# Patient Record
Sex: FEMALE | Race: WHITE | NOT HISPANIC OR LATINO | Employment: OTHER | URBAN - METROPOLITAN AREA
[De-identification: names, ages, dates, MRNs, and addresses within clinical notes are randomized per-mention and may not be internally consistent; named-entity substitution may affect disease eponyms.]

---

## 2017-03-21 DIAGNOSIS — R31.29 OTHER MICROSCOPIC HEMATURIA: ICD-10-CM

## 2017-03-21 DIAGNOSIS — N18.30 CHRONIC KIDNEY DISEASE, STAGE III (MODERATE) (HCC): ICD-10-CM

## 2017-03-21 DIAGNOSIS — I10 ESSENTIAL (PRIMARY) HYPERTENSION: ICD-10-CM

## 2017-03-21 DIAGNOSIS — I48.91 ATRIAL FIBRILLATION (HCC): ICD-10-CM

## 2017-03-21 DIAGNOSIS — N25.81 SECONDARY HYPERPARATHYROIDISM OF RENAL ORIGIN (HCC): ICD-10-CM

## 2017-03-21 DIAGNOSIS — R80.9 PROTEINURIA: ICD-10-CM

## 2017-03-21 DIAGNOSIS — I12.9 HYPERTENSIVE CHRONIC KIDNEY DISEASE WITH STAGE 1 THROUGH STAGE 4 CHRONIC KIDNEY DISEASE, OR UNSPECIFIED CHRONIC KIDNEY DISEASE: ICD-10-CM

## 2017-03-23 ENCOUNTER — ALLSCRIPTS OFFICE VISIT (OUTPATIENT)
Dept: OTHER | Facility: OTHER | Age: 82
End: 2017-03-23

## 2017-04-24 ENCOUNTER — TRANSCRIBE ORDERS (OUTPATIENT)
Dept: ADMINISTRATIVE | Facility: HOSPITAL | Age: 82
End: 2017-04-24

## 2017-04-24 DIAGNOSIS — R10.84 GENERALIZED ABDOMINAL PAIN: Primary | ICD-10-CM

## 2017-04-25 ENCOUNTER — HOSPITAL ENCOUNTER (OUTPATIENT)
Dept: CT IMAGING | Facility: HOSPITAL | Age: 82
Discharge: HOME | End: 2017-04-25
Payer: MEDICARE

## 2017-04-25 DIAGNOSIS — R10.84 GENERALIZED ABDOMINAL PAIN: ICD-10-CM

## 2017-04-25 PROCEDURE — 74176 CT ABD & PELVIS W/O CONTRAST: CPT

## 2017-06-15 ENCOUNTER — APPOINTMENT (EMERGENCY)
Dept: RADIOLOGY | Facility: HOSPITAL | Age: 82
End: 2017-06-15
Payer: MEDICARE

## 2017-06-15 ENCOUNTER — HOSPITAL ENCOUNTER (EMERGENCY)
Facility: HOSPITAL | Age: 82
Discharge: HOME | End: 2017-06-15
Attending: EMERGENCY MEDICINE
Payer: MEDICARE

## 2017-06-15 ENCOUNTER — APPOINTMENT (EMERGENCY)
Dept: NON INVASIVE DIAGNOSTICS | Facility: HOSPITAL | Age: 82
End: 2017-06-15
Payer: MEDICARE

## 2017-06-15 VITALS
TEMPERATURE: 99.8 F | OXYGEN SATURATION: 96 % | WEIGHT: 240 LBS | BODY MASS INDEX: 38.57 KG/M2 | DIASTOLIC BLOOD PRESSURE: 76 MMHG | RESPIRATION RATE: 20 BRPM | HEART RATE: 75 BPM | HEIGHT: 66 IN | SYSTOLIC BLOOD PRESSURE: 164 MMHG

## 2017-06-15 DIAGNOSIS — R60.9 EDEMA: ICD-10-CM

## 2017-06-15 DIAGNOSIS — M79.605 ACUTE PAIN OF LEFT LOWER EXTREMITY: Primary | ICD-10-CM

## 2017-06-15 LAB
ANION GAP SERPL CALCULATED.3IONS-SCNC: 8 MMOL/L (ref 4–13)
APTT PPP: 48 SECONDS (ref 23–35)
BASOPHILS # BLD AUTO: 0.02 THOUSANDS/ΜL (ref 0–0.1)
BASOPHILS NFR BLD AUTO: 0 % (ref 0–1)
BUN SERPL-MCNC: 31 MG/DL (ref 5–25)
CALCIUM SERPL-MCNC: 8.4 MG/DL (ref 8.3–10.1)
CHLORIDE SERPL-SCNC: 100 MMOL/L (ref 100–108)
CO2 SERPL-SCNC: 29 MMOL/L (ref 21–32)
CREAT SERPL-MCNC: 1.27 MG/DL (ref 0.6–1.3)
EOSINOPHIL # BLD AUTO: 0.13 THOUSAND/ΜL (ref 0–0.61)
EOSINOPHIL NFR BLD AUTO: 1 % (ref 0–6)
ERYTHROCYTE [DISTWIDTH] IN BLOOD BY AUTOMATED COUNT: 17.1 % (ref 11.6–15.1)
GFR SERPL CREATININE-BSD FRML MDRD: 40.5 ML/MIN/1.73SQ M
GLUCOSE SERPL-MCNC: 263 MG/DL (ref 65–140)
HCT VFR BLD AUTO: 30.6 % (ref 34.8–46.1)
HGB BLD-MCNC: 9.8 G/DL (ref 11.5–15.4)
INR PPP: 2.66 (ref 0.86–1.16)
LYMPHOCYTES # BLD AUTO: 0.93 THOUSANDS/ΜL (ref 0.6–4.47)
LYMPHOCYTES NFR BLD AUTO: 10 % (ref 14–44)
MCH RBC QN AUTO: 26.8 PG (ref 26.8–34.3)
MCHC RBC AUTO-ENTMCNC: 32 G/DL (ref 31.4–37.4)
MCV RBC AUTO: 84 FL (ref 82–98)
MONOCYTES # BLD AUTO: 0.58 THOUSAND/ΜL (ref 0.17–1.22)
MONOCYTES NFR BLD AUTO: 6 % (ref 4–12)
NEUTROPHILS # BLD AUTO: 7.73 THOUSANDS/ΜL (ref 1.85–7.62)
NEUTS SEG NFR BLD AUTO: 83 % (ref 43–75)
NRBC BLD AUTO-RTO: 0 /100 WBCS
PLATELET # BLD AUTO: 249 THOUSANDS/UL (ref 149–390)
PMV BLD AUTO: 10.1 FL (ref 8.9–12.7)
POTASSIUM SERPL-SCNC: 3.8 MMOL/L (ref 3.5–5.3)
PROTHROMBIN TIME: 28.7 SECONDS (ref 12.1–14.4)
RBC # BLD AUTO: 3.65 MILLION/UL (ref 3.81–5.12)
SODIUM SERPL-SCNC: 137 MMOL/L (ref 136–145)
WBC # BLD AUTO: 9.42 THOUSAND/UL (ref 4.31–10.16)

## 2017-06-15 PROCEDURE — 99284 EMERGENCY DEPT VISIT MOD MDM: CPT

## 2017-06-15 PROCEDURE — 36415 COLL VENOUS BLD VENIPUNCTURE: CPT | Performed by: EMERGENCY MEDICINE

## 2017-06-15 PROCEDURE — 85730 THROMBOPLASTIN TIME PARTIAL: CPT | Performed by: EMERGENCY MEDICINE

## 2017-06-15 PROCEDURE — 73564 X-RAY EXAM KNEE 4 OR MORE: CPT

## 2017-06-15 PROCEDURE — 93971 EXTREMITY STUDY: CPT

## 2017-06-15 PROCEDURE — 85025 COMPLETE CBC W/AUTO DIFF WBC: CPT | Performed by: EMERGENCY MEDICINE

## 2017-06-15 PROCEDURE — 85610 PROTHROMBIN TIME: CPT | Performed by: EMERGENCY MEDICINE

## 2017-06-15 PROCEDURE — 80048 BASIC METABOLIC PNL TOTAL CA: CPT | Performed by: EMERGENCY MEDICINE

## 2017-06-15 RX ORDER — HYDROCODONE BITARTRATE AND ACETAMINOPHEN 5; 325 MG/1; MG/1
1 TABLET ORAL ONCE
Status: COMPLETED | OUTPATIENT
Start: 2017-06-15 | End: 2017-06-15

## 2017-06-15 RX ORDER — ASPIRIN 81 MG/1
81 TABLET ORAL DAILY
COMMUNITY
End: 2019-02-18 | Stop reason: HOSPADM

## 2017-06-15 RX ORDER — METOPROLOL SUCCINATE 50 MG/1
50 TABLET, EXTENDED RELEASE ORAL DAILY
COMMUNITY

## 2017-06-15 RX ORDER — TOLTERODINE TARTRATE 1 MG/1
1 TABLET, EXTENDED RELEASE ORAL AS NEEDED
COMMUNITY
End: 2021-04-30

## 2017-06-15 RX ORDER — SERTRALINE HYDROCHLORIDE 25 MG/1
25 TABLET, FILM COATED ORAL DAILY
Status: ON HOLD | COMMUNITY
End: 2017-09-12 | Stop reason: ALTCHOICE

## 2017-06-15 RX ORDER — MULTIVITAMIN
1 TABLET ORAL DAILY
COMMUNITY

## 2017-06-15 RX ORDER — MELATONIN
1000 DAILY
COMMUNITY

## 2017-06-15 RX ORDER — HYDROCODONE BITARTRATE AND ACETAMINOPHEN 5; 325 MG/1; MG/1
1 TABLET ORAL EVERY 6 HOURS PRN
Qty: 10 TABLET | Refills: 0 | Status: ON HOLD | OUTPATIENT
Start: 2017-06-15 | End: 2019-02-18 | Stop reason: SDUPTHER

## 2017-06-15 RX ORDER — LANOLIN ALCOHOL/MO/W.PET/CERES
1 CREAM (GRAM) TOPICAL 2 TIMES DAILY
COMMUNITY

## 2017-06-15 RX ORDER — RABEPRAZOLE SODIUM 20 MG/1
20 TABLET, DELAYED RELEASE ORAL DAILY
COMMUNITY
End: 2021-04-12 | Stop reason: SDUPTHER

## 2017-06-15 RX ORDER — ALLOPURINOL 100 MG/1
100 TABLET ORAL 2 TIMES DAILY
COMMUNITY
End: 2021-11-10 | Stop reason: SDUPTHER

## 2017-06-15 RX ORDER — WARFARIN SODIUM 2.5 MG/1
3.75 TABLET ORAL
COMMUNITY
End: 2019-02-18 | Stop reason: HOSPADM

## 2017-06-15 RX ORDER — INDAPAMIDE 2.5 MG/1
2.5 TABLET, FILM COATED ORAL EVERY MORNING
COMMUNITY
End: 2019-02-18 | Stop reason: HOSPADM

## 2017-06-15 RX ADMIN — HYDROCODONE BITARTRATE AND ACETAMINOPHEN 1 TABLET: 5; 325 TABLET ORAL at 09:29

## 2017-07-13 ENCOUNTER — ALLSCRIPTS OFFICE VISIT (OUTPATIENT)
Dept: OTHER | Facility: OTHER | Age: 82
End: 2017-07-13

## 2017-07-13 DIAGNOSIS — R80.9 PROTEINURIA: ICD-10-CM

## 2017-07-13 DIAGNOSIS — I10 ESSENTIAL (PRIMARY) HYPERTENSION: ICD-10-CM

## 2017-07-13 DIAGNOSIS — N18.30 CHRONIC KIDNEY DISEASE, STAGE III (MODERATE) (HCC): ICD-10-CM

## 2017-07-13 DIAGNOSIS — N25.81 SECONDARY HYPERPARATHYROIDISM OF RENAL ORIGIN (HCC): ICD-10-CM

## 2017-07-24 DIAGNOSIS — N25.81 SECONDARY HYPERPARATHYROIDISM OF RENAL ORIGIN (HCC): ICD-10-CM

## 2017-07-26 ENCOUNTER — GENERIC CONVERSION - ENCOUNTER (OUTPATIENT)
Dept: OTHER | Facility: OTHER | Age: 82
End: 2017-07-26

## 2017-08-30 ENCOUNTER — TRANSCRIBE ORDERS (OUTPATIENT)
Dept: SLEEP CENTER | Facility: CLINIC | Age: 82
End: 2017-08-30

## 2017-08-30 DIAGNOSIS — G47.52 REM BEHAVIORAL DISORDER: Primary | ICD-10-CM

## 2017-09-11 ENCOUNTER — ANESTHESIA EVENT (OUTPATIENT)
Dept: GASTROENTEROLOGY | Facility: HOSPITAL | Age: 82
DRG: 921 | End: 2017-09-11
Payer: MEDICARE

## 2017-09-12 ENCOUNTER — ANESTHESIA (OUTPATIENT)
Dept: GASTROENTEROLOGY | Facility: HOSPITAL | Age: 82
DRG: 921 | End: 2017-09-12
Payer: MEDICARE

## 2017-09-12 ENCOUNTER — HOSPITAL ENCOUNTER (OUTPATIENT)
Facility: HOSPITAL | Age: 82
Setting detail: OUTPATIENT SURGERY
Discharge: HOME | DRG: 921 | End: 2017-09-12
Attending: INTERNAL MEDICINE | Admitting: INTERNAL MEDICINE
Payer: MEDICARE

## 2017-09-12 VITALS
TEMPERATURE: 97.9 F | DIASTOLIC BLOOD PRESSURE: 60 MMHG | WEIGHT: 240 LBS | HEIGHT: 67 IN | OXYGEN SATURATION: 99 % | HEART RATE: 77 BPM | BODY MASS INDEX: 37.67 KG/M2 | SYSTOLIC BLOOD PRESSURE: 126 MMHG | RESPIRATION RATE: 20 BRPM

## 2017-09-12 DIAGNOSIS — R10.13 EPIGASTRIC PAIN: ICD-10-CM

## 2017-09-12 DIAGNOSIS — Z86.010 HISTORY OF COLONIC POLYPS: ICD-10-CM

## 2017-09-12 DIAGNOSIS — Z80.0 FAMILY HISTORY OF MALIGNANT NEOPLASM OF DIGESTIVE ORGAN: ICD-10-CM

## 2017-09-12 LAB — GLUCOSE SERPL-MCNC: 181 MG/DL (ref 65–140)

## 2017-09-12 PROCEDURE — 88305 TISSUE EXAM BY PATHOLOGIST: CPT | Performed by: INTERNAL MEDICINE

## 2017-09-12 PROCEDURE — 82948 REAGENT STRIP/BLOOD GLUCOSE: CPT

## 2017-09-12 PROCEDURE — 0DBN8ZZ EXCISION OF SIGMOID COLON, VIA NATURAL OR ARTIFICIAL OPENING ENDOSCOPIC: ICD-10-PCS | Performed by: INTERNAL MEDICINE

## 2017-09-12 PROCEDURE — 88342 IMHCHEM/IMCYTCHM 1ST ANTB: CPT | Performed by: INTERNAL MEDICINE

## 2017-09-12 PROCEDURE — 0DB68ZX EXCISION OF STOMACH, VIA NATURAL OR ARTIFICIAL OPENING ENDOSCOPIC, DIAGNOSTIC: ICD-10-PCS | Performed by: INTERNAL MEDICINE

## 2017-09-12 PROCEDURE — 0DBL8ZX EXCISION OF TRANSVERSE COLON, VIA NATURAL OR ARTIFICIAL OPENING ENDOSCOPIC, DIAGNOSTIC: ICD-10-PCS | Performed by: INTERNAL MEDICINE

## 2017-09-12 RX ORDER — CLINDAMYCIN PHOSPHATE 600 MG/50ML
600 INJECTION INTRAVENOUS ONCE
Status: COMPLETED | OUTPATIENT
Start: 2017-09-12 | End: 2017-09-12

## 2017-09-12 RX ORDER — PROPOFOL 10 MG/ML
INJECTION, EMULSION INTRAVENOUS AS NEEDED
Status: DISCONTINUED | OUTPATIENT
Start: 2017-09-12 | End: 2017-09-12 | Stop reason: SURG

## 2017-09-12 RX ORDER — SODIUM CHLORIDE 9 MG/ML
125 INJECTION, SOLUTION INTRAVENOUS CONTINUOUS
Status: DISCONTINUED | OUTPATIENT
Start: 2017-09-12 | End: 2017-09-12 | Stop reason: HOSPADM

## 2017-09-12 RX ADMIN — SODIUM CHLORIDE 125 ML/HR: 0.9 INJECTION, SOLUTION INTRAVENOUS at 12:43

## 2017-09-12 RX ADMIN — LIDOCAINE HYDROCHLORIDE 80 MG: 20 INJECTION, SOLUTION INTRAVENOUS at 13:10

## 2017-09-12 RX ADMIN — PROPOFOL 50 MG: 10 INJECTION, EMULSION INTRAVENOUS at 13:15

## 2017-09-12 RX ADMIN — PROPOFOL 50 MG: 10 INJECTION, EMULSION INTRAVENOUS at 13:19

## 2017-09-12 RX ADMIN — CLINDAMYCIN PHOSPHATE 600 MG: 12 INJECTION, SOLUTION INTRAMUSCULAR; INTRAVENOUS at 12:43

## 2017-09-12 RX ADMIN — PROPOFOL 100 MG: 10 INJECTION, EMULSION INTRAVENOUS at 13:10

## 2017-09-12 NOTE — INTERVAL H&P NOTE
H&P reviewed  After examining the patient I find no changes in the patients condition since the H&P had been written  On lovenox direction for reconversion to antocoag will be given depending of finding egd/colon

## 2017-09-12 NOTE — PROGRESS NOTES
D/C instructions given and Pt verbalizes understanding  Dr Lambert Flores was here to talk with Pt  OOB to BR gait steady denies dizziness  Reports passed air AND VOIDED

## 2017-09-12 NOTE — DISCHARGE INSTRUCTIONS
Please call [phone] with any problems  You were to resume her Lovenox later today and take care for his Coumadin dose today  Other instructions as per Cardiology as to follow up INR at 6 stature  My office to contact you to make sure we have a return office visit to go over likely benign pathology  Gastric Polyps   WHAT YOU NEED TO KNOW:   Gastric polyps are growths that form in the lining of your stomach  They are not cancerous, but certain types of polyps can change into cancer  DISCHARGE INSTRUCTIONS:   Follow up with your healthcare provider as directed: You may need more tests or procedures  Write down any questions so you remember to ask them at your visits  Medicines:   · Medicines may  be given if you have an infection caused by H  pylori bacteria  · Take your medicine as directed  Contact your healthcare provider if you think your medicine is not helping or if you have side effects  Tell him or her if you are allergic to any medicine  Keep a list of the medicines, vitamins, and herbs you take  Include the amounts, and when and why you take them  Bring the list or the pill bottles to follow-up visits  Carry your medicine list with you in case of an emergency  Seek care immediately if:   · You have blood in your vomit  · You have dark bowel movements  · You have severe pain in your abdomen that does not go away after you take medicine  Contact your healthcare provider if:   · You have indigestion that does not go away with treatment  · You vomit after meals  · You have questions or concerns about your condition or care  © 2017 [address] Information is for End User's use only and may not be sold, redistributed or otherwise used for commercial purposes  All illustrations and images included in CareNotes® are the copyrighted property of [employer]  or Miguel Fisher  The above information is an  only   It is not intended as medical advice for individual conditions or treatments  Talk to your doctor, nurse or pharmacist before following any medical regimen to see if it is safe and effective for you  Colorectal Polyps   WHAT YOU NEED TO KNOW:   Colorectal polyps are small growths of tissue in the lining of the colon and rectum  Most polyps are hyperplastic polyps and are usually benign (noncancerous)  Certain types of polyps, called adenomatous polyps, may turn into cancer  DISCHARGE INSTRUCTIONS:   Follow up with your healthcare provider or gastroenterologist as directed: You may need to return for more tests, such as another colonoscopy  Write down your questions so you remember to ask them during your visits  Reduce your risk for colorectal polyps:   · Eat a variety of healthy foods:  Healthy foods include fruit, vegetables, whole-grain breads, low-fat dairy products, beans, lean meat, and fish  Ask if you need to be on a special diet  · Maintain a healthy weight:  Ask your healthcare provider if you need to lose weight and how much you need to lose  Ask for help with a weight loss program     · Exercise:  Begin to exercise slowly and do more as you get stronger  Talk with your healthcare provider before you start an exercise program      · Limit alcohol:  Your risk for polyps increases the more you drink  · Do not smoke: If you smoke, it is never too late to quit  Ask for information about how to stop  For support and more information:   · Orlando Santana ([facility]) [address], [place] [address]  Phone: [phone]  Web Address: www digestive  niddk nih gov  Contact your healthcare provider or gastroenterologist if:   · You have a fever  · You have chills, a cough, or feel weak and achy  · You have abdominal pain that does not go away or gets worse after you take medicine  · Your abdomen is swollen       · You are losing weight without trying  · You have questions or concerns about your condition or care  Seek care immediately or call 911 if:   · You have sudden shortness of breath  · You have a fast heart rate, fast breathing, or are too dizzy to stand up  · You have severe abdominal pain  · You see blood in your bowel movement  © 2017 [address] Information is for End User's use only and may not be sold, redistributed or otherwise used for commercial purposes  All illustrations and images included in CareNotes® are the copyrighted property of A D [employer] M [employer]  or Miguel Fisher  The above information is an  only  It is not intended as medical advice for individual conditions or treatments  Talk to your doctor, nurse or pharmacist before following any medical regimen to see if it is safe and effective for you

## 2017-09-12 NOTE — PRE-PROCEDURE INSTRUCTIONS
Endo process reviewed with pt  Call bell in reach  Pt  Comfortable without further questions at present

## 2017-09-12 NOTE — OP NOTE
OPERATIVE REPORT  PATIENT NAME: Cristela Vazquez    :  1935  MRN: [id number]  Pt Location: [facility] RO[facility] 01    SURGERY DATE: 2017    Surgeon(s) and Role:     Kayden Faith MD - Primary    Preop Diagnosis:  History of colonic polyps [Z86 010]  Family history of malignant neoplasm of digestive organ [Z80 0]  Epigastric pain [R10 13]    Post-Op Diagnosis Codes:     * History of colonic polyps [Z86 010]     * Family history of malignant neoplasm of digestive organ [Z80 0]     * Epigastric pain [R10 13]    Procedure(s) (LRB):  EGD AND COLONOSCOPY (N/A)    Specimen(s):  ID Type Source Tests Collected by Time Destination   1 : gastric antrum polyp bx Tissue Polyp, Stomach/Small Intestine TISSUE EXAM Rena Bahena IV, MD 2017 1312    2 : gastric body polyp bx Tissue Polyp, Stomach/Small Intestine TISSUE EXAM Rena Bahena IV, MD 2017 1313        Estimated Blood Loss:   Minimal    Drains:       Anesthesia Type:   IV Sedation with Anesthesia    Operative Indications:  History of colonic polyps [Z86 010]  Family history of malignant neoplasm of digestive organ [Z80 0]  Epigastric pain [R10 13]      Operative Findings:  1  Multiple large gastric polyps 2  Colon polyps  Complications:   None    Procedure and Technique: The upper endoscope was passed under direct visualization esophagus stomach and duodenum  The duodenum is normal   There are multiple polyps in the stomach  One is in the antrum is sessile and appears entirely benign and is biopsied  Her multiple other large polyps in the midbody and upper body of the stomach the largest of which is slightly reddish and is biopsied several times  It appears soft and benign  It is not removed as she presently is on Lovenox and needs to be converted back to Coumadin next stature and we do not know the pathology of this polyp which likely is benign    The fundus and cardia stomach were normal the esophagus is normal    The patient states that the colon prep exacerbated her abdominal pain and is now quiescent immediately prior to the procedure  She vomited the 2nd part of the prep  The colonoscope was introduced and passed to the cecum the preparation is very good  The cecum was reached  In the proximal transverse colon a 0 4 cm polyp is cold snared and suctioned  In the proximal left colon at 0 3 cm polyp is cold snared and suctioned  No other polyps were seen mucosa throughout has an otherwise normal     She has been instructed to restart her Lovenox later today and go back on her 1st dose of Coumadin today as per directions from Cardiology         I was present for the entire procedure    Patient Disposition:  hemodynamically stable    SIGNATURE: Cristian Peterson MD  DATE: September 12, 2017  TIME: 1:14 PM

## 2017-09-13 ENCOUNTER — HOSPITAL ENCOUNTER (INPATIENT)
Facility: HOSPITAL | Age: 82
LOS: 2 days | Discharge: HOME | DRG: 921 | End: 2017-09-15
Attending: EMERGENCY MEDICINE | Admitting: INTERNAL MEDICINE
Payer: MEDICARE

## 2017-09-13 ENCOUNTER — ANESTHESIA EVENT (OUTPATIENT)
Dept: GASTROENTEROLOGY | Facility: HOSPITAL | Age: 82
DRG: 921 | End: 2017-09-13
Payer: MEDICARE

## 2017-09-13 ENCOUNTER — ANESTHESIA (OUTPATIENT)
Dept: GASTROENTEROLOGY | Facility: HOSPITAL | Age: 82
DRG: 921 | End: 2017-09-13
Payer: MEDICARE

## 2017-09-13 DIAGNOSIS — K91.840 COLONOSCOPY CAUSING POST-PROCEDURAL BLEEDING: Primary | ICD-10-CM

## 2017-09-13 DIAGNOSIS — Z95.2 MECHANICAL HEART VALVE PRESENT: Chronic | ICD-10-CM

## 2017-09-13 DIAGNOSIS — Z79.01 ANTICOAGULATED: ICD-10-CM

## 2017-09-13 DIAGNOSIS — I48.20 CHRONIC ATRIAL FIBRILLATION (HCC): Chronic | ICD-10-CM

## 2017-09-13 DIAGNOSIS — K62.5 RECTAL BLEEDING: ICD-10-CM

## 2017-09-13 PROBLEM — M1A.9XX0 CHRONIC GOUT: Chronic | Status: ACTIVE | Noted: 2017-09-13

## 2017-09-13 PROBLEM — N18.30 CKD (CHRONIC KIDNEY DISEASE) STAGE 3, GFR 30-59 ML/MIN (HCC): Chronic | Status: ACTIVE | Noted: 2017-09-13

## 2017-09-13 PROBLEM — K63.5 COLON POLYP: Status: ACTIVE | Noted: 2017-09-13

## 2017-09-13 PROBLEM — I10 ESSENTIAL HYPERTENSION: Chronic | Status: ACTIVE | Noted: 2017-09-13

## 2017-09-13 PROBLEM — K29.70 GASTRITIS: Chronic | Status: ACTIVE | Noted: 2017-09-13

## 2017-09-13 LAB
ANION GAP SERPL CALCULATED.3IONS-SCNC: 4 MMOL/L (ref 4–13)
APTT PPP: 34 SECONDS (ref 23–35)
BASOPHILS # BLD AUTO: 0.03 THOUSANDS/ΜL (ref 0–0.1)
BASOPHILS NFR BLD AUTO: 0 % (ref 0–1)
BUN SERPL-MCNC: 23 MG/DL (ref 5–25)
CALCIUM SERPL-MCNC: 9.3 MG/DL (ref 8.3–10.1)
CHLORIDE SERPL-SCNC: 103 MMOL/L (ref 100–108)
CO2 SERPL-SCNC: 32 MMOL/L (ref 21–32)
CREAT SERPL-MCNC: 1.19 MG/DL (ref 0.6–1.3)
EOSINOPHIL # BLD AUTO: 0.17 THOUSAND/ΜL (ref 0–0.61)
EOSINOPHIL NFR BLD AUTO: 2 % (ref 0–6)
ERYTHROCYTE [DISTWIDTH] IN BLOOD BY AUTOMATED COUNT: 19.8 % (ref 11.6–15.1)
EST. AVERAGE GLUCOSE BLD GHB EST-MCNC: 189 MG/DL
GFR SERPL CREATININE-BSD FRML MDRD: 43 ML/MIN/1.73SQ M
GLUCOSE SERPL-MCNC: 111 MG/DL (ref 65–140)
GLUCOSE SERPL-MCNC: 154 MG/DL (ref 65–140)
GLUCOSE SERPL-MCNC: 166 MG/DL (ref 65–140)
GLUCOSE SERPL-MCNC: 292 MG/DL (ref 65–140)
HBA1C MFR BLD: 8.2 % (ref 4.2–6.3)
HCT VFR BLD AUTO: 29.4 % (ref 34.8–46.1)
HCT VFR BLD AUTO: 31.9 % (ref 34.8–46.1)
HCT VFR BLD AUTO: 32.8 % (ref 34.8–46.1)
HCT VFR BLD AUTO: 38.3 % (ref 34.8–46.1)
HGB BLD-MCNC: 10.4 G/DL (ref 11.5–15.4)
HGB BLD-MCNC: 10.4 G/DL (ref 11.5–15.4)
HGB BLD-MCNC: 12.9 G/DL (ref 11.5–15.4)
HGB BLD-MCNC: 9.4 G/DL (ref 11.5–15.4)
INR PPP: 1.36 (ref 0.86–1.16)
LYMPHOCYTES # BLD AUTO: 1.27 THOUSANDS/ΜL (ref 0.6–4.47)
LYMPHOCYTES NFR BLD AUTO: 16 % (ref 14–44)
MCH RBC QN AUTO: 26.4 PG (ref 26.8–34.3)
MCHC RBC AUTO-ENTMCNC: 32.6 G/DL (ref 31.4–37.4)
MCV RBC AUTO: 81 FL (ref 82–98)
MONOCYTES # BLD AUTO: 0.55 THOUSAND/ΜL (ref 0.17–1.22)
MONOCYTES NFR BLD AUTO: 7 % (ref 4–12)
NEUTROPHILS # BLD AUTO: 6.11 THOUSANDS/ΜL (ref 1.85–7.62)
NEUTS SEG NFR BLD AUTO: 75 % (ref 43–75)
NRBC BLD AUTO-RTO: 0 /100 WBCS
PLATELET # BLD AUTO: 259 THOUSANDS/UL (ref 149–390)
PMV BLD AUTO: 9.5 FL (ref 8.9–12.7)
POTASSIUM SERPL-SCNC: 3.6 MMOL/L (ref 3.5–5.3)
PROTHROMBIN TIME: 16.8 SECONDS (ref 12.1–14.4)
RBC # BLD AUTO: 3.94 MILLION/UL (ref 3.81–5.12)
SODIUM SERPL-SCNC: 139 MMOL/L (ref 136–145)
WBC # BLD AUTO: 8.13 THOUSAND/UL (ref 4.31–10.16)

## 2017-09-13 PROCEDURE — 85018 HEMOGLOBIN: CPT | Performed by: PHYSICIAN ASSISTANT

## 2017-09-13 PROCEDURE — 80048 BASIC METABOLIC PNL TOTAL CA: CPT | Performed by: EMERGENCY MEDICINE

## 2017-09-13 PROCEDURE — 85014 HEMATOCRIT: CPT | Performed by: INTERNAL MEDICINE

## 2017-09-13 PROCEDURE — 36415 COLL VENOUS BLD VENIPUNCTURE: CPT | Performed by: EMERGENCY MEDICINE

## 2017-09-13 PROCEDURE — 0W3P8ZZ CONTROL BLEEDING IN GASTROINTESTINAL TRACT, VIA NATURAL OR ARTIFICIAL OPENING ENDOSCOPIC: ICD-10-PCS | Performed by: INTERNAL MEDICINE

## 2017-09-13 PROCEDURE — 85018 HEMOGLOBIN: CPT | Performed by: INTERNAL MEDICINE

## 2017-09-13 PROCEDURE — 99285 EMERGENCY DEPT VISIT HI MDM: CPT

## 2017-09-13 PROCEDURE — 85014 HEMATOCRIT: CPT | Performed by: PHYSICIAN ASSISTANT

## 2017-09-13 PROCEDURE — 82948 REAGENT STRIP/BLOOD GLUCOSE: CPT

## 2017-09-13 PROCEDURE — 85730 THROMBOPLASTIN TIME PARTIAL: CPT | Performed by: EMERGENCY MEDICINE

## 2017-09-13 PROCEDURE — 85610 PROTHROMBIN TIME: CPT | Performed by: EMERGENCY MEDICINE

## 2017-09-13 PROCEDURE — 82272 OCCULT BLD FECES 1-3 TESTS: CPT

## 2017-09-13 PROCEDURE — 83036 HEMOGLOBIN GLYCOSYLATED A1C: CPT | Performed by: PHYSICIAN ASSISTANT

## 2017-09-13 PROCEDURE — 85025 COMPLETE CBC W/AUTO DIFF WBC: CPT | Performed by: EMERGENCY MEDICINE

## 2017-09-13 RX ORDER — MORPHINE SULFATE 4 MG/ML
1 INJECTION, SOLUTION INTRAMUSCULAR; INTRAVENOUS
Status: DISCONTINUED | OUTPATIENT
Start: 2017-09-13 | End: 2017-09-15 | Stop reason: HOSPADM

## 2017-09-13 RX ORDER — ASPIRIN 81 MG/1
81 TABLET ORAL DAILY
Status: DISCONTINUED | OUTPATIENT
Start: 2017-09-13 | End: 2017-09-13

## 2017-09-13 RX ORDER — PROPOFOL 10 MG/ML
INJECTION, EMULSION INTRAVENOUS AS NEEDED
Status: DISCONTINUED | OUTPATIENT
Start: 2017-09-13 | End: 2017-09-13 | Stop reason: SURG

## 2017-09-13 RX ORDER — WARFARIN SODIUM 5 MG/1
5 TABLET ORAL
Status: DISCONTINUED | OUTPATIENT
Start: 2017-09-19 | End: 2017-09-13

## 2017-09-13 RX ORDER — CALCIUM CARBONATE 500(1250)
1 TABLET ORAL
Status: DISCONTINUED | OUTPATIENT
Start: 2017-09-13 | End: 2017-09-15 | Stop reason: HOSPADM

## 2017-09-13 RX ORDER — PANTOPRAZOLE SODIUM 40 MG/1
40 TABLET, DELAYED RELEASE ORAL
Status: DISCONTINUED | OUTPATIENT
Start: 2017-09-13 | End: 2017-09-15 | Stop reason: HOSPADM

## 2017-09-13 RX ORDER — WARFARIN SODIUM 7.5 MG/1
3.75 TABLET ORAL
Status: DISCONTINUED | OUTPATIENT
Start: 2017-09-14 | End: 2017-09-13

## 2017-09-13 RX ORDER — HYDROCODONE BITARTRATE AND ACETAMINOPHEN 5; 325 MG/1; MG/1
1 TABLET ORAL EVERY 6 HOURS PRN
Status: DISCONTINUED | OUTPATIENT
Start: 2017-09-13 | End: 2017-09-15 | Stop reason: HOSPADM

## 2017-09-13 RX ORDER — HYDRALAZINE HYDROCHLORIDE 20 MG/ML
10 INJECTION INTRAMUSCULAR; INTRAVENOUS EVERY 6 HOURS PRN
Status: DISCONTINUED | OUTPATIENT
Start: 2017-09-13 | End: 2017-09-15 | Stop reason: HOSPADM

## 2017-09-13 RX ORDER — INDAPAMIDE 2.5 MG/1
2.5 TABLET, FILM COATED ORAL EVERY MORNING
Status: DISCONTINUED | OUTPATIENT
Start: 2017-09-13 | End: 2017-09-15 | Stop reason: HOSPADM

## 2017-09-13 RX ORDER — CLINDAMYCIN PHOSPHATE 600 MG/50ML
600 INJECTION INTRAVENOUS ONCE
Status: COMPLETED | OUTPATIENT
Start: 2017-09-13 | End: 2017-09-14

## 2017-09-13 RX ORDER — TOLTERODINE TARTRATE 1 MG/1
1 TABLET, EXTENDED RELEASE ORAL
Status: DISCONTINUED | OUTPATIENT
Start: 2017-09-13 | End: 2017-09-15 | Stop reason: HOSPADM

## 2017-09-13 RX ORDER — ALLOPURINOL 100 MG/1
100 TABLET ORAL
Status: DISCONTINUED | OUTPATIENT
Start: 2017-09-13 | End: 2017-09-15 | Stop reason: HOSPADM

## 2017-09-13 RX ORDER — METOPROLOL SUCCINATE 50 MG/1
50 TABLET, EXTENDED RELEASE ORAL DAILY
Status: DISCONTINUED | OUTPATIENT
Start: 2017-09-13 | End: 2017-09-15 | Stop reason: HOSPADM

## 2017-09-13 RX ORDER — LIDOCAINE HYDROCHLORIDE 10 MG/ML
INJECTION, SOLUTION INFILTRATION; PERINEURAL AS NEEDED
Status: DISCONTINUED | OUTPATIENT
Start: 2017-09-13 | End: 2017-09-13 | Stop reason: SURG

## 2017-09-13 RX ORDER — SODIUM CHLORIDE 9 MG/ML
INJECTION, SOLUTION INTRAVENOUS CONTINUOUS PRN
Status: DISCONTINUED | OUTPATIENT
Start: 2017-09-13 | End: 2017-09-13 | Stop reason: SURG

## 2017-09-13 RX ORDER — WARFARIN SODIUM 7.5 MG/1
3.75 TABLET ORAL
Status: DISCONTINUED | OUTPATIENT
Start: 2017-09-13 | End: 2017-09-13

## 2017-09-13 RX ORDER — MELATONIN
1000 DAILY
Status: DISCONTINUED | OUTPATIENT
Start: 2017-09-13 | End: 2017-09-15 | Stop reason: HOSPADM

## 2017-09-13 RX ADMIN — CLINDAMYCIN PHOSPHATE 600 MG: 12 INJECTION, SOLUTION INTRAMUSCULAR; INTRAVENOUS at 12:05

## 2017-09-13 RX ADMIN — ALLOPURINOL 100 MG: 100 TABLET ORAL at 20:41

## 2017-09-13 RX ADMIN — ALLOPURINOL 100 MG: 100 TABLET ORAL at 06:46

## 2017-09-13 RX ADMIN — INDAPAMIDE 2.5 MG: 2.5 TABLET, FILM COATED ORAL at 08:00

## 2017-09-13 RX ADMIN — TOLTERODINE TARTRATE 1 MG: 1 TABLET, FILM COATED ORAL at 23:20

## 2017-09-13 RX ADMIN — SODIUM CHLORIDE: 0.9 INJECTION, SOLUTION INTRAVENOUS at 14:43

## 2017-09-13 RX ADMIN — PANTOPRAZOLE SODIUM 40 MG: 40 TABLET, DELAYED RELEASE ORAL at 07:59

## 2017-09-13 RX ADMIN — PROPOFOL 20 MG: 10 INJECTION, EMULSION INTRAVENOUS at 15:01

## 2017-09-13 RX ADMIN — Medication 1 TABLET: at 06:46

## 2017-09-13 RX ADMIN — LIDOCAINE HYDROCHLORIDE 100 MG: 10 INJECTION, SOLUTION INFILTRATION; PERINEURAL at 14:54

## 2017-09-13 RX ADMIN — HYDROCODONE BITARTRATE AND ACETAMINOPHEN 1 TABLET: 5; 325 TABLET ORAL at 23:19

## 2017-09-13 RX ADMIN — PROPOFOL 30 MG: 10 INJECTION, EMULSION INTRAVENOUS at 14:59

## 2017-09-13 RX ADMIN — PANTOPRAZOLE SODIUM 40 MG: 40 TABLET, DELAYED RELEASE ORAL at 20:41

## 2017-09-13 RX ADMIN — Medication 400 MG: at 07:59

## 2017-09-13 RX ADMIN — METOPROLOL SUCCINATE 50 MG: 50 TABLET, EXTENDED RELEASE ORAL at 08:00

## 2017-09-13 RX ADMIN — PROPOFOL 70 MG: 10 INJECTION, EMULSION INTRAVENOUS at 14:54

## 2017-09-13 RX ADMIN — VITAMIN D, TAB 1000IU (100/BT) 1000 UNITS: 25 TAB at 08:00

## 2017-09-13 RX ADMIN — BIMATOPROST 1 DROP: 0.1 SOLUTION/ DROPS OPHTHALMIC at 23:18

## 2017-09-13 RX ADMIN — PROPOFOL 30 MG: 10 INJECTION, EMULSION INTRAVENOUS at 14:57

## 2017-09-13 RX ADMIN — INSULIN LISPRO 4 UNITS: 100 INJECTION, SOLUTION INTRAVENOUS; SUBCUTANEOUS at 23:21

## 2017-09-13 NOTE — CASE MANAGEMENT
Initial Clinical Review    Admission: Date/Time/Statement:    OBS  ORDER    9/13  @    0416    Orders Placed This Encounter   Procedures    Place in Observation (expected length of stay for this patient is less than two midnights)     Standing Status:   Standing     Number of Occurrences:   1     Order Specific Question:   Admitting Physician     Answer:   Stevie Haq [[id number]]     Order Specific Question:   Level of Care     Answer:   Med Surg [16]         ED: Date/Time/Mode of Arrival:   ED Arrival Information     Expected Arrival Acuity Means of Arrival Escorted By Service Admission Type    - 9/13/2017 01:49 Urgent Walk-In Self General Medicine Urgent    Arrival Complaint    rectal bleeding          Chief Complaint:   Chief Complaint   Patient presents with    Rectal Bleeding     Pt states "I had a colonoscopy earlier today  After I came home I had 3 loose BMs and the toliet had a moderate amt of blood in the toliet  I called the gastro group and was sent here"       History of Illness:    Jessica Solomon is a 81 y o  female, former nurse and current , who underwent EGD and colonoscopy at approximately 13:00 yesterday  Biopsies of the stomach and colon were taken  She had 3 bowel movements at home which were bloody into mostly bloody bowel movements in the ER  She has a history of chronic anticoagulation with Coumadin for atrial fibrillation as well as mechanical heart valve  She has not taken her Coumadin since Saturday and was bridged with Lovenox      At present she has a crampy sensation in the abdomen with some discomfort in the right lower quadrant      She contacted her gastroenterology provider's office and was instructed to come to the ER for evaluation with regard to the bloody stool      ED Vital Signs:   ED Triage Vitals   Temperature Pulse Respirations Blood Pressure SpO2   09/13/17 0200 09/13/17 0200 09/13/17 0200 09/13/17 0200 09/13/17 0200   98 1 °F (36 7 °C) 83 18 168/92 97 % Temp src Heart Rate Source Patient Position - Orthostatic VS BP Location FiO2 (%)   -- 09/13/17 0411 09/13/17 0411 09/13/17 0411 --    Monitor Lying Right arm       Pain Score       09/13/17 0200       3        Wt Readings from Last 1 Encounters:   09/13/17 109 kg (240 lb 4 8 oz)       Vital Signs (abnormal):   above    Abnormal Labs/Nicci gnostic Test Results:    PT    16 8      INR   1 36  H/H   10 4/31 9      adm            9 4/29 4     (  9/13  @    2047)             9 2/28 6   (    9/14)    ED Treatment:   Medication Administration from 09/13/2017 0149 to 09/13/2017 1017       Date/Time Order Dose Route Action Action by Comments     09/13/2017 0646 allopurinol (ZYLOPRIM) tablet 100 mg 100 mg Oral Given Jesi Reyes RN      09/13/2017 0847 aspirin (ECOTRIN LOW STRENGTH) EC tablet 81 mg 0 mg Oral Hold Freeda Schirmer, RN hold per GI at this point     09/13/2017 0646 calcium carbonate (OYSTER SHELL,OSCAL) 500 mg tablet 1 tablet 1 tablet Oral Given Jesi Reyes RN      09/13/2017 0800 cholecalciferol (VITAMIN D3) tablet 1,000 Units 1,000 Units Oral Given Freeda Schirmer, RN      09/13/2017 0800 indapamide (LOZOL) tablet 2 5 mg 2 5 mg Oral Given Freeda Schirmer, RN      09/13/2017 0759 magnesium oxide (MAG-OX) tablet 400 mg 400 mg Oral Given Freeda Schirmer, RN      09/13/2017 0800 metoprolol succinate (TOPROL-XL) 24 hr tablet 50 mg 50 mg Oral Given Freeda Schirmer, RN      09/13/2017 0759 pantoprazole (PROTONIX) EC tablet 40 mg 40 mg Oral Given Freeda Schirmer, RN      09/13/2017 0848 enoxaparin (LOVENOX) subcutaneous injection 30 mg 0 mg Subcutaneous Hold Freeda Schirmer, RN hold per GI at this point     09/13/2017 0800 insulin lispro (HumaLOG) 100 units/mL subcutaneous injection 1-6 Units 1 Units Subcutaneous Not Given Freeda Schirmer, RN           Past Medical/Surgical History:    Active Ambulatory Problems     Diagnosis Date Noted    No Active Ambulatory Problems     Resolved Ambulatory Problems     Diagnosis Date Noted    No Resolved Ambulatory Problems     Past Medical History:   Diagnosis Date    Chronic atrial fibrillation 9/13/2017    Chronic gout 9/13/2017    CKD (chronic kidney disease) stage 3, GFR 30-59 ml/min 9/13/2017    Colon polyp 9/13/2017    Coronary artery disease     Diabetes mellitus     Essential hypertension 9/13/2017    Gastritis 9/13/2017    Hypertension     Irregular heart beat     Mechanical heart valve present 9/13/2017    Osteoarthritis     Pacemaker     S/P AVR     Spinal stenosis        Admitting Diagnosis: Rectal bleeding [K62 5]  Anticoagulated [Z79 01]  Colonoscopy causing post-procedural bleeding [K91 840]    Age/Sex: 81 y o  female    Assessment/Plan:    Rectal bleeding     Plan:     Rectal bleeding   -patient is status post colonoscopy and EGD with biopsies of the stomach and colon with 3 episodes of bloody stool at home and 2 episodes in the ER  -patient is on chronic Coumadin however has not taken it since Saturday and has been using Lovenox due to mechanical heart valve and atrial fibrillation  -will monitor H/H closely and recheck in 4 hours  -consult Dr Rogers Brian who performed the procedure yesterday     Hypertension  -we will continue indapamide and metoprolol when she can have p  o  intake and will use hydralazine IV for now     Type 2 diabetes mellitus with insulin  -she normally uses Tradjenta and Levemir however because she will be NPO we will utilize sliding scale     Overactive bladder  -will continue Detrol 1 mg q h s  when she is she can have p o  intake     Atrial fibrillation and mechanical heart valve  -her warfarin schedule is 3 75 mg on 6 days a week and 5 mg 1 day a week which will continue when she can tolerate p o  intake   -will continue with Lovenox for now and observe her rectal bleeding and labs  -INR 1 36, Protime 16 8  -history of pacemaker/AICD placement  Chronic kidney disease stage 3  -will monitor with BMP     Gout  -continue allopurinol when she can tolerate p o      Gastritis  -continue AcipHex 20 mg when she can tolerate p o      Admission Orders: OBS   ORDER     9/13  @    0416    IP  ORDER  ENTERED    9/13      1403    Scheduled Meds:   allopurinol 100 mg Oral TID (diuretic)   aspirin 81 mg Oral Daily   bimatoprost 1 drop Both Eyes HS   calcium carbonate 1 tablet Oral Daily With Breakfast   cholecalciferol 1,000 Units Oral Daily   clindamycin 600 mg Intravenous Once   enoxaparin 30 mg Subcutaneous BID   indapamide 2 5 mg Oral QAM   insulin lispro 1-6 Units Subcutaneous TID AC   insulin lispro 1-6 Units Subcutaneous HS   metoprolol succinate 50 mg Oral Daily   pantoprazole 40 mg Oral BID AC   tolterodine 1 mg Oral HS   [START ON 9/14/2017] warfarin 3 75 mg Oral Once per day on Sun Mon Wed Thu Fri Sat   [START ON 9/19/2017] warfarin 5 mg Oral Once per day on Tue     Continuous Infusions:    PRN Meds: hydrALAZINE    HYDROcodone-acetaminophen    morphine injection    Cons  GI  hmgb  Q 4 hrs    Cons cardiology  Cl liq  Diet  Advance to   Pre renal diet   9/14  Flex  Sigmoid  9/13

## 2017-09-13 NOTE — PROGRESS NOTES
This nurse witnessed the patients bowel movement and it was about 50ml's of liquid dark red blood with some clots present  No stool appeared to be present

## 2017-09-13 NOTE — CONSULTS
Patient MRN: [id number]  Date of Service: 9/13/2017  Referring Provider: Sushma Avery PA-C  Provider Creating Note: Leida Boles PA-C  PCP: Lexie Chávez  Reason for Consult: colonoscopy causing post-procedural bleeding  Rectal bleeding    HISTORY OF PRESENT ILLNESS:  Juan David Hanks is a 81 y o  female status post EGD and colonoscopy POD#1 by Dr David Randle  Her EGD revealed multiple large gastric polyps and were biopsied  However the polyps were not removed as she was on Lovenox  Pathology is currently pending  Her colonoscopy preparation was very good  A 0 4 cm proximal transverse polyp was cold snared and suctioned  In the proximal left colon a 0 3 cm polyp was cold snared and suctioned  No other polyps were seen  She was restarted on her Lovenox later yesterday and told to restart Coumadin as directed by Cardiology  She has a history of aortic valve replacement as well as atrial fibrillation  Her Coumadin had been on hold since Saturday  Her INR on Sunday was 1 8  There was no mention of any bleeding in the perioperative report  She states she did not restart Coumadin given her symptoms as described below  Her last dose of Lovenox was 6pm yesterday  She is currently NPO  She presented to the emergency department overnight complaining of rectal bleeding  She states at 10:00 p m  yesterday she woke with very watery stool and noted some maroon blood mixed with her stool  She had 2 subsequent episodes which seemed less bloody at home  Since admission, she has had 5 more episodes of blood without stool  The last episode was approximately 8:00am  It was not witnessed by the ER staff  She did have some associated nausea which resolved after time  She has had some lower abdominal discomfort, more so in the right lower quadrant which has improved  She denies any lightheadedness, dizziness, chest pain, shortness of breath, hematemesis or melena    Her hemoglobin on admission was 10 4 g   A rectal exam was performed showing awake positive stool  Her hemoglobin has remained stable at 10 4 g   Her INR is 1 36  She remains hemodynamically stable  Review of Systems:    General:   No fever or chills; No significant weight loss or gain  EENT:   No ear pain, facial swelling; No sneezing, sore throat  Skin:   No rashes, color changes  Respiratory:     No shortness of breath, cough, wheezing, stridor  Cardiovascular:     No chest pain, palpitations  Gastrointestinal:    As per HPI  Musculoskeletal:     No arthralgias, myalgias, swelling  Neurologic:   No dizziness, numbness, weakness  No speech difficulties  Psych:   No agitation, suicidal ideations    Otherwise, All other twelve-point review of systems normal      Past Medical History:   Diagnosis Date    Chronic atrial fibrillation 9/13/2017    Chronic gout 9/13/2017    CKD (chronic kidney disease) stage 3, GFR 30-59 ml/min 9/13/2017    Colon polyp 9/13/2017    Coronary artery disease     Diabetes mellitus     Essential hypertension 9/13/2017    Gastritis 9/13/2017    Hypertension     Irregular heart beat     Mechanical heart valve present 9/13/2017    Osteoarthritis     Pacemaker     S/P AVR     Spinal stenosis     disk herniation      Past Surgical History:   Procedure Laterality Date    AORTIC VALVE REPLACEMENT      CARDIAC SURGERY      CATARACT EXTRACTION      CHOLECYSTECTOMY      COLONOSCOPY      ESOPHAGOGASTRODUODENOSCOPY      HERNIA REPAIR      x3    HYSTERECTOMY      INSERT / REPLACE / REMOVE PACEMAKER      JOINT REPLACEMENT Bilateral     knees    OK COLONOSCOPY FLX DX W/COLLJ SPEC WHEN PFRMD N/A 9/12/2017    Procedure: Lawrence Arenas COLONOSCOPY with polypectomies;  Surgeon: Garrett Farfan MD;  Location: [facility];   Service: Gastroenterology    ROTATOR CUFF REPAIR Left     SKIN BIOPSY      TONSILLECTOMY       Allergies   Allergen Reactions    Acetazolamide GI Intolerance    Cephalosporins     Diltiazem     Hydrochlorothiazide Other (See Comments)    Latex     Penicillins     Sulfa Antibiotics        Medications:  Home Medications  Prior to Admission medications    Medication Sig Start Date End Date Taking?  Authorizing Provider   allopurinol (ZYLOPRIM) 100 mg tablet Take 100 mg by mouth 3 (three) times a day   Yes Historical Provider, MD   aspirin (ECOTRIN LOW STRENGTH) 81 mg EC tablet Take 81 mg by mouth daily   Yes Historical Provider, MD   bimatoprost (LUMIGAN) 0 01 % ophthalmic drops Administer 1 drop to both eyes daily at bedtime   Yes Historical Provider, MD   calcium citrate-vitamin D (CITRACAL+D) 315-200 MG-UNIT per tablet Take 1 tablet by mouth 2 (two) times a day   Yes Historical Provider, MD   cholecalciferol (VITAMIN D3) 1,000 units tablet Take 1,000 Units by mouth daily   Yes Historical Provider, MD   enoxaparin (LOVENOX) 30 mg/0 3 mL Inject 30 mg under the skin 2 (two) times a day   Yes Historical Provider, MD   HYDROcodone-acetaminophen (NORCO) 5-325 mg per tablet Take 1 tablet by mouth every 6 (six) hours as needed for pain for up to 10 doses Max Daily Amount: 4 tablets 6/15/17  Yes Bruno Quintana MD   indapamide (LOZOL) 2 5 mg tablet Take 2 5 mg by mouth every morning   Yes Historical Provider, MD   insulin detemir (LEVEMIR) 100 units/mL subcutaneous injection Inject 28 Units under the skin daily at bedtime   Yes Historical Provider, MD   Linagliptin (TRADJENTA) 5 MG TABS Take 5 mg by mouth daily   Yes Historical Provider, MD   Magnesium 250 MG TABS Take 1 each by mouth daily   Yes Historical Provider, MD   metoprolol succinate (TOPROL-XL) 50 mg 24 hr tablet Take 50 mg by mouth daily   Yes Historical Provider, MD   Multiple Vitamin (MULTIVITAMIN) tablet Take 1 tablet by mouth daily   Yes Historical Provider, MD   Multiple Vitamins-Minerals (ICAPS AREDS 2 PO) Take 2 capsules by mouth daily   Yes Historical Provider, MD   RABEprazole (ACIPHEX) 20 MG tablet Take 20 mg by mouth daily Yes Historical Provider, MD   tolterodine (DETROL) 1 mg tablet Take 1 mg by mouth daily at bedtime   Yes Historical Provider, MD   warfarin (COUMADIN) 2 5 mg tablet Take 3 75 mg by mouth daily 5mg on Tuesday   Yes Historical Provider, MD       Inhouse Medications    Current Facility-Administered Medications:     allopurinol (ZYLOPRIM) tablet 100 mg, 100 mg, Oral, TID (diuretic), 100 mg at 09/13/17 0646    aspirin (ECOTRIN LOW STRENGTH) EC tablet 81 mg, 81 mg, Oral, Daily    bimatoprost (LUMIGAN) 0 01 % ophthalmic solution 1 drop, 1 drop, Both Eyes, HS    calcium carbonate (OYSTER SHELL,OSCAL) 500 mg tablet 1 tablet, 1 tablet, Oral, Daily With Breakfast, 1 tablet at 09/13/17 0646    cholecalciferol (VITAMIN D3) tablet 1,000 Units, 1,000 Units, Oral, Daily, 1,000 Units at 09/13/17 0800    enoxaparin (LOVENOX) subcutaneous injection 30 mg, 30 mg, Subcutaneous, BID    hydrALAZINE (APRESOLINE) injection 10 mg, 10 mg, Intravenous, Q6H PRN    HYDROcodone-acetaminophen (NORCO) 5-325 mg per tablet 1 tablet, 1 tablet, Oral, Q6H PRN    indapamide (LOZOL) tablet 2 5 mg, 2 5 mg, Oral, QAM, 2 5 mg at 09/13/17 0800    insulin lispro (HumaLOG) 100 units/mL subcutaneous injection 1-6 Units, 1-6 Units, Subcutaneous, TID AC **AND** Fingerstick Glucose (POCT), , , TID AC    insulin lispro (HumaLOG) 100 units/mL subcutaneous injection 1-6 Units, 1-6 Units, Subcutaneous, HS    metoprolol succinate (TOPROL-XL) 24 hr tablet 50 mg, 50 mg, Oral, Daily, 50 mg at 09/13/17 0800    morphine (PF) 4 mg/mL injection 1 mg, 1 mg, Intravenous, Q3H PRN    pantoprazole (PROTONIX) EC tablet 40 mg, 40 mg, Oral, BID AC, 40 mg at 09/13/17 0759    tolterodine (DETROL) tablet 1 mg, 1 mg, Oral, HS    warfarin (COUMADIN) tablet 3 75 mg, 3 75 mg, Oral, Daily (warfarin)    Current Outpatient Prescriptions:     allopurinol (ZYLOPRIM) 100 mg tablet    aspirin (ECOTRIN LOW STRENGTH) 81 mg EC tablet    bimatoprost (LUMIGAN) 0 01 % ophthalmic drops    calcium citrate-vitamin D (CITRACAL+D) 315-200 MG-UNIT per tablet    cholecalciferol (VITAMIN D3) 1,000 units tablet    enoxaparin (LOVENOX) 30 mg/0 3 mL    HYDROcodone-acetaminophen (NORCO) 5-325 mg per tablet    indapamide (LOZOL) 2 5 mg tablet    insulin detemir (LEVEMIR) 100 units/mL subcutaneous injection    Linagliptin (TRADJENTA) 5 MG TABS    Magnesium 250 MG TABS    metoprolol succinate (TOPROL-XL) 50 mg 24 hr tablet    Multiple Vitamin (MULTIVITAMIN) tablet    Multiple Vitamins-Minerals (ICAPS AREDS 2 PO)    RABEprazole (ACIPHEX) 20 MG tablet    tolterodine (DETROL) 1 mg tablet    warfarin (COUMADIN) 2 5 mg tablet      Social History   reports that she has quit smoking  She has never used smokeless tobacco  She reports that she does not drink alcohol or use drugs  Family History  History reviewed  No pertinent family history  OBJECTIVE:    /96   Pulse 76   Temp 98 1 °F (36 7 °C)   Resp 16   Wt 109 kg (240 lb 4 8 oz)   SpO2 100%   BMI 38 20 kg/m²   Physical Exam:     General Appearance:    Awake, alert, oriented x3, no distress   Head:    Normocephalic without obvious abnormality   Eyes:    PERRL, conjunctiva/corneas clear, EOM's intact        Neck:   Supple, no adenopathy, no JVD   Throat:   Mucous membranes moist   Lungs:     Clear to auscultation bilaterally, no wheezing or rhonchi   Heart:    Regular rate and rhythm, S1 and S2 normal, mechanical valve noted  Abdomen:     Soft, mild RLQ tenderness, non-distended  bowel sounds normoactive    Extremities:   Lower extremities with chronic venous stasis changes and edema noted  Psych:   Normal Affect   Neurologic:   CNII-XII intact   Strength symmetric, speech intact         Laboratory Studies:    Results from last 7 days  Lab Units 09/13/17  0723 09/13/17  0308   WBC Thousand/uL  --  8 13   HEMOGLOBIN g/dL 10 4* 10 4*   HEMATOCRIT % 32 8* 31 9*   MCV fL  --  81*   PLATELETS Thousands/uL  --  259   INR   -- 1 36*       Results from last 7 days  Lab Units 09/13/17  0308   SODIUM mmol/L 139   POTASSIUM mmol/L 3 6   CHLORIDE mmol/L 103   CO2 mmol/L 32   BUN mg/dL 23   CREATININE mg/dL 1 19   CALCIUM mg/dL 9 3   GLUCOSE RANDOM mg/dL 154*           Imaging and Other Studies:  No results found  ASSESSMENT AND PLAN:  1  Rectal bleeding, likely post-polypectomy bleed in the setting of anticoagulation with Coumadin/Lovenox  Will continue to monitor H&H and transfuse if necessary  Continue to hold Lovenox and ASA - discussed with ER RN  If brisk bleeding recurs/continues will plan for endoscopy later today  Discussed with Dr Anton Speaker  2  Atrial fibrillation with mechanical heart valve  Anticoagulation on hold  3  History of pacemaker/AICD placement  4  Chronic kidney disease stage 3  Management per SLIM  5  History of gastritis with multiple gastric polyps noted on EGD  Continue PPI  Await pathology          David Vasquez PA-C

## 2017-09-13 NOTE — ED PROVIDER NOTES
History  Chief Complaint   Patient presents with    Rectal Bleeding     Pt states "I had a colonoscopy earlier today  After I came home I had 3 loose BMs and the toliet had a moderate amt of blood in the toliet  I called the gastro group and was sent here"     61-year-old female presenting with bloody bowel movements after EGD/colonoscopy  Patient had procedure by GI, Dr Salcido Channel done yesterday around 1:00 p m  Elyl Ulysses She had multiple polyp biopsies done in her stomach and 2 colon polyps removed  There were no complications and she went home with no problems  She reports that 8 normally  Around 10:00 p m  she woke up to have a bowel movement and reports that it was very watery and looked like her bowel prep, although she noticed some red in her stool  She reports that she had 2 subsequent episodes approximately 1 hour apart following this  She reports that stools seem to get lighter in color, less red  She reports she had another bowel movement on arrival here that looked similar  She reports noticing some blood when she was wiping on the toilet paper  No rectal bleeding without bowel movements  She reports that her abdomen has been feeling uneasy and bowel sounds have been hyperactive  She reports episode of nausea earlier, she took Tums and reports that this has resolved  She reports some mild abdominal discomfort, mostly to right lower abdomen  She called her GI doctor who initially told her to follow up in the morning, however then called back 30 minutes later and told her to go to the ED for evaluation  Patient is anticoagulated for history of aortic valve replacement as well as atrial fibrillation  She normally takes Coumadin, however has been holding this since Sunday  She reports her INR on Sunday was 1 8  She has been doing Lovenox injections since holding the Coumadin b i d  She reports that tonight she gave herself her Lovenox injection and was supposed to be restarting her Coumadin    She reports that because of the blood in her stools she did not take her Coumadin  Patient denies any symptoms of symptomatic anemia such as lightheadedness, dizziness, headache, chest pain, shortness of breath  She has history of prior cholecystectomy, hysterectomy, hernia repair surgeries on her abdomen  A/P:  81-year-old female with bright red blood per rectum after EGD/colonoscopy, we will get CBC to assess for anemia, coags given patient is anticoagulated, BMP to assess renal function/electrolytes, discuss with on-call GI physician         9/12 Dr Adolph lBoom  - EGD: There are multiple polyps in the stomach  One is in the antrum is sessile and appears entirely benign and is biopsied  Her multiple other large polyps in the midbody and upper body of the stomach the largest of which is slightly reddish and is biopsied several times  - Colonoscopy: In the proximal transverse colon a 0 4 cm polyp is cold snared and suctioned  In the proximal left colon at 0 3 cm polyp is cold snared and suctioned  Prior to Admission Medications   Prescriptions Last Dose Informant Patient Reported? Taking?    HYDROcodone-acetaminophen (NORCO) 5-325 mg per tablet   No Yes   Sig: Take 1 tablet by mouth every 6 (six) hours as needed for pain for up to 10 doses Max Daily Amount: 4 tablets   Linagliptin (TRADJENTA) 5 MG TABS   Yes Yes   Sig: Take 5 mg by mouth daily   Magnesium 250 MG TABS   Yes Yes   Sig: Take 1 each by mouth daily   Multiple Vitamin (MULTIVITAMIN) tablet   Yes Yes   Sig: Take 1 tablet by mouth daily   Multiple Vitamins-Minerals (ICAPS AREDS 2 PO)   Yes Yes   Sig: Take 2 capsules by mouth daily   RABEprazole (ACIPHEX) 20 MG tablet   Yes Yes   Sig: Take 20 mg by mouth daily   allopurinol (ZYLOPRIM) 100 mg tablet   Yes Yes   Sig: Take 100 mg by mouth 3 (three) times a day   aspirin (ECOTRIN LOW STRENGTH) 81 mg EC tablet   Yes Yes   Sig: Take 81 mg by mouth daily   bimatoprost (LUMIGAN) 0 01 % ophthalmic drops   Yes Yes Sig: Administer 1 drop to both eyes daily at bedtime   calcium citrate-vitamin D (CITRACAL+D) 315-200 MG-UNIT per tablet   Yes Yes   Sig: Take 1 tablet by mouth 2 (two) times a day   cholecalciferol (VITAMIN D3) 1,000 units tablet   Yes Yes   Sig: Take 1,000 Units by mouth daily   enoxaparin (LOVENOX) 30 mg/0 3 mL   Yes Yes   Sig: Inject 30 mg under the skin 2 (two) times a day   indapamide (LOZOL) 2 5 mg tablet   Yes Yes   Sig: Take 2 5 mg by mouth every morning   insulin detemir (LEVEMIR) 100 units/mL subcutaneous injection   Yes Yes   Sig: Inject 28 Units under the skin daily at bedtime   metoprolol succinate (TOPROL-XL) 50 mg 24 hr tablet   Yes Yes   Sig: Take 50 mg by mouth daily   tolterodine (DETROL) 1 mg tablet   Yes Yes   Sig: Take 1 mg by mouth daily at bedtime   warfarin (COUMADIN) 2 5 mg tablet   Yes Yes   Sig: Take 3 75 mg by mouth daily 5mg on Tuesday      Facility-Administered Medications: None       Past Medical History:   Diagnosis Date    Chronic atrial fibrillation 9/13/2017    Chronic gout 9/13/2017    CKD (chronic kidney disease) stage 3, GFR 30-59 ml/min 9/13/2017    Colon polyp 9/13/2017    Coronary artery disease     Diabetes mellitus     Essential hypertension 9/13/2017    Gastritis 9/13/2017    Hypertension     Irregular heart beat     Mechanical heart valve present 9/13/2017    Osteoarthritis     Pacemaker     S/P AVR     Spinal stenosis     disk herniation        Past Surgical History:   Procedure Laterality Date    AORTIC VALVE REPLACEMENT      CARDIAC SURGERY      CATARACT EXTRACTION      CHOLECYSTECTOMY      COLONOSCOPY      ESOPHAGOGASTRODUODENOSCOPY      HERNIA REPAIR      x3    HYSTERECTOMY      INSERT / REPLACE / REMOVE PACEMAKER      JOINT REPLACEMENT Bilateral     knees    CA COLONOSCOPY FLX DX W/COLLJ SPEC WHEN PFRMD N/A 9/12/2017    Procedure: EGDwith bxAND COLONOSCOPY with polypectomies;  Surgeon: Janett Junior MD;  Location: [facility];   Service: Gastroenterology    ROTATOR CUFF REPAIR Left     SKIN BIOPSY      TONSILLECTOMY         History reviewed  No pertinent family history  I have reviewed and agree with the history as documented  Social History   Substance Use Topics    Smoking status: Former Smoker    Smokeless tobacco: Never Used    Alcohol use No        Review of Systems   Constitutional: Negative for chills, fever and unexpected weight change  HENT: Negative for ear pain, rhinorrhea and sore throat  Respiratory: Negative for cough and shortness of breath  Cardiovascular: Negative for chest pain and leg swelling  Gastrointestinal: Positive for abdominal pain and blood in stool  Negative for constipation, diarrhea, nausea and vomiting  Genitourinary: Negative for dysuria, frequency, hematuria and urgency  Musculoskeletal: Negative for back pain, myalgias and neck pain  Skin: Negative for color change and rash  Allergic/Immunologic: Negative for immunocompromised state  Neurological: Negative for dizziness, weakness, light-headedness, numbness and headaches  All other systems reviewed and are negative  Physical Exam  ED Triage Vitals   Temperature Pulse Respirations Blood Pressure SpO2   09/13/17 0200 09/13/17 0200 09/13/17 0200 09/13/17 0200 09/13/17 0200   98 1 °F (36 7 °C) 83 18 168/92 97 %      Temp src Heart Rate Source Patient Position - Orthostatic VS BP Location FiO2 (%)   -- 09/13/17 0411 09/13/17 0411 09/13/17 0411 --    Monitor Lying Right arm       Pain Score       09/13/17 0200       3           Physical Exam   Constitutional: She is oriented to person, place, and time  She appears well-developed and well-nourished  HENT:   Head: Normocephalic and atraumatic  Mouth/Throat: Oropharynx is clear and moist    Eyes: Conjunctivae and EOM are normal    Cardiovascular: Normal rate, regular rhythm, normal heart sounds and intact distal pulses      Mechanical valve click   Pulmonary/Chest: Effort normal and breath sounds normal  No respiratory distress  Abdominal: Soft  She exhibits no distension  There is no rebound and no guarding  Mild RLQ ttp, no rebound/guarding  No back/CVA ttp   Genitourinary: Rectal exam shows guaiac positive stool  Genitourinary Comments: Rectal exam with clear mucus and small spots of maroon colored stool, hemoccult positive  No bright red bleeding  External hemorrhoids noted  Musculoskeletal: She exhibits no edema or deformity  Neurological: She is alert and oriented to person, place, and time  She exhibits normal muscle tone  Skin: Skin is warm and dry  Psychiatric: She has a normal mood and affect  Her behavior is normal    Nursing note and vitals reviewed        ED Medications  Medications   allopurinol (ZYLOPRIM) tablet 100 mg (not administered)   aspirin (ECOTRIN LOW STRENGTH) EC tablet 81 mg (not administered)   bimatoprost (LUMIGAN) 0 01 % ophthalmic solution 1 drop (not administered)   calcium carbonate (OYSTER SHELL,OSCAL) 500 mg tablet 1 tablet (not administered)   cholecalciferol (VITAMIN D3) tablet 1,000 Units (not administered)   HYDROcodone-acetaminophen (NORCO) 5-325 mg per tablet 1 tablet (not administered)   indapamide (LOZOL) tablet 2 5 mg (not administered)   magnesium oxide (MAG-OX) tablet 400 mg (not administered)   metoprolol succinate (TOPROL-XL) 24 hr tablet 50 mg (not administered)   pantoprazole (PROTONIX) EC tablet 40 mg (not administered)   tolterodine (DETROL) tablet 1 mg (not administered)   warfarin (COUMADIN) tablet 3 75 mg (not administered)   enoxaparin (LOVENOX) subcutaneous injection 30 mg (not administered)   insulin lispro (HumaLOG) 100 units/mL subcutaneous injection 1-6 Units (not administered)   insulin lispro (HumaLOG) 100 units/mL subcutaneous injection 1-6 Units (not administered)   hydrALAZINE (APRESOLINE) injection 10 mg (not administered)   morphine (PF) 4 mg/mL injection 1 mg (not administered)       Diagnostic Studies  Labs Reviewed   CBC AND DIFFERENTIAL - Abnormal        Result Value Ref Range Status    Hemoglobin 10 4 (*) 11 5 - 15 4 g/dL Final    Hematocrit 31 9 (*) 34 8 - 46 1 % Final    MCV 81 (*) 82 - 98 fL Final    MCH 26 4 (*) 26 8 - 34 3 pg Final    RDW 19 8 (*) 11 6 - 15 1 % Final    WBC 8 13  4 31 - 10 16 Thousand/uL Final    RBC 3 94  3 81 - 5 12 Million/uL Final    MCHC 32 6  31 4 - 37 4 g/dL Final    MPV 9 5  8 9 - 12 7 fL Final    Platelets 838  346 - 390 Thousands/uL Final    nRBC 0  /100 WBCs Final    Neutrophils Relative 75  43 - 75 % Final    Lymphocytes Relative 16  14 - 44 % Final    Monocytes Relative 7  4 - 12 % Final    Eosinophils Relative 2  0 - 6 % Final    Basophils Relative 0  0 - 1 % Final    Neutrophils Absolute 6 11  1 85 - 7 62 Thousands/µL Final    Lymphocytes Absolute 1 27  0 60 - 4 47 Thousands/µL Final    Monocytes Absolute 0 55  0 17 - 1 22 Thousand/µL Final    Eosinophils Absolute 0 17  0 00 - 0 61 Thousand/µL Final    Basophils Absolute 0 03  0 00 - 0 10 Thousands/µL Final   PROTIME-INR - Abnormal     Protime 16 8 (*) 12 1 - 14 4 seconds Final    INR 1 36 (*) 0 86 - 1 16 Final   BASIC METABOLIC PANEL - Abnormal     Glucose 154 (*) 65 - 140 mg/dL Final    Comment:   If the patient is fasting, the ADA then defines impaired fasting glucose as > 100 mg/dL and diabetes as > or equal to 123 mg/dL  Specimen collection should occur prior to Sulfasalazine administration due to the potential for falsely depressed results  Specimen collection should occur prior to Sulfapyridine administration due to the potential for falsely elevated results      Sodium 139  136 - 145 mmol/L Final    Potassium 3 6  3 5 - 5 3 mmol/L Final    Chloride 103  100 - 108 mmol/L Final    CO2 32  21 - 32 mmol/L Final    Anion Gap 4  4 - 13 mmol/L Final    BUN 23  5 - 25 mg/dL Final    Creatinine 1 19  0 60 - 1 30 mg/dL Final    Comment: Standardized to IDMS reference method    Calcium 9 3  8 3 - 10 1 mg/dL Final    eGFR 43 ml/min/1 73sq m Final    Narrative:     National Kidney Disease Education Program recommendations are as follows:  GFR calculation is accurate only with a steady state creatinine  Chronic Kidney disease less than 60 ml/min/1 73 sq  meters  Kidney failure less than 15 ml/min/1 73 sq  meters  APTT - Normal    PTT 34  23 - 35 seconds Final    Narrative: Therapeutic Heparin Range = 60-90 seconds   HEMOGLOBIN   HEMOGLOBIN A1C   HEMOGLOBIN AND HEMATOCRIT, BLOOD       No orders to display       Procedures  Procedures      Phone Consults  ED Phone Contact    ED Course  ED Course as of Sep 13 0607   Wed Sep 13, 2017   0335 9 8 3 months ago Hemoglobin: (!) 10 4   0336 1 27 3 months ago Creatinine: 1 19 0336 INR: (!) 1 36   0342 Paged GI    0404 Discussed with Dr Janusz Vieira, GI who would like pt admitted under SLIM for observation  MDM  Number of Diagnoses or Management Options  Anticoagulated:   Colonoscopy causing post-procedural bleeding:   Diagnosis management comments: 82 yo F with post procedure (EGD/colonoscopy) GI bleeding in setting of anticoagulation, admitted for observation         Amount and/or Complexity of Data Reviewed  Clinical lab tests: ordered and reviewed  Review and summarize past medical records: yes  Discuss the patient with other providers: yes (GI (Dr Janusz Vieira))      CritCare Time    Disposition  Final diagnoses:   Colonoscopy causing post-procedural bleeding   Anticoagulated     ED Disposition     ED Disposition Condition Comment    Admit  Case was discussed with IM and the patient's admission status was agreed to be Admission Status: observation status to the service of Dr Marlin Bar   Follow-up Information    None       Patient's Medications   Discharge Prescriptions    No medications on file     No discharge procedures on file  ED Provider  Attending physically available and evaluated Arnold Bearden I managed the patient along with the ED Attending      Electronically Signed by       Jil Camacho DO  Resident  09/13/17 4977

## 2017-09-13 NOTE — H&P
History and Physical - [facility]    Patient Information: Brittany Childs 81 y o  female MRN: [id number]  Unit/Bed#: ED 22 Encounter: [id number]  Admitting Physician: Olga Diego PA-C  PCP: Saad Dennis MD  Date of Admission:  09/13/17      Assessment:    Rectal bleeding    Plan:    Rectal bleeding   -patient is status post colonoscopy and EGD with biopsies of the stomach and colon with 3 episodes of bloody stool at home and 2 episodes in the ER  -patient is on chronic Coumadin however has not taken it since Saturday and has been using Lovenox due to mechanical heart valve and atrial fibrillation  -will monitor H/H closely and recheck in 4 hours  -consult Dr Eleazar Lucero who performed the procedure yesterday    Hypertension  -we will continue indapamide and metoprolol when she can have p  o  intake and will use hydralazine IV for now    Type 2 diabetes mellitus with insulin  -she normally uses Tradjenta and Levemir however because she will be NPO we will utilize sliding scale    Overactive bladder  -will continue Detrol 1 mg q h s  when she is she can have p o  intake    Atrial fibrillation and mechanical heart valve  -her warfarin schedule is 3 75 mg on 6 days a week and 5 mg 1 day a week which will continue when she can tolerate p o  intake   -will continue with Lovenox for now and observe her rectal bleeding and labs  -INR 1 36, Protime 16 8  -history of pacemaker/AICD placement    Chronic kidney disease stage 3  -will monitor with BMP    Gout  -continue allopurinol when she can tolerate p o  Gastritis  -continue AcipHex 20 mg when she can tolerate p o  HPI:   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, former nurse and current , who underwent EGD and colonoscopy at approximately 13:00 yesterday  Biopsies of the stomach and colon were taken  She had 3 bowel movements at home which were bloody into mostly bloody bowel movements in the ER    She has a history of chronic anticoagulation with Coumadin for atrial fibrillation as well as mechanical heart valve  She has not taken her Coumadin since Saturday and was bridged with Lovenox  At present she has a crampy sensation in the abdomen with some discomfort in the right lower quadrant  She contacted her gastroenterology provider's office and was instructed to come to the ER for evaluation with regard to the bloody stool  Denies: Chest pain, Shortness of Breath, Nausea, Vomiting,Dysuria    ROS:  A 12-point review of systems was done  Please see the HPI for the full details  All other systems negative  PMH:  Principal Problem:    Rectal bleeding  Active Problems:    Essential hypertension    CKD (chronic kidney disease) stage 3, GFR 30-59 ml/min    Chronic gout    Mechanical heart valve present    Chronic atrial fibrillation    Gastritis    Colon polyp      Past Medical History:   Diagnosis Date    Chronic atrial fibrillation 9/13/2017    Chronic gout 9/13/2017    CKD (chronic kidney disease) stage 3, GFR 30-59 ml/min 9/13/2017    Colon polyp 9/13/2017    Coronary artery disease     Diabetes mellitus     Essential hypertension 9/13/2017    Gastritis 9/13/2017    Hypertension     Irregular heart beat     Mechanical heart valve present 9/13/2017    Osteoarthritis     Pacemaker     S/P AVR     Spinal stenosis     disk herniation      Past Surgical History:   Procedure Laterality Date    AORTIC VALVE REPLACEMENT      CARDIAC SURGERY      CATARACT EXTRACTION      CHOLECYSTECTOMY      COLONOSCOPY      ESOPHAGOGASTRODUODENOSCOPY      HERNIA REPAIR      x3    HYSTERECTOMY      INSERT / REPLACE / REMOVE PACEMAKER      JOINT REPLACEMENT Bilateral     knees    NM COLONOSCOPY FLX DX W/COLLJ SPEC WHEN PFRMD N/A 9/12/2017    Procedure: Marsha Draft COLONOSCOPY with polypectomies;  Surgeon: Javi Knight MD;  Location: [facility];   Service: Gastroenterology    ROTATOR CUFF REPAIR Left     SKIN BIOPSY      TONSILLECTOMY       Social History     Social History    Marital status:      Spouse name: N/A    Number of children: N/A    Years of education: N/A     Social History Main Topics    Smoking status: Former Smoker    Smokeless tobacco: Never Used    Alcohol use No    Drug use: No    Sexual activity: Not Asked     Other Topics Concern    None     Social History Narrative    None     History reviewed  No pertinent family history  MED/ALLERGIES:  No current facility-administered medications for this encounter        Current Outpatient Prescriptions   Medication Sig Dispense Refill    allopurinol (ZYLOPRIM) 100 mg tablet Take 100 mg by mouth 3 (three) times a day      aspirin (ECOTRIN LOW STRENGTH) 81 mg EC tablet Take 81 mg by mouth daily      bimatoprost (LUMIGAN) 0 01 % ophthalmic drops Administer 1 drop to both eyes daily at bedtime      calcium citrate-vitamin D (CITRACAL+D) 315-200 MG-UNIT per tablet Take 1 tablet by mouth 2 (two) times a day      cholecalciferol (VITAMIN D3) 1,000 units tablet Take 1,000 Units by mouth daily      enoxaparin (LOVENOX) 30 mg/0 3 mL Inject 30 mg under the skin 2 (two) times a day      HYDROcodone-acetaminophen (NORCO) 5-325 mg per tablet Take 1 tablet by mouth every 6 (six) hours as needed for pain for up to 10 doses Max Daily Amount: 4 tablets 10 tablet 0    indapamide (LOZOL) 2 5 mg tablet Take 2 5 mg by mouth every morning      insulin detemir (LEVEMIR) 100 units/mL subcutaneous injection Inject 28 Units under the skin daily at bedtime      Linagliptin (TRADJENTA) 5 MG TABS Take 5 mg by mouth daily      Magnesium 250 MG TABS Take 1 each by mouth daily      metoprolol succinate (TOPROL-XL) 50 mg 24 hr tablet Take 50 mg by mouth daily      Multiple Vitamin (MULTIVITAMIN) tablet Take 1 tablet by mouth daily      Multiple Vitamins-Minerals (ICAPS AREDS 2 PO) Take 2 capsules by mouth daily      RABEprazole (ACIPHEX) 20 MG tablet Take 20 mg by mouth daily      tolterodine (DETROL) 1 mg tablet Take 1 mg by mouth daily at bedtime      warfarin (COUMADIN) 2 5 mg tablet Take 3 75 mg by mouth daily 5mg on Tuesday       Allergies   Allergen Reactions    Acetazolamide GI Intolerance    Cephalosporins     Diltiazem     Hydrochlorothiazide Other (See Comments)    Latex     Penicillins     Sulfa Antibiotics        OBJECTIVE:    Current Vitals:   Blood Pressure: (!) 177/82 (09/13/17 0411)  Pulse: 76 (09/13/17 0411)  Temperature: 98 1 °F (36 7 °C) (09/13/17 0200)  Respirations: 16 (09/13/17 0411)  Weight - Scale: 109 kg (240 lb 4 8 oz) (09/13/17 0200)  SpO2: 100 % (09/13/17 0411)    No intake or output data in the 24 hours ending 09/13/17 0511    Invasive Devices     Peripheral Intravenous Line            Peripheral IV 09/13/17 Left Forearm less than 1 day                  Physical Exam   Constitutional: She is oriented to person, place, and time  She appears well-developed and well-nourished  No distress  HENT:   Head: Normocephalic and atraumatic  Right Ear: External ear normal    Left Ear: External ear normal    Mouth/Throat: No oropharyngeal exudate  Eyes: Conjunctivae and EOM are normal  Pupils are equal, round, and reactive to light  Cardiovascular: Normal rate, regular rhythm, normal heart sounds and intact distal pulses  No murmur heard  Mechanical aortic valve noted   Pulmonary/Chest: Effort normal and breath sounds normal    Abdominal: Soft  Bowel sounds are increased  There is tenderness (Mild) in the right upper quadrant, right lower quadrant, epigastric area, left upper quadrant and left lower quadrant  There is no rebound  Musculoskeletal: She exhibits edema (Chronic, bilateral lower  leg)  Lymphadenopathy:     She has no cervical adenopathy  Neurological: She is alert and oriented to person, place, and time  Skin: Skin is warm and dry  Psychiatric: She has a normal mood and affect   Her behavior is normal  Judgment and thought content normal                                                      Lab Results:     Results from last 7 days  Lab Units 09/13/17  0308   WBC Thousand/uL 8 13   HEMOGLOBIN g/dL 10 4*   HEMATOCRIT % 31 9*   PLATELETS Thousands/uL 259        Results from last 7 days  Lab Units 09/13/17  0308   SODIUM mmol/L 139   POTASSIUM mmol/L 3 6   CHLORIDE mmol/L 103   CO2 mmol/L 32   BUN mg/dL 23   CREATININE mg/dL 1 19   CALCIUM mg/dL 9 3   GLUCOSE RANDOM mg/dL 154*     No results found for: CKTOTAL, CKMBINDEX, TROPONINI      Imaging:   EKG, Pathology, and Other Studies:     VTE Prophylaxis: Enoxaparin (Lovenox)    Code Status: FULL    Counseling / Coordination of Care: Total floor / unit time spent today 30 minutes  Betsy Covarrubias PA-C    Portions of the record may have been created with voice recognition software  Occasional wrong word or "sound a like" substitutions may have occurred due to the inherent limitations of voice recognition software  Read the chart carefully and recognize, using context, where substitutions have occurred

## 2017-09-13 NOTE — ED ATTENDING ATTESTATION
Bryan Finley DO, saw and evaluated the patient  I have discussed the patient with the resident/non-physician practitioner and agree with the resident's/non-physician practitioner's findings, Plan of Care, and MDM as documented in the resident's/non-physician practitioner's note, except where noted  All available labs and Radiology studies were reviewed  At this point I agree with the current assessment done in the Emergency Department  I have conducted an independent evaluation of this patient a history and physical is as follows:    80 yo female who had EGD and colonoscopy by Dr Eleazar Lucero yesterday  Diogenes all day and last night around 2300 had loose BM "like water" but noted blood in bowl that looked red, 0000 another water like BM with blood, 0100 another and 4th episode while here (unfortunately it flushed on own before she could show staff) but when she wiped she saw blood  Hyperactive BS  Mild abdominal discomfort - mainly RLQ and mild nausea earlier (took TUMS with good relief)  Pt normally on coumadin but held for few days before EGD - last INR 1 8, taking lovenox and to restart coumadin today  Pt called GI service who initially told her to call office in am, then called her back in 30 min and told her to come in for exam     0340 - H/H 10 4/31 9, platelets 562, INR 4 65       7993 - Resident discussed case with Dr Eleazar Lucero who wants pt admitted  SLIM to admit       Final diagnoses:   Colonoscopy causing post-procedural bleeding   Anticoagulated     Critical Care Time  CritCare Time

## 2017-09-13 NOTE — PLAN OF CARE
Events in and surrounding colonoscopy and EGD noted and reviewed with continued episodes of passage of bright red blood per rectum post colonoscopy  She had a 0 4 cm proximal transverse polyp cold snared and suctioned along with a proximal left colon 0 3 cm polyp  She remains hemodynamically stable she has been off warfarin for approaching 5 days and last dose of Lovenox mg per kg was last night she remains MPL her hemoglobin remained stable and last measured at 12 4 and prior to that 10 4 and no significant drop since procedure    She was now to proceed with a flex sig later today and I would defer any anticoagulation including DVT prophylaxis until results known and would then placed back on mg per kg dosing of Lovenox versus heparin and would favor the latter if risk of bleed rebleeding in consultation with Cardiology in regards to anticoagulation going forward as heparin can be readily discontinued and antagonize short-term

## 2017-09-13 NOTE — OP NOTE
OPERATIVE REPORT  PATIENT NAME: Higinio Cantu    :  1935  MRN: [id number]  Pt Location: [facility]    SURGERY DATE: 2017    Surgeon(s) and Role:     Omkar Delong MD - Primary    Preop Diagnosis:  Colonoscopy causing post-procedural bleeding [K91 840]    Post-Op Diagnosis Codes:     * Colonoscopy causing post-procedural bleeding [K91 840]    Procedure(s) (LRB):  SIGMOIDOSCOPY FLEXIBLE (N/A)    Specimen(s):  * No specimens in log *    Estimated Blood Loss:   Minimal    Drains:       Anesthesia Type:   Conscious Sedation     Operative Indications:  Colonoscopy causing post-procedural bleeding [K91 840]      Operative Findings:  Probable post polypectomy bleed-2 polyp sites clipped      Complications:   None    Procedure and Technique: The colonoscopy was then passed to the proximal transverse colon  In the mid to proximal transverse colon there was a polypectomy slight which did have a slight red spot on it but there was no evidence of bleeding  Because of her prior symptoms I did deploy a clip over the small polypectomy site without incident  The colonoscope was further withdrawn there is no blood in the colon however there is some darkish stool in the rectosigmoid area signifying that likely bleeding has stopped  In any event in the mid left colon another polypectomy site is seen and is definitely had a red spot associated with it but no visible bleeding  Again a hemoclip was deployed over the lesion and there was no bleeding whatsoever  Disc hemoclip was also successfully weaned appointment  The colonoscope was withdrawn she appeared to tolerate the procedure without apparent complication  hold Lovenox until a m  she may resume resume it if no further bleeding at 7:00 a m      I was present for the entire procedure    Patient Disposition:  hemodynamically stable    SIGNATURE: Martin Wang MD  DATE: 2017  TIME: 2:51 PM

## 2017-09-13 NOTE — PLAN OF CARE
DISCHARGE PLANNING     Discharge to home or other facility with appropriate resources Progressing        GASTROINTESTINAL - ADULT     Minimal or absence of nausea and/or vomiting Progressing     Maintains or returns to baseline bowel function Progressing     Maintains adequate nutritional intake Progressing        Knowledge Deficit     Patient/family/caregiver demonstrates understanding of disease process, treatment plan, medications, and discharge instructions Progressing        PAIN - ADULT     Verbalizes/displays adequate comfort level or baseline comfort level Progressing        Potential for Falls     Patient will remain free of falls Progressing        SAFETY ADULT     Maintain or return to baseline ADL function Progressing     Maintain or return mobility status to optimal level Progressing

## 2017-09-14 LAB
ANION GAP SERPL CALCULATED.3IONS-SCNC: 4 MMOL/L (ref 4–13)
BUN SERPL-MCNC: 17 MG/DL (ref 5–25)
CALCIUM SERPL-MCNC: 8.3 MG/DL (ref 8.3–10.1)
CHLORIDE SERPL-SCNC: 104 MMOL/L (ref 100–108)
CO2 SERPL-SCNC: 32 MMOL/L (ref 21–32)
CREAT SERPL-MCNC: 1.08 MG/DL (ref 0.6–1.3)
GFR SERPL CREATININE-BSD FRML MDRD: 48 ML/MIN/1.73SQ M
GLUCOSE SERPL-MCNC: 127 MG/DL (ref 65–140)
GLUCOSE SERPL-MCNC: 162 MG/DL (ref 65–140)
GLUCOSE SERPL-MCNC: 188 MG/DL (ref 65–140)
GLUCOSE SERPL-MCNC: 207 MG/DL (ref 65–140)
GLUCOSE SERPL-MCNC: 216 MG/DL (ref 65–140)
GLUCOSE SERPL-MCNC: 89 MG/DL (ref 65–140)
HCT VFR BLD AUTO: 28.6 % (ref 34.8–46.1)
HCT VFR BLD AUTO: 31.5 % (ref 34.8–46.1)
HGB BLD-MCNC: 9.2 G/DL (ref 11.5–15.4)
HGB BLD-MCNC: 9.9 G/DL (ref 11.5–15.4)
INR PPP: 1.3 (ref 0.86–1.16)
POTASSIUM SERPL-SCNC: 3.4 MMOL/L (ref 3.5–5.3)
PROTHROMBIN TIME: 16.3 SECONDS (ref 12.1–14.4)
SODIUM SERPL-SCNC: 140 MMOL/L (ref 136–145)

## 2017-09-14 PROCEDURE — 85018 HEMOGLOBIN: CPT | Performed by: PHYSICIAN ASSISTANT

## 2017-09-14 PROCEDURE — 85014 HEMATOCRIT: CPT | Performed by: PHYSICIAN ASSISTANT

## 2017-09-14 PROCEDURE — 82948 REAGENT STRIP/BLOOD GLUCOSE: CPT

## 2017-09-14 PROCEDURE — 85610 PROTHROMBIN TIME: CPT | Performed by: INTERNAL MEDICINE

## 2017-09-14 PROCEDURE — 80048 BASIC METABOLIC PNL TOTAL CA: CPT | Performed by: INTERNAL MEDICINE

## 2017-09-14 RX ORDER — GABAPENTIN 300 MG/1
600 CAPSULE ORAL
COMMUNITY
End: 2018-11-01 | Stop reason: SDUPTHER

## 2017-09-14 RX ORDER — GABAPENTIN 300 MG/1
300 CAPSULE ORAL
Status: DISCONTINUED | OUTPATIENT
Start: 2017-09-14 | End: 2017-09-15 | Stop reason: HOSPADM

## 2017-09-14 RX ADMIN — INSULIN DETEMIR 28 UNITS: 100 INJECTION, SOLUTION SUBCUTANEOUS at 21:09

## 2017-09-14 RX ADMIN — INDAPAMIDE 2.5 MG: 2.5 TABLET, FILM COATED ORAL at 09:54

## 2017-09-14 RX ADMIN — ALLOPURINOL 100 MG: 100 TABLET ORAL at 17:01

## 2017-09-14 RX ADMIN — ENOXAPARIN SODIUM 30 MG: 30 INJECTION SUBCUTANEOUS at 21:09

## 2017-09-14 RX ADMIN — INSULIN LISPRO 2 UNITS: 100 INJECTION, SOLUTION INTRAVENOUS; SUBCUTANEOUS at 21:10

## 2017-09-14 RX ADMIN — INSULIN LISPRO 1 UNITS: 100 INJECTION, SOLUTION INTRAVENOUS; SUBCUTANEOUS at 11:03

## 2017-09-14 RX ADMIN — TOLTERODINE TARTRATE 1 MG: 1 TABLET, FILM COATED ORAL at 21:11

## 2017-09-14 RX ADMIN — BIMATOPROST 1 DROP: 0.1 SOLUTION/ DROPS OPHTHALMIC at 21:10

## 2017-09-14 RX ADMIN — ENOXAPARIN SODIUM 30 MG: 30 INJECTION SUBCUTANEOUS at 09:49

## 2017-09-14 RX ADMIN — GABAPENTIN 300 MG: 300 CAPSULE ORAL at 21:10

## 2017-09-14 RX ADMIN — ALLOPURINOL 100 MG: 100 TABLET ORAL at 06:38

## 2017-09-14 RX ADMIN — HYDROCODONE BITARTRATE AND ACETAMINOPHEN 1 TABLET: 5; 325 TABLET ORAL at 21:09

## 2017-09-14 RX ADMIN — Medication 1 TABLET: at 09:49

## 2017-09-14 RX ADMIN — VITAMIN D, TAB 1000IU (100/BT) 1000 UNITS: 25 TAB at 09:48

## 2017-09-14 RX ADMIN — GABAPENTIN 300 MG: 300 CAPSULE ORAL at 00:34

## 2017-09-14 RX ADMIN — PANTOPRAZOLE SODIUM 40 MG: 40 TABLET, DELAYED RELEASE ORAL at 17:01

## 2017-09-14 RX ADMIN — METOPROLOL SUCCINATE 50 MG: 50 TABLET, EXTENDED RELEASE ORAL at 09:49

## 2017-09-14 RX ADMIN — INSULIN LISPRO 2 UNITS: 100 INJECTION, SOLUTION INTRAVENOUS; SUBCUTANEOUS at 17:01

## 2017-09-14 RX ADMIN — PANTOPRAZOLE SODIUM 40 MG: 40 TABLET, DELAYED RELEASE ORAL at 06:38

## 2017-09-14 NOTE — PROGRESS NOTES
[address] Internal Medicine Progress Note  Patient: Michael Cano 81 y o  female   MRN: [id number]  PCP: Betty Guajardo MD  Unit/Bed#: E2 -19 Encounter: [id number]  Date Of Visit: 09/14/17    Assessment:    Principal Problem:    Colonoscopy causing post-procedural bleeding  Active Problems:    Rectal bleeding    Essential hypertension    CKD (chronic kidney disease) stage 3, GFR 30-59 ml/min    Chronic gout    Mechanical heart valve present    Chronic atrial fibrillation    Gastritis    Colon polyp      Plan:    · Colonoscopy with postprocedural bleeding prompted admission after reinstituting anticoagulant therapy with Lovenox bridge to warfarin  She had a colonoscopy to the proximal transverse colon last night to further assess and deployment of a hemo clipped over the previous  polypectomy site in mid left colon that was reddened without visible bleeding there is no active bleeding noted throughout procedure  She has had no recurrence of bleeding since and her H&H status has remained stable with last measured at 9 9 in past hour from 9 2 at 4:00 a m  · Atrial fibrillation on chronic warfarin anticoagulation in relation to mechanical aortic valve replacement appreciate Cardiology input and note can resume warfarin in 1 to 2 days based on modest thrombotic risk in relation to high flow valve  Rate control can continue with metoprolol as prior  · Will watch for any signs of rebleed or hemodynamic compromise in next 24 hours and presume can discharge in a m  with reinstitution of warfarin    Was to restart warfarin at 7 5 mg for 3 days and then return to her previous schedule of 3 75 mg every day except Sunday 5 mg  · Diabetes mellitus will reinstitute Levemir tonight based on return to diet      VTE Pharmacologic Prophylaxis:   Pharmacologic: Enoxaparin (Lovenox)  Mechanical VTE Prophylaxis in Place: Yes    Discussions with Specialists or Other Care Team Provider:  Discussed with Cardiology    Time Spent for Care: 30 minutes  More than 50% of total time spent on counseling and coordination of care as described above  Subjective:   Denies any chest pain shortness of breath lightheadedness or dizziness  Denies any passage of bright red blood per rectum and/or room stools  Denies any hematemesis  Objective:     Vitals:   Temp (24hrs), Av 6 °F (36 4 °C), Min:96 7 °F (35 9 °C), Max:99 °F (37 2 °C)    HR:  [73-87] 75  Resp:  [16-20] 16  BP: (110-160)/(63-89) 110/69  SpO2:  [96 %-100 %] 100 %  Body mass index is 38 79 kg/m²  Input and Output Summary (last 24 hours): Intake/Output Summary (Last 24 hours) at 17 1359  Last data filed at 17 0901   Gross per 24 hour   Intake             1000 ml   Output                0 ml   Net             1000 ml       Physical Exam:     Physical Exam:   General appearance: alert, appears stated age and cooperative  Head: Normocephalic, without obvious abnormality, atraumatic  Lungs: clear to auscultation bilaterally  Heart: regular rate and rhythm  Abdomen: soft, non-tender; bowel sounds normal; no masses,  no organomegaly  Back: negative  Extremities: extremities normal, atraumatic, no cyanosis or edema  Neurologic: Grossly normal      Additional Data:     Labs:      Results from last 7 days  Lab Units 17  1211  17  0308   WBC Thousand/uL  --   --  8 13   HEMOGLOBIN g/dL 9 9*  < > 10 4*   HEMATOCRIT % 31 5*  < > 31 9*   PLATELETS Thousands/uL  --   --  259   NEUTROS PCT %  --   --  75   LYMPHS PCT %  --   --  16   MONOS PCT %  --   --  7   EOS PCT %  --   --  2   < > = values in this interval not displayed      Results from last 7 days  Lab Units 17  0411   SODIUM mmol/L 140   POTASSIUM mmol/L 3 4*   CHLORIDE mmol/L 104   CO2 mmol/L 32   BUN mg/dL 17   CREATININE mg/dL 1 08   CALCIUM mg/dL 8 3   GLUCOSE RANDOM mg/dL 89       Results from last 7 days  Lab Units 17  0411   INR  1 30*       * I Have Reviewed All Lab Data Listed Above   * Additional Pertinent Lab Tests Reviewed: [address] Admission Reviewed    Imaging:  No results found  Imaging Reports Reviewed Today Include: none  Imaging Personally Reviewed by Myself Includes:  *no  No results found  Recent Cultures (last 7 days):           Last 24 Hours Medication List:     allopurinol 100 mg Oral TID (diuretic)   bimatoprost 1 drop Both Eyes HS   calcium carbonate 1 tablet Oral Daily With Breakfast   cholecalciferol 1,000 Units Oral Daily   enoxaparin 30 mg Subcutaneous Q12H DAVIDA   gabapentin 300 mg Oral HS   indapamide 2 5 mg Oral QAM   insulin detemir 28 Units Subcutaneous HS   insulin lispro 1-6 Units Subcutaneous TID AC   insulin lispro 1-6 Units Subcutaneous HS   metoprolol succinate 50 mg Oral Daily   pantoprazole 40 mg Oral BID AC   tolterodine 1 mg Oral HS        Today, Patient Was Seen By: Param Magana MD    ** Please Note: Dragon 360 Dictation voice to text software may have been used in the creation of this document   **

## 2017-09-14 NOTE — PROGRESS NOTES
Patient Name: Lopez Rosenthal  Patient MRN: [id number]  Date: 09/14/17  Service: [facility]    Subjective   Lopez Rosenthal is a 81 y o  female who was admitted with Colonoscopy causing post-procedural bleeding  She had a flexible sigmoidoscopy yesterday with no evidence of active bleeding  Because of her prior symptoms clip was deployed at polypectomy site  At this time she has had no further bleeding  She denies any abdominal pain  Hemoglobin stable at 9 2    Patient denies: Chest pain, shortness of breath, bright red blood per rectum or abdominal pain  All others negative except as noted in HPI  Objective     Vitals  Blood pressure 136/73, pulse 75, temperature (!) 96 7 °F (35 9 °C), temperature source Tympanic, resp  rate 18, weight 109 kg (240 lb 4 8 oz), SpO2 99 %  General: Alert, no apparent distress  Eyes: No scleral icterus  ENT: MMM  Card: RRR no murmur  Lungs: Clear to ascultation b/l  No wheezes, rales, rhonchi  Abdomen: Soft  Nontender  Nondistended  Bowel sounds present and normoactive  No hepatosplenomegaly  Skin: No jaundice  Neuro: Alert and oriented x3  Extremities:  Bilateral lower extremities with chronic venous stasis changes and +1 edema        Laboratory Studies  Lab Results   Component Value Date    CREATININE 1 08 09/14/2017    BUN 17 09/14/2017    K 3 4 (L) 09/14/2017     09/14/2017    CO2 32 09/14/2017    GLUCOSE 89 09/14/2017    CALCIUM 8 3 09/14/2017     Lab Results   Component Value Date    WBC 8 13 09/13/2017    HGB 9 2 (L) 09/14/2017    HCT 28 6 (L) 09/14/2017     09/13/2017    MCV 81 (L) 09/13/2017     Lab Results   Component Value Date    PROTIME 16 3 (H) 09/14/2017    INR 1 30 (H) 09/14/2017       Imaging and Other Studies    Inhouse Medications       Current Facility-Administered Medications:     allopurinol (ZYLOPRIM) tablet 100 mg, 100 mg, Oral, TID (diuretic), 100 mg at 09/14/17 0638    bimatoprost (LUMIGAN) 0 01 % ophthalmic solution 1 drop, 1 drop, Both Eyes, HS, 1 drop at 09/13/17 2318    calcium carbonate (OYSTER SHELL,OSCAL) 500 mg tablet 1 tablet, 1 tablet, Oral, Daily With Breakfast, 1 tablet at 09/13/17 0646    cholecalciferol (VITAMIN D3) tablet 1,000 Units, 1,000 Units, Oral, Daily, 1,000 Units at 09/13/17 0800    enoxaparin (LOVENOX) subcutaneous injection 30 mg, 30 mg, Subcutaneous, Q12H DAVIDA    gabapentin (NEURONTIN) capsule 300 mg, 300 mg, Oral, HS, 300 mg at 09/14/17 0034    hydrALAZINE (APRESOLINE) injection 10 mg, 10 mg, Intravenous, Q6H PRN    HYDROcodone-acetaminophen (NORCO) 5-325 mg per tablet 1 tablet, 1 tablet, Oral, Q6H PRN, 1 tablet at 09/13/17 2319    indapamide (LOZOL) tablet 2 5 mg, 2 5 mg, Oral, QAM, 2 5 mg at 09/13/17 0800    insulin lispro (HumaLOG) 100 units/mL subcutaneous injection 1-6 Units, 1-6 Units, Subcutaneous, TID AC **AND** Fingerstick Glucose (POCT), , , TID AC    insulin lispro (HumaLOG) 100 units/mL subcutaneous injection 1-6 Units, 1-6 Units, Subcutaneous, HS, 4 Units at 09/13/17 2321    metoprolol succinate (TOPROL-XL) 24 hr tablet 50 mg, 50 mg, Oral, Daily, 50 mg at 09/13/17 0800    morphine (PF) 4 mg/mL injection 1 mg, 1 mg, Intravenous, Q3H PRN    pantoprazole (PROTONIX) EC tablet 40 mg, 40 mg, Oral, BID AC, 40 mg at 09/14/17 0638    tolterodine (DETROL) tablet 1 mg, 1 mg, Oral, HS, 1 mg at 09/13/17 2320      Assessment/Plan:  1  Post polypectomy bleed  Status post flexible sigmoidoscopy with no evidence of  bleeding  Hemoclip placed at polypectomy site  Bleeding appears to have stopped will advance the diet and patient can restart Lovenox  Monitor for further bleeding  2   Atrial fib with mechanical heart valve  Lovenox restarted  3  Chronic kidney disease stage 3- primary to manage      HEMALATHA Mendoza

## 2017-09-14 NOTE — CONSULTS
Consult - Cardiology   Margie Ambriz 81 y o  female MRN: [id number]  Unit/Bed#: E2 -01 Encounter: [id number]        Reason For Consult:  Recommendation regarding anticoagulation               Assessment and Plan:     1  Recent upper endoscopy and colonoscopy with biopsy and cold snare polypectomy - 9/12/17, postprocedural blood loss per rectum prompting readmission:   Repeat colonoscopy 9/13/2017 with deployment of hemoclip at site of previous polypectomy though no visible bleeding was observed   Hemoglobin stable 9-10s without recurrent blood loss  Lovenox resumed in diet advanced per admitting service  2  Chronic warfarin anticoagulation with history of previous mechanical AVR and atrial fibrillation:   Under direction of patient's usual cardiologist Coumadin was held prior to her elective procedure with Lovenox bridging  As above, Lovenox has currently been restarted  I would advise continued close observation for signs of recurrent bleeding and stabilization of hemoglobin before resuming warfarin which can likely be deferred for 1-2 days with modest thrombotic risk as this is a high-flow valve  3   Atrial fibrillation:     Heart rate currently controlled ~~> continue Toprol   Anticoagulation as above     History Of Present Illness: This woman is an 81-year-old was PCP is Dr Georgie Yang, and cardiologist Dr Luisa Whaley  She has a history of colon polyps with prior polypectomy with pathology demonstrating these to be adenomas  She was recently seen by Dr Lauryn Boothe with some complaints of epigastric and left upper quadrant discomfort  Upper endoscopy and colonoscopy were planned and the patient presented to the hospital on 9/12/2017 for these  She had completed these examinations which included multiple biopsies of gastric polyps, and cold snared extraction of a left colonic polyp    She was discharged to home with instruction to resume Lovenox that evening (The Lovenox had been ordered by her cardiologist as a bridge to resumption of Coumadin and a therapeutic INR)  The patient did take a dose of Lovenox as instructed  Later that evening she had passage of some bright red blood per rectum prompting her return to the hospital    The patient was admitted and repeat endoscopy performed  Report of colonoscopy indicates a small red spot on a polyp which should been cold snared 1 day prior  A hemoclip was applied to this area  Lovenox was withheld last evening but was given this morning  She has had no further bleeding and her hemoglobin is stable in the 9's  The patient was seen this morning by Gastroenterology noting that the patient's bleeding appears to have stopped Jez Gunning continued use of Lovenox and advance of the patient's diet  Our consultation was requested seemingly to opine on resumption of Lovenox (which has already begun) versus a heparin drip, as well as the Coumadin  As alluded to above, this woman has a notable history of previous mechanical AVR -1997, hypertension, atrial fibrillation, indwelling pacemaker, previous cardiac ablation of unspecified type, and pulmonary hypertension          Past Medical History:        Past Medical History:   Diagnosis Date    Chronic atrial fibrillation 9/13/2017    Chronic gout 9/13/2017    CKD (chronic kidney disease) stage 3, GFR 30-59 ml/min 9/13/2017    Colon polyp 9/13/2017    Coronary artery disease     Diabetes mellitus     Essential hypertension 9/13/2017    Gastritis 9/13/2017    Hypertension     Irregular heart beat     Mechanical heart valve present 9/13/2017    Osteoarthritis     Pacemaker     S/P AVR     Spinal stenosis     disk herniation     Past Surgical History:   Procedure Laterality Date    AORTIC VALVE REPLACEMENT      CARDIAC SURGERY      CATARACT EXTRACTION      CHOLECYSTECTOMY      COLONOSCOPY      ESOPHAGOGASTRODUODENOSCOPY      HERNIA REPAIR      x3    HYSTERECTOMY      INSERT / REPLACE / REMOVE PACEMAKER      JOINT REPLACEMENT Bilateral     knees    CA COLONOSCOPY FLX DX W/COLLJ SPEC WHEN PFRMD N/A 9/12/2017    Procedure: Brandee Cedeno bxAND COLONOSCOPY with polypectomies;  Surgeon: Samantha Stoner MD;  Location: [facility]; Service: Gastroenterology    CA [address] N/A 9/13/2017    Procedure: colonoscopy with hemo clip x 2;  Surgeon: Samantha Stoner MD;  Location: [facility]; Service: Gastroenterology    ROTATOR CUFF REPAIR Left     SKIN BIOPSY      TONSILLECTOMY          Allergy:        Allergies   Allergen Reactions    Acetazolamide GI Intolerance    Cephalosporins     Diltiazem     Hydrochlorothiazide Other (See Comments)    Latex     Penicillins     Sulfa Antibiotics        Medications:       Prior to Admission medications    Medication Sig Start Date End Date Taking?  Authorizing Provider   allopurinol (ZYLOPRIM) 100 mg tablet Take 100 mg by mouth 3 (three) times a day   Yes Historical Provider, MD   aspirin (ECOTRIN LOW STRENGTH) 81 mg EC tablet Take 81 mg by mouth daily   Yes Historical Provider, MD   bimatoprost (LUMIGAN) 0 01 % ophthalmic drops Administer 1 drop to both eyes daily at bedtime   Yes Historical Provider, MD   calcium citrate-vitamin D (CITRACAL+D) 315-200 MG-UNIT per tablet Take 1 tablet by mouth 2 (two) times a day   Yes Historical Provider, MD   cholecalciferol (VITAMIN D3) 1,000 units tablet Take 1,000 Units by mouth daily   Yes Historical Provider, MD   enoxaparin (LOVENOX) 30 mg/0 3 mL Inject 30 mg under the skin 2 (two) times a day   Yes Historical Provider, MD   gabapentin (NEURONTIN) 300 mg capsule Take 300 mg by mouth daily at bedtime   Yes Historical Provider, MD   HYDROcodone-acetaminophen (NORCO) 5-325 mg per tablet Take 1 tablet by mouth every 6 (six) hours as needed for pain for up to 10 doses Max Daily Amount: 4 tablets 6/15/17  Yes Cameron Caldwell MD   indapamide (LOZOL) 2 5 mg tablet Take 2 5 mg by mouth every morning   Yes Historical Provider, MD   insulin detemir (LEVEMIR) 100 units/mL subcutaneous injection Inject 28 Units under the skin daily at bedtime   Yes Historical Provider, MD   Linagliptin (TRADJENTA) 5 MG TABS Take 5 mg by mouth daily   Yes Historical Provider, MD   Magnesium 250 MG TABS Take 1 each by mouth daily   Yes Historical Provider, MD   metoprolol succinate (TOPROL-XL) 50 mg 24 hr tablet Take 50 mg by mouth daily   Yes Historical Provider, MD   Multiple Vitamin (MULTIVITAMIN) tablet Take 1 tablet by mouth daily   Yes Historical Provider, MD   Multiple Vitamins-Minerals (ICAPS AREDS 2 PO) Take 2 capsules by mouth daily   Yes Historical Provider, MD   RABEprazole (ACIPHEX) 20 MG tablet Take 20 mg by mouth daily   Yes Historical Provider, MD   tolterodine (DETROL) 1 mg tablet Take 1 mg by mouth daily at bedtime   Yes Historical Provider, MD   warfarin (COUMADIN) 2 5 mg tablet Take 3 75 mg by mouth daily 5mg on Tuesday   Yes Historical Provider, MD       Family History:     History reviewed  No pertinent family history  Social History:       Social History     Social History    Marital status:      Spouse name: N/A    Number of children: N/A    Years of education: N/A     Social History Main Topics    Smoking status: Former Smoker    Smokeless tobacco: Never Used    Alcohol use No    Drug use: No    Sexual activity: Not Asked     Other Topics Concern    None     Social History Narrative    None       ROS:  Symptoms per HPI    Exam:  General:  Alert, oriented, pleasant and cooperative elderly female in no distress  Head: Normocephalic, atraumatic  Eyes:  EOMI  Pupils - equal, round, reactive to accomodation  No icterus  Normal Conjunctiva  Oropharynx:  Moist mucosa without lesion  Neck:  No JVD or bruit  Heart:  Currently regular with controlled rate  Crisp aortic prosthetic closure click  Lungs:  Clear without rales rhonchi or wheeze  Abdomen:  Soft, nontender, with normal bowel sounds   No organomegaly or mass  Lower Limbs:  No pitting edema  Some mild stasis changes  Pulses[de-identified]  Dorsal pedal pulses 2+ bilaterally posterior tibial 1-2 +  Musculoskeletal:  Grossly symmetrical   Independent movement of 4 limbs observed  Neurologic:    Oriented to:  Person, place, situation  Cranial Nerves: grossly intact - vision, smell, taste, and hearing  were not tested  Motor function: grossly normal,  symmetric Sensation: was not tested  DATA:             Weights: Wt Readings from Last 3 Encounters:   09/13/17 109 kg (240 lb 4 8 oz)   09/12/17 109 kg (240 lb)   06/15/17 109 kg (240 lb)   , Body mass index is 38 2 kg/m²  Lab Studies:               Results from last 7 days  Lab Units 09/14/17  0406 09/13/17  2047 09/13/17  1229  09/13/17  0308   WBC Thousand/uL  --   --   --   --  8 13   HEMOGLOBIN g/dL 9 2* 9 4* 12 9  < > 10 4*   HEMATOCRIT % 28 6* 29 4* 38 3  < > 31 9*   PLATELETS Thousands/uL  --   --   --   --  259   < > = values in this interval not displayed  ,   Results from last 7 days  Lab Units 09/14/17  0411 09/13/17  0308   SODIUM mmol/L 140 139   POTASSIUM mmol/L 3 4* 3 6   CHLORIDE mmol/L 104 103   CO2 mmol/L 32 32   BUN mg/dL 17 23   CREATININE mg/dL 1 08 1 19   CALCIUM mg/dL 8 3 9 3   GLUCOSE RANDOM mg/dL 89 154*

## 2017-09-15 VITALS
OXYGEN SATURATION: 98 % | WEIGHT: 240.3 LBS | HEART RATE: 75 BPM | HEIGHT: 66 IN | TEMPERATURE: 98.6 F | RESPIRATION RATE: 18 BRPM | SYSTOLIC BLOOD PRESSURE: 117 MMHG | BODY MASS INDEX: 38.62 KG/M2 | DIASTOLIC BLOOD PRESSURE: 65 MMHG

## 2017-09-15 PROBLEM — K91.840 COLONOSCOPY CAUSING POST-PROCEDURAL BLEEDING: Status: RESOLVED | Noted: 2017-09-13 | Resolved: 2017-09-15

## 2017-09-15 PROBLEM — K91.840 COLONOSCOPY CAUSING POST-PROCEDURAL BLEEDING: Status: ACTIVE | Noted: 2017-09-13

## 2017-09-15 PROBLEM — Z79.01 ANTICOAGULATED: Status: ACTIVE | Noted: 2017-09-15

## 2017-09-15 PROBLEM — K62.5 RECTAL BLEEDING: Status: RESOLVED | Noted: 2017-09-13 | Resolved: 2017-09-15

## 2017-09-15 PROBLEM — K63.5 COLON POLYP: Status: RESOLVED | Noted: 2017-09-13 | Resolved: 2017-09-15

## 2017-09-15 LAB
BASOPHILS # BLD AUTO: 0.03 THOUSANDS/ΜL (ref 0–0.1)
BASOPHILS NFR BLD AUTO: 0 % (ref 0–1)
EOSINOPHIL # BLD AUTO: 0.26 THOUSAND/ΜL (ref 0–0.61)
EOSINOPHIL NFR BLD AUTO: 3 % (ref 0–6)
ERYTHROCYTE [DISTWIDTH] IN BLOOD BY AUTOMATED COUNT: 19.7 % (ref 11.6–15.1)
GLUCOSE SERPL-MCNC: 140 MG/DL (ref 65–140)
GLUCOSE SERPL-MCNC: 232 MG/DL (ref 65–140)
HCT VFR BLD AUTO: 33.5 % (ref 34.8–46.1)
HGB BLD-MCNC: 10.6 G/DL (ref 11.5–15.4)
INR PPP: 1.01 (ref 0.86–1.16)
LYMPHOCYTES # BLD AUTO: 1.26 THOUSANDS/ΜL (ref 0.6–4.47)
LYMPHOCYTES NFR BLD AUTO: 17 % (ref 14–44)
MCH RBC QN AUTO: 25.7 PG (ref 26.8–34.3)
MCHC RBC AUTO-ENTMCNC: 31.6 G/DL (ref 31.4–37.4)
MCV RBC AUTO: 81 FL (ref 82–98)
MONOCYTES # BLD AUTO: 0.59 THOUSAND/ΜL (ref 0.17–1.22)
MONOCYTES NFR BLD AUTO: 8 % (ref 4–12)
NEUTROPHILS # BLD AUTO: 5.45 THOUSANDS/ΜL (ref 1.85–7.62)
NEUTS SEG NFR BLD AUTO: 72 % (ref 43–75)
NRBC BLD AUTO-RTO: 0 /100 WBCS
PLATELET # BLD AUTO: 261 THOUSANDS/UL (ref 149–390)
PMV BLD AUTO: 10 FL (ref 8.9–12.7)
PROTHROMBIN TIME: 13.3 SECONDS (ref 12.1–14.4)
RBC # BLD AUTO: 4.12 MILLION/UL (ref 3.81–5.12)
WBC # BLD AUTO: 7.59 THOUSAND/UL (ref 4.31–10.16)

## 2017-09-15 PROCEDURE — 82948 REAGENT STRIP/BLOOD GLUCOSE: CPT

## 2017-09-15 PROCEDURE — 85025 COMPLETE CBC W/AUTO DIFF WBC: CPT | Performed by: INTERNAL MEDICINE

## 2017-09-15 PROCEDURE — 85610 PROTHROMBIN TIME: CPT | Performed by: INTERNAL MEDICINE

## 2017-09-15 RX ADMIN — ALLOPURINOL 100 MG: 100 TABLET ORAL at 07:22

## 2017-09-15 RX ADMIN — INDAPAMIDE 2.5 MG: 2.5 TABLET, FILM COATED ORAL at 08:13

## 2017-09-15 RX ADMIN — PANTOPRAZOLE SODIUM 40 MG: 40 TABLET, DELAYED RELEASE ORAL at 16:03

## 2017-09-15 RX ADMIN — PANTOPRAZOLE SODIUM 40 MG: 40 TABLET, DELAYED RELEASE ORAL at 07:22

## 2017-09-15 RX ADMIN — ENOXAPARIN SODIUM 100 MG: 100 INJECTION SUBCUTANEOUS at 11:47

## 2017-09-15 RX ADMIN — INSULIN LISPRO 3 UNITS: 100 INJECTION, SOLUTION INTRAVENOUS; SUBCUTANEOUS at 12:02

## 2017-09-15 RX ADMIN — Medication 1 TABLET: at 08:12

## 2017-09-15 RX ADMIN — METOPROLOL SUCCINATE 50 MG: 50 TABLET, EXTENDED RELEASE ORAL at 08:12

## 2017-09-15 RX ADMIN — VITAMIN D, TAB 1000IU (100/BT) 1000 UNITS: 25 TAB at 08:12

## 2017-09-15 NOTE — PLAN OF CARE
Potential for Falls     Patient will remain free of falls Progressing          SAFETY ADULT     Maintain or return to baseline ADL function Progressing

## 2017-09-15 NOTE — DISCHARGE INSTRUCTIONS
Please continue your coumadin tonight as scheduled from your prior regimen  Per Dr Janusz Vieira you may restart your iron supplementation this coming Monday  Please avoid taking it with calcium primarily dairy, eggs, or tea/coffee  Please take it at least 30 minutes before your meal   Please continue to take lovenox injections ONCE DAILY  A prescription has been provided for 10 days if you need more  Please follow up with your coumadin clinic and continue to take lovenox daily until your INR is above 2 0 for 2 visits  If you have any further bleeding please come back to the ER for re-evaluation  Please follow up with [facility] for discussion of your biopsy

## 2017-09-15 NOTE — PHYSICIAN ADVISOR
This patient is a 81 y o  y/o female who was admitted to the hospital on 9/13/2017 0157  History of Present Illness includes: This is an 80 y/o f who presented to the ED c/o bloody BM that began after EGD/colonscopy performed the day prior to presentation  + nausea and abdominal discomfort  Her medical history includes HTN, DM, CKD, and atrial fibrillation  PT also has a history of AVR and takes coumadin however since the procedure she has been giving herself lovenox shots  Vital signs in the ER are as follows   ED Triage Vitals   Temperature Pulse Respirations Blood Pressure SpO2   09/13/17 0200 09/13/17 0200 09/13/17 0200 09/13/17 0200 09/13/17 0200   98 1 °F (36 7 °C) 83 18 168/92 97 %      Temp Source Heart Rate Source Patient Position - Orthostatic VS BP Location FiO2 (%)   09/13/17 1433 09/13/17 0411 09/13/17 0411 09/13/17 0411 --   Tympanic Monitor Lying Right arm       Pain Score       09/13/17 0200       3       Physical Exam was notable for a well developed f, heart sounds with a mechanical valve click, abdomen with RLQ tenderness to palpation without rebound or guarding and guaic positive stool  Treatment in the ER included blood work to assess for anemia as well as renal function and anticoagulation as well as consultations to gastroenterology    Results are as follows:  WBC 4 31 - 10 16 Thousand/uL 8 13    RBC 3 81 - 5 12 Million/uL 3 94    Hemoglobin 11 5 - 15 4 g/dL 10 4  L   Hematocrit 34 8 - 46 1 % 31 9  L   MCV 82 - 98 fL 81  L   MCH 26 8 - 34 3 pg 26 4  L   MCHC 31 4 - 37 4 g/dL 32 6    RDW 11 6 - 15 1 % 19 8  H   MPV 8 9 - 12 7 fL 9 5    Platelets 565 - 617 Thousands/uL 259      Protime 12 1 - 14 4 seconds 16 8  H   INR 0 86 - 1 16 1 36       Sodium 136 - 145 mmol/L 139    Potassium 3 5 - 5 3 mmol/L 3 6    Chloride 100 - 108 mmol/L 103    CO2 21 - 32 mmol/L 32    Anion Gap 4 - 13 mmol/L 4    BUN 5 - 25 mg/dL 23    Creatinine 0 60 - 1 30 mg/dL 1 19    Comments: Standardized to IDMS reference method   Glucose 65 - 140 mg/dL 154  H      Calcium 8 3 - 10 1 mg/dL 9 3    eGFR ml/min/1 73sq m 43        The patient was initially admitted as OBSERVATION for rectal bleeding post-procedure however, she has remained hospitalized for 2 day(s) and is currently an INPATIENT due to ongoing blood loss secondary to rectal bleeding with a drop in hemoglobin  There is currently documentation of the need for continued INPATIENT ADMISSION by the treating provider  The plan of care includes consultation to gastroenterology with plan for colonoscopy as well as consultations to cardiology for anticoagulation therapy  OF note, During the admission, the pt has a clip placed at site of previous polypectomy with post-procedure hemoglobin of 9 2    The patient is appropriate for  Inpatient Admission  The rationale is as follows: This is an 82 y/o f with GI bleed post-coloscopy on anticoagulation requiring repeat coloscopy for clipping of previous procedure site as well as monitoring for initiation of anticoagulation therapy  INPATIENT ADMISSION is a complex medical decision and based upon review of the relevant and available documentation, I agree that the patient is appropriate for INPATIENT ADMISSION

## 2017-09-15 NOTE — DISCHARGE SUMMARY
Discharge Summary - [facility]    Patient Information: Sonal Mays 81 y o  female MRN: [id number]  Unit/Bed#: E2 -01 Encounter: [id number]    Discharging Physician / Practitioner: Gabrielle Fernandez PA-C  PCP: Analia Bonds MD  Admission Date: 9/13/2017  Discharge Date: 09/15/17    Reason for Admission: postprocedural colonoscopy bleeding    Discharge Diagnoses:     Principal Problem (Resolved):    Colonoscopy causing post-procedural bleeding  Active Problems:    Essential hypertension    CKD (chronic kidney disease) stage 3, GFR 30-59 ml/min    Chronic gout    Mechanical heart valve present    Chronic atrial fibrillation    Gastritis    Anticoagulated  Resolved Problems:    Rectal bleeding    Colon polyp      Consultations During Hospital Stay:  · GI  · cardiology    Procedures Performed:     · colonoscopy    Significant Findings / Test Results:     · Colonoscopy report 9/13/2017  Preop Diagnosis:  Colonoscopy causing post-procedural bleeding [K91 840]     Post-Op Diagnosis Codes:     * Colonoscopy causing post-procedural bleeding [K91 840]     Procedure(s) (LRB):  SIGMOIDOSCOPY FLEXIBLE (N/A)     Specimen(s):  * No specimens in log *     Estimated Blood Loss:   Minimal     Drains:        Anesthesia Type:   Conscious Sedation      Operative Indications:  Colonoscopy causing post-procedural bleeding [K91 840]        Operative Findings:  Probable post polypectomy bleed-2 polyp sites clipped        Complications:   None       Incidental Findings:   · None     Test Results Pending at Discharge (will require follow up): · None     Outpatient Tests Requested:  · PT INR, H&H    Complications:  None    Hospital Course:     Sonal Mays is a 81 y o  female patient who originally presented to the hospital on 9/13/2017 due to bloody bowel movements after EGD and colonoscopy the day prior to admission    She does have a baseline anemia and baseline CKD, as well as chronic atrial fibrillation and mechanical valve replacement, for which she is on Coumadin  On admission her PT INR was subtherapeutic with an INR of 1 36  Her APTT was 34 on b i d  Lovenox  In respect to the patient's GI bleed be, she had no further episodes of bleed during her stay  She had a repeat colonoscopy and had a hemoclip placed at a previous polypectomy site, as this was the suspected site of bleed  No active bleed was discovered  The patient's anemia on admission was between 9 8-10 4  She did have 1 value of hemoglobin at 12 9 which is thought to be an outlier  She then had progression of her hemoglobin to 9 4 and 9 2  Postprocedure, her hemoglobin had improved to 9 9 and on discharge 10 6  She was recommended to follow up with Dr [facility] Pederson group for GI discussed the biopsies of her polypectomy  She was recommended to restart her iron next week per his team     In respect to the patient's chronic anticoagulation and AFib and mechanical valve, Cardiology was consulted  Given her history of CKD stage 3 it was recommended that she go on a reduced dose of her Lovenox instead of b i d  Dosing  Initial discussion with Cardiology had recommended q 18 hours, however this was further discussed between Cardiology and the attending provider Dr  longer any and myself and the patient was felt to be an acceptable risk at 1mg/kg daily dosing with reintroduction of her normal coumadin regimen with close follow up with her coumadin clinic  She is recommended to take both her night time dose of 3 75mg daily except Sunday dose of 5mg, and stop lovenox after her INR is >2 0 x 2 checks  On the day of discharge, the patient had no further episodes of bleed, was doing well and anxious for discharge  She was cleared by Gastroenterology for d/c  Condition at Discharge: good     Discharge Day Visit / Exam:     Subjective:  Patient reports she is doing well  Her appetite is intact   No further episodes of BRBPR or melena  She denies any chest pain, shortness of breath, palpitations  No lightheadedness or dizziness or numbness or tingling or blurry vision  Vitals: Blood Pressure: 154/82 (09/15/17 0717)  Pulse: 76 (09/15/17 0717)  Temperature: 97 7 °F (36 5 °C) (09/15/17 0717)  Temp Source: Tympanic (09/15/17 0717)  Respirations: 18 (09/15/17 0717)  Height: 5' 6" (167 6 cm) (09/14/17 1050)  Weight - Scale: 109 kg (240 lb 4 8 oz) (09/14/17 1050)  SpO2: 100 % (09/15/17 0717)  Exam:   Physical Exam   Constitutional: She appears well-developed and well-nourished  No distress  HENT:   Head: Normocephalic and atraumatic  Right Ear: External ear normal    Left Ear: External ear normal    Mouth/Throat: Oropharynx is clear and moist  No oropharyngeal exudate  Eyes: Conjunctivae are normal  Right eye exhibits no discharge  Left eye exhibits no discharge  No scleral icterus  Cardiovascular: Normal rate, normal heart sounds and intact distal pulses  Exam reveals no gallop and no friction rub  No murmur heard  Irregularly irregular   Pulmonary/Chest: Effort normal  No respiratory distress  She has no wheezes  She has no rales  Abdominal: Soft  She exhibits no distension  There is no tenderness  There is no rebound and no guarding  Musculoskeletal: She exhibits no edema  Lymphadenopathy:     She has no cervical adenopathy  Neurological: She is alert  Skin: Skin is warm and dry  She is not diaphoretic  Psychiatric: She has a normal mood and affect  Vitals reviewed  (  Be Sure to Include Physical Exam: Delete this entire line when you have entered your exam)  Discussion with Family:  Discussion of anticoagulation needs, follow up with Coumadin clinic the patient's cardiologist, risk to benefit ratio of Lovenox dosing while patient on Coumadin bridge    Discharge instructions/Information to patient and family:   See after visit summary for information provided to patient and family        Provisions for Follow-Up Care:  See after visit summary for information related to follow-up care and any pertinent home health orders  Disposition:     Home    For Discharges to [facility]:   · Not Applicable to this Patient - Not Applicable to this Patient    Planned Readmission: none     Discharge Statement:  I spent 40 minutes discharging the patient  This time was spent on the day of discharge  I had direct contact with the patient on the day of discharge  Greater than 50% of the total time was spent examining patient, answering all patient questions, arranging and discussing plan of care with patient as well as directly providing post-discharge instructions  Additional time then spent on discharge activities  Discharge Medications:  See after visit summary for reconciled discharge medications provided to patient and family        ** Please Note: This note has been constructed using a voice recognition system **

## 2017-09-15 NOTE — PROGRESS NOTES
Patient Name: Gerhardt Server  Patient MRN: [id number]  Date: 09/15/17  Service: [facility]    Subjective   Gerhardt Server is a 81 y o  female who was admitted with Colonoscopy causing post-procedural bleeding  She states that she has no abdominal pain  She has had no abdominal pain  She has had no bright red blood per rectum or melena  Her appetite is good  Hemoglobin is now 10 6      Patient denies: Chest pain, shortness of breath  All others negative except as noted in HPI  Objective     Vitals  Blood pressure 154/82, pulse 76, temperature 97 7 °F (36 5 °C), temperature source Tympanic, resp  rate 18, height 5' 6" (1 676 m), weight 109 kg (240 lb 4 8 oz), SpO2 100 %  General: Alert, no apparent distress  Eyes: No scleral icterus  ENT: MMM  Card: RRR no murmur  Lungs: Clear to ascultation b/l  No wheezes, rales, rhonchi  Abdomen: Soft  Nontender  Nondistended  Bowel sounds present and normoactive  No hepatosplenomegaly    Skin: No jaundice  Neuro: Alert and oriented x3  Extremities:  Bilateral lower extremity with chronic venous stasis changes and +1 edema      Laboratory Studies  Lab Results   Component Value Date    CREATININE 1 08 09/14/2017    BUN 17 09/14/2017    K 3 4 (L) 09/14/2017     09/14/2017    CO2 32 09/14/2017    GLUCOSE 89 09/14/2017    CALCIUM 8 3 09/14/2017     Lab Results   Component Value Date    WBC 7 59 09/15/2017    HGB 10 6 (L) 09/15/2017    HCT 33 5 (L) 09/15/2017     09/15/2017    MCV 81 (L) 09/15/2017     Lab Results   Component Value Date    PROTIME 13 3 09/15/2017    INR 1 01 09/15/2017       Imaging and Other Studies    Inhouse Medications       Current Facility-Administered Medications:     allopurinol (ZYLOPRIM) tablet 100 mg, 100 mg, Oral, TID (diuretic), 100 mg at 09/15/17 0722    bimatoprost (LUMIGAN) 0 01 % ophthalmic solution 1 drop, 1 drop, Both Eyes, HS, 1 drop at 09/14/17 2110    calcium carbonate (OYSTER SHELL,OSCAL) 500 mg tablet 1 tablet, 1 tablet, Oral, Daily With Breakfast, 1 tablet at 09/15/17 6148    cholecalciferol (VITAMIN D3) tablet 1,000 Units, 1,000 Units, Oral, Daily, 1,000 Units at 09/15/17 0812    enoxaparin (LOVENOX) subcutaneous injection 105 mg, 1 mg/kg, Subcutaneous, Q18H    gabapentin (NEURONTIN) capsule 300 mg, 300 mg, Oral, HS, 300 mg at 09/14/17 2110    hydrALAZINE (APRESOLINE) injection 10 mg, 10 mg, Intravenous, Q6H PRN    HYDROcodone-acetaminophen (NORCO) 5-325 mg per tablet 1 tablet, 1 tablet, Oral, Q6H PRN, 1 tablet at 09/14/17 2109    indapamide (LOZOL) tablet 2 5 mg, 2 5 mg, Oral, QAM, 2 5 mg at 09/15/17 0813    insulin detemir (LEVEMIR) subcutaneous injection 28 Units, 28 Units, Subcutaneous, HS, 28 Units at 09/14/17 2109    insulin lispro (HumaLOG) 100 units/mL subcutaneous injection 1-6 Units, 1-6 Units, Subcutaneous, TID AC, 2 Units at 09/14/17 1701 **AND** Fingerstick Glucose (POCT), , , TID AC    insulin lispro (HumaLOG) 100 units/mL subcutaneous injection 1-6 Units, 1-6 Units, Subcutaneous, HS, 2 Units at 09/14/17 2110    metoprolol succinate (TOPROL-XL) 24 hr tablet 50 mg, 50 mg, Oral, Daily, 50 mg at 09/15/17 0812    morphine (PF) 4 mg/mL injection 1 mg, 1 mg, Intravenous, Q3H PRN    pantoprazole (PROTONIX) EC tablet 40 mg, 40 mg, Oral, BID AC, 40 mg at 09/15/17 5551    tolterodine (DETROL) tablet 1 mg, 1 mg, Oral, HS, 1 mg at 09/14/17 2111      Assessment/Plan:  1  Post polypectomy GI bleed  Status post flexible sigmoidoscopy with no evidence of bleeding  Hemoclip placed at polypectomy site  Bleeding to have stopped  Patient is tolerating advanced diet with no evidence of bright red blood per rectum or melena  Lovenox has been restarted  2   Mechanical AVR and history of atrial fibrillation  Anticoagulation per Cardiology  3  CKD stage 3 primary to manage        HEMALATHA Hamilton

## 2017-11-02 ENCOUNTER — HOSPITAL ENCOUNTER (OUTPATIENT)
Dept: SLEEP CENTER | Facility: CLINIC | Age: 82
Discharge: HOME | End: 2017-11-02
Payer: MEDICARE

## 2017-11-02 ENCOUNTER — TRANSCRIBE ORDERS (OUTPATIENT)
Dept: SLEEP CENTER | Facility: CLINIC | Age: 82
End: 2017-11-02

## 2017-11-02 DIAGNOSIS — G47.52 REM BEHAVIORAL DISORDER: ICD-10-CM

## 2017-11-02 DIAGNOSIS — G25.81 RESTLESS LEGS: Primary | ICD-10-CM

## 2017-11-02 NOTE — CONSULTS
Consultation - [id number] [place] 81 y o  female MRN: [id number]          Reason for Consult / Principal Problem:    1  Poor quality sleep   2  Frequent nocturnal awakenings  3  Non restorative sleep  4  Excessive daytime sleepiness  5  History of restless legs syndrome  6  History of anemia    HPI: Cecil Rogers is a 81y o  year old female  She currently complains of insomnia  This is characterized by frequent nocturnal awakenings and non restorative sleep  She feels this began several years ago when she was in the hospital for treatment of chronic low back pain  She was placed on opioids and tramadol for pain control  Later as this medication was discontinued she experienced withdrawal symptoms  It was at this time when her sleep issues began  Since then she has experienced frequent nocturnal awakenings, non restorative sleep and excessive daytime sleepiness  She has a history of restless legs syndrome which dates back to her childhood  She currently uses 300 mg of gabapentin for control of these symptoms at bedtime  Employment:  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  Work hours are 8:30 a m  to 5:00 p m  Monday, Tuesday and alternating Wednesdays  She also occasionally feels in as needed  Sleep Schedule:       Bedtime:  Work nights 11:00 p m , non work nights 11 p m  to 12:00 a m  Latency:  Work nights 15 minutes, non work nights 1 hour or more      Wakeup time:  Work days 5:30 a m , non work days 7:00 a m  Awakenings:       Frequency:  1 to 3 per night      Causes:  Need to urinate, leg cramps      Duration:  May be brief for as long as 1 hour    Daytime Sleepiness / Inappropriate Sleep:       Most severe:  About 10:00 a m          Naps :  Twice per week    Time:  Afternoon    Duration:  60 to 90 minutes, refreshing quality      Inappropriate drowsiness / sleep:  As a passenger in a car, watching television or sitting at her computer while working    Snoring:  Denied    Apnea:  Denied    Change in Weight:  She has gained and lost weight over the past several years but she denies any consistent pattern    RLS:  Many clinical symptoms consistent with this diagnosis  She experiences dysesthesias described as things crawling in my legs a sensation of pressure or squeezing  These are associated with an urge to move and seemed to be improved with movement  There much more significant when sitting or lying down and seemed to worsen as the day progresses  She feels that her daughter may also have similar symptoms she has been taking 300 mg of gabapentin at bedtime with fairly good success  Should she awaken with the symptoms she would usually take another 100 mg of gabapentin which seems to improve her complaints within 20 to 30 minutes      Other Complaints:  Bruxism, chronic pain, urinary frequency, heartburn during sleep, sleepwalking and sleep talking as a child, nightmares when on hypnotics and when pregnant     History:      Caffeine:  8 to 16 oz each morning with an extra copy in the afternoon if necessary to maintain wakefulness       Tobacco:  As many as 3 packs daily in the past, discontinued smoking about 40 years ago       Alcohol:  Denied       Drugs:  Denied     Past medical, past surgical,and family history can be reviewed in the patient's chart  Allergies and medications can be reviewed in the patient's chart        Signs         Weight:    135 6 lb (107 2 kg)  Height:    66 5 inches  BMI:     37 7   Blood Pressure:   138/80  Heart Rate:    88  Neck Circumference:  Not measured  ESS:     6    Physical Exam  General Appearance:   Alert, cooperative, no distress, appears stated age, obese     Head:   Normocephalic, without obvious abnormality, atraumatic     Eyes:   PERRL, conjunctiva/corneas clear, EOM's intact          Nose:  Nares normal, septum midline, mucosa normal, no drainage or sinus tenderness     Throat: Lips, teeth and gums normal; tongue slightly increased in size but normal in shape and midline in position; mucosa modestly redundant bilaterally, uvula appears normal, tonsils not visualized, Mallampati class 2       Neck:  Supple, symmetrical, trachea midline, no adenopathy; Thyroid: No enlargement, tenderness or nodules; no carotid bruit or JVD   Lungs:    Clear to auscultation bilaterally, respirations unlabored     Heart:   Regular rate and rhythm, S1 and S2 normal, mechanical valve , rub or gallop       Extremities:  Extremities normal, atraumatic, no cyanosis, mild bilateral edema of legs     Pulses:  2+ and symmetric all extremities     Skin:  Skin color, texture, turgor normal, no rashes or lesions     Lymph nodes:      Cervical and supraclavicular normal       Neurologic:    CNII-XII intact  Normal strength, sensation throughout     Sleep Study Results:  She reports sleep study was completed at [address] [place] [address] approximately 20 years ago and showed no evidence of abnormal nocturnal breathing  She states there was some evidence to suggest a diagnosis of restless legs syndrome  ASSESSMENT / PLAN     Assessment:  1  Restless legs syndrome  2  Nocturnal awakenings likely secondary to periodic limb movements  3  Non restorative sleep  4  Excessive daytime sleepiness  5  Obesity  6  Possible abnormal iron metabolism  7  Relatively insufficient sleep    Plan:  1  She will have blood drawn next week which will include serum ferritin, TIBC and CBC  2  Increase gabapentin to 400 mg at bedtime  3  Continue oral iron replacement for now  4  Consider iron infusion treatment if serum ferritin extremely low    Counseling / Coordination of Care  Total clinic time spent today 60 minutes  Greater than 50% of total time was spent with the patient and / or family counseling and / or coordination of care  A description of the counseling / coordination of care:      Most of our time was spent discussing her history of restless legs syndrome and current treatment including gabapentin at bedtime and iron replacement  She also told me about her history of chronic anemia possibly related to renal insufficiency  This may also be her history of GERD and hiatal hernia  This may be complicated by anticoagulation using Coumadin as result of her mechanical aortic valve ([facility])  I told her that my feeling was her nocturnal awakenings are the major cause of non restorative sleep  She also has insufficient sleep due to her schedule  At best she in bed for 6 5 hours on work days  Most of this is significantly fragmented resulting in very poor sleep efficiency  She only achieves about 6 hours on non work days which is likely very similar in quality to work days  I have asked her to increase her nighttime dose of gabapentin to 400 mg in the hopes of further suppressing periodic limb movements  This should help to decrease the number of awakenings and episodes of fragmentation so that the quality of her sleep will improve  She will contact the Sleep [address] [place] [address] in approximately 1 week with a report on her progress  Further changes in her medication will be made based on her response to this change  I do not feel that a repeat polysomnogram is warranted at this time, however, if treatment of involuntary leg movements does not improve her complaints a diagnostic study could prove helpful              Crystal Diez DO    Board Certified in Sleep Disorders Medicine

## 2018-01-13 VITALS
RESPIRATION RATE: 20 BRPM | WEIGHT: 244.25 LBS | HEART RATE: 78 BPM | DIASTOLIC BLOOD PRESSURE: 78 MMHG | BODY MASS INDEX: 39.25 KG/M2 | HEIGHT: 66 IN | SYSTOLIC BLOOD PRESSURE: 141 MMHG

## 2018-01-13 NOTE — MISCELLANEOUS
Provider Comments  Provider Comments:   PATIENT WAS A NO SHOW FOR THE APPOINTMENT        Signatures   Electronically signed by : JOHN Rodriguez ; Dec  8 2016  4:48PM EST

## 2018-01-14 VITALS
WEIGHT: 248.38 LBS | HEIGHT: 66 IN | HEART RATE: 88 BPM | BODY MASS INDEX: 39.92 KG/M2 | DIASTOLIC BLOOD PRESSURE: 88 MMHG | SYSTOLIC BLOOD PRESSURE: 148 MMHG

## 2018-01-15 NOTE — MISCELLANEOUS
Message   Recorded as Task   Date: 07/26/2016 09:02 PM, Created By: Ana Herbert   Task Name: Provider to Provider   Assigned To: Maritza Mckenna   Regarding Patient: Jay Bustillo, Status: In Progress   Shanema Naveed - 26 Jul 2016 9:02 PM     TASK CREATED  Please see if Juhi Kaye has any UTI symptoms   Chela Veliz - 27 Jul 2016 8:03 AM     TASK REASSIGNED: Previously Assigned To [facility] Katrin Kilgore - 27 Jul 2016 9:11 AM     TASK IN PROGRESS   hCela Veliz - 27 Jul 2016 9:11 AM     TASK EDITED  Left message for pt to call the office   Pt states she is not having any symptoms of UTI  [facility] 7/28/2016      Active Problems    1  Acute cystitis without hematuria (595 0) (N30 00)   2  Atrial fibrillation (427 31) (I48 91)   3  Benign hypertensive CKD (403 10) (I12 9)   4  Breast lump (611 72) (N63)   5  Cardiomegaly (429 3) (I51 7)   6  Chronic kidney disease, stage 3 (585 3) (N18 3)   7  Diastolic dysfunction (103 3) (I51 9)   8  DJD (degenerative joint disease), multiple sites (715 89) (M15 9)   9  DM2 (diabetes mellitus, type 2) (250 00) (E11 9)   10  Encounter for screening mammogram for malignant neoplasm of breast (V76 12)    (Z12 31)   11  Esophageal reflux (530 81) (K21 9)   12  Esophagitis, reflux (530 11) (K21 0)   13  Fibromyalgia (729 1) (M79 7)   14  Fibrous breast lumps (611 72) (N63)   15  Gout (274 9) (M10 9)   16  Hypertension (401 9) (I10)   17  Hyperuricemia without signs inflammatory arthritis/tophaceous disease (790 6) (E79 0)   18  Knee pain (719 46) (M25 569)   19  MCI (mild cognitive impairment) (331 83) (G31 84)   20  Microscopic hematuria (599 72) (R31 2)   21  Mitral regurgitation (424 0) (I34 0)   22  Morbid or severe obesity due to excess calories (278 01) (E66 01)   23  Nephrolithiasis With Hyperuricuria (274 11)   24  Osteoarthritis (715 90) (M19 90)   25  Osteoporosis (733 00) (M81 0)   26  Other urinary incontinence (788 39) (N39 498)   27  Proteinuria (791 0) (R80 9)   28  Pulmonary artery hypertension (416 8) (I27 2)   29  Restless legs syndrome (333 94) (G25 81)   30  Secondary hyperparathyroidism, renal (588 81) (N25 81)   31  Tricuspid regurgitation (397 0) (I07 1)   32  Urinary frequency (788 41) (R35 0)   33  Vitamin D deficiency (268 9) (E55 9)    Current Meds   1  Aciphex 20 MG Oral Tablet Delayed Release (RABEprazole Sodium); TAKE 1 TABLET   DAILY; Therapy: (Vergil Iva) to Recorded   2  Aspirin Low Dose 81 MG TABS; TAKE 1 TABLET DAILY; Therapy: (Vergil Iva) to Recorded   3  Citracal Plus Oral Tablet; TAKE 1 TABLET DAILY; Therapy: (Vergil Iva) to Recorded   4  Detrol 1 MG Oral Tablet (Tolterodine Tartrate); Take one tablet daily; Therapy: (Recorded:06May2014) to Recorded   5  FlavoxATE HCl - 100 MG Oral Tablet; Take one to 2 pills up to 3 times per day for urinary   symptoms; Therapy: [id number] to (Last Rx:[id number])  Requested for: [id number] Ordered   6  Indapamide 2 5 MG Oral Tablet; TAKE 1 TABLET DAILY; Therapy: (Vergil Iva) to Recorded   7  Levemir FlexPen 100 UNIT/ML SOLN; 9 units in the evening Recorded   8  Metoprolol Succinate ER 50 MG Oral Tablet Extended Release 24 Hour; Take 1 tablet   daily Recorded   9  Multi-Vitamin Oral Tablet; TAKE 1 TABLET DAILY; Therapy: (Vergil Iva) to Recorded   10  Tradjenta 5 MG Oral Tablet; Take 1 tablet daily Recorded   11  TraMADol HCl - 50 MG Oral Tablet; TAKE 1 TABLET 4 TIMES DAILY AS NEEDED    Recorded   12  Tylenol 325 MG Oral Tablet; TAKE 1 TO 2 TABLETS EVERY 6 HOURS AS NEEDED; Therapy: (Vergil Iva) to Recorded   13  Vicodin 5-500 MG TABS (Hydrocodone-Acetaminophen); TAKE 1 TABLET DAILY AS    NEEDED FOR PAIN;    Therapy: (Recorded:06May2014) to Recorded   14  Vitamin D3 1000 UNIT Oral Tablet; take 2 tablet daily; Therapy: (Vergil Iva) to Recorded   15   Warfarin Sodium 2 5 MG Oral Tablet; TAKE 1 TABLET EVERY OTHER DAY; Therapy: (Recorded:26Jun2013) to Recorded   16  Warfarin Sodium 5 MG Oral Tablet; TAKE 1 TABLET EVERY OTHER DAY; Therapy: (Valeen Mood) to Recorded    Allergies    1  Penicillins   2  Cephalosporins   3  Hydrochlorothiazide TABS   4  Sulfa Drugs   5  Cardizem TABS   6   Latex Exam Gloves MISC    Signatures   Electronically signed by : JOHN Yadav ; Jul 28 2016  4:22PM EST

## 2018-01-15 NOTE — MISCELLANEOUS
Message   Recorded as Task   Date: 07/26/2017 11:26 AM, Created By: Elzbieta Alcantar   Task Name: Follow Up   Assigned To: Luisa Azevedo   Regarding Patient: Umang Cisneros, Status: In Progress   Comment:    Lukas Montano - 26 Jul 2017 11:26 AM     TASK CREATED  Can you let her know that her labs work is stable, creatinine 1 24 UA show no proteinuria thanks  Krystal Wiley - 26 Jul 2017 12:34 PM     TASK IN PROGRESS   Krystal Wiley - 26 Jul 2017 12:34 PM     TASK EDITED  Left a message for Vikram Paul to return my call  Vikram Paul called me back  She is aware of her blood work results  No other concerns at the moment  Active Problems    1  Acute cystitis without hematuria (595 0) (N30 00)   2  Atrial fibrillation (427 31) (I48 91)   3  Benign hypertensive CKD (403 10) (I12 9)   4  Breast lump (611 72) (N63)   5  Cardiomegaly (429 3) (I51 7)   6  Chronic kidney disease, stage 3 (585 3) (N18 3)   7  Diastolic dysfunction (860 0) (I51 9)   8  DJD (degenerative joint disease), multiple sites (715 89) (M15 9)   9  DM2 (diabetes mellitus, type 2) (250 00) (E11 9)   10  Encounter for screening mammogram for malignant neoplasm of breast (V76 12)    (Z12 31)   11  Esophageal reflux (530 81) (K21 9)   12  Esophagitis, reflux (530 11) (K21 0)   13  Fibromyalgia (729 1) (M79 7)   14  Fibrous breast lumps (611 72) (N63)   15  Gout (274 9) (M10 9)   16  Hypertension (401 9) (I10)   17  Hyperuricemia without signs inflammatory arthritis/tophaceous disease (790 6) (E79 0)   18  Knee pain (719 46) (M25 569)   19  MCI (mild cognitive impairment) (331 83) (G31 84)   20  Microscopic hematuria (599 72) (R31 29)   21  Mitral regurgitation (424 0) (I34 0)   22  Morbid or severe obesity due to excess calories (278 01) (E66 01)   23  Nephrolithiasis With Hyperuricuria (274 11)   24  Osteoarthritis (715 90) (M19 90)   25  Osteoporosis (733 00) (M81 0)   26  Other urinary incontinence (788 39) (N39 498)   27  Proteinuria (791 0) (R80 9)   28   Pulmonary artery hypertension (416 8) (I27 2)   29  Restless legs syndrome (333 94) (G25 81)   30  Secondary hyperparathyroidism, renal (588 81) (N25 81)   31  Tricuspid regurgitation (397 0) (I07 1)   32  Urinary frequency (788 41) (R35 0)   33  Vitamin D deficiency (268 9) (E55 9)    Current Meds   1  Aciphex 20 MG Oral Tablet Delayed Release; TAKE 1 TABLET DAILY; Therapy: (Greer Cogan) to Recorded   2  Allopurinol 100 MG Oral Tablet; TAKE 1 TABLET TWICE DAILY; Therapy: [id number] to Recorded   3  Aspirin Low Dose 81 MG TABS; TAKE 1 TABLET DAILY; Therapy: (Greer Cogan) to Recorded   4  Citracal Plus Oral Tablet; TAKE 1 TABLET DAILY; Therapy: (Greer Cogan) to Recorded   5  Detrol 1 MG Oral Tablet; Take one tablet daily; Therapy: (Recorded:88Dom5534) to Recorded   6  FlavoxATE HCl - 100 MG Oral Tablet; Take one to 2 pills up to 3 times per day for urinary   symptoms; Therapy: 28Tpy0672 to (Last Rx:[id number])  Requested for: [id number] Ordered   7  Indapamide 2 5 MG Oral Tablet; TAKE 1 TABLET DAILY; Therapy: ([id number]) to Recorded   8  Levemir FlexPen 100 UNIT/ML SOLN; 9 units in the evening Recorded   9  Metoprolol Succinate ER 50 MG Oral Tablet Extended Release 24 Hour; Take 1 tablet   daily Recorded   10  Multi-Vitamin Oral Tablet; TAKE 1 TABLET DAILY; Therapy: (Greer Cogan) to Recorded   11  Sertraline HCl - 25 MG Oral Tablet; TAKE 1 TABLET DAILY; Therapy: [id number] to Recorded   12  Tradjenta 5 MG Oral Tablet; Take 1 tablet daily Recorded   13  Tylenol 325 MG Oral Tablet; TAKE 1 TO 2 TABLETS EVERY 6 HOURS AS NEEDED; Therapy: (Greer Cogan) to Recorded   14  Vitamin D3 1000 UNIT Oral Tablet; take 2 tablet daily; Therapy: (Greer Cogan) to Recorded   15  Warfarin Sodium 2 5 MG Oral Tablet; TAKE 1 TABLET EVERY OTHER DAY; Therapy: (Recorded:67Xko5247) to Recorded   16  Warfarin Sodium 5 MG Oral Tablet; TAKE 1 TABLET EVERY OTHER DAY;     Therapy: (Recorded:26Jun2013) to Recorded    Allergies    1  Penicillins   2  Cephalosporins   3  HydroCHLOROthiazide TABS   4  Sulfa Drugs   5  Cardizem TABS   6   Latex Exam Gloves MISC    Signatures   Electronically signed by : Lemuel Costa DO; Jul 26 2017  3:02PM EST                       (Author)

## 2018-01-25 ENCOUNTER — OFFICE VISIT (OUTPATIENT)
Dept: NEPHROLOGY | Facility: CLINIC | Age: 83
End: 2018-01-25
Payer: MEDICARE

## 2018-01-25 VITALS
HEIGHT: 67 IN | HEART RATE: 74 BPM | BODY MASS INDEX: 37.92 KG/M2 | WEIGHT: 241.6 LBS | DIASTOLIC BLOOD PRESSURE: 74 MMHG | SYSTOLIC BLOOD PRESSURE: 130 MMHG

## 2018-01-25 DIAGNOSIS — I51.7 CARDIOMEGALY: ICD-10-CM

## 2018-01-25 DIAGNOSIS — I51.89 DIASTOLIC DYSFUNCTION: ICD-10-CM

## 2018-01-25 DIAGNOSIS — N18.30 CHRONIC KIDNEY DISEASE, STAGE 3 (HCC): Chronic | ICD-10-CM

## 2018-01-25 DIAGNOSIS — N25.81 SECONDARY HYPERPARATHYROIDISM, RENAL (HCC): Primary | ICD-10-CM

## 2018-01-25 DIAGNOSIS — I10 ESSENTIAL HYPERTENSION: Chronic | ICD-10-CM

## 2018-01-25 DIAGNOSIS — M1A.9XX0 CHRONIC GOUT WITHOUT TOPHUS, UNSPECIFIED CAUSE, UNSPECIFIED SITE: Chronic | ICD-10-CM

## 2018-01-25 PROBLEM — R35.0 INCREASED FREQUENCY OF URINATION: Status: ACTIVE | Noted: 2018-01-25

## 2018-01-25 PROBLEM — N20.0 URIC ACID NEPHROLITHIASIS: Status: RESOLVED | Noted: 2018-01-25 | Resolved: 2018-01-25

## 2018-01-25 PROBLEM — I27.21 PULMONARY ARTERY HYPERTENSION (HCC): Status: ACTIVE | Noted: 2018-01-25

## 2018-01-25 PROBLEM — E66.01 MORBID OR SEVERE OBESITY DUE TO EXCESS CALORIES: Status: ACTIVE | Noted: 2018-01-25

## 2018-01-25 PROBLEM — E79.0 HYPERURICEMIA WITHOUT SIGNS INFLAMMATORY ARTHRITIS/TOPHACEOUS DISEASE: Status: ACTIVE | Noted: 2018-01-25

## 2018-01-25 PROBLEM — N20.0 URIC ACID NEPHROLITHIASIS: Status: ACTIVE | Noted: 2018-01-25

## 2018-01-25 PROBLEM — M15.9 DJD (DEGENERATIVE JOINT DISEASE), MULTIPLE SITES: Status: ACTIVE | Noted: 2018-01-25

## 2018-01-25 PROBLEM — N63.0 FIBROUS BREAST LUMPS: Status: ACTIVE | Noted: 2018-01-25

## 2018-01-25 PROBLEM — M79.7 FIBROMYALGIA: Status: ACTIVE | Noted: 2018-01-25

## 2018-01-25 PROBLEM — R80.9 PROTEINURIA: Status: ACTIVE | Noted: 2018-01-25

## 2018-01-25 PROBLEM — I12.9 BENIGN HYPERTENSIVE CKD: Status: ACTIVE | Noted: 2018-01-25

## 2018-01-25 PROBLEM — M10.9 GOUT: Chronic | Status: ACTIVE | Noted: 2017-09-13

## 2018-01-25 PROCEDURE — 99214 OFFICE O/P EST MOD 30 MIN: CPT | Performed by: INTERNAL MEDICINE

## 2018-01-25 NOTE — PROGRESS NOTES
OFFICE FOLLOW UP - Nephrology   Jaya Gustafson 81 y o  female MRN: [id number]    Encounter: [id number]        ASSESSMENT and PLAN:  Noa Dodson was seen today for follow-up  Diagnoses and all orders for this visit: 1  Secondary hyperparathyroidism, renal (HCC)  -     PTH, intact; Future  - she has had an elevated PTH in the past  - she is currently on vitamin D 3 1000 units daily  - repeat the PTH as above and will adjust according     2  Essential hypertension  - her blood pressure is well controlled today  - she is currently indapamide  And metoprolol  - she does have significant lower extremity edema  - if worsening edema would consider starting a diuretic     3  Cardiomegaly  - follows up with cardiologist at [facility]     4  Chronic gout without tophus, unspecified cause, unspecified site  -now on allopurinol 100 mg b i d   - tolerating this well  With no recent gout attacks     5  Diastolic dysfunction  - no episodes of flash pulmonary edema does have lower extremity edema but stable  - continue ongoing cardiology follow-up  - for her lower extremity edema we did discuss potentially using Ace wraps     6  Chronic kidney disease, stage 3  -     CBC and differential; Future  -     Comprehensive metabolic panel; Future  -     Magnesium; Future  -     Phosphorus; Future  -     Uric acid; Future  -     Urinalysis  -     Protein / creatinine ratio, urine  - will check the labs as listed above  - continue routine  for anemia related to chronic kidney disease and bone mineral disease    HPI: Jaya Gustafson is a 81 y o  female who is here for Follow-up    since her last office visit she has been doing relatively okay she states that she does have some increased fatigue and some increased lower extremity edema    She has tried compression stockings for this however there difficult for her to get on she denies any fevers or chills nausea vomiting     denies any nausea or vomiting noted diarrhea constipation no gout attack urine output has been stable    ROS:   All the systems were reviewed and were negative except as documented on the HPI  Allergies: Cephalosporins; Acetazolamide; Cefazolin; Colchicine; Diltiazem; Docusate; Febuxostat; Hydrochlorothiazide; Latex;  Metformin; Nortriptyline; Penicillins; Povidone iodine; Ropinirole; Simvastatin; Sulfa antibiotics; and Valsartan    Medications:   Current Outpatient Prescriptions:     allopurinol (ZYLOPRIM) 100 mg tablet, Take 100 mg by mouth 2 (two) times a day  , Disp: , Rfl:     aspirin (ECOTRIN LOW STRENGTH) 81 mg EC tablet, Take 81 mg by mouth daily, Disp: , Rfl:     bimatoprost (LUMIGAN) 0 01 % ophthalmic drops, Administer 1 drop to both eyes daily at bedtime, Disp: , Rfl:     calcium citrate-vitamin D (CITRACAL+D) 315-200 MG-UNIT per tablet, Take 1 tablet by mouth 2 (two) times a day, Disp: , Rfl:     cholecalciferol (VITAMIN D3) 1,000 units tablet, Take 1,000 Units by mouth daily, Disp: , Rfl:     gabapentin (NEURONTIN) 300 mg capsule, Take 300 mg by mouth daily at bedtime, Disp: , Rfl:     HYDROcodone-acetaminophen (NORCO) 5-325 mg per tablet, Take 1 tablet by mouth every 6 (six) hours as needed for pain for up to 10 doses Max Daily Amount: 4 tablets, Disp: 10 tablet, Rfl: 0    indapamide (LOZOL) 2 5 mg tablet, Take 2 5 mg by mouth every morning, Disp: , Rfl:     insulin detemir (LEVEMIR) 100 units/mL subcutaneous injection, Inject 28 Units under the skin daily at bedtime, Disp: , Rfl:     Linagliptin (TRADJENTA) 5 MG TABS, Take 5 mg by mouth daily, Disp: , Rfl:     Magnesium 250 MG TABS, Take 1 each by mouth daily, Disp: , Rfl:     metoprolol succinate (TOPROL-XL) 50 mg 24 hr tablet, Take 50 mg by mouth daily, Disp: , Rfl:     Multiple Vitamin (MULTIVITAMIN) tablet, Take 1 tablet by mouth daily, Disp: , Rfl:     Multiple Vitamins-Minerals (ICAPS AREDS 2 PO), Take 2 capsules by mouth daily, Disp: , Rfl:     RABEprazole (ACIPHEX) 20 MG tablet, Take 20 mg by mouth daily, Disp: , Rfl:     tolterodine (DETROL) 1 mg tablet, Take 1 mg by mouth daily at bedtime, Disp: , Rfl:     warfarin (COUMADIN) 2 5 mg tablet, Take 3 75 mg by mouth daily 5mg on Tuesday, Disp: , Rfl:     enoxaparin (LOVENOX) 100 mg/mL, Inject 1 mL under the skin every 24 hours, Disp: 10 mL, Rfl: 0    Past Medical History:   Diagnosis Date    Chronic atrial fibrillation ([facility] ) 9/13/2017    Chronic gout 9/13/2017    CKD (chronic kidney disease) stage 3, GFR 30-59 ml/min 9/13/2017    Colon polyp 9/13/2017    Coronary artery disease     Diabetes mellitus ([facility] )     Essential hypertension 9/13/2017    Gastritis 9/13/2017    Hypertension     Irregular heart beat     Mechanical heart valve present 9/13/2017    Osteoarthritis     Pacemaker     S/P AVR     Spinal stenosis     disk herniation      Past Surgical History:   Procedure Laterality Date    AORTIC VALVE REPLACEMENT      CARDIAC SURGERY      CATARACT EXTRACTION      CHOLECYSTECTOMY      COLONOSCOPY      ESOPHAGOGASTRODUODENOSCOPY      HERNIA REPAIR      x3    HYSTERECTOMY      INSERT / REPLACE / [address] Bilateral     knees    [facility] COLONOSCOPY FLX DX W/COLLJ SPEC WHEN PFRMD N/A 9/12/2017    Procedure: [place] COLONOSCOPY with polypectomies;  Surgeon: Anna Marie Serrano MD;  Location: [facility]; Service: Gastroenterology    [facility] SIGMOIDOSCOPY FLX DX W/COLLJ SPEC BR/WA IF PFRMD N/A 9/13/2017    Procedure: colonoscopy with hemo clip x 2;  Surgeon: Anna Marie Serrano MD;  Location: [facility]; Service: Gastroenterology    ROTATOR CUFF REPAIR Left     SKIN BIOPSY      TONSILLECTOMY       History reviewed  No pertinent family history  reports that she has quit smoking  She has never used smokeless tobacco  She reports that she does not drink alcohol or use drugs        Physical Exam:   Vitals:    01/25/18 1342   BP: 130/74   Pulse: 74   Weight: 110 kg (241 lb 9 6 oz)   Height: 5' 6 61" (1 692 m)     Body mass index is 38 28 kg/m²  General: conscious, cooperative, in not acute distress  Eyes: conjunctivae pink, anicteric sclerae  ENT: lips and mucous membranes moist  Neck: supple, no JVD  Chest: clear breath sounds bilateral, no crackles, ronchus or wheezings  CVS: distinct S1 & S2, normal rate, regular rhythm  Abdomen: soft, non-tender, non-distended, normoactive bowel sounds  Extremities:  2+ pitting edema  Skin: no rash  Neuro: awake, alert, oriented      Lab Results:       labs were reviewed from September and show a potassium of 3 4 creatinine of 1 08 with an estimated GFR 48 her hemoglobin was 10 6    Portions of the record may have been created with voice recognition software  Occasional wrong word or "sound a like" substitutions may have occurred due to the inherent limitations of voice recognition software  Read the chart carefully and recognize, using context, where substitutions have occurred  If you have any questions, please contact the dictating provider

## 2018-01-25 NOTE — PATIENT INSTRUCTIONS
1  Will check your blood work as listed  2  We will call you with the results and let you know of any medication changes if you do not hear from us within 4 days of it getting her blood work please call our office  3  Continue in Indapamide at current dose  4  Can use Ace wraps for compression as needed  5  Follow-up in 6 months if everything is stable  Please call with any questions or concerns sooner if need be

## 2018-02-16 ENCOUNTER — OFFICE VISIT (OUTPATIENT)
Dept: OBGYN CLINIC | Facility: CLINIC | Age: 83
End: 2018-02-16
Payer: MEDICARE

## 2018-02-16 VITALS — WEIGHT: 241 LBS | DIASTOLIC BLOOD PRESSURE: 80 MMHG | BODY MASS INDEX: 38.18 KG/M2 | SYSTOLIC BLOOD PRESSURE: 130 MMHG

## 2018-02-16 DIAGNOSIS — N63.10 LUMP OF RIGHT BREAST: ICD-10-CM

## 2018-02-16 DIAGNOSIS — N63.0 BREAST LUMP IN FEMALE: Primary | ICD-10-CM

## 2018-02-16 PROCEDURE — 99213 OFFICE O/P EST LOW 20 MIN: CPT | Performed by: NURSE PRACTITIONER

## 2018-02-16 RX ORDER — FERROUS SULFATE 325(65) MG
325 TABLET ORAL
COMMUNITY

## 2018-02-16 RX ORDER — METHOCARBAMOL 500 MG/1
750 TABLET, FILM COATED ORAL 2 TIMES DAILY
Status: ON HOLD | COMMUNITY
End: 2019-02-18 | Stop reason: SDUPTHER

## 2018-02-16 NOTE — PROGRESS NOTES
Assessment/Plan:    No problem-specific Assessment & Plan notes found for this encounter  Diagnoses and all orders for this visit:    Breast lump in female  -     Mammo diagnostic left w cad; Future    Lump of right breast    Other orders  -     ferrous sulfate 325 (65 Fe) mg tablet; Take 325 mg by mouth daily with breakfast  -     methocarbamol (ROBAXIN) 500 mg tablet; Take 500 mg by mouth 3 (three) times a day      Plan    Have Dx mammoram of both breast  Call with needs or concerns  Return in 1 yr  Subjective:      Patient ID: Kala Portillo is a 81 y o  female  Pt has a Hx of MARTÍNEZ BSO for endometrial cancer    HPI     Pt states she has noted a pea size NT lump at 1200 on her R breast Last mammogram was > 4 yrs ago    The following portions of the patient's history were reviewed and updated as appropriate: allergies, current medications, past family history, past medical history, past social history, past surgical history and problem list     Review of Systems      Denies complaints on concerns today, with exception of R breast lump  Denies pain  Objective:      /80   Wt 109 kg (241 lb)   BMI 38 18 kg/m²          Physical Exam      Valentin Zarate is alert and oriented  L breast NT, neg  Discharge dimpling or lumps  R breast pea size lump at 1200, NT, neg   Dimpling or discharge

## 2018-02-20 ENCOUNTER — TRANSCRIBE ORDERS (OUTPATIENT)
Dept: LAB | Facility: CLINIC | Age: 83
End: 2018-02-20

## 2018-02-20 ENCOUNTER — APPOINTMENT (OUTPATIENT)
Dept: LAB | Facility: CLINIC | Age: 83
End: 2018-02-20
Payer: MEDICARE

## 2018-02-20 DIAGNOSIS — E11.628 BULLOSIS DIABETICORUM (HCC): ICD-10-CM

## 2018-02-20 DIAGNOSIS — E78.5 HYPERLIPIDEMIA, UNSPECIFIED HYPERLIPIDEMIA TYPE: Primary | ICD-10-CM

## 2018-02-20 LAB
ALBUMIN SERPL BCP-MCNC: 3.5 G/DL (ref 3.5–5)
ALP SERPL-CCNC: 81 U/L (ref 46–116)
ALT SERPL W P-5'-P-CCNC: 32 U/L (ref 12–78)
ANION GAP SERPL CALCULATED.3IONS-SCNC: 6 MMOL/L (ref 4–13)
AST SERPL W P-5'-P-CCNC: 23 U/L (ref 5–45)
BASOPHILS # BLD AUTO: 0.04 THOUSANDS/ΜL (ref 0–0.1)
BASOPHILS NFR BLD AUTO: 0 % (ref 0–1)
BILIRUB SERPL-MCNC: 0.47 MG/DL (ref 0.2–1)
BUN SERPL-MCNC: 35 MG/DL (ref 5–25)
CALCIUM SERPL-MCNC: 9.5 MG/DL (ref 8.3–10.1)
CHLORIDE SERPL-SCNC: 98 MMOL/L (ref 100–108)
CHOLEST SERPL-MCNC: 187 MG/DL (ref 50–200)
CO2 SERPL-SCNC: 35 MMOL/L (ref 21–32)
CREAT SERPL-MCNC: 1.22 MG/DL (ref 0.6–1.3)
EOSINOPHIL # BLD AUTO: 0.15 THOUSAND/ΜL (ref 0–0.61)
EOSINOPHIL NFR BLD AUTO: 1 % (ref 0–6)
ERYTHROCYTE [DISTWIDTH] IN BLOOD BY AUTOMATED COUNT: 17 % (ref 11.6–15.1)
EST. AVERAGE GLUCOSE BLD GHB EST-MCNC: 223 MG/DL
GFR SERPL CREATININE-BSD FRML MDRD: 42 ML/MIN/1.73SQ M
GLUCOSE P FAST SERPL-MCNC: 160 MG/DL (ref 65–99)
HBA1C MFR BLD: 9.4 % (ref 4.2–6.3)
HCT VFR BLD AUTO: 36.7 % (ref 34.8–46.1)
HDLC SERPL-MCNC: 51 MG/DL (ref 40–60)
HGB BLD-MCNC: 11.9 G/DL (ref 11.5–15.4)
LDLC SERPL CALC-MCNC: 109 MG/DL (ref 0–100)
LYMPHOCYTES # BLD AUTO: 2.89 THOUSANDS/ΜL (ref 0.6–4.47)
LYMPHOCYTES NFR BLD AUTO: 28 % (ref 14–44)
MCH RBC QN AUTO: 28.7 PG (ref 26.8–34.3)
MCHC RBC AUTO-ENTMCNC: 32.4 G/DL (ref 31.4–37.4)
MCV RBC AUTO: 88 FL (ref 82–98)
MONOCYTES # BLD AUTO: 0.89 THOUSAND/ΜL (ref 0.17–1.22)
MONOCYTES NFR BLD AUTO: 9 % (ref 4–12)
NEUTROPHILS # BLD AUTO: 6.37 THOUSANDS/ΜL (ref 1.85–7.62)
NEUTS SEG NFR BLD AUTO: 62 % (ref 43–75)
NRBC BLD AUTO-RTO: 0 /100 WBCS
PLATELET # BLD AUTO: 281 THOUSANDS/UL (ref 149–390)
PMV BLD AUTO: 10.4 FL (ref 8.9–12.7)
POTASSIUM SERPL-SCNC: 4 MMOL/L (ref 3.5–5.3)
PROT SERPL-MCNC: 8.2 G/DL (ref 6.4–8.2)
RBC # BLD AUTO: 4.15 MILLION/UL (ref 3.81–5.12)
SODIUM SERPL-SCNC: 139 MMOL/L (ref 136–145)
TRIGL SERPL-MCNC: 134 MG/DL
WBC # BLD AUTO: 10.39 THOUSAND/UL (ref 4.31–10.16)

## 2018-02-20 PROCEDURE — 80053 COMPREHEN METABOLIC PANEL: CPT

## 2018-02-20 PROCEDURE — 36415 COLL VENOUS BLD VENIPUNCTURE: CPT

## 2018-02-20 PROCEDURE — 83036 HEMOGLOBIN GLYCOSYLATED A1C: CPT

## 2018-02-20 PROCEDURE — 80061 LIPID PANEL: CPT

## 2018-02-20 PROCEDURE — 85025 COMPLETE CBC W/AUTO DIFF WBC: CPT

## 2018-02-23 DIAGNOSIS — N63.0 BREAST LUMP: Primary | ICD-10-CM

## 2018-02-28 ENCOUNTER — HOSPITAL ENCOUNTER (OUTPATIENT)
Dept: ULTRASOUND IMAGING | Facility: CLINIC | Age: 83
Discharge: HOME | End: 2018-02-28
Payer: MEDICARE

## 2018-02-28 ENCOUNTER — HOSPITAL ENCOUNTER (OUTPATIENT)
Dept: MAMMOGRAPHY | Facility: CLINIC | Age: 83
Discharge: HOME | End: 2018-02-28
Payer: MEDICARE

## 2018-02-28 DIAGNOSIS — N63.0 BREAST LUMP: ICD-10-CM

## 2018-02-28 DIAGNOSIS — N63.0 LUMP OR MASS IN BREAST: ICD-10-CM

## 2018-02-28 PROCEDURE — 77066 DX MAMMO INCL CAD BI: CPT

## 2018-02-28 PROCEDURE — 76642 ULTRASOUND BREAST LIMITED: CPT

## 2018-04-13 ENCOUNTER — OFFICE VISIT (OUTPATIENT)
Dept: SLEEP CENTER | Facility: CLINIC | Age: 83
End: 2018-04-13
Payer: MEDICARE

## 2018-04-13 VITALS
BODY MASS INDEX: 38.8 KG/M2 | HEIGHT: 66 IN | WEIGHT: 241.4 LBS | DIASTOLIC BLOOD PRESSURE: 80 MMHG | HEART RATE: 83 BPM | SYSTOLIC BLOOD PRESSURE: 128 MMHG

## 2018-04-13 DIAGNOSIS — G25.81 RLS (RESTLESS LEGS SYNDROME): Primary | ICD-10-CM

## 2018-04-13 PROCEDURE — 99214 OFFICE O/P EST MOD 30 MIN: CPT | Performed by: PSYCHIATRY & NEUROLOGY

## 2018-04-13 RX ORDER — GABAPENTIN 100 MG/1
100 CAPSULE ORAL 4 TIMES DAILY
Qty: 360 CAPSULE | Refills: 1 | Status: SHIPPED | OUTPATIENT
Start: 2018-04-13 | End: 2018-09-30 | Stop reason: SDUPTHER

## 2018-04-13 NOTE — PATIENT INSTRUCTIONS
1   Increase gabapentin to 600 milligrams at bedtime  2  Consider further titration and 100 milligram increments as necessary  3  [facility] in 1 week with a progress report  4    Return for routine re-evaluation in 6 months

## 2018-04-13 NOTE — PROGRESS NOTES
Progress Note - [address] 81 y o  female   :1935, MRN: [id number]  2018          Follow Up Evaluation / Problem:     Restless Legs Syndrome    HPI: Gerhardt Server is a 81y o  year old female  She is currently being treated for Restless Legs Syndrome  At the time of her last visit she was taking 400 milligrams of gabapentin  This was recently increased to 500 milligrams          Current Outpatient Prescriptions:     allopurinol (ZYLOPRIM) 100 mg tablet, Take 100 mg by mouth 2 (two) times a day  , Disp: , Rfl:     aspirin (ECOTRIN LOW STRENGTH) 81 mg EC tablet, Take 81 mg by mouth daily, Disp: , Rfl:     bimatoprost (LUMIGAN) 0 01 % ophthalmic drops, Administer 1 drop to both eyes daily at bedtime, Disp: , Rfl:     calcium citrate-vitamin D (CITRACAL+D) 315-200 MG-UNIT per tablet, Take 1 tablet by mouth 2 (two) times a day, Disp: , Rfl:     cholecalciferol (VITAMIN D3) 1,000 units tablet, Take 1,000 Units by mouth daily, Disp: , Rfl:     ferrous sulfate 325 (65 Fe) mg tablet, Take 325 mg by mouth daily with breakfast, Disp: , Rfl:     gabapentin (NEURONTIN) 300 mg capsule, Take 500 mg by mouth daily at bedtime  , Disp: , Rfl:     HYDROcodone-acetaminophen (NORCO) 5-325 mg per tablet, Take 1 tablet by mouth every 6 (six) hours as needed for pain for up to 10 doses Max Daily Amount: 4 tablets, Disp: 10 tablet, Rfl: 0    indapamide (LOZOL) 2 5 mg tablet, Take 2 5 mg by mouth every morning, Disp: , Rfl:     insulin detemir (LEVEMIR) 100 units/mL subcutaneous injection, Inject 60 Units under the skin daily at bedtime  , Disp: , Rfl:     Linagliptin (TRADJENTA) 5 MG TABS, Take 5 mg by mouth daily, Disp: , Rfl:     Magnesium 250 MG TABS, Take 1 each by mouth daily, Disp: , Rfl:     methocarbamol (ROBAXIN) 500 mg tablet, Take 500 mg by mouth 3 (three) times a day, Disp: , Rfl:     metoprolol succinate (TOPROL-XL) 50 mg 24 hr tablet, Take 50 mg by mouth daily, Disp: , Rfl:     Multiple Vitamin (MULTIVITAMIN) tablet, Take 1 tablet by mouth daily, Disp: , Rfl:     Multiple Vitamins-Minerals (ICAPS AREDS 2 PO), Take 2 capsules by mouth daily, Disp: , Rfl:     RABEprazole (ACIPHEX) 20 MG tablet, Take 20 mg by mouth daily, Disp: , Rfl:     tolterodine (DETROL) 1 mg tablet, Take 1 mg by mouth daily at bedtime, Disp: , Rfl:     warfarin (COUMADIN) 2 5 mg tablet, Take 3 75 mg by mouth daily 5mg on Tuesday, Disp: , Rfl:     enoxaparin (LOVENOX) 100 mg/mL, Inject 1 mL under the skin every 24 hours, Disp: 10 mL, Rfl: 0    gabapentin (NEURONTIN) 100 mg capsule, Take 1 capsule (100 mg total) by mouth 4 (four) times a day, Disp: 360 capsule, Rfl: 1    [place] Sleepiness Scale  Sitting and reading: Would never doze  Watching TV: Moderate chance of dozing  Sitting, inactive in a public place (e g  a theatre or a meeting): Would never doze  As a passenger in a car for an hour without a break: Would never doze  Lying down to rest in the afternoon when circumstances permit: Moderate chance of dozing  Sitting and talking to someone: Would never doze  Sitting quietly after a lunch without alcohol: Would never doze  In a car, while stopped for a few minutes in traffic: Would never doze  Total score: 4              Vitals:    04/13/18 1200   BP: 128/80   Pulse: 83   Weight: 109 kg (241 lb 6 4 oz)   Height: 5' 6" (1 676 m)       Body mass index is 38 96 kg/m²  Neck Circumference: 32       EPWORTH SLEEPINESS SCORE  Total score: 4      Past History Since Last Sleep Center Visit:     She complained of some headache possibly associated with gabapentin, however, this dissipated without incident  At her current dose of 500 milligrams she has had no further difficulties  Symptoms of restless legs syndrome seem to be improved however she is having greater difficulty staying asleep  She reports at least to awakenings each night    One of these usually happens between midnight and 2 a m  and she reports no significant difficulty returning to sleep after 1 of these events  The other is often at approximately 4 a m  and results in a great deal of difficulty returning to sleep  The review of systems and following portions of the patient's history were reviewed and updated as appropriate: allergies, current medications, past family history, past medical history, past social history, past surgical history, and problem list     OBJECTIVE      Physical Exam:     General Appearance:   Alert, cooperative, no distress, appears stated age, rather obese     Head:   Normocephalic, without obvious abnormality, atraumatic     Eyes:   PERRL, conjunctiva/corneas clear, EOM's intact          Nose:  Nares normal, septum midline, no drainage or sinus tenderness           Throat:  Lips, teeth and gums normal; tongue normal size and  shape and midline mucosa mildly redundant bilaterally, uvula appears normal, tonsils not visualized, Mallampati class 2       Neck:  Supple, symmetrical, trachea midline, no adenopathy; Thyroid: No enlargement, tenderness or nodules; no carotid bruit or JVD     Lungs:      Clear to auscultation bilaterally, respirations unlabored     Heart:   Regular rate and rhythm, S1 and S2 normal, no murmur, rub or gallop       Extremities:  Extremities normal, atraumatic, no cyanosis or edema     Pulses:  2+ and symmetric all extremities     Skin:  Skin color, texture, turgor normal, no rashes or lesions       Neurologic:  CNII-XII intact  Normal strength, sensation throughout       ASSESSMENT / PLAN    1  RLS (restless legs syndrome)  gabapentin (NEURONTIN) 100 mg capsule           Counseling / Coordination of Care  Total clinic time spent today 25 minutes  Greater than 50% of total time was spent with the patient and / or family counseling and / or coordination of care       A description of the counseling / coordination of care:     Impressions, Prognosis, Instructions for management, Patient and family education and Importance of compliance with treatment    The following instructions have been given to the patient today:    Patient Instructions   1  Increase gabapentin to 600 milligrams at bedtime  2  Consider further titration and 100 milligram increments as necessary  3  Contact the [facility] in 1 week with a progress report  4    Return for routine re-evaluation in 6 months        Sánchez Cool

## 2018-04-18 ENCOUNTER — APPOINTMENT (OUTPATIENT)
Dept: LAB | Facility: CLINIC | Age: 83
End: 2018-04-18
Payer: MEDICARE

## 2018-04-18 ENCOUNTER — TRANSCRIBE ORDERS (OUTPATIENT)
Dept: LAB | Facility: CLINIC | Age: 83
End: 2018-04-18

## 2018-04-18 DIAGNOSIS — E11.628 BULLOSIS DIABETICORUM (HCC): Primary | ICD-10-CM

## 2018-04-18 PROCEDURE — 36415 COLL VENOUS BLD VENIPUNCTURE: CPT

## 2018-04-18 PROCEDURE — 83036 HEMOGLOBIN GLYCOSYLATED A1C: CPT

## 2018-04-19 LAB
EST. AVERAGE GLUCOSE BLD GHB EST-MCNC: 235 MG/DL
HBA1C MFR BLD: 9.8 % (ref 4.2–6.3)

## 2018-05-08 ENCOUNTER — TELEPHONE (OUTPATIENT)
Dept: CARDIOLOGY | Facility: CLINIC | Age: 83
End: 2018-05-08

## 2018-05-08 NOTE — TELEPHONE ENCOUNTER
Jacey please see the most recent call. Pt is asking about lab work and instructions for upcoming device implant. Sched 5/31

## 2018-05-08 NOTE — TELEPHONE ENCOUNTER
Pts surgery is 5/31 and has not received lab script or instructions in mail.  Please contact. Thank you!

## 2018-05-09 NOTE — TELEPHONE ENCOUNTER
Spoke with patient. Labs and instructions were mailed back in late February. Will send out another copy today

## 2018-05-31 ENCOUNTER — APPOINTMENT (OUTPATIENT)
Dept: RADIOLOGY | Facility: HOSPITAL | Age: 83
End: 2018-05-31
Attending: HOSPITALIST
Payer: MEDICARE

## 2018-05-31 ENCOUNTER — ANESTHESIA EVENT (OUTPATIENT)
Dept: CARDIOLOGY | Facility: HOSPITAL | Age: 83
Setting detail: HOSPITAL OUTPATIENT SURGERY
End: 2018-05-31
Payer: MEDICARE

## 2018-05-31 PROBLEM — I48.20 CHRONIC ATRIAL FIBRILLATION (CMS/HCC): Status: ACTIVE | Noted: 2018-05-31

## 2018-05-31 PROBLEM — I44.2 CHB (COMPLETE HEART BLOCK) (CMS/HCC): Status: ACTIVE | Noted: 2018-05-31

## 2018-05-31 PROBLEM — Z45.010 PACEMAKER GENERATOR END OF LIFE: Status: ACTIVE | Noted: 2018-05-31

## 2018-05-31 PROBLEM — I10 HYPERTENSION: Status: ACTIVE | Noted: 2018-05-31

## 2018-05-31 PROCEDURE — 25800000 HC PHARMACY IV SOLUTIONS: Performed by: NURSE ANESTHETIST, CERTIFIED REGISTERED

## 2018-05-31 PROCEDURE — 71045 X-RAY EXAM CHEST 1 VIEW: CPT

## 2018-05-31 PROCEDURE — 25000000 HC PHARMACY GENERAL: Performed by: NURSE ANESTHETIST, CERTIFIED REGISTERED

## 2018-05-31 PROCEDURE — 63600000 HC DRUGS/DETAIL CODE: Mod: JW | Performed by: NURSE ANESTHETIST, CERTIFIED REGISTERED

## 2018-05-31 RX ORDER — PROPOFOL 200MG/20ML
SYRINGE (ML) INTRAVENOUS AS NEEDED
Status: DISCONTINUED | OUTPATIENT
Start: 2018-05-31 | End: 2018-05-31 | Stop reason: SURG

## 2018-05-31 RX ORDER — SODIUM CHLORIDE 9 MG/ML
INJECTION, SOLUTION INTRAVENOUS CONTINUOUS PRN
Status: DISCONTINUED | OUTPATIENT
Start: 2018-05-31 | End: 2018-05-31 | Stop reason: SURG

## 2018-05-31 RX ORDER — PHENYLEPHRINE HCL IN 0.9% NACL 1 MG/10 ML
SYRINGE (ML) INTRAVENOUS AS NEEDED
Status: DISCONTINUED | OUTPATIENT
Start: 2018-05-31 | End: 2018-05-31 | Stop reason: SURG

## 2018-05-31 RX ORDER — FENTANYL CITRATE 50 UG/ML
INJECTION, SOLUTION INTRAMUSCULAR; INTRAVENOUS AS NEEDED
Status: DISCONTINUED | OUTPATIENT
Start: 2018-05-31 | End: 2018-05-31 | Stop reason: SURG

## 2018-05-31 RX ORDER — MIDAZOLAM HYDROCHLORIDE 2 MG/2ML
INJECTION, SOLUTION INTRAMUSCULAR; INTRAVENOUS AS NEEDED
Status: DISCONTINUED | OUTPATIENT
Start: 2018-05-31 | End: 2018-05-31 | Stop reason: SURG

## 2018-05-31 RX ORDER — LIDOCAINE HYDROCHLORIDE 10 MG/ML
INJECTION, SOLUTION EPIDURAL; INFILTRATION; INTRACAUDAL; PERINEURAL AS NEEDED
Status: DISCONTINUED | OUTPATIENT
Start: 2018-05-31 | End: 2018-05-31 | Stop reason: SURG

## 2018-05-31 RX ADMIN — PROPOFOL 20 MG: 10 INJECTION, EMULSION INTRAVENOUS at 09:15

## 2018-05-31 RX ADMIN — SODIUM CHLORIDE: 9 INJECTION, SOLUTION INTRAVENOUS at 09:00

## 2018-05-31 RX ADMIN — FENTANYL CITRATE 50 MCG: 50 INJECTION, SOLUTION INTRAMUSCULAR; INTRAVENOUS at 09:00

## 2018-05-31 RX ADMIN — PROPOFOL 20 MG: 10 INJECTION, EMULSION INTRAVENOUS at 10:01

## 2018-05-31 RX ADMIN — PROPOFOL 30 MG: 10 INJECTION, EMULSION INTRAVENOUS at 10:25

## 2018-05-31 RX ADMIN — FENTANYL CITRATE 25 MCG: 50 INJECTION, SOLUTION INTRAMUSCULAR; INTRAVENOUS at 09:15

## 2018-05-31 RX ADMIN — Medication 100 MCG: at 10:06

## 2018-05-31 RX ADMIN — FENTANYL CITRATE 25 MCG: 50 INJECTION, SOLUTION INTRAMUSCULAR; INTRAVENOUS at 09:24

## 2018-05-31 RX ADMIN — MIDAZOLAM HYDROCHLORIDE 1 MG: 1 INJECTION, SOLUTION INTRAMUSCULAR; INTRAVENOUS at 09:00

## 2018-05-31 RX ADMIN — LIDOCAINE HYDROCHLORIDE 5 ML: 10 INJECTION, SOLUTION EPIDURAL; INFILTRATION; INTRACAUDAL; PERINEURAL at 09:02

## 2018-05-31 RX ADMIN — PROPOFOL 50 MG: 10 INJECTION, EMULSION INTRAVENOUS at 09:30

## 2018-06-11 ENCOUNTER — OFFICE VISIT (OUTPATIENT)
Dept: CARDIOLOGY | Facility: CLINIC | Age: 83
End: 2018-06-11
Payer: MEDICARE

## 2018-06-11 DIAGNOSIS — Z48.89 ENCOUNTER FOR POSTOPERATIVE WOUND CHECK: Primary | ICD-10-CM

## 2018-06-12 NOTE — PROGRESS NOTES
Subjective     Patient ID: Ailyn St is a 82 y.o. female.    HPI-Ms. St presents to the office today for a check on her incision from her recent pacemaker generator change and placement of new RV lead.  A new RV Biotronik lead was advanced into the right ventricle.  The previous RV lead was capped and abandoned and the pocket. the generator was replaced due to end of battery life.  The atrial port of the device is capped due to chronic atrial fibrillation.  Therefore the patient currently has a capped and abandoned previous RV lead, a new RV lead and a chronic LV lead.  Left clavicular insertion site is healing nicely.  New Steri-Strips were applied to the inner edge for continued healing.     Review of Systems-negative    Objective     There were no vitals filed for this visit.  There is no height or weight on file to calculate BMI.    Physical Exam-left clavicular pacemaker insertion site is healing nicely.  There is small resolving ecchymosis.  New Steri-Strips were applied to the inner edge of the incision.    Assessment/Plan      Pacing and sensing thresholds were stable in the new RV and chronic LV lead.  There was 98% biventricular pacing.     She will return to see Dr. Mcknight in 1 month at the [place] [facility] office.  We reviewed continued restriction of the left arm below the shoulder level.  She knows for signs and symptoms to watch for any infection at the insertion site.      Problem List Items Addressed This Visit     None

## 2018-07-09 ENCOUNTER — TRANSCRIBE ORDERS (OUTPATIENT)
Dept: LAB | Facility: CLINIC | Age: 83
End: 2018-07-09

## 2018-07-09 ENCOUNTER — APPOINTMENT (OUTPATIENT)
Dept: LAB | Facility: CLINIC | Age: 83
End: 2018-07-09
Payer: MEDICARE

## 2018-07-09 DIAGNOSIS — D64.9 ANEMIA, UNSPECIFIED TYPE: Primary | ICD-10-CM

## 2018-07-09 LAB
BASOPHILS # BLD AUTO: 0.04 THOUSANDS/ΜL (ref 0–0.1)
BASOPHILS NFR BLD AUTO: 1 % (ref 0–1)
EOSINOPHIL # BLD AUTO: 0.25 THOUSAND/ΜL (ref 0–0.61)
EOSINOPHIL NFR BLD AUTO: 3 % (ref 0–6)
ERYTHROCYTE [DISTWIDTH] IN BLOOD BY AUTOMATED COUNT: 16.5 % (ref 11.6–15.1)
HCT VFR BLD AUTO: 37.4 % (ref 34.8–46.1)
HGB BLD-MCNC: 11.7 G/DL (ref 11.5–15.4)
IMM GRANULOCYTES # BLD AUTO: 0.04 THOUSAND/UL (ref 0–0.2)
IMM GRANULOCYTES NFR BLD AUTO: 1 % (ref 0–2)
LYMPHOCYTES # BLD AUTO: 1.18 THOUSANDS/ΜL (ref 0.6–4.47)
LYMPHOCYTES NFR BLD AUTO: 14 % (ref 14–44)
MCH RBC QN AUTO: 29 PG (ref 26.8–34.3)
MCHC RBC AUTO-ENTMCNC: 31.3 G/DL (ref 31.4–37.4)
MCV RBC AUTO: 93 FL (ref 82–98)
MONOCYTES # BLD AUTO: 0.58 THOUSAND/ΜL (ref 0.17–1.22)
MONOCYTES NFR BLD AUTO: 7 % (ref 4–12)
NEUTROPHILS # BLD AUTO: 6.59 THOUSANDS/ΜL (ref 1.85–7.62)
NEUTS SEG NFR BLD AUTO: 74 % (ref 43–75)
NRBC BLD AUTO-RTO: 0 /100 WBCS
PLATELET # BLD AUTO: 248 THOUSANDS/UL (ref 149–390)
PMV BLD AUTO: 10.1 FL (ref 8.9–12.7)
RBC # BLD AUTO: 4.04 MILLION/UL (ref 3.81–5.12)
WBC # BLD AUTO: 8.68 THOUSAND/UL (ref 4.31–10.16)

## 2018-07-09 PROCEDURE — 36415 COLL VENOUS BLD VENIPUNCTURE: CPT

## 2018-07-09 PROCEDURE — 85025 COMPLETE CBC W/AUTO DIFF WBC: CPT

## 2018-07-13 ENCOUNTER — OFFICE VISIT (OUTPATIENT)
Dept: CARDIOLOGY | Facility: CLINIC | Age: 83
End: 2018-07-13
Payer: MEDICARE

## 2018-07-13 VITALS
DIASTOLIC BLOOD PRESSURE: 80 MMHG | BODY MASS INDEX: 38.76 KG/M2 | OXYGEN SATURATION: 95 % | HEIGHT: 66 IN | WEIGHT: 241.2 LBS | SYSTOLIC BLOOD PRESSURE: 130 MMHG | HEART RATE: 75 BPM

## 2018-07-13 DIAGNOSIS — R53.83 FATIGUE, UNSPECIFIED TYPE: ICD-10-CM

## 2018-07-13 DIAGNOSIS — I44.2 CHB (COMPLETE HEART BLOCK) (CMS/HCC): ICD-10-CM

## 2018-07-13 DIAGNOSIS — Z45.018 BIVENTRICULAR PACEMAKER CHECK: Primary | ICD-10-CM

## 2018-07-13 PROCEDURE — 99024 POSTOP FOLLOW-UP VISIT: CPT | Performed by: INTERNAL MEDICINE

## 2018-07-16 PROBLEM — Z45.018 BIVENTRICULAR PACEMAKER CHECK: Status: ACTIVE | Noted: 2018-07-16

## 2018-07-16 PROBLEM — R53.83 FATIGUE: Status: ACTIVE | Noted: 2018-07-16

## 2018-07-16 ASSESSMENT — ENCOUNTER SYMPTOMS
GASTROINTESTINAL NEGATIVE: 1
CARDIOVASCULAR NEGATIVE: 1
HEMATOLOGIC/LYMPHATIC NEGATIVE: 1
SHORTNESS OF BREATH: 1
WEIGHT GAIN: 1
PSYCHIATRIC NEGATIVE: 1
WEAKNESS: 1
ENDOCRINE NEGATIVE: 1
EYES NEGATIVE: 1

## 2018-07-16 NOTE — ASSESSMENT & PLAN NOTE
She underwent generator change of a Biotronik biventricular pacemaker in May 2018.  Her previous RV lead was malfunctioning therefore a new RV lead was implanted.  Her chronic LV lead was left in place. Her previous RV lead is capped.  Biotronik Entrinsa implanted May 31, 2018.  LV threshold 0.8 V at 0.4 ms and lead impedance 1189 ohms.  Parameters VVIR 75 bpm.  BiV pacing is 100%.    It is unclear why the patient is having such marked changes in exercise tolerance along with her fatigue.  Her underlying rhythm is atrial fibrillation with complete heart block which is unchanged.  Other than the addition of a new RV lead, programmed parameters were kept as identical as possible to the old settings.  Transthoracic echo was performed in the office, and a limited fashion.  LV function appeared to remain normal, and there is no evidence of pericardial effusion.  Diagnostics on her new device revealed blunted heart rate response.  She underwent exercise testing in the office with adjustment in the sensor output of the rate responsiveness.

## 2018-07-16 NOTE — ASSESSMENT & PLAN NOTE
Ms. St complains of extreme fatigue over the past few weeks, not being able to walk across her office at work.  Adjustments were made in the rate responsiveness of her pacemaker after diagnostics revealed a blunted heart rate response with exercise.  Patient was concerned about her aortic valve; thinking there may have been a change in the status.  Limited echo did not appear to demonstrate significant changes in aortic valve function, but full diagnostics were unable to be performed and I do not have an old echo for comparison.  However, the onset of her symptoms appear to be associated with the generator change, I am hopeful changes rate responsiveness will alleviate her symptoms.

## 2018-07-16 NOTE — PROGRESS NOTES
Electrophysiology  Outpatient Progress Note       Reason for visit:   Chief Complaint   Patient presents with   • Atrial Fibrillation   • Device Check       HPI   Ailyn St is a 83 y.o. female who is seen today for follow up of her newly implanted biventricular pacemaker indicated for complete heart block during AV node ablation in 2013.  She has chronic atrial fibrillation.  She has a new LV lead along with a new RV lead.  Her previous RV lead had chronically elevated thresholds and had been turned off due to this.  That RV lead is capped.    Over the past 2-3 weeks she has noticed an extreme fatigue reporting she is unable to walk across her office without becoming extremely fatigued and mildly short of breath.       Past Medical History:   Diagnosis Date   • Abnormal ECG    • Arthritis    • Fatigue 7/16/2018   • GERD (gastroesophageal reflux disease)    • Hypertension    • Osteoporosis    • Type 2 diabetes mellitus (CMS/HCC) (HCC)      Past Surgical History:   Procedure Laterality Date   • AORTIC VALVE REPLACEMENT  1998   • TOTAL ABDOMINAL HYSTERECTOMY W/ BILATERAL SALPINGOOPHORECTOMY         Social History   Substance Use Topics   • Smoking status: Former Smoker     Types: Cigarettes     Quit date: 1966   • Smokeless tobacco: Never Used   • Alcohol use Yes     History reviewed. No pertinent family history.    ALLERGY:  Cephalosporins; Penicillins; Colace [docusate sodium]; Hydrochlorothiazide; Metformin; Requip [ropinirole]; Sulfa (sulfonamide antibiotics); and Latex    Current Outpatient Prescriptions   Medication Sig Dispense Refill   • allopurinol (ZYLOPRIM) 300 mg tablet Take 300 mg by mouth 2 (two) times a day.     • B-complex with vitamin C tablet Take 1 tablet by mouth daily.     • bimatoprost (LUMIGAN) 0.01 % ophthalmic drops 1 drop nightly.     • calcium carbonate-vitamin D3 250-125 mg-unit tablet Take by mouth daily.     • cholecalciferol, vitamin D3, 1,000 unit tablet Take 1,000 Units by  mouth daily.     • ferrous sulfate 325 mg (65 mg iron) EC tablet Take 325 mg by mouth daily with breakfast.     • gabapentin (NEURONTIN) 100 mg capsule Take 600 mg by mouth nightly.     • HYDROcodone-acetaminophen (NORCO) 5-325 mg per tablet Take 1 tablet by mouth every 6 (six) hours as needed for moderate pain (taken at night).     • insulin detemir U-100 (LEVEMIR) 100 unit/mL (3 mL) subcutaneous pen Inject 60 Units under the skin nightly. Patient took 30 units last evening     • metoprolol succinate XL (TOPROL-XL) 50 mg 24 hr tablet Take 50 mg by mouth daily.     • pantoprazole (PROTONIX) 40 mg EC tablet Take 40 mg by mouth. Normally takes aciphex 30 mg daily.     • tolterodine (DETROL) 2 mg tablet Take 2 mg by mouth nightly.     • warfarin (COUMADIN) 3 mg tablet Take 3.75 mg by mouth once daily. Taken for 6 days then 5 mg on Friday.  Held X 3 days per order.     • indapamide (LOZOL) 2.5 mg tablet Take 2.5 mg by mouth daily.     • insulin aspart U-100 (NovoLOG) 100 unit/mL subcutaneous pen Inject under the skin 3 (three) times a day before meals. Sliding scale as follows:  160-190 give 3 units, 191-210 give 5 units, 211-240 give 7 units, 241-270 give 9 units, greater than 271 give 10 units and recheck sugars in 2-3 hours     • methocarbamol (ROBAXIN) 750 mg tablet Take 750 mg by mouth 2 (two) times a day.       No current facility-administered medications for this visit.        Review of Systems   Constitution: Positive for weakness, malaise/fatigue and weight gain.   HENT: Negative.    Eyes: Negative.    Cardiovascular: Negative.    Respiratory: Positive for shortness of breath.    Endocrine: Negative.    Hematologic/Lymphatic: Negative.    Skin: Negative.    Musculoskeletal: Positive for muscle weakness.   Gastrointestinal: Negative.    Genitourinary: Negative.    Psychiatric/Behavioral: Negative.        Objective   Vitals:    07/13/18 1621   BP: 130/80   Pulse: 75   SpO2: 95%       Physical Exam    Constitutional: She is oriented to person, place, and time. She appears well-nourished.   HENT:   Head: Normocephalic.   Eyes: Conjunctivae and EOM are normal. Pupils are equal, round, and reactive to light.   Neck: Normal range of motion.   Cardiovascular: Normal rate, regular rhythm and normal heart sounds.    Pulmonary/Chest: Effort normal and breath sounds normal.   Abdominal: Soft. Bowel sounds are normal. She exhibits no distension.   Musculoskeletal: Normal range of motion. She exhibits no edema.   Neurological: She is alert and oriented to person, place, and time.   Skin: Skin is warm and dry.   Incision well-healed.    Psychiatric: Her mood appears anxious.   Frustrated       Lab Results   Component Value Date    WBC 6.87 06/01/2018    HGB 10.8 (L) 06/01/2018     06/01/2018     06/01/2018    K 3.7 06/01/2018    CREATININE 1.1 06/01/2018    INR 1.4 06/01/2018     Cardiovascular Procedures:  ECHO/MUGA:  Echo (Scanned) - 9/7/2012   ELECTROPHYSIOLOGY:  Devices (Bi-Ventricular PPM (Biotronik-Evia), Evia Dr-T serial# [id number] PID [id number]. DDDR 80-130ppm. Normal function. Changes made=sensor rate and gain. Base rate decreased to 75ppm.) - 10/30/2013   RFA (Indication A Fib, AVN ablation with pacemaker implantation) - 10/22/2013   STRESS TESTS:  Unruly MPI (Scanned) - 10/24/2011   ETT (Lexiscan. Had SOB and lightheadedness during recoevry, otherwise normal.) - 1/22/2016   AVR 1998    Assessment   Problem List Items Addressed This Visit        Circulatory    CHB (complete heart block) (CMS/HCC) (HCC)     100% AV paced following to complete heart block with AV node ablation in 2013.         Complication of Procedure: Insert single lead to existing PPM/ICD (05/31/2018)    Complication of Admission: Admission (02/19/2018)       Other    Biventricular pacemaker check - Primary     She underwent generator change of a Biotronik biventricular pacemaker in May 2018.  Her previous RV lead was malfunctioning therefore  a new RV lead was implanted.  Her chronic LV lead was left in place. Her previous RV lead is capped.  Biotronik Entrinsa implanted May 31, 2018.  LV threshold 0.8 V at 0.4 ms and lead impedance 1189 ohms.  Parameters VVIR 75 bpm.  BiV pacing is 100%.    It is unclear why the patient is having such marked changes in exercise tolerance along with her fatigue.  Her underlying rhythm is atrial fibrillation with complete heart block which is unchanged.  Other than the addition of a new RV lead, programmed parameters were kept as identical as possible to the old settings.  Transthoracic echo was performed in the office, and a limited fashion.  LV function appeared to remain normal, and there is no evidence of pericardial effusion.  Diagnostics on her new device revealed blunted heart rate response.  She underwent exercise testing in the office with adjustment in the sensor output of the rate responsiveness.         Fatigue     Ms. St complains of extreme fatigue over the past few weeks, not being able to walk across her office at work.  Adjustments were made in the rate responsiveness of her pacemaker after diagnostics revealed a blunted heart rate response with exercise.  Patient was concerned about her aortic valve; thinking there may have been a change in the status.  Limited echo did not appear to demonstrate significant changes in aortic valve function, but full diagnostics were unable to be performed and I do not have an old echo for comparison.  However, the onset of her symptoms appear to be associated with the generator change, I am hopeful changes rate responsiveness will alleviate her symptoms.                    Simon Mcknight MD  7/28/2018

## 2018-07-19 ENCOUNTER — OFFICE VISIT (OUTPATIENT)
Dept: NEPHROLOGY | Facility: CLINIC | Age: 83
End: 2018-07-19
Payer: MEDICARE

## 2018-07-19 VITALS
BODY MASS INDEX: 38.57 KG/M2 | SYSTOLIC BLOOD PRESSURE: 126 MMHG | WEIGHT: 240 LBS | HEIGHT: 66 IN | DIASTOLIC BLOOD PRESSURE: 68 MMHG

## 2018-07-19 DIAGNOSIS — N25.81 SECONDARY HYPERPARATHYROIDISM, RENAL (HCC): ICD-10-CM

## 2018-07-19 DIAGNOSIS — I51.89 DIASTOLIC DYSFUNCTION: ICD-10-CM

## 2018-07-19 DIAGNOSIS — I12.9 BENIGN HYPERTENSIVE KIDNEY DISEASE WITH CHRONIC KIDNEY DISEASE STAGE I THROUGH STAGE IV, OR UNSPECIFIED: ICD-10-CM

## 2018-07-19 DIAGNOSIS — M1A.9XX0 CHRONIC GOUT WITHOUT TOPHUS, UNSPECIFIED CAUSE, UNSPECIFIED SITE: Chronic | ICD-10-CM

## 2018-07-19 DIAGNOSIS — N18.30 CHRONIC KIDNEY DISEASE, STAGE 3 (HCC): Chronic | ICD-10-CM

## 2018-07-19 PROCEDURE — 99214 OFFICE O/P EST MOD 30 MIN: CPT | Performed by: INTERNAL MEDICINE

## 2018-07-19 NOTE — PATIENT INSTRUCTIONS
Chronic Kidney Disease   WHAT YOU NEED TO KNOW:   Chronic kidney disease (CKD) is the gradual and permanent loss of kidney function  It is also called chronic kidney failure, or chronic renal insufficiency  Normally, the kidneys remove fluid, chemicals, and waste from your blood  These wastes are turned into urine by your kidneys  CKD may worsen over time and lead to kidney failure  DISCHARGE INSTRUCTIONS:   Return to the emergency department if:   · You are confused and very drowsy  · You have a seizure  · You have shortness of breath  Contact your healthcare provider if:   · You suddenly gain or lose more weight than your healthcare provider has told you is okay  · You have itchy skin or a rash  · You urinate more or less than you normally do  · You have blood in your urine  · You have nausea and repeated vomiting  · You have fatigue or muscle weakness  · You have hiccups that will not stop  · You have questions or concerns about your condition or care  Medicines:   · Medicines  may be given to decrease blood pressure and get rid of extra fluid  You may also receive medicine to manage health conditions that may occur with CKD, such as anemia, diabetes, and heart disease  · Take your medicine as directed  Contact your healthcare provider if you think your medicine is not helping or if you have side effects  Tell him or her if you are allergic to any medicine  Keep a list of the medicines, vitamins, and herbs you take  Include the amounts, and when and why you take them  Bring the list or the pill bottles to follow-up visits  Carry your medicine list with you in case of an emergency  Follow up with your healthcare provider as directed: You will need to return for tests to monitor your kidney function  You may also be referred to a kidney specialist  Write down your questions so you remember to ask them during your visits  Manage other health conditions:   Follow your healthcare provider's directions on how to manage diabetes, high blood pressure, and heart disease  These conditions can make CKD worse  Talk to your healthcare provider before you take over-the-counter medicine  Medicines such as NSAIDs, stomach medicine, or laxatives may harm your kidneys  Weigh yourself daily:  Ask your healthcare provider what your weight should be  Ask how much liquid you should drink each day  CKD may cause you to gain or lose weight rapidly  Weigh yourself every day  Write down your weight, how much liquid you drink or eat, and how much you urinate each day  Contact your healthcare provider if your weight is higher or lower than it should be  Manage CKD:   · Maintain a healthy weight  Ask your healthcare provider how much you should weigh  Ask him to help you create a weight loss plan if you are overweight  · Exercise 30 to 60 minutes a day, 4 to 7 times a week, or as directed  Ask about the best exercise plan for you  Regular exercise can help you manage CKD, high blood pressure, and diabetes  · Follow your healthcare provider's advice about what to eat and drink  He may tell you to eat food low in sodium (salt), potassium, phosphorus, or protein  You may need to see a dietitian if you need help planning meals  Ask how much liquid to drink each day and which liquids are best for you  · Limit alcohol  Ask how much alcohol is safe for you to drink  A drink of alcohol is 12 ounces of beer, 5 ounces of wine, or 1½ ounces of liquor  · Do not smoke  Nicotine and other chemicals in cigarettes and cigars can cause lung and kidney damage  Ask your healthcare provider for information if you currently smoke and need help to quit  E-cigarettes or smokeless tobacco still contain nicotine  Talk to your healthcare provider before you use these products  · Ask your healthcare provider if you need vaccines    Infections such as pneumonia, influenza, and hepatitis can be more harmful or more likely to occur in a person who has CKD  Vaccines reduce your risk of infection with these viruses  © 2017 [facility] [place] [facility]– [place] Information is for End User's use only and may not be sold, redistributed or otherwise used for commercial purposes  All illustrations and images included in CareNotes® are the copyrighted property of [employer]  or Miguel Fisher  The above information is an  only  It is not intended as medical advice for individual conditions or treatments  Talk to your doctor, nurse or pharmacist before following any medical regimen to see if it is safe and effective for you

## 2018-07-19 NOTE — PROGRESS NOTES
OFFICE FOLLOW UP - Nephrology   Criss Way 81 y o  female MRN: [id number]    Encounter: [id number]        ASSESSMENT and PLAN:  Elle Nayak was seen today for follow-up  Diagnoses and all orders for this visit: 1  Secondary hyperparathyroidism, renal (HCC)  -     PTH, intact; Future  - she has had an elevated PTH in the past will repeat now  - she is currently on vitamin D 3 1000 units daily  - repeat the PTH as above and will adjust according     2  Essential hypertension  - her blood pressure is well controlled today upon repeat  - she is currently indapamide  And metoprolol  - she does have significant lower extremity edema  - if worsening edema would consider starting another diuretic     3  Cardiomegaly  - follows up with cardiologist at [facility]     4  Chronic gout without tophus, unspecified cause, unspecified site  -now on allopurinol 100 mg b i d   - tolerating this well  With no recent gout attacks     5  Diastolic dysfunction  - no episodes of flash pulmonary edema does have lower extremity edema but stable  - continue ongoing cardiology follow-up  - for her lower extremity edema we did discuss potentially using Ace wraps  -she follows up at [facility]     6  Chronic kidney disease, stage 3  -     CBC and differential; Future  -     Comprehensive metabolic panel; Future  -     Magnesium; Future  -     Phosphorus; Future  -     Uric acid;  Future  -     Urinalysis  -     Protein / creatinine ratio, urine  - will check the labs as listed above  - continue routine  for anemia related to chronic kidney disease and bone mineral disease    HPI: Criss Way is a 81 y o  female who is here for Follow-up    Her lower extremity edema remains stable denies any chest pain shortness of breath fevers chills nausea vomiting diarrhea or constipation continues to do well without any gout attacks good urine output her A1cs have been high and she was started on subcu insulin     denies any nausea or vomiting noted diarrhea constipation no gout attack urine output has been stable    ROS:   All the systems were reviewed and were negative except as documented on the HPI  Allergies: Cephalosporins; Acetazolamide; Cefazolin; Colchicine; Diltiazem; Docusate; Febuxostat; Hydrochlorothiazide; Latex;  Metformin; Nortriptyline; Penicillins; Povidone iodine; Ropinirole; Simvastatin; Sulfa antibiotics; and Valsartan    Medications:   Current Outpatient Prescriptions:     allopurinol (ZYLOPRIM) 100 mg tablet, Take 100 mg by mouth 2 (two) times a day  , Disp: , Rfl:     aspirin (ECOTRIN LOW STRENGTH) 81 mg EC tablet, Take 81 mg by mouth daily, Disp: , Rfl:     bimatoprost (LUMIGAN) 0 01 % ophthalmic drops, Administer 1 drop to both eyes daily at bedtime, Disp: , Rfl:     calcium citrate-vitamin D (CITRACAL+D) 315-200 MG-UNIT per tablet, Take 1 tablet by mouth 2 (two) times a day, Disp: , Rfl:     cholecalciferol (VITAMIN D3) 1,000 units tablet, Take 1,000 Units by mouth daily, Disp: , Rfl:     ferrous sulfate 325 (65 Fe) mg tablet, Take 325 mg by mouth daily with breakfast, Disp: , Rfl:     gabapentin (NEURONTIN) 100 mg capsule, Take 1 capsule (100 mg total) by mouth 4 (four) times a day (Patient taking differently: Take 600 mg by mouth 4 (four) times a day  ), Disp: 360 capsule, Rfl: 1    gabapentin (NEURONTIN) 300 mg capsule, Take 500 mg by mouth daily at bedtime  , Disp: , Rfl:     HYDROcodone-acetaminophen (NORCO) 5-325 mg per tablet, Take 1 tablet by mouth every 6 (six) hours as needed for pain for up to 10 doses Max Daily Amount: 4 tablets, Disp: 10 tablet, Rfl: 0    indapamide (LOZOL) 2 5 mg tablet, Take 2 5 mg by mouth every morning, Disp: , Rfl:     insulin aspart (NovoLOG) 100 units/mL injection, Inject under the skin 3 (three) times a day before meals, Disp: , Rfl:     insulin detemir (LEVEMIR) 100 units/mL subcutaneous injection, Inject 60 Units under the skin daily at bedtime  , Disp: , Rfl:     Linagliptin (TRADJENTA) 5 MG TABS, Take 5 mg by mouth daily, Disp: , Rfl:     Magnesium 250 MG TABS, Take 1 each by mouth daily, Disp: , Rfl:     methocarbamol (ROBAXIN) 500 mg tablet, Take 500 mg by mouth 3 (three) times a day, Disp: , Rfl:     metoprolol succinate (TOPROL-XL) 50 mg 24 hr tablet, Take 50 mg by mouth daily, Disp: , Rfl:     Multiple Vitamin (MULTIVITAMIN) tablet, Take 1 tablet by mouth daily, Disp: , Rfl:     Multiple Vitamins-Minerals (ICAPS AREDS 2 PO), Take 2 capsules by mouth daily, Disp: , Rfl:     RABEprazole (ACIPHEX) 20 MG tablet, Take 20 mg by mouth daily, Disp: , Rfl:     tolterodine (DETROL) 1 mg tablet, Take 1 mg by mouth daily at bedtime, Disp: , Rfl:     warfarin (COUMADIN) 2 5 mg tablet, Take 3 75 mg by mouth daily 5mg on Tuesday, Disp: , Rfl:     enoxaparin (LOVENOX) 100 mg/mL, Inject 1 mL under the skin every 24 hours, Disp: 10 mL, Rfl: 0    Past Medical History:   Diagnosis Date    Chronic atrial fibrillation ([facility] ) 9/13/2017    Chronic gout 9/13/2017    CKD (chronic kidney disease) stage 3, GFR 30-59 ml/min 9/13/2017    Colon polyp 9/13/2017    Coronary artery disease     [facility] [address] )     Essential hypertension 9/13/2017    Gastritis 9/13/2017    Hypertension     Irregular heart beat     Mechanical heart valve present 9/13/2017    Osteoarthritis     Pacemaker     S/P AVR     Spinal stenosis     disk herniation      Past Surgical History:   Procedure Laterality Date    AORTIC VALVE REPLACEMENT      CARDIAC SURGERY      CATARACT EXTRACTION      CHOLECYSTECTOMY      COLONOSCOPY      ESOPHAGOGASTRODUODENOSCOPY      HERNIA REPAIR      x3    HYSTERECTOMY      INSERT / REPLACE / REMOVE PACEMAKER      JOINT REPLACEMENT Bilateral     knees    KY COLONOSCOPY FLX DX W/COLLJ SPEC WHEN PFRMD N/A 9/12/2017    Procedure: EGDwith bxAND COLONOSCOPY with polypectomies;  Surgeon: Shane Salazar MD;  Location: [facility];   Service: Gastroenterology    WA SIGMOIDOSCOPY FLX DX W/COLLJ SPEC BR/WA IF PFRMD N/A 9/13/2017    Procedure: colonoscopy with hemo clip x 2;  Surgeon: Fer Rios MD;  Location: [facility]; Service: Gastroenterology    ROTATOR CUFF REPAIR Left     SKIN BIOPSY      TONSILLECTOMY       Family History   Problem Relation Age of Onset    Diabetes Mother     Heart disease Father       reports that she has quit smoking  She has never used smokeless tobacco  She reports that she does not drink alcohol or use drugs  Physical Exam:   Vitals:    07/19/18 1337 07/19/18 1356   BP: 164/82 126/68   BP Location: Right arm    Patient Position: Sitting    Cuff Size: Standard    Weight: 109 kg (240 lb)    Height: 5' 6" (1 676 m)      Body mass index is 38 74 kg/m²  General: conscious, cooperative, in not acute distress  Eyes: conjunctivae pink, anicteric sclerae  ENT: lips and mucous membranes moist  Neck: supple, no JVD  Chest: clear breath sounds bilateral, no crackles, ronchus or wheezings  CVS: distinct S1 & S2, normal rate, regular rhythm  Abdomen: soft, non-tender, non-distended, normoactive bowel sounds  Extremities:  2+ pitting edema  Skin: no rash  Neuro: awake, alert, oriented      Lab Results:      Her last creatinine has been 1 2 F electrolyte stable will repeat now    Portions of the record may have been created with voice recognition software  Occasional wrong word or "sound a like" substitutions may have occurred due to the inherent limitations of voice recognition software  Read the chart carefully and recognize, using context, where substitutions have occurred  If you have any questions, please contact the dictating provider

## 2018-07-24 ENCOUNTER — TRANSCRIBE ORDERS (OUTPATIENT)
Dept: LAB | Facility: CLINIC | Age: 83
End: 2018-07-24

## 2018-07-24 ENCOUNTER — APPOINTMENT (OUTPATIENT)
Dept: LAB | Facility: CLINIC | Age: 83
End: 2018-07-24
Payer: MEDICARE

## 2018-07-24 LAB
ALBUMIN SERPL BCP-MCNC: 3.5 G/DL (ref 3.5–5)
ALP SERPL-CCNC: 88 U/L (ref 46–116)
ALT SERPL W P-5'-P-CCNC: 27 U/L (ref 12–78)
ANION GAP SERPL CALCULATED.3IONS-SCNC: 7 MMOL/L (ref 4–13)
AST SERPL W P-5'-P-CCNC: 26 U/L (ref 5–45)
BACTERIA UR QL AUTO: ABNORMAL /HPF
BILIRUB SERPL-MCNC: 0.3 MG/DL (ref 0.2–1)
BILIRUB UR QL STRIP: NEGATIVE
BUN SERPL-MCNC: 27 MG/DL (ref 5–25)
CALCIUM SERPL-MCNC: 8.9 MG/DL (ref 8.3–10.1)
CHLORIDE SERPL-SCNC: 102 MMOL/L (ref 100–108)
CLARITY UR: CLEAR
CO2 SERPL-SCNC: 29 MMOL/L (ref 21–32)
COLOR UR: YELLOW
CREAT SERPL-MCNC: 1.26 MG/DL (ref 0.6–1.3)
CREAT UR-MCNC: 111 MG/DL
GFR SERPL CREATININE-BSD FRML MDRD: 40 ML/MIN/1.73SQ M
GLUCOSE P FAST SERPL-MCNC: 159 MG/DL (ref 65–99)
GLUCOSE UR STRIP-MCNC: NEGATIVE MG/DL
HGB UR QL STRIP.AUTO: NEGATIVE
HYALINE CASTS #/AREA URNS LPF: ABNORMAL /LPF
KETONES UR STRIP-MCNC: NEGATIVE MG/DL
LEUKOCYTE ESTERASE UR QL STRIP: ABNORMAL
MAGNESIUM SERPL-MCNC: 1.8 MG/DL (ref 1.6–2.6)
NITRITE UR QL STRIP: NEGATIVE
NON-SQ EPI CELLS URNS QL MICRO: ABNORMAL /HPF
PH UR STRIP.AUTO: 6 [PH] (ref 4.5–8)
PHOSPHATE SERPL-MCNC: 3.1 MG/DL (ref 2.3–4.1)
POTASSIUM SERPL-SCNC: 4 MMOL/L (ref 3.5–5.3)
PROT SERPL-MCNC: 8.1 G/DL (ref 6.4–8.2)
PROT UR STRIP-MCNC: NEGATIVE MG/DL
PROT UR-MCNC: 19 MG/DL
PROT/CREAT UR: 0.17 MG/G{CREAT} (ref 0–0.1)
PTH-INTACT SERPL-MCNC: 49.1 PG/ML (ref 18.4–80.1)
RBC #/AREA URNS AUTO: ABNORMAL /HPF
SODIUM SERPL-SCNC: 138 MMOL/L (ref 136–145)
SP GR UR STRIP.AUTO: 1.02 (ref 1–1.03)
UROBILINOGEN UR QL STRIP.AUTO: 0.2 E.U./DL
WBC #/AREA URNS AUTO: ABNORMAL /HPF

## 2018-07-24 PROCEDURE — 83970 ASSAY OF PARATHORMONE: CPT | Performed by: INTERNAL MEDICINE

## 2018-07-24 PROCEDURE — 36415 COLL VENOUS BLD VENIPUNCTURE: CPT | Performed by: INTERNAL MEDICINE

## 2018-07-24 PROCEDURE — 84100 ASSAY OF PHOSPHORUS: CPT | Performed by: INTERNAL MEDICINE

## 2018-07-24 PROCEDURE — 82570 ASSAY OF URINE CREATININE: CPT | Performed by: INTERNAL MEDICINE

## 2018-07-24 PROCEDURE — 81001 URINALYSIS AUTO W/SCOPE: CPT | Performed by: INTERNAL MEDICINE

## 2018-07-24 PROCEDURE — 80053 COMPREHEN METABOLIC PANEL: CPT | Performed by: INTERNAL MEDICINE

## 2018-07-24 PROCEDURE — 83735 ASSAY OF MAGNESIUM: CPT | Performed by: INTERNAL MEDICINE

## 2018-07-24 PROCEDURE — 84156 ASSAY OF PROTEIN URINE: CPT | Performed by: INTERNAL MEDICINE

## 2018-07-25 NOTE — PROGRESS NOTES
Please let her know that her wrap work is reviewed and her renal function remains at baseline parathyroid hormone is 49 1 will not make any changes to her medications thanks

## 2018-07-26 ENCOUNTER — TELEPHONE (OUTPATIENT)
Dept: NEPHROLOGY | Facility: HOSPITAL | Age: 83
End: 2018-07-26

## 2018-07-26 NOTE — TELEPHONE ENCOUNTER
----- Message from Sj Spicer DO sent at 7/25/2018  2:42 PM EDT -----  Please let her know that her wrap work is reviewed and her renal function remains at baseline parathyroid hormone is 49 1 will not make any changes to her medications thanks

## 2018-08-08 PROBLEM — Z95.2 S/P AVR: Status: ACTIVE | Noted: 2018-08-08

## 2018-08-08 ASSESSMENT — ENCOUNTER SYMPTOMS
PSYCHIATRIC NEGATIVE: 1
EYES NEGATIVE: 1
SHORTNESS OF BREATH: 0
WEIGHT GAIN: 0
CARDIOVASCULAR NEGATIVE: 1
HEMATOLOGIC/LYMPHATIC NEGATIVE: 1
GASTROINTESTINAL NEGATIVE: 1
WEAKNESS: 0
ENDOCRINE NEGATIVE: 1

## 2018-08-08 NOTE — PROGRESS NOTES
Electrophysiology  Outpatient Progress Note       Reason for visit:   No chief complaint on file.      PRADIP St is a 83 y.o. female who is seen today for follow up of her newly implanted biventricular pacemaker indicated for complete heart block during AV node ablation in 2013.  She has chronic atrial fibrillation.  She has a new LV lead along with a new RV lead.  Her previous RV lead had chronically elevated thresholds and had been turned off due to this.  That RV lead is capped.  At her previous visit a month ago she complained of severe dyspnea on exertion.  Limited echo did not show an effusion. Adjustments were made in the rate responsiveness of her pacemaker after diagnostics revealed a blunted heart rate response with exercise. She returns today feeling greatly improved.    Past Medical History:   Diagnosis Date   • Abnormal ECG    • Arthritis    • Fatigue 7/16/2018   • GERD (gastroesophageal reflux disease)    • Hypertension    • Osteoporosis    • S/P AVR 8/8/2018   • Type 2 diabetes mellitus (CMS/HCC) ([facility])      Past Surgical History:   Procedure Laterality Date   • AORTIC VALVE REPLACEMENT  1998   • TOTAL ABDOMINAL HYSTERECTOMY W/ BILATERAL SALPINGOOPHORECTOMY         Social History   Substance Use Topics   • Smoking status: Former Smoker     Types: Cigarettes     Quit date: 1966   • Smokeless tobacco: Never Used   • Alcohol use No     No family history on file.    ALLERGY:  Cephalosporins; Penicillins; Colace [docusate sodium]; Hydrochlorothiazide; Losartan potassium; Metformin; Requip [ropinirole]; Sulfa (sulfonamide antibiotics); Valsartan; and Latex    Current Outpatient Prescriptions   Medication Sig Dispense Refill   • allopurinol (ZYLOPRIM) 300 mg tablet Take 300 mg by mouth 2 (two) times a day.     • B-complex with vitamin C tablet Take 1 tablet by mouth daily.     • bimatoprost (LUMIGAN) 0.01 % ophthalmic drops 1 drop nightly.     • calcium carbonate-vitamin D3 250-125 mg-unit  tablet Take by mouth daily.     • cholecalciferol, vitamin D3, 1,000 unit tablet Take 1,000 Units by mouth daily.     • ferrous sulfate 325 mg (65 mg iron) EC tablet Take 325 mg by mouth daily with breakfast.     • gabapentin (NEURONTIN) 100 mg capsule Take 700 mg by mouth nightly.       • HYDROcodone-acetaminophen (NORCO) 5-325 mg per tablet Take 1 tablet by mouth every 6 (six) hours as needed for moderate pain (taken at night).     • indapamide (LOZOL) 2.5 mg tablet Take 2.5 mg by mouth daily.     • insulin aspart U-100 (NovoLOG) 100 unit/mL subcutaneous pen Inject under the skin 3 (three) times a day before meals. Sliding scale as follows:  160-190 give 3 units, 191-210 give 5 units, 211-240 give 7 units, 241-270 give 9 units, greater than 271 give 10 units and recheck sugars in 2-3 hours     • insulin detemir U-100 (LEVEMIR) 100 unit/mL (3 mL) subcutaneous pen Inject 60 Units under the skin nightly. Patient took 30 units last evening     • methocarbamol (ROBAXIN) 750 mg tablet Take 750 mg by mouth 2 (two) times a day.     • metoprolol succinate XL (TOPROL-XL) 50 mg 24 hr tablet Take 50 mg by mouth daily.     • pantoprazole (PROTONIX) 40 mg EC tablet Take 40 mg by mouth. Normally takes aciphex 30 mg daily.     • tolterodine (DETROL) 2 mg tablet Take 2 mg by mouth nightly.     • warfarin (COUMADIN) 3 mg tablet Take 3.75 mg by mouth once daily. Taken for 6 days then 5 mg on Friday.  Held X 3 days per order.       No current facility-administered medications for this visit.        Review of Systems   Constitution: Negative for weakness, malaise/fatigue and weight gain.   HENT: Negative.    Eyes: Negative.    Cardiovascular: Negative.    Respiratory: Negative for shortness of breath.    Endocrine: Negative.    Hematologic/Lymphatic: Negative.    Skin: Negative.    Musculoskeletal: Negative for muscle weakness.   Gastrointestinal: Negative.    Genitourinary: Negative.    Psychiatric/Behavioral: Negative.         Objective   Vitals:    08/10/18 0858   BP: 140/84   Pulse: 83   SpO2: 95%       Physical Exam   Constitutional: She is oriented to person, place, and time. She appears well-nourished.   HENT:   Head: Normocephalic.   Eyes: Conjunctivae and EOM are normal. Pupils are equal, round, and reactive to light.   Neck: Normal range of motion.   Cardiovascular: Normal rate, regular rhythm and normal heart sounds.    Pulmonary/Chest: Effort normal and breath sounds normal.   Abdominal: Soft. Bowel sounds are normal. She exhibits no distension.   Musculoskeletal: Normal range of motion. She exhibits no edema.   Neurological: She is alert and oriented to person, place, and time.   Skin: Skin is warm and dry.   Incision well-healed.    Psychiatric: Her mood appears not anxious. Her affect is not blunt.       Lab Results   Component Value Date    WBC 6.87 06/01/2018    HGB 10.8 (L) 06/01/2018     06/01/2018     06/01/2018    K 3.7 06/01/2018    CREATININE 1.1 06/01/2018    INR 1.4 06/01/2018     Cardiovascular Procedures:  ECHO/MUGA:  Echo (Scanned) - 9/7/2012   ELECTROPHYSIOLOGY:  Devices (Bi-Ventricular PPM (Biotronik-Evia), Evia Leora serial# [id number] [id number]. DDDR 80-130ppm. Normal function. Changes made=sensor rate and gain. Base rate decreased to 75ppm.) - 10/30/2013   RFA (Indication A Fib, AVN ablation with pacemaker implantation) - 10/22/2013   STRESS TESTS:  Unruly MPI (Scanned) - 10/24/2011   ETT (Lexiscan. Had SOB and lightheadedness during recoevry, otherwise normal.) - 1/22/2016   AVR 1998    Assessment   Problem List Items Addressed This Visit        Circulatory    CHB (complete heart block) (CMS/HCC) (HCC)     She remains in complete heart block following her AV node ablation for uncontrolled atrial fibrillation.  A normal functioning pacemaker is in place.         Complication of Procedure: Insert single lead to existing PPM/ICD (05/31/2018)    Complication of Admission: Admission (02/19/2018)     Relevant Orders    ECG 12 lead (Completed)    Chronic atrial fibrillation (CMS/HCC) (HCC) - Primary     She has chronic atrial fibrillation and underwent an AV node ablation in the past.  Her pacemaker is functioning normally.  She continues on chronic anticoagulation.  She is asymptomatic.         Relevant Orders    ECG 12 lead (Completed)    S/P AVR     Status post aVR 1998.  Her most recent echo shows stable aortic valve.            Other    Biventricular pacemaker check     She underwent implantation of a Biotronik biventricular pacemaker in May 2018.  Her previous RV lead was malfunctioning therefore a new RV lead was implanted along with the new LV lead.  Her previous RV lead is capped.  Biotronik Entrinsa implanted May 31, 2018.  LV threshold 0.8 V at 0.4 ms and lead impedance 1189 ohms.  Parameters VVIR 75 bpm.  BiV pacing is 100%.  Underlying rhythm is atrial fibrillation with complete heart block.  At her previous appointment diagnostics revealed blunted heart rate response.  She underwent exercise testing in the office with adjustment in the sensor output of the rate responsiveness.  She feels great improvement in her dyspnea on exertion.  She continues to notice a bit of dyspnea on exertion with stairs.  Another adjustment was made in her rate responsiveness today.  She will return in 3 months but call us sooner if this adjustment does not improve her mild dyspnea.    The atrial port is capped.  She has an abandoned capped old RV lead . A new LV and RV lead.  Thresholds, sensing and impedances are stable in these leads.                    Simon Mcknight MD  8/19/2018

## 2018-08-10 ENCOUNTER — OFFICE VISIT (OUTPATIENT)
Dept: CARDIOLOGY | Facility: CLINIC | Age: 83
End: 2018-08-10
Payer: MEDICARE

## 2018-08-10 VITALS
BODY MASS INDEX: 38.7 KG/M2 | OXYGEN SATURATION: 95 % | HEART RATE: 83 BPM | WEIGHT: 239.8 LBS | SYSTOLIC BLOOD PRESSURE: 140 MMHG | DIASTOLIC BLOOD PRESSURE: 84 MMHG

## 2018-08-10 DIAGNOSIS — I48.20 CHRONIC ATRIAL FIBRILLATION (CMS/HCC): Primary | ICD-10-CM

## 2018-08-10 DIAGNOSIS — I44.2 CHB (COMPLETE HEART BLOCK) (CMS/HCC): ICD-10-CM

## 2018-08-10 DIAGNOSIS — Z45.018 BIVENTRICULAR PACEMAKER CHECK: ICD-10-CM

## 2018-08-10 DIAGNOSIS — Z95.2 S/P AVR: ICD-10-CM

## 2018-08-10 PROCEDURE — 99024 POSTOP FOLLOW-UP VISIT: CPT | Performed by: INTERNAL MEDICINE

## 2018-08-10 PROCEDURE — 93000 ELECTROCARDIOGRAM COMPLETE: CPT | Performed by: INTERNAL MEDICINE

## 2018-08-10 NOTE — LETTER
August 19, 2018     Damien Perdue Jr., MD  [address]  [place] [address]    Patient: Ailyn St   YOB: 1935   Date of Visit: 8/10/2018       Dear Dr. Perdue:    Thank you for referring Ailyn St to me for evaluation. Below are my notes for this consultation.    If you have questions, please do not hesitate to call me. I look forward to following your patient along with you.         Sincerely,        Simon Mcknight MD        CC: No Recipients  Simon Mcknight MD  8/19/2018  1:19 PM  Signed       Electrophysiology  Outpatient Progress Note       Reason for visit:   No chief complaint on file.      HPI   Ailyn St is a 83 y.o. female who is seen today for follow up of her newly implanted biventricular pacemaker indicated for complete heart block during AV node ablation in 2013.  She has chronic atrial fibrillation.  She has a new LV lead along with a new RV lead.  Her previous RV lead had chronically elevated thresholds and had been turned off due to this.  That RV lead is capped.  At her previous visit a month ago she complained of severe dyspnea on exertion.  Limited echo did not show an effusion. Adjustments were made in the rate responsiveness of her pacemaker after diagnostics revealed a blunted heart rate response with exercise. She returns today feeling greatly improved.    Past Medical History:   Diagnosis Date   • Abnormal ECG    • Arthritis    • Fatigue 7/16/2018   • GERD (gastroesophageal reflux disease)    • Hypertension    • Osteoporosis    • S/P AVR 8/8/2018   • Type 2 diabetes mellitus (CMS/HCC) (HCC)      Past Surgical History:   Procedure Laterality Date   • AORTIC VALVE REPLACEMENT  1998   • TOTAL ABDOMINAL HYSTERECTOMY W/ BILATERAL SALPINGOOPHORECTOMY         Social History   Substance Use Topics   • Smoking status: Former Smoker     Types: Cigarettes     Quit date: 1966   • Smokeless tobacco: Never Used   • Alcohol use No     No family history on  file.    ALLERGY:  Cephalosporins; Penicillins; Colace [docusate sodium]; Hydrochlorothiazide; Losartan potassium; Metformin; Requip [ropinirole]; Sulfa (sulfonamide antibiotics); Valsartan; and Latex    Current Outpatient Prescriptions   Medication Sig Dispense Refill   • allopurinol (ZYLOPRIM) 300 mg tablet Take 300 mg by mouth 2 (two) times a day.     • B-complex with vitamin C tablet Take 1 tablet by mouth daily.     • bimatoprost (LUMIGAN) 0.01 % ophthalmic drops 1 drop nightly.     • calcium carbonate-vitamin D3 250-125 mg-unit tablet Take by mouth daily.     • cholecalciferol, vitamin D3, 1,000 unit tablet Take 1,000 Units by mouth daily.     • ferrous sulfate 325 mg (65 mg iron) EC tablet Take 325 mg by mouth daily with breakfast.     • gabapentin (NEURONTIN) 100 mg capsule Take 700 mg by mouth nightly.       • HYDROcodone-acetaminophen (NORCO) 5-325 mg per tablet Take 1 tablet by mouth every 6 (six) hours as needed for moderate pain (taken at night).     • indapamide (LOZOL) 2.5 mg tablet Take 2.5 mg by mouth daily.     • insulin aspart U-100 (NovoLOG) 100 unit/mL subcutaneous pen Inject under the skin 3 (three) times a day before meals. Sliding scale as follows:  160-190 give 3 units, 191-210 give 5 units, 211-240 give 7 units, 241-270 give 9 units, greater than 271 give 10 units and recheck sugars in 2-3 hours     • insulin detemir U-100 (LEVEMIR) 100 unit/mL (3 mL) subcutaneous pen Inject 60 Units under the skin nightly. Patient took 30 units last evening     • methocarbamol (ROBAXIN) 750 mg tablet Take 750 mg by mouth 2 (two) times a day.     • metoprolol succinate XL (TOPROL-XL) 50 mg 24 hr tablet Take 50 mg by mouth daily.     • pantoprazole (PROTONIX) 40 mg EC tablet Take 40 mg by mouth. Normally takes aciphex 30 mg daily.     • tolterodine (DETROL) 2 mg tablet Take 2 mg by mouth nightly.     • warfarin (COUMADIN) 3 mg tablet Take 3.75 mg by mouth once daily. Taken for 6 days then 5 mg on Friday.   Held X 3 days per order.       No current facility-administered medications for this visit.        Review of Systems   Constitution: Negative for weakness, malaise/fatigue and weight gain.   HENT: Negative.    Eyes: Negative.    Cardiovascular: Negative.    Respiratory: Negative for shortness of breath.    Endocrine: Negative.    Hematologic/Lymphatic: Negative.    Skin: Negative.    Musculoskeletal: Negative for muscle weakness.   Gastrointestinal: Negative.    Genitourinary: Negative.    Psychiatric/Behavioral: Negative.        Objective   Vitals:    08/10/18 0858   BP: 140/84   Pulse: 83   SpO2: 95%       Physical Exam   Constitutional: She is oriented to person, place, and time. She appears well-nourished.   HENT:   Head: Normocephalic.   Eyes: Conjunctivae and EOM are normal. Pupils are equal, round, and reactive to light.   Neck: Normal range of motion.   Cardiovascular: Normal rate, regular rhythm and normal heart sounds.    Pulmonary/Chest: Effort normal and breath sounds normal.   Abdominal: Soft. Bowel sounds are normal. She exhibits no distension.   Musculoskeletal: Normal range of motion. She exhibits no edema.   Neurological: She is alert and oriented to person, place, and time.   Skin: Skin is warm and dry.   Incision well-healed.    Psychiatric: Her mood appears not anxious. Her affect is not blunt.       Lab Results   Component Value Date    WBC 6.87 06/01/2018    HGB 10.8 (L) 06/01/2018     06/01/2018     06/01/2018    K 3.7 06/01/2018    CREATININE 1.1 06/01/2018    INR 1.4 06/01/2018     Cardiovascular Procedures:  ECHO/MUGA:  Echo (Scanned) - 9/7/2012   ELECTROPHYSIOLOGY:  Devices (Bi-Ventricular PPM (Biotronik-Evia), Evia Dr-T serial# [id number] [id number]. DDDR 80-130ppm. Normal function. Changes made=sensor rate and gain. Base rate decreased to 75ppm.) - 10/30/2013   RFA (Indication A Fib, AVN ablation with pacemaker implantation) - 10/22/2013   STRESS TESTS:  Unruly MPI (Scanned) -  10/24/2011   ETT (Lexiscan. Had SOB and lightheadedness during recoevry, otherwise normal.) - 1/22/2016   AVR 1998    Assessment   Problem List Items Addressed This Visit        Circulatory    CHB (complete heart block) (CMS/HCC) (HCC)     She remains in complete heart block following her AV node ablation for uncontrolled atrial fibrillation.  A normal functioning pacemaker is in place.         Complication of Procedure: Insert single lead to existing PPM/ICD (05/31/2018)    Complication of Admission: Admission (02/19/2018)    Relevant Orders    ECG 12 lead (Completed)    Chronic atrial fibrillation (CMS/HCC) (HCC) - Primary     She has chronic atrial fibrillation and underwent an AV node ablation in the past.  Her pacemaker is functioning normally.  She continues on chronic anticoagulation.  She is asymptomatic.         Relevant Orders    ECG 12 lead (Completed)    S/P AVR     Status post aVR 1998.  Her most recent echo shows stable aortic valve.            Other    Biventricular pacemaker check     She underwent implantation of a Biotronik biventricular pacemaker in May 2018.  Her previous RV lead was malfunctioning therefore a new RV lead was implanted along with the new LV lead.  Her previous RV lead is capped.  Biotronik Entrinsa implanted May 31, 2018.  LV threshold 0.8 V at 0.4 ms and lead impedance 1189 ohms.  Parameters VVIR 75 bpm.  BiV pacing is 100%.  Underlying rhythm is atrial fibrillation with complete heart block.  At her previous appointment diagnostics revealed blunted heart rate response.  She underwent exercise testing in the office with adjustment in the sensor output of the rate responsiveness.  She feels great improvement in her dyspnea on exertion.  She continues to notice a bit of dyspnea on exertion with stairs.  Another adjustment was made in her rate responsiveness today.  She will return in 3 months but call us sooner if this adjustment does not improve her mild dyspnea.    The atrial  port is capped.  She has an abandoned capped old RV lead . A new LV and RV lead.  Thresholds, sensing and impedances are stable in these leads.                    Simon Mcknight MD  8/19/2018

## 2018-08-10 NOTE — ASSESSMENT & PLAN NOTE
She remains in complete heart block following her AV node ablation for uncontrolled atrial fibrillation.  A normal functioning pacemaker is in place.

## 2018-08-10 NOTE — ASSESSMENT & PLAN NOTE
She has chronic atrial fibrillation and underwent an AV node ablation in the past.  Her pacemaker is functioning normally.  She continues on chronic anticoagulation.  She is asymptomatic.

## 2018-08-10 NOTE — ASSESSMENT & PLAN NOTE
She underwent implantation of a Biotronik biventricular pacemaker in May 2018.  Her previous RV lead was malfunctioning therefore a new RV lead was implanted along with the new LV lead.  Her previous RV lead is capped.  Biotronik Entrinsa implanted May 31, 2018.  LV threshold 0.8 V at 0.4 ms and lead impedance 1189 ohms.  Parameters VVIR 75 bpm.  BiV pacing is 100%.  Underlying rhythm is atrial fibrillation with complete heart block.  At her previous appointment diagnostics revealed blunted heart rate response.  She underwent exercise testing in the office with adjustment in the sensor output of the rate responsiveness.  She feels great improvement in her dyspnea on exertion.  She continues to notice a bit of dyspnea on exertion with stairs.  Another adjustment was made in her rate responsiveness today.  She will return in 3 months but call us sooner if this adjustment does not improve her mild dyspnea.    The atrial port is capped.  She has an abandoned capped old RV lead . A new LV and RV lead.  Thresholds, sensing and impedances are stable in these leads.

## 2018-08-10 NOTE — LETTER
August 19, 2018     Giorgio Man MD  [address]  [address]  [place] [place] [address]    Patient: Ailyn St   YOB: 1935   Date of Visit: 8/10/2018       Dear Dr. Man:    Thank you for referring Ailyn St to me for evaluation. Below are my notes for this consultation.    If you have questions, please do not hesitate to call me. I look forward to following your patient along with you.         Sincerely,        Simon Mcknight MD        CC: No Recipients

## 2018-09-30 DIAGNOSIS — G25.81 RLS (RESTLESS LEGS SYNDROME): ICD-10-CM

## 2018-10-01 RX ORDER — GABAPENTIN 100 MG/1
CAPSULE ORAL
Qty: 360 CAPSULE | Refills: 1 | Status: SHIPPED | OUTPATIENT
Start: 2018-10-01 | End: 2018-11-01 | Stop reason: DRUGHIGH

## 2018-10-04 ENCOUNTER — TELEPHONE (OUTPATIENT)
Dept: SCHEDULING | Facility: CLINIC | Age: 83
End: 2018-10-04

## 2018-10-04 ENCOUNTER — TRANSCRIBE ORDERS (OUTPATIENT)
Dept: ADMINISTRATIVE | Facility: HOSPITAL | Age: 83
End: 2018-10-04

## 2018-10-04 DIAGNOSIS — Z01.01 ENCOUNTER FOR VISION SCREENING WITH ABNORMAL FINDINGS: ICD-10-CM

## 2018-10-04 DIAGNOSIS — R06.00 ACUTE DYSPNEA: Primary | ICD-10-CM

## 2018-10-04 NOTE — TELEPHONE ENCOUNTER
Pt is requesting an appt for any of the below dates at [facility] or [place]    11/2 11/7 11/21 11/30    Please contact pt back on [phone]

## 2018-10-10 ENCOUNTER — HOSPITAL ENCOUNTER (OUTPATIENT)
Dept: PULMONOLOGY | Facility: HOSPITAL | Age: 83
Discharge: HOME | End: 2018-10-10
Payer: MEDICARE

## 2018-10-10 ENCOUNTER — TRANSCRIBE ORDERS (OUTPATIENT)
Dept: ADMINISTRATIVE | Facility: HOSPITAL | Age: 83
End: 2018-10-10

## 2018-10-10 ENCOUNTER — HOSPITAL ENCOUNTER (OUTPATIENT)
Dept: RADIOLOGY | Facility: HOSPITAL | Age: 83
Discharge: HOME | End: 2018-10-10
Payer: MEDICARE

## 2018-10-10 DIAGNOSIS — I27.20 PULMONARY HYPERTENSION (HCC): ICD-10-CM

## 2018-10-10 DIAGNOSIS — R06.00 ACUTE DYSPNEA: ICD-10-CM

## 2018-10-10 DIAGNOSIS — I27.20 PULMONARY HYPERTENSION (HCC): Primary | ICD-10-CM

## 2018-10-10 PROCEDURE — 94729 DIFFUSING CAPACITY: CPT | Performed by: INTERNAL MEDICINE

## 2018-10-10 PROCEDURE — 94760 N-INVAS EAR/PLS OXIMETRY 1: CPT

## 2018-10-10 PROCEDURE — 94060 EVALUATION OF WHEEZING: CPT | Performed by: INTERNAL MEDICINE

## 2018-10-10 PROCEDURE — 71046 X-RAY EXAM CHEST 2 VIEWS: CPT

## 2018-10-10 PROCEDURE — 94726 PLETHYSMOGRAPHY LUNG VOLUMES: CPT

## 2018-10-10 PROCEDURE — 94060 EVALUATION OF WHEEZING: CPT

## 2018-10-10 PROCEDURE — 94726 PLETHYSMOGRAPHY LUNG VOLUMES: CPT | Performed by: INTERNAL MEDICINE

## 2018-10-10 PROCEDURE — 94729 DIFFUSING CAPACITY: CPT

## 2018-10-10 RX ORDER — ALBUTEROL SULFATE 2.5 MG/3ML
2.5 SOLUTION RESPIRATORY (INHALATION) ONCE AS NEEDED
Status: COMPLETED | OUTPATIENT
Start: 2018-10-10 | End: 2018-10-10

## 2018-10-10 RX ADMIN — ALBUTEROL SULFATE 2.5 MG: 2.5 SOLUTION RESPIRATORY (INHALATION) at 10:32

## 2018-11-01 ENCOUNTER — OFFICE VISIT (OUTPATIENT)
Dept: SLEEP CENTER | Facility: CLINIC | Age: 83
End: 2018-11-01
Payer: MEDICARE

## 2018-11-01 ENCOUNTER — HOSPITAL ENCOUNTER (OUTPATIENT)
Dept: NON INVASIVE DIAGNOSTICS | Facility: CLINIC | Age: 83
Discharge: HOME | End: 2018-11-01
Payer: MEDICARE

## 2018-11-01 VITALS
WEIGHT: 244.2 LBS | OXYGEN SATURATION: 95 % | HEART RATE: 88 BPM | SYSTOLIC BLOOD PRESSURE: 124 MMHG | DIASTOLIC BLOOD PRESSURE: 78 MMHG | HEIGHT: 66 IN | BODY MASS INDEX: 39.25 KG/M2

## 2018-11-01 DIAGNOSIS — E66.01 CLASS 2 SEVERE OBESITY DUE TO EXCESS CALORIES WITH SERIOUS COMORBIDITY AND BODY MASS INDEX (BMI) OF 39.0 TO 39.9 IN ADULT (HCC): ICD-10-CM

## 2018-11-01 DIAGNOSIS — Z01.01 ENCOUNTER FOR VISION SCREENING WITH ABNORMAL FINDINGS: ICD-10-CM

## 2018-11-01 DIAGNOSIS — G25.81 RLS (RESTLESS LEGS SYNDROME): Primary | ICD-10-CM

## 2018-11-01 DIAGNOSIS — R25.2 LEG CRAMPS: ICD-10-CM

## 2018-11-01 DIAGNOSIS — I51.7 CARDIOMEGALY: ICD-10-CM

## 2018-11-01 DIAGNOSIS — I27.21 PULMONARY ARTERY HYPERTENSION (HCC): ICD-10-CM

## 2018-11-01 PROBLEM — E66.812 CLASS 2 SEVERE OBESITY DUE TO EXCESS CALORIES WITH SERIOUS COMORBIDITY AND BODY MASS INDEX (BMI) OF 39.0 TO 39.9 IN ADULT (HCC): Status: ACTIVE | Noted: 2018-01-25

## 2018-11-01 PROCEDURE — 99214 OFFICE O/P EST MOD 30 MIN: CPT | Performed by: PSYCHIATRY & NEUROLOGY

## 2018-11-01 PROCEDURE — 93880 EXTRACRANIAL BILAT STUDY: CPT

## 2018-11-01 RX ORDER — LIDOCAINE 50 MG/G
PATCH TOPICAL
COMMUNITY
End: 2019-01-10

## 2018-11-01 RX ORDER — GABAPENTIN 100 MG/1
CAPSULE ORAL
Qty: 180 CAPSULE | Refills: 0 | Status: SHIPPED | OUTPATIENT
Start: 2018-11-01 | End: 2019-01-10

## 2018-11-01 RX ORDER — GABAPENTIN 300 MG/1
600 CAPSULE ORAL
Qty: 180 CAPSULE | Refills: 1 | Status: SHIPPED | OUTPATIENT
Start: 2018-11-01 | End: 2019-06-14 | Stop reason: SDUPTHER

## 2018-11-01 NOTE — PROGRESS NOTES
Progress Note - [address] Harmon 81 y o  female   :1935, MRN: [id number]  2018          Follow Up Evaluation / Problem:     Restless Legs Syndrome    HPI: Jaya Gustafson is a 81y o  year old female  She is currently being treated for restless legs syndrome          Current Outpatient Prescriptions:     allopurinol (ZYLOPRIM) 100 mg tablet, Take 100 mg by mouth 2 (two) times a day  , Disp: , Rfl:     aspirin (ECOTRIN LOW STRENGTH) 81 mg EC tablet, Take 81 mg by mouth daily, Disp: , Rfl:     bimatoprost (LUMIGAN) 0 01 % ophthalmic drops, Administer 1 drop to both eyes daily at bedtime, Disp: , Rfl:     calcium citrate-vitamin D (CITRACAL+D) 315-200 MG-UNIT per tablet, Take 1 tablet by mouth 2 (two) times a day, Disp: , Rfl:     cholecalciferol (VITAMIN D3) 1,000 units tablet, Take 1,000 Units by mouth daily, Disp: , Rfl:     ferrous sulfate 325 (65 Fe) mg tablet, Take 325 mg by mouth daily with breakfast, Disp: , Rfl:     gabapentin (NEURONTIN) 300 mg capsule, Take 2 capsules (600 mg total) by mouth daily at bedtime, Disp: 180 capsule, Rfl: 1    HYDROcodone-acetaminophen (NORCO) 5-325 mg per tablet, Take 1 tablet by mouth every 6 (six) hours as needed for pain for up to 10 doses Max Daily Amount: 4 tablets, Disp: 10 tablet, Rfl: 0    indapamide (LOZOL) 2 5 mg tablet, Take 2 5 mg by mouth every morning, Disp: , Rfl:     insulin aspart (NovoLOG) 100 units/mL injection, Inject under the skin 3 (three) times a day before meals, Disp: , Rfl:     insulin detemir (LEVEMIR) 100 units/mL subcutaneous injection, Inject 60 Units under the skin daily at bedtime  , Disp: , Rfl:     Magnesium 250 MG TABS, Take 1 each by mouth daily, Disp: , Rfl:     methocarbamol (ROBAXIN) 500 mg tablet, Take 750 mg by mouth 2 (two) times a day  , Disp: , Rfl:     metoprolol succinate (TOPROL-XL) 50 mg 24 hr tablet, Take 50 mg by mouth daily, Disp: , Rfl:     Multiple Vitamin (MULTIVITAMIN) tablet, Take 1 tablet by mouth daily, Disp: , Rfl:     Multiple Vitamins-Minerals (ICAPS AREDS 2 PO), Take 2 capsules by mouth daily, Disp: , Rfl:     polyethylene glycol-propylene glycol (SYSTANE) 0 4-0 3 %, every 3 (three) hours as needed, Disp: , Rfl:     RABEprazole (ACIPHEX) 20 MG tablet, Take 20 mg by mouth daily, Disp: , Rfl:     tolterodine (DETROL) 1 mg tablet, Take 1 mg by mouth daily at bedtime, Disp: , Rfl:     enoxaparin (LOVENOX) 100 mg/mL, Inject 1 mL under the skin every 24 hours (Patient not taking: Reported on 11/1/2018 ), Disp: 10 mL, Rfl: 0    gabapentin (NEURONTIN) 100 mg capsule, Take 2 capsules at bedtime daily, Disp: 180 capsule, Rfl: 0    lidocaine (LIDODERM) 5 %, Lidoderm 5 % topical patch, Disp: , Rfl:     warfarin (COUMADIN) 2 5 mg tablet, Take 3 75 mg by mouth daily 5mg on Tuesday, Disp: , Rfl:     [place] Sleepiness Scale  Sitting and reading: Slight chance of dozing  Watching TV: Moderate chance of dozing  Sitting, inactive in a public place (e g  a theatre or a meeting): Would never doze  As a passenger in a car for an hour without a break: Would never doze  Lying down to rest in the afternoon when circumstances permit: High chance of dozing  Sitting and talking to someone: Would never doze  Sitting quietly after a lunch without alcohol: Would never doze  In a car, while stopped for a few minutes in traffic: Would never doze  Total score: 6              Vitals:    11/01/18 1100   BP: 124/78   Pulse: 88   SpO2: 95%   Weight: 111 kg (244 lb 3 2 oz)   Height: 5' 6" (1 676 m)       Body mass index is 39 41 kg/m²  EPWORTH SLEEPINESS SCORE  Total score: 6      Past History Since Last Sleep Center Visit:     She reports that her symptoms have improved significantly on gabapentin  There was some confusion with her last script  She was supposed to be taking to 300 mg tablets and to 100 mg capsules before bedtime    Her prescription was called in for 100 mg capsules to be taken 4 times daily  She has since run out of 300 mg tablets and has been using 100 mg capsules to make up the difference  She continues to take 200 mg of magnesium and 400 mg of calcium citrate on a daily basis  She also uses hydrocodone/acetaminophen (5-325) at bedtime for back pain  She complains today that she has difficulty falling asleep at times  She feels as though she must reposition herself to become comfortable  His most likely this is secondary to her back pain  She denies all symptoms of restless legs syndrome or periodic limb movements during these times  The review of systems and following portions of the patient's history were reviewed and updated as appropriate: allergies, current medications, past family history, past medical history, past social history, past surgical history, and problem list     Review of Systems        Genitourinary none   Cardiology ankle/leg swelling   Gastrointestinal none   Neurology numbness/tingling of an extremity and balance problems   Constitutional claustrophobia and fatigue   Integumentary none   Psychiatry none   Musculoskeletal muscle aches, back pain and leg cramps   Pulmonary difficulty breathing when lying flat    ENT none   Endocrine frequent urination   Hematological none          OBJECTIVE    Physical Exam:     General Appearance:   Alert, cooperative, no distress, appears stated age, very obese     Head:   Normocephalic, without obvious abnormality, atraumatic     Eyes:   PERRL, conjunctiva/corneas clear, EOM's intact          Nose:  Nares normal, septum midline, no drainage or sinus tenderness           Throat:  Lips, teeth and gums normal; tongue normal size and  shape and midline       Neck:  Supple, symmetrical, trachea midline, no adenopathy;  Thyroid: No enlargement, tenderness or nodules; no carotid bruit or JVD     Lungs:      Clear to auscultation bilaterally, respirations unlabored     Heart:   Regular rate and rhythm, S1 and S2 normal, no murmur, rub or gallop       Extremities:  Extremities normal, atraumatic, no cyanosis or edema     Pulses:  2+ and symmetric all extremities     Skin:  Skin color, texture, turgor normal, no rashes, there is a large area of ecchymosis on the dorsal aspect of her right forearm  She states that she was accidentally struck by a heavy door several days ago  Neurologic:  CNII-XII intact  Normal strength, sensation throughout       ASSESSMENT / PLAN    1  RLS (restless legs syndrome)  gabapentin (NEURONTIN) 100 mg capsule    gabapentin (NEURONTIN) 300 mg capsule           Counseling / Coordination of Care  Total clinic time spent today 25 minutes  Greater than 50% of total time was spent with the patient and / or family counseling and / or coordination of care  A description of the counseling / coordination of care:     Impressions, Prognosis, Instructions for management, Risks and benefits of treatment and Importance of compliance with treatment    The following instructions have been given to the patient today:    There are no Patient Instructions on file for this visit        Sánchez Harris

## 2018-11-01 NOTE — PATIENT INSTRUCTIONS
1   Continue gabapentin at 800 mg nightly  2  Supply prescription for 300 mg capsules and take two before bedtime  3  Continue to take two 100 mg capsules of gabapentin at bedtime  4  Try using 4-6 oz of tonic water nightly for control of leg cramps  5    Try adding 1/2 of a hydrocodone/acetaminophen tablet at bedtime to help control pain as needed

## 2018-11-01 NOTE — PROGRESS NOTES
Review of Systems      Genitourinary none   Cardiology ankle/leg swelling   Gastrointestinal none   Neurology numbness/tingling of an extremity and balance problems   Constitutional claustrophobia and fatigue   Integumentary none   Psychiatry none   Musculoskeletal muscle aches, back pain and leg cramps   Pulmonary difficulty breathing when lying flat    ENT none   Endocrine frequent urination   Hematological none

## 2018-11-02 PROCEDURE — 93880 EXTRACRANIAL BILAT STUDY: CPT | Performed by: SURGERY

## 2018-12-20 PROBLEM — Z79.01 ANTICOAGULATED ON COUMADIN: Status: ACTIVE | Noted: 2018-12-20

## 2018-12-20 ASSESSMENT — ENCOUNTER SYMPTOMS
PSYCHIATRIC NEGATIVE: 1
WEIGHT GAIN: 0
EYES NEGATIVE: 1
HEMATOLOGIC/LYMPHATIC NEGATIVE: 1
GASTROINTESTINAL NEGATIVE: 1
ENDOCRINE NEGATIVE: 1
WEAKNESS: 0

## 2018-12-20 NOTE — PROGRESS NOTES
Electrophysiology  Outpatient Progress Note       Reason for visit:   Chief Complaint   Patient presents with   • Follow Up   • Atrial Fibrillation   • Pacemaker Check       HPI   Ailyn St is a 83 y.o. female who is seen today for follow up of her  implanted biventricular pacemaker indicated for complete heart block during AV node ablation in 2013.  She has chronic atrial fibrillation.  She has a new LV lead along with a new RV lead.  Her previous RV lead had chronically elevated thresholds and had been turned off due to this.  That RV lead is capped.      She is recently been having some increasing shortness of breath.  She is scheduled to undergo further cardiac testing with her local cardiologist.    Past Medical History:   Diagnosis Date   • Abnormal ECG    • Anticoagulated on Coumadin 12/20/2018   • Arthritis    • Fatigue 7/16/2018   • GERD (gastroesophageal reflux disease)    • Hypertension    • Osteoporosis    • S/P AVR 8/8/2018   • SOB (shortness of breath) 12/31/2018   • Type 2 diabetes mellitus (CMS/HCC) (HCC)      Past Surgical History:   Procedure Laterality Date   • ADENOIDECTOMY     • AORTIC VALVE REPLACEMENT  1998   • CHOLECYSTECTOMY     • TONSILLECTOMY     • TOTAL ABDOMINAL HYSTERECTOMY W/ BILATERAL SALPINGOOPHORECTOMY         Social History   Substance Use Topics   • Smoking status: Former Smoker     Types: Cigarettes     Quit date: 1966   • Smokeless tobacco: Never Used      Comment: [employer] in 1966   • Alcohol use Yes      Comment: rarely     History reviewed. No pertinent family history.    ALLERGY:  Cephalosporins; Penicillins; Colace [docusate sodium]; Hydrochlorothiazide; Losartan potassium; Metformin; Requip [ropinirole]; Sulfa (sulfonamide antibiotics); Valsartan; and Latex    Current Outpatient Prescriptions   Medication Sig Dispense Refill   • allopurinol (ZYLOPRIM) 300 mg tablet Take 300 mg by mouth 2 (two) times a day.     • B-complex with vitamin C tablet Take 1 tablet by mouth  daily.     • bimatoprost (LUMIGAN) 0.01 % ophthalmic drops 1 drop nightly.     • calcium carbonate-vitamin D3 250-125 mg-unit tablet Take by mouth daily.     • cholecalciferol, vitamin D3, 1,000 unit tablet Take 1,000 Units by mouth daily.     • ferrous sulfate 325 mg (65 mg iron) EC tablet Take 325 mg by mouth daily with breakfast.     • gabapentin (NEURONTIN) 600 mg tablet Take 600 mg by mouth nightly.       • HYDROcodone-acetaminophen (NORCO) 5-325 mg per tablet Take 1 tablet by mouth every 6 (six) hours as needed for moderate pain (taken at night).     • indapamide (LOZOL) 2.5 mg tablet Take 2.5 mg by mouth daily.     • insulin aspart U-100 (NovoLOG) 100 unit/mL subcutaneous pen Inject under the skin 3 (three) times a day before meals. Sliding scale as follows:  160-190 give 3 units, 191-210 give 5 units, 211-240 give 7 units, 241-270 give 9 units, greater than 271 give 10 units and recheck sugars in 2-3 hours     • insulin detemir U-100 (LEVEMIR) 100 unit/mL (3 mL) subcutaneous pen Inject 60 Units under the skin nightly. Patient took 30 units last evening     • methocarbamol (ROBAXIN) 750 mg tablet Take 750 mg by mouth 2 (two) times a day.     • metoprolol succinate XL (TOPROL-XL) 50 mg 24 hr tablet Take 50 mg by mouth daily.     • RABEprazole (ACIPHEX) 20 mg EC tablet Take 20 mg by mouth daily.     • tolterodine (DETROL) 2 mg tablet Take 2 mg by mouth nightly.     • vit C/E/Zn/coppr/lutein/zeaxan (PRESERVISION AREDS-2 ORAL) Take by mouth.     • warfarin (COUMADIN) 3 mg tablet Take 3.75 mg by mouth once daily. Taken for 6 days then 5 mg on Friday.  Held X 3 days per order.     • pantoprazole (PROTONIX) 40 mg EC tablet Take 40 mg by mouth. Normally takes aciphex 30 mg daily.       No current facility-administered medications for this visit.        Review of Systems   Constitution: Negative for weakness, malaise/fatigue and weight gain.   HENT: Negative.    Eyes: Negative.    Cardiovascular: Positive for dyspnea  on exertion.   Respiratory: Positive for shortness of breath.    Endocrine: Negative.    Hematologic/Lymphatic: Negative.    Skin: Negative.    Musculoskeletal: Negative for muscle weakness.   Gastrointestinal: Negative.    Genitourinary: Negative.    Psychiatric/Behavioral: Negative.        Objective   Vitals:    12/27/18 1505   BP: 130/80   Pulse: 83   SpO2: 98%       Physical Exam   Constitutional: She is oriented to person, place, and time. She appears well-nourished.   HENT:   Head: Normocephalic.   Eyes: Conjunctivae and EOM are normal. Pupils are equal, round, and reactive to light.   Neck: Normal range of motion.   Cardiovascular: Normal rate, regular rhythm and normal heart sounds.    Pulmonary/Chest: Effort normal and breath sounds normal.   Abdominal: Soft. Bowel sounds are normal. She exhibits no distension.   Musculoskeletal: Normal range of motion. She exhibits no edema.   Neurological: She is alert and oriented to person, place, and time.   Skin: Skin is warm and dry.   Incision well-healed.    Psychiatric: Her mood appears not anxious. Her affect is not blunt.       Lab Results   Component Value Date    WBC 6.87 06/01/2018    HGB 10.8 (L) 06/01/2018     06/01/2018     06/01/2018    K 3.7 06/01/2018    CREATININE 1.1 06/01/2018    INR 1.4 06/01/2018     Cardiovascular Procedures:    ECHO/MUGA:  Echo (Scanned) - 9/7/2012     ELECTROPHYSIOLOGY:  Devices (Bi-Ventricular PPM (Biotronik-Evia), Evia Dr-T serial# [id number] [id number]. DDDR 80-130ppm. Normal function. Changes made=sensor rate and gain. Base rate decreased to 75ppm.) - 10/30/2013   RFA (Indication A Fib, AVN ablation with pacemaker implantation) - 10/22/2013     STRESS TESTS:  Unruly MPI (Scanned) - 10/24/2011   ETT (Lexiscan. Had SOB and lightheadedness during recoevry, otherwise normal.) - 1/22/2016    CT SURGERY-   AVR 1998    Assessment   Problem List Items Addressed This Visit        Respiratory    SOB (shortness of breath)      Adjustments to the rate responsive settings of her biventricular pacemaker did improve her SOB slightly in the past. But she continues with moderate symptoms. She will undergo a cardiac catheterization at [facility] for ongoing symptoms             Circulatory    CHB (complete heart block) (CMS/HCC) (HCC) - Primary     CHB following her AV node ablation for uncontrolled symptomatic atrial fibrillation. Normal functioning biventricular pacemaker in place.          Complication of Procedure: Insert single lead to existing PPM/ICD (05/31/2018)    Complication of Admission: Admission (02/19/2018)    Chronic atrial fibrillation (CMS/HCC) (HCC)     Asymptomatic since AV node ablation. Anticoagulated on Coumadin.             Hematologic    Anticoagulated on Coumadin       Other    Biventricular pacemaker check     She underwent implantation of a Biotronik biventricular pacemaker in May 2018.  Her previous RV lead was malfunctioning therefore a new RV lead was implanted along with the new LV lead.  Her previous RV lead is capped.  Biotronik Entrinsa implanted May 31, 2018.  LV threshold 0.9 V at 0.4 ms and lead impedance 1120 ohms.  Parameters VVIR 75 bpm.  BiV pacing is 100%.  Underlying rhythm is atrial fibrillation with complete heart block.           S/P AVR     S/p AVR 1998. Most recent echocardiogram shows stable valve. She plans to undergo a cardiac catheterization, both right and left at [facility].                     Simon Mcknight MD  1/1/2019

## 2018-12-27 ENCOUNTER — OFFICE VISIT (OUTPATIENT)
Dept: CARDIOLOGY | Facility: CLINIC | Age: 83
End: 2018-12-27
Payer: MEDICARE

## 2018-12-27 VITALS
BODY MASS INDEX: 39.37 KG/M2 | HEIGHT: 66 IN | WEIGHT: 245 LBS | OXYGEN SATURATION: 98 % | SYSTOLIC BLOOD PRESSURE: 130 MMHG | HEART RATE: 83 BPM | DIASTOLIC BLOOD PRESSURE: 80 MMHG

## 2018-12-27 DIAGNOSIS — Z95.2 S/P AVR: ICD-10-CM

## 2018-12-27 DIAGNOSIS — I48.20 CHRONIC ATRIAL FIBRILLATION (CMS/HCC): ICD-10-CM

## 2018-12-27 DIAGNOSIS — R06.02 SOB (SHORTNESS OF BREATH): ICD-10-CM

## 2018-12-27 DIAGNOSIS — I44.2 CHB (COMPLETE HEART BLOCK) (CMS/HCC): Primary | ICD-10-CM

## 2018-12-27 DIAGNOSIS — Z45.018 BIVENTRICULAR PACEMAKER CHECK: ICD-10-CM

## 2018-12-27 DIAGNOSIS — Z79.01 ANTICOAGULATED ON COUMADIN: ICD-10-CM

## 2018-12-27 PROCEDURE — 99214 OFFICE O/P EST MOD 30 MIN: CPT | Performed by: INTERNAL MEDICINE

## 2018-12-27 RX ORDER — RABEPRAZOLE SODIUM 20 MG/1
20 TABLET, DELAYED RELEASE ORAL DAILY
COMMUNITY

## 2018-12-27 NOTE — LETTER
January 1, 2019     Damien Perdue Jr., MD  [address]  [facility] [address]    Patient: Ailyn St   YOB: 1935   Date of Visit: 12/27/2018       Dear Dr. Perdue:    Thank you for referring Ailyn St to me for evaluation. Below are my notes for this consultation.    If you have questions, please do not hesitate to call me. I look forward to following your patient along with you.         Sincerely,        Simon Mcknight MD        CC: No Recipients  Simon Mcknight MD  1/1/2019  3:53 PM  Signed       Electrophysiology  Outpatient Progress Note       Reason for visit:   Chief Complaint   Patient presents with   • Follow Up   • Atrial Fibrillation   • Pacemaker Check       HPI   Ailyn St is a 83 y.o. female who is seen today for follow up of her  implanted biventricular pacemaker indicated for complete heart block during AV node ablation in 2013.  She has chronic atrial fibrillation.  She has a new LV lead along with a new RV lead.  Her previous RV lead had chronically elevated thresholds and had been turned off due to this.  That RV lead is capped.      She is recently been having some increasing shortness of breath.  She is scheduled to undergo further cardiac testing with her local cardiologist.    Past Medical History:   Diagnosis Date   • Abnormal ECG    • Anticoagulated on Coumadin 12/20/2018   • Arthritis    • Fatigue 7/16/2018   • GERD (gastroesophageal reflux disease)    • Hypertension    • Osteoporosis    • S/P AVR 8/8/2018   • SOB (shortness of breath) 12/31/2018   • Type 2 diabetes mellitus (CMS/HCC) (HCC)      Past Surgical History:   Procedure Laterality Date   • ADENOIDECTOMY     • AORTIC VALVE REPLACEMENT  1998   • CHOLECYSTECTOMY     • TONSILLECTOMY     • TOTAL ABDOMINAL HYSTERECTOMY W/ BILATERAL SALPINGOOPHORECTOMY         Social History   Substance Use Topics   • Smoking status: Former Smoker     Types: Cigarettes     Quit date: 1966   • Smokeless tobacco: Never  Used      Comment: [employer] in 1966   • Alcohol use Yes      Comment: rarely     History reviewed. No pertinent family history.    ALLERGY:  Cephalosporins; Penicillins; Colace [docusate sodium]; Hydrochlorothiazide; Losartan potassium; Metformin; Requip [ropinirole]; Sulfa (sulfonamide antibiotics); Valsartan; and Latex    Current Outpatient Prescriptions   Medication Sig Dispense Refill   • allopurinol (ZYLOPRIM) 300 mg tablet Take 300 mg by mouth 2 (two) times a day.     • B-complex with vitamin C tablet Take 1 tablet by mouth daily.     • bimatoprost (LUMIGAN) 0.01 % ophthalmic drops 1 drop nightly.     • calcium carbonate-vitamin D3 250-125 mg-unit tablet Take by mouth daily.     • cholecalciferol, vitamin D3, 1,000 unit tablet Take 1,000 Units by mouth daily.     • ferrous sulfate 325 mg (65 mg iron) EC tablet Take 325 mg by mouth daily with breakfast.     • gabapentin (NEURONTIN) 600 mg tablet Take 600 mg by mouth nightly.       • HYDROcodone-acetaminophen (NORCO) 5-325 mg per tablet Take 1 tablet by mouth every 6 (six) hours as needed for moderate pain (taken at night).     • indapamide (LOZOL) 2.5 mg tablet Take 2.5 mg by mouth daily.     • insulin aspart U-100 (NovoLOG) 100 unit/mL subcutaneous pen Inject under the skin 3 (three) times a day before meals. Sliding scale as follows:  160-190 give 3 units, 191-210 give 5 units, 211-240 give 7 units, 241-270 give 9 units, greater than 271 give 10 units and recheck sugars in 2-3 hours     • insulin detemir U-100 (LEVEMIR) 100 unit/mL (3 mL) subcutaneous pen Inject 60 Units under the skin nightly. Patient took 30 units last evening     • methocarbamol (ROBAXIN) 750 mg tablet Take 750 mg by mouth 2 (two) times a day.     • metoprolol succinate XL (TOPROL-XL) 50 mg 24 hr tablet Take 50 mg by mouth daily.     • RABEprazole (ACIPHEX) 20 mg EC tablet Take 20 mg by mouth daily.     • tolterodine (DETROL) 2 mg tablet Take 2 mg by mouth nightly.     • vit  C/E/Zn/coppr/lutein/zeaxan (PRESERVISION AREDS-2 ORAL) Take by mouth.     • warfarin (COUMADIN) 3 mg tablet Take 3.75 mg by mouth once daily. Taken for 6 days then 5 mg on Friday.  Held X 3 days per order.     • pantoprazole (PROTONIX) 40 mg EC tablet Take 40 mg by mouth. Normally takes aciphex 30 mg daily.       No current facility-administered medications for this visit.        Review of Systems   Constitution: Negative for weakness, malaise/fatigue and weight gain.   HENT: Negative.    Eyes: Negative.    Cardiovascular: Positive for dyspnea on exertion.   Respiratory: Positive for shortness of breath.    Endocrine: Negative.    Hematologic/Lymphatic: Negative.    Skin: Negative.    Musculoskeletal: Negative for muscle weakness.   Gastrointestinal: Negative.    Genitourinary: Negative.    Psychiatric/Behavioral: Negative.        Objective   Vitals:    12/27/18 1505   BP: 130/80   Pulse: 83   SpO2: 98%       Physical Exam   Constitutional: She is oriented to person, place, and time. She appears well-nourished.   HENT:   Head: Normocephalic.   Eyes: Conjunctivae and EOM are normal. Pupils are equal, round, and reactive to light.   Neck: Normal range of motion.   Cardiovascular: Normal rate, regular rhythm and normal heart sounds.    Pulmonary/Chest: Effort normal and breath sounds normal.   Abdominal: Soft. Bowel sounds are normal. She exhibits no distension.   Musculoskeletal: Normal range of motion. She exhibits no edema.   Neurological: She is alert and oriented to person, place, and time.   Skin: Skin is warm and dry.   Incision well-healed.    Psychiatric: Her mood appears not anxious. Her affect is not blunt.       Lab Results   Component Value Date    WBC 6.87 06/01/2018    HGB 10.8 (L) 06/01/2018     06/01/2018     06/01/2018    K 3.7 06/01/2018    CREATININE 1.1 06/01/2018    INR 1.4 06/01/2018     Cardiovascular Procedures:    ECHO/MUGA:  Echo (Scanned) - 9/7/2012      ELECTROPHYSIOLOGY:  Devices (Bi-Ventricular PPM (Biotronik-Evia), Evia Dr-T serial# [id number] [id number]. DDDR 80-130ppm. Normal function. Changes made=sensor rate and gain. Base rate decreased to 75ppm.) - 10/30/2013   RFA (Indication A Fib, AVN ablation with pacemaker implantation) - 10/22/2013     STRESS TESTS:  Unruly MPI (Scanned) - 10/24/2011   ETT (Lexiscan. Had SOB and lightheadedness during recoevry, otherwise normal.) - 1/22/2016    CT SURGERY-   AVR 1998    Assessment   Problem List Items Addressed This Visit        Respiratory    SOB (shortness of breath)     Adjustments to the rate responsive settings of her biventricular pacemaker did improve her SOB slightly in the past. But she continues with moderate symptoms. She will undergo a cardiac catheterization at [facility] for ongoing symptoms             Circulatory    CHB (complete heart block) (CMS/HCC) (HCC) - Primary     CHB following her AV node ablation for uncontrolled symptomatic atrial fibrillation. Normal functioning biventricular pacemaker in place.          Complication of Procedure: Insert single lead to existing PPM/ICD (05/31/2018)    Complication of Admission: Admission (02/19/2018)    Chronic atrial fibrillation (CMS/HCC) (HCC)     Asymptomatic since AV node ablation. Anticoagulated on Coumadin.             Hematologic    Anticoagulated on Coumadin       Other    Biventricular pacemaker check     She underwent implantation of a Biotronik biventricular pacemaker in May 2018.  Her previous RV lead was malfunctioning therefore a new RV lead was implanted along with the new LV lead.  Her previous RV lead is capped.  Biotronik Entrinsa implanted May 31, 2018.  LV threshold 0.9 V at 0.4 ms and lead impedance 1120 ohms.  Parameters VVIR 75 bpm.  BiV pacing is 100%.  Underlying rhythm is atrial fibrillation with complete heart block.           S/P AVR     S/p AVR 1998. Most recent echocardiogram shows stable valve. She plans to undergo a  cardiac catheterization, both right and left at [facility].                     Simon Mcknight MD  1/1/2019

## 2018-12-31 PROBLEM — R06.02 SOB (SHORTNESS OF BREATH): Status: ACTIVE | Noted: 2018-12-31

## 2018-12-31 NOTE — ASSESSMENT & PLAN NOTE
Adjustments to the rate responsive settings of her biventricular pacemaker did improve her SOB slightly in the past. But she continues with moderate symptoms. She will undergo a cardiac catheterization at [facility] for ongoing symptoms

## 2018-12-31 NOTE — ASSESSMENT & PLAN NOTE
S/p AVR 1998. Most recent echocardiogram shows stable valve. She plans to undergo a cardiac catheterization, both right and left at [facility].

## 2018-12-31 NOTE — ASSESSMENT & PLAN NOTE
CHB following her AV node ablation for uncontrolled symptomatic atrial fibrillation. Normal functioning biventricular pacemaker in place.

## 2018-12-31 NOTE — ASSESSMENT & PLAN NOTE
She underwent implantation of a Biotronik biventricular pacemaker in May 2018.  Her previous RV lead was malfunctioning therefore a new RV lead was implanted along with the new LV lead.  Her previous RV lead is capped.  Biotronik Entrinsa implanted May 31, 2018.  LV threshold 0.9 V at 0.4 ms and lead impedance 1120 ohms.  Parameters VVIR 75 bpm.  BiV pacing is 100%.  Underlying rhythm is atrial fibrillation with complete heart block.

## 2019-01-01 ASSESSMENT — ENCOUNTER SYMPTOMS
DYSPNEA ON EXERTION: 1
SHORTNESS OF BREATH: 1

## 2019-01-10 ENCOUNTER — OFFICE VISIT (OUTPATIENT)
Dept: CARDIOLOGY CLINIC | Facility: CLINIC | Age: 84
End: 2019-01-10
Payer: MEDICARE

## 2019-01-10 VITALS
BODY MASS INDEX: 39.31 KG/M2 | WEIGHT: 244.6 LBS | DIASTOLIC BLOOD PRESSURE: 78 MMHG | HEART RATE: 86 BPM | OXYGEN SATURATION: 97 % | HEIGHT: 66 IN | SYSTOLIC BLOOD PRESSURE: 128 MMHG

## 2019-01-10 DIAGNOSIS — E66.01 CLASS 2 SEVERE OBESITY DUE TO EXCESS CALORIES WITH SERIOUS COMORBIDITY AND BODY MASS INDEX (BMI) OF 39.0 TO 39.9 IN ADULT (HCC): ICD-10-CM

## 2019-01-10 DIAGNOSIS — I50.32 CHRONIC DIASTOLIC CONGESTIVE HEART FAILURE (HCC): ICD-10-CM

## 2019-01-10 DIAGNOSIS — I10 ESSENTIAL HYPERTENSION: Chronic | ICD-10-CM

## 2019-01-10 DIAGNOSIS — I51.89 DIASTOLIC DYSFUNCTION: ICD-10-CM

## 2019-01-10 DIAGNOSIS — I48.20 CHRONIC ATRIAL FIBRILLATION (HCC): Primary | Chronic | ICD-10-CM

## 2019-01-10 DIAGNOSIS — I27.21 PULMONARY ARTERY HYPERTENSION (HCC): ICD-10-CM

## 2019-01-10 DIAGNOSIS — I51.7 CARDIOMEGALY: ICD-10-CM

## 2019-01-10 DIAGNOSIS — Z95.2 MECHANICAL HEART VALVE PRESENT: Chronic | ICD-10-CM

## 2019-01-10 PROCEDURE — 99215 OFFICE O/P EST HI 40 MIN: CPT | Performed by: INTERNAL MEDICINE

## 2019-01-11 LAB — HBA1C MFR BLD HPLC: 9.4 %

## 2019-01-16 ENCOUNTER — TELEPHONE (OUTPATIENT)
Dept: CARDIOLOGY CLINIC | Facility: CLINIC | Age: 84
End: 2019-01-16

## 2019-01-16 NOTE — TELEPHONE ENCOUNTER
Received call from Sarai Quinonez at the [facility] regarding pt LHC which is scheduled for 1/23/2019 with you  Pt aware to  hold warfarin 3 days prior  per Dr Juanito Opitz' office pt will check an INR 1/22 and if below 2 5 pt will inject one dose of lovenox that morning  But will not take any other dosages prior to procedure but may need to bridge post        I just wanted to make you aware of this

## 2019-01-21 PROBLEM — I50.32 CHRONIC DIASTOLIC CONGESTIVE HEART FAILURE (HCC): Status: ACTIVE | Noted: 2019-01-21

## 2019-01-21 NOTE — PROGRESS NOTES
Cardiology Follow Up    Radha Campbell  1935  [id number]  [address]  [address] [address] [phone]    1  Chronic atrial fibrillation ([address] )     2  Chronic diastolic congestive heart failure (HCC)     3  Cardiomegaly     4  Diastolic dysfunction     5  Essential hypertension     6  Pulmonary artery hypertension ([address] )     7  Mechanical heart valve present     8  Class 2 severe obesity due to excess calories with serious comorbidity and body mass index (BMI) of 39 0 to 39 9 in adult [facility])          History:  64-year-old female with a history of a aortic valve replacement Saint Ra mechanical in 1998, chronic atrial fibrillation with an unsuccessful ablation in 2013 AV kian ablation and pacemaker placement  In 5th May 2018 the pacer generator was replaced and a new CRT Biotronik pulse generator was placed with new RV and LV leads  The previous RV lead was capped as was the are a lead secondary to the chronic atrial fibrillation  She was programmed in a VVI R mode  She has been complaining of progressive dyspnea on exertion for the past year  She states she gets short of breath when walking a few steps or on an upgrade and needs to rest   She also has an increase in fatigue  She denies chest pain orthopnea paroxysmal nocturnal dyspnea or syncope  She does have intermittent lower extremity edema      Patient Active Problem List   Diagnosis    Hypertension    Chronic kidney disease, stage 3 (HCC)    Gout    Mechanical heart valve present    Chronic atrial fibrillation (HCC)    Gastritis    Anticoagulated    Acute cystitis without hematuria    Benign hypertensive CKD    Cardiomegaly    Diastolic dysfunction    DJD (degenerative joint disease), multiple sites    Esophagitis, reflux    Fibromyalgia    Fibrous breast lumps    H/O mechanical aortic valve replacement    Hyperuricemia without signs inflammatory arthritis/tophaceous disease    MCI (mild cognitive impairment)    Class 2 severe obesity due to excess calories with serious comorbidity and body mass index (BMI) of 39 0 to 39 9 in adult [facility])    Osteoarthritis    Primary osteoarthritis involving multiple joints    Proteinuria    Pulmonary artery hypertension (HCC)    RLS (restless legs syndrome)    Secondary hyperparathyroidism, renal (HCC)    Increased frequency of urination    Lump of right breast    Leg cramps    Chronic diastolic congestive heart failure (HCC)     Past Medical History:   Diagnosis Date    Chronic atrial fibrillation ([facility] ) 9/13/2017    Chronic gout 9/13/2017    Chronic rhinitis 11/08/2018    CKD (chronic kidney disease) stage 3, GFR 30-59 ml/min ([facility] ) 9/13/2017    Colon polyp 9/13/2017    Coronary artery disease     Diabetes mellitus ([facility] )     COMBS (dyspnea on exertion) 11/02/2018    Essential hypertension 9/13/2017    Gastritis 9/13/2017    Hypertension     Irregular heart beat     Mechanical heart valve present 9/13/2017    Osteoarthritis     Pacemaker     S/P AVR     Spinal stenosis     disk herniation      Social History     Social History    Marital status:      Spouse name: N/A    Number of children: N/A    Years of education: N/A     Occupational History    Not on file       Social History Main Topics    Smoking status: Former Smoker    Smokeless tobacco: Never Used      Comment: quit more than 40 yr    Alcohol use No    Drug use: No    Sexual activity: Not on file     Other Topics Concern    Not on file     Social History Narrative    No narrative on file      Family History   Problem Relation Age of Onset    Diabetes Mother     Hypertension Mother     Heart disease Father     Emphysema Father     Asthma Sister     Cancer Sister     Hypertension Sister      Past Surgical History:   Procedure Laterality Date    AORTIC VALVE REPLACEMENT      CARDIAC SURGERY      CATARACT EXTRACTION      CHOLECYSTECTOMY      COLONOSCOPY      ESOPHAGOGASTRODUODENOSCOPY      HERNIA REPAIR      x3    HYSTERECTOMY      INSERT / REPLACE / REMOVE PACEMAKER      JOINT REPLACEMENT Bilateral     knees    [place] COLONOSCOPY FLX DX W/COLLJ SPEC WHEN PFRMD N/A 9/12/2017    Procedure: EGDwith bxAND COLONOSCOPY with polypectomies;  Surgeon: Derek Ramirez MD;  [facility]; Service: Gastroenterology    MN SIGMOIDOSCOPY FLX DX W/COLLJ SPEC BR/WA IF PFRMD N/A 9/13/2017    Procedure: colonoscopy with hemo clip x 2;  Surgeon: Derek Ramirez MD;  [facility]: [facility];   Service: Gastroenterology    ROTATOR CUFF REPAIR Left     SKIN BIOPSY      TONSILLECTOMY         Current Outpatient Prescriptions:     allopurinol (ZYLOPRIM) 100 mg tablet, Take 100 mg by mouth 2 (two) times a day  , Disp: , Rfl:     aspirin (ECOTRIN LOW STRENGTH) 81 mg EC tablet, Take 81 mg by mouth daily, Disp: , Rfl:     bimatoprost (LUMIGAN) 0 01 % ophthalmic drops, Administer 1 drop to both eyes daily at bedtime, Disp: , Rfl:     calcium citrate-vitamin D (CITRACAL+D) 315-200 MG-UNIT per tablet, Take 1 tablet by mouth 2 (two) times a day, Disp: , Rfl:     cholecalciferol (VITAMIN D3) 1,000 units tablet, Take 1,000 Units by mouth daily, Disp: , Rfl:     ferrous sulfate 325 (65 Fe) mg tablet, Take 325 mg by mouth daily with breakfast, Disp: , Rfl:     gabapentin (NEURONTIN) 300 mg capsule, Take 2 capsules (600 mg total) by mouth daily at bedtime, Disp: 180 capsule, Rfl: 1    HYDROcodone-acetaminophen (NORCO) 5-325 mg per tablet, Take 1 tablet by mouth every 6 (six) hours as needed for pain for up to 10 doses Max Daily Amount: 4 tablets, Disp: 10 tablet, Rfl: 0    indapamide (LOZOL) 2 5 mg tablet, Take 2 5 mg by mouth every morning, Disp: , Rfl:     insulin aspart (NovoLOG) 100 units/mL injection, Inject under the skin 3 (three) times a day before meals, Disp: , Rfl:     insulin detemir (LEVEMIR) 100 units/mL subcutaneous injection, Inject 60 Units under the skin daily at bedtime  , Disp: , Rfl:     Magnesium 250 MG TABS, Take 1 each by mouth daily, Disp: , Rfl:     methocarbamol (ROBAXIN) 500 mg tablet, Take 750 mg by mouth 2 (two) times a day  , Disp: , Rfl:     metoprolol succinate (TOPROL-XL) 50 mg 24 hr tablet, Take 50 mg by mouth daily, Disp: , Rfl:     Multiple Vitamin (MULTIVITAMIN) tablet, Take 1 tablet by mouth daily, Disp: , Rfl:     Multiple Vitamins-Minerals (ICAPS AREDS 2 PO), Take 2 capsules by mouth daily, Disp: , Rfl:     polyethylene glycol-propylene glycol (SYSTANE) 0 4-0 3 %, every 3 (three) hours as needed, Disp: , Rfl:     RABEprazole (ACIPHEX) 20 MG tablet, Take 20 mg by mouth daily, Disp: , Rfl:     tolterodine (DETROL) 1 mg tablet, Take 1 mg by mouth daily at bedtime, Disp: , Rfl:     warfarin (COUMADIN) 2 5 mg tablet, Take 3 75 mg by mouth daily 5mg on Tuesday, Disp: , Rfl:   Allergies   Allergen Reactions    Cephalosporins Rash and Anaphylaxis     Anaphylaxis  Other reaction(s): Itching    Acetazolamide GI Intolerance    Allopurinol     Cefazolin Other (See Comments)     rash    Colchicine     Diltiazem     Docusate Other (See Comments)     impaction    Eszopiclone     Febuxostat     Glycerin     Hydrochlorothiazide Other (See Comments) and Headache     Other reaction(s): Other (see comments)  Generalized swelling    Latex      Other reaction(s): Other (See Comments)  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    Metformin      Other reaction(s): GI intolerance    Nortriptyline      Other reaction(s): Other (See Comments)  rash bolivar flexeril      Penicillins      Other reaction(s): Other (See Comments)  RASH-anaphylaxis  Other reaction(s): Anaphylaxis    Povidone Iodine Other (See Comments)     VAGINAL ITCHING    Ropinirole      Back pain    Simvastatin Other (See Comments)     muscle aches    Sulfa Antibiotics      Other reaction(s):  Other (See Comments)  bolivar Lasix, GI upset, rash  Other reaction(s): GI Upset    Valsartan      Other reaction(s): Other (See Comments)  bolivar olmesartan    Fluticasone      Leg cramps       Labs:  No visits with results within 2 Month(s) from this visit  Latest known visit with results is:   Office Visit on 07/19/2018   Component Date Value    Sodium 07/24/2018 138     Potassium 07/24/2018 4 0     Chloride 07/24/2018 102     CO2 07/24/2018 29     ANION GAP 07/24/2018 7     BUN 07/24/2018 27*    Creatinine 07/24/2018 1 26     Glucose, Fasting 07/24/2018 159*    Calcium 07/24/2018 8 9     AST 07/24/2018 26     ALT 07/24/2018 27     Alkaline Phosphatase 07/24/2018 88     Total Protein 07/24/2018 8 1     Albumin 07/24/2018 3 5     Total Bilirubin 07/24/2018 0 30     eGFR 07/24/2018 40     Magnesium 07/24/2018 1 8     Phosphorus 07/24/2018 3 1     Creatinine, Ur 07/24/2018 111 0     Protein Urine Random 07/24/2018 19     Prot/Creat Ratio, Ur 07/24/2018 0 17*    PTH 07/24/2018 49 1     Clarity, UA 07/24/2018 Clear     Color, UA 07/24/2018 Yellow     Specific Gravity, UA 07/24/2018 1 019     pH, UA 07/24/2018 6 0     Glucose, UA 07/24/2018 Negative     Ketones, UA 07/24/2018 Negative     Blood, UA 07/24/2018 Negative     Protein, UA 07/24/2018 Negative     Nitrite, UA 07/24/2018 Negative     Bilirubin, UA 07/24/2018 Negative     Urobilinogen, UA 07/24/2018 0 2     Leukocytes, UA 07/24/2018 Trace*    WBC, UA 07/24/2018 2-4*    RBC, UA 07/24/2018 None Seen     Hyaline Casts, UA 07/24/2018 None Seen     Bacteria, UA 07/24/2018 Occasional     Epithelial Cells 07/24/2018 Occasional      Imaging: No results found  Review of Systems:  Review of Systems    Physical Exam:  Physical Exam    Discussion/Summary:  81-year-old female with the mechanical aortic valve replacement from 1998 chronic atrial fibrillation with AV kian ablation in 2013 and in 2018 placement of a biventricular pacemaker in VVIR mode    She has progressive dyspnea on exertion  2D echocardiogram performed by her primary cardiologist on 11/09/2018 showed moderate left ventricular hypertrophy  Ejection fraction was preserved at 60% right ventricular was normal in size and function the aortic valve appeared to function normally though I note a peak velocity of 3 8 m/sec reported  This may have been an over estimation  The mitral valve showed moderate mitral regurgitation tricuspid valve moderate to severe tricuspid regurgitation and there was evidence of moderate pulmonary hypertension I did not see the echocardiogram but this is from the report  Twelve lead EKG today shows an atrial fibrillation with a ventricularly paced rhythm at 85  She was referred to me for right left heart catheterization to further delineate her reasons for her dyspnea  I suspect she has chronic atrial fibrillation with C LVH resulting in diastolic heart failure  In addition she has at least moderate mitral regurgitation  She appears to have secondary pulmonary hypertension and moderate to severe tricuspid regurgitation  Right heart catheterization will give her referring cardiologist and pulmonary physicain an adequate assessment of her right heart pressures  I can also assess her coronary anatomy  I reviewed the risks of the procedure in detail as per the consent form she agrees and accepts  Will be scheduled within the next week or 2  Further recommendations will be made based on the findings

## 2019-01-22 ENCOUNTER — TELEPHONE (OUTPATIENT)
Dept: SURGERY | Facility: HOSPITAL | Age: 84
End: 2019-01-22

## 2019-01-22 RX ORDER — ASPIRIN 81 MG/1
324 TABLET, CHEWABLE ORAL ONCE
Status: CANCELLED | OUTPATIENT
Start: 2019-01-22 | End: 2019-01-22

## 2019-01-22 RX ORDER — SODIUM CHLORIDE 9 MG/ML
150 INJECTION, SOLUTION INTRAVENOUS CONTINUOUS
Status: CANCELLED | OUTPATIENT
Start: 2019-01-22

## 2019-01-23 ENCOUNTER — HOSPITAL ENCOUNTER (OUTPATIENT)
Dept: NON INVASIVE DIAGNOSTICS | Facility: HOSPITAL | Age: 84
Discharge: HOME | End: 2019-01-23
Attending: INTERNAL MEDICINE | Admitting: INTERNAL MEDICINE
Payer: MEDICARE

## 2019-01-23 VITALS
WEIGHT: 240 LBS | HEART RATE: 84 BPM | DIASTOLIC BLOOD PRESSURE: 67 MMHG | BODY MASS INDEX: 38.57 KG/M2 | OXYGEN SATURATION: 96 % | SYSTOLIC BLOOD PRESSURE: 140 MMHG | TEMPERATURE: 97.8 F | HEIGHT: 66 IN | RESPIRATION RATE: 16 BRPM

## 2019-01-23 DIAGNOSIS — N18.30 CHRONIC KIDNEY DISEASE, STAGE 3 (HCC): Primary | Chronic | ICD-10-CM

## 2019-01-23 DIAGNOSIS — R06.02 SOB (SHORTNESS OF BREATH): ICD-10-CM

## 2019-01-23 LAB
ANION GAP SERPL CALCULATED.3IONS-SCNC: 6 MMOL/L (ref 4–13)
ATRIAL RATE: 88 BPM
BASOPHILS # BLD AUTO: 0.04 THOUSANDS/ΜL (ref 0–0.1)
BASOPHILS NFR BLD AUTO: 1 % (ref 0–1)
BUN SERPL-MCNC: 36 MG/DL (ref 5–25)
CALCIUM SERPL-MCNC: 8.5 MG/DL (ref 8.3–10.1)
CHLORIDE SERPL-SCNC: 100 MMOL/L (ref 100–108)
CO2 SERPL-SCNC: 28 MMOL/L (ref 21–32)
CREAT SERPL-MCNC: 1.28 MG/DL (ref 0.6–1.3)
EOSINOPHIL # BLD AUTO: 0.14 THOUSAND/ΜL (ref 0–0.61)
EOSINOPHIL NFR BLD AUTO: 2 % (ref 0–6)
ERYTHROCYTE [DISTWIDTH] IN BLOOD BY AUTOMATED COUNT: 15.2 % (ref 11.6–15.1)
GFR SERPL CREATININE-BSD FRML MDRD: 39 ML/MIN/1.73SQ M
GLUCOSE P FAST SERPL-MCNC: 155 MG/DL (ref 65–99)
GLUCOSE SERPL-MCNC: 155 MG/DL (ref 65–140)
HCT VFR BLD AUTO: 39.6 % (ref 34.8–46.1)
HGB BLD-MCNC: 12.7 G/DL (ref 11.5–15.4)
IMM GRANULOCYTES # BLD AUTO: 0.03 THOUSAND/UL (ref 0–0.2)
IMM GRANULOCYTES NFR BLD AUTO: 0 % (ref 0–2)
INR PPP: 1.37 (ref 0.86–1.17)
LYMPHOCYTES # BLD AUTO: 1.18 THOUSANDS/ΜL (ref 0.6–4.47)
LYMPHOCYTES NFR BLD AUTO: 17 % (ref 14–44)
MCH RBC QN AUTO: 29.9 PG (ref 26.8–34.3)
MCHC RBC AUTO-ENTMCNC: 32.1 G/DL (ref 31.4–37.4)
MCV RBC AUTO: 93 FL (ref 82–98)
MONOCYTES # BLD AUTO: 0.49 THOUSAND/ΜL (ref 0.17–1.22)
MONOCYTES NFR BLD AUTO: 7 % (ref 4–12)
NEUTROPHILS # BLD AUTO: 4.89 THOUSANDS/ΜL (ref 1.85–7.62)
NEUTS SEG NFR BLD AUTO: 73 % (ref 43–75)
NRBC BLD AUTO-RTO: 0 /100 WBCS
PLATELET # BLD AUTO: 247 THOUSANDS/UL (ref 149–390)
PMV BLD AUTO: 10.1 FL (ref 8.9–12.7)
POTASSIUM SERPL-SCNC: 3.7 MMOL/L (ref 3.5–5.3)
PROTHROMBIN TIME: 17 SECONDS (ref 11.8–14.2)
QRS AXIS: 248 DEGREES
QRSD INTERVAL: 174 MS
QT INTERVAL: 448 MS
QTC INTERVAL: 533 MS
RBC # BLD AUTO: 4.25 MILLION/UL (ref 3.81–5.12)
SODIUM SERPL-SCNC: 134 MMOL/L (ref 136–145)
T WAVE AXIS: 79 DEGREES
VENTRICULAR RATE: 85 BPM
WBC # BLD AUTO: 6.77 THOUSAND/UL (ref 4.31–10.16)

## 2019-01-23 PROCEDURE — C1894 INTRO/SHEATH, NON-LASER: HCPCS | Performed by: INTERNAL MEDICINE

## 2019-01-23 PROCEDURE — 82810 BLOOD GASES O2 SAT ONLY: CPT | Performed by: INTERNAL MEDICINE

## 2019-01-23 PROCEDURE — 85610 PROTHROMBIN TIME: CPT | Performed by: NURSE PRACTITIONER

## 2019-01-23 PROCEDURE — 85025 COMPLETE CBC W/AUTO DIFF WBC: CPT | Performed by: NURSE PRACTITIONER

## 2019-01-23 PROCEDURE — C1769 GUIDE WIRE: HCPCS | Performed by: INTERNAL MEDICINE

## 2019-01-23 PROCEDURE — 93010 ELECTROCARDIOGRAM REPORT: CPT | Performed by: INTERNAL MEDICINE

## 2019-01-23 PROCEDURE — 93005 ELECTROCARDIOGRAM TRACING: CPT

## 2019-01-23 PROCEDURE — 80048 BASIC METABOLIC PNL TOTAL CA: CPT | Performed by: NURSE PRACTITIONER

## 2019-01-23 PROCEDURE — 99153 MOD SED SAME PHYS/QHP EA: CPT | Performed by: INTERNAL MEDICINE

## 2019-01-23 PROCEDURE — 93456 R HRT CORONARY ARTERY ANGIO: CPT | Performed by: INTERNAL MEDICINE

## 2019-01-23 PROCEDURE — 99152 MOD SED SAME PHYS/QHP 5/>YRS: CPT | Performed by: INTERNAL MEDICINE

## 2019-01-23 RX ORDER — ASPIRIN 81 MG/1
324 TABLET, CHEWABLE ORAL ONCE
Status: COMPLETED | OUTPATIENT
Start: 2019-01-23 | End: 2019-01-23

## 2019-01-23 RX ORDER — SODIUM CHLORIDE 9 MG/ML
150 INJECTION, SOLUTION INTRAVENOUS CONTINUOUS
Status: DISCONTINUED | OUTPATIENT
Start: 2019-01-23 | End: 2019-01-23

## 2019-01-23 RX ORDER — ACETAMINOPHEN 325 MG/1
650 TABLET ORAL EVERY 6 HOURS PRN
Status: DISCONTINUED | OUTPATIENT
Start: 2019-01-23 | End: 2019-01-23 | Stop reason: HOSPADM

## 2019-01-23 RX ORDER — LIDOCAINE HYDROCHLORIDE 10 MG/ML
INJECTION, SOLUTION INFILTRATION; PERINEURAL CODE/TRAUMA/SEDATION MEDICATION
Status: COMPLETED | OUTPATIENT
Start: 2019-01-23 | End: 2019-01-23

## 2019-01-23 RX ORDER — FENTANYL CITRATE 50 UG/ML
INJECTION, SOLUTION INTRAMUSCULAR; INTRAVENOUS CODE/TRAUMA/SEDATION MEDICATION
Status: COMPLETED | OUTPATIENT
Start: 2019-01-23 | End: 2019-01-23

## 2019-01-23 RX ORDER — SODIUM CHLORIDE 9 MG/ML
75 INJECTION, SOLUTION INTRAVENOUS CONTINUOUS
Status: DISPENSED | OUTPATIENT
Start: 2019-01-23 | End: 2019-01-23

## 2019-01-23 RX ORDER — ONDANSETRON 2 MG/ML
4 INJECTION INTRAMUSCULAR; INTRAVENOUS EVERY 8 HOURS PRN
Status: DISCONTINUED | OUTPATIENT
Start: 2019-01-23 | End: 2019-01-23 | Stop reason: HOSPADM

## 2019-01-23 RX ORDER — MIDAZOLAM HYDROCHLORIDE 1 MG/ML
INJECTION INTRAMUSCULAR; INTRAVENOUS CODE/TRAUMA/SEDATION MEDICATION
Status: COMPLETED | OUTPATIENT
Start: 2019-01-23 | End: 2019-01-23

## 2019-01-23 RX ADMIN — ONDANSETRON 4 MG: 2 INJECTION INTRAMUSCULAR; INTRAVENOUS at 14:29

## 2019-01-23 RX ADMIN — IOHEXOL 55 ML: 350 INJECTION, SOLUTION INTRAVENOUS at 12:45

## 2019-01-23 RX ADMIN — SODIUM CHLORIDE 150 ML/HR: 0.9 INJECTION, SOLUTION INTRAVENOUS at 08:14

## 2019-01-23 RX ADMIN — LIDOCAINE HYDROCHLORIDE 8 ML: 10 INJECTION, SOLUTION INFILTRATION; PERINEURAL at 12:13

## 2019-01-23 RX ADMIN — ACETAMINOPHEN 650 MG: 325 TABLET ORAL at 14:41

## 2019-01-23 RX ADMIN — FENTANYL CITRATE 50 MCG: 50 INJECTION, SOLUTION INTRAMUSCULAR; INTRAVENOUS at 11:55

## 2019-01-23 RX ADMIN — LIDOCAINE HYDROCHLORIDE 1 ML: 10 INJECTION, SOLUTION INFILTRATION; PERINEURAL at 12:04

## 2019-01-23 RX ADMIN — MIDAZOLAM 2 MG: 1 INJECTION INTRAMUSCULAR; INTRAVENOUS at 11:55

## 2019-01-23 RX ADMIN — FENTANYL CITRATE 50 MCG: 50 INJECTION, SOLUTION INTRAMUSCULAR; INTRAVENOUS at 12:42

## 2019-01-23 RX ADMIN — ASPIRIN 81 MG 243 MG: 81 TABLET ORAL at 07:39

## 2019-01-23 NOTE — INTERVAL H&P NOTE
Update:     H&P reviewed  After examining the patient, I find no changed to the H&P since it had been written  Patient re-evaluated   Accept as history and physical     Wilder Frey MD/January 23, 2019/11:46 AM

## 2019-01-23 NOTE — H&P (VIEW-ONLY)
Cardiology Follow Up    Kwame Hart  1935  [id number]  [address]  [address] [address] [phone]    1  Chronic atrial fibrillation ([address] )     2  Chronic diastolic congestive heart failure (HCC)     3  Cardiomegaly     4  Diastolic dysfunction     5  Essential hypertension     6  Pulmonary artery hypertension ([facility] )     7  Mechanical heart valve present     8  Class 2 severe obesity due to excess calories with serious comorbidity and body mass index (BMI) of 39 0 to 39 9 in adult [facility])          History:  52-year-old female with a history of a aortic valve replacement Saint Ra mechanical in 1998, chronic atrial fibrillation with an unsuccessful ablation in 2013 AV kian ablation and pacemaker placement  In 5th May 2018 the pacer generator was replaced and a new CRT Biotronik pulse generator was placed with new RV and LV leads  The previous RV lead was capped as was the are a lead secondary to the chronic atrial fibrillation  She was programmed in a VVI R mode  She has been complaining of progressive dyspnea on exertion for the past year  She states she gets short of breath when walking a few steps or on an upgrade and needs to rest   She also has an increase in fatigue  She denies chest pain orthopnea paroxysmal nocturnal dyspnea or syncope  She does have intermittent lower extremity edema      Patient Active Problem List   Diagnosis    Hypertension    Chronic kidney disease, stage 3 (HCC)    Gout    Mechanical heart valve present    Chronic atrial fibrillation (HCC)    Gastritis    Anticoagulated    Acute cystitis without hematuria    Benign hypertensive CKD    Cardiomegaly    Diastolic dysfunction    DJD (degenerative joint disease), multiple sites    Esophagitis, reflux    Fibromyalgia    Fibrous breast lumps    H/O mechanical aortic valve replacement    Hyperuricemia without signs inflammatory arthritis/tophaceous disease    MCI (mild cognitive impairment)    Class 2 severe obesity due to excess calories with serious comorbidity and body mass index (BMI) of 39 0 to 39 9 in adult [facility])    Osteoarthritis    Primary osteoarthritis involving multiple joints    Proteinuria    Pulmonary artery hypertension (HCC)    RLS (restless legs syndrome)    Secondary hyperparathyroidism, renal (HCC)    Increased frequency of urination    Lump of right breast    Leg cramps    Chronic diastolic congestive heart failure (HCC)     Past Medical History:   Diagnosis Date    Chronic atrial fibrillation ([facility] ) 9/13/2017    Chronic gout 9/13/2017    Chronic rhinitis 11/08/2018    CKD (chronic kidney disease) stage 3, GFR 30-59 ml/min ([facility] ) 9/13/2017    Colon polyp 9/13/2017    Coronary artery disease     Diabetes mellitus ([facility] )     COMBS (dyspnea on exertion) 11/02/2018    Essential hypertension 9/13/2017    Gastritis 9/13/2017    Hypertension     Irregular heart beat     Mechanical heart valve present 9/13/2017    Osteoarthritis     Pacemaker     S/P AVR     Spinal stenosis     disk herniation      Social History     Social History    Marital status:      Spouse name: N/A    Number of children: N/A    Years of education: N/A     Occupational History    Not on file       Social History Main Topics    Smoking status: Former Smoker    Smokeless tobacco: Never Used      Comment: quit more than 40 yr    Alcohol use No    Drug use: No    Sexual activity: Not on file     Other Topics Concern    Not on file     Social History Narrative    No narrative on file      Family History   Problem Relation Age of Onset    Diabetes Mother     Hypertension Mother     Heart disease Father     Emphysema Father     Asthma Sister     Cancer Sister     Hypertension Sister      Past Surgical History:   Procedure Laterality Date    AORTIC VALVE REPLACEMENT      CARDIAC SURGERY      CATARACT EXTRACTION      CHOLECYSTECTOMY      COLONOSCOPY      ESOPHAGOGASTRODUODENOSCOPY      HERNIA REPAIR      x3    HYSTERECTOMY      INSERT / REPLACE / REMOVE PACEMAKER      JOINT REPLACEMENT Bilateral     knees    [place] COLONOSCOPY FLX DX W/COLLJ SPEC WHEN PFRMD N/A 9/12/2017    Procedure: EGDwith bxAND COLONOSCOPY with polypectomies;  Surgeon: Sanjana Clifford MD;  Location: [facility]; Service: Gastroenterology    AZ SIGMOIDOSCOPY FLX DX W/COLLJ SPEC BR/WA IF PFRMD N/A 9/13/2017    Procedure: colonoscopy with hemo clip x 2;  Surgeon: Sanjana Clifford MD;  Location: [facility];   Service: Gastroenterology    ROTATOR CUFF REPAIR Left     SKIN BIOPSY      TONSILLECTOMY         Current Outpatient Prescriptions:     allopurinol (ZYLOPRIM) 100 mg tablet, Take 100 mg by mouth 2 (two) times a day  , Disp: , Rfl:     aspirin (ECOTRIN LOW STRENGTH) 81 mg EC tablet, Take 81 mg by mouth daily, Disp: , Rfl:     bimatoprost (LUMIGAN) 0 01 % ophthalmic drops, Administer 1 drop to both eyes daily at bedtime, Disp: , Rfl:     calcium citrate-vitamin D (CITRACAL+D) 315-200 MG-UNIT per tablet, Take 1 tablet by mouth 2 (two) times a day, Disp: , Rfl:     cholecalciferol (VITAMIN D3) 1,000 units tablet, Take 1,000 Units by mouth daily, Disp: , Rfl:     ferrous sulfate 325 (65 Fe) mg tablet, Take 325 mg by mouth daily with breakfast, Disp: , Rfl:     gabapentin (NEURONTIN) 300 mg capsule, Take 2 capsules (600 mg total) by mouth daily at bedtime, Disp: 180 capsule, Rfl: 1    HYDROcodone-acetaminophen (NORCO) 5-325 mg per tablet, Take 1 tablet by mouth every 6 (six) hours as needed for pain for up to 10 doses Max Daily Amount: 4 tablets, Disp: 10 tablet, Rfl: 0    indapamide (LOZOL) 2 5 mg tablet, Take 2 5 mg by mouth every morning, Disp: , Rfl:     insulin aspart (NovoLOG) 100 units/mL injection, Inject under the skin 3 (three) times a day before meals, Disp: , Rfl:     insulin detemir (LEVEMIR) 100 units/mL subcutaneous injection, Inject 60 Units under the skin daily at bedtime  , Disp: , Rfl:     Magnesium 250 MG TABS, Take 1 each by mouth daily, Disp: , Rfl:     methocarbamol (ROBAXIN) 500 mg tablet, Take 750 mg by mouth 2 (two) times a day  , Disp: , Rfl:     metoprolol succinate (TOPROL-XL) 50 mg 24 hr tablet, Take 50 mg by mouth daily, Disp: , Rfl:     Multiple Vitamin (MULTIVITAMIN) tablet, Take 1 tablet by mouth daily, Disp: , Rfl:     Multiple Vitamins-Minerals (ICAPS AREDS 2 PO), Take 2 capsules by mouth daily, Disp: , Rfl:     polyethylene glycol-propylene glycol (SYSTANE) 0 4-0 3 %, every 3 (three) hours as needed, Disp: , Rfl:     RABEprazole (ACIPHEX) 20 MG tablet, Take 20 mg by mouth daily, Disp: , Rfl:     tolterodine (DETROL) 1 mg tablet, Take 1 mg by mouth daily at bedtime, Disp: , Rfl:     warfarin (COUMADIN) 2 5 mg tablet, Take 3 75 mg by mouth daily 5mg on Tuesday, Disp: , Rfl:   Allergies   Allergen Reactions    Cephalosporins Rash and Anaphylaxis     Anaphylaxis  Other reaction(s): Itching    Acetazolamide GI Intolerance    Allopurinol     Cefazolin Other (See Comments)     rash    Colchicine     Diltiazem     Docusate Other (See Comments)     impaction    Eszopiclone     Febuxostat     Glycerin     Hydrochlorothiazide Other (See Comments) and Headache     Other reaction(s): Other (see comments)  Generalized swelling    Latex      Other reaction(s): Other (See Comments)  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    Metformin      Other reaction(s): GI intolerance    Nortriptyline      Other reaction(s): Other (See Comments)  rash bolivar flexeril      Penicillins      Other reaction(s): Other (See Comments)  RASH-anaphylaxis  Other reaction(s): Anaphylaxis    Povidone Iodine Other (See Comments)     VAGINAL ITCHING    Ropinirole      Back pain    Simvastatin Other (See Comments)     muscle aches    Sulfa Antibiotics      Other reaction(s):  Other (See Comments)  bolivar Lasix, GI upset, rash  Other reaction(s): GI Upset    Valsartan      Other reaction(s): Other (See Comments)  bolivar olmesartan    Fluticasone      Leg cramps       Labs:  No visits with results within 2 Month(s) from this visit  Latest known visit with results is:   Office Visit on 07/19/2018   Component Date Value    Sodium 07/24/2018 138     Potassium 07/24/2018 4 0     Chloride 07/24/2018 102     CO2 07/24/2018 29     ANION GAP 07/24/2018 7     BUN 07/24/2018 27*    Creatinine 07/24/2018 1 26     Glucose, Fasting 07/24/2018 159*    Calcium 07/24/2018 8 9     AST 07/24/2018 26     ALT 07/24/2018 27     Alkaline Phosphatase 07/24/2018 88     Total Protein 07/24/2018 8 1     Albumin 07/24/2018 3 5     Total Bilirubin 07/24/2018 0 30     eGFR 07/24/2018 40     Magnesium 07/24/2018 1 8     Phosphorus 07/24/2018 3 1     Creatinine, Ur 07/24/2018 111 0     Protein Urine Random 07/24/2018 19     Prot/Creat Ratio, Ur 07/24/2018 0 17*    PTH 07/24/2018 49 1     Clarity, UA 07/24/2018 Clear     Color, UA 07/24/2018 Yellow     Specific Gravity, UA 07/24/2018 1 019     pH, UA 07/24/2018 6 0     Glucose, UA 07/24/2018 Negative     Ketones, UA 07/24/2018 Negative     Blood, UA 07/24/2018 Negative     Protein, UA 07/24/2018 Negative     Nitrite, UA 07/24/2018 Negative     Bilirubin, UA 07/24/2018 Negative     Urobilinogen, UA 07/24/2018 0 2     Leukocytes, UA 07/24/2018 Trace*    WBC, UA 07/24/2018 2-4*    RBC, UA 07/24/2018 None Seen     Hyaline Casts, UA 07/24/2018 None Seen     Bacteria, UA 07/24/2018 Occasional     Epithelial Cells 07/24/2018 Occasional      Imaging: No results found  Review of Systems:  Review of Systems    Physical Exam:  Physical Exam    Discussion/Summary:  71-year-old female with the mechanical aortic valve replacement from 1998 chronic atrial fibrillation with AV kian ablation in 2013 and in 2018 placement of a biventricular pacemaker in VVIR mode    She has progressive dyspnea on exertion  2D echocardiogram performed by her primary cardiologist on 11/09/2018 showed moderate left ventricular hypertrophy  Ejection fraction was preserved at 60% right ventricular was normal in size and function the aortic valve appeared to function normally though I note a peak velocity of 3 8 m/sec reported  This may have been an over estimation  The mitral valve showed moderate mitral regurgitation tricuspid valve moderate to severe tricuspid regurgitation and there was evidence of moderate pulmonary hypertension I did not see the echocardiogram but this is from the report  Twelve lead EKG today shows an atrial fibrillation with a ventricularly paced rhythm at 85  She was referred to me for right left heart catheterization to further delineate her reasons for her dyspnea  I suspect she has chronic atrial fibrillation with C LVH resulting in diastolic heart failure  In addition she has at least moderate mitral regurgitation  She appears to have secondary pulmonary hypertension and moderate to severe tricuspid regurgitation  Right heart catheterization will give her referring cardiologist and pulmonary physicain an adequate assessment of her right heart pressures  I can also assess her coronary anatomy  I reviewed the risks of the procedure in detail as per the consent form she agrees and accepts  Will be scheduled within the next week or 2  Further recommendations will be made based on the findings

## 2019-01-23 NOTE — DISCHARGE INSTRUCTIONS
1  Please see the post cardiac catheterization dishcarge instructions  No heavy lifting, greater than 10 lbs  or strenuous  activity for 48 hrs  2 Remove band aid tomorrow  Shower and wash groin area gently with soap and water- beginning tomorrow  Rinse and pat dry  Apply new water seal band aid  Repeat this process for 5 days  No powders, creams lotions or antibiotic ointments  for 5 days  No tub baths, hot tubs or swimming for 5 days  3  Please call our office ([phone]) if you have any fever, redness, swelling, discharge from your groin access site      4 No driving for 1 day

## 2019-01-23 NOTE — PROCEDURES
Procedures  Procedure: Left and right heart catheterization  Attending: Annabella Lopez MD  Fellow: Lyssa Damon MD    Indication: Dyspnea    Findings:  Left main: normal-sized, no evidence of atherosclerosis  LAD: normal-sized, no evidence of significant disease  CX: normal size, no evidence of significant disease  RCA: normal size, no evidence of significant disease    Right heart hemodynamics: RA 10 mmHg, RV 45/10 mmHg, PA 45/25 mmHg with a mean PAP 32 mmHg, PCWP approx 22 mmHg with V waves  PA sats 68 8%, Arterial sats 96 4%, CO 4 5 L/min, CI 2 1 L/min/kg      Valve Fluoroscopy: Normal leaflet motion of mechanical aortic valve prosthesis  Access: Right femoral arterial access, hemostasis achieved with Per-Close device  Right femoral vein access  Hemostasis achieved with manual pressure  Complications: None    Impression:   No evidence of significant epicardial coronary artery disease  Mild to moderate pulmonary hypertension    Plan:   Follow-up with primary cardiologist     The findings of the catheterization were discussed with the patient

## 2019-01-28 ENCOUNTER — HOSPITAL ENCOUNTER (INPATIENT)
Facility: HOSPITAL | Age: 84
LOS: 4 days | Discharge: NURSING, REHAB OR LONG-TERM CARE | DRG: 908 | End: 2019-02-02
Attending: EMERGENCY MEDICINE | Admitting: INTERNAL MEDICINE
Payer: MEDICARE

## 2019-01-28 ENCOUNTER — APPOINTMENT (EMERGENCY)
Dept: CT IMAGING | Facility: HOSPITAL | Age: 84
DRG: 908 | End: 2019-01-28
Payer: MEDICARE

## 2019-01-28 ENCOUNTER — TELEPHONE (OUTPATIENT)
Dept: CARDIOLOGY CLINIC | Facility: CLINIC | Age: 84
End: 2019-01-28

## 2019-01-28 ENCOUNTER — HOSPITAL ENCOUNTER (OUTPATIENT)
Dept: NON INVASIVE DIAGNOSTICS | Facility: HOSPITAL | Age: 84
Discharge: HOME | End: 2019-01-28
Payer: MEDICARE

## 2019-01-28 DIAGNOSIS — S80.11XA HEMATOMA OF LEG, RIGHT, INITIAL ENCOUNTER: Primary | ICD-10-CM

## 2019-01-28 DIAGNOSIS — M79.604 PAIN OF RIGHT LOWER EXTREMITY: ICD-10-CM

## 2019-01-28 DIAGNOSIS — M79.604 PAIN OF RIGHT LOWER EXTREMITY: Primary | ICD-10-CM

## 2019-01-28 LAB
ALBUMIN SERPL BCP-MCNC: 3.7 G/DL (ref 3.5–5)
ALP SERPL-CCNC: 97 U/L (ref 46–116)
ALT SERPL W P-5'-P-CCNC: 43 U/L (ref 12–78)
ANION GAP SERPL CALCULATED.3IONS-SCNC: 9 MMOL/L (ref 4–13)
APTT PPP: 43 SECONDS (ref 26–38)
AST SERPL W P-5'-P-CCNC: 55 U/L (ref 5–45)
BASOPHILS # BLD AUTO: 0.04 THOUSANDS/ΜL (ref 0–0.1)
BASOPHILS NFR BLD AUTO: 0 % (ref 0–1)
BILIRUB SERPL-MCNC: 0.66 MG/DL (ref 0.2–1)
BUN SERPL-MCNC: 25 MG/DL (ref 5–25)
CALCIUM SERPL-MCNC: 9.7 MG/DL (ref 8.3–10.1)
CHLORIDE SERPL-SCNC: 97 MMOL/L (ref 100–108)
CO2 SERPL-SCNC: 29 MMOL/L (ref 21–32)
CREAT SERPL-MCNC: 1.23 MG/DL (ref 0.6–1.3)
EOSINOPHIL # BLD AUTO: 0.04 THOUSAND/ΜL (ref 0–0.61)
EOSINOPHIL NFR BLD AUTO: 0 % (ref 0–6)
ERYTHROCYTE [DISTWIDTH] IN BLOOD BY AUTOMATED COUNT: 15.5 % (ref 11.6–15.1)
GFR SERPL CREATININE-BSD FRML MDRD: 41 ML/MIN/1.73SQ M
GLUCOSE SERPL-MCNC: 231 MG/DL (ref 65–140)
HCT VFR BLD AUTO: 40.2 % (ref 34.8–46.1)
HGB BLD-MCNC: 12.5 G/DL (ref 11.5–15.4)
IMM GRANULOCYTES # BLD AUTO: 0.06 THOUSAND/UL (ref 0–0.2)
IMM GRANULOCYTES NFR BLD AUTO: 1 % (ref 0–2)
INR PPP: 1.79 (ref 0.86–1.17)
LYMPHOCYTES # BLD AUTO: 1.23 THOUSANDS/ΜL (ref 0.6–4.47)
LYMPHOCYTES NFR BLD AUTO: 10 % (ref 14–44)
MCH RBC QN AUTO: 29.6 PG (ref 26.8–34.3)
MCHC RBC AUTO-ENTMCNC: 31.1 G/DL (ref 31.4–37.4)
MCV RBC AUTO: 95 FL (ref 82–98)
MONOCYTES # BLD AUTO: 0.39 THOUSAND/ΜL (ref 0.17–1.22)
MONOCYTES NFR BLD AUTO: 3 % (ref 4–12)
NEUTROPHILS # BLD AUTO: 10.6 THOUSANDS/ΜL (ref 1.85–7.62)
NEUTS SEG NFR BLD AUTO: 86 % (ref 43–75)
NRBC BLD AUTO-RTO: 0 /100 WBCS
PLATELET # BLD AUTO: 286 THOUSANDS/UL (ref 149–390)
PMV BLD AUTO: 9.8 FL (ref 8.9–12.7)
POTASSIUM SERPL-SCNC: 5.1 MMOL/L (ref 3.5–5.3)
PROT SERPL-MCNC: 8.6 G/DL (ref 6.4–8.2)
PROTHROMBIN TIME: 20.9 SECONDS (ref 11.8–14.2)
RBC # BLD AUTO: 4.23 MILLION/UL (ref 3.81–5.12)
SODIUM SERPL-SCNC: 135 MMOL/L (ref 136–145)
WBC # BLD AUTO: 12.36 THOUSAND/UL (ref 4.31–10.16)

## 2019-01-28 PROCEDURE — 93971 EXTREMITY STUDY: CPT | Performed by: SURGERY

## 2019-01-28 PROCEDURE — 99285 EMERGENCY DEPT VISIT HI MDM: CPT

## 2019-01-28 PROCEDURE — 80053 COMPREHEN METABOLIC PANEL: CPT | Performed by: EMERGENCY MEDICINE

## 2019-01-28 PROCEDURE — 96374 THER/PROPH/DIAG INJ IV PUSH: CPT

## 2019-01-28 PROCEDURE — 36415 COLL VENOUS BLD VENIPUNCTURE: CPT | Performed by: EMERGENCY MEDICINE

## 2019-01-28 PROCEDURE — 93971 EXTREMITY STUDY: CPT

## 2019-01-28 PROCEDURE — 85730 THROMBOPLASTIN TIME PARTIAL: CPT | Performed by: EMERGENCY MEDICINE

## 2019-01-28 PROCEDURE — 85025 COMPLETE CBC W/AUTO DIFF WBC: CPT | Performed by: EMERGENCY MEDICINE

## 2019-01-28 PROCEDURE — 74177 CT ABD & PELVIS W/CONTRAST: CPT

## 2019-01-28 PROCEDURE — 85610 PROTHROMBIN TIME: CPT | Performed by: EMERGENCY MEDICINE

## 2019-01-28 RX ORDER — MORPHINE SULFATE 4 MG/ML
4 INJECTION, SOLUTION INTRAMUSCULAR; INTRAVENOUS ONCE
Status: COMPLETED | OUTPATIENT
Start: 2019-01-28 | End: 2019-01-28

## 2019-01-28 RX ADMIN — IOHEXOL 100 ML: 350 INJECTION, SOLUTION INTRAVENOUS at 23:41

## 2019-01-28 RX ADMIN — MORPHINE SULFATE 4 MG: 4 INJECTION, SOLUTION INTRAMUSCULAR; INTRAVENOUS at 22:45

## 2019-01-28 NOTE — TELEPHONE ENCOUNTER
Pt called today c/o severe, excruciating pain at right groin cath site, following cardiac cath done on 1/23  No edema, discoloration at site, good pulses noted  Pain is mostly with movement and began two days post cath  Pt believes the catheter may have irritated the femoral nerve  I sent an email and a text message to Dr Papito Looney with this information

## 2019-01-29 ENCOUNTER — APPOINTMENT (INPATIENT)
Dept: RADIOLOGY | Facility: HOSPITAL | Age: 84
DRG: 908 | End: 2019-01-29
Payer: MEDICARE

## 2019-01-29 PROBLEM — E11.9 DIABETES MELLITUS (HCC): Status: ACTIVE | Noted: 2019-01-29

## 2019-01-29 PROBLEM — T14.8XXA HEMATOMA: Status: ACTIVE | Noted: 2019-01-29

## 2019-01-29 LAB
ANION GAP SERPL CALCULATED.3IONS-SCNC: 11 MMOL/L (ref 4–13)
BUN SERPL-MCNC: 24 MG/DL (ref 5–25)
CALCIUM SERPL-MCNC: 9 MG/DL (ref 8.3–10.1)
CHLORIDE SERPL-SCNC: 98 MMOL/L (ref 100–108)
CO2 SERPL-SCNC: 28 MMOL/L (ref 21–32)
CREAT SERPL-MCNC: 1.24 MG/DL (ref 0.6–1.3)
ERYTHROCYTE [DISTWIDTH] IN BLOOD BY AUTOMATED COUNT: 15.5 % (ref 11.6–15.1)
EST. AVERAGE GLUCOSE BLD GHB EST-MCNC: 197 MG/DL
GFR SERPL CREATININE-BSD FRML MDRD: 41 ML/MIN/1.73SQ M
GLUCOSE SERPL-MCNC: 152 MG/DL (ref 65–140)
GLUCOSE SERPL-MCNC: 174 MG/DL (ref 65–140)
GLUCOSE SERPL-MCNC: 181 MG/DL (ref 65–140)
GLUCOSE SERPL-MCNC: 215 MG/DL (ref 65–140)
GLUCOSE SERPL-MCNC: 233 MG/DL (ref 65–140)
HBA1C MFR BLD: 8.5 % (ref 4.2–6.3)
HCT VFR BLD AUTO: 36 % (ref 34.8–46.1)
HGB BLD-MCNC: 11.4 G/DL (ref 11.5–15.4)
INR PPP: 1.78 (ref 0.86–1.17)
MCH RBC QN AUTO: 30.6 PG (ref 26.8–34.3)
MCHC RBC AUTO-ENTMCNC: 31.7 G/DL (ref 31.4–37.4)
MCV RBC AUTO: 97 FL (ref 82–98)
PLATELET # BLD AUTO: 284 THOUSANDS/UL (ref 149–390)
PMV BLD AUTO: 10.5 FL (ref 8.9–12.7)
POTASSIUM SERPL-SCNC: 4.1 MMOL/L (ref 3.5–5.3)
PROTHROMBIN TIME: 20.8 SECONDS (ref 11.8–14.2)
RBC # BLD AUTO: 3.73 MILLION/UL (ref 3.81–5.12)
SODIUM SERPL-SCNC: 137 MMOL/L (ref 136–145)
WBC # BLD AUTO: 12.21 THOUSAND/UL (ref 4.31–10.16)

## 2019-01-29 PROCEDURE — C1887 CATHETER, GUIDING: HCPCS

## 2019-01-29 PROCEDURE — C1894 INTRO/SHEATH, NON-LASER: HCPCS

## 2019-01-29 PROCEDURE — 99222 1ST HOSP IP/OBS MODERATE 55: CPT | Performed by: PHYSICIAN ASSISTANT

## 2019-01-29 PROCEDURE — 75710 ARTERY X-RAYS ARM/LEG: CPT | Performed by: RADIOLOGY

## 2019-01-29 PROCEDURE — 37242 VASC EMBOLIZE/OCCLUDE ARTERY: CPT

## 2019-01-29 PROCEDURE — 37244 VASC EMBOLIZE/OCCLUDE BLEED: CPT | Performed by: RADIOLOGY

## 2019-01-29 PROCEDURE — 99223 1ST HOSP IP/OBS HIGH 75: CPT | Performed by: INTERNAL MEDICINE

## 2019-01-29 PROCEDURE — 83036 HEMOGLOBIN GLYCOSYLATED A1C: CPT | Performed by: PHYSICIAN ASSISTANT

## 2019-01-29 PROCEDURE — 82948 REAGENT STRIP/BLOOD GLUCOSE: CPT

## 2019-01-29 PROCEDURE — 04LH3DZ OCCLUSION OF RIGHT EXTERNAL ILIAC ARTERY WITH INTRALUMINAL DEVICE, PERCUTANEOUS APPROACH: ICD-10-PCS | Performed by: RADIOLOGY

## 2019-01-29 PROCEDURE — 96375 TX/PRO/DX INJ NEW DRUG ADDON: CPT

## 2019-01-29 PROCEDURE — 99152 MOD SED SAME PHYS/QHP 5/>YRS: CPT

## 2019-01-29 PROCEDURE — 36247 INS CATH ABD/L-EXT ART 3RD: CPT | Performed by: RADIOLOGY

## 2019-01-29 PROCEDURE — 76937 US GUIDE VASCULAR ACCESS: CPT

## 2019-01-29 PROCEDURE — 85610 PROTHROMBIN TIME: CPT | Performed by: PHYSICIAN ASSISTANT

## 2019-01-29 PROCEDURE — C1760 CLOSURE DEV, VASC: HCPCS

## 2019-01-29 PROCEDURE — 75774 ARTERY X-RAY EACH VESSEL: CPT | Performed by: RADIOLOGY

## 2019-01-29 PROCEDURE — 99153 MOD SED SAME PHYS/QHP EA: CPT

## 2019-01-29 PROCEDURE — 75630 X-RAY AORTA LEG ARTERIES: CPT | Performed by: RADIOLOGY

## 2019-01-29 PROCEDURE — 85027 COMPLETE CBC AUTOMATED: CPT | Performed by: PHYSICIAN ASSISTANT

## 2019-01-29 PROCEDURE — 36415 COLL VENOUS BLD VENIPUNCTURE: CPT | Performed by: PHYSICIAN ASSISTANT

## 2019-01-29 PROCEDURE — 75736 ARTERY X-RAYS PELVIS: CPT

## 2019-01-29 PROCEDURE — 80048 BASIC METABOLIC PNL TOTAL CA: CPT | Performed by: PHYSICIAN ASSISTANT

## 2019-01-29 PROCEDURE — 36140 INTRO NDL ICATH UPR/LXTR ART: CPT

## 2019-01-29 PROCEDURE — C1769 GUIDE WIRE: HCPCS

## 2019-01-29 PROCEDURE — 99152 MOD SED SAME PHYS/QHP 5/>YRS: CPT | Performed by: RADIOLOGY

## 2019-01-29 RX ORDER — OXYBUTYNIN CHLORIDE 5 MG/1
2.5 TABLET ORAL
Status: DISCONTINUED | OUTPATIENT
Start: 2019-01-29 | End: 2019-02-02 | Stop reason: HOSPADM

## 2019-01-29 RX ORDER — PANTOPRAZOLE SODIUM 40 MG/1
40 TABLET, DELAYED RELEASE ORAL
Status: DISCONTINUED | OUTPATIENT
Start: 2019-01-29 | End: 2019-02-02 | Stop reason: HOSPADM

## 2019-01-29 RX ORDER — HYDROMORPHONE HCL/PF 1 MG/ML
0.5 SYRINGE (ML) INJECTION
Status: DISCONTINUED | OUTPATIENT
Start: 2019-01-29 | End: 2019-01-31

## 2019-01-29 RX ORDER — MIDAZOLAM HYDROCHLORIDE 1 MG/ML
INJECTION INTRAMUSCULAR; INTRAVENOUS CODE/TRAUMA/SEDATION MEDICATION
Status: COMPLETED | OUTPATIENT
Start: 2019-01-29 | End: 2019-01-29

## 2019-01-29 RX ORDER — FENTANYL CITRATE 50 UG/ML
INJECTION, SOLUTION INTRAMUSCULAR; INTRAVENOUS CODE/TRAUMA/SEDATION MEDICATION
Status: COMPLETED | OUTPATIENT
Start: 2019-01-29 | End: 2019-01-29

## 2019-01-29 RX ORDER — HYDROCODONE BITARTRATE AND ACETAMINOPHEN 5; 325 MG/1; MG/1
1 TABLET ORAL EVERY 6 HOURS PRN
Status: DISCONTINUED | OUTPATIENT
Start: 2019-01-29 | End: 2019-02-01

## 2019-01-29 RX ORDER — GABAPENTIN 300 MG/1
600 CAPSULE ORAL
Status: DISCONTINUED | OUTPATIENT
Start: 2019-01-29 | End: 2019-02-02 | Stop reason: HOSPADM

## 2019-01-29 RX ORDER — METHOCARBAMOL 500 MG/1
750 TABLET, FILM COATED ORAL 2 TIMES DAILY PRN
Status: DISCONTINUED | OUTPATIENT
Start: 2019-01-29 | End: 2019-02-02 | Stop reason: HOSPADM

## 2019-01-29 RX ORDER — ONDANSETRON 2 MG/ML
4 INJECTION INTRAMUSCULAR; INTRAVENOUS EVERY 8 HOURS PRN
Status: DISCONTINUED | OUTPATIENT
Start: 2019-01-29 | End: 2019-02-02 | Stop reason: HOSPADM

## 2019-01-29 RX ORDER — ALLOPURINOL 100 MG/1
100 TABLET ORAL 2 TIMES DAILY
Status: DISCONTINUED | OUTPATIENT
Start: 2019-01-29 | End: 2019-02-02 | Stop reason: HOSPADM

## 2019-01-29 RX ORDER — FERROUS SULFATE 325(65) MG
325 TABLET ORAL
Status: DISCONTINUED | OUTPATIENT
Start: 2019-01-29 | End: 2019-02-02 | Stop reason: HOSPADM

## 2019-01-29 RX ORDER — ACETAMINOPHEN 325 MG/1
650 TABLET ORAL EVERY 6 HOURS PRN
Status: DISCONTINUED | OUTPATIENT
Start: 2019-01-29 | End: 2019-02-02 | Stop reason: HOSPADM

## 2019-01-29 RX ORDER — INDAPAMIDE 2.5 MG/1
2.5 TABLET, FILM COATED ORAL EVERY MORNING
Status: DISCONTINUED | OUTPATIENT
Start: 2019-01-29 | End: 2019-02-02 | Stop reason: HOSPADM

## 2019-01-29 RX ORDER — METOPROLOL SUCCINATE 50 MG/1
50 TABLET, EXTENDED RELEASE ORAL DAILY
Status: DISCONTINUED | OUTPATIENT
Start: 2019-01-29 | End: 2019-02-02 | Stop reason: HOSPADM

## 2019-01-29 RX ORDER — HYDROMORPHONE HCL/PF 1 MG/ML
1 SYRINGE (ML) INJECTION ONCE
Status: COMPLETED | OUTPATIENT
Start: 2019-01-29 | End: 2019-01-29

## 2019-01-29 RX ORDER — SODIUM CHLORIDE 9 MG/ML
75 INJECTION, SOLUTION INTRAVENOUS CONTINUOUS
Status: DISCONTINUED | OUTPATIENT
Start: 2019-01-29 | End: 2019-01-30

## 2019-01-29 RX ORDER — WARFARIN SODIUM 5 MG/1
5 TABLET ORAL
Status: DISCONTINUED | OUTPATIENT
Start: 2019-01-29 | End: 2019-02-01

## 2019-01-29 RX ORDER — HEPARIN SODIUM 10000 [USP'U]/100ML
3-20 INJECTION, SOLUTION INTRAVENOUS
Status: DISCONTINUED | OUTPATIENT
Start: 2019-01-29 | End: 2019-01-31

## 2019-01-29 RX ADMIN — HYDROCODONE BITARTRATE AND ACETAMINOPHEN 1 TABLET: 5; 325 TABLET ORAL at 02:43

## 2019-01-29 RX ADMIN — INSULIN LISPRO 1 UNITS: 100 INJECTION, SOLUTION INTRAVENOUS; SUBCUTANEOUS at 17:36

## 2019-01-29 RX ADMIN — FENTANYL CITRATE 25 MCG: 50 INJECTION, SOLUTION INTRAMUSCULAR; INTRAVENOUS at 12:52

## 2019-01-29 RX ADMIN — ALLOPURINOL 100 MG: 100 TABLET ORAL at 17:36

## 2019-01-29 RX ADMIN — FENTANYL CITRATE 25 MCG: 50 INJECTION, SOLUTION INTRAMUSCULAR; INTRAVENOUS at 11:49

## 2019-01-29 RX ADMIN — OXYBUTYNIN CHLORIDE 2.5 MG: 5 TABLET ORAL at 21:31

## 2019-01-29 RX ADMIN — ALLOPURINOL 100 MG: 100 TABLET ORAL at 10:32

## 2019-01-29 RX ADMIN — MIDAZOLAM 1 MG: 1 INJECTION INTRAMUSCULAR; INTRAVENOUS at 12:21

## 2019-01-29 RX ADMIN — GABAPENTIN 600 MG: 300 CAPSULE ORAL at 21:30

## 2019-01-29 RX ADMIN — INSULIN DETEMIR 60 UNITS: 100 INJECTION, SOLUTION SUBCUTANEOUS at 21:34

## 2019-01-29 RX ADMIN — METHOCARBAMOL 750 MG: 750 TABLET, FILM COATED ORAL at 06:01

## 2019-01-29 RX ADMIN — HYDROCODONE BITARTRATE AND ACETAMINOPHEN 1 TABLET: 5; 325 TABLET ORAL at 18:15

## 2019-01-29 RX ADMIN — FENTANYL CITRATE 25 MCG: 50 INJECTION, SOLUTION INTRAMUSCULAR; INTRAVENOUS at 12:21

## 2019-01-29 RX ADMIN — BIMATOPROST 1 DROP: 0.1 SOLUTION/ DROPS OPHTHALMIC at 21:50

## 2019-01-29 RX ADMIN — PANTOPRAZOLE SODIUM 40 MG: 40 TABLET, DELAYED RELEASE ORAL at 05:55

## 2019-01-29 RX ADMIN — HYDROMORPHONE HYDROCHLORIDE 0.5 MG: 1 INJECTION, SOLUTION INTRAMUSCULAR; INTRAVENOUS; SUBCUTANEOUS at 16:42

## 2019-01-29 RX ADMIN — SODIUM CHLORIDE 75 ML/HR: 0.9 INJECTION, SOLUTION INTRAVENOUS at 18:08

## 2019-01-29 RX ADMIN — FERROUS SULFATE TAB 325 MG (65 MG ELEMENTAL FE) 325 MG: 325 (65 FE) TAB at 08:46

## 2019-01-29 RX ADMIN — MIDAZOLAM 1 MG: 1 INJECTION INTRAMUSCULAR; INTRAVENOUS at 11:49

## 2019-01-29 RX ADMIN — FENTANYL CITRATE 25 MCG: 50 INJECTION, SOLUTION INTRAMUSCULAR; INTRAVENOUS at 12:01

## 2019-01-29 RX ADMIN — METOPROLOL SUCCINATE 50 MG: 50 TABLET, EXTENDED RELEASE ORAL at 08:46

## 2019-01-29 RX ADMIN — IODIXANOL 50 ML: 320 INJECTION, SOLUTION INTRAVASCULAR at 13:44

## 2019-01-29 RX ADMIN — MIDAZOLAM 0.5 MG: 1 INJECTION INTRAMUSCULAR; INTRAVENOUS at 12:52

## 2019-01-29 RX ADMIN — HEPARIN SODIUM AND DEXTROSE 11.1 UNITS/KG/HR: 10000; 5 INJECTION INTRAVENOUS at 18:05

## 2019-01-29 RX ADMIN — INSULIN DETEMIR 60 UNITS: 100 INJECTION, SOLUTION SUBCUTANEOUS at 02:35

## 2019-01-29 RX ADMIN — INDAPAMIDE 2.5 MG: 2.5 TABLET, FILM COATED ORAL at 10:32

## 2019-01-29 RX ADMIN — HYDROCODONE BITARTRATE AND ACETAMINOPHEN 1 TABLET: 5; 325 TABLET ORAL at 08:46

## 2019-01-29 RX ADMIN — HYDROMORPHONE HYDROCHLORIDE 1 MG: 1 INJECTION, SOLUTION INTRAMUSCULAR; INTRAVENOUS; SUBCUTANEOUS at 01:02

## 2019-01-29 RX ADMIN — WARFARIN SODIUM 5 MG: 5 TABLET ORAL at 18:16

## 2019-01-29 NOTE — ASSESSMENT & PLAN NOTE
St Ra mechanical AVR '98  -on chronic Coumadin therapy  -on Lovenox-->coumadin bridge due to recent cardiac cath 1/23/19  -INR 1 78  -need to continue anticoagulation w/either heparin drip or Lovenox to prevent anticoagulation gap  -management per cardiology or primary service

## 2019-01-29 NOTE — UTILIZATION REVIEW
Initial Clinical Review    Admission: Date/Time/Statement: 1/29/19 @ 0205   Orders Placed This Encounter   Procedures    Inpatient Admission (expected length of stay for this patient Order details is greater than two midnights)     Standing Status:   Standing     Number of Occurrences:   1     Order Specific Question:   Admitting Physician     Answer:   Concepcion Whitlock [[id number]]     Order Specific Question:   Level of Care     Answer:   Med Surg [[id number]]     Order Specific Question:   Estimated length of stay     Answer:   More than 2 Midnights     Order Specific Question:   Certification     Answer:   I certify that inpatient services are medically necessary for this patient for a duration of greater than two midnights  See H&P and MD Progress Notes for additional information about the patient's course of treatment  ED: Date/Time/Mode of Arrival:   ED Arrival Information     Expected Arrival Acuity Means of Arrival Escorted By Service Admission Type    - 1/28/2019 19:50 Urgent Walk-In Self Hospitalist Urgent    Arrival Complaint    Leg Pain        Chief Complaint:   Chief Complaint   Patient presents with    Groin Pain     right sided groin pain radiating to right thigh, medial knee, and pain is radiating down right leg  had a venous duplex of right leg today outpatient and sent home  patient has had worsening pain  History of Illness: 81year old female with a PMH of aortic valve replacement, chronic A  Fib, essential hypertension, DM type II, CKD stage III presenting with right groin pain for 3 days  Pt states she had a cardiac catheterization done via the right femoral access 1/25/19  She states that she has had 8/10 severe sharp pain in her right groin that radiates down her leg to her popliteal fossa  She underwent a duplex this afternoon that ruled out aneurysm and DVT  She has ecchymosis across her lower abdomen and increased swelling in her right groin and leg compared to the left   She is also complaining of vaginal spotting  She denies n/v/d, urinary symptoms, hematochezia or melena  She denies chest pain or SOB  Pain is exacerbated with movement  Her pain has been getting progressively worse and she is now unable to ambulate  She has been bridged with Lovenox since Thursday, and has been taking 5 mg of Coumadin per day since then  She has not taken Coumadin today thus far      ED Vital Signs:   ED Triage Vitals   Temperature Pulse Respirations Blood Pressure SpO2   01/28/19 1953 01/28/19 1955 01/28/19 1955 01/28/19 1955 01/28/19 1955   (!) 97 °F (36 1 °C) 85 18 (!) 180/82 98 %      Temp Source Heart Rate Source Patient Position - Orthostatic VS BP Location FiO2 (%)   01/28/19 1953 01/28/19 1955 01/28/19 1955 01/28/19 1955 --   Temporal Monitor Sitting Right arm       Pain Score       01/28/19 1955       Worst Possible Pain        Wt Readings from Last 1 Encounters:   01/23/19 109 kg (240 lb)     Vital Signs (abnormal):     97 °F (36 1 °C) 75 18 170/70 96 % -- WK   01/29/19 0050 -- 70 18  180/76 96 % -- WK   01/28/19 2245 -- 74 18  190/84 96 % -- WK   01/28/19 2215 -- 74 18 136/65 96 % -- WK   01/28/19 1955 -- 85 18  180/82          Pertinent Labs/Diagnostic Test Results:   Wbc's 12 36,   H&H 12 5 / 40 2,   ,   CL 97,  Glu 231,     ast 55,   INR 1 79    CT A&P: 1   No acute inflammatory process identified within the abdomen or pelvis      ED Treatment:   Medication Administration from 01/28/2019 1949 to 01/29/2019 1620       Date/Time Order Dose Route Action Action by Comments     01/28/2019 2245 morphine (PF) 4 mg/mL injection 4 mg 4 mg Intravenous Given Padmini Gordillo RN      01/28/2019 2341 iohexol (OMNIPAQUE) 350 MG/ML injection (SINGLE-DOSE) 100 mL 100 mL Intravenous Given Maribel Rodriguez      01/29/2019 0102 HYDROmorphone (DILAUDID) injection 1 mg 1 mg Intravenous Given Padmini Gordillo RN      01/29/2019 1032 allopurinol (ZYLOPRIM) tablet 100 mg 100 mg Oral Given Jennifer Reynoso, RN      01/29/2019 0846 ferrous sulfate tablet 325 mg 325 mg Oral Given Daphne Peguero, RN      01/29/2019 0846 HYDROcodone-acetaminophen (NORCO) 5-325 mg per tablet 1 tablet 1 tablet Oral Given Daphne Peguero, RN      01/29/2019 0243 HYDROcodone-acetaminophen (NORCO) 5-325 mg per tablet 1 tablet 1 tablet Oral Given Isabel Cary RN      01/29/2019 1032 indapamide (LOZOL) tablet 2 5 mg 2 5 mg Oral Given Daphne Peguero RN      01/29/2019 0235 insulin detemir (LEVEMIR) subcutaneous injection 60 Units 60 Units Subcutaneous Given Isabel Cary RN      01/29/2019 0555 pantoprazole (PROTONIX) EC tablet 40 mg 40 mg Oral Given Isabel Cary RN      01/29/2019 0601 methocarbamol (ROBAXIN) tablet 750 mg 750 mg Oral Given Isabel Cary RN      01/29/2019 0846 metoprolol succinate (TOPROL-XL) 24 hr tablet 50 mg 50 mg Oral Given Daphne Peguero RN      01/29/2019 1200 insulin lispro (HumaLOG) 100 units/mL subcutaneous injection 1-5 Units 1 Units Subcutaneous Not Given Rashmi Pettit RN      01/29/2019 5265 insulin lispro (HumaLOG) 100 units/mL subcutaneous injection 1-5 Units 1 Units Subcutaneous Not Given Daphne Peguero RN NPO do to possible cath procedure       01/29/2019 0556 insulin lispro (HumaLOG) 100 units/mL subcutaneous injection 1-5 Units   Subcutaneous Not Given Isabel Cary RN      01/29/2019 1149 midazolam (VERSED) injection 1 mg Intravenous Given Connor Celis RN      01/29/2019 1149 fentanyl citrate (PF) 100 MCG/2ML 25 mcg Intravenous Given Connor Celis RN      01/29/2019 1252 fentanyl citrate (PF) 100 MCG/2ML 25 mcg Intravenous Given LYNETTE Urban      01/29/2019 1221 fentanyl citrate (PF) 100 MCG/2ML 25 mcg Intravenous Given Connor Celis, RN      01/29/2019 1201 fentanyl citrate (PF) 100 MCG/2ML 25 mcg Intravenous Given Connor Celis RN      01/29/2019 1252 midazolam (VERSED) injection 0 5 mg Intravenous Given LYNETTE Urban      01/29/2019 1221 midazolam (VERSED) injection 1 mg Intravenous Given Therese Abarca RN      01/29/2019 1344 iodixanol (VISIPAQUE) 320 MG/ML injection 200 mL 50 mL Intra-arterial Given Mouna [place]         Past Medical/Surgical History:   Past Medical History:   Diagnosis Date    Chronic atrial fibrillation ([facility] [address] ) 9/13/2017    Chronic gout 9/13/2017    Chronic rhinitis 11/08/2018    CKD (chronic kidney disease) stage 3, GFR 30-59 ml/min ([facility]) 9/13/2017    Colon polyp 9/13/2017    Coronary artery disease     Diabetes mellitus ([facility] [address] )     COMBS (dyspnea on exertion) 11/02/2018    Essential hypertension 9/13/2017    Gastritis 9/13/2017    Hypertension     Irregular heart beat     Mechanical heart valve present 9/13/2017    Osteoarthritis     Pacemaker     S/P AVR     Spinal stenosis      Admitting Diagnosis: Leg pain [M79 606]  Hematoma of leg, right, initial encounter [S80 11XA]  Age/Sex: 81 y o  female     Assessment/Plan:   · Hematoma right thigh  ? Admit to med/surg  S/p right cardiac cath  On coumadin for heart valve replacement  Also currently on lovenox for bridging for recent procedure  ? hgb 12 5 which is her baseline  ? CT scan shows hematoma right thigh abductors with possible small foci of active extravasation  ? ED spoke with vascular and IR  No need for urgent intervention  Will consult IR for evaluation in AM  ? Hold lovenox  INR 1 79  Needs to be anticoagulated as soon as safely possible due to valve replacement  Recheck INR in AM     Plan for Additional Problems:   · HTN- continue home meds  · CKD- creatinine 1 23 today  · Gastritis- continue protonix  · DM- continue levemir  Order accuchecks with sliding scale    Anticipated Length of Stay:  Patient will be admitted on an Inpatient basis with an anticipated length of stay of  Greater than 2 midnights     Justification for Hospital Stay: patient requires IR consult    Admission Orders: IP    Scheduled Meds:   Current Facility-Administered Medications:  acetaminophen 650 mg Oral Q6H PRN    allopurinol 100 mg Oral BID    bimatoprost 1 drop Both Eyes HS    ferrous sulfate 325 mg Oral Daily With Breakfast    gabapentin 600 mg Oral HS    HYDROcodone-acetaminophen 1 tablet Oral Q6H PRN    HYDROmorphone 0 5 mg Intravenous Q3H PRN    indapamide 2 5 mg Oral QAM    insulin detemir 60 Units Subcutaneous HS    insulin lispro 1-5 Units Subcutaneous Q6H DAVIDA    methocarbamol 750 mg Oral BID PRN    metoprolol succinate 50 mg Oral Daily    ondansetron 4 mg Intravenous Q8H PRN    oxybutynin 2 5 mg Oral HS    pantoprazole 40 mg Oral Early Morning      Consult IR  PT / OT Eval  CBC, BMP, PT / INR in am  Consult Vascular    1/29:  Right femoral angiogram and embolization    Findings: Successful coil embolization of a branch of the superficial epigastric artery  Network Utilization Review Department  Phone: [phone]; Fax [phone]  [email or web address]  ATTENTION: Please call with any questions or concerns to [phone]  and carefully listen to the prompts so that you are directed to the right person  Send all requests for admission clinical reviews, approved or denied determinations and any other requests to fax [phone]   All voicemails are confidential

## 2019-01-29 NOTE — ASSESSMENT & PLAN NOTE
Baseline creatinine 1 2 range  -recommend IV hydration pre and post angiogram today by IR  -BMP in am  -continue to trend creatinine  -continue to avoid nephrotoxic agents

## 2019-01-29 NOTE — INTERVAL H&P NOTE
Update: (This section must be completed if the H&P was completed greater than 24 hrs to procedure or admission)    H&P reviewed  After examining the patient, I find no changed to the H&P since it had been written  Patient re-evaluated   Accept as history and physical     Elsa Manzo MD/January 29, 2019/1:36 PM

## 2019-01-29 NOTE — ED NOTES
Pt states she has leg, groin pain especially after turning leg   Pt was at [facility] ER today for dvt study and was told it was negative     Audra Ferrari RN  01/28/19 7392

## 2019-01-29 NOTE — H&P (VIEW-ONLY)
Progress Note - Dilip Perez 1935, 81 y o  female MRN: [id number]    Unit/Bed#: ED 25 Encounter: [id number]    Primary Care Provider: Janette Rivera MD   Date and time admitted to hospital: 1/28/2019  9:27 PM    * Hematoma   Assessment & Plan    R proximal thigh hematoma after right/left heart cath 1/23/19  -CT abdomen pelvis with contrast reviewed  Intramuscular hematoma right thigh abductors with foci of active extravasation without expanding hematoma  No evidence of R CFA pseudoaneurysm or AVF  -planned angiogram w/IR today per ER workup  -no acute vascular intervention recommended  -continue observation  -in light of CKD III and 2 recent contrast loads w/cardiac cath 1/23/19 and CT today, recommend IV hydration pre and post angiogram today if performed  Baseline LVEF not available on chart  -continue anticoagulation due to mechanical AVR  -d/w Dr Fernandez [facility] Doctor  Thank you for allowing us to participate in the care of this patient       Chronic kidney disease, stage 3 (HCC)   Assessment & Plan    Baseline creatinine 1 2 range  -recommend IV hydration pre and post angiogram today by IR  -BMP in am  -continue to trend creatinine  -continue to avoid nephrotoxic agents     Mechanical heart valve present   Assessment & Plan    St Ra mechanical AVR '98  -on chronic Coumadin therapy  -on Lovenox-->coumadin bridge due to recent cardiac cath 1/23/19  -INR 1 78  -need to continue anticoagulation w/either heparin drip or Lovenox to prevent anticoagulation gap  -management per cardiology or primary service     Chronic atrial fibrillation (HCC)   Assessment & Plan    Chronic atrial fibrillation, s/p AV node ablation and PPM  -pacer dependent  -continue anticoagulation therapy           Consulting Service: [facility]    Chief Complaint:  Right inner thigh pain x 4 days after cardiac catheterization    HPI: Dilip Perez is a 81 y o  female with history of HTN, gastritis, type II DM, CKD III (creat 1 1-1 3), chronic atrial fibrillation, s/p failed AFib ablation, s/p AV node ablation and PPM, s/p mechanical AVR on chronic coumadin and s/p right/left heart catheterization 1/23/19 w/Lovenox bridging who presents with 4 day history of proximal medial R thigh pain exacerbated by ambulation, movement and internal rotation/adduction of R hip  The patient states the proximal right inner thigh pain started 2 days after cardiac catheterization 1/25/2019  It is persistent and mildly progressively worse prompting ER evaluation last p m  Patient denies right lower extremity claudication, rest pain, tissue loss, weakness, paresthesias, numbness, pallor, cyanosis or cool sensation  The pain radiates from the right medial thigh to anterior thigh  Venous duplex negative for acute or chronic DVT  CT abdomen pelvis with IV contrast demonstrates intramuscular hematoma of the right thigh abductors with small foci of active extravasation but no evidence of pseudoaneurysm or AV fistula  IR has been consulted for angiographic evaluation  Vascular surgery consulted for right thigh intermuscular hematoma with active bleed  No evidence of expanding hematoma  H/H stable @ 11 4/36 0 and unchanged from pre-cath  Review of Systems:  General: negative  Cardiovascular:  Dyspnea on exertion    No orthopnea  Respiratory:  Dyspnea on exertion  Gastrointestinal:  History of gastritis controlled with PPI  Genitourinary ROS: no dysuria, trouble voiding, or hematuria  Musculoskeletal ROS:  As per the above HPI  Neurological ROS: no TIA or stroke symptoms  Hematological and Lymphatic ROS: negative  Dermatological ROS: As per the above HPI  Psychological ROS: negative  Ophthalmic ROS: negative  ENT ROS: negative    Past Medical History:  Past Medical History:   Diagnosis Date    Chronic atrial fibrillation ([facility] [address] ) 9/13/2017    Chronic gout 9/13/2017    Chronic rhinitis 11/08/2018    CKD (chronic kidney disease) stage 3, GFR 30-59 ml/min ([facility]) 9/13/2017    Colon polyp 9/13/2017    Coronary artery disease     Diabetes mellitus ([address] )     COMBS (dyspnea on exertion) 11/02/2018    Essential hypertension 9/13/2017    Gastritis 9/13/2017    Hypertension     Irregular heart beat     Mechanical heart valve present 9/13/2017    Osteoarthritis     Pacemaker     S/P AVR     Spinal stenosis     disk herniation        Past Surgical History:  Past Surgical History:   Procedure Laterality Date    AORTIC VALVE REPLACEMENT      CARDIAC SURGERY      CATARACT EXTRACTION      CHOLECYSTECTOMY      COLONOSCOPY      ESOPHAGOGASTRODUODENOSCOPY      HERNIA REPAIR      x3    HYSTERECTOMY      INSERT / REPLACE / REMOVE PACEMAKER      JOINT REPLACEMENT Bilateral     knees    TN COLONOSCOPY FLX DX W/COLLJ SPEC WHEN PFRMD N/A 9/12/2017    Procedure: EGDwith bxAND COLONOSCOPY with polypectomies;  Surgeon: Donavon Pickering MD;  Location: [facility]; Service: Gastroenterology    TN SIGMOIDOSCOPY FLX DX W/COLLJ SPEC BR/WA IF PFRMD N/A 9/13/2017    Procedure: colonoscopy with hemo clip x 2;  Surgeon: Donavon Pickering MD;  Location: [facility]; Service: Gastroenterology    ROTATOR CUFF REPAIR Left     SKIN BIOPSY      TONSILLECTOMY         Social History:  History   Alcohol Use No     History   Drug Use No     History   Smoking Status    Former Smoker   Smokeless Tobacco    Never Used     Comment: quit more than 36 yr       Family History:  Family History   Problem Relation Age of Onset    Diabetes Mother     Hypertension Mother     Heart disease Father     Emphysema Father     Asthma Sister    Ray Cancer Sister     Hypertension Sister        Allergies:   Allergies   Allergen Reactions    Cephalosporins Rash and Anaphylaxis     Anaphylaxis  Other reaction(s): Itching    Acetazolamide GI Intolerance    Allopurinol     Cefazolin Other (See Comments)     rash    Colchicine     Diltiazem     Docusate Other (See Comments)     impaction    Eszopiclone     Febuxostat     Glycerin     Hydrochlorothiazide Other (See Comments) and Headache     Other reaction(s): Other (see comments)  Generalized swelling    Latex      Other reaction(s): Other (See Comments)  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    Metformin      Other reaction(s): GI intolerance    Nortriptyline      Other reaction(s): Other (See Comments)  rash bolivar flexeril      Penicillins      Other reaction(s): Other (See Comments)  RASH-anaphylaxis  Other reaction(s): Anaphylaxis    Povidone Iodine Other (See Comments)     VAGINAL ITCHING    Ropinirole      Back pain    Simvastatin Other (See Comments)     muscle aches    Sulfa Antibiotics      Other reaction(s): Other (See Comments)  bolivar Lasix, GI upset, rash  Other reaction(s): GI Upset    Valsartan      Other reaction(s):  Other (See Comments)  bolivar olmesartan    Fluticasone      Leg cramps       Medications:  Current Facility-Administered Medications   Medication Dose Route Frequency    acetaminophen (TYLENOL) tablet 650 mg  650 mg Oral Q6H PRN    allopurinol (ZYLOPRIM) tablet 100 mg  100 mg Oral BID    bimatoprost (LUMIGAN) 0 01 % ophthalmic solution 1 drop  1 drop Both Eyes HS    fentanyl citrate (PF) 100 MCG/2ML   Intravenous Code/Trauma/Sedation Med    ferrous sulfate tablet 325 mg  325 mg Oral Daily With Breakfast    gabapentin (NEURONTIN) capsule 600 mg  600 mg Oral HS    HYDROcodone-acetaminophen (NORCO) 5-325 mg per tablet 1 tablet  1 tablet Oral Q6H PRN    HYDROmorphone (DILAUDID) injection 0 5 mg  0 5 mg Intravenous Q3H PRN    indapamide (LOZOL) tablet 2 5 mg  2 5 mg Oral QAM    insulin detemir (LEVEMIR) subcutaneous injection 60 Units  60 Units Subcutaneous HS    insulin lispro (HumaLOG) 100 units/mL subcutaneous injection 1-5 Units  1-5 Units Subcutaneous Q6H [address]    methocarbamol (ROBAXIN) tablet 750 mg  750 mg Oral BID PRN    metoprolol succinate (TOPROL-XL) 24 hr tablet 50 mg  50 mg Oral Daily    ondansetron (ZOFRAN) injection 4 mg 4 mg Intravenous Q8H PRN    oxybutynin (DITROPAN) tablet 2 5 mg  2 5 mg Oral HS    pantoprazole (PROTONIX) EC tablet 40 mg  40 mg Oral Early Morning       Vitals:  /74 (BP Location: Right arm)   Pulse 84   Temp (!) 97 °F (36 1 °C)   Resp 18   SpO2 98%     I/Os:  No intake/output data recorded  Lab Results and Cultures:   Lab Results   Component Value Date    WBC 12 21 (H) 01/29/2019    HGB 11 4 (L) 01/29/2019    HCT 36 0 01/29/2019    MCV 97 01/29/2019     01/29/2019     Lab Results   Component Value Date    CALCIUM 9 0 01/29/2019    K 4 1 01/29/2019    CO2 28 01/29/2019    CL 98 (L) 01/29/2019    BUN 24 01/29/2019    CREATININE 1 24 01/29/2019     Lab Results   Component Value Date    INR 1 78 (H) 01/29/2019    INR 1 79 (H) 01/28/2019    INR 1 37 (H) 01/23/2019    PROTIME 20 8 (H) 01/29/2019    PROTIME 20 9 (H) 01/28/2019    PROTIME 17 0 (H) 01/23/2019       Lipid Panel: No results found for: CHOL,     Blood Culture: No results found for: BLOODCX,   Urinalysis:   Lab Results   Component Value Date    COLORU Yellow 07/24/2018    CLARITYU Clear 07/24/2018    SPECGRAV 1 019 07/24/2018    PHUR 6 0 07/24/2018    LEUKOCYTESUR Trace (A) 07/24/2018    NITRITE Negative 07/24/2018    GLUCOSEU Negative 07/24/2018    KETONESU Negative 07/24/2018    BILIRUBINUR Negative 07/24/2018    BLOODU Negative 07/24/2018   ,   Urine Culture: No results found for: URINECX,   Wound Culure: No results found for: WOUNDCULT    Imaging:  CT abdomen pelvis with IV contrast 1/29/2019:  Imaging study and images reviewed  Imaging study reviewed with Dr Janett Kowalski Doctor  As described above  See full report below:  ADDENDUM:  There is enlargement of the right hip abductor musculature predominantly of the right pectineus and obturator externus muscles compatible with intramuscular hematoma  There are small foci of active extravasation seen within the muscle for example   (series 2, image 80)    The right femoral artery is patent without evidence of pseudoaneurysm  The right femoral vein enhances normally without evidence of arteriovenous fistula  RLE LEVD 1/28/2019:  No acute or chronic DVT or superficial thrombophlebitis    Physical Exam:    General appearance: alert and oriented, in no acute distress  Skin: Skin color, texture, turgor normal  No rashes or lesions  Neurologic: Grossly normal  Head: Normocephalic, without obvious abnormality, atraumatic  Eyes: PERRL, EOMI, sclerae nonicteric  Throat: lips, mucosa, and tongue normal; teeth and gums normal  Neck: no adenopathy, no carotid bruit, no JVD, supple, symmetrical, trachea midline and thyroid not enlarged, symmetric, no tenderness/mass/nodules  Back: symmetric, no curvature  ROM normal  No CVA tenderness  Lungs: clear to auscultation bilaterally  Chest wall: no tenderness  Heart: regular rate and rhythm, S1: normal, S2: normal prosthetic S2, no S3 or S4, no rub and No murmur  Abdomen: soft, non-tender; bowel sounds normal; no masses,  no organomegaly and Nondistended  No abdominal bruits  Ecchymosis noted right groin extending on to suprapubic area  No hematoma or pulsatile mass  Extremities: extremities normal, warm and well-perfused; no cyanosis, clubbing, or edema, no ulcers, gangrene or trophic changes and Ecchymosis noted right groin at prior right femoral access site  No pulsatile mass  Ecchymosis extending on to the right suprapubic area  Moderate tenderness with palpation on the right proximal medial groin and thigh but difficult to appreciate hematoma  no right lower extremity edema  Chronic venous stasis changes bilateral lower legs  Bilateral lower extremities motor and sensory intact with palpable distal pulses        Pulse exam:  Radial: Right: 2+ Left[de-identified] 2+  Femoral: Right: 2+ Left: 2+  DP: Right: 2+ Left: 2+  PT: Right: 1+ Left: 1+      Cheryle Southerly, PA-C  1/29/2019  [facility], [phone]

## 2019-01-29 NOTE — H&P
History and Physical - [facility]    Patient Information: Blas Flores 81 y o  female MRN: [id number]  Unit/Bed#: ED 25 Encounter: [id number]  Admitting Physician: Everett Mujica PA-C  PCP: Thomas Moore MD  Date of Admission:  01/29/19    Assessment/Plan:    Hospital Problem List:     Principal Problem:    Hematoma  Active Problems:    Hypertension    Chronic kidney disease, stage 3 ([facility] [address] )    Mechanical heart valve present    Chronic atrial fibrillation ([facility] [address] )    Gastritis    DM    Plan for the Primary Problem(s):  · Hematoma right thigh  · Admit to med/surg  S/p right cardiac cath  On coumadin for heart valve replacement  Also currently on lovenox for bridging for recent procedure  · hgb 12 5 which is her baseline  · CT scan shows hematoma right thigh abductors with possible small foci of active extravasation  · ED spoke with vascular and IR  No need for urgent intervention  Will consult IR for evaluation in AM  · Hold lovenox  INR 1 79  Needs to be anticoagulated as soon as safely possible due to valve replacement  Recheck INR in AM    Plan for Additional Problems:   · HTN- continue home meds  · CKD- creatinine 1 23 today  · Gastritis- continue protonix  · DM- continue levemir  Order accuchecks with sliding scale    VTE Prophylaxis: Warfarin (Coumadin)  / reason for no mechanical VTE prophylaxis hematoma   Code Status: full code  POLST: There is no POLST form on file for this patient (pre-hospital)    Anticipated Length of Stay:  Patient will be admitted on an Inpatient basis with an anticipated length of stay of  Greater than 2 midnights  Justification for Hospital Stay: patient requires IR consult    Total Time for Visit, including Counseling / Coordination of Care: 45 minutes  Greater than 50% of this total time spent on direct patient counseling and coordination of care      Chief Complaint:   Thigh pain    History of Present Illness:    Blas Flores is a 81 y o  female who presents with right thigh pain  Patient had a cardiac cath on 1/25/19  She was on coumadin pre-procedure  She was bridged with lovenox  She developed ecchymosis in the right groin and swelling in the right thigh  She was unable to walk today  No bleeding at puncture site  Review of Systems:    Review of Systems   Constitutional: Positive for activity change  HENT: Negative  Eyes: Negative  Respiratory: Positive for shortness of breath  Cardiovascular: Negative  Gastrointestinal: Negative  Endocrine: Negative  Genitourinary: Negative  Musculoskeletal: Positive for gait problem and myalgias  Skin: Positive for color change  Allergic/Immunologic: Negative  Hematological: Bruises/bleeds easily  Psychiatric/Behavioral: Negative  Past Medical and Surgical History:     Past Medical History:   Diagnosis Date    Chronic atrial fibrillation ([facility] ) 9/13/2017    Chronic gout 9/13/2017    Chronic rhinitis 11/08/2018    CKD (chronic kidney disease) stage 3, GFR 30-59 ml/min [facility] 9/13/2017    Colon polyp 9/13/2017    Coronary artery disease     Diabetes mellitus ([facility] [address] )     COMBS (dyspnea on exertion) 11/02/2018    Essential hypertension 9/13/2017    Gastritis 9/13/2017    Hypertension     Irregular heart beat     Mechanical heart valve present 9/13/2017    Osteoarthritis     Pacemaker     S/P AVR     Spinal stenosis     disk herniation        Past Surgical History:   Procedure Laterality Date    AORTIC VALVE REPLACEMENT      CARDIAC SURGERY      CATARACT EXTRACTION      CHOLECYSTECTOMY      COLONOSCOPY      ESOPHAGOGASTRODUODENOSCOPY      HERNIA REPAIR      x3    HYSTERECTOMY      INSERT / REPLACE / REMOVE PACEMAKER      JOINT REPLACEMENT Bilateral     knees    TX COLONOSCOPY FLX DX W/COLLJ SPEC WHEN PFRMD N/A 9/12/2017    Procedure: Rupal Garcia COLONOSCOPY with polypectomies;  Surgeon: Tone Munguia MD;  Location: [facility];   Service: Gastroenterology    TX SIGMOIDOSCOPY FLX DX W/COLLJ SPEC BR/WA IF PFRMD N/A 9/13/2017    Procedure: colonoscopy with hemo clip x 2;  Surgeon: Naheed Jimenez MD;  Location: [facility]; Service: Gastroenterology    ROTATOR CUFF REPAIR Left     SKIN BIOPSY      TONSILLECTOMY         Meds/Allergies:    Prior to Admission medications    Medication Sig Start Date End Date Taking?  Authorizing Provider   allopurinol (ZYLOPRIM) 100 mg tablet Take 100 mg by mouth 2 (two) times a day      Historical Provider, MD   aspirin (ECOTRIN LOW STRENGTH) 81 mg EC tablet Take 81 mg by mouth daily    Historical Provider, MD   bimatoprost (LUMIGAN) 0 01 % ophthalmic drops Administer 1 drop to both eyes daily at bedtime    Historical Provider, MD   calcium citrate-vitamin D (CITRACAL+D) 315-200 MG-UNIT per tablet Take 1 tablet by mouth 2 (two) times a day    Historical Provider, MD   cholecalciferol (VITAMIN D3) 1,000 units tablet Take 1,000 Units by mouth daily    Historical Provider, MD   ferrous sulfate 325 (65 Fe) mg tablet Take 325 mg by mouth daily with breakfast    Historical Provider, MD   gabapentin (NEURONTIN) 300 mg capsule Take 2 capsules (600 mg total) by mouth daily at bedtime 11/1/18   Tamy Camargo DO   HYDROcodone-acetaminophen (NORCO) 5-325 mg per tablet Take 1 tablet by mouth every 6 (six) hours as needed for pain for up to 10 doses Max Daily Amount: 4 tablets 6/15/17   Brook Swanson MD   indapamide (LOZOL) 2 5 mg tablet Take 2 5 mg by mouth every morning    Historical Provider, MD   insulin aspart (NovoLOG) 100 units/mL injection Inject under the skin 3 (three) times a day before meals    Historical Provider, MD   insulin detemir (LEVEMIR) 100 units/mL subcutaneous injection Inject 60 Units under the skin daily at bedtime      Historical Provider, MD   Magnesium 250 MG TABS Take 1 each by mouth daily    Historical Provider, MD   methocarbamol (ROBAXIN) 500 mg tablet Take 750 mg by mouth 2 (two) times a day      Historical Provider, MD   metoprolol succinate (TOPROL-XL) 50 mg 24 hr tablet Take 50 mg by mouth daily    Historical Provider, MD   Multiple Vitamin (MULTIVITAMIN) tablet Take 1 tablet by mouth daily    Historical Provider, MD   Multiple Vitamins-Minerals (ICAPS AREDS 2 PO) Take 2 capsules by mouth daily    Historical Provider, MD   polyethylene glycol-propylene glycol (SYSTANE) 0 4-0 3 % every 3 (three) hours as needed    Historical Provider, MD   RABEprazole (ACIPHEX) 20 MG tablet Take 20 mg by mouth daily    Historical Provider, MD   tolterodine (DETROL) 1 mg tablet Take 1 mg by mouth daily at bedtime    Historical Provider, MD   warfarin (COUMADIN) 2 5 mg tablet Take 3 75 mg by mouth daily 5mg on Tuesday    Historical Provider, MD     I have reviewed home medications with patient personally  Allergies: Allergies   Allergen Reactions    Cephalosporins Rash and Anaphylaxis     Anaphylaxis  Other reaction(s): Itching    Acetazolamide GI Intolerance    Allopurinol     Cefazolin Other (See Comments)     rash    Colchicine     Diltiazem     Docusate Other (See Comments)     impaction    Eszopiclone     Febuxostat     Glycerin     Hydrochlorothiazide Other (See Comments) and Headache     Other reaction(s): Other (see comments)  Generalized swelling    Latex      Other reaction(s): Other (See Comments)  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    Metformin      Other reaction(s): GI intolerance    Nortriptyline      Other reaction(s): Other (See Comments)  rash bolivar flexeril      Penicillins      Other reaction(s): Other (See Comments)  RASH-anaphylaxis  Other reaction(s): Anaphylaxis    Povidone Iodine Other (See Comments)     VAGINAL ITCHING    Ropinirole      Back pain    Simvastatin Other (See Comments)     muscle aches    Sulfa Antibiotics      Other reaction(s): Other (See Comments)  bolivar Lasix, GI upset, rash  Other reaction(s): GI Upset    Valsartan      Other reaction(s):  Other (See Comments)  bolivar olmesartan    Fluticasone      Leg cramps       Social History:     Marital Status:    Occupation: RN/    Patient Pre-hospital Level of Mobility: ambulatory  Patient Pre-hospital Diet Restrictions: none  Substance Use History:   History   Alcohol Use No     History   Smoking Status    Former Smoker   Smokeless Tobacco    Never Used     Comment: quit more than 40 yr     History   Drug Use No       Family History:    non-contributory    Physical Exam:     Vitals:   Blood Pressure: (!) 180/76 (01/29/19 0050)  Pulse: 70 (01/29/19 0050)  Temperature: (!) 97 °F (36 1 °C) (01/28/19 1953)  Temp Source: Temporal (01/28/19 1953)  Respirations: 18 (01/29/19 0050)  SpO2: 96 % (01/29/19 0050)    Physical Exam   Constitutional: She is oriented to person, place, and time  She appears well-developed and well-nourished  No distress  HENT:   Head: Normocephalic and atraumatic  Eyes: Pupils are equal, round, and reactive to light  Conjunctivae are normal    Neck: Normal range of motion  Neck supple  Cardiovascular: Normal rate and regular rhythm  Pulmonary/Chest: Effort normal and breath sounds normal  No respiratory distress  She has no wheezes  Abdominal: Soft  Bowel sounds are normal  She exhibits no distension  There is no tenderness  Musculoskeletal:   Right thigh swelling greater than left  No active bleeding at groin access point  Ecchymosis noted in groin and over abdomen  Neurological: She is alert and oriented to person, place, and time  Skin: Skin is warm and dry  She is not diaphoretic  Psychiatric: She has a normal mood and affect  Her behavior is normal    Vitals reviewed  Additional Data:     Lab Results: I have personally reviewed pertinent reports          Results from last 7 days  Lab Units 01/28/19  2234   WBC Thousand/uL 12 36*   HEMOGLOBIN g/dL 12 5   HEMATOCRIT % 40 2   PLATELETS Thousands/uL 286   NEUTROS PCT % 86*   LYMPHS PCT % 10*   MONOS PCT % 3*   EOS PCT % 0 Results from last 7 days  Lab Units 01/28/19  2234   POTASSIUM mmol/L 5 1   CHLORIDE mmol/L 97*   CO2 mmol/L 29   BUN mg/dL 25   CREATININE mg/dL 1 23   CALCIUM mg/dL 9 7   ALK PHOS U/L 97   ALT U/L 43   AST U/L 55*       Results from last 7 days  Lab Units 01/28/19  2234   INR  1 79*       Imaging: I have personally reviewed pertinent reports  Vas Lower Limb Venous Duplex Study, Unilateral/limited    Result Date: 1/28/2019  Narrative:  [facility] REPORT CLINICAL: Indications: Patient presents with right lower extremity pain in groin for 2 days, post cardiac cath on 1/23/19 Operative History: Mechanical heart valve Pacemaker Risk Factors The patient has history of Obesity, HTN, Diabetes (Yes), HLD and CAD  CONCLUSION:  Impression: RIGHT LOWER LIMB No evidence of acute or chronic deep vein thrombosis   No evidence of superficial thrombophlebitis noted  Doppler evaluation shows a normal response to augmentation maneuvers  Popliteal, posterior tibial and anterior tibial arterial Doppler waveforms are triphasic  LEFT LOWER LIMB LIMITED Evaluation shows no evidence of thrombus in the common femoral vein  Doppler evaluation shows a normal response to augmentation maneuvers  Technical findings were given to Mary Mejias at Dr Megha Bush office  SIGNATURE: Electronically Signed by: Saturnino Abreu on 2019-01-28 09:15:25 PM    Ct Abdomen Pelvis With Contrast    Addendum Date: 1/29/2019 Addendum:   ADDENDUM: There is enlargement of the right hip abductor musculature predominantly of the right pectineus and obturator externus muscles compatible with intramuscular hematoma  There are small foci of active extravasation seen within the muscle for example (series 2, image 80)  The right femoral artery is patent without evidence of pseudoaneurysm  The right femoral vein enhances normally without evidence of arteriovenous fistula        Result Date: 1/29/2019  Narrative: CT ABDOMEN AND PELVIS WITH IV CONTRAST INDICATION:   Abdominal pain, unspecified  COMPARISON:  CT of the abdomen and pelvis on April 25, 2017  TECHNIQUE:  CT examination of the abdomen and pelvis was performed  Axial, sagittal, and coronal 2D reformatted images were created from the source data and submitted for interpretation  Radiation dose length product (DLP) for this visit:  1183 mGy-cm   This examination, like all CT scans performed in the [facility], was performed utilizing techniques to minimize radiation dose exposure, including the use of iterative reconstruction and automated exposure control  IV Contrast:  100 mL of iohexol (OMNIPAQUE)  was administered intravenously without immediate adverse reaction  Enteric Contrast:  Enteric contrast was not administered  FINDINGS: ABDOMEN LOWER CHEST:  Stable cardiomegaly  LIVER/BILIARY TREE:  Mildly nodular contour of the liver, correlate for cirrhosis  GALLBLADDER:  Gallbladder is surgically absent  SPLEEN:  Unremarkable  PANCREAS:  Atrophic  ADRENAL GLANDS:  Stable 1 cm left adrenal nodule  KIDNEYS/URETERS:  One or more simple renal cyst(s) is noted  In addition, there are hypodensities too small to characterize  No hydronephrosis  STOMACH AND BOWEL:  Unremarkable  APPENDIX:  No findings to suggest appendicitis  ABDOMINOPELVIC CAVITY:  No ascites or free intraperitoneal air  No lymphadenopathy  VESSELS:  Unremarkable for patient's age  PELVIS REPRODUCTIVE ORGANS:  Patient is status post hysterectomy  URINARY BLADDER:  Unremarkable  ABDOMINAL WALL/INGUINAL REGIONS:  Postsurgical changes of the ventral abdominal wall  Subcutaneous nodules are seen along the ventral abdominal wall likely due to injections  OSSEOUS STRUCTURES:  No acute fracture or destructive osseous lesion  Stable compression deformity of the T11 vertebral body  Redemonstrated kyphoplasty change within L2    Stable degenerative changes at L5-S1 with endplate sclerotic changes and bilateral neural foraminal narrowing  Impression: 1  No acute inflammatory process identified within the abdomen or pelvis  2   Other findings as above  Workstation performed: [id number]       EKG, Pathology, and Other Studies Reviewed on Admission:   · EKG: none    Allscripts / Epic Records Reviewed: Yes     ** Please Note: This note has been constructed using a voice recognition system   **

## 2019-01-29 NOTE — BRIEF OP NOTE (RAD/CATH)
Right femoral angiogram and embolization - Procedure Note    PATIENT NAME: Corinne Bloodgood  : 1935  MRN: [id number]     Pre-op Diagnosis:   1  Hematoma of leg, right, initial encounter      Post-op Diagnosis:   1  Hematoma of leg, right, initial encounter        Surgeon:   Carmen Montero MD  Assistants:     No qualified resident was available, Resident is only observing    Estimated Blood Loss: minimal  Findings: Successful coil embolization of a branch of the superficial epigastric artery      Specimens: none    Complications:  None immediate    Anesthesia: Conscious sedation    Carmen Montero MD     Date: 2019  Time: 1:43 PM

## 2019-01-29 NOTE — CONSULTS
Progress Note - Eleni Larsen 1935, 81 y o  female MRN: [id number]    Unit/Bed#: ED 25 Encounter: [id number]    Primary Care Provider: Vanna Ruelas MD   Date and time admitted to hospital: 1/28/2019  9:27 PM    * Hematoma   Assessment & Plan    R proximal thigh hematoma after right/left heart cath 1/23/19  -CT abdomen pelvis with contrast reviewed  Intramuscular hematoma right thigh abductors with foci of active extravasation without expanding hematoma  No evidence of R CFA pseudoaneurysm or AVF  -planned angiogram w/IR today per ER workup  -no acute vascular intervention recommended  -continue observation  -in light of CKD III and 2 recent contrast loads w/cardiac cath 1/23/19 and CT today, recommend IV hydration pre and post angiogram today if performed  Baseline LVEF not available on chart  -continue anticoagulation due to mechanical AVR  -d/w Dr Erasmo Wright Doctor  Thank you for allowing us to participate in the care of this patient       Chronic kidney disease, stage 3 (HCC)   Assessment & Plan    Baseline creatinine 1 2 range  -recommend IV hydration pre and post angiogram today by IR  -BMP in am  -continue to trend creatinine  -continue to avoid nephrotoxic agents     Mechanical heart valve present   Assessment & Plan    St Ra mechanical AVR '98  -on chronic Coumadin therapy  -on Lovenox-->coumadin bridge due to recent cardiac cath 1/23/19  -INR 1 78  -need to continue anticoagulation w/either heparin drip or Lovenox to prevent anticoagulation gap  -management per cardiology or primary service     Chronic atrial fibrillation (HCC)   Assessment & Plan    Chronic atrial fibrillation, s/p AV node ablation and PPM  -pacer dependent  -continue anticoagulation therapy           Consulting Service: [facility]    Chief Complaint:  Right inner thigh pain x 4 days after cardiac catheterization    HPI: Eleni Larsen is a 81 y o  female with history of HTN, gastritis, type II DM, CKD III (creat 1 1-1 3), chronic atrial fibrillation, s/p failed AFib ablation, s/p AV node ablation and PPM, s/p mechanical AVR on chronic coumadin and s/p right/left heart catheterization 1/23/19 w/Lovenox bridging who presents with 4 day history of proximal medial R thigh pain exacerbated by ambulation, movement and internal rotation/adduction of R hip  The patient states the proximal right inner thigh pain started 2 days after cardiac catheterization 1/25/2019  It is persistent and mildly progressively worse prompting ER evaluation last p m  Patient denies right lower extremity claudication, rest pain, tissue loss, weakness, paresthesias, numbness, pallor, cyanosis or cool sensation  The pain radiates from the right medial thigh to anterior thigh  Venous duplex negative for acute or chronic DVT  CT abdomen pelvis with IV contrast demonstrates intramuscular hematoma of the right thigh abductors with small foci of active extravasation but no evidence of pseudoaneurysm or AV fistula  IR has been consulted for angiographic evaluation  Vascular surgery consulted for right thigh intermuscular hematoma with active bleed  No evidence of expanding hematoma  H/H stable @ 11 4/36 0 and unchanged from pre-cath  Review of Systems:  General: negative  Cardiovascular:  Dyspnea on exertion    No orthopnea  Respiratory:  Dyspnea on exertion  Gastrointestinal:  History of gastritis controlled with PPI  Genitourinary ROS: no dysuria, trouble voiding, or hematuria  Musculoskeletal ROS:  As per the above HPI  Neurological ROS: no TIA or stroke symptoms  Hematological and Lymphatic ROS: negative  Dermatological ROS: As per the above HPI  Psychological ROS: negative  Ophthalmic ROS: negative  ENT ROS: negative    Past Medical History:  Past Medical History:   Diagnosis Date    Chronic atrial fibrillation ([facility] [address] ) 9/13/2017    Chronic gout 9/13/2017    Chronic rhinitis 11/08/2018    CKD (chronic kidney disease) stage 3, GFR 30-59 ml/min ([facility]) 9/13/2017    Colon polyp 9/13/2017    Coronary artery disease     Diabetes mellitus ([facility] [address] )     COMBS (dyspnea on exertion) 11/02/2018    Essential hypertension 9/13/2017    Gastritis 9/13/2017    Hypertension     Irregular heart beat     Mechanical heart valve present 9/13/2017    Osteoarthritis     Pacemaker     S/P AVR     Spinal stenosis     disk herniation        Past Surgical History:  Past Surgical History:   Procedure Laterality Date    AORTIC VALVE REPLACEMENT      CARDIAC SURGERY      CATARACT EXTRACTION      CHOLECYSTECTOMY      COLONOSCOPY      ESOPHAGOGASTRODUODENOSCOPY      HERNIA REPAIR      x3    HYSTERECTOMY      INSERT / REPLACE / REMOVE PACEMAKER      JOINT REPLACEMENT Bilateral     knees    SC COLONOSCOPY FLX DX W/COLLJ SPEC WHEN PFRMD N/A 9/12/2017    Procedure: EGDwith bxAND COLONOSCOPY with polypectomies;  Surgeon: Hailey Tavares MD;  [facility]; Service: Gastroenterology    SC SIGMOIDOSCOPY FLX DX W/COLLJ SPEC BR/WA IF PFRMD N/A 9/13/2017    Procedure: colonoscopy with hemo clip x 2;  Surgeon: Hailey Tavares MD;  [facility]; Service: Gastroenterology    ROTATOR CUFF REPAIR Left     SKIN BIOPSY      TONSILLECTOMY         Social History:  History   Alcohol Use No     History   Drug Use No     History   Smoking Status    Former Smoker   Smokeless Tobacco    Never Used     Comment: quit more than 36 yr       Family History:  Family History   Problem Relation Age of Onset    Diabetes Mother     Hypertension Mother     Heart disease Father     Emphysema Father     Asthma Sister    Bebe Rhody Cancer Sister     Hypertension Sister        Allergies:   Allergies   Allergen Reactions    Cephalosporins Rash and Anaphylaxis     Anaphylaxis  Other reaction(s): Itching    Acetazolamide GI Intolerance    Allopurinol     Cefazolin Other (See Comments)     rash    Colchicine     Diltiazem     Docusate Other (See Comments)     impaction    Eszopiclone     Febuxostat     Glycerin     Hydrochlorothiazide Other (See Comments) and Headache     Other reaction(s): Other (see comments)  Generalized swelling    Latex      Other reaction(s): Other (See Comments)  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    Metformin      Other reaction(s): GI intolerance    Nortriptyline      Other reaction(s): Other (See Comments)  rash bolivar flexeril      Penicillins      Other reaction(s): Other (See Comments)  RASH-anaphylaxis  Other reaction(s): Anaphylaxis    Povidone Iodine Other (See Comments)     VAGINAL ITCHING    Ropinirole      Back pain    Simvastatin Other (See Comments)     muscle aches    Sulfa Antibiotics      Other reaction(s): Other (See Comments)  bolivar Lasix, GI upset, rash  Other reaction(s): GI Upset    Valsartan      Other reaction(s):  Other (See Comments)  bolivar olmesartan    Fluticasone      Leg cramps       Medications:  Current Facility-Administered Medications   Medication Dose Route Frequency    acetaminophen (TYLENOL) tablet 650 mg  650 mg Oral Q6H PRN    allopurinol (ZYLOPRIM) tablet 100 mg  100 mg Oral BID    bimatoprost (LUMIGAN) 0 01 % ophthalmic solution 1 drop  1 drop Both Eyes HS    fentanyl citrate (PF) 100 MCG/2ML   Intravenous Code/Trauma/Sedation Med    ferrous sulfate tablet 325 mg  325 mg Oral Daily With Breakfast    gabapentin (NEURONTIN) capsule 600 mg  600 mg Oral HS    HYDROcodone-acetaminophen (NORCO) 5-325 mg per tablet 1 tablet  1 tablet Oral Q6H PRN    HYDROmorphone (DILAUDID) injection 0 5 mg  0 5 mg Intravenous Q3H PRN    indapamide (LOZOL) tablet 2 5 mg  2 5 mg Oral QAM    insulin detemir (LEVEMIR) subcutaneous injection 60 Units  60 Units Subcutaneous HS    insulin lispro (HumaLOG) 100 units/mL subcutaneous injection 1-5 Units  1-5 Units Subcutaneous Q6H [facility]    methocarbamol (ROBAXIN) tablet 750 mg  750 mg Oral BID PRN    metoprolol succinate (TOPROL-XL) 24 hr tablet 50 mg  50 mg Oral Daily    ondansetron (ZOFRAN) injection 4 mg 4 mg Intravenous Q8H PRN    oxybutynin (DITROPAN) tablet 2 5 mg  2 5 mg Oral HS    pantoprazole (PROTONIX) EC tablet 40 mg  40 mg Oral Early Morning       Vitals:  /74 (BP Location: Right arm)   Pulse 84   Temp (!) 97 °F (36 1 °C)   Resp 18   SpO2 98%     I/Os:  No intake/output data recorded  Lab Results and Cultures:   Lab Results   Component Value Date    WBC 12 21 (H) 01/29/2019    HGB 11 4 (L) 01/29/2019    HCT 36 0 01/29/2019    MCV 97 01/29/2019     01/29/2019     Lab Results   Component Value Date    CALCIUM 9 0 01/29/2019    K 4 1 01/29/2019    CO2 28 01/29/2019    CL 98 (L) 01/29/2019    BUN 24 01/29/2019    CREATININE 1 24 01/29/2019     Lab Results   Component Value Date    INR 1 78 (H) 01/29/2019    INR 1 79 (H) 01/28/2019    INR 1 37 (H) 01/23/2019    PROTIME 20 8 (H) 01/29/2019    PROTIME 20 9 (H) 01/28/2019    PROTIME 17 0 (H) 01/23/2019       Lipid Panel: No results found for: CHOL,     Blood Culture: No results found for: BLOODCX,   Urinalysis:   Lab Results   Component Value Date    COLORU Yellow 07/24/2018    CLARITYU Clear 07/24/2018    SPECGRAV 1 019 07/24/2018    PHUR 6 0 07/24/2018    LEUKOCYTESUR Trace (A) 07/24/2018    NITRITE Negative 07/24/2018    GLUCOSEU Negative 07/24/2018    KETONESU Negative 07/24/2018    BILIRUBINUR Negative 07/24/2018    BLOODU Negative 07/24/2018   ,   Urine Culture: No results found for: URINECX,   Wound Culure: No results found for: WOUNDCULT    Imaging:  CT abdomen pelvis with IV contrast 1/29/2019:  Imaging study and images reviewed  Imaging study reviewed with Dr Zarate [facility] Doctor  As described above  See full report below:  ADDENDUM:  There is enlargement of the right hip abductor musculature predominantly of the right pectineus and obturator externus muscles compatible with intramuscular hematoma  There are small foci of active extravasation seen within the muscle for example   (series 2, image 80)    The right femoral artery is patent without evidence of pseudoaneurysm  The right femoral vein enhances normally without evidence of arteriovenous fistula  RLE LEVD 1/28/2019:  No acute or chronic DVT or superficial thrombophlebitis    Physical Exam:    General appearance: alert and oriented, in no acute distress  Skin: Skin color, texture, turgor normal  No rashes or lesions  Neurologic: Grossly normal  Head: Normocephalic, without obvious abnormality, atraumatic  Eyes: PERRL, EOMI, sclerae nonicteric  Throat: lips, mucosa, and tongue normal; teeth and gums normal  Neck: no adenopathy, no carotid bruit, no JVD, supple, symmetrical, trachea midline and thyroid not enlarged, symmetric, no tenderness/mass/nodules  Back: symmetric, no curvature  ROM normal  No CVA tenderness  Lungs: clear to auscultation bilaterally  Chest wall: no tenderness  Heart: regular rate and rhythm, S1: normal, S2: normal prosthetic S2, no S3 or S4, no rub and No murmur  Abdomen: soft, non-tender; bowel sounds normal; no masses,  no organomegaly and Nondistended  No abdominal bruits  Ecchymosis noted right groin extending on to suprapubic area  No hematoma or pulsatile mass  Extremities: extremities normal, warm and well-perfused; no cyanosis, clubbing, or edema, no ulcers, gangrene or trophic changes and Ecchymosis noted right groin at prior right femoral access site  No pulsatile mass  Ecchymosis extending on to the right suprapubic area  Moderate tenderness with palpation on the right proximal medial groin and thigh but difficult to appreciate hematoma  no right lower extremity edema  Chronic venous stasis changes bilateral lower legs  Bilateral lower extremities motor and sensory intact with palpable distal pulses        Pulse exam:  Radial: Right: 2+ Left[de-identified] 2+  Femoral: Right: 2+ Left: 2+  DP: Right: 2+ Left: 2+  PT: Right: 1+ Left: 1+      Venkatesh Varela PA-C  1/29/2019  [facility], [phone]

## 2019-01-29 NOTE — ED RE-EVALUATION NOTE
Pain was improved with morphine for approximately 30 minutes  Pain returned, and then resolved with 1 mg of Dilaudid   Explained to patient that IR will be consulted for possible arterial gram       Monique Frankel PA-C  01/29/19 9847

## 2019-01-29 NOTE — ED NOTES
Patient returned from [address] [place] [address], [address] [place] [address]  01/28/19 7056

## 2019-01-29 NOTE — PHYSICIAN ADVISOR
Current patient class: Inpatient  The patient is currently on Hospital Day: 2 at [facility]      The patient was admitted to the hospital at [place] [address] on 1/29/19 for the following diagnosis:  Leg pain [M79 606]  Hematoma of leg, right, initial encounter [S80 11XA]       There is documentation in the medical record of an expected length of stay of at least 2 midnights  The patient is therefore expected to satisfy the 2 midnight benchmark and given the 2 midnight presumption is appropriate for INPATIENT ADMISSION  Given this expectation of a satisfying stay, CMS instructs us that the patient is most often appropriate for inpatient admission under part A provided medical necessity is documented in the chart  After review of the relevant documentation, labs, vital signs and test results, the patient is appropriate for INPATIENT ADMISSION  Admission to the hospital as an inpatient is a complex decision making process which requires the practitioner to consider the patients presenting complaint, history and physical examination and all relevant testing  With this in mind, in this case, the patient was deemed appropriate for INPATIENT ADMISSION  After review of the documentation and testing available at the time of the admission I concur with this clinical determination of medical necessity  Rationale is as follows: The patient is a 81 yrs old Female who presented to the ED at 1/28/2019  9:27 PM with a chief complaint of Groin Pain (right sided groin pain radiating to right thigh, medial knee, and pain is radiating down right leg  had a venous duplex of right leg today outpatient and sent home  patient has had worsening pain  )     Given the need for further hospitalization, and along with the documentation of medical necessity present in the chart, the patient is appropriate for inpatient admission    The patient is expected to satisfy the 2 midnight benchmark, and will require further acute medical care  The patient does have comorbid conditions which increases the risk for significant adverse outcome  Given this the patient is appropriate for inpatient admission  The patients vitals on arrival were ED Triage Vitals   Temperature Pulse Respirations Blood Pressure SpO2   01/28/19 1953 01/28/19 1955 01/28/19 1955 01/28/19 1955 01/28/19 1955   (!) 97 °F (36 1 °C) 85 18 (!) 180/82 98 %      Temp Source Heart Rate Source Patient Position - Orthostatic VS BP Location FiO2 (%)   01/28/19 1953 01/28/19 1955 01/28/19 1955 01/28/19 1955 --   Temporal Monitor Sitting Right arm       Pain Score       01/28/19 1955       Worst Possible Pain           Past Medical History:   Diagnosis Date    Chronic atrial fibrillation ([facility] [address] ) 9/13/2017    Chronic gout 9/13/2017    Chronic rhinitis 11/08/2018    CKD (chronic kidney disease) stage 3, GFR 30-59 ml/min ([facility]) 9/13/2017    Colon polyp 9/13/2017    Coronary artery disease     Diabetes mellitus ([facility] [address] )     COMBS (dyspnea on exertion) 11/02/2018    Essential hypertension 9/13/2017    Gastritis 9/13/2017    Hypertension     Irregular heart beat     Mechanical heart valve present 9/13/2017    Osteoarthritis     Pacemaker     S/P AVR     Spinal stenosis     disk herniation      Past Surgical History:   Procedure Laterality Date    AORTIC VALVE REPLACEMENT      CARDIAC SURGERY      CATARACT EXTRACTION      CHOLECYSTECTOMY      COLONOSCOPY      ESOPHAGOGASTRODUODENOSCOPY      HERNIA REPAIR      x3    HYSTERECTOMY      INSERT / REPLACE / REMOVE PACEMAKER      IR PELVIC ANGIOGRAPHY / INTERVENTION  1/29/2019    JOINT REPLACEMENT Bilateral     knees    HI COLONOSCOPY FLX DX W/COLLJ SPEC WHEN PFRMD N/A 9/12/2017    Procedure: Vesta pandeyAND COLONOSCOPY with polypectomies;  Surgeon: Wetzel Canavan, MD;  Location: [facility];   Service: Gastroenterology    HI SIGMOIDOSCOPY FLX DX W/COLLJ SPEC BR/WA IF PFRMD N/A 9/13/2017    Procedure: colonoscopy with hemo clip x 2;  Surgeon: Faby Cardenas MD;  Location: [facility];   Service: Gastroenterology    ROTATOR CUFF REPAIR Left     SKIN BIOPSY      TONSILLECTOMY             Consults have been placed to:   IP CONSULT TO CASE MANAGEMENT  IP CONSULT TO VASCULAR SURGERY  IP CONSULT TO CASE MANAGEMENT    Vitals:    01/29/19 1256 01/29/19 1301 01/29/19 1306 01/29/19 1647   BP: 158/77 156/83 147/84 127/75   BP Location:    Left arm   Pulse: 85 85 86 84   Resp: 16 18 18 18   Temp:    98 °F (36 7 °C)   TempSrc:    Tympanic   SpO2: 100% 100% 100% 97%       Most recent labs:    Recent Labs      01/28/19   2234  01/29/19   0408   WBC  12 36*  12 21*   HGB  12 5  11 4*   HCT  40 2  36 0   PLT  286  284   K  5 1  4 1   CALCIUM  9 7  9 0   BUN  25  24   CREATININE  1 23  1 24   INR  1 79*  1 78*   AST  55*   --    ALT  43   --    ALKPHOS  97   --        Scheduled Meds:  Current Facility-Administered Medications:  acetaminophen 650 mg Oral Q6H PRN Estefani Alba PA-C   allopurinol 100 mg Oral BID Estefani Alba PA-C   bimatoprost 1 drop Both Eyes HS Estefani Alba PA-C   ferrous sulfate 325 mg Oral Daily With Breakfast Estefani Alba PA-C   gabapentin 600 mg Oral HS Estefani Alba PA-C   heparin  Intravenous Once Krish Rodriguez DO   HYDROcodone-acetaminophen 1 tablet Oral Q6H PRN Estefani Alba PA-C   HYDROmorphone 0 5 mg Intravenous Q3H PRN Estefani Alba PA-C   indapamide 2 5 mg Oral QAM Estefani Alba PA-C   insulin detemir 60 Units Subcutaneous HS Estefani Alba PA-C   insulin lispro 1-5 Units Subcutaneous Q6H [facility] Estefani Alba PA-C   methocarbamol 750 mg Oral BID PRN Estefani Alba PA-C   metoprolol succinate 50 mg Oral Daily Estefani Alba PA-C   ondansetron 4 mg Intravenous Q8H PRN Estefani Alba PA-C   oxybutynin 2 5 mg Oral HS Estefani Alba PA-C   pantoprazole 40 mg Oral Early Morning Estefani Alba PA-C   warfarin 5 mg Oral Daily (warfarin) DO Ham Martinez Infusions:   PRN Meds:   acetaminophen    HYDROcodone-acetaminophen    HYDROmorphone    methocarbamol    ondansetron    Surgical procedures (if appropriate):

## 2019-01-29 NOTE — DISCHARGE INSTRUCTIONS
ARTERIOGRAM    WHAT YOU SHOULD KNOW:   An angiogram is a procedure to look at arteries in your body  Arteries are the blood vessels that carry blood from your heart to your body  AFTER YOU LEAVE:     Self-care:   · Limit activity: Rest for the remainder of the day of your procedure  Have some one with you until the next morning  Keep your arm or leg straight as much as possible  Rest as much as possible, sitting lying or reclining  Walk only to go to the bathroom, to bed or to eat  If the angiogram catheter was put in your leg, use the stairs as little as possible  No driving  · Keep your wound clean and dry  You may shower 24 hours after your procedure  The bandage you have on should fall off in 2-3 days  If there is any drainage from the puncture site, you should put on a clean bandage  · Watch for bleeding and bruising: It is normal to have a bruise and soreness where the angiogram catheter went in  · Diet:   · You may resume your regular diet, Sips of flat soda will help with mild nausea  · Drink more liquids than usual for the next 24 hours      · IMMEDIATELY Contact Interventional Radiology at [phone] [place] PATIENTS: Contact Interventional Radiology at [phone]) Yunier Levin PATIENTS: Contact Interventional Radiology at [phone]) if any of the following occur:  · If your bruise gets larger or if you notice any active bleeding  APPLY DIRECT PRESSURE TO THE BLEEDING SITE  · If you notice increased swelling or have increased pain at the puncture site   · If you have any numbness or pain in the extremity of the puncture site   · If that extremity seems cold or pale      · You have fever greater than 101  · Persistent nausea or vomitting    Follow up with your primary healthcare provider  as directed: Write down your questions so you remember to ask them during your visits

## 2019-01-29 NOTE — ED PROVIDER NOTES
History  Chief Complaint   Patient presents with    Groin Pain     right sided groin pain radiating to right thigh, medial knee, and pain is radiating down right leg  had a venous duplex of right leg today outpatient and sent home  patient has had worsening pain  Pt is an 81year old female with a PMH of aortic valve replacement, chronic A  Fib, essential hypertension, DM type II, CKD stage III presenting with right groin pain for 3 days  Pt states she had a cardiac catheterization done via the right femoral access 1/25/19  She states that she has had 8/10 severe sharp pain in her right groin that radiates down her leg to her popliteal fossa  She underwent a duplex this afternoon that ruled out aneurysm and DVT  She has ecchymosis across her lower abdomen and increased swelling in her right groin and leg compared to the left  She is also complaining of vaginal spotting  She denies n/v/d, urinary symptoms, hematochezia or melena  She denies chest pain or SOB  Pain is exacerbated with movement  Her pain has been getting progressively worse and she is now unable to ambulate  She has been bridged with Lovenox since Thursday, and has been taking 5 mg of Coumadin per day since then  She has not taken Coumadin today thus far  Prior to Admission Medications   Prescriptions Last Dose Informant Patient Reported? Taking?    HYDROcodone-acetaminophen (NORCO) 5-325 mg per tablet  Self No No   Sig: Take 1 tablet by mouth every 6 (six) hours as needed for pain for up to 10 doses Max Daily Amount: 4 tablets   Magnesium 250 MG TABS  Self Yes No   Sig: Take 1 each by mouth daily   Multiple Vitamin (MULTIVITAMIN) tablet  Self Yes No   Sig: Take 1 tablet by mouth daily   Multiple Vitamins-Minerals (ICAPS AREDS 2 PO)  Self Yes No   Sig: Take 2 capsules by mouth daily   RABEprazole (ACIPHEX) 20 MG tablet  Self Yes No   Sig: Take 20 mg by mouth daily   allopurinol (ZYLOPRIM) 100 mg tablet  Self Yes No   Sig: Take 100 mg by mouth 2 (two) times a day     aspirin (ECOTRIN LOW STRENGTH) 81 mg EC tablet  Self Yes No   Sig: Take 81 mg by mouth daily   bimatoprost (LUMIGAN) 0 01 % ophthalmic drops  Self Yes No   Sig: Administer 1 drop to both eyes daily at bedtime   calcium citrate-vitamin D (CITRACAL+D) 315-200 MG-UNIT per tablet  Self Yes No   Sig: Take 1 tablet by mouth 2 (two) times a day   cholecalciferol (VITAMIN D3) 1,000 units tablet  Self Yes No   Sig: Take 1,000 Units by mouth daily   ferrous sulfate 325 (65 Fe) mg tablet  Self Yes No   Sig: Take 325 mg by mouth daily with breakfast   gabapentin (NEURONTIN) 300 mg capsule   No No   Sig: Take 2 capsules (600 mg total) by mouth daily at bedtime   indapamide (LOZOL) 2 5 mg tablet  Self Yes No   Sig: Take 2 5 mg by mouth every morning   insulin aspart (NovoLOG) 100 units/mL injection  Self Yes No   Sig: Inject under the skin 3 (three) times a day before meals   insulin detemir (LEVEMIR) 100 units/mL subcutaneous injection  Self Yes No   Sig: Inject 60 Units under the skin daily at bedtime     methocarbamol (ROBAXIN) 500 mg tablet  Self Yes No   Sig: Take 750 mg by mouth 2 (two) times a day     metoprolol succinate (TOPROL-XL) 50 mg 24 hr tablet  Self Yes No   Sig: Take 50 mg by mouth daily   polyethylene glycol-propylene glycol (SYSTANE) 0 4-0 3 %   Yes No   Sig: every 3 (three) hours as needed   tolterodine (DETROL) 1 mg tablet  Self Yes No   Sig: Take 1 mg by mouth daily at bedtime   warfarin (COUMADIN) 2 5 mg tablet  Self Yes No   Sig: Take 3 75 mg by mouth daily 5mg on Tuesday      Facility-Administered Medications: None       Past Medical History:   Diagnosis Date    Chronic atrial fibrillation (HCC) 9/13/2017    Chronic gout 9/13/2017    Chronic rhinitis 11/08/2018    CKD (chronic kidney disease) stage 3, GFR 30-59 ml/min (HCC) 9/13/2017    Colon polyp 9/13/2017    Coronary artery disease     Diabetes mellitus ([address] )     COMBS (dyspnea on exertion) 11/02/2018    Essential hypertension 9/13/2017    Gastritis 9/13/2017    Hypertension     Irregular heart beat     Mechanical heart valve present 9/13/2017    Osteoarthritis     Pacemaker     S/P AVR     Spinal stenosis     disk herniation        Past Surgical History:   Procedure Laterality Date    AORTIC VALVE REPLACEMENT      CARDIAC SURGERY      CATARACT EXTRACTION      CHOLECYSTECTOMY      COLONOSCOPY      ESOPHAGOGASTRODUODENOSCOPY      HERNIA REPAIR      x3    HYSTERECTOMY      INSERT / REPLACE / REMOVE PACEMAKER      JOINT REPLACEMENT Bilateral     knees    PA COLONOSCOPY FLX DX W/COLLJ SPEC WHEN PFRMD N/A 9/12/2017    Procedure: EGDwith bxAND COLONOSCOPY with polypectomies;  Surgeon: Jh Galvan MD;  Location: [facility]; Service: Gastroenterology    PA SIGMOIDOSCOPY FLX DX W/COLLJ SPEC BR/WA IF PFRMD N/A 9/13/2017    Procedure: colonoscopy with hemo clip x 2;  Surgeon: Jh Galvan MD;  Location: [facility]; Service: Gastroenterology    ROTATOR CUFF REPAIR Left     SKIN BIOPSY      TONSILLECTOMY         Family History   Problem Relation Age of Onset    Diabetes Mother     Hypertension Mother     Heart disease Father     Emphysema Father     Asthma Sister     Cancer Sister     Hypertension Sister      I have reviewed and agree with the history as documented  Social History   Substance Use Topics    Smoking status: Former Smoker    Smokeless tobacco: Never Used      Comment: quit more than 40 yr    Alcohol use No        Review of Systems   Constitutional: Negative for activity change, appetite change, chills, diaphoresis, fatigue and fever  Respiratory: Negative for cough, chest tightness and shortness of breath  Cardiovascular: Negative for chest pain  Gastrointestinal: Negative for abdominal pain, constipation, diarrhea, nausea and vomiting  Genitourinary: Positive for vaginal bleeding   Negative for decreased urine volume, difficulty urinating, dysuria, flank pain, frequency, hematuria, menstrual problem, pelvic pain, urgency, vaginal discharge and vaginal pain  Musculoskeletal: Positive for gait problem and myalgias  Negative for back pain  Neurological: Negative for dizziness, weakness, light-headedness and headaches  Physical Exam  Physical Exam   Constitutional: She is oriented to person, place, and time  She appears well-developed and well-nourished  No distress  HENT:   Head: Normocephalic and atraumatic  Right Ear: External ear normal    Left Ear: External ear normal    Nose: Nose normal    Mouth/Throat: Oropharynx is clear and moist  No oropharyngeal exudate  Eyes: Pupils are equal, round, and reactive to light  Conjunctivae and EOM are normal  Right eye exhibits no discharge  Left eye exhibits no discharge  No scleral icterus  Neck: Normal range of motion  Neck supple  No JVD present  No tracheal deviation present  Cardiovascular: Normal rate, regular rhythm, normal heart sounds and intact distal pulses  Pulses:       Femoral pulses are 2+ on the right side, and 2+ on the left side  Dorsalis pedis pulses are 2+ on the right side, and 2+ on the left side  Pulmonary/Chest: Effort normal and breath sounds normal  No stridor  Abdominal: Soft  Bowel sounds are normal  She exhibits no distension and no mass  There is tenderness  There is no rebound and no guarding  RLQ and groin tenderness to palpation  Ecchymosis of the lower abdomen  Musculoskeletal: Normal range of motion  Swelling of the right groin and thigh when compared to left  No erythema or ecchymosis of the leg  Pain with ROM, but no decreased ROM  Lymphadenopathy:     She has no cervical adenopathy  Neurological: She is alert and oriented to person, place, and time  She has normal strength  No cranial nerve deficit or sensory deficit  She exhibits normal muscle tone  Coordination normal    Skin: Skin is warm and dry  Capillary refill takes less than 2 seconds   She is not diaphoretic         Vital Signs  ED Triage Vitals   Temperature Pulse Respirations Blood Pressure SpO2   01/28/19 1953 01/28/19 1955 01/28/19 1955 01/28/19 1955 01/28/19 1955   (!) 97 °F (36 1 °C) 85 18 (!) 180/82 98 %      Temp Source Heart Rate Source Patient Position - Orthostatic VS BP Location FiO2 (%)   01/28/19 1953 01/28/19 1955 01/28/19 1955 01/28/19 1955 --   Temporal Monitor Sitting Right arm       Pain Score       01/28/19 1955       Worst Possible Pain           Vitals:    01/28/19 1955 01/28/19 2215 01/28/19 2245 01/29/19 0050   BP: (!) 180/82 136/65 (!) 190/84 (!) 180/76   Pulse: 85 74 74 70   Patient Position - Orthostatic VS: Sitting Lying Lying Lying       Visual Acuity  Visual Acuity      Most Recent Value   L Pupil Size (mm)  3   R Pupil Size (mm)  3   L Pupil Shape  Round   R Pupil Shape  Round          ED Medications  Medications   morphine (PF) 4 mg/mL injection 4 mg (4 mg Intravenous Given 1/28/19 2245)   iohexol (OMNIPAQUE) 350 MG/ML injection (SINGLE-DOSE) 100 mL (100 mL Intravenous Given 1/28/19 2341)   HYDROmorphone (DILAUDID) injection 1 mg (1 mg Intravenous Given 1/29/19 0102)       Diagnostic Studies  Results Reviewed     Procedure Component Value Units Date/Time    Comprehensive metabolic panel [[id number]]  (Abnormal) Collected:  01/28/19 2234    Lab Status:  Final result Specimen:  Blood from Arm, Left Updated:  01/28/19 2324     Sodium 135 (L) mmol/L      Potassium 5 1 mmol/L      Chloride 97 (L) mmol/L      CO2 29 mmol/L      ANION GAP 9 mmol/L      BUN 25 mg/dL      Creatinine 1 23 mg/dL      Glucose 231 (H) mg/dL      Calcium 9 7 mg/dL      AST 55 (H) U/L      ALT 43 U/L      Alkaline Phosphatase 97 U/L      Total Protein 8 6 (H) g/dL      Albumin 3 7 g/dL      Total Bilirubin 0 66 mg/dL      eGFR 41 ml/min/1 73sq m     Narrative:         National Kidney Disease Education Program recommendations are as follows:  GFR calculation is accurate only with a steady state creatinine  Chronic Kidney disease less than 60 ml/min/1 73 sq  meters  Kidney failure less than 15 ml/min/1 73 sq  meters  Protime-INR [[id number]]  (Abnormal) Collected:  01/28/19 2234    Lab Status:  Final result Specimen:  Blood from Arm, Left Updated:  01/28/19 2253     Protime 20 9 (H) seconds      INR 1 79 (H)    APTT [[id number]]  (Abnormal) Collected:  01/28/19 2234    Lab Status:  Final result Specimen:  Blood from Arm, Left Updated:  01/28/19 2253     PTT 43 (H) seconds     CBC and differential [[id number]]  (Abnormal) Collected:  01/28/19 2234    Lab Status:  Final result Specimen:  Blood from Arm, Left Updated:  01/28/19 2241     WBC 12 36 (H) Thousand/uL      RBC 4 23 Million/uL      Hemoglobin 12 5 g/dL      Hematocrit 40 2 %      MCV 95 fL      MCH 29 6 pg      MCHC 31 1 (L) g/dL      RDW 15 5 (H) %      MPV 9 8 fL      Platelets 604 Thousands/uL      nRBC 0 /100 WBCs      Neutrophils Relative 86 (H) %      Immat GRANS % 1 %      Lymphocytes Relative 10 (L) %      Monocytes Relative 3 (L) %      Eosinophils Relative 0 %      Basophils Relative 0 %      Neutrophils Absolute 10 60 (H) Thousands/µL      Immature Grans Absolute 0 06 Thousand/uL      Lymphocytes Absolute 1 23 Thousands/µL      Monocytes Absolute 0 39 Thousand/µL      Eosinophils Absolute 0 04 Thousand/µL      Basophils Absolute 0 04 Thousands/µL                  CT abdomen pelvis with contrast   Final Result by Aditya Villarreal MD (01/29 0025)   Addendum 1 of 1 by Aditya Villarreal MD (01/29 6726)   ADDENDUM:      There is enlargement of the right hip abductor musculature predominantly    of the right pectineus and obturator externus muscles compatible with    intramuscular hematoma  There are small foci of active extravasation seen    within the muscle for example    (series 2, image 80)  The right femoral artery is patent without evidence    of pseudoaneurysm    The right femoral vein enhances normally without    evidence of arteriovenous fistula  Final      1  No acute inflammatory process identified within the abdomen or pelvis  2   Other findings as above  Workstation performed: [id number]                    Procedures  Procedures       Phone Contacts  ED Phone Contact    ED Course                               MDM  Number of Diagnoses or Management Options  Hematoma of leg, right, initial encounter:   Diagnosis management comments: CT of the a/p revealed intramuscular hematoma of the right abductor muscle with active extravasation within the muscle  INR is stable at 1 79  Pain is controlled with Dilaudid  Vitals have remained stable throughout ED course  No signs of decompensating  Hemoglobin is 12 5 and HCT 40 2  I spoke to vascular surgery and interventional radiology and they recommended admitting her to [facility], and having IR evaluate her in the morning for potential arterial gram  She had 0 8 of Lovenox this morning but did not take Coumadin  She will be admitted to [address] [place] [address]  Disposition  Final diagnoses:   Hematoma of leg, right, initial encounter - Right adductor muscle     Time reflects when diagnosis was documented in both MDM as applicable and the Disposition within this note     Time User Action Codes Description Comment    1/29/2019  1:02 AM Cira AFGAN Add [S80 11XA] Hematoma of leg, right, initial encounter     1/29/2019  1:02 AM Cira FAGAN Modify [S80 11XA] Hematoma of leg, right, initial encounter Right adductor muscle      ED Disposition     None      Follow-up Information    None         Patient's Medications   Discharge Prescriptions    No medications on file     No discharge procedures on file      ED Provider  Electronically Signed by           Tanya Carrington PA-C  01/29/19 0609

## 2019-01-29 NOTE — PROGRESS NOTES
Patient returned from IR  Must remain flat until 1600  Patient aware  Patient resting comfortably  Will continue to monitor

## 2019-01-29 NOTE — ED NOTES
Pt took vicodin at 1530 and 2 extra strength tylenol at 1730 w no relief   Pt denies and change in leg size, no bruising on leg      Higinio Metz RN  01/28/19 2133       Higinio Metz RN  01/28/19 6131

## 2019-01-29 NOTE — ASSESSMENT & PLAN NOTE
R proximal thigh hematoma after right/left heart cath 1/23/19  -CT abdomen pelvis with contrast reviewed  Intramuscular hematoma right thigh abductors with foci of active extravasation without expanding hematoma  No evidence of R CFA pseudoaneurysm or AVF  -planned angiogram w/IR today per ER workup  -no acute vascular intervention recommended  -continue observation  -in light of CKD III and 2 recent contrast loads w/cardiac cath 1/23/19 and CT today, recommend IV hydration pre and post angiogram today if performed  Baseline LVEF not available on chart  -continue anticoagulation due to mechanical AVR  -d/w Dr Zarate [facility] Doctor  Thank you for allowing us to participate in the care of this patient

## 2019-01-29 NOTE — ASSESSMENT & PLAN NOTE
Chronic atrial fibrillation, s/p AV node ablation and PPM  -pacer dependent  -continue anticoagulation therapy

## 2019-01-30 PROBLEM — D62 ACUTE BLOOD LOSS ANEMIA: Status: ACTIVE | Noted: 2019-01-30

## 2019-01-30 LAB
ALBUMIN SERPL BCP-MCNC: 3 G/DL (ref 3.5–5)
ALP SERPL-CCNC: 72 U/L (ref 46–116)
ALT SERPL W P-5'-P-CCNC: 41 U/L (ref 12–78)
ANION GAP SERPL CALCULATED.3IONS-SCNC: 6 MMOL/L (ref 4–13)
APTT PPP: 71 SECONDS (ref 26–38)
APTT PPP: 88 SECONDS (ref 26–38)
AST SERPL W P-5'-P-CCNC: 45 U/L (ref 5–45)
BILIRUB SERPL-MCNC: 0.61 MG/DL (ref 0.2–1)
BUN SERPL-MCNC: 27 MG/DL (ref 5–25)
CALCIUM SERPL-MCNC: 8.2 MG/DL (ref 8.3–10.1)
CHLORIDE SERPL-SCNC: 100 MMOL/L (ref 100–108)
CO2 SERPL-SCNC: 32 MMOL/L (ref 21–32)
CREAT SERPL-MCNC: 1.19 MG/DL (ref 0.6–1.3)
ERYTHROCYTE [DISTWIDTH] IN BLOOD BY AUTOMATED COUNT: 15.8 % (ref 11.6–15.1)
GFR SERPL CREATININE-BSD FRML MDRD: 43 ML/MIN/1.73SQ M
GLUCOSE SERPL-MCNC: 131 MG/DL (ref 65–140)
GLUCOSE SERPL-MCNC: 144 MG/DL (ref 65–140)
GLUCOSE SERPL-MCNC: 171 MG/DL (ref 65–140)
GLUCOSE SERPL-MCNC: 189 MG/DL (ref 65–140)
GLUCOSE SERPL-MCNC: 219 MG/DL (ref 65–140)
HCT VFR BLD AUTO: 29.9 % (ref 34.8–46.1)
HGB BLD-MCNC: 9.2 G/DL (ref 11.5–15.4)
INR PPP: 2.03 (ref 0.86–1.17)
MCH RBC QN AUTO: 29.8 PG (ref 26.8–34.3)
MCHC RBC AUTO-ENTMCNC: 30.8 G/DL (ref 31.4–37.4)
MCV RBC AUTO: 97 FL (ref 82–98)
PLATELET # BLD AUTO: 233 THOUSANDS/UL (ref 149–390)
PMV BLD AUTO: 10 FL (ref 8.9–12.7)
POTASSIUM SERPL-SCNC: 3.9 MMOL/L (ref 3.5–5.3)
PROT SERPL-MCNC: 6.9 G/DL (ref 6.4–8.2)
PROTHROMBIN TIME: 23 SECONDS (ref 11.8–14.2)
RBC # BLD AUTO: 3.09 MILLION/UL (ref 3.81–5.12)
SODIUM SERPL-SCNC: 138 MMOL/L (ref 136–145)
WBC # BLD AUTO: 11.96 THOUSAND/UL (ref 4.31–10.16)

## 2019-01-30 PROCEDURE — 85730 THROMBOPLASTIN TIME PARTIAL: CPT | Performed by: INTERNAL MEDICINE

## 2019-01-30 PROCEDURE — G8988 SELF CARE GOAL STATUS: HCPCS

## 2019-01-30 PROCEDURE — G8987 SELF CARE CURRENT STATUS: HCPCS

## 2019-01-30 PROCEDURE — 85027 COMPLETE CBC AUTOMATED: CPT | Performed by: INTERNAL MEDICINE

## 2019-01-30 PROCEDURE — G8979 MOBILITY GOAL STATUS: HCPCS

## 2019-01-30 PROCEDURE — 97163 PT EVAL HIGH COMPLEX 45 MIN: CPT

## 2019-01-30 PROCEDURE — G8978 MOBILITY CURRENT STATUS: HCPCS

## 2019-01-30 PROCEDURE — 99232 SBSQ HOSP IP/OBS MODERATE 35: CPT | Performed by: INTERNAL MEDICINE

## 2019-01-30 PROCEDURE — 82948 REAGENT STRIP/BLOOD GLUCOSE: CPT

## 2019-01-30 PROCEDURE — 97167 OT EVAL HIGH COMPLEX 60 MIN: CPT

## 2019-01-30 PROCEDURE — 85610 PROTHROMBIN TIME: CPT | Performed by: INTERNAL MEDICINE

## 2019-01-30 PROCEDURE — 99232 SBSQ HOSP IP/OBS MODERATE 35: CPT | Performed by: PHYSICIAN ASSISTANT

## 2019-01-30 PROCEDURE — 80053 COMPREHEN METABOLIC PANEL: CPT | Performed by: INTERNAL MEDICINE

## 2019-01-30 RX ADMIN — HYDROMORPHONE HYDROCHLORIDE 0.5 MG: 1 INJECTION, SOLUTION INTRAMUSCULAR; INTRAVENOUS; SUBCUTANEOUS at 11:30

## 2019-01-30 RX ADMIN — ALLOPURINOL 100 MG: 100 TABLET ORAL at 09:04

## 2019-01-30 RX ADMIN — HYDROMORPHONE HYDROCHLORIDE 0.5 MG: 1 INJECTION, SOLUTION INTRAMUSCULAR; INTRAVENOUS; SUBCUTANEOUS at 01:25

## 2019-01-30 RX ADMIN — INSULIN DETEMIR 60 UNITS: 100 INJECTION, SOLUTION SUBCUTANEOUS at 21:38

## 2019-01-30 RX ADMIN — HYDROMORPHONE HYDROCHLORIDE 0.5 MG: 1 INJECTION, SOLUTION INTRAMUSCULAR; INTRAVENOUS; SUBCUTANEOUS at 19:11

## 2019-01-30 RX ADMIN — HYDROCODONE BITARTRATE AND ACETAMINOPHEN 1 TABLET: 5; 325 TABLET ORAL at 09:03

## 2019-01-30 RX ADMIN — BIMATOPROST 1 DROP: 0.1 SOLUTION/ DROPS OPHTHALMIC at 21:38

## 2019-01-30 RX ADMIN — PANTOPRAZOLE SODIUM 40 MG: 40 TABLET, DELAYED RELEASE ORAL at 05:20

## 2019-01-30 RX ADMIN — FERROUS SULFATE TAB 325 MG (65 MG ELEMENTAL FE) 325 MG: 325 (65 FE) TAB at 09:03

## 2019-01-30 RX ADMIN — INSULIN LISPRO 2 UNITS: 100 INJECTION, SOLUTION INTRAVENOUS; SUBCUTANEOUS at 13:32

## 2019-01-30 RX ADMIN — ALLOPURINOL 100 MG: 100 TABLET ORAL at 17:57

## 2019-01-30 RX ADMIN — WARFARIN SODIUM 5 MG: 5 TABLET ORAL at 17:57

## 2019-01-30 RX ADMIN — HYDROCODONE BITARTRATE AND ACETAMINOPHEN 1 TABLET: 5; 325 TABLET ORAL at 15:19

## 2019-01-30 RX ADMIN — HYDROMORPHONE HYDROCHLORIDE 0.5 MG: 1 INJECTION, SOLUTION INTRAMUSCULAR; INTRAVENOUS; SUBCUTANEOUS at 06:49

## 2019-01-30 RX ADMIN — OXYBUTYNIN CHLORIDE 2.5 MG: 5 TABLET ORAL at 21:38

## 2019-01-30 RX ADMIN — GABAPENTIN 600 MG: 300 CAPSULE ORAL at 21:38

## 2019-01-30 RX ADMIN — INSULIN LISPRO 1 UNITS: 100 INJECTION, SOLUTION INTRAVENOUS; SUBCUTANEOUS at 17:56

## 2019-01-30 NOTE — ASSESSMENT & PLAN NOTE
R proximal thigh intermuscular hematoma s/p R CFV/CFA access for right/left heart cath 1/23/19, s/p coil embolization branch of superficial epigastric artery (IR) 1/29/19  -CT abdomen pelvis with contrast reviewed  Intramuscular hematoma right thigh abductors with foci of active extravasation without expanding hematoma  No evidence of R CFA pseudoaneurysm or AVF  -no further vascular intervention recommended  -symptoms improving  -H/H 9 2/29 9, slight drift but stable  -continue anticoagulation with heparin-->coumadin bridge due to mechanical AVR  -will see on a p r n  Basis during his hospitalization  No outpatient follow-up required    Please call if we can be of any further assistance  -discussed with Dr Roman Sultana  -discussed with Dr Alisia Ramirez

## 2019-01-30 NOTE — ASSESSMENT & PLAN NOTE
Hematoma of right thigh due to cardiac catheterization and on anticoagulation  Affected artery has been successfully embolized by IR    Does have drop in hemoglobin which probably reflects recent bleeding

## 2019-01-30 NOTE — PROGRESS NOTES
Progress Note -  Solders 1935, 81 y o  female MRN: [id number]    Unit/Bed#: [place] [address] -01 Encounter: [id number]    Primary Care Provider: Georgie Hashimoto , MD   Date and time admitted to hospital: 1/28/2019  9:27 PM    * Hematoma   Assessment & Plan    R proximal thigh intermuscular hematoma s/p R CFV/CFA access for right/left heart cath 1/23/19, s/p coil embolization branch of superficial epigastric artery (IR) 1/29/19  -CT abdomen pelvis with contrast reviewed  Intramuscular hematoma right thigh abductors with foci of active extravasation without expanding hematoma  No evidence of R CFA pseudoaneurysm or AVF  -no further vascular intervention recommended  -symptoms improving  -H/H 9 2/29 9, slight drift but stable  -continue anticoagulation with heparin-->coumadin bridge due to mechanical AVR  -will see on a p r n  Basis during his hospitalization  No outpatient follow-up required  Please call if we can be of any further assistance  -discussed with Dr Gloria Flower  -discussed with Dr Zeferino Ames     Chronic kidney disease, stage 3 [facility])   Assessment & Plan    -baseline creatinine 1 2 range  -creat stable at 1 19 this am  -continue to trend creatinine  -continue to avoid nephrotoxic agents  -management per primary service     Mechanical heart valve present   Assessment & Plan    St Ra mechanical AVR '98  -on chronic Coumadin therapy  -pre-hospital on Lovenox-->coumadin bridge due to recent cardiac cath 1/23/19  -INR 2 03 this am  -continue heparin--->coumadin bridge for target INR 2 5-3 5  -management per primary service     Chronic atrial fibrillation [facility])   Assessment & Plan    Chronic atrial fibrillation, s/p AV node ablation and PPM  -pacer dependent  -continue anticoagulation therapy         Subjective:  POD #1 coil embolization branches superficial epigastric artery (IR)  Patient awake, alert without new complaints  States right groin and buttocks pain improved but still present    Denies right lower extremity paresthesias, weakness, numbness or rest pain  Denies left groin access site pain  Slight drift in H/H but 9 2/29  9  Creatinine stable 1 19 this a m  and baseline  VSS    Vitals:  /61 (BP Location: Right arm)   Pulse 84   Temp 99 1 °F (37 3 °C) (Tympanic)   Resp 18   SpO2 98%     I/Os:  No intake/output data recorded  I/O this shift:   In: 997 5 [I V :997 5]  Out: -     Lab Results and Cultures:   Lab Results   Component Value Date    WBC 11 96 (H) 01/30/2019    HGB 9 2 (L) 01/30/2019    HCT 29 9 (L) 01/30/2019    MCV 97 01/30/2019     01/30/2019     Lab Results   Component Value Date    CALCIUM 8 2 (L) 01/30/2019    K 3 9 01/30/2019    CO2 32 01/30/2019     01/30/2019    BUN 27 (H) 01/30/2019    CREATININE 1 19 01/30/2019     Lab Results   Component Value Date    INR 2 03 (H) 01/30/2019    INR 1 78 (H) 01/29/2019    INR 1 79 (H) 01/28/2019    PROTIME 23 0 (H) 01/30/2019    PROTIME 20 8 (H) 01/29/2019    PROTIME 20 9 (H) 01/28/2019        Blood Culture: No results found for: BLOODCX,   Urinalysis:   Lab Results   Component Value Date    COLORU Yellow 07/24/2018    CLARITYU Clear 07/24/2018    SPECGRAV 1 019 07/24/2018    PHUR 6 0 07/24/2018    LEUKOCYTESUR Trace (A) 07/24/2018    NITRITE Negative 07/24/2018    GLUCOSEU Negative 07/24/2018    KETONESU Negative 07/24/2018    BILIRUBINUR Negative 07/24/2018    BLOODU Negative 07/24/2018   ,   Urine Culture: No results found for: URINECX,   Wound Culure: No results found for: WOUNDCULT    Medications:  Current Facility-Administered Medications   Medication Dose Route Frequency    acetaminophen (TYLENOL) tablet 650 mg  650 mg Oral Q6H PRN    allopurinol (ZYLOPRIM) tablet 100 mg  100 mg Oral BID    bimatoprost (LUMIGAN) 0 01 % ophthalmic solution 1 drop  1 drop Both Eyes HS    ferrous sulfate tablet 325 mg  325 mg Oral Daily With Breakfast    gabapentin (NEURONTIN) capsule 600 mg  600 mg Oral HS    heparin (porcine) 25,000 units in 250 mL infusion (premix)  3-20 Units/kg/hr (Order-Specific) Intravenous Titrated    HYDROcodone-acetaminophen (NORCO) 5-325 mg per tablet 1 tablet  1 tablet Oral Q6H PRN    HYDROmorphone (DILAUDID) injection 0 5 mg  0 5 mg Intravenous Q3H PRN    indapamide (LOZOL) tablet 2 5 mg  2 5 mg Oral QAM    insulin detemir (LEVEMIR) subcutaneous injection 60 Units  60 Units Subcutaneous HS    insulin lispro (HumaLOG) 100 units/mL subcutaneous injection 1-6 Units  1-6 Units Subcutaneous 4x Daily (AC & HS)    methocarbamol (ROBAXIN) tablet 750 mg  750 mg Oral BID PRN    metoprolol succinate (TOPROL-XL) 24 hr tablet 50 mg  50 mg Oral Daily    ondansetron (ZOFRAN) injection 4 mg  4 mg Intravenous Q8H PRN    oxybutynin (DITROPAN) tablet 2 5 mg  2 5 mg Oral HS    pantoprazole (PROTONIX) EC tablet 40 mg  40 mg Oral Early Morning    sodium chloride 0 9 % infusion  75 mL/hr Intravenous Continuous    warfarin (COUMADIN) tablet 5 mg  5 mg Oral Daily (warfarin)       Imaging:  Pelvic angiogram (IR) 1/29/2019:  IMPRESSION:  Impression: No definite extravasation or active bleeding identified  Small irregularity off of a branch of the superficial epigastric artery which was embolized    Physical Exam:    General appearance: alert and oriented, in no acute distress  Neurologic: Grossly normal  Neck: no adenopathy, no carotid bruit, no JVD, supple, symmetrical, trachea midline and thyroid not enlarged, symmetric, no tenderness/mass/nodules  Lungs: clear to auscultation bilaterally  Heart: regular rate and rhythm, S1: normal, S2: normal prosthetic S2, no rub and No murmur  Abdomen: soft, non-tender; bowel sounds normal; no masses,  no organomegaly and Nondistended  No abdominal bruits    Stable ecchymosis right suprapubic area  Extremities: extremities normal, warm and well-perfused; no cyanosis, clubbing, or edema, no ulcers, gangrene or trophic changes and Ecchymosis noted right groin extending on to suprapubic area   No palpable hematoma or pulsatile mass  Mild tenderness with palpation of right proximal medial groin no right lower extremity edema  Chronic venous stasis changes bilateral lower legs  Bilateral lower extremities motor and sensory intact with palpable distal pulses  Left groin access site clear without erythema, induration, drainage, hemorrhage or hematoma    Dressing intact    Pulse exam:  Radial: Right: 2+ Left[de-identified] 2+  Femoral: Right: 2+ Left: 2+  DP: Right: 2+ Left: 2+  PT: Right: 1+ Left: 1+      Cari Montaño PA-C  1/30/2019  [facility], [phone]

## 2019-01-30 NOTE — PLAN OF CARE

## 2019-01-30 NOTE — ASSESSMENT & PLAN NOTE
Chronic atrial fibrillation rate controlled on metoprolol    On heparin drip bridging for mechanical valve regarding warfarin

## 2019-01-30 NOTE — ASSESSMENT & PLAN NOTE
Blood loss anemia due to thigh hematoma      Results from last 7 days  Lab Units 01/30/19  0626 01/29/19  0408 01/28/19  2234   HEMOGLOBIN g/dL 9 2* 11 4* 12 5

## 2019-01-30 NOTE — SOCIAL WORK
CM met with patient at bedside to address discharge needs  CM pointed out name/number on the white board and communicated she was that individual      Patient lives with her , Rachael Hooksutant, in a multiple-story home; bedroom on the 2nd floor  PTA, patient had no difficulty with stairs, but after this medical event she believes she will have difficulty  Patient is independent with ADLs and functional mobility  Food shopping & meal prep is done by patient  Patient does not use any DMEs  Patient is not able to ambulate without assistance from a seated or laying position  Hx of VNA reported  Patient is being recommended for STR; list provided  Patient is not involved in any community activities  Patient is employed  PCP identified as Alexander Gregory, a [facility] physician  No POA identified but dtr, Gustavo Marsh, is permitted to be healthcare representative, spokesperson for the family and designated caregiver if/when needed  Patient uses [facility] on [address]  for Rx needs; patient made aware of [facility] in house  Patient does drive; reports her  is available to transport home  Is this not a readmission within the last 30 days  CM will remain available and follow as needed  CM also encouraged patient to follow up with all/any recommended appointments after discharge  Patient advised of importance for patient and family to participate in managing patients medical well being

## 2019-01-30 NOTE — PHYSICAL THERAPY NOTE
PT EVALUATION    81 y o     [id number]    Leg pain [M79 606]  Hematoma of leg, right, initial encounter [S80 11XA]    Past Medical History:   Diagnosis Date    Chronic atrial fibrillation ([facility] ) 9/13/2017    Chronic gout 9/13/2017    Chronic rhinitis 11/08/2018    CKD (chronic kidney disease) stage 3, GFR 30-59 ml/min ([facility]) 9/13/2017    Colon polyp 9/13/2017    Coronary artery disease     Diabetes mellitus ([facility] )     COMBS (dyspnea on exertion) 11/02/2018    Essential hypertension 9/13/2017    Gastritis 9/13/2017    Hypertension     Irregular heart beat     Mechanical heart valve present 9/13/2017    Osteoarthritis     Pacemaker     S/P AVR     Spinal stenosis     disk herniation          Past Surgical History:   Procedure Laterality Date    AORTIC VALVE REPLACEMENT      CARDIAC SURGERY      CATARACT EXTRACTION      CHOLECYSTECTOMY      COLONOSCOPY      ESOPHAGOGASTRODUODENOSCOPY      HERNIA REPAIR      x3    HYSTERECTOMY      INSERT / REPLACE / REMOVE PACEMAKER      IR PELVIC ANGIOGRAPHY / INTERVENTION  1/29/2019    JOINT REPLACEMENT Bilateral     knees    DC COLONOSCOPY FLX DX W/COLLJ SPEC WHEN PFRMD N/A 9/12/2017    Procedure: Zia Benoit bxAND COLONOSCOPY with polypectomies;  Surgeon: Maria Victoria Allen MD;  [facility]: [facility]; Service: Gastroenterology    DC SIGMOIDOSCOPY FLX DX W/COLLJ SPEC BR/WA IF PFRMD N/A 9/13/2017    Procedure: colonoscopy with hemo clip x 2;  Surgeon: Maria Victoria Allen MD;  [facility]: [facility]; Service: Gastroenterology    ROTATOR CUFF REPAIR Left     SKIN BIOPSY      TONSILLECTOMY        01/30/19 1204   Note Type   Note type Eval only   Pain Assessment   Pain Score 8   Pain Type Acute pain   Pain Location Groin   Pain Orientation Right   Home Living   Type of Home House   Home Layout Two level;Bed/bath upstairs  (1 LAUREL  13 steps to 2nd)   Home Equipment Walker;Cane  (not using PTA)   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   + drives     Prior Function   Level of [place] Independent with ADLs and functional mobility   Lives With Spouse   Receives Help From Family   ADL Assistance Independent   IADLs Independent   Falls in the last 6 months 0   Vocational Part time employment   Comments I without use of AD  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   Restrictions/Precautions   Weight Bearing Precautions Per Order No   Other Precautions Fall Risk;Pain;Multiple lines   General   Additional Pertinent History Pt is 80 y/o female admitted with R groin pain 2* hematoma p cardiac cath  s/p coil embolization in IR 1/29  PT consulted  Activity as tolerated  Family/Caregiver Present Yes   Cognition   Overall Cognitive Status WFL   RUE Assessment   RUE Assessment WFL   LUE Assessment   LUE Assessment WFL   RLE Assessment   RLE Assessment X   Strength RLE   R Hip Flexion 3/5   R Knee Extension 4-/5   R Ankle Dorsiflexion 4/5   R Ankle Plantar Flexion 4/5   LLE Assessment   LLE Assessment WFL  (groslsy 4/5)   Light Touch   RLE Light Touch Grossly intact  (+ hx of neuropathy)   LLE Light Touch Grossly intact  (+ hx of nueropathy)   Bed Mobility   Sit to Supine 3  Moderate assistance   Additional items Assist x 2; Increased time required;Verbal cues;LE management   Additional Comments seated at EOB upon arrival    Transfers   Sit to Stand 4  Minimal assistance   Additional items Assist x 1; Increased time required;Verbal cues   Stand to Sit 4  Minimal assistance   Additional items Assist x 1; Increased time required;Verbal cues   Additional Comments cues for hand placement and technique  Ambulation/Elevation   Gait pattern Decreased foot clearance; Improper Weight shift;Decreased R stance; Short stride; Excessively slow; Inconsistent vianca; Antalgic   Gait Assistance 4  Minimal assist   Additional items Assist x 1   Assistive Device Rolling walker   Distance Amb with RW 15'x1  Increased time, fatigue and pain noted     Balance   Static Sitting Good   Dynamic Sitting Fair +   Static Standing Fair   Dynamic Standing Fair -   Ambulatory Fair -   Endurance Deficit   Endurance Deficit Yes   Endurance Deficit Description pain, fatigue/   Activity Tolerance   Activity Tolerance Patient limited by fatigue;Patient limited by pain   Medical Staff Made Aware Nurse, Natalya Whittaker   Nurse Made Aware yes   Assessment   Prognosis Good   Problem List Decreased strength;Decreased range of motion;Decreased endurance; Impaired balance;Decreased mobility;Pain   Assessment Pt is 80 y/o female admitted with R leg pain with inability to walk  Found to have Hematoma R thigh following recent cardiac catherization on 1/25  S/P coil embolization of branch of superficial epigastric artery in IR 1/29  PT consulted  Activity as tolerated  Prior to cardiac cath with hematoma, was completely independent without AD, drives and works part time as   Resides with spouse in 2 story home with bed and bath on 2nd floor  Currently presents with moderate functional limitations related to R thigh pain, decreased RLE ROM and strength related to same  Requires increased time and assistance for all mobility and ambulation with RW  Limited tolerance to distances noted with decreased RLE WB, pace and foot clearance  ModA of 2 for sit to supine  Fely for transfers and ambulation with RW support  Given impairments as previously listed will benefit from ongoing PT in order to address and optimize outcomes and abiltiy to return to independent level of function  May benefit from STR at d/c but will monitor disposition with progress  Barriers to Discharge Inaccessible home environment   Goals   Patient Goals to be able to get back to work  STG Expiration Date 02/09/19   Short Term Goal #1 10 days: 1)  Pt will perform bed mobility with Esperanza demonstrating appropriate technique 100% of the time in order to improve function  2)  Perform all transfers with Esperanza demonstrating safe and appropriate technique 100% of the time in order to improve ability to negotiate safely in home environment  3) Amb with least restrictive AD > 200'x1 with mod I in order to demonstrate ability to negotiate in home environment  4)  Improve overall strength and balance 1/2 grade in order to optimize ability to perform functional tasks and reduce fall risk  5) Increase activity tolerance to 45 minutes in order to improve endurance to functional tasks  6)  Negotiate stairs using most appropriate technique and S in order to be able to negotiate safely in home environment  7) PT for ongoing patient and family/caregiver education, DME needs and d/c planning in order to promote highest level of function in least restrictive environment  Treatment Day 0   Plan   Treatment/Interventions Functional transfer training;LE strengthening/ROM; Elevations; Therapeutic exercise; Endurance training;Patient/family training;Equipment eval/education; Bed mobility;Gait training; Compensatory technique education;Continued evaluation;Spoke to nursing;OT;Family   PT Frequency Other (Comment)  (4-5x/wk)   Recommendation   Recommendation Short-term skilled PT  (vs hhpt pending progress )   Equipment Recommended Walker  (RW)   PT - OK to Discharge No  (to home, yes if to [facility])   Additional Comments must acheive IPPT goals if to d/c to home     Modified Dasha Scale   Modified Dasha Scale 4   Barthel Index   Feeding 10   Bathing 0   Grooming Score 5   Dressing Score 5   Bladder Score 10   Bowels Score 10   Toilet Use Score 5   Transfers (Bed/Chair) Score 10   Mobility (Level Surface) Score 0   Stairs Score 0   Barthel Index Score 55   History: co - morbidities, fall risk, use of assistive device, assist for adl's, steps,  multiple lines  Exam: impairments in locomotion, musculoskeletal, balance,posture,skin integrity, cardiac, barthel 55  Clinical: unstable/unpredictable ( fall risk, more restrictive AD use, decreased activity tolerance compared to baseline, pain)  Complexity:high    Mar Cough, PT

## 2019-01-30 NOTE — ASSESSMENT & PLAN NOTE
St Ra mechanical AVR '98  -on chronic Coumadin therapy  -pre-hospital on Lovenox-->coumadin bridge due to recent cardiac cath 1/23/19  -INR 2 03 this am  -continue heparin--->coumadin bridge for target INR 2 5-3 5  -management per primary service

## 2019-01-30 NOTE — PLAN OF CARE
Problem: OCCUPATIONAL THERAPY ADULT  Goal: Performs self-care activities at highest level of function for planned discharge setting  See evaluation for individualized goals  Treatment Interventions: ADL retraining, Functional transfer training, UE strengthening/ROM, Endurance training, Patient/family training, Equipment evaluation/education, Compensatory technique education          See flowsheet documentation for full assessment, interventions and recommendations  Limitation: Decreased ADL status, Decreased UE strength, Decreased endurance  Prognosis: Good  Assessment: Pt is a 84y/o female admitted to the hospital 2* R thigh pain, swelling, and inability to walk  Pt noted with a hematoma, requiring a s/p R LE angiogram, embolization(1/29)  Pt with recent(1/23) hx cardiac cath  No restriction noted with R LE  PTA pt states independence with all aspects of her ADLs, transfers, ambulation--w/o device; neg falls, ocassionally home alone, +  During initial evaluation, pt demonstrated deficits with her functional balance, functional mobility, ADL status, activity tolerance(currently fair=15-20mins), b/l UE strength, and transfer safety  Pt would benefit from continued OT tx for the above deficits  3-5xwk/1-2wks        OT Discharge Recommendation: Short Term Rehab

## 2019-01-30 NOTE — PLAN OF CARE
Problem: PHYSICAL THERAPY ADULT  Goal: Performs mobility at highest level of function for planned discharge setting  See evaluation for individualized goals  Treatment/Interventions: Functional transfer training, LE strengthening/ROM, Elevations, Therapeutic exercise, Endurance training, Patient/family training, Equipment eval/education, Bed mobility, Gait training, Compensatory technique education, Continued evaluation, Spoke to nursing, OT, Family  Equipment Recommended: Winter Vargas (ANGELIKA)       See flowsheet documentation for full assessment, interventions and recommendations  Outcome: Progressing  Prognosis: Good  Problem List: Decreased strength, Decreased range of motion, Decreased endurance, Impaired balance, Decreased mobility, Pain  Assessment: Pt is 82 y/o female admitted with R leg pain with inability to walk  Found to have Hematoma R thigh following recent cardiac catherization on 1/25  S/P coil embolization of branch of superficial epigastric artery in IR 1/29  PT consulted  Activity as tolerated  Prior to cardiac cath with hematoma, was completely independent without AD, drives and works part time as   Resides with spouse in 2 story home with bed and bath on 2nd floor  Currently presents with moderate functional limitations related to R thigh pain, decreased RLE ROM and strength related to same  Requires increased time and assistance for all mobility and ambulation with RW  Limited tolerance to distances noted with decreased RLE WB, pace and foot clearance  ModA of 2 for sit to supine  Fely for transfers and ambulation with RW support  Given impairments as previously listed will benefit from ongoing PT in order to address and optimize outcomes and abiltiy to return to independent level of function  May benefit from STR at d/c but will monitor disposition with progress    Barriers to Discharge: Inaccessible home environment     Recommendation: Short-term skilled PT (vs hhpt pending progress  )     PT - OK to Discharge: No (to home, yes if to rhb)    See flowsheet documentation for full assessment

## 2019-01-30 NOTE — ASSESSMENT & PLAN NOTE
-baseline creatinine 1 2 range  -creat stable at 1 19 this am  -continue to trend creatinine  -continue to avoid nephrotoxic agents  -management per primary service

## 2019-01-30 NOTE — ASSESSMENT & PLAN NOTE
Lab Results   Component Value Date    HGBA1C 8 5 (H) 01/29/2019       Recent Labs      01/29/19   2059  01/30/19   0738  01/30/19   1101  01/30/19   1559   POCGLU  174*  131  219*  171*     Diabetes mellitus, continue levemir and sliding scale

## 2019-01-30 NOTE — OCCUPATIONAL THERAPY NOTE
OccupationalTherapy Evaluation(time=6723-0816)     Patient Name: Sai Proctor  Today's Date: 1/30/2019  Problem List  Patient Active Problem List   Diagnosis    Hypertension    Chronic kidney disease, stage 3 ([address] )    Gout    Mechanical heart valve present    Chronic atrial fibrillation (HCC)    Gastritis    Anticoagulated    Acute cystitis without hematuria    Benign hypertensive CKD    Cardiomegaly    Diastolic dysfunction    DJD (degenerative joint disease), multiple sites    Esophagitis, reflux    Fibromyalgia    Fibrous breast lumps    H/O mechanical aortic valve replacement    Hyperuricemia without signs inflammatory arthritis/tophaceous disease    MCI (mild cognitive impairment)    Class 2 severe obesity due to excess calories with serious comorbidity and body mass index (BMI) of 39 0 to 39 9 in adult (HCC)    Osteoarthritis    Primary osteoarthritis involving multiple joints    Proteinuria    Pulmonary artery hypertension (HCC)    RLS (restless legs syndrome)    Secondary hyperparathyroidism, renal (HCC)    Increased frequency of urination    Lump of right breast    Leg cramps    Chronic diastolic congestive heart failure (HCC)    Hematoma    Diabetes mellitus ([address] )     Past Medical History  Past Medical History:   Diagnosis Date    Chronic atrial fibrillation ([address] ) 9/13/2017    Chronic gout 9/13/2017    Chronic rhinitis 11/08/2018    CKD (chronic kidney disease) stage 3, GFR 30-59 ml/min ([address] ) 9/13/2017    Colon polyp 9/13/2017    Coronary artery disease     Diabetes mellitus ([address] )     COMBS (dyspnea on exertion) 11/02/2018    Essential hypertension 9/13/2017    Gastritis 9/13/2017    Hypertension     Irregular heart beat     Mechanical heart valve present 9/13/2017    Osteoarthritis     Pacemaker     S/P AVR     Spinal stenosis     disk herniation      Past Surgical History  Past Surgical History:   Procedure Laterality Date    AORTIC VALVE REPLACEMENT  CARDIAC SURGERY      CATARACT EXTRACTION      CHOLECYSTECTOMY      COLONOSCOPY      ESOPHAGOGASTRODUODENOSCOPY      HERNIA REPAIR      x3    HYSTERECTOMY      INSERT / Emmalene Drivers / REMOVE PACEMAKER      IR PELVIC ANGIOGRAPHY / INTERVENTION  1/29/2019    JOINT REPLACEMENT Bilateral     knees    OK COLONOSCOPY FLX DX W/COLLJ SPEC WHEN PFRMD N/A 9/12/2017    Procedure: Peder Carbine bxAND COLONOSCOPY with polypectomies;  Surgeon: Dereck Tavarez MD;  Location: [facility]; Service: Gastroenterology    OK SIGMOIDOSCOPY FLX DX W/COLLJ SPEC BR/WA IF PFRMD N/A 9/13/2017    Procedure: colonoscopy with hemo clip x 2;  Surgeon: Dereck Tavarez MD;  Location: [facility];   Service: Gastroenterology    ROTATOR CUFF REPAIR Left     SKIN BIOPSY      TONSILLECTOMY             01/30/19 1205   Note Type   Note type Eval only   Restrictions/Precautions   Other Precautions Fall Risk;Pain;Multiple lines   Pain Assessment   Pain Assessment 0-10   Pain Score 8   Pain Type Acute pain   Pain Location Groin   Pain Orientation Right   Home Living   Type of Home House   Home Layout Two level;Bed/bath upstairs  (13 steps to 2nd, 1 ludmila, 0 railing)   Home Equipment Walker;Cane   Prior Function   Lives With Spouse   ADL Assistance Independent   Falls in the last 6 months 0   Lifestyle   Autonomy PTA pt states independence with all aspects of her ADLs, transfers, ambulation--w/o device; neg falls, ocassionally home alone, +   Reciprocal Relationships 4 children   Service to Others works parttime at [employer] as a    Intrinsic Gratification watching TV   Psychosocial   Psychosocial (WDL) WDL   Subjective   Subjective "It's sore when I get up "   ADL   Where Assessed Edge of bed   Eating Assistance 6  Modified independent   Grooming Assistance 6  Modified Independent   UB Bathing Assistance 5  Supervision/Setup   LB Bathing Assistance 3  Moderate Assistance   UB Dressing Assistance 5  Supervision/Setup   LB Dressing Assistance 3  Moderate Assistance   Bed Mobility   Rolling R 4  Minimal assistance   Additional items Assist x 1; Increased time required;Verbal cues;LE management   Rolling L 4  Minimal assistance   Additional items Assist x 1; Increased time required;Verbal cues;LE management   Sit to Supine 3  Moderate assistance   Additional items Assist x 2; Increased time required;Verbal cues;LE management   Transfers   Sit to Stand 4  Minimal assistance   Additional items Assist x 1; Increased time required   Stand to Sit 4  Minimal assistance   Additional items Assist x 1; Increased time required;Verbal cues   Functional Mobility   Functional Mobility 4  Minimal assistance   Additional Comments x1   Additional items Rolling walker   Balance   Static Sitting Good   Dynamic Sitting Fair +   Static Standing Fair   Dynamic Standing Fair -   Activity Tolerance   Activity Tolerance Patient limited by fatigue;Patient limited by pain   Medical Staff Made Aware nsg, P T     RUE Assessment   RUE Assessment WFL   RUE Strength   RUE Overall Strength Within Functional Limits - able to perform ADL tasks with strength  (4/5 throughout)   LUE Assessment   LUE Assessment WFL   LUE Strength   LUE Overall Strength Within Functional Limits - able to perform ADL tasks with strength  (4/5 throughout)   Hand Function   Gross Motor Coordination Functional   Fine Motor Coordination Functional   Sensation   Light Touch No apparent deficits   Proprioception   Proprioception No apparent deficits   Vision-Basic Assessment   Current Vision Wears glasses all the time   Vision - Complex Assessment   Acuity Able to read clock/calendar on wall without difficulty   Perception   Inattention/Neglect Appears intact   Cognition   Overall Cognitive Status WFL   Arousal/Participation Alert   Attention Within functional limits   Orientation Level Oriented X4   Memory Within functional limits   Following Commands Follows all commands and directions without difficulty Assessment   Limitation Decreased ADL status; Decreased UE strength;Decreased endurance   Prognosis Good   Assessment Pt is a 82y/o female admitted to the hospital 2* R thigh pain, swelling, and inability to walk  Pt noted with a hematoma, requiring a s/p R LE angiogram, embolization(1/29)  Pt with recent(1/23) hx cardiac cath  No restriction noted with R LE  PTA pt states independence with all aspects of her ADLs, transfers, ambulation--w/o device; neg falls, ocassionally home alone, +  During initial evaluation, pt demonstrated deficits with her functional balance, functional mobility, ADL status, activity tolerance(currently fair=15-20mins), b/l UE strength, and transfer safety  Pt would benefit from continued OT tx for the above deficits  3-5xwk/1-2wks  Goals   Patient Goals "to be able to get back to work "   STG Time Frame 3-5   Short Term Goal #1 Pt will demonstrate improved activity tolerance to good(20-30mins) and standing tolerance to 3-5mins to assist with ADLs  Short Term Goal #2 Pt will demonstrate mod I with their bed mobility to facilitate EOB ADLs  Short Term Goal  Pt will demonstrate mod I with their sit-stand transfers to assist with completion of their LE dressing  LTG Time Frame (5-10days)   Long Term Goal #1 Pt will demonstrate proper walker/transfer safety 100% of the time  Long Term Goal #2 Pt will demonstrate g/g- balance with all functional activities  Long Term Goal Pt will demonstrate mod I with their UE and LE bathing/dresssing  Plan   Treatment Interventions ADL retraining;Functional transfer training;UE strengthening/ROM; Endurance training;Patient/family training;Equipment evaluation/education; Compensatory technique education   Goal Expiration Date 02/09/19   Treatment Day 0   OT Frequency 3-5x/wk   Recommendation   OT Discharge Recommendation Short Term Rehab   Barthel Index   Feeding 10   Bathing 0   Grooming Score 5   Dressing Score 5   Bladder Score 10 Bowels Score 10   Toilet Use Score 5   Transfers (Bed/Chair) Score 10   Mobility (Level Surface) Score 0   Stairs Score 0   Barthel Index Score 55   Gt Wu, OT

## 2019-01-30 NOTE — PROGRESS NOTES
Progress Note - Justin Lane 1935, 81 y o  female MRN: [id number]  Unit/Bed#: [facility] 68 2 -01 Encounter: [id number]  Primary Care Provider: Parveen Kenyon MD   Date and time admitted to hospital: 1/28/2019  9:27 PM        Assessment and Plan  * Hematoma   Assessment & Plan    Hematoma of right thigh due to cardiac catheterization and on anticoagulation  Affected artery has been successfully embolized by IR  Does have drop in hemoglobin which probably reflects recent bleeding     Acute blood loss anemia   Assessment & Plan    Blood loss anemia due to thigh hematoma  Results from last 7 days  Lab Units 01/30/19  0626 01/29/19  0408 01/28/19  2234   HEMOGLOBIN g/dL 9 2* 11 4* 12 5        Diabetes mellitus [facility])   Assessment & Plan    Lab Results   Component Value Date    HGBA1C 8 5 (H) 01/29/2019       Recent Labs      01/29/19   2059  01/30/19   0738  01/30/19   1101  01/30/19   1559   POCGLU  174*  131  219*  171*     Diabetes mellitus, continue levemir and sliding scale     Chronic atrial fibrillation (HCC)   Assessment & Plan    Chronic atrial fibrillation rate controlled on metoprolol  On heparin drip bridging for mechanical valve regarding warfarin     Mechanical heart valve present   Assessment & Plan    Continue heparin drip bridging to warfarin     Chronic kidney disease, stage 3 (HCC)   Assessment & Plan    CKD 3 remains at baseline after contrast study; will discontinue IV fluids     Hypertension   Assessment & Plan    Continue indapamide and metoprolol  VTE Pharmacologic Prophylaxis: Heparin Drip    Patient Centered Rounds: I have performed bedside rounds with nursing staff today  Discussions with Specialists or Other Care Team Provider:  Vascular surgery, fior Rose  Education and Discussions with Family / Patient:     Time Spent for Care: 30 mins  More than 50% of total time spent on counseling and coordination of care as described above      Current Length of Stay: 1 day(s)  Current Patient Status: Inpatient     Certification Statement: The patient will continue to require additional inpatient hospital stay due to Hematoma  Discharge Plan / Estimated Discharge Date:  Patient agreeable to rehab    Code Status: Level 1 - Full Code  ______________________________________________________________________________    Subjective:   Patient seen and examined  Working with rehab realizes she is weak  Still having leg pains  Objective:   Vitals: Blood pressure 140/80, pulse 84, temperature 97 7 °F (36 5 °C), temperature source Temporal, resp  rate 20, SpO2 93 %      Physical Exam:   General appearance: alert, appears stated age and cooperative  Head: atraumatic  Lungs: clear to auscultation bilaterally  Heart: regular rate and rhythm  Abdomen: soft, non-tender, positive bowel sounds   Back: negative, range of motion normal  Extremities: right thigh edema  Neurologic: Grossly normal    Additional Data:   Labs:    Results from last 7 days  Lab Units 01/30/19  0626 01/29/19  0408 01/28/19  2234   WBC Thousand/uL 11 96* 12 21* 12 36*   HEMOGLOBIN g/dL 9 2* 11 4* 12 5   HEMATOCRIT % 29 9* 36 0 40 2   MCV fL 97 97 95   PLATELETS Thousands/uL 233 284 286   INR  2 03* 1 78* 1 79*       Results from last 7 days  Lab Units 01/30/19  0626 01/29/19  0408 01/28/19  2234   SODIUM mmol/L 138 137 135*   POTASSIUM mmol/L 3 9 4 1 5 1   CHLORIDE mmol/L 100 98* 97*   CO2 mmol/L 32 28 29   ANION GAP mmol/L 6 11 9   BUN mg/dL 27* 24 25   CREATININE mg/dL 1 19 1 24 1 23   CALCIUM mg/dL 8 2* 9 0 9 7   ALBUMIN g/dL 3 0*  --  3 7   TOTAL BILIRUBIN mg/dL 0 61  --  0 66   ALK PHOS U/L 72  --  97   ALT U/L 41  --  43   AST U/L 45  --  55*   EGFR ml/min/1 73sq m 43 41 41   GLUCOSE RANDOM mg/dL 144* 233* 231*                        Results from last 7 days  Lab Units 01/30/19  1101 01/30/19  0738 01/29/19  2059 01/29/19  1705 01/29/19  1017 01/29/19  0550   POC GLUCOSE mg/dl 219* 131 174* 152* 181* 215* Results from last 7 days  Lab Units 01/29/19  0408   HEMOGLOBIN A1C % 8 5*         * I Have Reviewed All Lab Data Listed Above  Cultures:           Imaging:  Imaging Reports Reviewed Today Include:   Procedure: Ir Pelvic Angiography / Intervention  Result Date: 1/29/2019  Impression: Impression: No definite extravasation or active bleeding identified  Small irregularity off of a branch of the superficial epigastric artery which was embolized Workstation performed: [id number]     Scheduled Meds:  Current Facility-Administered Medications:  acetaminophen 650 mg Oral Q6H PRN HIRAM White-REESE    allopurinol 100 mg Oral BID HIRAM White-REESE    bimatoprost 1 drop Both Eyes HS Tomas Lara PA-C    ferrous sulfate 325 mg Oral Daily With Breakfast Tomas Lara PA-C    gabapentin 600 mg Oral HS Tomas Lara PA-C    heparin (porcine) 3-20 Units/kg/hr (Order-Specific) Intravenous Titrated Hershell Meagan, DO Last Rate: 11 1 Units/kg/hr (01/30/19 1608)   HYDROcodone-acetaminophen 1 tablet Oral Q6H PRN HIRAM White-REESE    HYDROmorphone 0 5 mg Intravenous Q3H PRN HIRAM White-REESE    indapamide 2 5 mg Oral QAM Tomas Lara PA-C    insulin detemir 60 Units Subcutaneous HS Tomas Lara PA-C    insulin lispro 1-6 Units Subcutaneous 4x Daily (AC & HS) Alfonso Cobian,     methocarbamol 750 mg Oral BID PRN HIRAM White-REESE    metoprolol succinate 50 mg Oral Daily Tomas Lara PA-C    ondansetron 4 mg Intravenous Q8H PRN HIRAM White-REESE    oxybutynin 2 5 mg Oral HS HIRAM White-REESE    pantoprazole 40 mg Oral Early Morning HIRAM White-REESE    sodium chloride 75 mL/hr Intravenous Continuous Hershell Meagan, DO Last Rate: 75 mL/hr (01/30/19 7814)   warfarin 5 mg Oral Daily (warfarin) Hershell Meagan, DO        Hershell Meagan, DO  [facility] Internal Medicine  Hospitalist    ** Please Note: This note has been constructed using a voice recognition system   **

## 2019-01-31 ENCOUNTER — APPOINTMENT (INPATIENT)
Dept: CT IMAGING | Facility: HOSPITAL | Age: 84
DRG: 908 | End: 2019-01-31
Payer: MEDICARE

## 2019-01-31 ENCOUNTER — TELEPHONE (OUTPATIENT)
Dept: NEPHROLOGY | Facility: CLINIC | Age: 84
End: 2019-01-31

## 2019-01-31 LAB
ANION GAP SERPL CALCULATED.3IONS-SCNC: 8 MMOL/L (ref 4–13)
APTT PPP: 76 SECONDS (ref 26–38)
BUN SERPL-MCNC: 31 MG/DL (ref 5–25)
CALCIUM SERPL-MCNC: 8 MG/DL (ref 8.3–10.1)
CHLORIDE SERPL-SCNC: 95 MMOL/L (ref 100–108)
CO2 SERPL-SCNC: 28 MMOL/L (ref 21–32)
CREAT SERPL-MCNC: 1.25 MG/DL (ref 0.6–1.3)
ERYTHROCYTE [DISTWIDTH] IN BLOOD BY AUTOMATED COUNT: 16 % (ref 11.6–15.1)
GFR SERPL CREATININE-BSD FRML MDRD: 40 ML/MIN/1.73SQ M
GLUCOSE SERPL-MCNC: 118 MG/DL (ref 65–140)
GLUCOSE SERPL-MCNC: 125 MG/DL (ref 65–140)
GLUCOSE SERPL-MCNC: 166 MG/DL (ref 65–140)
GLUCOSE SERPL-MCNC: 170 MG/DL (ref 65–140)
GLUCOSE SERPL-MCNC: 185 MG/DL (ref 65–140)
HCT VFR BLD AUTO: 25.6 % (ref 34.8–46.1)
HGB BLD-MCNC: 8.2 G/DL (ref 11.5–15.4)
INR PPP: 2.3 (ref 0.86–1.17)
MCH RBC QN AUTO: 31.1 PG (ref 26.8–34.3)
MCHC RBC AUTO-ENTMCNC: 32 G/DL (ref 31.4–37.4)
MCV RBC AUTO: 97 FL (ref 82–98)
PLATELET # BLD AUTO: 274 THOUSANDS/UL (ref 149–390)
PMV BLD AUTO: 11.1 FL (ref 8.9–12.7)
POTASSIUM SERPL-SCNC: 4.1 MMOL/L (ref 3.5–5.3)
PROTHROMBIN TIME: 25.4 SECONDS (ref 11.8–14.2)
RBC # BLD AUTO: 2.64 MILLION/UL (ref 3.81–5.12)
SODIUM SERPL-SCNC: 131 MMOL/L (ref 136–145)
WBC # BLD AUTO: 11.45 THOUSAND/UL (ref 4.31–10.16)

## 2019-01-31 PROCEDURE — 73700 CT LOWER EXTREMITY W/O DYE: CPT

## 2019-01-31 PROCEDURE — 85027 COMPLETE CBC AUTOMATED: CPT | Performed by: INTERNAL MEDICINE

## 2019-01-31 PROCEDURE — 80048 BASIC METABOLIC PNL TOTAL CA: CPT | Performed by: INTERNAL MEDICINE

## 2019-01-31 PROCEDURE — 82948 REAGENT STRIP/BLOOD GLUCOSE: CPT

## 2019-01-31 PROCEDURE — 85610 PROTHROMBIN TIME: CPT | Performed by: INTERNAL MEDICINE

## 2019-01-31 PROCEDURE — 99232 SBSQ HOSP IP/OBS MODERATE 35: CPT | Performed by: INTERNAL MEDICINE

## 2019-01-31 PROCEDURE — 85730 THROMBOPLASTIN TIME PARTIAL: CPT | Performed by: INTERNAL MEDICINE

## 2019-01-31 RX ORDER — HYDROMORPHONE HCL/PF 1 MG/ML
0.5 SYRINGE (ML) INJECTION
Status: DISCONTINUED | OUTPATIENT
Start: 2019-01-31 | End: 2019-02-02 | Stop reason: HOSPADM

## 2019-01-31 RX ORDER — OXYCODONE HYDROCHLORIDE 5 MG/1
5 TABLET ORAL EVERY 4 HOURS PRN
Status: DISCONTINUED | OUTPATIENT
Start: 2019-01-31 | End: 2019-02-01

## 2019-01-31 RX ADMIN — HYDROMORPHONE HYDROCHLORIDE 0.5 MG: 1 INJECTION, SOLUTION INTRAMUSCULAR; INTRAVENOUS; SUBCUTANEOUS at 00:56

## 2019-01-31 RX ADMIN — INSULIN LISPRO 1 UNITS: 100 INJECTION, SOLUTION INTRAVENOUS; SUBCUTANEOUS at 17:41

## 2019-01-31 RX ADMIN — PANTOPRAZOLE SODIUM 40 MG: 40 TABLET, DELAYED RELEASE ORAL at 05:31

## 2019-01-31 RX ADMIN — INSULIN LISPRO 1 UNITS: 100 INJECTION, SOLUTION INTRAVENOUS; SUBCUTANEOUS at 12:39

## 2019-01-31 RX ADMIN — METHOCARBAMOL 750 MG: 750 TABLET, FILM COATED ORAL at 17:52

## 2019-01-31 RX ADMIN — HYDROCODONE BITARTRATE AND ACETAMINOPHEN 1 TABLET: 5; 325 TABLET ORAL at 09:05

## 2019-01-31 RX ADMIN — OXYCODONE HYDROCHLORIDE 5 MG: 5 TABLET ORAL at 23:22

## 2019-01-31 RX ADMIN — METOPROLOL SUCCINATE 50 MG: 50 TABLET, EXTENDED RELEASE ORAL at 09:05

## 2019-01-31 RX ADMIN — ALLOPURINOL 100 MG: 100 TABLET ORAL at 17:41

## 2019-01-31 RX ADMIN — INSULIN DETEMIR 60 UNITS: 100 INJECTION, SOLUTION SUBCUTANEOUS at 21:50

## 2019-01-31 RX ADMIN — BIMATOPROST 1 DROP: 0.1 SOLUTION/ DROPS OPHTHALMIC at 21:53

## 2019-01-31 RX ADMIN — ALLOPURINOL 100 MG: 100 TABLET ORAL at 09:05

## 2019-01-31 RX ADMIN — FERROUS SULFATE TAB 325 MG (65 MG ELEMENTAL FE) 325 MG: 325 (65 FE) TAB at 09:05

## 2019-01-31 RX ADMIN — HYDROMORPHONE HYDROCHLORIDE 0.5 MG: 1 INJECTION, SOLUTION INTRAMUSCULAR; INTRAVENOUS; SUBCUTANEOUS at 12:17

## 2019-01-31 RX ADMIN — INDAPAMIDE 2.5 MG: 2.5 TABLET, FILM COATED ORAL at 09:05

## 2019-01-31 RX ADMIN — OXYBUTYNIN CHLORIDE 2.5 MG: 5 TABLET ORAL at 21:51

## 2019-01-31 RX ADMIN — GABAPENTIN 600 MG: 300 CAPSULE ORAL at 21:51

## 2019-01-31 RX ADMIN — HYDROCODONE BITARTRATE AND ACETAMINOPHEN 1 TABLET: 5; 325 TABLET ORAL at 17:52

## 2019-01-31 RX ADMIN — WARFARIN SODIUM 5 MG: 5 TABLET ORAL at 17:40

## 2019-01-31 NOTE — ASSESSMENT & PLAN NOTE
Chronic atrial fibrillation rate controlled on metoprolol    On heparin drip with warfarin bridging bridging for mechanical valve

## 2019-01-31 NOTE — ASSESSMENT & PLAN NOTE
Blood loss anemia due to thigh hematoma    Due to worsening anemia will recheck imaging to ensure hematoma is stable    Results from last 7 days  Lab Units 01/31/19  0502 01/30/19  0626 01/29/19  0408 01/28/19  2234   HEMOGLOBIN g/dL 8 2* 9 2* 11 4* 12 5

## 2019-01-31 NOTE — NURSING NOTE
Went into patients room to give nighttime medications  Blood glucose was 189  She was due for 1 unit of Humalog and 60 units of Levemir  Refused Humalog, states she does not take Humalog and Levemir together  She was also requesting the insulin injection to be given on the inner part of upper arm  Explained that we inject it in the subcutaneous tissue of the outer arm or abdomen if patient chooses that location

## 2019-01-31 NOTE — ASSESSMENT & PLAN NOTE
Hematoma of right thigh due to cardiac catheterization and on anticoagulation  Affected artery has been successfully embolized by IR    Does have drop in hemoglobin and due to increased pressure and pain in area will have repeat imaging and orthopedic evaluation

## 2019-01-31 NOTE — SOCIAL WORK
CM met with pt at bedside to get STR options, in order of preference: [address] [facility], [facility] and Jeff  Referrals sent

## 2019-01-31 NOTE — TELEPHONE ENCOUNTER
Pt called to cancel her upcoming appointment with Dr Pool Negron as she is currently admitted to the hospital  She did not wish to reschedule at this time and says she will call once she is d/c

## 2019-01-31 NOTE — PLAN OF CARE
DISCHARGE PLANNING     Discharge to home or other facility with appropriate resources Progressing        DISCHARGE PLANNING - CARE MANAGEMENT     Discharge to post-acute care or home with appropriate resources Progressing        Nutrition/Hydration-ADULT     Nutrient/Hydration intake appropriate for improving, restoring or maintaining nutritional needs Progressing        PAIN - ADULT     Verbalizes/displays adequate comfort level or baseline comfort level Progressing        Potential for Falls     Patient will remain free of falls Progressing        SAFETY ADULT     Maintain or return to baseline ADL function Progressing     Maintain or return mobility status to optimal level Progressing

## 2019-01-31 NOTE — PROGRESS NOTES
Progress Note - Jazmin Gillette 1935, 81 y o  female MRN: [id number]  Unit/Bed#: [facility] 68 2 -01 Encounter: [id number]  Primary Care Provider: Soren Kearney MD   Date and time admitted to hospital: 1/28/2019  9:27 PM        Assessment and Plan  * Hematoma   Assessment & Plan    Hematoma of right thigh due to cardiac catheterization and on anticoagulation  Affected artery has been successfully embolized by IR  Does have drop in hemoglobin and due to increased pressure and pain in area will have repeat imaging and orthopedic evaluation     Acute blood loss anemia   Assessment & Plan    Blood loss anemia due to thigh hematoma  Due to worsening anemia will recheck imaging to ensure hematoma is stable    Results from last 7 days  Lab Units 01/31/19  0502 01/30/19  0626 01/29/19  0408 01/28/19  2234   HEMOGLOBIN g/dL 8 2* 9 2* 11 4* 12 5        Diabetes mellitus [facility])   Assessment & Plan    Lab Results   Component Value Date    HGBA1C 8 5 (H) 01/29/2019       Recent Labs      01/30/19   2117  01/31/19   0757  01/31/19   1111  01/31/19   1615   POCGLU  189*  125  185*  170*     Diabetes mellitus, continue levemir and sliding scale     Chronic atrial fibrillation (HCC)   Assessment & Plan    Chronic atrial fibrillation rate controlled on metoprolol  On heparin drip with warfarin bridging bridging for mechanical valve     Mechanical heart valve present   Assessment & Plan    Continue heparin drip bridging to warfarin     Chronic kidney disease, stage 3 (HCC)   Assessment & Plan    CKD 3 remains at baseline after contrast study; stable off IV fluids     Hypertension   Assessment & Plan    Continue indapamide and metoprolol  VTE Pharmacologic Prophylaxis: Heparin Drip    Patient Centered Rounds: I have performed bedside rounds with nursing staff today  Discussions with Specialists or Other Care Team Provider:   Education and Discussions with Family / Patient:      Time Spent for Care: 30 mins  More than 50% of total time spent on counseling and coordination of care as described above  Current Length of Stay: 2 day(s)  Current Patient Status: Inpatient     Certification Statement: The patient will continue to require additional inpatient hospital stay due to Hematoma  Discharge Plan / Estimated Discharge Date:  Patient agreeable to rehab placement    Code Status: Level 1 - Full Code  ______________________________________________________________________________    Subjective:   Patient seen and examined  Having worsening right thigh pain despite heating pad    Objective:   Vitals: Blood pressure 126/80, pulse 84, temperature 98 4 °F (36 9 °C), temperature source Temporal, resp  rate 18, SpO2 97 %      Physical Exam:   General appearance: alert, appears stated age and cooperative  Head: Normocephalic, without obvious abnormality, atraumatic  Lungs: clear to auscultation bilaterally  Heart: regular rate and rhythm  Abdomen: soft, non-tender, positive bowel sounds   Back: negative  Extremities: edema and ecchymoses on right thigh  Neurologic: Grossly normal    Additional Data:   Labs:    Results from last 7 days  Lab Units 01/31/19  0525 01/31/19  0502 01/30/19  0626 01/29/19 0408 01/28/19  2234   WBC Thousand/uL  --  11 45* 11 96* 12 21* 12 36*   HEMOGLOBIN g/dL  --  8 2* 9 2* 11 4* 12 5   HEMATOCRIT %  --  25 6* 29 9* 36 0 40 2   MCV fL  --  97 97 97 95   PLATELETS Thousands/uL  --  274 233 284 286   INR  2 30*  --  2 03* 1 78* 1 79*       Results from last 7 days  Lab Units 01/31/19  0502 01/30/19  0626 01/29/19  0408 01/28/19  2234   SODIUM mmol/L 131* 138 137 135*   POTASSIUM mmol/L 4 1 3 9 4 1 5 1   CHLORIDE mmol/L 95* 100 98* 97*   CO2 mmol/L 28 32 28 29   ANION GAP mmol/L 8 6 11 9   BUN mg/dL 31* 27* 24 25   CREATININE mg/dL 1 25 1 19 1 24 1 23   CALCIUM mg/dL 8 0* 8 2* 9 0 9 7   ALBUMIN g/dL  --  3 0*  --  3 7   TOTAL BILIRUBIN mg/dL  --  0 61  --  0 66   ALK PHOS U/L  --  72  --  97   ALT U/L --  41  --  43   AST U/L  --  45  --  55*   EGFR ml/min/1 73sq m 40 43 41 41   GLUCOSE RANDOM mg/dL 118 144* 233* 231*       Results from last 7 days  Lab Units 01/31/19  1615 01/31/19  1111 01/31/19  0757 01/30/19  2117 01/30/19  1559 01/30/19  1101 01/30/19  0738 01/29/19  2059 01/29/19  1705 01/29/19  1017 01/29/19  0550   POC GLUCOSE mg/dl 170* 185* 125 189* 171* 219* 131 174* 152* 181* 215*       Results from last 7 days  Lab Units 01/29/19  0408   HEMOGLOBIN A1C % 8 5*         * I Have Reviewed All Lab Data Listed Above  Cultures:           Imaging:  Imaging Reports Reviewed Today Include:   Procedure: Ir Pelvic Angiography / Intervention    Result Date: 1/29/2019  Narrative: Pelvic angiogram Right common femoral angiogram Right profunda femoris angiogram Medial branch of the profunda femoris artery angiogram Right superficial epigastric angiogram and embolization History: Right groin active bleeding and intramuscular hematoma Contrast: 50 mL of Visipaque 320 Fluoro time: 12 2 minutes Number of Images: 856 Conscious sedation time: 1 hour and 30 minutes Technique: The patient was brought to the interventional radiology suite and identified verbally and by wrist band  The patient was placed supine on the table and the left groin was prepped and draped in the usual sterile fashion  Lidocaine was administered to the skin and a small skin incision was made  The left common femoral artery was then punctured percutaneously utilizing Seldinger technique  A [employer] Health[employer] wire was advanced into the abdominal aorta over which a 5 [employer] Rani vascular sheath was inserted and connected to a flush bag  A 4 [employer] omni flush catheter was then advanced to distal aorta and a digital subtraction pelvic angiogram was performed    Next, a catheter was placed up and over the bifurcation into the right external iliac artery for an angiogram and then into the right common femoral artery for an angiogram and then into the right profunda femoris artery for an angiogram and then into a medial branch vessel of the right profunda femoris artery for an angiogram and then into the right superior epigastric artery for an angiogram followed by placement of a microcatheter into a branch of the superficial epigastric artery for an angiogram and coil embolization  A postprocedure right external iliac antrum was performed  A left common femoral angiogram was performed  The left common femoral arteriotomy was closed with a pro glide closure device  The patient tolerated the procedure well, including conscious sedation with no immediate complications seen  Impression: Impression: No definite extravasation or active bleeding identified  Small irregularity off of a branch of the superficial epigastric artery which was embolized Workstation performed: [id number]     Procedure: Ct Abdomen Pelvis With Contrast    Addendum Date: 1/29/2019 Addendum:   ADDENDUM: There is enlargement of the right hip abductor musculature predominantly of the right pectineus and obturator externus muscles compatible with intramuscular hematoma  There are small foci of active extravasation seen within the muscle for example (series 2, image 80)  The right femoral artery is patent without evidence of pseudoaneurysm  The right femoral vein enhances normally without evidence of arteriovenous fistula  Result Date: 1/29/2019  Narrative: CT ABDOMEN AND PELVIS WITH IV CONTRAST INDICATION:   Abdominal pain, unspecified  COMPARISON:  CT of the abdomen and pelvis on April 25, 2017  TECHNIQUE:  CT examination of the abdomen and pelvis was performed  Axial, sagittal, and coronal 2D reformatted images were created from the source data and submitted for interpretation  Radiation dose length product (DLP) for this visit:  1183 mGy-cm     This examination, like all CT scans performed in the [facility], was performed utilizing techniques to minimize radiation dose exposure, including the use of iterative reconstruction and automated exposure control  IV Contrast:  100 mL of iohexol (OMNIPAQUE)  was administered intravenously without immediate adverse reaction  Enteric Contrast:  Enteric contrast was not administered  FINDINGS: ABDOMEN LOWER CHEST:  Stable cardiomegaly  LIVER/BILIARY TREE:  Mildly nodular contour of the liver, correlate for cirrhosis  GALLBLADDER:  Gallbladder is surgically absent  SPLEEN:  Unremarkable  PANCREAS:  Atrophic  ADRENAL GLANDS:  Stable 1 cm left adrenal nodule  KIDNEYS/URETERS:  One or more simple renal cyst(s) is noted  In addition, there are hypodensities too small to characterize  No hydronephrosis  STOMACH AND BOWEL:  Unremarkable  APPENDIX:  No findings to suggest appendicitis  ABDOMINOPELVIC CAVITY:  No ascites or free intraperitoneal air  No lymphadenopathy  VESSELS:  Unremarkable for patient's age  PELVIS REPRODUCTIVE ORGANS:  Patient is status post hysterectomy  URINARY BLADDER:  Unremarkable  ABDOMINAL WALL/INGUINAL REGIONS:  Postsurgical changes of the ventral abdominal wall  Subcutaneous nodules are seen along the ventral abdominal wall likely due to injections  OSSEOUS STRUCTURES:  No acute fracture or destructive osseous lesion  Stable compression deformity of the T11 vertebral body  Redemonstrated kyphoplasty change within L2  Stable degenerative changes at L5-S1 with endplate sclerotic changes and bilateral neural foraminal narrowing  Impression: 1  No acute inflammatory process identified within the abdomen or pelvis  2   Other findings as above   Workstation performed: [id number]     Scheduled Meds:  Current Facility-Administered Medications:  acetaminophen 650 mg Oral Q6H PRN Katelyn Cavazos PA-C    allopurinol 100 mg Oral BID Katelyn Cavazos PA-C    bimatoprost 1 drop Both Eyes HS Katelyn Cavazos PA-C    ferrous sulfate 325 mg Oral Daily With Breakfast Katelyn Cavazos PA-C    gabapentin 600 mg Oral HS Katelyn Cavazos PA-C heparin (porcine) 3-20 Units/kg/hr (Order-Specific) Intravenous Titrated Elvin Olsen,  Last Rate: 11 1 Units/kg/hr (01/31/19 0801)   HYDROcodone-acetaminophen 1 tablet Oral Q6H PRN [employer], PA-C    HYDROmorphone 0 5 mg Intravenous Q3H PRN [employer], PA-C    indapamide 2 5 mg Oral QAM [employer], PA-C    insulin detemir 60 Units Subcutaneous HS [employer], PA-C    insulin lispro 1-6 Units Subcutaneous 4x Daily (AC & HS) Alfonso Cobian,     methocarbamol 750 mg Oral BID PRN [employer], PA-C    metoprolol succinate 50 mg Oral Daily [employer], PA-C    ondansetron 4 mg Intravenous Q8H PRN [employer], PA-C    oxybutynin 2 5 mg Oral HS [employer], PA-C    pantoprazole 40 mg Oral Early Morning [employer], PA-C    warfarin 5 mg Oral Daily (warfarin) Elvin Olsen, DO Elvin Olsen,   [facility] Internal Medicine  Hospitalist    ** Please Note: This note has been constructed using a voice recognition system   **

## 2019-01-31 NOTE — ASSESSMENT & PLAN NOTE
Lab Results   Component Value Date    HGBA1C 8 5 (H) 01/29/2019       Recent Labs      01/30/19   2117  01/31/19   0757  01/31/19   1111  01/31/19   1615   POCGLU  189*  125  185*  170*     Diabetes mellitus, continue levemir and sliding scale

## 2019-02-01 PROBLEM — K59.00 CONSTIPATION: Status: ACTIVE | Noted: 2019-02-01

## 2019-02-01 LAB
GLUCOSE SERPL-MCNC: 102 MG/DL (ref 65–140)
GLUCOSE SERPL-MCNC: 179 MG/DL (ref 65–140)
GLUCOSE SERPL-MCNC: 199 MG/DL (ref 65–140)
GLUCOSE SERPL-MCNC: 251 MG/DL (ref 65–140)
HCT VFR BLD AUTO: 25.3 % (ref 34.8–46.1)
HGB BLD-MCNC: 8.1 G/DL (ref 11.5–15.4)
INR PPP: 2.54 (ref 0.86–1.17)
PROTHROMBIN TIME: 27.4 SECONDS (ref 11.8–14.2)

## 2019-02-01 PROCEDURE — 85610 PROTHROMBIN TIME: CPT | Performed by: INTERNAL MEDICINE

## 2019-02-01 PROCEDURE — 99232 SBSQ HOSP IP/OBS MODERATE 35: CPT | Performed by: INTERNAL MEDICINE

## 2019-02-01 PROCEDURE — 85014 HEMATOCRIT: CPT | Performed by: INTERNAL MEDICINE

## 2019-02-01 PROCEDURE — 82948 REAGENT STRIP/BLOOD GLUCOSE: CPT

## 2019-02-01 PROCEDURE — 85018 HEMOGLOBIN: CPT | Performed by: INTERNAL MEDICINE

## 2019-02-01 PROCEDURE — 99222 1ST HOSP IP/OBS MODERATE 55: CPT | Performed by: ORTHOPAEDIC SURGERY

## 2019-02-01 RX ORDER — WARFARIN SODIUM 7.5 MG/1
3.75 TABLET ORAL
Status: DISCONTINUED | OUTPATIENT
Start: 2019-02-01 | End: 2019-02-02 | Stop reason: HOSPADM

## 2019-02-01 RX ORDER — OXYCODONE HYDROCHLORIDE 10 MG/1
10 TABLET ORAL EVERY 4 HOURS PRN
Status: DISCONTINUED | OUTPATIENT
Start: 2019-02-01 | End: 2019-02-02 | Stop reason: HOSPADM

## 2019-02-01 RX ORDER — OXYCODONE HYDROCHLORIDE 5 MG/1
5 TABLET ORAL EVERY 4 HOURS PRN
Status: DISCONTINUED | OUTPATIENT
Start: 2019-02-01 | End: 2019-02-02 | Stop reason: HOSPADM

## 2019-02-01 RX ORDER — LACTULOSE 20 G/30ML
20 SOLUTION ORAL 3 TIMES DAILY PRN
Status: DISCONTINUED | OUTPATIENT
Start: 2019-02-01 | End: 2019-02-02 | Stop reason: HOSPADM

## 2019-02-01 RX ORDER — POLYETHYLENE GLYCOL 3350 17 G/17G
17 POWDER, FOR SOLUTION ORAL DAILY
Status: DISCONTINUED | OUTPATIENT
Start: 2019-02-01 | End: 2019-02-02 | Stop reason: HOSPADM

## 2019-02-01 RX ADMIN — BIMATOPROST 1 DROP: 0.1 SOLUTION/ DROPS OPHTHALMIC at 21:36

## 2019-02-01 RX ADMIN — METOPROLOL SUCCINATE 50 MG: 50 TABLET, EXTENDED RELEASE ORAL at 08:33

## 2019-02-01 RX ADMIN — INDAPAMIDE 2.5 MG: 2.5 TABLET, FILM COATED ORAL at 08:32

## 2019-02-01 RX ADMIN — INSULIN DETEMIR 60 UNITS: 100 INJECTION, SOLUTION SUBCUTANEOUS at 21:38

## 2019-02-01 RX ADMIN — GABAPENTIN 600 MG: 300 CAPSULE ORAL at 21:35

## 2019-02-01 RX ADMIN — OXYBUTYNIN CHLORIDE 2.5 MG: 5 TABLET ORAL at 21:35

## 2019-02-01 RX ADMIN — INSULIN LISPRO 2 UNITS: 100 INJECTION, SOLUTION INTRAVENOUS; SUBCUTANEOUS at 12:44

## 2019-02-01 RX ADMIN — POLYETHYLENE GLYCOL 3350 17 G: 17 POWDER, FOR SOLUTION ORAL at 11:27

## 2019-02-01 RX ADMIN — OXYCODONE HYDROCHLORIDE 5 MG: 5 TABLET ORAL at 07:32

## 2019-02-01 RX ADMIN — PANTOPRAZOLE SODIUM 40 MG: 40 TABLET, DELAYED RELEASE ORAL at 05:57

## 2019-02-01 RX ADMIN — ALLOPURINOL 100 MG: 100 TABLET ORAL at 08:32

## 2019-02-01 RX ADMIN — HYDROMORPHONE HYDROCHLORIDE 0.5 MG: 1 INJECTION, SOLUTION INTRAMUSCULAR; INTRAVENOUS; SUBCUTANEOUS at 00:25

## 2019-02-01 RX ADMIN — METHOCARBAMOL 750 MG: 750 TABLET, FILM COATED ORAL at 08:32

## 2019-02-01 RX ADMIN — OXYCODONE HYDROCHLORIDE 10 MG: 10 TABLET ORAL at 18:02

## 2019-02-01 RX ADMIN — ALLOPURINOL 100 MG: 100 TABLET ORAL at 18:02

## 2019-02-01 RX ADMIN — FERROUS SULFATE TAB 325 MG (65 MG ELEMENTAL FE) 325 MG: 325 (65 FE) TAB at 08:33

## 2019-02-01 RX ADMIN — WARFARIN SODIUM 3.75 MG: 7.5 TABLET ORAL at 18:02

## 2019-02-01 RX ADMIN — OXYCODONE HYDROCHLORIDE 10 MG: 10 TABLET ORAL at 12:44

## 2019-02-01 NOTE — ASSESSMENT & PLAN NOTE
INR now therapeutic    Heparin infusion discontinued    Results from last 7 days  Lab Units 02/01/19  0559 01/31/19  0525 01/30/19  0626 01/29/19  0408 01/28/19  2234   INR  2 54* 2 30* 2 03* 1 78* 1 79*

## 2019-02-01 NOTE — ASSESSMENT & PLAN NOTE
Hematoma of right thigh due to cardiac catheterization and on anticoagulation  Affected artery has been successfully embolized by IR  Does have drop in hemoglobin which now seems to have stabilized  Appreciate orthopedic evaluation  Imaging does demonstrate worsening hematoma however consideration is there was time difference between the original imaging and IR procedure

## 2019-02-01 NOTE — PROGRESS NOTES
Procedure: Ct Femur Right Wo Contrast  Result Date: 1/31/2019  Impression: Slight interval increase in size of right adductor compartment hematoma The study was marked in EPIC for immediate notification  Workstation performed: [id number]     Results from last 7 days  Lab Units 01/31/19  0502 01/30/19  0626 01/29/19  0408 01/28/19  2234   HEMOGLOBIN g/dL 8 2* 9 2* 11 4* 12 5     Discussed imaging findings with the patient as well as nurse  Does have enlarged area of hematoma which may be old blood as timing between last image and IR embolization was approximately 15 hours  It is noted patient had drop in hemoglobin compared to time of admission  On examination no evidence of compartment syndrome with good distal pulses  INR this morning 2 3 likely will be 2 5+ tomorrow for mechanical valve  Recommend stopping heparin drip tonight and patient agreeable  Will have orthopedic evaluation tomorrow non urgently for evaluation    Patient agreeable to plan  All questions have been answered

## 2019-02-01 NOTE — PROGRESS NOTES
Progress Note - Sai Proctor 1935, 81 y o  female MRN: [id number]    Unit/Bed#: [facility] 68 2 -01 Encounter: [id number]    Primary Care Provider: Tariq Nunn MD   Date and time admitted to hospital: 1/28/2019  9:27 PM        Assessment and Plan  * Hematoma   Assessment & Plan    Hematoma of right thigh due to cardiac catheterization and on anticoagulation  Affected artery has been successfully embolized by IR  Does have drop in hemoglobin which now seems to have stabilized  Appreciate orthopedic evaluation  Imaging does demonstrate worsening hematoma however consideration is there was time difference between the original imaging and IR procedure  Constipation   Assessment & Plan    No bowel movement x7 days  Add miralax and lactulose p r n  Acute blood loss anemia   Assessment & Plan    Blood loss anemia due to thigh hematoma  Appears to have stabilized  Results from last 7 days  Lab Units 02/01/19  0559 01/31/19  0502 01/30/19  0626 01/29/19  0408 01/28/19  2234   HEMOGLOBIN g/dL 8 1* 8 2* 9 2* 11 4* 12 5        Diabetes mellitus [facility])   Assessment & Plan    Lab Results   Component Value Date    HGBA1C 8 5 (H) 01/29/2019       Recent Labs      01/31/19   1111  01/31/19   1615  01/31/19   2103  02/01/19   0729   POCGLU  185*  170*  166*  102     Diabetes mellitus, continue levemir and sliding scale     Chronic atrial fibrillation (HCC)   Assessment & Plan    Chronic atrial fibrillation rate controlled on metoprolol  Mechanical heart valve present   Assessment & Plan    INR now therapeutic    Heparin infusion discontinued    Results from last 7 days  Lab Units 02/01/19  0559 01/31/19  0525 01/30/19  0626 01/29/19  0408 01/28/19  2234   INR  2 54* 2 30* 2 03* 1 78* 1 79*        Chronic kidney disease, stage 3 (HCC)   Assessment & Plan    CKD 3 remains at baseline after contrast study; stable off IV fluids     Hypertension   Assessment & Plan    Continue indapamide and metoprolol  VTE Pharmacologic Prophylaxis: Warfarin (Coumadin)    Patient Centered Rounds: I have performed bedside rounds with nursing staff today  Discussions with Specialists or Other Care Team Provider:  Case management  Education and Discussions with Family / Patient:     Time Spent for Care: 25 mins  More than 50% of total time spent on counseling and coordination of care as described above  Current Length of Stay: 3 day(s)  Current Patient Status: Inpatient     Certification Statement: The patient will continue to require additional inpatient hospital stay due to Hematoma  Discharge Plan / Estimated Discharge Date: rehab tomorrow    Code Status: Level 1 - Full Code  ______________________________________________________________________________    Subjective:   Patient seen and examined  Pain better controlled with oxycodone  Objective:   Vitals: Blood pressure 133/73, pulse 83, temperature 98 5 °F (36 9 °C), temperature source Temporal, resp  rate 18, SpO2 100 %      Physical Exam:   General appearance: alert, appears stated age and cooperative  Head: Normocephalic, without obvious abnormality, atraumatic  Lungs: clear to auscultation bilaterally  Heart: regular rate and rhythm  Abdomen: soft, non-tender, positive bowel sounds   Back: negative, range of motion normal  Extremities: edema and ecchymosis right thigh  Neurologic: Grossly normal    Additional Data:   Labs:    Results from last 7 days  Lab Units 02/01/19  0559 01/31/19  0525 01/31/19  0502 01/30/19  0626 01/29/19  0408 01/28/19  2234   WBC Thousand/uL  --   --  11 45* 11 96* 12 21* 12 36*   HEMOGLOBIN g/dL 8 1*  --  8 2* 9 2* 11 4* 12 5   HEMATOCRIT % 25 3*  --  25 6* 29 9* 36 0 40 2   MCV fL  --   --  97 97 97 95   PLATELETS Thousands/uL  --   --  274 233 284 286   INR  2 54* 2 30*  --  2 03* 1 78* 1 79*       Results from last 7 days  Lab Units 01/31/19  0502 01/30/19  0626 01/29/19  0408 01/28/19  2234   SODIUM mmol/L 131* 138 137 135*   POTASSIUM mmol/L 4 1 3 9 4 1 5 1   CHLORIDE mmol/L 95* 100 98* 97*   CO2 mmol/L 28 32 28 29   ANION GAP mmol/L 8 6 11 9   BUN mg/dL 31* 27* 24 25   CREATININE mg/dL 1 25 1 19 1 24 1 23   CALCIUM mg/dL 8 0* 8 2* 9 0 9 7   ALBUMIN g/dL  --  3 0*  --  3 7   TOTAL BILIRUBIN mg/dL  --  0 61  --  0 66   ALK PHOS U/L  --  72  --  97   ALT U/L  --  41  --  43   AST U/L  --  45  --  55*   EGFR ml/min/1 73sq m 40 43 41 41   GLUCOSE RANDOM mg/dL 118 144* 233* 231*                        Results from last 7 days  Lab Units 02/01/19  0729 01/31/19  2103 01/31/19  1615 01/31/19  1111 01/31/19  0757 01/30/19  2117 01/30/19  1559 01/30/19  1101 01/30/19  0738 01/29/19  2059 01/29/19  1705 01/29/19  1017   POC GLUCOSE mg/dl 102 166* 170* 185* 125 189* 171* 219* 131 174* 152* 181*       Results from last 7 days  Lab Units 01/29/19  0408   HEMOGLOBIN A1C % 8 5*         * I Have Reviewed All Lab Data Listed Above  Cultures:           Imaging:  Imaging Reports Reviewed Today Include:   Procedure: Ct Femur Right Wo Contrast  Result Date: 1/31/2019  Impression: Slight interval increase in size of right adductor compartment hematoma The study was marked in EPIC for immediate notification  Workstation performed: [id number]     Procedure: Ir Pelvic Angiography / Intervention  Result Date: 1/29/2019  Impression: Impression: No definite extravasation or active bleeding identified    Small irregularity off of a branch of the superficial epigastric artery which was embolized Workstation performed: [id number]     Scheduled Meds:  Current Facility-Administered Medications:  acetaminophen 650 mg Oral Q6H PRN HIRAM Nichole-REESE   allopurinol 100 mg Oral BID HIRAM Nichole-REESE   bimatoprost 1 drop Both Eyes HS HIRAM Nichole-REESE   ferrous sulfate 325 mg Oral Daily With Breakfast HIRAM Nichole-REESE   gabapentin 600 mg Oral HS HIRAM Nichole-C   HYDROcodone-acetaminophen 1 tablet Oral Q6H PRN Baldo Corey PA-C HYDROmorphone 0 5 mg Intravenous Q3H PRN Javier Esparza, DO   indapamide 2 5 mg Oral QAM Joyce Kraft PA-C   insulin detemir 60 Units Subcutaneous HS Joyce Kraft, LORRI   insulin lispro 1-6 Units Subcutaneous 4x Daily (AC & HS) Alfonso Cobian, DO   methocarbamol 750 mg Oral BID PRN Joyce Kraft PA-C   metoprolol succinate 50 mg Oral Daily Joyce Kraft PA-C   ondansetron 4 mg Intravenous Q8H PRN Joyce Kraft PA-C   oxybutynin 2 5 mg Oral HS Joyce Kraft, LORRI   oxyCODONE 5 mg Oral Q4H PRN Javier Esparza,    pantoprazole 40 mg Oral Early Morning Joyce Kraft PA-C   warfarin 5 mg Oral Daily (warfarin) Javier Esparza, DO       Javier Esparza, DO  [facility] Internal Medicine  Hospitalist    ** Please Note: This note has been constructed using a voice recognition system   **

## 2019-02-01 NOTE — ASSESSMENT & PLAN NOTE
Lab Results   Component Value Date    HGBA1C 8 5 (H) 01/29/2019       Recent Labs      01/31/19   1111  01/31/19   1615  01/31/19   2103  02/01/19   0729   POCGLU  185*  170*  166*  102     Diabetes mellitus, continue levemir and sliding scale

## 2019-02-01 NOTE — ASSESSMENT & PLAN NOTE
Blood loss anemia due to thigh hematoma  Appears to have stabilized      Results from last 7 days  Lab Units 02/01/19  0559 01/31/19  0502 01/30/19  0626 01/29/19  0408 01/28/19  2234   HEMOGLOBIN g/dL 8 1* 8 2* 9 2* 11 4* 12 5

## 2019-02-01 NOTE — SOCIAL WORK
Attending made CM aware that patient would be cleared for discharge today  A [facility] transport was scheduled with [email or web address] for 1:30 to go to [facility] for report in EPIC  No Medical Necessity form required  Updated: attending, RN, unit clerk, facility via [address], and patient at bedside  IMM presented to patient at bedside and transport time for [facility]; IMM copy made for MR

## 2019-02-02 ENCOUNTER — HOSPITAL ENCOUNTER (INPATIENT)
Facility: HOSPITAL | Age: 84
LOS: 16 days | Discharge: HOME HEALTH | DRG: 948 | End: 2019-02-18
Attending: FAMILY MEDICINE | Admitting: FAMILY MEDICINE
Payer: MEDICARE

## 2019-02-02 VITALS
TEMPERATURE: 98.2 F | OXYGEN SATURATION: 98 % | HEART RATE: 85 BPM | SYSTOLIC BLOOD PRESSURE: 126 MMHG | DIASTOLIC BLOOD PRESSURE: 74 MMHG | RESPIRATION RATE: 17 BRPM

## 2019-02-02 DIAGNOSIS — M15.9 DJD (DEGENERATIVE JOINT DISEASE), MULTIPLE SITES: ICD-10-CM

## 2019-02-02 DIAGNOSIS — Z95.2 H/O MECHANICAL AORTIC VALVE REPLACEMENT: ICD-10-CM

## 2019-02-02 DIAGNOSIS — M1A.9XX0 CHRONIC GOUT WITHOUT TOPHUS, UNSPECIFIED CAUSE, UNSPECIFIED SITE: Chronic | ICD-10-CM

## 2019-02-02 DIAGNOSIS — E87.1 HYPONATREMIA: Primary | ICD-10-CM

## 2019-02-02 PROBLEM — R26.2 AMBULATORY DYSFUNCTION: Status: ACTIVE | Noted: 2019-02-02

## 2019-02-02 LAB
GLUCOSE SERPL-MCNC: 109 MG/DL (ref 65–140)
GLUCOSE SERPL-MCNC: 162 MG/DL (ref 70–99)
GLUCOSE SERPL-MCNC: 174 MG/DL (ref 65–140)
GLUCOSE SERPL-MCNC: 199 MG/DL (ref 70–99)
HCT VFR BLD AUTO: 26.1 % (ref 34.8–46.1)
HGB BLD-MCNC: 7.9 G/DL (ref 11.5–15.4)

## 2019-02-02 PROCEDURE — 99239 HOSP IP/OBS DSCHRG MGMT >30: CPT | Performed by: INTERNAL MEDICINE

## 2019-02-02 PROCEDURE — 85014 HEMATOCRIT: CPT | Performed by: INTERNAL MEDICINE

## 2019-02-02 PROCEDURE — 99305 1ST NF CARE MODERATE MDM 35: CPT | Performed by: FAMILY MEDICINE

## 2019-02-02 PROCEDURE — 82948 REAGENT STRIP/BLOOD GLUCOSE: CPT

## 2019-02-02 PROCEDURE — 85018 HEMOGLOBIN: CPT | Performed by: INTERNAL MEDICINE

## 2019-02-02 RX ORDER — OXYCODONE HYDROCHLORIDE 10 MG/1
10 TABLET ORAL EVERY 4 HOURS PRN
Status: CANCELLED | OUTPATIENT
Start: 2019-02-02

## 2019-02-02 RX ORDER — WARFARIN SODIUM 2.5 MG/1
3.75 TABLET ORAL
Status: DISCONTINUED | OUTPATIENT
Start: 2019-02-02 | End: 2019-02-03

## 2019-02-02 RX ORDER — METOPROLOL SUCCINATE 50 MG/1
50 TABLET, EXTENDED RELEASE ORAL DAILY
Status: CANCELLED | OUTPATIENT
Start: 2019-02-03

## 2019-02-02 RX ORDER — ACETAMINOPHEN 325 MG/1
650 TABLET ORAL EVERY 6 HOURS PRN
Status: CANCELLED | OUTPATIENT
Start: 2019-02-02

## 2019-02-02 RX ORDER — POLYETHYLENE GLYCOL 3350 17 G/17G
17 POWDER, FOR SOLUTION ORAL DAILY
Status: DISCONTINUED | OUTPATIENT
Start: 2019-02-03 | End: 2019-02-18 | Stop reason: HOSPADM

## 2019-02-02 RX ORDER — METHOCARBAMOL 750 MG/1
750 TABLET, FILM COATED ORAL 2 TIMES DAILY
Status: DISCONTINUED | OUTPATIENT
Start: 2019-02-02 | End: 2019-02-03

## 2019-02-02 RX ORDER — METHOCARBAMOL 500 MG/1
750 TABLET, FILM COATED ORAL 2 TIMES DAILY
Status: CANCELLED | OUTPATIENT
Start: 2019-02-02

## 2019-02-02 RX ORDER — POLYETHYLENE GLYCOL 3350 17 G/17G
17 POWDER, FOR SOLUTION ORAL DAILY
Status: CANCELLED | OUTPATIENT
Start: 2019-02-03

## 2019-02-02 RX ORDER — METOPROLOL SUCCINATE 50 MG/1
50 TABLET, EXTENDED RELEASE ORAL DAILY
Status: DISCONTINUED | OUTPATIENT
Start: 2019-02-03 | End: 2019-02-18 | Stop reason: HOSPADM

## 2019-02-02 RX ORDER — FERROUS SULFATE 325(65) MG
325 TABLET ORAL
Status: CANCELLED | OUTPATIENT
Start: 2019-02-03

## 2019-02-02 RX ORDER — PANTOPRAZOLE SODIUM 40 MG/1
40 TABLET, DELAYED RELEASE ORAL
Status: CANCELLED | OUTPATIENT
Start: 2019-02-03

## 2019-02-02 RX ORDER — OXYCODONE HYDROCHLORIDE 5 MG/1
10 TABLET ORAL EVERY 4 HOURS PRN
Status: DISCONTINUED | OUTPATIENT
Start: 2019-02-02 | End: 2019-02-18 | Stop reason: HOSPADM

## 2019-02-02 RX ORDER — LACTULOSE 20 G/30ML
20 SOLUTION ORAL 3 TIMES DAILY PRN
Status: DISCONTINUED | OUTPATIENT
Start: 2019-02-02 | End: 2019-02-18 | Stop reason: HOSPADM

## 2019-02-02 RX ORDER — OXYBUTYNIN CHLORIDE 5 MG/1
2.5 TABLET ORAL
Status: DISCONTINUED | OUTPATIENT
Start: 2019-02-02 | End: 2019-02-18 | Stop reason: HOSPADM

## 2019-02-02 RX ORDER — FERROUS SULFATE 325(65) MG
325 TABLET ORAL
Status: DISCONTINUED | OUTPATIENT
Start: 2019-02-03 | End: 2019-02-18 | Stop reason: HOSPADM

## 2019-02-02 RX ORDER — GABAPENTIN 300 MG/1
600 CAPSULE ORAL
Status: DISCONTINUED | OUTPATIENT
Start: 2019-02-02 | End: 2019-02-18 | Stop reason: HOSPADM

## 2019-02-02 RX ORDER — INDAPAMIDE 2.5 MG/1
2.5 TABLET, FILM COATED ORAL EVERY MORNING
Status: CANCELLED | OUTPATIENT
Start: 2019-02-03

## 2019-02-02 RX ORDER — WARFARIN SODIUM 7.5 MG/1
3.75 TABLET ORAL
Status: CANCELLED | OUTPATIENT
Start: 2019-02-02

## 2019-02-02 RX ORDER — ACETAMINOPHEN 325 MG/1
650 TABLET ORAL EVERY 6 HOURS PRN
Status: DISCONTINUED | OUTPATIENT
Start: 2019-02-02 | End: 2019-02-18 | Stop reason: HOSPADM

## 2019-02-02 RX ORDER — LACTULOSE 20 G/30ML
20 SOLUTION ORAL 3 TIMES DAILY PRN
Status: CANCELLED | OUTPATIENT
Start: 2019-02-02

## 2019-02-02 RX ORDER — ALLOPURINOL 100 MG/1
100 TABLET ORAL 2 TIMES DAILY
Status: DISCONTINUED | OUTPATIENT
Start: 2019-02-02 | End: 2019-02-18 | Stop reason: HOSPADM

## 2019-02-02 RX ORDER — OXYCODONE HYDROCHLORIDE 5 MG/1
5 TABLET ORAL EVERY 4 HOURS PRN
Status: CANCELLED | OUTPATIENT
Start: 2019-02-02

## 2019-02-02 RX ORDER — PANTOPRAZOLE SODIUM 40 MG/1
40 TABLET, DELAYED RELEASE ORAL
Status: DISCONTINUED | OUTPATIENT
Start: 2019-02-03 | End: 2019-02-18 | Stop reason: HOSPADM

## 2019-02-02 RX ORDER — OXYCODONE HYDROCHLORIDE 5 MG/1
5 TABLET ORAL EVERY 4 HOURS PRN
Status: DISCONTINUED | OUTPATIENT
Start: 2019-02-02 | End: 2019-02-18 | Stop reason: HOSPADM

## 2019-02-02 RX ORDER — LATANOPROST 50 UG/ML
1 SOLUTION/ DROPS OPHTHALMIC
Status: DISCONTINUED | OUTPATIENT
Start: 2019-02-02 | End: 2019-02-18 | Stop reason: HOSPADM

## 2019-02-02 RX ORDER — GABAPENTIN 300 MG/1
600 CAPSULE ORAL
Status: CANCELLED | OUTPATIENT
Start: 2019-02-02

## 2019-02-02 RX ORDER — INDAPAMIDE 2.5 MG/1
2.5 TABLET, FILM COATED ORAL EVERY MORNING
Status: DISCONTINUED | OUTPATIENT
Start: 2019-02-03 | End: 2019-02-12

## 2019-02-02 RX ORDER — OXYBUTYNIN CHLORIDE 5 MG/1
2.5 TABLET ORAL
Status: CANCELLED | OUTPATIENT
Start: 2019-02-02

## 2019-02-02 RX ORDER — ALLOPURINOL 100 MG/1
100 TABLET ORAL 2 TIMES DAILY
Status: CANCELLED | OUTPATIENT
Start: 2019-02-02

## 2019-02-02 RX ADMIN — METOPROLOL SUCCINATE 50 MG: 50 TABLET, EXTENDED RELEASE ORAL at 07:59

## 2019-02-02 RX ADMIN — METHOCARBAMOL 750 MG: 750 TABLET, FILM COATED ORAL at 08:07

## 2019-02-02 RX ADMIN — OXYCODONE HYDROCHLORIDE 5 MG: 5 TABLET ORAL at 16:31

## 2019-02-02 RX ADMIN — INSULIN DETEMIR 60 UNITS: 100 INJECTION, SOLUTION SUBCUTANEOUS at 21:04

## 2019-02-02 RX ADMIN — INSULIN LISPRO 1 UNITS: 100 INJECTION, SOLUTION INTRAVENOUS; SUBCUTANEOUS at 12:07

## 2019-02-02 RX ADMIN — INSULIN LISPRO 1 UNITS: 100 INJECTION, SOLUTION INTRAVENOUS; SUBCUTANEOUS at 17:37

## 2019-02-02 RX ADMIN — OXYCODONE HYDROCHLORIDE 10 MG: 10 TABLET ORAL at 10:07

## 2019-02-02 RX ADMIN — FERROUS SULFATE TAB 325 MG (65 MG ELEMENTAL FE) 325 MG: 325 (65 FE) TAB at 07:59

## 2019-02-02 RX ADMIN — ALLOPURINOL 100 MG: 100 TABLET ORAL at 17:38

## 2019-02-02 RX ADMIN — INDAPAMIDE 2.5 MG: 2.5 TABLET, FILM COATED ORAL at 08:00

## 2019-02-02 RX ADMIN — ALLOPURINOL 100 MG: 100 TABLET ORAL at 07:59

## 2019-02-02 RX ADMIN — OXYBUTYNIN CHLORIDE 2.5 MG: 5 TABLET ORAL at 21:04

## 2019-02-02 RX ADMIN — LATANOPROST 1 DROP: 50 SOLUTION OPHTHALMIC at 21:07

## 2019-02-02 RX ADMIN — OXYCODONE HYDROCHLORIDE 10 MG: 10 TABLET ORAL at 06:03

## 2019-02-02 RX ADMIN — OXYCODONE HYDROCHLORIDE 10 MG: 5 TABLET ORAL at 22:58

## 2019-02-02 RX ADMIN — POLYETHYLENE GLYCOL 3350 17 G: 17 POWDER, FOR SOLUTION ORAL at 08:01

## 2019-02-02 RX ADMIN — GABAPENTIN 600 MG: 300 CAPSULE ORAL at 21:03

## 2019-02-02 RX ADMIN — WARFARIN SODIUM 3.75 MG: 2.5 TABLET ORAL at 17:38

## 2019-02-02 RX ADMIN — PANTOPRAZOLE SODIUM 40 MG: 40 TABLET, DELAYED RELEASE ORAL at 05:57

## 2019-02-02 RX ADMIN — METHOCARBAMOL 750 MG: 750 TABLET ORAL at 17:38

## 2019-02-02 NOTE — ASSESSMENT & PLAN NOTE
Lab Results   Component Value Date    HGBA1C 8 5 (H) 01/29/2019       Recent Labs      02/01/19   1615  02/01/19   2057  02/02/19   0727  02/02/19   1139   POCGLU  179*  251*  109  174*     Diabetes mellitus, continue levemir and sliding scale

## 2019-02-02 NOTE — ASSESSMENT & PLAN NOTE
CKD 3 remains at baseline after contrast study; stable off IV fluids    Results from last 7 days  Lab Units 01/31/19  0502 01/30/19  0626 01/29/19  0408 01/28/19  2234   BUN mg/dL 31* 27* 24 25   CREATININE mg/dL 1 25 1 19 1 24 1 23

## 2019-02-02 NOTE — ASSESSMENT & PLAN NOTE
When patient was discharge from hospital previous to recent admission to [facility] regarding cardiac catheterization she was on Lovenox to warfarin bridging given mechanical valve  Warfarin was held for two days after findings of hematoma  INR now therapeutic and heparin infusion discontinued  She has been historically well controlled on 3 75 mg daily    Will recheck INR tomorrow as her hemoglobin is 7 9    Results from last 7 days  Lab Units 02/01/19  0559 01/31/19  0525 01/30/19  0626 01/29/19  0408 01/28/19  2234   INR  2 54* 2 30* 2 03* 1 78* 1 79*

## 2019-02-02 NOTE — ASSESSMENT & PLAN NOTE
· Hematoma of right thigh due to cardiac catheterization and on anticoagulation  Affected artery has been successfully embolized by IR  Does have drop in hemoglobin which now seems to have stabilized- but still dropped from previous I will repeat another 1 tomorrow  Appreciate orthopedic evaluation  Imaging does demonstrate worsening hematoma however  there was time difference between the original imaging and IR procedure  Continue supportive care with heating pad  · Pain management  · Repeat INR hemoglobin tomorrow

## 2019-02-02 NOTE — ASSESSMENT & PLAN NOTE
Blood loss anemia due to thigh hematoma  Appears to have stabilized    With her acute blood loss anemia at her age in her cardiac status I would like to keep hemoglobin actually higher as she also will start physical therapy I actually like to be above 8  I will actually repeat hemoglobin tomorrow and transfuse if lower  On iron    Results from last 7 days  Lab Units 02/02/19  0549 02/01/19  0559 01/31/19  0502 01/30/19  0626 01/29/19  0408 01/28/19  2234   HEMOGLOBIN g/dL 7 9* 8 1* 8 2* 9 2* 11 4* 12 5

## 2019-02-02 NOTE — ASSESSMENT & PLAN NOTE
No bowel movement since admission  Added miralax and lactulose p r n   If does not work will need to have enema

## 2019-02-02 NOTE — DISCHARGE SUMMARY
Discharge- Collette Pierini 1935, 81 y o  female MRN: [id number]  Unit/Bed#: [address] 219-01 Encounter: [id number]  Primary Care Provider: Gary Yang MD   Date and time admitted to hospital: 1/28/2019  9:27 PM        Admitting Provider:  rBandon Miller DO  Discharge Provider:  Brandon Miller DO  Admission Date: 1/28/2019       Discharge Date: 02/02/19   LOS: 4  Primary Care Physician at Discharge: Gary Yang MD [facility] COURSE:  Collette Pierini is a 81 y o  female who presented to the hospital with worsening right thigh pain  Patient had cardiac diagnostic catheterization done 1/23/2019  Given presence of mechanical aortic valve she was discharged with ;ovenox to warfarin bridging  Unfortunately she developed worsening right thigh pain and in the emergency department was noted to have hematoma  She was admitted where vascular surgery and IR coordinated embolization of superficial epigastric artery  She did have further drop in hemoglobin however does not appear to be evidence of further bleeding  She had repeat imaging and was seen by orthopedics  Due to debility she is being discharged to rehab  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 [employer]  Case was discussed with  prior to discharge  DISCHARGE DIAGNOSES  * Hematoma   Assessment & Plan    Hematoma of right thigh due to cardiac catheterization and on anticoagulation  Affected artery has been successfully embolized by IR  Does have drop in hemoglobin which now seems to have stabilized  Appreciate orthopedic evaluation  Imaging does demonstrate worsening hematoma however  there was time difference between the original imaging and IR procedure  Continue supportive care with heating pad     Constipation   Assessment & Plan    No bowel movement since admission  Added miralax and lactulose p r n       Acute blood loss anemia   Assessment & Plan    Blood loss anemia due to thigh hematoma  Appears to have stabilized  Transfuse if hemoglobin less than 7 0    Results from last 7 days  Lab Units 02/02/19  0549 02/01/19  0559 01/31/19  0502 01/30/19  0626 01/29/19  0408 01/28/19  2234   HEMOGLOBIN g/dL 7 9* 8 1* 8 2* 9 2* 11 4* 12 5        Diabetes mellitus ([address] )   Assessment & Plan    Lab Results   Component Value Date    HGBA1C 8 5 (H) 01/29/2019       Recent Labs      02/01/19   1615  02/01/19   2057  02/02/19   0727  02/02/19   1139   POCGLU  179*  251*  109  174*     Diabetes mellitus, continue levemir and sliding scale     Chronic atrial fibrillation (HCC)   Assessment & Plan    Chronic atrial fibrillation rate controlled on metoprolol  Mechanical heart valve present   Assessment & Plan    When patient was discharge from hospital regarding cardiac catheterization she was on Lovenox to warfarin bridging given mechanical valve  Warfarin was held for two days after findings of hematoma  INR now therapeutic and heparin infusion discontinued  She has been historically well controlled on 3 75 mg daily  Will recheck INR on Monday  Results from last 7 days  Lab Units 02/01/19  0559 01/31/19  0525 01/30/19  0626 01/29/19  0408 01/28/19  2234   INR  2 54* 2 30* 2 03* 1 78* 1 79*        Chronic kidney disease, stage 3 (HCC)   Assessment & Plan    CKD 3 remains at baseline after contrast study; stable off IV fluids    Results from last 7 days  Lab Units 01/31/19  0502 01/30/19  0626 01/29/19  0408 01/28/19  2234   BUN mg/dL 31* 27* 24 25   CREATININE mg/dL 1 25 1 19 1 24 1 23        Hypertension   Assessment & Plan    Continue indapamide and metoprolol  Anil Layton Dr Doctor - vascular surgery    PROCEDURES PERFORMED  Ir Pelvic Angiography / Intervention  Result Date: 1/29/2019  Impression: Impression: No definite extravasation or active bleeding identified    Small irregularity off of a branch of the superficial epigastric artery which was embolized Workstation performed: [id number]     RADIOLOGY RESULTS  Ct Femur Right Wo Contrast  Result Date: 1/31/2019  Impression: Slight interval increase in size of right adductor compartment hematoma The study was marked in EPIC for immediate notification  Workstation performed: [id number]     Vas Lower Limb Venous Duplex Study, Unilateral/limited  Result Date: 1/28/2019  RIGHT LOWER LIMB No evidence of acute or chronic deep vein thrombosis   No evidence of superficial thrombophlebitis noted  Doppler evaluation shows a normal response to augmentation maneuvers  Popliteal, posterior tibial and anterior tibial arterial Doppler waveforms are triphasic  LEFT LOWER LIMB LIMITED Evaluation shows no evidence of thrombus in the common femoral vein  Doppler evaluation shows a normal response to augmentation maneuvers  Technical findings were given to Rock Lawrence at Dr Verlyn Kocher office  SIGNATURE: Electronically Signed by: Rosy Andre on 2019-01-28 09:15:25 PM    Ct Abdomen Pelvis With Contrast  Addendum Date: 1/29/2019 Addendum:   ADDENDUM: There is enlargement of the right hip abductor musculature predominantly of the right pectineus and obturator externus muscles compatible with intramuscular hematoma  There are small foci of active extravasation seen within the muscle for example (series 2, image 80)  The right femoral artery is patent without evidence of pseudoaneurysm  The right femoral vein enhances normally without evidence of arteriovenous fistula  Result Date: 1/29/2019  Impression: 1  No acute inflammatory process identified within the abdomen or pelvis  2   Other findings as above   Workstation performed: [id number]       LABS  Results from last 7 days  Lab Units 02/02/19  0549 02/01/19  0559 01/31/19  0525 01/31/19  0502 01/30/19  0626 01/29/19  0408 01/28/19  2234   WBC Thousand/uL  --   --   --  11 45* 11 96* 12 21* 12 36*   HEMOGLOBIN g/dL 7 9* 8 1*  --  8 2* 9 2* 11 4* 12 5 HEMATOCRIT % 26 1* 25 3*  --  25 6* 29 9* 36 0 40 2   MCV fL  --   --   --  97 97 97 95   PLATELETS Thousands/uL  --   --   --  274 233 284 286   INR   --  2 54* 2 30*  --  2 03* 1 78* 1 79*       Results from last 7 days  Lab Units 01/31/19  0502 01/30/19  0626 01/29/19  0408 01/28/19  2234   SODIUM mmol/L 131* 138 137 135*   POTASSIUM mmol/L 4 1 3 9 4 1 5 1   CHLORIDE mmol/L 95* 100 98* 97*   CO2 mmol/L 28 32 28 29   BUN mg/dL 31* 27* 24 25   CREATININE mg/dL 1 25 1 19 1 24 1 23   CALCIUM mg/dL 8 0* 8 2* 9 0 9 7   ALBUMIN g/dL  --  3 0*  --  3 7   TOTAL BILIRUBIN mg/dL  --  0 61  --  0 66   ALK PHOS U/L  --  72  --  97   ALT U/L  --  41  --  43   AST U/L  --  45  --  55*   EGFR ml/min/1 73sq m 40 43 41 41   GLUCOSE RANDOM mg/dL 118 144* 233* 231*       Results from last 7 days  Lab Units 02/02/19  1139 02/02/19  0727 02/01/19 2057 02/01/19 1615 02/01/19  1135 02/01/19  0729 01/31/19  2103 01/31/19  1615 01/31/19  1111 01/31/19  0757   POC GLUCOSE mg/dl 174* 109 251* 179* 199* 102 166* 170* 185* 125       Results from last 7 days  Lab Units 01/29/19  0408   HEMOGLOBIN A1C % 8 5*       PHYSICAL EXAM:  Vitals:   Blood Pressure: 126/74 (02/02/19 0725)  Pulse: 85 (02/02/19 0725)  Temperature: 98 2 °F (36 8 °C) (02/02/19 0725)  Temp Source: Temporal (02/02/19 0725)  Respirations: 17 (02/02/19 0725)  SpO2: 98 % (02/02/19 0725)    General appearance: alert, appears stated age and cooperative  Skin: Ecchymosis of right thigh upper extremities and torso  Head: Normocephalic, without obvious abnormality, atraumatic  Eyes: conjunctivae/corneas clear  PERRL, EOM's intact    Lungs: clear to auscultation bilaterally  Heart: regular rate and rhythm  Abdomen: obese soft scattered ecchymosis  Back: range of motion normal  Extremities: edema +2 right thigh; scattered ecchymosis  Neurologic: Grossly normal    Planned Re-admission:  No  Discharge Disposition:  Rehab  Facility: [facility]    Test Results Pending at Discharge: None  Incidental findings:  None    Medications   Please see After Visit Summary for reconciled discharge medications provided to patient and family  Diet restrictions:  Diabetic diet   Activity restrictions: No strenuous activity  Discharge Condition: stable    Outpatient Follow-Up  1  Eufemia Chambers MD [address] [place] [id number] [phone] - follow-up within one week  2  Carlene Ruiz MD cardiology  Follow-up as scheduled  3  Mallika Reynaga DO nephrology  Rescheduled appointment for kidney disease  4  Ulysses Casas MD interventional cardiology  Follow-up as scheduled    Code Status: Level 1 - Full Code  Discharge Statement   I spent 45 minutes discharging the patient  This time was spent on the day of discharge  Greater than 50% of total time was spent with the patient and / or family counseling and / or coordination of care  ** Please Note: This note has been constructed using a voice recognition system   **

## 2019-02-02 NOTE — H&P
H&P- Damir Peraza 1935, 81 y o  female MRN: [id number]    Unit/Bed#: -01 Encounter: [id number]    Primary Care Provider: Valente Adams MD   Date and time admitted to hospital: 2/2/2019  2:40 PM        Constipation   Assessment & Plan    No bowel movement since admission  Added miralax and lactulose p r n  If does not work will need to have enema     Acute blood loss anemia   Assessment & Plan    Blood loss anemia due to thigh hematoma  Appears to have stabilized  With her acute blood loss anemia at her age in her cardiac status I would like to keep hemoglobin actually higher as she also will start physical therapy I actually like to be above 8  I will actually repeat hemoglobin tomorrow and transfuse if lower  On iron    Results from last 7 days  Lab Units 02/02/19  0549 02/01/19  0559 01/31/19  0502 01/30/19  0626 01/29/19  0408 01/28/19  2234   HEMOGLOBIN g/dL 7 9* 8 1* 8 2* 9 2* 11 4* 12 5        Diabetes mellitus ([address] )   Assessment & Plan    Lab Results   Component Value Date    HGBA1C 8 5 (H) 01/29/2019       Recent Labs      02/01/19   1615  02/01/19   2057  02/02/19   0727  02/02/19   1139   POCGLU  179*  251*  109  174*     Diabetes mellitus, continue levemir and sliding scale     Hematoma   Assessment & Plan    · Hematoma of right thigh due to cardiac catheterization and on anticoagulation  Affected artery has been successfully embolized by IR  Does have drop in hemoglobin which now seems to have stabilized- but still dropped from previous I will repeat another 1 tomorrow  Appreciate orthopedic evaluation  Imaging does demonstrate worsening hematoma however  there was time difference between the original imaging and IR procedure  Continue supportive care with heating pad  · Pain management  · Repeat INR hemoglobin tomorrow  Chronic atrial fibrillation (HCC)   Assessment & Plan    Chronic atrial fibrillation rate controlled on metoprolol    She is also on warfarin Mechanical heart valve present   Assessment & Plan    When patient was discharge from hospital previous to recent admission to [facility] regarding cardiac catheterization she was on Lovenox to warfarin bridging given mechanical valve  Warfarin was held for two days after findings of hematoma  INR now therapeutic and heparin infusion discontinued  She has been historically well controlled on 3 75 mg daily  Will recheck INR tomorrow as her hemoglobin is 7 9    Results from last 7 days  Lab Units 02/01/19  0559 01/31/19  0525 01/30/19  0626 01/29/19  0408 01/28/19  2234   INR  2 54* 2 30* 2 03* 1 78* 1 79*        Gout   Assessment & Plan    · Continue allopurinol     Chronic kidney disease, stage 3 (HCC)   Assessment & Plan    CKD 3 remains at baseline after contrast study; stable off IV fluids    Results from last 7 days  Lab Units 01/31/19  0502 01/30/19  0626 01/29/19  0408 01/28/19  2234   BUN mg/dL 31* 27* 24 25   CREATININE mg/dL 1 25 1 19 1 24 1 23        Hypertension   Assessment & Plan    Continue indapamide and metoprolol  * Ambulatory dysfunction   Assessment & Plan    · PT OT for evaluation  VTE Prophylaxis: Warfarin (Coumadin)  / sequential compression device   Code Status:  Full code  POLST: There is no POLST form on file for this patient (pre-hospital)  Discussion with family:  Patient    Anticipated Length of Stay:  Patient will be admitted on an SNF Short Term Inpatient basis with an anticipated length of stay of  > 2 midnights  Justification for Hospital Stay:  Rehab    Total Time for Visit, including Counseling / Coordination of Care: 1 hour  Greater than 50% of this total time spent on direct patient counseling and coordination of care      Chief Complaint:   Leg pain    History of Present Illness:    Blas Flores is a 81 y o  female who presents with deconditioning altered dysfunction with recent hospitalization [facility] for a right groin hematoma  status post a previous the catheterization     Patient states that the leg pain is still there but is better than before she denies any chest pain or shortness of breath nausea vomiting diarrhea but she does have constipation last time she went to the bathroom was Friday  She denies any dizziness  Review of Systems:    Review of Systems   Constitutional: Negative for fatigue and fever  HENT: Negative  Negative for ear pain, rhinorrhea and sore throat  Eyes: Negative  Negative for pain and itching  Respiratory: Negative  Negative for cough, chest tightness, shortness of breath and wheezing  Cardiovascular: Negative  Negative for chest pain, palpitations and leg swelling  Gastrointestinal: Positive for constipation  Negative for abdominal pain, diarrhea, nausea and vomiting  Endocrine: Negative for polydipsia, polyphagia and polyuria  Genitourinary: Negative for dysuria and flank pain  Musculoskeletal: Negative  Negative for back pain and myalgias  Right leg pain   Skin: Negative  Negative for rash and wound  Neurological: Negative  Negative for dizziness, syncope, speech difficulty, weakness, light-headedness, numbness and headaches  Psychiatric/Behavioral: Negative for agitation, confusion and decreased concentration  All other systems reviewed and are negative        Past Medical and Surgical History:     Past Medical History:   Diagnosis Date    Chronic atrial fibrillation ([facility] ) 9/13/2017    Chronic gout 9/13/2017    Chronic rhinitis 11/08/2018    CKD (chronic kidney disease) stage 3, GFR 30-59 ml/min ([facility]) 9/13/2017    Colon polyp 9/13/2017    Coronary artery disease     Diabetes mellitus ([facility] )     COMBS (dyspnea on exertion) 11/02/2018    Essential hypertension 9/13/2017    Gastritis 9/13/2017    Hypertension     Irregular heart beat     Mechanical heart valve present 9/13/2017    Osteoarthritis     Pacemaker     S/P AVR     Spinal stenosis     disk herniation        Past Surgical History:   Procedure Laterality Date    AORTIC VALVE REPLACEMENT      CARDIAC SURGERY      CATARACT EXTRACTION      CHOLECYSTECTOMY      COLONOSCOPY      ESOPHAGOGASTRODUODENOSCOPY      HERNIA REPAIR      x3    HYSTERECTOMY      INSERT / REPLACE / REMOVE PACEMAKER      IR PELVIC ANGIOGRAPHY / INTERVENTION  1/29/2019    JOINT REPLACEMENT Bilateral     knees    WY COLONOSCOPY FLX DX W/COLLJ SPEC WHEN PFRMD N/A 9/12/2017    Procedure: EGDwith bxAND COLONOSCOPY with polypectomies;  Surgeon: Hossein Sandoval MD;  Location: [facility]; Service: Gastroenterology    WY SIGMOIDOSCOPY FLX DX W/COLLJ SPEC BR/WA IF PFRMD N/A 9/13/2017    Procedure: colonoscopy with hemo clip x 2;  Surgeon: Hossein Sandoval MD;  Location: [facility]; Service: Gastroenterology    ROTATOR CUFF REPAIR Left     SKIN BIOPSY      TONSILLECTOMY         Meds/Allergies:    Prior to Admission medications    Medication Sig Start Date End Date Taking?  Authorizing Provider   allopurinol (ZYLOPRIM) 100 mg tablet Take 100 mg by mouth 2 (two) times a day     Yes Historical Provider, MD   aspirin (ECOTRIN LOW STRENGTH) 81 mg EC tablet Take 81 mg by mouth daily   Yes Historical Provider, MD   bimatoprost (LUMIGAN) 0 01 % ophthalmic drops Administer 1 drop to both eyes daily at bedtime   Yes Historical Provider, MD   calcium citrate-vitamin D (CITRACAL+D) 315-200 MG-UNIT per tablet Take 1 tablet by mouth 2 (two) times a day   Yes Historical Provider, MD   cholecalciferol (VITAMIN D3) 1,000 units tablet Take 1,000 Units by mouth daily   Yes Historical Provider, MD   ferrous sulfate 325 (65 Fe) mg tablet Take 325 mg by mouth daily with breakfast   Yes Historical Provider, MD   gabapentin (NEURONTIN) 300 mg capsule Take 2 capsules (600 mg total) by mouth daily at bedtime 11/1/18  Yes Arlyne Im, DO   HYDROcodone-acetaminophen (NORCO) 5-325 mg per tablet Take 1 tablet by mouth every 6 (six) hours as needed for pain for up to 10 doses Max Daily Amount: 4 tablets 6/15/17  Yes Tanika Monique MD   indapamide (LOZOL) 2 5 mg tablet Take 2 5 mg by mouth every morning   Yes Historical Provider, MD   insulin aspart (NovoLOG) 100 units/mL injection Inject under the skin 3 (three) times a day before meals   Yes Historical Provider, MD   insulin detemir (LEVEMIR) 100 units/mL subcutaneous injection Inject 60 Units under the skin daily at bedtime     Yes Historical Provider, MD   Magnesium 250 MG TABS Take 1 each by mouth daily   Yes Historical Provider, MD   methocarbamol (ROBAXIN) 500 mg tablet Take 750 mg by mouth 2 (two) times a day     Yes Historical Provider, MD   metoprolol succinate (TOPROL-XL) 50 mg 24 hr tablet Take 50 mg by mouth daily   Yes Historical Provider, MD   Multiple Vitamin (MULTIVITAMIN) tablet Take 1 tablet by mouth daily   Yes Historical Provider, MD   Multiple Vitamins-Minerals (ICAPS AREDS 2 PO) Take 2 capsules by mouth daily   Yes Historical Provider, MD   polyethylene glycol-propylene glycol (SYSTANE) 0 4-0 3 % every 3 (three) hours as needed   Yes Historical Provider, MD   RABEprazole (ACIPHEX) 20 MG tablet Take 20 mg by mouth daily   Yes Historical Provider, MD   tolterodine (DETROL) 1 mg tablet Take 1 mg by mouth daily at bedtime   Yes Historical Provider, MD   warfarin (COUMADIN) 2 5 mg tablet Take 3 75 mg by mouth daily 5mg on Tuesday   Yes Historical Provider, MD     I have reviewed home medications with a medical source (PCP, Pharmacy, other)  Allergies: Allergies   Allergen Reactions    Cephalosporins Rash and Anaphylaxis     Anaphylaxis  Other reaction(s): Itching    Acetazolamide GI Intolerance    Allopurinol     Cefazolin Other (See Comments)     rash    Colchicine     Diltiazem     Docusate Other (See Comments)     impaction    Eszopiclone     Febuxostat     Glycerin     Hydrochlorothiazide Other (See Comments) and Headache     Other reaction(s):  Other (see comments)  Generalized swelling    Latex Other reaction(s): Other (See Comments)  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    Metformin      Other reaction(s): GI intolerance    Nortriptyline      Other reaction(s): Other (See Comments)  rash bolivar flexeril      Penicillins      Other reaction(s): Other (See Comments)  RASH-anaphylaxis  Other reaction(s): Anaphylaxis    Povidone Iodine Other (See Comments)     VAGINAL ITCHING    Ropinirole      Back pain    Simvastatin Other (See Comments)     muscle aches    Sulfa Antibiotics      Other reaction(s): Other (See Comments)  bolivar Lasix, GI upset, rash  Other reaction(s): GI Upset    Valsartan      Other reaction(s): Other (See Comments)  bolivar olmesartan    Fluticasone      Leg cramps       Social History:     Marital Status:    Substance Use History:   History   Alcohol Use No     History   Smoking Status    Former Smoker   Smokeless Tobacco    Never Used     Comment: quit more than 40 yr     History   Drug Use No       Family History:    Family History   Problem Relation Age of Onset    Diabetes Mother     Hypertension Mother     Heart disease Father     Emphysema Father     Asthma Sister     Cancer Sister     Hypertension Sister        Physical Exam:     Vitals:   Blood Pressure: 145/72 (02/02/19 1420)  Pulse: 92 (02/02/19 1420)  Temperature: 97 5 °F (36 4 °C) (02/02/19 1420)  Temp Source: Temporal (02/02/19 1420)  Respirations: 19 (02/02/19 1420)  Height: 5' 6" (167 6 cm) (02/02/19 1420)  Weight - Scale: 112 kg (246 lb 7 6 oz) (02/02/19 1420)  SpO2: 93 % (02/02/19 1420)    Physical Exam   Constitutional: She is oriented to person, place, and time  Obese   HENT:   Head: Normocephalic and atraumatic  Eyes: Pupils are equal, round, and reactive to light  EOM are normal    Neck: Normal range of motion  Cardiovascular: Normal rate, regular rhythm and normal heart sounds  Pulmonary/Chest: Effort normal and breath sounds normal  No respiratory distress  She has no wheezes  She has no rales  Abdominal: Soft  Bowel sounds are normal    Musculoskeletal: Normal range of motion  She exhibits edema (Plus one pitting edema of the bilateral lower extremities)  Neurological: She is alert and oriented to person, place, and time  She has normal reflexes  Skin: Skin is warm  Left inner thigh hematoma there is also ecchymosis at the left forearm arm   Psychiatric: She has a normal mood and affect  Additional Data:     Lab Results: I have personally reviewed pertinent reports  Results from last 7 days  Lab Units 02/02/19  0549  01/31/19  0502  01/28/19  2234   WBC Thousand/uL  --   --  11 45*  < > 12 36*   HEMOGLOBIN g/dL 7 9*  < > 8 2*  < > 12 5   HEMATOCRIT % 26 1*  < > 25 6*  < > 40 2   PLATELETS Thousands/uL  --   --  274  < > 286   NEUTROS PCT %  --   --   --   --  86*   LYMPHS PCT %  --   --   --   --  10*   MONOS PCT %  --   --   --   --  3*   EOS PCT %  --   --   --   --  0   < > = values in this interval not displayed      Results from last 7 days  Lab Units 01/31/19  0502 01/30/19  0626   SODIUM mmol/L 131* 138   POTASSIUM mmol/L 4 1 3 9   CHLORIDE mmol/L 95* 100   CO2 mmol/L 28 32   BUN mg/dL 31* 27*   CREATININE mg/dL 1 25 1 19   ANION GAP mmol/L 8 6   CALCIUM mg/dL 8 0* 8 2*   ALBUMIN g/dL  --  3 0*   TOTAL BILIRUBIN mg/dL  --  0 61   ALK PHOS U/L  --  72   ALT U/L  --  41   AST U/L  --  45   GLUCOSE RANDOM mg/dL 118 144*       Results from last 7 days  Lab Units 02/01/19  0559   INR  2 54*       Results from last 7 days  Lab Units 02/02/19  1139 02/02/19  0727 02/01/19 2057 02/01/19  1615 02/01/19  1135 02/01/19  0729 01/31/19  2103 01/31/19  1615 01/31/19  1111 01/31/19  0757 01/30/19  2117 01/30/19  1559   POC GLUCOSE mg/dl 174* 109 251* 179* 199* 102 166* 170* 185* 125 189* 171*       Results from last 7 days  Lab Units 01/29/19  0408   HEMOGLOBIN A1C % 8 5*           Imaging: I have personally reviewed pertinent films in PACS    No orders to display       EKG, Pathology, and Other Studies Reviewed on Admission:   · EKG:  Not available    Allscripts / Epic Records Reviewed: Yes     ** Please Note: This note has been constructed using a voice recognition system   **

## 2019-02-02 NOTE — ASSESSMENT & PLAN NOTE
When patient was discharge from hospital regarding cardiac catheterization she was on Lovenox to warfarin bridging given mechanical valve  Warfarin was held for two days after findings of hematoma  INR now therapeutic and heparin infusion discontinued  She has been historically well controlled on 3 75 mg daily    Will recheck INR on Monday  Results from last 7 days  Lab Units 02/01/19  0559 01/31/19  0525 01/30/19  0626 01/29/19  0408 01/28/19  2234   INR  2 54* 2 30* 2 03* 1 78* 1 79*

## 2019-02-02 NOTE — ASSESSMENT & PLAN NOTE
Hematoma of right thigh due to cardiac catheterization and on anticoagulation  Affected artery has been successfully embolized by IR  Does have drop in hemoglobin which now seems to have stabilized  Appreciate orthopedic evaluation  Imaging does demonstrate worsening hematoma however  there was time difference between the original imaging and IR procedure    Continue supportive care with heating pad

## 2019-02-02 NOTE — ASSESSMENT & PLAN NOTE
Blood loss anemia due to thigh hematoma  Appears to have stabilized    Transfuse if hemoglobin less than 7 0    Results from last 7 days  Lab Units 02/02/19  0549 02/01/19  0559 01/31/19  0502 01/30/19  0626 01/29/19  0408 01/28/19  2234   HEMOGLOBIN g/dL 7 9* 8 1* 8 2* 9 2* 11 4* 12 5

## 2019-02-02 NOTE — PLAN OF CARE
Problem: Potential for Falls  Goal: Patient will remain free of falls  INTERVENTIONS:  - Assess patient frequently for physical needs  -  Identify cognitive and physical deficits and behaviors that affect risk of falls  -  [place] fall precautions as indicated by assessment   - Educate patient/family on patient safety including physical limitations  - Instruct patient to call for assistance with activity based on assessment  - Modify environment to reduce risk of injury  - Consider OT/PT consult to assist with strengthening/mobility   Outcome: Progressing  Patient arrived from [facility]    Call bell and belongings  within reach and patient instructed to call for ASSISTANCE

## 2019-02-03 LAB
GLUCOSE SERPL-MCNC: 132 MG/DL (ref 70–99)
GLUCOSE SERPL-MCNC: 142 MG/DL (ref 70–99)
GLUCOSE SERPL-MCNC: 186 MG/DL (ref 70–99)
GLUCOSE SERPL-MCNC: 221 MG/DL (ref 70–99)
HCT VFR BLD AUTO: 24.8 % (ref 36–46)
HGB BLD-MCNC: 8 G/DL (ref 12–16)
INR PPP: 2.97 (ref 0.89–1.1)
PROTHROMBIN TIME: 29.6 SECONDS (ref 9.5–11.6)

## 2019-02-03 PROCEDURE — 85014 HEMATOCRIT: CPT | Performed by: FAMILY MEDICINE

## 2019-02-03 PROCEDURE — 85610 PROTHROMBIN TIME: CPT | Performed by: FAMILY MEDICINE

## 2019-02-03 PROCEDURE — 85018 HEMOGLOBIN: CPT | Performed by: FAMILY MEDICINE

## 2019-02-03 PROCEDURE — 82948 REAGENT STRIP/BLOOD GLUCOSE: CPT

## 2019-02-03 RX ORDER — WARFARIN SODIUM 2.5 MG/1
2.5 TABLET ORAL
Status: COMPLETED | OUTPATIENT
Start: 2019-02-03 | End: 2019-02-03

## 2019-02-03 RX ORDER — METHOCARBAMOL 500 MG/1
500 TABLET, FILM COATED ORAL 2 TIMES DAILY
Status: DISCONTINUED | OUTPATIENT
Start: 2019-02-03 | End: 2019-02-18 | Stop reason: HOSPADM

## 2019-02-03 RX ADMIN — ALLOPURINOL 100 MG: 100 TABLET ORAL at 17:27

## 2019-02-03 RX ADMIN — LATANOPROST 1 DROP: 50 SOLUTION OPHTHALMIC at 21:47

## 2019-02-03 RX ADMIN — ALLOPURINOL 100 MG: 100 TABLET ORAL at 08:28

## 2019-02-03 RX ADMIN — METHOCARBAMOL 750 MG: 750 TABLET ORAL at 08:31

## 2019-02-03 RX ADMIN — INSULIN LISPRO 1 UNITS: 100 INJECTION, SOLUTION INTRAVENOUS; SUBCUTANEOUS at 17:28

## 2019-02-03 RX ADMIN — METHOCARBAMOL 500 MG: 500 TABLET, FILM COATED ORAL at 17:28

## 2019-02-03 RX ADMIN — INSULIN DETEMIR 60 UNITS: 100 INJECTION, SOLUTION SUBCUTANEOUS at 21:47

## 2019-02-03 RX ADMIN — OXYBUTYNIN CHLORIDE 2.5 MG: 5 TABLET ORAL at 21:47

## 2019-02-03 RX ADMIN — OXYCODONE HYDROCHLORIDE 10 MG: 5 TABLET ORAL at 10:29

## 2019-02-03 RX ADMIN — OXYCODONE HYDROCHLORIDE 5 MG: 5 TABLET ORAL at 22:52

## 2019-02-03 RX ADMIN — FERROUS SULFATE TAB 325 MG (65 MG ELEMENTAL FE) 325 MG: 325 (65 FE) TAB at 08:29

## 2019-02-03 RX ADMIN — METOPROLOL SUCCINATE 50 MG: 50 TABLET, EXTENDED RELEASE ORAL at 08:28

## 2019-02-03 RX ADMIN — OXYCODONE HYDROCHLORIDE 5 MG: 5 TABLET ORAL at 06:11

## 2019-02-03 RX ADMIN — GABAPENTIN 600 MG: 300 CAPSULE ORAL at 21:46

## 2019-02-03 RX ADMIN — WARFARIN SODIUM 2.5 MG: 2.5 TABLET ORAL at 17:51

## 2019-02-03 RX ADMIN — PANTOPRAZOLE SODIUM 40 MG: 40 TABLET, DELAYED RELEASE ORAL at 06:11

## 2019-02-03 RX ADMIN — INDAPAMIDE 2.5 MG: 2.5 TABLET, FILM COATED ORAL at 08:27

## 2019-02-03 RX ADMIN — OXYCODONE HYDROCHLORIDE 10 MG: 5 TABLET ORAL at 17:27

## 2019-02-03 NOTE — PROGRESS NOTES
Medication Regimen Review (MRR)    To promote positive health outcomes and reduce adverse consequences the patient's medication therapy has been reviewed by a pharmacist for the following potential problems:   1   documented indication and therapeutic benefits  2   appropriate dose, frequency, route, and duration of therapy  3   medication interactions, side effects, and allergies  4   medication or transcription errors  Medications are also reviewed for appropriate monitoring, duplicate therapy, and dose reduction  Based on the review please see the following recommendations  Patient information:    The patient is 81 y o  admitted for  ambulatory dysfunction  INR 2 54 A1c 8 5    Wt Readings from Last 1 Encounters:   02/02/19 112 kg (246 lb 7 6 oz)       Lab Results   Component Value Date    CALCIUM 8 0 (L) 01/31/2019    K 4 1 01/31/2019    CO2 28 01/31/2019    CL 95 (L) 01/31/2019    BUN 31 (H) 01/31/2019    CREATININE 1 25 01/31/2019       Patient is taking the following medications that need review:    Methocarbamol 750mg po 2 times per day  Recommendations:  1  Recommend to decrease the dose or use alternative therapy for the psychopharmacologic medication  Methocarbamol is Beer's medication with anticholinergic side effects, sedation, and increased risk for falls

## 2019-02-04 LAB
GLUCOSE SERPL-MCNC: 151 MG/DL (ref 70–99)
GLUCOSE SERPL-MCNC: 177 MG/DL (ref 70–99)
GLUCOSE SERPL-MCNC: 189 MG/DL (ref 70–99)
GLUCOSE SERPL-MCNC: 190 MG/DL (ref 70–99)
HCT VFR BLD AUTO: 26.4 % (ref 36–46)
HGB BLD-MCNC: 8.6 G/DL (ref 12–16)
INR PPP: 2.82 (ref 0.89–1.1)
PROTHROMBIN TIME: 28.2 SECONDS (ref 9.5–11.6)

## 2019-02-04 PROCEDURE — 85014 HEMATOCRIT: CPT | Performed by: FAMILY MEDICINE

## 2019-02-04 PROCEDURE — 97116 GAIT TRAINING THERAPY: CPT

## 2019-02-04 PROCEDURE — 82948 REAGENT STRIP/BLOOD GLUCOSE: CPT

## 2019-02-04 PROCEDURE — 85610 PROTHROMBIN TIME: CPT | Performed by: FAMILY MEDICINE

## 2019-02-04 PROCEDURE — 97163 PT EVAL HIGH COMPLEX 45 MIN: CPT

## 2019-02-04 PROCEDURE — 97535 SELF CARE MNGMENT TRAINING: CPT

## 2019-02-04 PROCEDURE — 97166 OT EVAL MOD COMPLEX 45 MIN: CPT

## 2019-02-04 PROCEDURE — 85018 HEMOGLOBIN: CPT | Performed by: FAMILY MEDICINE

## 2019-02-04 PROCEDURE — 97530 THERAPEUTIC ACTIVITIES: CPT

## 2019-02-04 RX ORDER — WARFARIN SODIUM 2.5 MG/1
2.5 TABLET ORAL
Status: DISCONTINUED | OUTPATIENT
Start: 2019-02-04 | End: 2019-02-05

## 2019-02-04 RX ADMIN — OXYBUTYNIN CHLORIDE 2.5 MG: 5 TABLET ORAL at 21:05

## 2019-02-04 RX ADMIN — WARFARIN SODIUM 2.5 MG: 2.5 TABLET ORAL at 17:13

## 2019-02-04 RX ADMIN — METHOCARBAMOL 500 MG: 500 TABLET, FILM COATED ORAL at 17:13

## 2019-02-04 RX ADMIN — PANTOPRAZOLE SODIUM 40 MG: 40 TABLET, DELAYED RELEASE ORAL at 06:13

## 2019-02-04 RX ADMIN — LATANOPROST 1 DROP: 50 SOLUTION OPHTHALMIC at 21:05

## 2019-02-04 RX ADMIN — ALLOPURINOL 100 MG: 100 TABLET ORAL at 09:15

## 2019-02-04 RX ADMIN — OXYCODONE HYDROCHLORIDE 10 MG: 5 TABLET ORAL at 20:48

## 2019-02-04 RX ADMIN — INSULIN DETEMIR 60 UNITS: 100 INJECTION, SOLUTION SUBCUTANEOUS at 21:05

## 2019-02-04 RX ADMIN — METOPROLOL SUCCINATE 50 MG: 50 TABLET, EXTENDED RELEASE ORAL at 09:15

## 2019-02-04 RX ADMIN — INSULIN LISPRO 2 UNITS: 100 INJECTION, SOLUTION INTRAVENOUS; SUBCUTANEOUS at 21:05

## 2019-02-04 RX ADMIN — OXYCODONE HYDROCHLORIDE 10 MG: 5 TABLET ORAL at 13:34

## 2019-02-04 RX ADMIN — ALLOPURINOL 100 MG: 100 TABLET ORAL at 17:14

## 2019-02-04 RX ADMIN — INSULIN LISPRO 1 UNITS: 100 INJECTION, SOLUTION INTRAVENOUS; SUBCUTANEOUS at 06:13

## 2019-02-04 RX ADMIN — INSULIN LISPRO 1 UNITS: 100 INJECTION, SOLUTION INTRAVENOUS; SUBCUTANEOUS at 17:16

## 2019-02-04 RX ADMIN — FERROUS SULFATE TAB 325 MG (65 MG ELEMENTAL FE) 325 MG: 325 (65 FE) TAB at 09:15

## 2019-02-04 RX ADMIN — OXYCODONE HYDROCHLORIDE 5 MG: 5 TABLET ORAL at 09:21

## 2019-02-04 RX ADMIN — METHOCARBAMOL 500 MG: 500 TABLET, FILM COATED ORAL at 09:15

## 2019-02-04 RX ADMIN — MAGNESIUM OXIDE TAB 400 MG (241.3 MG ELEMENTAL MG) 400 MG: 400 (241.3 MG) TAB at 14:40

## 2019-02-04 RX ADMIN — INDAPAMIDE 2.5 MG: 2.5 TABLET, FILM COATED ORAL at 09:28

## 2019-02-04 RX ADMIN — GABAPENTIN 600 MG: 300 CAPSULE ORAL at 21:05

## 2019-02-04 NOTE — SOCIAL WORK
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) in a [address] w/ 1 [address] and 2nd floor set-up (half bath available on 1st floor)  Pt was independent with ambulation w/o DME and with all self-care/home management tasks  Pt notes that she owns a SPC, walker (suspected to be RW), and raised toilet seat  Pt has no current in-home services, recent SNF admissions, MH dx or substance abuse hx  Pt's PCP is Dr Nicole Yap and preferred pharmacy is the [facility] on [address]  SW discussed scheduling [facility]  Pt was interested but was not ready to schedule meeting at this time  No questions or concerns from pt  SW will continue to follow for plan of care

## 2019-02-04 NOTE — PHYSICAL THERAPY NOTE
Physical Therapy Evaluation: 20 minutes ( 8:50 to 9:19)     Patient's Name: Chato Mills    Admitting Diagnosis  Groin hematoma [S30 1XXA]    Problem List  Patient Active Problem List   Diagnosis    Hypertension    Chronic kidney disease, stage 3 ([address] )    Gout    Mechanical heart valve present    Chronic atrial fibrillation (HCC)    Gastritis    Anticoagulated    Acute cystitis without hematuria    Benign hypertensive CKD    Cardiomegaly    Diastolic dysfunction    DJD (degenerative joint disease), multiple sites    Esophagitis, reflux    Fibromyalgia    Fibrous breast lumps    H/O mechanical aortic valve replacement    Hyperuricemia without signs inflammatory arthritis/tophaceous disease    MCI (mild cognitive impairment)    Class 2 severe obesity due to excess calories with serious comorbidity and body mass index (BMI) of 39 0 to 39 9 in adult (HCC)    Osteoarthritis    Primary osteoarthritis involving multiple joints    Proteinuria    Pulmonary artery hypertension (HCC)    RLS (restless legs syndrome)    Secondary hyperparathyroidism, renal (HCC)    Increased frequency of urination    Lump of right breast    Leg cramps    Chronic diastolic congestive heart failure (HCC)    Hematoma    Diabetes mellitus ([address] )    Acute blood loss anemia    Constipation    Ambulatory dysfunction       Past Medical History  Past Medical History:   Diagnosis Date    Chronic atrial fibrillation ([address] ) 9/13/2017    Chronic gout 9/13/2017    Chronic rhinitis 11/08/2018    CKD (chronic kidney disease) stage 3, GFR 30-59 ml/min ([address] ) 9/13/2017    Colon polyp 9/13/2017    Coronary artery disease     Diabetes mellitus ([address] )     COMBS (dyspnea on exertion) 11/02/2018    Essential hypertension 9/13/2017    Gastritis 9/13/2017    Hypertension     Irregular heart beat     Mechanical heart valve present 9/13/2017    Osteoarthritis     Pacemaker     S/P AVR     Spinal stenosis     disk herniation        Past Surgical History  Past Surgical History:   Procedure Laterality Date    AORTIC VALVE REPLACEMENT      CARDIAC SURGERY      CATARACT EXTRACTION      CHOLECYSTECTOMY      COLONOSCOPY      ESOPHAGOGASTRODUODENOSCOPY      HERNIA REPAIR      x3    HYSTERECTOMY      INSERT / REPLACE / REMOVE PACEMAKER      IR PELVIC ANGIOGRAPHY / INTERVENTION  1/29/2019    JOINT REPLACEMENT Bilateral     knees    MD COLONOSCOPY FLX DX W/COLLJ SPEC WHEN PFRMD N/A 9/12/2017    Procedure: EGDwith bxAND COLONOSCOPY with polypectomies;  Surgeon: Jayme Vo MD;  Location: [facility]; Service: Gastroenterology    MD SIGMOIDOSCOPY FLX DX W/COLLJ SPEC BR/WA IF PFRMD N/A 9/13/2017    Procedure: colonoscopy with hemo clip x 2;  Surgeon: Jayme Vo MD;  Location: [facility]; Service: Gastroenterology    ROTATOR CUFF REPAIR Left     SKIN BIOPSY      TONSILLECTOMY          02/04/19 0859   Note Type   Note type Eval/Treat   Pain Assessment   Pain Assessment 0-10   Pain Score 8   Pain Type Acute pain   Pain Location Leg;Arm  (Front and inside of thigh past knee)   Pain Orientation Right   Home Living   Type of Home House  (1 LAUREL without rails ( doorstoop))   Home Layout Two level;1/2 bath on main level  (13 steps to 2nd fl )   Bathroom Shower/Tub Tub/shower unit  (Steps into tub (glass door won't allow for shower bench))   Bathroom Toilet Raised  (Raised on 2nd fl; standard on 1st floor)   Bathroom Equipment (None)   Bathroom Accessibility Accessible via walker  (In both bathrooms)   Home Equipment Cane;Feeding equipment;Reacher  (RW (5 years))   Additional Comments Prior to hospitalization, pt lived with her spouse in a 2 story home with 1 LAUREL over door stoop  There is a powder room on the first floor  Pt has a full flight of steps with left handrail up to access 2nd floor bedroom and full bathroom     Prior Function   Level of [place] Independent with ADLs and functional mobility   Lives With Spouse  (Works part-time as a therapist)   Receives Help From Family;Friend(s)  (Spouse assists, 4 children are out of state)   [address] in the last 6 months 0   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[address]))   Comments Pt was Independent without use of AD short community distances (pt reports "  limited by SOB with minimal exertion and poor exercise tolerance;  due to my heart" ) , + 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   Restrictions/Precautions   [facility] Precautions Per Order No   Other Precautions Visual impairment;Pain; Fall Risk  (Req LUE man BP take 2'  painful R arm, pacemaker)   General   Additional Pertinent History Pt is 80 y/o female admitted with R groin pain 2* hematoma p cardiac cath ; s/p coil embolization in IR 1/29  PT consulted  Family/Caregiver Present No   Cognition   Overall Cognitive Status WFL   Arousal/Participation Cooperative   Attention Within functional limits   Orientation Level Oriented X4;Oriented to person;Oriented to place;Oriented to time   Memory Within functional limits   Following Commands Follows multistep commands without difficulty   Coordination   Sensation ("vibratory sensat, is impaired in both feet per podiatrist)   Light Touch   RLE Light Touch Impaired  (Pt reports " slightly impaired to light touch")   LLE Light Touch Impaired  (Pt reports " slightly impaired to light touch")   Proprioception   RLE Proprioception Grossly intact   LLE Proprioception Grossly Intact   Bed Mobility   Supine to Sit (MIN A)   Additional items LE management;Verbal cues; Increased time required  (Assisted with BLE placement off of bed)   Sit to Supine Kasey Loomis /Dependent)   Additional items LE management; Increased time required;Verbal cues  (Assisted with BLE management onto bed)   Additional Comments (Bed Mobility: HOB flat, no rial, right side of bed )   Transfers   Sit to Stand (CG A)   Additional items Verbal cues; Increased time required  (Cues for hand placement)   Stand to Sit (CG A)   Additional items (Cues for hand placement, assited pt to control descent)   Stand pivot (CG A)   Additional items (SPT with RW-> cues for RW management)   Ambulation/Elevation   Gait pattern Decreased foot clearance;Decreased R stance; Short stride; Step to; Antalgic; Improper Weight shift  (Feet EV'ed, +BLE cramping upon initiation of amb)   Gait Assistance (CG A)   Additional items (LOB x 1 to right ( CGA for recovery))   Assistive Device Rolling walker   Distance 30 feet   Stair Management Assistance Not tested   Balance   Static Sitting Good   Dynamic Sitting (Fair (-) increased RUE/RLE pain with bending)   Static Standing (Fair with RW)   Dynamic Standing (Fair (-) with RW)   Ambulatory (Fair (-) with RW)   Endurance Deficit   Endurance Deficit Yes   Endurance Deficit Description (Decreased endurance for activity)   Activity Tolerance   Activity Tolerance Patient limited by fatigue;Patient limited by pain  ("Limited by SOB with min exertion, poor ex bolivar due to heart")   Nurse Made Aware (RN made aware of pain and gave pt meds)   Assessment   Prognosis Good   Problem List Decreased strength;Decreased range of motion;Decreased endurance; Impaired balance;Decreased coordination;Decreased mobility; Impaired judgement;Decreased safety awareness; Impaired vision; Impaired sensation;Obesity; Decreased skin integrity;Orthopedic restrictions;Pain   Assessment In summary, guiding factors including patient history, examination of body sytem(s), clinical presentation and clinical decision making were considered   Pt presents with comorbid conditions that impact function, comorbid conditions that may limit ability to progress, context of current functional limitations as compared to the prior level of function, impaired prior level of function, limited physical/social support, participation restrictions, with living environment deficits, and impaired emotional state (anxious at times durng PT Eval, very particular)  Pt also presents with impaired: safety, sensation (slight neuropathy  bilateral fee/toes numb, pt reports " vibratory sensation is impaired per podiatrist " ) skin condition ( BUE's and right thigh/pubic are bruised, RLE edematous), vision ( macular degeneration, glaucoma bilateral eyes), hearing, vital sign response ( decreased SPO2 level post mobility), BLE MMT strength, BLE functional strength, endurance for activity, sit and stand balance, bed mobility, transfers, and gait abilities  Pt assessed to have significant BLE cramping upon initiation of ambulation  Pt also reports decreased use of her RUE ROM and strength during inpatient hospitalization  Clinical presentation is with unstable and unpredictable characteristics  The assigned level of complexity is: High  Pt will benefit from skilled PT tx intervention to maximize safe mobility in prep for discharge  Prior to hospitalization, pt lived with her spouse in a 2 story home with 1 LAUREL over door stoop  There is a powder room on the first floor  Pt has a full flight of steps with left handrail up to access 2nd floor bedroom and full bathroom  Pt was Independent without use of AD short community distances (pt reports "  limited by SOB with minimal exertion and poor exercise tolerance;  due to my heart" ) , + 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  Barriers to Discharge Inaccessible home environment;Decreased caregiver support  (Pt's husb works Part Time; alone at times)   Barriers to Discharge Comments (Pt has 1 LAUREL home and a FF of steps to 2nd floor)   Goals   Patient Goals (" Increase ambulation, strneght, and stamina)   STG Expiration Date 02/18/19   Short Term Goal #1 STG's = LTG's   LTG Expiration Date 02/18/19   Long Term Goal #1   STG's = LTG's ( Expiration Date: 2/18/19)    1   Patient will perform sit <->supine transfer ( HOB flat, no rail, RIGHT side of bed) with MOD I ( in order to get in/out of right side of bed at home)     2  Patient will perform all functional transfers with: MOD I ( in order to  transfer from one surface to another)     3  Patient will ambulate with a RW  > or = 100 feet with MOD I ( in order to safely access all necessary areas of home)     4  Patient will ascend/descend a curb step with RW and MOD I (to allow pt to safely enter and exit home over door stoop)  5  Patient will ascend/descend a full flight of steps with left handrail up,  with device prn, with MOD I (to access bedroom/full bathroom level of home)     Treatment Day 1   Plan   Treatment/Interventions ADL retraining;Functional transfer training;LE strengthening/ROM; Endurance training;Elevations; Therapeutic exercise;Cognitive reorientation;Patient/family training;Equipment eval/education; Bed mobility;Gait training; Compensatory technique education;Spoke to MD;Spoke to nursing;Spoke to case management;Spoke to advanced practitioner;OT;Family   PT Frequency (6x/week x 2 weeks)   Recommendation   Equipment Recommended (RW ( to allow pt to keep a RW on each level))   Barthel Index   Feeding 5  (Pt reports difficulty using RUEdue to pain)   Bathing 0   Grooming Score 0  (Unable to lift RUE to brush hair ( pt is right handed))   Dressing Score 5   Bladder Score 5   Bowels Score 10   Toilet Use Score 5   Transfers (Bed/Chair) Score 10   Mobility (Level Surface) Score 0   Stairs Score 0   Barthel Index Score 40     Vitals:   Seated at rest: HR 76 ( manual), SPO2 (RA) 97%, /60 ( manual)     After ambulating with a RW for 30 feet ( seated):  HR  83 ( left lower arm, manual), SPO2 (RA) 90%, /70 ( left lower arm, manual)       BLE MMT Strength  Right hip flexion  ( increased pain): 2/5  Left hip flexion: 3-/5   Right knee / ( increased pain, resistance not applied): 3/5   Left knee /: 4/5    Right ankle DF( within available range): 4+/5  Left ankle DF: 5/5   Right ankle PF: 4+/5  Left ankle PF: 5/5        BLE ROM Testing  Bilateral knee flexion: grossly 95 degrees  Right ankle DF AROM: - 8 degrees    Lacey Blind, PT         PHYSICAL THERAPY TREATMENT NOTE     Time In: 9:19    Time Out: 10:14  Total Time: 55 minutes         S: " I would like to get my strength back before I go to work"     O:   Vitals: After ambulating with a RW for 40 feet ( seated): HR  83 ( left lower arm, manual), SPO2 (RA) 90%        Patient Education:  Educated pt on benefits of mobility, risks of immobility, differences between PT/OT, functional mobility training with a RW ( during transfers and gait), using appropriate hand placement  with sit <->stand transfers and proper RW management with SPT's  During sit <->stand transfers,  pt attempts to pull to stand from RW; then keeps hands on RW when sitting  Discussed importance of controlling decent during stand to sit transfers to decrease risk of spinal compression fractures  Pt pushes RW too far forward during transfers and gait and occassionally abandons RW during SPT's  Reviewed use of call bell for assistance, different methods to assist BLE's into bed, and POC  Transfers ( EOB, recliner chair): sit <->stand CG A, SPT with RW CG A, SPT with no AD and CG A    Gait: Ambulated with a RW for 14 feet, then 40 feet with CG A,  LOB to right with CG A required for recovery ( see PT Eval above for gait deviations)    At end of session pt remained in recliner chair w/o issues  Call bell and phone within reach  A: Pt presents with generalized deconditioning s/p recent hospital stay  Pt assessed to have a decrease in SPO2 levels  post ambulation  Pt was a former nurse and requesting manual BP's  be taken in left lower arm due + A-fid diagnosis and pain in RUE  Heart rate also to be taken manually   Patient receptive to education and is looking forward continued rehab  Would like to be home in 1 week if possible      P: Continue skilled PT tx, as per POC in prep for return to home with spouse      Ursula Conde, PT

## 2019-02-04 NOTE — PLAN OF CARE
Problem: OCCUPATIONAL THERAPY ADULT  Goal: Performs self-care activities at highest level of function for planned discharge setting  See evaluation for individualized goals  Treatment Interventions: ADL retraining, Functional transfer training, UE strengthening/ROM, Endurance training, Patient/family training, Equipment evaluation/education, Activity engagement, Energy conservation  Equipment Recommended:  (to be further assessed)       See flowsheet documentation for full assessment, interventions and recommendations  Limitation: Decreased ADL status, Decreased UE ROM, Decreased UE strength, Decreased endurance, Decreased self-care trans, Decreased high-level ADLs  Prognosis: Good  Assessment: Pt is a 81 y o  female seen for OT evaluation s/p admit to [facility] on 2/2/2019 w/ Ambulatory dysfunction  OT completed expanded review of pt's medical and social history  Pt with current OT orders and appropriate for evaluation  Pt with h/o HTN, stage 3 kidney disease, gout, DM type II, and recently seen in ED d/t R LE hematoma  Prior to admission, pt was living at home with spouse and able to complete ADLs, IADLs, functional mobility (I)  Pt presents to OT below baseline due to the following performance deficits: ROM; strength; edema; pain; balance; stand tolerance; functional mobility; self care; and IADLs  Pt to benefit from continued skilled OT tx while in the [facility] to address deficits as defined above and maximize level of functional independence w ADL's and functional mobility  Occupational Performance areas to address include: grooming, bathing/shower, toilet hygiene, dressing, clothing management, meal prep, household maintenance and care of pets  During evaluation, pt reported that she did not want to participate in MMT/ROM of UE d/t increase pain  B UE ROM WFLs based on occupational performance with ADLs during session  Will assess MMT in future sessions   OT provided pt with heating pad that was present in room after the session to decrease pain in B LE  Based on findings, pt is of moderate complexity  From OT standpoint, recommendation at time of d/c would be home with home OT         OT Discharge Recommendation: Home OT  OT - OK to Discharge: No

## 2019-02-04 NOTE — OCCUPATIONAL THERAPY NOTE
Occupational Therapy Evaluation ([id number]) and Treatment ([id number])      Jackie Clemons    2/4/2019    Patient Active Problem List   Diagnosis    Hypertension    Chronic kidney disease, stage 3 (HCC)    Gout    Mechanical heart valve present    Chronic atrial fibrillation (HCC)    Gastritis    Anticoagulated    Acute cystitis without hematuria    Benign hypertensive CKD    Cardiomegaly    Diastolic dysfunction    DJD (degenerative joint disease), multiple sites    Esophagitis, reflux    Fibromyalgia    Fibrous breast lumps    H/O mechanical aortic valve replacement    Hyperuricemia without signs inflammatory arthritis/tophaceous disease    MCI (mild cognitive impairment)    Class 2 severe obesity due to excess calories with serious comorbidity and body mass index (BMI) of 39 0 to 39 9 in adult (HCC)    Osteoarthritis    Primary osteoarthritis involving multiple joints    Proteinuria    Pulmonary artery hypertension (HCC)    RLS (restless legs syndrome)    Secondary hyperparathyroidism, renal ([facility])    Increased frequency of urination    Lump of right breast    Leg cramps    Chronic diastolic congestive heart failure (HCC)    Hematoma    Diabetes mellitus ([address] )    Acute blood loss anemia    Constipation    Ambulatory dysfunction       Past Medical History:   Diagnosis Date    Chronic atrial fibrillation ([facility]) 9/13/2017    Chronic gout 9/13/2017    Chronic rhinitis 11/08/2018    CKD (chronic kidney disease) stage 3, GFR 30-59 ml/min ([facility]) 9/13/2017    Colon polyp 9/13/2017    Coronary artery disease     Diabetes mellitus ([address] )     COMBS (dyspnea on exertion) 11/02/2018    Essential hypertension 9/13/2017    Gastritis 9/13/2017    Hypertension     Irregular heart beat     Mechanical heart valve present 9/13/2017    Osteoarthritis     Pacemaker     S/P AVR     Spinal stenosis     disk herniation        Past Surgical History:   Procedure Laterality Date    AORTIC VALVE REPLACEMENT      CARDIAC SURGERY      CATARACT EXTRACTION      CHOLECYSTECTOMY      COLONOSCOPY      ESOPHAGOGASTRODUODENOSCOPY      HERNIA REPAIR      x3    HYSTERECTOMY      INSERT / Yesenia Cerna / REMOVE PACEMAKER      IR PELVIC ANGIOGRAPHY / INTERVENTION  1/29/2019    JOINT REPLACEMENT Bilateral     knees    CO COLONOSCOPY FLX DX W/COLLJ SPEC WHEN PFRMD N/A 9/12/2017    Procedure: Burnetta Briscoe bxAND COLONOSCOPY with polypectomies;  Surgeon: Derek Ramirez MD;  Location: [facility]; Service: Gastroenterology    CO SIGMOIDOSCOPY FLX DX W/COLLJ SPEC BR/WA IF PFRMD N/A 9/13/2017    Procedure: colonoscopy with hemo clip x 2;  Surgeon: Derek Ramirez MD;  Location: [facility]; Service: Gastroenterology    ROTATOR CUFF REPAIR Left     SKIN BIOPSY      TONSILLECTOMY        02/04/19 1202   Note Type   Note type Eval/Treat   Restrictions/Precautions   Other Precautions Fall Risk;Pain   Pain Assessment   Pain Assessment 0-10   Pain Score 5   Pain Location Arm   Pain Orientation Right   Pain Descriptors Aching   Pain Frequency Constant/continuous   Patient's Stated Pain Goal No pain   Hospital Pain Intervention(s) Medication (See MAR); Repositioned;Rest;Emotional support   Multiple Pain Sites Yes   Pain 2   Pain Score 2 4   Pain Location 2 Leg   Pain Orientation 2 Bilateral   Pain Descriptors 2 Aching   Pain Frequency 2 Constant/continuous   Patient's Stated Pain Goal 2 No pain   Pain Intervention(s) 2 Medication (See MAR); Heat applied; Emotional support;Rest;Elevated   Home Living   Type of Home House  (1 LAUREL without rails)   Home Layout Two level;Bed/bath upstairs   Bathroom Shower/Tub Tub/shower unit  (glass door - won't allow for tub/bench)   Bathroom Toilet Raised  (on 2nd floor; standard of 1st floor)   Bathroom Equipment (none)   Bathroom Accessibility Accessible via walker   Home Equipment Cane;Feeding equipment  (RW)   Prior Function   Level of [place] Independent with ADLs and functional mobility Lives With Spouse  (1 cat)   Receives Help From Family   ADL Assistance Independent   IADLs Independent   Falls in the last 6 months 0   Vocational Part time employment  (employed PT as  for [employer])   Lifestyle   Autonomy (PTA, pt (I) ADLs, IADLs, functional moblity, (+) driving)   Reciprocal Relationships (lives with , has 4 children)   Service to Others ()   Intrinsic Gratification (watching tv)   Psychosocial   Psychosocial (WDL) WDL   Subjective   Subjective "I can't do that bc of all my pain" re: MMT   ADL   Where Assessed Chair   Equipment Provided [address] [facility] 5  Supervision/Setup   Eating Deficit Setup   Grooming Assistance 5  Supervision/Setup   Grooming Deficit Setup   UB Bathing Assistance 3  Moderate Assistance   UB Bathing Deficit Steadying;Setup;Verbal cueing;Supervision/safety; Increased time to complete   LB Bathing Assistance 2  Maximal Assistance   LB Bathing Deficit Setup;Steadying;Verbal cueing;Supervision/safety; Increased time to complete   UB Dressing Assistance 3  Moderate Assistance   UB Dressing Deficit Thread LUE; Thread RUE;Pull around back;Pull down in back;Steadying;Supervision/safety; Increased time to complete   LB Dressing Assistance 3  Moderate Assistance   LB Dressing Deficit Setup;Steadying;Verbal cueing;Supervision/safety; Increased time to complete; Thread LLE into pants; Thread RLE into pants;Pull up over hips   Toileting Assistance  (pt did not need to void during session)   Functional Assistance 4  Minimal Assistance   Functional Deficit Steadying;Verbal cueing;Supervision/safety; Increased time to complete   Bed Mobility   Additional Comments (pt OOB beginning of session)   Transfers   Sit to Stand 5  Supervision   Additional items Increased time required   Stand to Sit 5  Supervision   Additional items Increased time required   Car transfer Unable to assess   Functional Mobility   Functional Mobility 5  Supervision   Balance Static Sitting Fair +   Dynamic Sitting Fair +   Static Standing Fair +   Dynamic Standing Fair   Ambulatory Fair   Activity Tolerance   Activity Tolerance Patient limited by fatigue;Patient limited by pain   Hand Function   Gross Motor Coordination Functional   Fine Motor Coordination Functional   Cognition   Overall Cognitive Status WFL   Orientation Level Oriented X4   Assessment   Limitation Decreased ADL status; Decreased UE ROM; Decreased UE strength;Decreased endurance;Decreased self-care trans;Decreased high-level ADLs   Prognosis Good   Assessment Pt is a 81 y o  female seen for OT evaluation s/p admit to [facility] on 2/2/2019 w/ Ambulatory dysfunction  OT completed expanded review of pt's medical and social history  Pt with current OT orders and appropriate for evaluation  Pt with h/o HTN, stage 3 kidney disease, gout, DM type II, and recently seen in ED d/t R LE hematoma  Prior to admission, pt was living at home with spouse and able to complete ADLs, IADLs, functional mobility (I)  Pt presents to OT below baseline due to the following performance deficits: ROM; strength; edema; pain; balance; stand tolerance; functional mobility; self care; and IADLs  Pt to benefit from continued skilled OT tx while in the T[facility] to address deficits as defined above and maximize level of functional independence w ADL's and functional mobility  Occupational Performance areas to address include: grooming, bathing/shower, toilet hygiene, dressing, clothing management, meal prep, household maintenance and care of pets  During evaluation, pt reported that she did not want to participate in MMT/ROM of UE d/t increase pain  B UE ROM WFLs based on occupational performance with ADLs during session  Will assess MMT in future sessions  OT provided pt with heating pad that was present in room after the session to decrease pain in B LE  Based on findings, pt is of moderate complexity   From OT standpoint, recommendation at time of d/c would be home with home OT  Goals   Patient Goals ("To increase my activity tolerance and fall asleep faster  ")   Plan   Treatment Interventions ADL retraining;Functional transfer training;UE strengthening/ROM; Endurance training;Patient/family training;Equipment evaluation/education; Activityengagement; Energy conservation   Goal Expiration Date 02/18/19   Treatment Day (1)   OT Frequency (6x/wk 40 minutes)   Additional Treatment Session   Start Time 1145   End Time 1202   Treatment Assessment Pt participated in skilled OT session 2/4/2019 with interventions consisting of self-care re-training, AE eval/education to: increase independence with ADLs/IADLs, increase safety with ADLs/IADLs, increase activity tolerance needed to complete ADLs/IADLs  Pt agreeable to OT treatment session, upon arrival patient was found sitting in chair  Vitals: 130/70, 100% O2, 87 BPM  Pt reported that she would not be able to cuate/doff pants; however, pt demonstrated mod A donning/doffing pants with use of reacher  Pt required VC and encouragement to complete ADLs (donning/doffing pants and jacket)  Pt reported that she wants to work on her activity tolerance in order to complete ADLs/IADLs at home and at work  OT educated pt on POC and role of OT while in TCF  Pt verbalized understanding of same and reported that she is thankful for OT  Pt continues to be functioning below baseline level, occupational performance remains limited secondary to factors listed above and increased risk for falls and injury  From OT standpoint, recommendation at time of d/c would be home with home OT  Pt in chair, all needs met at end of session      Recommendation   OT Discharge Recommendation Home OT   Equipment Recommended (to be further assessed)   OT - OK to Discharge No       Pt will achieve the following goals in 2 weeks   Grooming- (I)   UB ADL- (I)    LB ADL- mod (I)   Toileting- mod (I) with hygiene and clothing management    Bed Mobility- (I) using log roll technique d/t back pain with bed flat and no SR to prep for purposeful tasks   ADL Txfs- mod (I) with RW for ADLs   Stand Balance- Good dynamic & unsupported for clothing management    Stand Tolerance- 5 minutes for hygiene   Seated Balance- Good dynamic for LB dressing     Tub/Shower- mod (I) using most appropriate method; educate on AE   Meal Prep- (I) with good safety    Home Management (laundry & bed making)- mod (I)   Item Retrieval- mod (I) with good safety    Activity Tolerance- 35 minutes for ADLs   Pt will participate in UE there ex 3xs to maximize strength for ADL txfs   Pt will perform UE HEP to maximize strength for ADL/IADL tasks   Pt will identify 3-5 energy conservation techniques to maximize endurance with occupational performance     Elina Loera MS, OTR/L

## 2019-02-04 NOTE — PLAN OF CARE
Problem: PHYSICAL THERAPY ADULT  Goal: Performs mobility at highest level of function for planned discharge setting  See evaluation for individualized goals  Treatment/Interventions: ADL retraining, Functional transfer training, LE strengthening/ROM, Endurance training, Elevations, Therapeutic exercise, Cognitive reorientation, Patient/family training, Equipment eval/education, Bed mobility, Gait training, Compensatory technique education, Spoke to MD, Spoke to nursing, Spoke to case management, Spoke to advanced practitioner, OT, Family  Equipment Recommended:  (RW ( to allow pt to keep a RW on each level))       See flowsheet documentation for full assessment, interventions and recommendations  Prognosis: Good  Problem List: Decreased strength, Decreased range of motion, Decreased endurance, Impaired balance, Decreased coordination, Decreased mobility, Impaired judgement, Decreased safety awareness, Impaired vision, Impaired sensation, Obesity, Decreased skin integrity, Orthopedic restrictions, Pain  Assessment: In summary, guiding factors including patient history, examination of body sytem(s), clinical presentation and clinical decision making were considered  Pt presents with comorbid conditions that impact function, comorbid conditions that may limit ability to progress, context of current functional limitations as compared to the prior level of function, impaired prior level of function, limited physical/social support, participation restrictions, with living environment deficits, and impaired emotional state (anxious at times durng PT Eval, very particular)  Pt also presents with impaired: safety, sensation (slight neuropathy  bilateral fee/toes numb, pt reports " vibratory sensation is impaired per podiatrist " ) skin condition ( BUE's and right thigh/pubic are bruised, RLE edematous), vision ( macular degeneration, glaucoma bilateral eyes), hearing, vital sign response ( decreased SPO2 level post mobility), BLE MMT strength, BLE functional strength, endurance for activity, sit and stand balance, bed mobility, transfers, and gait abilities  Pt assessed to have significant BLE cramping upon initiation of ambulation  Pt also reports decreased use of her RUE ROM and strength during inpatient hospitalization  Clinical presentation is with unstable and unpredictable characteristics  The assigned level of complexity is: High  Pt will benefit from skilled PT tx intervention to maximize safe mobility in prep for discharge  Prior to hospitalization, pt lived with her spouse in a 2 story home with 1 LAUREL over door stoop  There is a powder room on the first floor  Pt has a full flight of steps with left handrail up to access 2nd floor bedroom and full bathroom  Pt was Independent without use of AD short community distances (pt reports "  limited by SOB with minimal exertion and poor exercise tolerance;  due to my heart" ) , + 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

## 2019-02-05 LAB
GLUCOSE SERPL-MCNC: 146 MG/DL (ref 70–99)
GLUCOSE SERPL-MCNC: 185 MG/DL (ref 65–140)
GLUCOSE SERPL-MCNC: 192 MG/DL (ref 65–140)

## 2019-02-05 PROCEDURE — 97110 THERAPEUTIC EXERCISES: CPT

## 2019-02-05 PROCEDURE — 97535 SELF CARE MNGMENT TRAINING: CPT

## 2019-02-05 PROCEDURE — 97116 GAIT TRAINING THERAPY: CPT

## 2019-02-05 PROCEDURE — 82948 REAGENT STRIP/BLOOD GLUCOSE: CPT

## 2019-02-05 RX ORDER — WARFARIN SODIUM 2.5 MG/1
3.75 TABLET ORAL
Status: COMPLETED | OUTPATIENT
Start: 2019-02-05 | End: 2019-02-05

## 2019-02-05 RX ADMIN — OXYBUTYNIN CHLORIDE 2.5 MG: 5 TABLET ORAL at 21:13

## 2019-02-05 RX ADMIN — OXYCODONE HYDROCHLORIDE 10 MG: 5 TABLET ORAL at 22:25

## 2019-02-05 RX ADMIN — METHOCARBAMOL 500 MG: 500 TABLET, FILM COATED ORAL at 17:06

## 2019-02-05 RX ADMIN — METHOCARBAMOL 500 MG: 500 TABLET, FILM COATED ORAL at 09:05

## 2019-02-05 RX ADMIN — INSULIN LISPRO 2 UNITS: 100 INJECTION, SOLUTION INTRAVENOUS; SUBCUTANEOUS at 12:33

## 2019-02-05 RX ADMIN — INSULIN DETEMIR 60 UNITS: 100 INJECTION, SOLUTION SUBCUTANEOUS at 21:14

## 2019-02-05 RX ADMIN — OXYCODONE HYDROCHLORIDE 10 MG: 5 TABLET ORAL at 14:51

## 2019-02-05 RX ADMIN — ACETAMINOPHEN 650 MG: 325 TABLET ORAL at 19:44

## 2019-02-05 RX ADMIN — PANTOPRAZOLE SODIUM 40 MG: 40 TABLET, DELAYED RELEASE ORAL at 06:13

## 2019-02-05 RX ADMIN — INSULIN LISPRO 1 UNITS: 100 INJECTION, SOLUTION INTRAVENOUS; SUBCUTANEOUS at 21:13

## 2019-02-05 RX ADMIN — WARFARIN SODIUM 2.5 MG: 2.5 TABLET ORAL at 17:06

## 2019-02-05 RX ADMIN — OXYCODONE HYDROCHLORIDE 10 MG: 5 TABLET ORAL at 08:38

## 2019-02-05 RX ADMIN — FERROUS SULFATE TAB 325 MG (65 MG ELEMENTAL FE) 325 MG: 325 (65 FE) TAB at 08:38

## 2019-02-05 RX ADMIN — INSULIN LISPRO 1 UNITS: 100 INJECTION, SOLUTION INTRAVENOUS; SUBCUTANEOUS at 17:40

## 2019-02-05 RX ADMIN — ALLOPURINOL 100 MG: 100 TABLET ORAL at 09:05

## 2019-02-05 RX ADMIN — MAGNESIUM OXIDE TAB 400 MG (241.3 MG ELEMENTAL MG) 400 MG: 400 (241.3 MG) TAB at 09:05

## 2019-02-05 RX ADMIN — ALLOPURINOL 100 MG: 100 TABLET ORAL at 17:06

## 2019-02-05 RX ADMIN — GABAPENTIN 600 MG: 300 CAPSULE ORAL at 21:13

## 2019-02-05 RX ADMIN — WARFARIN SODIUM 3.75 MG: 2.5 TABLET ORAL at 17:39

## 2019-02-05 RX ADMIN — INDAPAMIDE 2.5 MG: 2.5 TABLET, FILM COATED ORAL at 09:05

## 2019-02-05 RX ADMIN — METOPROLOL SUCCINATE 50 MG: 50 TABLET, EXTENDED RELEASE ORAL at 09:05

## 2019-02-05 RX ADMIN — LATANOPROST 1 DROP: 50 SOLUTION OPHTHALMIC at 21:13

## 2019-02-05 NOTE — OCCUPATIONAL THERAPY NOTE
Occupational Therapy Treatment Note    Name:  Radha Campbell   MRN:   [id number]  Age:     81 y o    Patient Active Problem List   Diagnosis    Hypertension    Chronic kidney disease, stage 3 (HCC)    Gout    Mechanical heart valve present    Chronic atrial fibrillation (HCC)    Gastritis    Anticoagulated    Acute cystitis without hematuria    Benign hypertensive CKD    Cardiomegaly    Diastolic dysfunction    DJD (degenerative joint disease), multiple sites    Esophagitis, reflux    Fibromyalgia    Fibrous breast lumps    H/O mechanical aortic valve replacement    Hyperuricemia without signs inflammatory arthritis/tophaceous disease    MCI (mild cognitive impairment)    Class 2 severe obesity due to excess calories with serious comorbidity and body mass index (BMI) of 39 0 to 39 9 in adult (HCC)    Osteoarthritis    Primary osteoarthritis involving multiple joints    Proteinuria    Pulmonary artery hypertension (HCC)    RLS (restless legs syndrome)    Secondary hyperparathyroidism, renal (HCC)    Increased frequency of urination    Lump of right breast    Leg cramps    Chronic diastolic congestive heart failure (HCC)    Hematoma    Diabetes mellitus ([facility] )    Acute blood loss anemia    Constipation    Ambulatory dysfunction     Groin hematoma [S30 [id number]]      Subjective/Goals: "to get back to work"    Vitals: see nursing summary sheet    OT total treatment time: ([phone]) 68 min    Additional goals & Comments:       02/05/19 0943   Restrictions/Precautions   Weight Bearing Precautions Per Order No   Other Precautions Fall Risk;Pain   Pain Assessment   Pain Assessment 0-10   Pain Score 8   Pain Type Acute pain   Pain Location Buttocks;Hip;Leg   Pain Orientation Right   Pain Descriptors Aching;Nagging;Radiating   Pain Frequency Constant/continuous   Pain Onset Ongoing   Clinical Progression Not changed   Patient's Stated Pain Goal No pain   Hospital Pain Intervention(s) Medication (See MAR); Repositioned; Ambulation/increased activity; Emotional support; Rest   Diversional Activities Television   ADL   Where Assessed (standing sinskdie bathing, seated dressing)   Grooming Comments standing sinkside-- Supervision  increased effort with completion due to pain right UE-- patient uses left to assist PRN  Recommended sitting for completion to conserve energy    UB Bathing Comments MI   LB Bathing Comments MI (ed to sit as needed due to some SOB)   UB Dressing Comments min assist (ed on technique for c-style shirt management)-- assistance influenced for pain right UE   LB Dressing Comments Min assist with use of AE (reacher)   Toileting Comments S/MI CM and hygiene   Meal Prep   Meal Preparation reports shared duties with spouse and NO intention to complete upon immediate D/C   Light Housekeeping   Light Housekeeping Supervision bed management-- fixing of sheets    Kitchen Mobility   Kitchen Mobility Comments S/MI without AE  Patient states that she intends to NOT use walker upon d/c leana with work   Functional Standing Tolerance   Time approx 5 min  Does present with pain and need for rest with SOB evident   Bed Mobility   Supine to Sit 4  Minimal assistance   Sit to Supine 4  Minimal assistance   Additional Comments LE management, increased time and ed on leg  use  Patient with pain influencing abilities-- foot stool used to increase function    Needs continued practice   Transfers   Sit to Stand 6  Modified independent   Stand to Sit 6  Modified independent   Stand pivot 6  Modified independent   Additional Comments Without AD   Functional Mobility   Functional Mobility 5  Supervision   Additional Comments + RW distance   Tub Transfers   Tub Transfers Comments discussed states 16" wall management, no GB and no use of bench ability-- unable to trail this date due to pain   Therapeutic Exercise - ROM   UE-ROM (decreased ROM on right UE, ROM within ADL's)   Cognition   Overall Cognitive Status [facility]   Arousal/Participation Alert; Cooperative   Attention Within functional limits   Orientation Level Oriented X4   Memory Within functional limits   Following Commands Follows multistep commands with increased time or repetition   Comments needs some redirection to focus on education, some difficulty with recommendations as she wants to "just get back to the way she was"   Additional Activities   Additional Activities Comments Balance:  Fair+ dynamci and unsupported   Activity Tolerance   Activity Tolerance Patient tolerated treatment well  (Fair+)   Assessment   Assessment Patient seen this date for OT with focus on goals as set by OTR  Patient reports that her main difficulty to be with management of right UE ROM, right LE pain resulting in decreased strength for mobility, standing and bed mobility  Patient ed on energy conservation techniques and AE for increased function  Patient reports that she is not concerned with her daily function but more her ability to get back to work  Ed on goals for therapy session and relation to her getting back home at independent level  Patient states spouse and her both work PT and that they share responsibilities  Continued OT is recommended at this time as patient functional abilities and safety remain below functional baseline  At end of session patient remains in room with all needs within reach  Plan   Treatment Interventions ADL retraining;Functional transfer training;UE strengthening/ROM; Energy conservation; Activityengagement   Goal Expiration Date 02/18/19   Treatment Day 2   OT Frequency (6x/week)   Recommendation   OT Discharge Recommendation Home OT   OT - OK to Discharge Olga Tucker  2/5/2019

## 2019-02-05 NOTE — PROGRESS NOTES
RECREATIONAL THERAPY PARTICIPATION LOG      ACTIVITY:    GAMES:        BINGO:        MUSIC STIM:        ARTS & CRAFTS:        EXERCISE:        CLUBS & MEETING:        SOCIALS: Resident declined/refused Lunch [place] invitation  SPIRITUAL: Resident declined/refused 1:1 Bible Reading in her room  INDEPENDENT: Watching television in her room  Resident communicated that the Channels available to patients here are very limited to what she is accustomed to at home  1:1:    Resident was seen for a Recreational Therapy greeting and visitation this morning  Resident's goal is to be able to go home soon; resident described herself as not someone who always likes to have someone taking care of her  Resident shared her thoughts and feelings about Recreational therapy in regards to patient care; resident/patient has a lot of experience in Patient Care, as she is a  for [employer] in [place] and 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

## 2019-02-05 NOTE — PLAN OF CARE
Problem: PHYSICAL THERAPY ADULT  Goal: Performs mobility at highest level of function for planned discharge setting  See evaluation for individualized goals  Treatment/Interventions: ADL retraining, Functional transfer training, LE strengthening/ROM, Endurance training, Elevations, Therapeutic exercise, Cognitive reorientation, Patient/family training, Equipment eval/education, Bed mobility, Gait training, Compensatory technique education, Spoke to MD, Spoke to nursing, Spoke to case management, Spoke to advanced practitioner, OT, Family  Equipment Recommended:  (RW ( to allow pt to keep a RW on each level))       See flowsheet documentation for full assessment, interventions and recommendations  Outcome: Progressing  Prognosis: Good  Problem List: Decreased strength, Decreased range of motion, Decreased endurance, Impaired balance, Decreased coordination, Decreased mobility, Impaired judgement, Decreased safety awareness, Impaired vision, Impaired sensation, Obesity, Decreased skin integrity, Orthopedic restrictions, Pain  Assessment: Pt seen at bedside for PT treatment  Pt agreeable to PT despite pain  Pts gait is steady  NO LOB  Pt painful w/ all exercise  Pt did fairly well on stairs  Cp provided for pts R knee per her request  Barriers to Discharge: Inaccessible home environment, Decreased caregiver support (Pt's lilianeb works Part Time; alone at times)  Barriers to Discharge Comments:  (Pt has 1 LAUREL home and a FF of steps to 2nd floor)             See flowsheet documentation for full assessment

## 2019-02-05 NOTE — PROGRESS NOTES
Patient takes 3 75 mg of warfarin 6 days a week and one day in between she takes 2 5- so today will give her 3 75 as had 2 5 yesterday - inr in am and dose accordingly

## 2019-02-05 NOTE — PLAN OF CARE
Problem: OCCUPATIONAL THERAPY ADULT  Goal: Performs self-care activities at highest level of function for planned discharge setting  See evaluation for individualized goals  Treatment Interventions: ADL retraining, Functional transfer training, UE strengthening/ROM, Endurance training, Patient/family training, Equipment evaluation/education, Activity engagement, Energy conservation  Equipment Recommended:  (to be further assessed)       See flowsheet documentation for full assessment, interventions and recommendations  Outcome: Progressing  Limitation: Decreased ADL status, Decreased UE ROM, Decreased UE strength, Decreased endurance, Decreased self-care trans, Decreased high-level ADLs  Prognosis: Good  Assessment: Patient seen this date for OT with focus on goals as set by OTR  Patient reports that her main difficulty to be with management of right UE ROM, right LE pain resulting in decreased strength for mobility, standing and bed mobility  Patient ed on energy conservation techniques and AE for increased function  Patient reports that she is not concerned with her daily function but more her ability to get back to work  Ed on goals for therapy session and relation to her getting back home at independent level  Patient states spouse and her both work PT and that they share responsibilities  Continued OT is recommended at this time as patient functional abilities and safety remain below functional baseline  At end of session patient remains in room with all needs within reach       OT Discharge Recommendation: Home OT  OT - OK to Discharge: No

## 2019-02-05 NOTE — PHYSICAL THERAPY NOTE
55 minute treatment       02/05/19 3978   Pain Assessment   Pain Assessment 0-10   Pain Score 8   Pain Type Acute pain   Pain Location Leg   Pain Orientation Right;Upper   Pain Descriptors Cramping;Aching;Tightness   Pain Frequency Constant/continuous   Pain Onset Ongoing   Patient's Stated Pain Goal No pain   Hospital Pain Intervention(s) Medication (See MAR); Cold applied; Emotional support; Environmental changes   Restrictions/Precautions   Weight Bearing Precautions Per Order No   Other Precautions Fall Risk;Pain   General   Chart Reviewed Yes   Response to Previous Treatment Patient with no complaints from previous session  Family/Caregiver Present No   Cognition   Overall Cognitive Status WFL   Following Commands Follows multistep commands without difficulty   Subjective   Subjective Pt agreeable to PT   Transfers   Sit to Stand 6  Modified independent   Additional items Increased time required   Stand to Sit 6  Modified independent   Additional items Increased time required   Stand pivot 6  Modified independent   Additional items (RW support)   Ambulation/Elevation   Gait pattern Improper Weight shift;Decreased foot clearance;Decreased R stance; Antalgic   Gait Assistance 5  Supervision   Assistive Device Rolling walker   Distance 80', 79', 40' x 2   Stair Management Assistance (CG)   Stair Management Technique Two rails   Number of Stairs 4   Endurance Deficit   Endurance Deficit Yes   Activity Tolerance   Activity Tolerance Patient limited by fatigue;Patient limited by pain   Exercises   Heelslides Sitting;20 reps  (seated heel slide)   Hip Flexion Sitting;Right;AAROM;20 reps; Left;AROM   Knee AROM [employer]; Bilateral;20 reps   Ankle Pumps Sitting;10 reps;Bilateral;AROM   Assessment   Prognosis Good   Problem List Decreased strength;Decreased range of motion;Decreased endurance; Impaired balance;Decreased coordination;Decreased mobility; Impaired judgement;Decreased safety awareness; Impaired vision; Impaired sensation;Obesity; Decreased skin integrity;Orthopedic restrictions;Pain   Assessment Pt seen at bedside for PT treatment  Pt agreeable to PT despite pain  Pts gait is steady  NO LOB  Pt painful w/ all exercise  Pt did fairly well on stairs   Cp provided for pts R knee per her request   Goals   Patient Goals "get out of here", to go back to work"   STG Expiration Date 02/18/19   Treatment Day 2   Plan   Treatment/Interventions (COntinue per plan of care)   Progress Progressing toward goals   PT Frequency (6x/week)     Colletta Alderman, PTA

## 2019-02-06 ENCOUNTER — APPOINTMENT (INPATIENT)
Dept: NON INVASIVE DIAGNOSTICS | Facility: HOSPITAL | Age: 84
DRG: 948 | End: 2019-02-06
Payer: MEDICARE

## 2019-02-06 PROBLEM — E87.1 HYPONATREMIA: Status: ACTIVE | Noted: 2019-02-06

## 2019-02-06 LAB
GLUCOSE SERPL-MCNC: 114 MG/DL (ref 65–140)
GLUCOSE SERPL-MCNC: 131 MG/DL (ref 65–140)
GLUCOSE SERPL-MCNC: 200 MG/DL (ref 65–140)
GLUCOSE SERPL-MCNC: 206 MG/DL (ref 65–140)
GLUCOSE SERPL-MCNC: 240 MG/DL (ref 65–140)
HCT VFR BLD AUTO: 26.8 % (ref 36–46)
HGB BLD-MCNC: 8.7 G/DL (ref 12–16)
INR PPP: 1.63 (ref 0.89–1.1)
PLATELET # BLD AUTO: 382 THOUSANDS/UL (ref 150–450)
PROTHROMBIN TIME: 16.8 SECONDS (ref 9.5–11.6)

## 2019-02-06 PROCEDURE — 85610 PROTHROMBIN TIME: CPT | Performed by: FAMILY MEDICINE

## 2019-02-06 PROCEDURE — 97535 SELF CARE MNGMENT TRAINING: CPT

## 2019-02-06 PROCEDURE — 85018 HEMOGLOBIN: CPT | Performed by: FAMILY MEDICINE

## 2019-02-06 PROCEDURE — 85014 HEMATOCRIT: CPT | Performed by: FAMILY MEDICINE

## 2019-02-06 PROCEDURE — 93971 EXTREMITY STUDY: CPT

## 2019-02-06 PROCEDURE — 82948 REAGENT STRIP/BLOOD GLUCOSE: CPT

## 2019-02-06 PROCEDURE — 99308 SBSQ NF CARE LOW MDM 20: CPT | Performed by: NURSE PRACTITIONER

## 2019-02-06 PROCEDURE — 97110 THERAPEUTIC EXERCISES: CPT

## 2019-02-06 PROCEDURE — 97530 THERAPEUTIC ACTIVITIES: CPT

## 2019-02-06 PROCEDURE — 85049 AUTOMATED PLATELET COUNT: CPT | Performed by: PHYSICIAN ASSISTANT

## 2019-02-06 PROCEDURE — 97116 GAIT TRAINING THERAPY: CPT

## 2019-02-06 RX ORDER — WARFARIN SODIUM 2.5 MG/1
3.75 TABLET ORAL
Status: DISCONTINUED | OUTPATIENT
Start: 2019-02-06 | End: 2019-02-08

## 2019-02-06 RX ADMIN — LATANOPROST 1 DROP: 50 SOLUTION OPHTHALMIC at 22:02

## 2019-02-06 RX ADMIN — ENOXAPARIN SODIUM 105 MG: 120 INJECTION SUBCUTANEOUS at 08:12

## 2019-02-06 RX ADMIN — METHOCARBAMOL 500 MG: 500 TABLET, FILM COATED ORAL at 08:07

## 2019-02-06 RX ADMIN — GABAPENTIN 600 MG: 300 CAPSULE ORAL at 22:01

## 2019-02-06 RX ADMIN — OXYCODONE HYDROCHLORIDE 10 MG: 5 TABLET ORAL at 19:51

## 2019-02-06 RX ADMIN — OXYBUTYNIN CHLORIDE 2.5 MG: 5 TABLET ORAL at 22:01

## 2019-02-06 RX ADMIN — OXYCODONE HYDROCHLORIDE 10 MG: 5 TABLET ORAL at 06:52

## 2019-02-06 RX ADMIN — ALLOPURINOL 100 MG: 100 TABLET ORAL at 17:43

## 2019-02-06 RX ADMIN — MAGNESIUM OXIDE TAB 400 MG (241.3 MG ELEMENTAL MG) 400 MG: 400 (241.3 MG) TAB at 08:08

## 2019-02-06 RX ADMIN — INSULIN LISPRO 3 UNITS: 100 INJECTION, SOLUTION INTRAVENOUS; SUBCUTANEOUS at 22:12

## 2019-02-06 RX ADMIN — INDAPAMIDE 2.5 MG: 2.5 TABLET, FILM COATED ORAL at 08:07

## 2019-02-06 RX ADMIN — WARFARIN SODIUM 3.75 MG: 2.5 TABLET ORAL at 17:41

## 2019-02-06 RX ADMIN — METHOCARBAMOL 500 MG: 500 TABLET, FILM COATED ORAL at 17:43

## 2019-02-06 RX ADMIN — FERROUS SULFATE TAB 325 MG (65 MG ELEMENTAL FE) 325 MG: 325 (65 FE) TAB at 06:53

## 2019-02-06 RX ADMIN — METOPROLOL SUCCINATE 50 MG: 50 TABLET, EXTENDED RELEASE ORAL at 08:08

## 2019-02-06 RX ADMIN — INSULIN DETEMIR 60 UNITS: 100 INJECTION, SOLUTION SUBCUTANEOUS at 21:59

## 2019-02-06 RX ADMIN — INSULIN LISPRO 2 UNITS: 100 INJECTION, SOLUTION INTRAVENOUS; SUBCUTANEOUS at 12:26

## 2019-02-06 RX ADMIN — ALLOPURINOL 100 MG: 100 TABLET ORAL at 08:08

## 2019-02-06 RX ADMIN — ENOXAPARIN SODIUM 105 MG: 120 INJECTION SUBCUTANEOUS at 22:03

## 2019-02-06 RX ADMIN — OXYCODONE HYDROCHLORIDE 10 MG: 5 TABLET ORAL at 14:30

## 2019-02-06 NOTE — ASSESSMENT & PLAN NOTE
Lab Results   Component Value Date    HGBA1C 8 5 (H) 01/29/2019       Recent Labs      02/05/19   1147  02/05/19   1644  02/05/19 2042 02/06/19   0626   POCGLU  192*  185*  200*  114     · Fair control with levamir 60u at HS and sliding scale  · Continue to monitor

## 2019-02-06 NOTE — ASSESSMENT & PLAN NOTE
· Hematoma of right thigh due to diagnostic cardiac catheterization on 1/23/19, and on anticoagulation for mechanical AV and afib  · Affected artery has been successfully embolized by IR  · Did have drop in hemoglobin which now seems to have stabilized  · Continue pain management - controlled on Oxy IR    · Monitor CBC and INR

## 2019-02-06 NOTE — PHYSICAL THERAPY NOTE
Physical Therapy Daily Treatment Note       02/06/19 4220-8779 (37 minutes)   Pain Assessment   Pain Assessment 0-10   Pain Score 5 (increased to 8/10 post PT treatment)   Pain Type Acute pain   Pain Location Leg   Pain Orientation Right; Anterior;Frontal;Inner   Pain Descriptors Tightness   Pain Frequency Constant/continuous   Pain Onset Ongoing   Clinical Progression Gradually improving   Patient's Stated Pain Goal No pain   Hospital Pain Intervention(s) Cold applied;Repositioned; Ambulation/increased activity; Elevated   Restrictions/Precautions   Weight Bearing Precautions Per Order No   Other Precautions Fall Risk;Pain;Visual impairment   General   Chart Reviewed Yes   Response to Previous Treatment (Patient reporting increased pain from previous session )   Family/Caregiver Present Yes  ( present part of session )   Cognition   Overall Cognitive Status [facility]   Arousal/Participation Alert; Cooperative   Attention Within functional limits   Memory Within functional limits   Following Commands Follows all commands and directions without difficulty   Subjective   Subjective (I was )   Bed Mobility   Rolling R 6  Modified independent   Supine to Sit 6  Modified independent   Additional items HOB elevated; Bedrails; Increased time required   Sit to Supine 6  Modified independent   Additional items HOB elevated  (did not use leg  this session)   Transfers   Sit to Stand 6  Modified independent   Additional items Armrests; Increased time required   Stand to Sit 6  Modified independent   Additional items Armrests; Increased time required   Stand pivot 6  Modified independent   Additional items Armrests; Increased time required  (RW)   Ambulation/Elevation   Gait pattern Improper Weight shift; Antalgic; Forward Flexion;Decreased foot clearance;Decreased R stance; Short stride; Excessively slow   Gait Assistance 5  Supervision   Assistive Device Rolling walker   Distance (30' x 2)   Stair Management Assistance 5 Supervision   Stair Management Technique Step to pattern; Foreward;Nonreciprocal  (2 steps B HR, 2 steps L HR)   Number of Stairs (4)   Balance   Ambulatory Fair +   Endurance Deficit   Endurance Deficit Yes   Activity Tolerance   Activity Tolerance Patient limited by fatigue;Patient limited by pain   Exercises   Knee AROM Sitting;10 reps;Right   Knee PROM Extension Sitting;5 reps;Right   Ankle Pumps Supine;20 reps;AROM; Right   Assessment   Prognosis Good   Problem List Decreased strength;Decreased range of motion;Decreased endurance; Impaired balance;Decreased mobility; Impaired vision;Obesity; Decreased skin integrity;Pain   Assessment Unable to complete full treatment session this afternoon due to pt having external cardiology appointment for new RV lead placement and multi-chamber pacemaker generator change  Pt reports having increased pain after last PT treatment session but CP on R knee, HP on groin area, and pain medication have been helping control pain  Pt able to perform bed mobility and transfers MOD I  No LOB noted with ambulation this session  Pt SOB with minimal activity with stable SPO2 readings  Pt educated about proper positioning and technique for performing ankle pumps to decrease BLE edema  All movements continue to be painful  Would benefit from continued skilled physical therapy to improve safety and functional mobility in prep for return to home with  and work  Barriers to Discharge Inaccessible home environment  ([address], FF to 2nd floor)   Goals   Patient Goals ("restoration of strength, decrease pain, greater movement")   STG Expiration Date 02/18/19   LTG Expiration Date 02/18/19   Treatment Day 2   Plan   Treatment/Interventions Functional transfer training;LE strengthening/ROM; Elevations; Therapeutic exercise;Patient/family training;Bed mobility;Gait training; Compensatory technique education; Family   Progress Progressing toward goals   PT Frequency (6x/wk)   Recommendation   Equipment Recommended (none at this time)     Vitals  Resting edge of bed: /72, HR 87 bpm, SPO2 (RA) 99%  Seated post 30' x 1 ambulation with RW: HR 70 bpm, SPO2 (RA) 98%    Ankush Andre, PT

## 2019-02-06 NOTE — NURSING NOTE
No c/o chest pain or respiratory distress  History of pacemaker, due to pain in right leg , pain meds given as ordered and effective  Pt is post cardiac cath   Alert and oriented x  3

## 2019-02-06 NOTE — ASSESSMENT & PLAN NOTE
· She has been historically well controlled on 3 75 mg daily, with 2 5mg one day a week  · INR had been therapeutic, but dropped to day to 1 63  Pt reports this is because she was not getting her home dose  · Lovenox ordered until therapeutic again  Daily INR x3 days also ordered  ,     Results from last 7 days  Lab Units 02/06/19  0445 02/04/19  0545 02/03/19  0742 02/01/19  0559 01/31/19  0525   INR  1 63* 2 82* 2 97* 2 54* 2 30*

## 2019-02-06 NOTE — OCCUPATIONAL THERAPY NOTE
Occupational Therapy Treatment Note    Name:  Priscilla Guzman   MRN:   [id number]  Age:     81 y o    Patient Active Problem List   Diagnosis    Hypertension    Chronic kidney disease, stage 3 (HCC)    Gout    Mechanical heart valve present    Chronic atrial fibrillation (HCC)    Gastritis    Anticoagulated    Acute cystitis without hematuria    Benign hypertensive CKD    Cardiomegaly    Diastolic dysfunction    DJD (degenerative joint disease), multiple sites    Esophagitis, reflux    Fibromyalgia    Fibrous breast lumps    H/O mechanical aortic valve replacement    Hyperuricemia without signs inflammatory arthritis/tophaceous disease    MCI (mild cognitive impairment)    Class 2 severe obesity due to excess calories with serious comorbidity and body mass index (BMI) of 39 0 to 39 9 in adult (HCC)    Osteoarthritis    Primary osteoarthritis involving multiple joints    Proteinuria    Pulmonary artery hypertension (HCC)    RLS (restless legs syndrome)    Secondary hyperparathyroidism, renal (HCC)    Increased frequency of urination    Lump of right breast    Leg cramps    Chronic diastolic congestive heart failure (HCC)    Hematoma    Diabetes mellitus (HCC)    Acute blood loss anemia    Constipation    Ambulatory dysfunction    Hyponatremia     Groin hematoma [S30 1XXA]      Subjective/Goals: "I have a horrible headache and hope it is not a migraine" patient felt better by end of session    Vitals: see nursing summary sheet    OT total treatment time: (5146-2818) 68 min    Additional goals & Comments:  Reacher, sock aide, and leg  sample equipment left in room along with foot stool for bed mobility     02/06/19 1218   Restrictions/Precautions   Weight Bearing Precautions Per Order No   Other Precautions Fall Risk;Pain   Pain Assessment   Pain Assessment 0-10   Pain Score 8   Pain Type Acute pain   Pain Location Leg   Pain Orientation Right   Pain Descriptors Aching   Pain Frequency Constant/continuous   Pain Onset Ongoing   Clinical Progression Gradually improving   Patient's Stated Pain Goal No pain   Hospital Pain Intervention(s) Medication (See MAR); Repositioned; Ambulation/increased activity; Distraction; Rest   Diversional Activities Television   ADL   Where Assessed Chair   Grooming Comments MI standing sinkside for hand washing    LB Dressing Comments focus on sock management with AE use-- doff with dressing stick Supervision, don with wide sock aide Supervision   Toileting Comments MI CM and hygiene   Meal Prep   Meal Preparation due to headache and increased pain this date- not tolerated   Light Housekeeping   Light Housekeeping Supervision to fix bed sheet and pad   Kitchen Mobility   Kitchen Mobility Comments S/MI without AD short room distance-- uses walker bed<>bathroom   Functional Standing Tolerance   Time approx 3 min (pain/fatige/headache)   Bed Mobility   Supine to Sit 5  Supervision   Sit to Supine 5  Supervision   Additional Comments patient not tolerate to log roll technique as this increaes pain to l=right LE at this time  Bed mobility completed with ed on alternative technique with use of foot stool and leg  completing both trails with supervision  Attempted to cross left under right for support to bring LE up as unit however patient unable  Discussed option of bed rail -- patient may be considering  2 types of leg lifters trailed with ed on pro/con to each     Transfers   Sit to Stand 6  Modified independent   Stand to Sit 6  Modified independent   Stand pivot 6  Modified independent   Toilet transfer 6  Modified independent   Additional Comments + RW and without for SPT   Functional Mobility   Functional Mobility 6  Modified independent   Additional Comments + RW within room for short distance   Therapeutic Exercise - ROM   UE-ROM (withheld for VDE testing- but  did clear for tx  )   Therapeutic Excerise-Strength   UE Strength (to be sent for VDE therefore ther ex withheld pending result)   Cognition   Overall Cognitive Status [facility]   Arousal/Participation Alert; Cooperative   Attention Within functional limits   Orientation Level Oriented X4   Memory Within functional limits   Following Commands Follows all commands and directions without difficulty   Comments needs some cues to remain on task  Patient ed on energy conservation techniques, fall prevention and safety  Patient ed on use of AE PRN for pain management and increased function  Patient with headache this date therefore increased time needed however reports felt better come end of session  Additional Activities   Additional Activities Comments Balance:  fair + dynamic and unsupported   Activity Tolerance   Activity Tolerance Patient tolerated treatment well  (Fair)   Assessment   Assessment Patient seen this date for OT with focus on goals as set by OTR  Due to patient's increased headache at start of session patient education was provided on fall prevention, safety and use of energy conservation techniques  Patient then agreeable to ed with AE for sock management as well as bed mobility  Increased time needed for task completion  Patient was MI for toileting demostrating good safety and balance for transfer  Continue OT at this time  At end of session patient remains in room with all needs within reach  Plan   Treatment Interventions ADL retraining;Functional transfer training;UE strengthening/ROM; Energy conservation; Activityengagement   Goal Expiration Date 02/18/19   Treatment Day 3   OT Frequency (6x/week)   Recommendation   OT Discharge Recommendation Home OT   OT - OK to Discharge Olga Dudley  2/6/2019

## 2019-02-06 NOTE — ASSESSMENT & PLAN NOTE
· At baseline as of 1/31/19    · Will repeat labs in AM  · Avoid nephrotoxins  · Avoid hypotension    Results from last 7 days  Lab Units 01/31/19  0502   BUN mg/dL 31*   CREATININE mg/dL 1 25

## 2019-02-06 NOTE — SOCIAL WORK
[facility] scheduled with patient and her  at 2:00 PM  Team notified

## 2019-02-06 NOTE — ASSESSMENT & PLAN NOTE
· Rate controlled on metoprolol  · Also on warfarin-subtherapeutic, therefore bridging with lovenox

## 2019-02-06 NOTE — PROGRESS NOTES
Progress Note - [place] 1935, 81 y o  female MRN: [id number]    Unit/Bed#: -01 Encounter: [id number]    Primary Care Provider: Cosmo Ivan MD   Date and time admitted to hospital: 2/2/2019  2:40 PM        * Ambulatory dysfunction   Assessment & Plan    · Deconditioning 2/2 recent hospital admission for thigh hematoma, 2/2 diagnostic cardiac cath done on 1/23/19  · Continue inpatient rehab  Acute blood loss anemia   Assessment & Plan    · Admitted to hospital on 1/28/19 with blood loss due to thigh hematoma, s/p diagnostic catheterization done 1/23/2019  · Also on coumadin for mechanical AV and hx of afib - currently subtherapeutic, therefore, bridging with lovenox  · Hg has stabilized  · Tranfuse if less than 8 and continue to monitor  Results from last 7 days  Lab Units 02/06/19  0445 02/04/19  0545 02/03/19  0742 02/02/19  0549 02/01/19  0559 01/31/19  0502   HEMOGLOBIN g/dL 8 7* 8 6* 8 0* 7 9* 8 1* 8 2*        Mechanical heart valve present   Assessment & Plan    · She has been historically well controlled on 3 75 mg daily, with 2 5mg one day a week  · INR had been therapeutic, but dropped to day to 1 63  Pt reports this is because she was not getting her home dose  · Lovenox ordered until therapeutic again  Daily INR x3 days also ordered  ,     Results from last 7 days  Lab Units 02/06/19  0445 02/04/19  0545 02/03/19  0742 02/01/19  0559 01/31/19  0525   INR  1 63* 2 82* 2 97* 2 54* 2 30*        Chronic atrial fibrillation (HCC)   Assessment & Plan    · Rate controlled on metoprolol  · Also on warfarin-subtherapeutic, therefore bridging with lovenox  Hyponatremia   Assessment & Plan    · Sodium was 131 on 1/31 - will repeat BMP       Constipation   Assessment & Plan    · Resolved  · Continue miralax and prn lactulose     Diabetes mellitus [facility])   Assessment & Plan    Lab Results   Component Value Date    HGBA1C 8 5 (H) 01/29/2019       Recent Labs      02/05/19 1147  02/05/19   1644  02/05/19   2042  02/06/19   0626   POCGLU  192*  185*  200*  114     · Fair control with levamir 60u at HS and sliding scale  · Continue to monitor  Hematoma   Assessment & Plan    · Hematoma of right thigh due to diagnostic cardiac catheterization on 1/23/19, and on anticoagulation for mechanical AV and afib  · Affected artery has been successfully embolized by IR  · Did have drop in hemoglobin which now seems to have stabilized  · Continue pain management - controlled on Oxy IR  · Monitor CBC and INR       Diastolic dysfunction   Assessment & Plan    · Pt has dyspnea on exertion at baseline  · Denies any change from baseline  · Does not appear to be in exacerbation  · She does had 1-2+ BLE edema  · Also had edema in BUE's, which appear to be due to multiple needle sticks for blood/IV  · Will monitor daily weights  Gout   Assessment & Plan    · Continue allopurinol  · No acute flair  Chronic kidney disease, stage 3 (HCC)   Assessment & Plan    · At baseline as of 1/31/19  · Will repeat labs in AM  · Avoid nephrotoxins  · Avoid hypotension    Results from last 7 days  Lab Units 01/31/19  0502   BUN mg/dL 31*   CREATININE mg/dL 1 25        Hypertension   Assessment & Plan    · Continue indapamide and metoprolol  · Stable at this time  Continue PO ferrous sulfate for anemia  VTE Pharmacologic Prophylaxis:   Pharmacologic: lovenox to bridge to coumadin  Mechanical VTE Prophylaxis in Place: Yes    Patient Centered Rounds: I have performed bedside rounds with nursing staff today  Discussions with Specialists or Other Care Team Provider:     Education and Discussions with Family / Patient: Progress, hx, and plan of care discussed with pt  Time Spent for Care: 20 minutes  More than 50% of total time spent on counseling and coordination of care as described above      Current Length of Stay: 4 day(s)    Current Patient Status: SNF Short Term Inpatient Certification Statement: The patient will continue to require additional inpatient hospital stay due to deconditioning  Discharge Plan: Pt is not yet medically stable for discharge  Code Status: Level 1 - Full Code      Subjective:   Pt reports ongoing RLE pain related to hematoma, and RUE pain related to multiple sticks  She does stated symptoms are gradually improving  No c/o chest pain or SOB at rest  Has ongoing dyspnea with exertion  No N/V/D,constipation, dysuria, dizziness, or light headedness  Objective:     Vitals:   Temp (24hrs), Av 6 °F (36 4 °C), Min:97 6 °F (36 4 °C), Max:97 6 °F (36 4 °C)    Temp:  [97 6 °F (36 4 °C)] 97 6 °F (36 4 °C)  HR:  [72-86] 86  Resp:  [19-20] 19  BP: (118-128)/(60) 120/60  SpO2:  [99 %] 99 %  Body mass index is 39 14 kg/m²  Input and Output Summary (last 24 hours):     No intake or output data in the 24 hours ending 19 0830    Physical Exam:     Physical Exam   Constitutional: She is oriented to person, place, and time  No distress  HENT:   Head: Normocephalic and atraumatic  Eyes: Right eye exhibits no discharge  Left eye exhibits no discharge  Neck: No JVD present  Cardiovascular: Normal rate, regular rhythm and intact distal pulses  Exam reveals no gallop and no friction rub  No murmur heard  Valve click     Pulmonary/Chest: Effort normal and breath sounds normal  No stridor  No respiratory distress  She has no wheezes  She has no rales  Abdominal: Soft  Bowel sounds are normal  She exhibits no distension  There is no tenderness  There is no rebound and no guarding  Musculoskeletal: She exhibits edema (BLE and BUE edema) and tenderness (Right arm and right thigh)  Neurological: She is alert and oriented to person, place, and time  Skin: Skin is warm and dry  She is not diaphoretic  Diffuse ecchymotic areas in BUE's and RLE, with largest are in RLE  Psychiatric: She has a normal mood and affect         Additional Data: Labs:      Results from last 7 days  Lab Units 02/06/19  0445  01/31/19  0502   WBC Thousand/uL  --   --  11 45*   HEMOGLOBIN g/dL 8 7*  < > 8 2*   HEMATOCRIT % 26 8*  < > 25 6*   PLATELETS Thousands/uL  --   --  274   < > = values in this interval not displayed  Results from last 7 days  Lab Units 01/31/19  0502   SODIUM mmol/L 131*   POTASSIUM mmol/L 4 1   CHLORIDE mmol/L 95*   CO2 mmol/L 28   BUN mg/dL 31*   CREATININE mg/dL 1 25   ANION GAP mmol/L 8   CALCIUM mg/dL 8 0*   GLUCOSE RANDOM mg/dL 118       Results from last 7 days  Lab Units 02/06/19  0445   INR  1 63*       Results from last 7 days  Lab Units 02/06/19  0626 02/05/19  2042 02/05/19  1644 02/05/19  1147 02/05/19  0616 02/04/19  2044 02/04/19  1637 02/04/19  1139 02/04/19  0605 02/03/19  2030 02/03/19  1638 02/03/19  1144   POC GLUCOSE mg/dl 114 200* 185* 192* 146* 190* 189* 151* 177* 221* 186* 142*                   * I Have Reviewed All Lab Data Listed Above  * Additional Pertinent Lab Tests Reviewed:  Osiel 67 Admission Reviewed    Imaging:    Imaging Reports Reviewed Today Include: none  Imaging Personally Reviewed by Myself Includes:  none    Recent Cultures (last 7 days):           Last 24 Hours Medication List:     Current Facility-Administered Medications:  acetaminophen 650 mg Oral Q6H PRN Enedelia Fuller MD   allopurinol 100 mg Oral BID Enedelia Fuller MD   enoxaparin 1 mg/kg Subcutaneous Q12H [address] [facility]   ferrous sulfate 325 mg Oral Daily With Breakfast Enedelia Fuller MD   gabapentin 600 mg Oral HS Enedelia Fuller MD   indapamide 2 5 mg Oral QAM Enedelia Fuller MD   insulin detemir 60 Units Subcutaneous HS Enedelia Fuller MD   insulin lispro 1-6 Units Subcutaneous 4x Daily (AC & HS) Enedelia Fuller MD   lactulose 20 g Oral TID PRN Enedelia Fuller MD   latanoprost 1 drop Both Eyes HS Enedelia Fuller MD   magnesium oxide 400 mg Oral Daily Enedelia Fuller MD   methocarbamol 500 mg Oral BID Enedelia Fuller MD   metoprolol succinate 50 mg Oral Daily Eendelia Fuller MD   oxybutynin 2 5 mg Oral HS Enedelia Fuller MD   oxyCODONE 10 mg Oral Q4H PRN Enedelia Fuller MD   oxyCODONE 5 mg Oral Q4H PRN Enedelia Fuller MD   pantoprazole 40 mg Oral Early Morning Enedelia Fuller MD   polyethylene glycol 17 g Oral Daily Enedelia Fuller MD   tuberculin 5 Units Intradermal PRN Meg Salcedo MD   warfarin 3 75 mg Oral Daily (warfarin) HEMALATHA Wood        Today, Patient Was Seen By: HEMALATHA Wood

## 2019-02-06 NOTE — OCCUPATIONAL THERAPY NOTE
Patient attempted this AM for OT  Patient with c/o increased headache at this time requesting therapy try later in AM   Patient reports headache to be 6/10 and although no numerical pain rated to right LE patient did express pain and use of ice to aide in pain management  PORTER to resume later in AM as able      Rosaline Fraction  2/6/2019

## 2019-02-06 NOTE — ASSESSMENT & PLAN NOTE
· Admitted to hospital on 1/28/19 with blood loss due to thigh hematoma, s/p diagnostic catheterization done 1/23/2019  · Also on coumadin for mechanical AV and hx of afib - currently subtherapeutic, therefore, bridging with lovenox  · Hg has stabilized  · Tranfuse if less than 8 and continue to monitor     Results from last 7 days  Lab Units 02/06/19  0445 02/04/19  0545 02/03/19  0742 02/02/19  0549 02/01/19  0559 01/31/19  0502   HEMOGLOBIN g/dL 8 7* 8 6* 8 0* 7 9* 8 1* 8 2*

## 2019-02-06 NOTE — ASSESSMENT & PLAN NOTE
· Pt has dyspnea on exertion at baseline  · Denies any change from baseline  · Does not appear to be in exacerbation  · She does had 1-2+ BLE edema  · Also had edema in BUE's, which appear to be due to multiple needle sticks for blood/IV  · Will monitor daily weights

## 2019-02-06 NOTE — PLAN OF CARE
Problem: PHYSICAL THERAPY ADULT  Goal: Performs mobility at highest level of function for planned discharge setting  See evaluation for individualized goals  Treatment/Interventions: ADL retraining, Functional transfer training, LE strengthening/ROM, Endurance training, Elevations, Therapeutic exercise, Cognitive reorientation, Patient/family training, Equipment eval/education, Bed mobility, Gait training, Compensatory technique education, Spoke to MD, Spoke to nursing, Spoke to case management, Spoke to advanced practitioner, OT, Family  Equipment Recommended:  (RW ( to allow pt to keep a RW on each level))       See flowsheet documentation for full assessment, interventions and recommendations  Outcome: Progressing  Prognosis: Good  Problem List: Decreased strength, Decreased range of motion, Decreased endurance, Impaired balance, Decreased mobility, Impaired vision, Obesity, Decreased skin integrity, Pain  Assessment: Unable to complete full treatment session this afternoon due to pt having external cardiology appointment for new RV lead placement and multi-chamber pacemaker generator change  Pt reports having increased pain after last PT treatment session but CP on R knee, HP on groin area, and pain medication have been helping control pain  Pt able to perform bed mobility and transfers MOD I  No LOB noted with ambulation this session  Pt SOB with minimal activity with stable SPO2 readings  Pt educated about proper positioning and technique for performing ankle pumps to decrease BLE edema  All movements continue to be painful  Would benefit from continued skilled physical therapy to improve safety and functional mobility in prep for return to home with  and work  Barriers to Discharge: Inaccessible home environment (1 LAUREL, FF to 2nd floor)  Barriers to Discharge Comments:  (Pt has 1 LAUREL home and a FF of steps to 2nd floor)             See flowsheet documentation for full assessment

## 2019-02-06 NOTE — ASSESSMENT & PLAN NOTE
· Deconditioning 2/2 recent hospital admission for thigh hematoma, 2/2 diagnostic cardiac cath done on 1/23/19  · Continue inpatient rehab

## 2019-02-06 NOTE — PLAN OF CARE
Problem: OCCUPATIONAL THERAPY ADULT  Goal: Performs self-care activities at highest level of function for planned discharge setting  See evaluation for individualized goals  Treatment Interventions: ADL retraining, Functional transfer training, UE strengthening/ROM, Endurance training, Patient/family training, Equipment evaluation/education, Activity engagement, Energy conservation  Equipment Recommended:  (to be further assessed)       See flowsheet documentation for full assessment, interventions and recommendations  Outcome: Progressing  Limitation: Decreased ADL status, Decreased UE ROM, Decreased UE strength, Decreased endurance, Decreased self-care trans, Decreased high-level ADLs  Prognosis: Good  Assessment: Patient seen this date for OT with focus on goals as set by OTR  Due to patient's increased headache at start of session patient education was provided on fall prevention, safety and use of energy conservation techniques  Patient then agreeable to ed with AE for sock management as well as bed mobility  Increased time needed for task completion  Patient was MI for toileting demostrating good safety and balance for transfer  Continue OT at this time  At end of session patient remains in room with all needs within reach         OT Discharge Recommendation: Home OT  OT - OK to Discharge: No

## 2019-02-07 LAB
ANION GAP SERPL CALCULATED.3IONS-SCNC: 8 MMOL/L (ref 5–14)
BASOPHILS # BLD AUTO: 0.1 THOUSANDS/ΜL (ref 0–0.1)
BASOPHILS NFR BLD AUTO: 1 % (ref 0–1)
BUN SERPL-MCNC: 32 MG/DL (ref 5–25)
CALCIUM SERPL-MCNC: 8.7 MG/DL (ref 8.4–10.2)
CHLORIDE SERPL-SCNC: 89 MMOL/L (ref 97–108)
CO2 SERPL-SCNC: 32 MMOL/L (ref 22–30)
CREAT SERPL-MCNC: 1.09 MG/DL (ref 0.6–1.2)
EOSINOPHIL # BLD AUTO: 0.3 THOUSAND/ΜL (ref 0–0.4)
EOSINOPHIL NFR BLD AUTO: 3 % (ref 0–6)
ERYTHROCYTE [DISTWIDTH] IN BLOOD BY AUTOMATED COUNT: 17.3 %
GFR SERPL CREATININE-BSD FRML MDRD: 47 ML/MIN/1.73SQ M
GLUCOSE SERPL-MCNC: 130 MG/DL (ref 65–140)
GLUCOSE SERPL-MCNC: 140 MG/DL (ref 70–99)
GLUCOSE SERPL-MCNC: 155 MG/DL (ref 65–140)
GLUCOSE SERPL-MCNC: 157 MG/DL (ref 65–140)
GLUCOSE SERPL-MCNC: 166 MG/DL (ref 65–140)
GLUCOSE SERPL-MCNC: 216 MG/DL (ref 65–140)
HCT VFR BLD AUTO: 26.2 % (ref 36–46)
HGB BLD-MCNC: 8.3 G/DL (ref 12–16)
INR PPP: 1.65 (ref 0.89–1.1)
LYMPHOCYTES # BLD AUTO: 1.5 THOUSANDS/ΜL (ref 0.5–4)
LYMPHOCYTES NFR BLD AUTO: 18 % (ref 25–45)
MCH RBC QN AUTO: 30.3 PG (ref 26–34)
MCHC RBC AUTO-ENTMCNC: 31.8 G/DL (ref 31–36)
MCV RBC AUTO: 95 FL (ref 80–100)
MONOCYTES # BLD AUTO: 0.4 THOUSAND/ΜL (ref 0.2–0.9)
MONOCYTES NFR BLD AUTO: 5 % (ref 1–10)
NEUTROPHILS # BLD AUTO: 6 THOUSANDS/ΜL (ref 1.8–7.8)
NEUTS SEG NFR BLD AUTO: 73 % (ref 45–65)
OSMOLALITY UR/SERPL-RTO: 290 MMOL/KG (ref 282–298)
OSMOLALITY UR: 238 MMOL/KG
PLATELET # BLD AUTO: 359 THOUSANDS/UL (ref 150–450)
PMV BLD AUTO: 7.6 FL (ref 8.9–12.7)
POTASSIUM SERPL-SCNC: 4.2 MMOL/L (ref 3.6–5)
PROTHROMBIN TIME: 17 SECONDS (ref 9.5–11.6)
RBC # BLD AUTO: 2.75 MILLION/UL (ref 4–5.2)
SODIUM 24H UR-SCNC: 36 MOL/L
SODIUM SERPL-SCNC: 129 MMOL/L (ref 137–147)
WBC # BLD AUTO: 8.2 THOUSAND/UL (ref 4.5–11)

## 2019-02-07 PROCEDURE — 83930 ASSAY OF BLOOD OSMOLALITY: CPT | Performed by: NURSE PRACTITIONER

## 2019-02-07 PROCEDURE — 93971 EXTREMITY STUDY: CPT | Performed by: SURGERY

## 2019-02-07 PROCEDURE — 97116 GAIT TRAINING THERAPY: CPT

## 2019-02-07 PROCEDURE — 82948 REAGENT STRIP/BLOOD GLUCOSE: CPT

## 2019-02-07 PROCEDURE — 80048 BASIC METABOLIC PNL TOTAL CA: CPT | Performed by: NURSE PRACTITIONER

## 2019-02-07 PROCEDURE — 97110 THERAPEUTIC EXERCISES: CPT

## 2019-02-07 PROCEDURE — 97530 THERAPEUTIC ACTIVITIES: CPT

## 2019-02-07 PROCEDURE — 84300 ASSAY OF URINE SODIUM: CPT | Performed by: NURSE PRACTITIONER

## 2019-02-07 PROCEDURE — 83935 ASSAY OF URINE OSMOLALITY: CPT | Performed by: NURSE PRACTITIONER

## 2019-02-07 PROCEDURE — 85025 COMPLETE CBC W/AUTO DIFF WBC: CPT | Performed by: NURSE PRACTITIONER

## 2019-02-07 PROCEDURE — 97535 SELF CARE MNGMENT TRAINING: CPT

## 2019-02-07 PROCEDURE — 85610 PROTHROMBIN TIME: CPT | Performed by: NURSE PRACTITIONER

## 2019-02-07 RX ADMIN — WARFARIN SODIUM 3.75 MG: 2.5 TABLET ORAL at 17:19

## 2019-02-07 RX ADMIN — LATANOPROST 1 DROP: 50 SOLUTION OPHTHALMIC at 21:32

## 2019-02-07 RX ADMIN — INSULIN LISPRO 1 UNITS: 100 INJECTION, SOLUTION INTRAVENOUS; SUBCUTANEOUS at 11:59

## 2019-02-07 RX ADMIN — ALLOPURINOL 100 MG: 100 TABLET ORAL at 08:04

## 2019-02-07 RX ADMIN — ALLOPURINOL 100 MG: 100 TABLET ORAL at 17:19

## 2019-02-07 RX ADMIN — MAGNESIUM OXIDE TAB 400 MG (241.3 MG ELEMENTAL MG) 400 MG: 400 (241.3 MG) TAB at 08:01

## 2019-02-07 RX ADMIN — OXYBUTYNIN CHLORIDE 2.5 MG: 5 TABLET ORAL at 21:27

## 2019-02-07 RX ADMIN — POLYETHYLENE GLYCOL 3350 17 G: 17 POWDER, FOR SOLUTION ORAL at 08:03

## 2019-02-07 RX ADMIN — GABAPENTIN 600 MG: 300 CAPSULE ORAL at 21:27

## 2019-02-07 RX ADMIN — METOPROLOL SUCCINATE 50 MG: 50 TABLET, EXTENDED RELEASE ORAL at 08:00

## 2019-02-07 RX ADMIN — METHOCARBAMOL 500 MG: 500 TABLET, FILM COATED ORAL at 08:02

## 2019-02-07 RX ADMIN — ENOXAPARIN SODIUM 105 MG: 120 INJECTION SUBCUTANEOUS at 21:32

## 2019-02-07 RX ADMIN — OXYCODONE HYDROCHLORIDE 10 MG: 5 TABLET ORAL at 13:34

## 2019-02-07 RX ADMIN — INSULIN LISPRO 2 UNITS: 100 INJECTION, SOLUTION INTRAVENOUS; SUBCUTANEOUS at 21:27

## 2019-02-07 RX ADMIN — METHOCARBAMOL 500 MG: 500 TABLET, FILM COATED ORAL at 17:19

## 2019-02-07 RX ADMIN — INDAPAMIDE 2.5 MG: 2.5 TABLET, FILM COATED ORAL at 08:02

## 2019-02-07 RX ADMIN — ENOXAPARIN SODIUM 105 MG: 120 INJECTION SUBCUTANEOUS at 08:07

## 2019-02-07 RX ADMIN — FERROUS SULFATE TAB 325 MG (65 MG ELEMENTAL FE) 325 MG: 325 (65 FE) TAB at 07:56

## 2019-02-07 RX ADMIN — OXYCODONE HYDROCHLORIDE 10 MG: 5 TABLET ORAL at 08:22

## 2019-02-07 RX ADMIN — OXYCODONE HYDROCHLORIDE 10 MG: 5 TABLET ORAL at 20:26

## 2019-02-07 RX ADMIN — INSULIN LISPRO 1 UNITS: 100 INJECTION, SOLUTION INTRAVENOUS; SUBCUTANEOUS at 17:36

## 2019-02-07 RX ADMIN — INSULIN DETEMIR 60 UNITS: 100 INJECTION, SOLUTION SUBCUTANEOUS at 21:27

## 2019-02-07 NOTE — PROGRESS NOTES
Pt has hyponatremia - 129  Urine studies ordered  Meds reviewed  Places on 1200cc fluid restriction for now  Will pass on for  f/u

## 2019-02-07 NOTE — NURSING NOTE
Patient woken for AM labs and medication pass  No complaints at this time  Patient stated that she "just got up not long ago to pee" and didn't want to get up for AM weight at this time  Will pass to nursing aid and day staff  Call bell in reach, will monitor

## 2019-02-07 NOTE — PHYSICAL THERAPY NOTE
Physical Therapy Daily Treatment Note        02/07/19: 43 minutes ( 15:13 to 15:56)     Pain Assessment   Pain Assessment 0-10   Pain Score 7   Pain Type Acute pain   Pain Location Head  (Headche)   Pain Orientation Anterior   Pain Radiating Towards (Across forehead and posterior neck)   Pain Onset Sudden   Clinical Progression Gradually improving   Patient's Stated Pain Goal No pain   Restrictions/Precautions   Weight Bearing Precautions Per Order No   Other Precautions Visual impairment;Pain; Fall Risk   General   Chart Reviewed Yes   Response to Previous Treatment Patient with no complaints from previous session  Family/Caregiver Present No   Cognition   Overall Cognitive Status WFL   Arousal/Participation Alert; Cooperative   Attention Within functional limits   Memory Within functional limits   Following Commands Follows all commands and directions without difficulty   Subjective   Subjective (" I think my headache is from temperature changes in room")   Bed Mobility   Supine to Sit 6  Modified independent   Additional items Increased time required   Sit to Supine 6  Modified independent   Additional items Increased time required   Additional Comments (Bed Mobiliy: HOB flat, no rail)   Transfers   Sit to Stand 6  Modified independent   Additional items (Good hand placement)   Stand to Sit 6  Modified independent   Additional items (Good hand placement, + controlled descent)   Stand pivot 6  Modified independent  (SPT with and without RW)   Additional items (Cues for RW management; but still appears steady )   Ambulation/Elevation   Gait pattern Decreased foot clearance;Narrow MALAIKA; Improper Weight shift; Forward Flexion;Decreased R stance; Short stride  (M/L sway, slow vianca)   Assistive Device Rolling walker   Distance 45 feet x 2   Stair Management Assistance Not tested  (Pt declined due to not feeling well)   Balance   Static Sitting Good   Dynamic Sitting Fair +   Static Standing (Good (-) with RW) Dynamic Standing (Fair+ with RW during SPT)   Ambulatory (Fair+ with RW )   Endurance Deficit   Endurance Deficit Yes   Endurance Deficit Description (Decreased endurance for activity)   Activity Tolerance   Activity Tolerance Patient limited by pain; Patient limited by fatigue   Nurse Made Aware (Nursing aware of pt's headache)   Assessment   Prognosis Good   Problem List Decreased strength;Decreased range of motion;Decreased endurance; Impaired balance;Decreased mobility; Decreased coordination; Impaired judgement;Decreased safety awareness; Impaired vision;Decreased skin integrity;Obesity;Orthopedic restrictions;Pain   Assessment Prior to PT tx session, pt reported having a severe headache  Pt stated that RN was aware that she had a headache and was medicated  This PT assisted pt into bed and placed a cold pack on her head  Upon returning 25 minutes later pt stated that she  "felt a little better"  Pt concerned that her sugar may be low  Nursing assessed blood sugar and vitals as follows: Seated at EOB: SPO2 (RA), /80 ( manual), HR 75 (manual), SPO2 (RA) pulse oximeter not recording, Blood sugar: 157  Pt declined elevation training; but will try tomorrow  Vitals post 45 feet ambulation trial ( seated): HR 85 ( taken manually), SPO2 did not register on pulse oximeter  Pt now plans to sleep on the left side of the bed upon return to home  STG/LTG 1 modified to reflect change in information ( effective 2/7/19): Patient will perform sit <->supine transfer ( HOB flat, no rail)  with conssitent MOD I ( in order to get in/out of bed at home)  Pt has a Care Conference scheduled tomorrow at 2:00 PM to discuss discharge planning and needs  At end of tx session pt requested to sit in her recliner ; all needs within reach     Barriers to Discharge Inaccessible home environment  (1 LAUREL, FF to second floor bed/full bathroom)   Goals   Patient Goals (" I like to get eventually walk without a RW, I'm too vain")   STG Expiration Date 02/18/19   Short Term Goal #1 STG's = LTG's   LTG Expiration Date 02/18/19   Long Term Goal #1 STG's = LTG's   Treatment Day 3   Plan   Treatment/Interventions ADL retraining;Functional transfer training;LE strengthening/ROM; Elevations; Therapeutic exercise; Endurance training;Cognitive reorientation;Patient/family training;Equipment eval/education; Bed mobility;Gait training; Compensatory technique education;Spoke to MD;Spoke to nursing;Spoke to case management;Spoke to advanced practitioner;OT;Family   Progress Progressing toward goals   PT Frequency (6x/week)   Recommendation   Equipment Recommended (Bariatric RW)       Danniel Opitz, PT

## 2019-02-07 NOTE — NURSING NOTE
Medicated for pain x 2 for R arm and R leg pain with adequate relief   Swollen of R arm unchanged , will monitor

## 2019-02-07 NOTE — NURSING NOTE
Alert and oriented x 3 medicated for back pain with adequate relief  C/o pain and swollen upper extremities VDE done   Will monitor closely

## 2019-02-07 NOTE — PLAN OF CARE
Problem: OCCUPATIONAL THERAPY ADULT  Goal: Performs self-care activities at highest level of function for planned discharge setting  See evaluation for individualized goals  Treatment Interventions: ADL retraining, Functional transfer training, UE strengthening/ROM, Endurance training, Patient/family training, Equipment evaluation/education, Activity engagement, Energy conservation  Equipment Recommended:  (to be further assessed)       See flowsheet documentation for full assessment, interventions and recommendations  Outcome: Progressing  Limitation: Decreased ADL status, Decreased UE ROM, Decreased UE strength, Decreased endurance, Decreased self-care trans, Decreased high-level ADLs  Prognosis: Good  Assessment: Patient seen this date for OT with focus on goals as set by OTR  Patient reports that she is feeling a little better then previous sessions however remains limits by pain throughout right LE  Patient unable to complete tub shower transfers at this time and reports inability to remove shower doors  Plans to addess m,eal prep and home management which thus far have not been tolerated as focus was on basic ADL function, transfers and bed mobility  Continue OT at this time  At end of session patient remains in room with all needs within reach        OT Discharge Recommendation: Home OT  OT - OK to Discharge: No

## 2019-02-07 NOTE — NURSING NOTE
Patient assisted back to bed after voiding sufficient quantities in washroom  Ambulating well with walker  Complaints of mild pain in right knee and shoulder, refusing medication at this time  All needs within reach, will monitor

## 2019-02-07 NOTE — PLAN OF CARE
Problem: PHYSICAL THERAPY ADULT  Goal: Performs mobility at highest level of function for planned discharge setting  See evaluation for individualized goals  Treatment/Interventions: ADL retraining, Functional transfer training, LE strengthening/ROM, Endurance training, Elevations, Therapeutic exercise, Cognitive reorientation, Patient/family training, Equipment eval/education, Bed mobility, Gait training, Compensatory technique education, Spoke to MD, Spoke to nursing, Spoke to case management, Spoke to advanced practitioner, OT, Family  Equipment Recommended:  (RW ( to allow pt to keep a RW on each level))       See flowsheet documentation for full assessment, interventions and recommendations  Outcome: Progressing  Prognosis: Good  Problem List: Decreased strength, Decreased range of motion, Decreased endurance, Impaired balance, Decreased mobility, Decreased coordination, Impaired judgement, Decreased safety awareness, Impaired vision, Decreased skin integrity, Obesity, Orthopedic restrictions, Pain  Assessment: Prior to PT tx session, pt reported having a severe headache  Pt stated that RN was aware that she had a headache and was medicated  This PT assisted pt into bed and placed a cold pack on her head  Upon returning 25 minutes later pt stated that she  "felt a little better"  Pt concerned that her sugar may be low  Nursing assessed blood sugar and vitals as follows: Seated at EOB: SPO2 (RA), /80 ( manual), HR 75 (manual), SPO2 (RA) pulse oximeter not recording, Blood sugar: 157  Pt declined elevation training; but will try tomorrow  Vitals post 45 feet ambulation trial ( seated): HR 85 ( taken manually), SPO2 did not register on pulse oximeter  Pt now plans to sleep on the left side of the bed upon return to home  STG/LTG 1 modified to reflect change in information ( effective 2/7/19):   Patient will perform sit <->supine transfer ( HOB flat, no rail)  with conssitent MOD I ( in order to get in/out of bed at home)  Pt has a Care Conference scheduled tomorrow at 2:00 PM to discuss discharge planning and needs  At end of tx session pt requested to sit in her recliner ; all needs within reach  Barriers to Discharge: Inaccessible home environment (1 LAUREL, FF to second floor bed/full bathroom)  Barriers to Discharge Comments:  (Pt has 1 LAUREL home and a FF of steps to 2nd floor)             See flowsheet documentation for full assessment

## 2019-02-07 NOTE — OCCUPATIONAL THERAPY NOTE
Occupational Therapy Treatment Note    Name:  Damir Peraza   MRN:   [id number]  Age:     81 y o    Patient Active Problem List   Diagnosis    Hypertension    Chronic kidney disease, stage 3 (HCC)    Gout    Mechanical heart valve present    Chronic atrial fibrillation (HCC)    Gastritis    Anticoagulated    Acute cystitis without hematuria    Benign hypertensive CKD    Cardiomegaly    Diastolic dysfunction    DJD (degenerative joint disease), multiple sites    Esophagitis, reflux    Fibromyalgia    Fibrous breast lumps    H/O mechanical aortic valve replacement    Hyperuricemia without signs inflammatory arthritis/tophaceous disease    MCI (mild cognitive impairment)    Class 2 severe obesity due to excess calories with serious comorbidity and body mass index (BMI) of 39 0 to 39 9 in adult (HCC)    Osteoarthritis    Primary osteoarthritis involving multiple joints    Proteinuria    Pulmonary artery hypertension (HCC)    RLS (restless legs syndrome)    Secondary hyperparathyroidism, renal (HCC)    Increased frequency of urination    Lump of right breast    Leg cramps    Chronic diastolic congestive heart failure (HCC)    Hematoma    Diabetes mellitus ([address] )    Acute blood loss anemia    Constipation    Ambulatory dysfunction    Hyponatremia     Groin hematoma [S30 1XXA]      Subjective/Goals: "my VDE was negative but I still have no intential movement on my right arm"    Vitals: see nursing summary sheet    OT total treatment time: (7563-0378) 57 min    Additional goals & Comments:       02/07/19 1118   Restrictions/Precautions   Weight Bearing Precautions Per Order No   Other Precautions Pain;Visual impairment   Pain Assessment   Pain Assessment 0-10   Pain Score 7   Pain Type Acute pain   Pain Location Arm   Pain Orientation Right   Pain Descriptors Tightness   Pain Frequency Constant/continuous   Pain Onset Ongoing   Clinical Progression Gradually improving   Patient's Stated Pain Goal No pain   Hospital Pain Intervention(s) Repositioned; Ambulation/increased activity; Distraction; Emotional support   Diversional Activities Television   ADL   Grooming Comments Standing sinkside MI grooming   UB Dressing Comments independent with gown but no overhead shirt worn this date   Toileting Comments MI CM and hygiene   Kitchen Mobility   Kitchen Mobility Comments item retrieval/tranport short room distance ithout AD MI    Functional Standing Tolerance   Time approx 4 min    Bed Mobility   Rolling L 6  Modified independent   Additional items (ed on positioning)   Supine to Sit 6  Modified independent   Sit to Supine 6  Modified independent   Additional Comments no rail, HOB slight elevated from left of bed bs right (plans to switch position with spouse upon d/c until feeling better to manage from right without pain  no leg  needed  Transfers   Sit to Stand 6  Modified independent   Stand to Sit 6  Modified independent   Stand pivot 6  Modified independent   Additional Comments + RW, increased time   Functional Mobility   Functional Mobility 6  Modified independent   Additional Comments + RW   Tub Transfers   Tub Transfers Comments attempted patient unable to manage at this time (pain)   Therapeutic Exercise - ROM   UE-ROM (1# bilateral tableslides 2x10 forward flex, "V" x10)   Cognition   Overall Cognitive Status [facility]   Arousal/Participation Alert; Cooperative   Attention Within functional limits   Orientation Level Oriented X4   Memory Within functional limits   Following Commands Follows all commands and directions without difficulty   Comments needs some redirection to progress through session as she gets lost in discussion   Additional Activities   Additional Activities Comments Balance:  Fair+/good- Dynamic and unsupported for ADL's  Sitting balance: Fair (pain limitations)   Activity Tolerance   Activity Tolerance Patient tolerated treatment well  (Fair)   Assessment   Assessment Patient seen this date for OT with focus on goals as set by OTR  Patient reports that she is feeling a little better then previous sessions however remains limits by pain throughout right LE  Patient unable to complete tub shower transfers at this time and reports inability to remove shower doors  Plans to addess m,eal prep and home management which thus far have not been tolerated as focus was on basic ADL function, transfers and bed mobility  Continue OT at this time  At end of session patient remains in room with all needs within reach  Plan   Treatment Interventions ADL retraining;Functional transfer training;UE strengthening/ROM; Energy conservation; Activityengagement   Goal Expiration Date 02/18/19   Treatment Day 4   OT Frequency (6x/week)   Recommendation   OT Discharge Recommendation Home OT   OT - OK to Discharge No   Rosaline Fraction  2/7/2019

## 2019-02-08 LAB
ANION GAP SERPL CALCULATED.3IONS-SCNC: 9 MMOL/L (ref 5–14)
ANISOCYTOSIS BLD QL SMEAR: PRESENT
BUN SERPL-MCNC: 31 MG/DL (ref 5–25)
CALCIUM SERPL-MCNC: 9 MG/DL (ref 8.4–10.2)
CHLORIDE SERPL-SCNC: 89 MMOL/L (ref 97–108)
CO2 SERPL-SCNC: 32 MMOL/L (ref 22–30)
CREAT SERPL-MCNC: 1.06 MG/DL (ref 0.6–1.2)
EOSINOPHIL # BLD AUTO: 0.33 THOUSAND/UL (ref 0–0.4)
EOSINOPHIL NFR BLD MANUAL: 4 % (ref 0–6)
ERYTHROCYTE [DISTWIDTH] IN BLOOD BY AUTOMATED COUNT: 17.5 %
GFR SERPL CREATININE-BSD FRML MDRD: 49 ML/MIN/1.73SQ M
GLUCOSE SERPL-MCNC: 108 MG/DL (ref 65–140)
GLUCOSE SERPL-MCNC: 123 MG/DL (ref 70–99)
GLUCOSE SERPL-MCNC: 151 MG/DL (ref 65–140)
GLUCOSE SERPL-MCNC: 210 MG/DL (ref 65–140)
GLUCOSE SERPL-MCNC: 225 MG/DL (ref 65–140)
HCT VFR BLD AUTO: 29.8 % (ref 36–46)
HGB BLD-MCNC: 9.6 G/DL (ref 12–16)
INR PPP: 1.67 (ref 0.89–1.1)
LYMPHOCYTES # BLD AUTO: 2.05 THOUSAND/UL (ref 0.5–4)
LYMPHOCYTES # BLD AUTO: 25 % (ref 25–45)
MCH RBC QN AUTO: 30.7 PG (ref 26–34)
MCHC RBC AUTO-ENTMCNC: 32.3 G/DL (ref 31–36)
MCV RBC AUTO: 95 FL (ref 80–100)
MONOCYTES # BLD AUTO: 0.74 THOUSAND/UL (ref 0.2–0.9)
MONOCYTES NFR BLD AUTO: 9 % (ref 1–10)
NEUTS BAND NFR BLD MANUAL: 1 % (ref 0–8)
NEUTS SEG # BLD: 5.08 THOUSAND/UL (ref 1.8–7.8)
NEUTS SEG NFR BLD AUTO: 61 %
PLATELET # BLD AUTO: 366 THOUSANDS/UL (ref 150–450)
PLATELET BLD QL SMEAR: ADEQUATE
PMV BLD AUTO: 7.7 FL (ref 8.9–12.7)
POTASSIUM SERPL-SCNC: 4.2 MMOL/L (ref 3.6–5)
PROTHROMBIN TIME: 17.2 SECONDS (ref 9.5–11.6)
RBC # BLD AUTO: 3.13 MILLION/UL (ref 4–5.2)
RBC MORPH BLD: NORMAL
SODIUM SERPL-SCNC: 130 MMOL/L (ref 137–147)
TOTAL CELLS COUNTED SPEC: 100
WBC # BLD AUTO: 8.2 THOUSAND/UL (ref 4.5–11)

## 2019-02-08 PROCEDURE — 97535 SELF CARE MNGMENT TRAINING: CPT

## 2019-02-08 PROCEDURE — 97110 THERAPEUTIC EXERCISES: CPT

## 2019-02-08 PROCEDURE — 97530 THERAPEUTIC ACTIVITIES: CPT

## 2019-02-08 PROCEDURE — 97116 GAIT TRAINING THERAPY: CPT

## 2019-02-08 PROCEDURE — 82948 REAGENT STRIP/BLOOD GLUCOSE: CPT

## 2019-02-08 PROCEDURE — 85610 PROTHROMBIN TIME: CPT | Performed by: NURSE PRACTITIONER

## 2019-02-08 PROCEDURE — 80048 BASIC METABOLIC PNL TOTAL CA: CPT | Performed by: NURSE PRACTITIONER

## 2019-02-08 PROCEDURE — 85007 BL SMEAR W/DIFF WBC COUNT: CPT | Performed by: NURSE PRACTITIONER

## 2019-02-08 PROCEDURE — 85027 COMPLETE CBC AUTOMATED: CPT | Performed by: NURSE PRACTITIONER

## 2019-02-08 RX ORDER — WARFARIN SODIUM 5 MG/1
5 TABLET ORAL
Status: DISCONTINUED | OUTPATIENT
Start: 2019-02-08 | End: 2019-02-15

## 2019-02-08 RX ADMIN — OXYBUTYNIN CHLORIDE 2.5 MG: 5 TABLET ORAL at 21:59

## 2019-02-08 RX ADMIN — INSULIN LISPRO 2 UNITS: 100 INJECTION, SOLUTION INTRAVENOUS; SUBCUTANEOUS at 12:14

## 2019-02-08 RX ADMIN — OXYCODONE HYDROCHLORIDE 10 MG: 5 TABLET ORAL at 12:27

## 2019-02-08 RX ADMIN — INSULIN LISPRO 2 UNITS: 100 INJECTION, SOLUTION INTRAVENOUS; SUBCUTANEOUS at 22:00

## 2019-02-08 RX ADMIN — LATANOPROST 1 DROP: 50 SOLUTION OPHTHALMIC at 22:09

## 2019-02-08 RX ADMIN — INSULIN DETEMIR 60 UNITS: 100 INJECTION, SOLUTION SUBCUTANEOUS at 22:00

## 2019-02-08 RX ADMIN — ENOXAPARIN SODIUM 105 MG: 120 INJECTION SUBCUTANEOUS at 21:59

## 2019-02-08 RX ADMIN — INSULIN LISPRO 1 UNITS: 100 INJECTION, SOLUTION INTRAVENOUS; SUBCUTANEOUS at 17:13

## 2019-02-08 RX ADMIN — FERROUS SULFATE TAB 325 MG (65 MG ELEMENTAL FE) 325 MG: 325 (65 FE) TAB at 07:31

## 2019-02-08 RX ADMIN — OXYCODONE HYDROCHLORIDE 10 MG: 5 TABLET ORAL at 06:08

## 2019-02-08 RX ADMIN — METHOCARBAMOL 500 MG: 500 TABLET, FILM COATED ORAL at 08:41

## 2019-02-08 RX ADMIN — ENOXAPARIN SODIUM 105 MG: 120 INJECTION SUBCUTANEOUS at 09:00

## 2019-02-08 RX ADMIN — OXYCODONE HYDROCHLORIDE 10 MG: 5 TABLET ORAL at 17:13

## 2019-02-08 RX ADMIN — ALLOPURINOL 100 MG: 100 TABLET ORAL at 17:13

## 2019-02-08 RX ADMIN — WARFARIN SODIUM 5 MG: 5 TABLET ORAL at 17:13

## 2019-02-08 RX ADMIN — ALLOPURINOL 100 MG: 100 TABLET ORAL at 08:41

## 2019-02-08 RX ADMIN — MAGNESIUM OXIDE TAB 400 MG (241.3 MG ELEMENTAL MG) 400 MG: 400 (241.3 MG) TAB at 08:41

## 2019-02-08 RX ADMIN — OXYCODONE HYDROCHLORIDE 10 MG: 5 TABLET ORAL at 22:21

## 2019-02-08 RX ADMIN — METHOCARBAMOL 500 MG: 500 TABLET, FILM COATED ORAL at 17:13

## 2019-02-08 RX ADMIN — GABAPENTIN 600 MG: 300 CAPSULE ORAL at 21:59

## 2019-02-08 RX ADMIN — INDAPAMIDE 2.5 MG: 2.5 TABLET, FILM COATED ORAL at 08:41

## 2019-02-08 RX ADMIN — METOPROLOL SUCCINATE 50 MG: 50 TABLET, EXTENDED RELEASE ORAL at 08:41

## 2019-02-08 NOTE — OCCUPATIONAL THERAPY NOTE
Occupational Therapy Treatment Note    Name:  Denita Huffman   MRN:   [id number]  Age:     81 y o  Patient Active Problem List   Diagnosis    Hypertension    Chronic kidney disease, stage 3 (HCC)    Gout    Mechanical heart valve present    Chronic atrial fibrillation (HCC)    Gastritis    Anticoagulated    Acute cystitis without hematuria    Benign hypertensive CKD    Cardiomegaly    Diastolic dysfunction    DJD (degenerative joint disease), multiple sites    Esophagitis, reflux    Fibromyalgia    Fibrous breast lumps    H/O mechanical aortic valve replacement    Hyperuricemia without signs inflammatory arthritis/tophaceous disease    MCI (mild cognitive impairment)    Class 2 severe obesity due to excess calories with serious comorbidity and body mass index (BMI) of 39 0 to 39 9 in adult (HCC)    Osteoarthritis    Primary osteoarthritis involving multiple joints    Proteinuria    Pulmonary artery hypertension (HCC)    RLS (restless legs syndrome)    Secondary hyperparathyroidism, renal (HCC)    Increased frequency of urination    Lump of right breast    Leg cramps    Chronic diastolic congestive heart failure (HCC)    Hematoma    Diabetes mellitus ([facility] [address] )    Acute blood loss anemia    Constipation    Ambulatory dysfunction    Hyponatremia     Groin hematoma [S30 1XXA]      Subjective/Goals: "to get better and back to work"    Vitals: see nursing summary sheet     OT total treatment time: (4790-7028) 64 min    Pain:  Right UE arm (shoulder through forearm) 5/10 with medication 1 hour prior    Care conference:  · All goals appropriate at this time with exception of home management which patient states although a long term goal is not a goal for her to return home-- patient states that spouse to assist in home management    · Patient continues to make progress in therapy skill sets with her main concern on movement and ROM of right UE and ability to walk without assistive device even if for short distance as she does not want to work with a walker  · Meal prep for light snack/beverage retrieval and transport-- she reports she will have spouse to majority of cooking and she may plan to be in kitchen to assist seated to instruct spouse and/or prep  · Patient ed on HEP and continued OT upon d/c for arm-- patient ensured that these are the goals  Goals:  · Grooming- independent  · LB ADL- MI  · Toileting- MI  · Bed Mobility- MI left side of bed, no rail, no AE and good technique  · ADL Txfs- MI with walker in room, MI SHORT room distance without  Ed to use walker for pain management, safety and energy conservation leana with distance  · Stand Balance- Good dynamic & unsupported for clothing management   · Stand Tolerance-2-3 min average (does get SOB with activity and limited to pain)  · Seated Balance- Fair dynamic (AE utilized for LE management)  · Tub/Shower- unable to manage right LE height for tub transfer at this time-- pain, fatigue and decreased strength  · Activity Tolerance- patient continues to need rest with each activity presented limited by pain and or fatigue  Average tolerance 5 min before rest, tolerating 60 min session  · UE there ex:  Patient completed 1# table slides 2x10 with good tolerance with cues to complete at slow pace with rest as needed  Patient ed on completion of these as HEP to be initiated  Patient would benefit from trail with wall walks- not attempted this date  · Energy conservation:  Continued ed  Assessment:  Patient see this date for OT with focus on goals as set by OTR  Patient plans are to d/c home with spouse at highest level of function however spouse able to assist with meals and home management  Patient continues to make progress in therapy skill sets but remains limited by overall pain and SOB/fatigue  Patient is well motivated for wellness and open to recommendations made for safety, energy conservation and ADL's with AE PRN  Continue OT at this time  At end of session patient remains in room with all needs within reach       [facility]  2/8/2019

## 2019-02-08 NOTE — PLAN OF CARE
Problem: PHYSICAL THERAPY ADULT  Goal: Performs mobility at highest level of function for planned discharge setting  See evaluation for individualized goals  Treatment/Interventions: ADL retraining, Functional transfer training, LE strengthening/ROM, Endurance training, Elevations, Therapeutic exercise, Cognitive reorientation, Patient/family training, Equipment eval/education, Bed mobility, Gait training, Compensatory technique education, Spoke to MD, Spoke to nursing, Spoke to case management, Spoke to advanced practitioner, OT, Family  Equipment Recommended:  (RW ( to allow pt to keep a RW on each level))       See flowsheet documentation for full assessment, interventions and recommendations  Outcome: Progressing  Prognosis: Good  Problem List: Decreased strength, Decreased range of motion, Decreased endurance, Impaired balance, Decreased mobility, Impaired judgement, Decreased safety awareness, Decreased coordination, Impaired vision, Obesity, Decreased skin integrity, Orthopedic restrictions, Pain  Assessment: Reviewed home set-up with pt: Pt has 1 door stoop to enter and exit home  Practiced 1 + 1 curb steps with RW ascending forwards with LLE; then descending backwards with RLE  Pt then requested to practice ascending and descending forwards with the same BLE sequencing  Pt was able to manage RW without assistance on curb steps; during both trials  Pt reported feeling safer with increased ease and less groin discomfort descending backwards  Pt also has a full flight of steps with left handrail up, to access bedroom/full bathroom level of home  Initiated elevation training on steps in stairwell, for the first time today  Pt ascended and descended  6 steps, followed immediately by 3 more steps with left handrail up  Pt ascended forwards with LLE; then descending backwards with RLE  Required cues for cane and BLE sequencing  Improvement assessed today with activity tolerance and gait training    A Care Conference is scheduled for 2:00 PM to discuss discharge planning and needs  Barriers to Discharge: Inaccessible home environment, Decreased caregiver support (Pt will be alone when husb works part time)  Barriers to Discharge Comments:  (1 LAUREL home, FF of step sto access 2nd fl bedroom/full bath)             See flowsheet documentation for full assessment

## 2019-02-08 NOTE — PHYSICAL THERAPY NOTE
Physical Therapy Daily Treatment Note     02/08/19: 55 minutes ( 8:47 to 9:42)    Pain Assessment   Pain Assessment 0-10   Pain Score 7   Pain Type Acute pain   Pain Location Leg;Arm   Pain Orientation Right   Pain Descriptors Tightness  (Tightness in groin)   Pain Frequency Constant/continuous   Clinical Progression Gradually improving   Patient's Stated Pain Goal No pain   Restrictions/Precautions   Weight Bearing Precautions Per Order No   Other Precautions Visual impairment; Fall Risk;Pain, pacemaker,  take manual BP & HR per pt request ( + A-fib)   General   Chart Reviewed Yes   Response to Previous Treatment Patient with no complaints from previous session  Family/Caregiver Present No   Cognition   Overall Cognitive Status WFL   Arousal/Participation Alert; Cooperative   Attention Within functional limits   Memory Within functional limits   Following Commands Follows all commands and directions without difficulty   Subjective   Subjective (" My pain is bad in the morning and gets worse")   Bed Mobility   Rolling L 6  Modified independent   Additional items Increased time required   Supine to Sit 6  Modified independent   Additional items Increased time required   Sit to Supine 6  Modified independent   Additional items Increased time required   Additional Comments (Bed Mobility: HOB flat, no rail)   Transfers   Sit to Stand 6  Modified independent   Additional items Increased time required  (Good hand placement)   Stand to Sit 6  Modified independent   Additional items Increased time required  (Good hand placement, controlled descent)   Stand pivot 6  Modified independent  (SPT with and without RW)   Additional items (Occ abandons RW, but still appears steady)   Additional items (Transfers: EOB, low wheelchair, recliner)   Ambulation/Elevation   Gait pattern Short stride;Decreased foot clearance; Forward Flexion; Improper Weight shift; Antalgic;Decreased R stance  (Slow vianca, slight M/L sway)   Additional items (Amb with no AD 10 feet with Supervision)   Assistive Device Rolling walker   Distance (Amb with RW 35 feet, 20 feet MOD I; 70 feet with Supervision)   Stair Management Assistance 5  Supervision   Stair Management Technique One rail L;Step to pattern; With cane  (Ascends forward with LLE; descends backwards RLE)   Number of Stairs (6 + 3 ( see assessment for details))   Curbs (See Assessment for details)   Balance   Static Sitting Good   Dynamic Sitting Fair +   Dynamic Standing (Good (-) with RW during SPT's)   Ambulatory (Good (-) with RW)   Higher level balance (F Unspported bal: side step, step backwards, turn )   Endurance Deficit   Endurance Deficit Yes   Endurance Deficit Description (Fatigued very easily on steps today)   Activity Tolerance   Activity Tolerance Patient limited by fatigue;Patient limited by pain   Assessment   Prognosis Good   Problem List Decreased strength;Decreased range of motion;Decreased endurance; Impaired balance;Decreased mobility; Impaired judgement;Decreased safety awareness;Decreased coordination; Impaired vision;Obesity; Decreased skin integrity;Orthopedic restrictions;Pain   Assessment Reviewed home set-up with pt: Pt has 1 door stoop to enter and exit home  Practiced 1 + 1 curb steps with RW ascending forwards with LLE; then descending backwards with RLE  Pt then requested to practice ascending and descending forwards with the same BLE sequencing  Pt was able to manage RW without assistance on curb steps; during both trials  Pt reported feeling safer with increased ease and less groin discomfort descending backwards  Pt also has a full flight of steps with left handrail up, to access bedroom/full bathroom level of home  Initiated elevation training on steps in stairwell, for the first time today  Pt ascended and descended  6 steps, followed immediately by 3 more steps with left handrail up  Pt ascended forwards with LLE; then descending backwards with RLE   Required cues for cane and BLE sequencing  Improvement assessed today with activity tolerance and gait training  A Care Conference is scheduled for 2:00 PM to discuss discharge planning and needs  Barriers to Discharge Inaccessible home environment;Decreased caregiver support  (Pt will be alone when husb works part time)   Barriers to Discharge Comments (1 LAUREL home, FF of step sto access 2nd fl bedroom/full bath)   Goals   Patient Goals (" I would like to return to work without using a RW")   STG Expiration Date 02/18/19   LTG Expiration Date 02/18/19   Treatment Day 4   Plan   Treatment/Interventions ADL retraining;Functional transfer training;LE strengthening/ROM; Elevations; Therapeutic exercise; Endurance training;Cognitive reorientation;Patient/family training;Equipment eval/education; Bed mobility;Gait training; Compensatory technique education;Spoke to MD;Spoke to nursing;Spoke to case management;Spoke to advanced practitioner;OT;Family   Progress Progressing toward goals   PT Frequency (6x/week)   Recommendation   Equipment Recommended (Bariatric RW ( to allow pt to keep a RW on each level))       Vitals:   Seated at rest: HR 92, SPO2 (RA) 99%  After negotiating 6 + 3 steps with left handrail up ( seated): SPO2 (RA) 99%, HR  ( 85)

## 2019-02-08 NOTE — PHYSICAL THERAPY NOTE
Care Conference: 23 minutes (n 14:01 to 14:24) ): Pt and her  (Janie Null), PT, PORTER, and SW all in attendance for meeting  Physical Therapy: Reviewed functional status from PT IE, discussed STG's/LTG's established on PT IE and progression made towards these goals in prep for discharge back to home with   Reviewed current functional status in PT  Pt is ambulating with a RW up to 70 feet with Supervision and all transfers performed with MOD I today  Pt appears to perform better during AM PT tx sessions  Assessed to have increased fatigue in PM     Pt has 1 door stoop to enter and exit home  Practiced 1 + 1 curb steps with RW ascending forwards with LLE; then descending backwards with RLE  Pt then requested to practice ascending and descending forwards with the same BLE sequencing  Pt was able to manage RW without assistance on curb steps; during both trials  Pt reported feeling safer with increased ease and less groin discomfort descending backwards  Pt also has a full flight of steps with left handrail up, to access bedroom/full bathroom level of home  Initiated elevation training on steps in stairwell, for the first time today  Pt ascended and descended  6 steps, followed immediately by 3 more steps with left handrail up  Pt ascended forwards with LLE; then descending backwards with RLE  Required cues for cane and BLE sequencing  Improvement assessed today with activity tolerance and gait training  Pt currently owns a regular sized RW which is very narrow for the pt  Pt would benefit from a more appropriately fitting bariatric RW  Recommend keeping 1 walker on 1st level and the other RW on the 2nd  level of the home  This would prevent pt from having to carry RW up/down steps   to look into pricing  Recommend Home PT to maximize safe Independent mobility within the home  Pt agreeable to home services at discharge       does not feeling family training is necessary at this time  Plan is for pt to be at MOD I with all aspects of mobility  by discharge  Anticipated discharge date from skilled PT is 2/18/19         Nunu Gross, PT

## 2019-02-08 NOTE — PLAN OF CARE
Problem: PHYSICAL THERAPY ADULT  Goal: Performs mobility at highest level of function for planned discharge setting  See evaluation for individualized goals  Treatment/Interventions: ADL retraining, Functional transfer training, LE strengthening/ROM, Endurance training, Elevations, Therapeutic exercise, Cognitive reorientation, Patient/family training, Equipment eval/education, Bed mobility, Gait training, Compensatory technique education, Spoke to MD, Spoke to nursing, Spoke to case management, Spoke to advanced practitioner, OT, Family  Equipment Recommended:  (RW ( to allow pt to keep a RW on each level))       See flowsheet documentation for full assessment, interventions and recommendations  Outcome: Progressing  Prognosis: Good  Problem List: Decreased strength, Decreased range of motion, Decreased endurance, Impaired balance, Decreased mobility, Impaired judgement, Decreased safety awareness, Decreased coordination, Impaired vision, Obesity, Decreased skin integrity, Orthopedic restrictions, Pain  Assessment:  Care Conference: 23 minutes (n 14:01 to 14:24) ): Pt and her  (Griselda Wells), PT, PORTER, and SW all in attendance for meeting  Physical Therapy: Reviewed functional status from PT IE, discussed STG's/LTG's established on PT IE and progression made towards these goals in prep for discharge back to home with   Reviewed current functional status in PT  Pt is ambulating with a RW up to 70 feet with Supervision and all transfers performed with MOD I today  Pt appears to perform better during AM PT tx sessions  Assessed to have increased fatigue in PM     Pt has 1 door stoop to enter and exit home  Practiced 1 + 1 curb steps with RW ascending forwards with LLE; then descending backwards with RLE  Pt then requested to practice ascending and descending forwards with the same BLE sequencing  Pt was able to manage RW without assistance on curb steps; during both trials    Pt reported feeling safer with increased ease and less groin discomfort descending backwards  Pt also has a full flight of steps with left handrail up, to access bedroom/full bathroom level of home  Initiated elevation training on steps in stairwell, for the first time today  Pt ascended and descended  6 steps, followed immediately by 3 more steps with left handrail up  Pt ascended forwards with LLE; then descending backwards with RLE  Required cues for cane and BLE sequencing  Improvement assessed today with activity tolerance and gait training  Pt currently owns a regular sized RW which is very narrow for the pt  Pt would benefit from a more appropriately fitting bariatric RW  Recommend keeping 1 walker on 1st level and the other RW on the 2nd  level of the home  This would prevent pt from having to carry RW up/down steps   to look into pricing  Recommend Home PT to maximize safe Independent mobility within the home  Pt agreeable to home services at discharge   does not feeling family training is necessary at this time  Plan is for pt to be at MOD I with all aspects of mobility  by discharge  Anticipated discharge date from skilled PT is 2/18/19  Barriers to Discharge: Inaccessible home environment, Decreased caregiver support (Pt will be alone when husb works part time)  Barriers to Discharge Comments:  (1 LAUREL home, FF of step sto access 2nd fl bedroom/full bath)             See flowsheet documentation for full assessment

## 2019-02-08 NOTE — SOCIAL WORK
[facility] held with patient and patient's   PT/MALCOLM and SW also present  Patient continues to progress well in therapy  Goal is for patient to ambulate 100 ft, patient can currently ambulate 75 ft with a RW  Patient needs a RW to ambulate, patient can complete very short distances without a RW  PT projects continuing therapy until 2/18, PT feels patient will not need that long  PT to practice a wide-RW with patient  Patient is agreeable to use [facility] at discharge  SW to follow-up with referral  Patient also would like FMLA  Attending to complete forms on Monday  SW to continue to follow at this time

## 2019-02-08 NOTE — PLAN OF CARE
Problem: OCCUPATIONAL THERAPY ADULT  Goal: Performs self-care activities at highest level of function for planned discharge setting  See evaluation for individualized goals  Treatment Interventions: ADL retraining, Functional transfer training, UE strengthening/ROM, Endurance training, Patient/family training, Equipment evaluation/education, Activity engagement, Energy conservation  Equipment Recommended:  (to be further assessed)       See flowsheet documentation for full assessment, interventions and recommendations  Outcome: Progressing  Limitation: Decreased ADL status, Decreased UE ROM, Decreased UE strength, Decreased endurance, Decreased self-care trans, Decreased high-level ADLs  Prognosis: Good  Assessment: Patient see this date for OT with focus on goals as set by OTR  Patient plans are to d/c home with spouse at highest level of function however spouse able to assist with meals and home management  Patient continues to make progress in therapy skill sets but remains limited by overall pain and SOB/fatigue  Patient is well motivated for wellness and open to recommendations made for safety, energy conservation and ADL's with AE PRN  Continue OT at this time  At end of session patient remains in room with all needs within reach      OT Discharge Recommendation: Home OT  OT - OK to Discharge: No

## 2019-02-09 LAB
GLUCOSE SERPL-MCNC: 153 MG/DL (ref 65–140)
GLUCOSE SERPL-MCNC: 161 MG/DL (ref 65–140)
GLUCOSE SERPL-MCNC: 170 MG/DL (ref 65–140)
GLUCOSE SERPL-MCNC: 172 MG/DL (ref 65–140)
INR PPP: 1.97 (ref 0.89–1.1)
PROTHROMBIN TIME: 20.1 SECONDS (ref 9.5–11.6)

## 2019-02-09 PROCEDURE — 82948 REAGENT STRIP/BLOOD GLUCOSE: CPT

## 2019-02-09 PROCEDURE — 85610 PROTHROMBIN TIME: CPT | Performed by: NURSE PRACTITIONER

## 2019-02-09 PROCEDURE — 97116 GAIT TRAINING THERAPY: CPT

## 2019-02-09 PROCEDURE — 97110 THERAPEUTIC EXERCISES: CPT

## 2019-02-09 RX ADMIN — ALLOPURINOL 100 MG: 100 TABLET ORAL at 08:01

## 2019-02-09 RX ADMIN — WARFARIN SODIUM 5 MG: 5 TABLET ORAL at 17:11

## 2019-02-09 RX ADMIN — MAGNESIUM OXIDE TAB 400 MG (241.3 MG ELEMENTAL MG) 400 MG: 400 (241.3 MG) TAB at 08:01

## 2019-02-09 RX ADMIN — METHOCARBAMOL 500 MG: 500 TABLET, FILM COATED ORAL at 08:01

## 2019-02-09 RX ADMIN — OXYBUTYNIN CHLORIDE 2.5 MG: 5 TABLET ORAL at 21:31

## 2019-02-09 RX ADMIN — GABAPENTIN 600 MG: 300 CAPSULE ORAL at 21:29

## 2019-02-09 RX ADMIN — ALLOPURINOL 100 MG: 100 TABLET ORAL at 17:11

## 2019-02-09 RX ADMIN — OXYCODONE HYDROCHLORIDE 5 MG: 5 TABLET ORAL at 13:55

## 2019-02-09 RX ADMIN — INSULIN LISPRO 1 UNITS: 100 INJECTION, SOLUTION INTRAVENOUS; SUBCUTANEOUS at 12:29

## 2019-02-09 RX ADMIN — OXYCODONE HYDROCHLORIDE 10 MG: 5 TABLET ORAL at 22:49

## 2019-02-09 RX ADMIN — INSULIN DETEMIR 60 UNITS: 100 INJECTION, SOLUTION SUBCUTANEOUS at 21:12

## 2019-02-09 RX ADMIN — ENOXAPARIN SODIUM 105 MG: 120 INJECTION SUBCUTANEOUS at 22:00

## 2019-02-09 RX ADMIN — ENOXAPARIN SODIUM 105 MG: 120 INJECTION SUBCUTANEOUS at 08:05

## 2019-02-09 RX ADMIN — INSULIN LISPRO 1 UNITS: 100 INJECTION, SOLUTION INTRAVENOUS; SUBCUTANEOUS at 21:30

## 2019-02-09 RX ADMIN — OXYCODONE HYDROCHLORIDE 10 MG: 5 TABLET ORAL at 09:54

## 2019-02-09 RX ADMIN — ACETAMINOPHEN 650 MG: 325 TABLET ORAL at 13:05

## 2019-02-09 RX ADMIN — INDAPAMIDE 2.5 MG: 2.5 TABLET, FILM COATED ORAL at 08:00

## 2019-02-09 RX ADMIN — ACETAMINOPHEN 650 MG: 325 TABLET ORAL at 01:26

## 2019-02-09 RX ADMIN — INSULIN LISPRO 1 UNITS: 100 INJECTION, SOLUTION INTRAVENOUS; SUBCUTANEOUS at 07:45

## 2019-02-09 RX ADMIN — FERROUS SULFATE TAB 325 MG (65 MG ELEMENTAL FE) 325 MG: 325 (65 FE) TAB at 08:01

## 2019-02-09 RX ADMIN — LATANOPROST 1 DROP: 50 SOLUTION OPHTHALMIC at 21:30

## 2019-02-09 RX ADMIN — INSULIN LISPRO 1 UNITS: 100 INJECTION, SOLUTION INTRAVENOUS; SUBCUTANEOUS at 17:11

## 2019-02-09 RX ADMIN — METHOCARBAMOL 500 MG: 500 TABLET, FILM COATED ORAL at 17:11

## 2019-02-09 RX ADMIN — METOPROLOL SUCCINATE 50 MG: 50 TABLET, EXTENDED RELEASE ORAL at 08:00

## 2019-02-09 NOTE — PLAN OF CARE
Problem: PHYSICAL THERAPY ADULT  Goal: Performs mobility at highest level of function for planned discharge setting  See evaluation for individualized goals  Treatment/Interventions: ADL retraining, Functional transfer training, LE strengthening/ROM, Endurance training, Elevations, Therapeutic exercise, Cognitive reorientation, Patient/family training, Equipment eval/education, Bed mobility, Gait training, Compensatory technique education, Spoke to MD, Spoke to nursing, Spoke to case management, Spoke to advanced practitioner, OT, Family  Equipment Recommended:  (RW ( to allow pt to keep a RW on each level))       See flowsheet documentation for full assessment, interventions and recommendations  Outcome: Progressing  Prognosis: Good  Problem List: Decreased strength, Decreased range of motion, Decreased endurance, Impaired balance, Decreased mobility, Impaired judgement, Decreased safety awareness, Decreased coordination, Impaired vision, Obesity, Decreased skin integrity, Orthopedic restrictions, Pain  Assessment: Pt seen for PT  Pt sitting OOB in chair  Pt agreeable to PT, but reports she doesn't want to do as many steps as yesterday  Treatment limited 2* coming in to check on pts RLE hematoma  Barriers to Discharge: Inaccessible home environment, Decreased caregiver support (Pt will be alone when lilianeb works part time)  Barriers to Discharge Comments:  (1 LAUREL home, FF of step sto access 2nd fl bedroom/full bath)             See flowsheet documentation for full assessment

## 2019-02-09 NOTE — PLAN OF CARE
Problem: OCCUPATIONAL THERAPY ADULT  Goal: Performs self-care activities at highest level of function for planned discharge setting  See evaluation for individualized goals  Treatment Interventions: ADL retraining, Functional transfer training, UE strengthening/ROM, Endurance training, Patient/family training, Equipment evaluation/education, Activity engagement, Energy conservation  Equipment Recommended:  (to be further assessed)       See flowsheet documentation for full assessment, interventions and recommendations  Outcome: Progressing  Limitation: Decreased ADL status, Decreased UE ROM, Decreased UE strength, Decreased endurance, Decreased self-care trans, Decreased high-level ADLs  Prognosis: Good  Assessment: Patient participated in Skilled OT session this date with interventions consisting of ADL re training with the use of correct body mechnaics, Energy Conservation techniques, deep breathing technique, safety awareness and fall prevention techniques, therapeutic exercise to: increase functional use of BUEs, increase BUE muscle strength , increase dynamic sit/ stand balance during functional activity  and increase OOB/ sitting tolerance   Patient agreeable to OT treatment session, upon arrival patient was found seated OOB to Chair  In comparison to previous session, patient with improvements in functional transfers/mobility, dynamic standing balance, standing tolerance  Please refer to chart above for functional levels  Patient requiring frequent re direction, frequent rest periods and ocassional safety reminders  Patient continues to be functioning below baseline level, occupational performance remains limited secondary to factors listed above and increased risk for falls and injury  From OT standpoint, recommendation at time of d/c would be Home OT     Patient to benefit from continued Occupational Therapy treatment while in the hospital to address deficits as defined above and maximize level of functional independence with ADLs and functional mobility        OT Discharge Recommendation: Home OT  OT - OK to Discharge: No

## 2019-02-09 NOTE — PHYSICAL THERAPY NOTE
17 minute treatment       02/09/19 0761   Pain Assessment   Pain Assessment 0-10   Pain Score 4   Pain Type Acute pain   Pain Location Leg   Pain Orientation Right   Pain Descriptors Tightness   Pain Frequency Constant/continuous   Patient's Stated Pain Goal No pain   Hospital Pain Intervention(s) Ambulation/increased activity; Emotional support; Environmental changes   Diversional Activities Television  (newspaper)   Restrictions/Precautions   Weight Bearing Precautions Per Order No   Other Precautions Fall Risk;Pain;Visual impairment   General   Chart Reviewed Yes   Response to Previous Treatment Patient with no complaints from previous session  Family/Caregiver Present No   Cognition   Overall Cognitive Status WFL   Following Commands Follows all commands and directions without difficulty   Subjective   Subjective Pt agreable to PT   Bed Mobility   Sit to Supine 6  Modified independent   Additional items Increased time required   Transfers   Sit to Stand 6  Modified independent   Additional items Increased time required   Stand to Sit 6  Modified independent   Additional items Increased time required   Stand pivot 6  Modified independent  (with and w/o RW)   Ambulation/Elevation   Gait pattern Antalgic; Improper Weight shift;Decreased foot clearance; Short stride   Gait Assistance 5  Supervision   Assistive Device Rolling walker;Bariatric Rolling walker   Distance 50' and 40   Stair Management Assistance 5  Supervision   Stair Management Technique One rail L;Step to pattern; With cane  (ascends forward, descends backwards)   Number of Stairs 4  (pt declined any more steps)   Curbs (pt declined)   Endurance Deficit   Endurance Deficit Yes   Activity Tolerance   Activity Tolerance Patient limited by fatigue;Patient limited by pain   Nurse Made Aware (nurse requested pt back to bed 2* dr here to check pts RLE)   Assessment   Prognosis Good   Problem List Decreased strength;Decreased range of motion;Decreased endurance; Impaired balance;Decreased mobility; Impaired judgement;Decreased safety awareness;Decreased coordination; Impaired vision;Obesity; Decreased skin integrity;Orthopedic restrictions;Pain   Assessment Pt seen for PT  Pt sitting OOB in chair  Pt agreeable to PT, but reports she doesn't want to do as many steps as yesterday    Treatment limited 2* coming in to check on pts RLE hematoma   Goals   Patient Goals go to bed after PT   STG Expiration Date 02/18/19   Treatment Day 5   Plan   Treatment/Interventions (continue per plan of care)   Progress Progressing toward goals   PT Frequency (6x/week)   Venancio Conet, PTA

## 2019-02-09 NOTE — OCCUPATIONAL THERAPY NOTE
Occupational Therapy Treatment Note      Pool Hernandez    2/9/2019    Patient Active Problem List   Diagnosis    Hypertension    Chronic kidney disease, stage 3 ([facility])    Gout    Mechanical heart valve present    Chronic atrial fibrillation ([facility])    Gastritis    Anticoagulated    Acute cystitis without hematuria    Benign hypertensive CKD    Cardiomegaly    Diastolic dysfunction    DJD (degenerative joint disease), multiple sites    Esophagitis, reflux    Fibromyalgia    Fibrous breast lumps    H/O mechanical aortic valve replacement    Hyperuricemia without signs inflammatory arthritis/tophaceous disease    MCI (mild cognitive impairment)    Class 2 severe obesity due to excess calories with serious comorbidity and body mass index (BMI) of 39 0 to 39 9 in adult ([facility])    Osteoarthritis    Primary osteoarthritis involving multiple joints    Proteinuria    Pulmonary artery hypertension ([facility])    RLS (restless legs syndrome)    Secondary hyperparathyroidism, renal ([facility])    Increased frequency of urination    Lump of right breast    Leg cramps    Chronic diastolic congestive heart failure ([facility])    Hematoma    Diabetes mellitus ([address] )    Acute blood loss anemia    Constipation    Ambulatory dysfunction    Hyponatremia       Past Medical History:   Diagnosis Date    Chronic atrial fibrillation ([address] ) 9/13/2017    Chronic gout 9/13/2017    Chronic rhinitis 11/08/2018    CKD (chronic kidney disease) stage 3, GFR 30-59 ml/min ([facility]) 9/13/2017    Colon polyp 9/13/2017    Coronary artery disease     Diabetes mellitus ([address] )     COMBS (dyspnea on exertion) 11/02/2018    Essential hypertension 9/13/2017    Gastritis 9/13/2017    Hypertension     Irregular heart beat     Mechanical heart valve present 9/13/2017    Osteoarthritis     Pacemaker     S/P AVR     Spinal stenosis     disk herniation        Past Surgical History:   Procedure Laterality Date    AORTIC VALVE REPLACEMENT      CARDIAC SURGERY      CATARACT EXTRACTION      CHOLECYSTECTOMY      COLONOSCOPY      ESOPHAGOGASTRODUODENOSCOPY      HERNIA REPAIR      x3    HYSTERECTOMY      INSERT / Clide Bowling / REMOVE PACEMAKER      IR PELVIC ANGIOGRAPHY / INTERVENTION  1/29/2019    JOINT REPLACEMENT Bilateral     knees    AL COLONOSCOPY FLX DX W/COLLJ SPEC WHEN PFRMD N/A 9/12/2017    Procedure: Wes Terrell bxAND COLONOSCOPY with polypectomies;  Surgeon: Kaylin Saunders MD;  [facility]; Service: Gastroenterology    AL SIGMOIDOSCOPY FLX DX W/COLLJ SPEC BR/WA IF PFRMD N/A 9/13/2017    Procedure: colonoscopy with hemo clip x 2;  Surgeon: Kaylin Saunders MD;  Location: [facility]; Service: Gastroenterology    ROTATOR CUFF REPAIR Left     SKIN BIOPSY      TONSILLECTOMY         OT Tx: 8780-6638 (15 mins)       02/09/19 1520   Restrictions/Precautions   Weight Bearing Precautions Per Order No   Other Precautions Pain; Fall Risk   Lifestyle   Autonomy PTA pt states independence with all aspects of her ADLs, transfers, ambulation--w/o device; neg falls, ocassionally home alone, +   Reciprocal Relationships 4 children   Service to Others works parttime at [employer] as a    Intrinsic Gratification watching TV   Pain Assessment   Pain Assessment 0-10   Pain Score Worst Possible Pain   Pain Type Acute pain   Pain Location Shoulder   Pain Orientation Right   Pain Frequency Other (Comment)  (during activity)   Patient's Stated Pain Goal No pain   Hospital Pain Intervention(s) Repositioned; Ambulation/increased activity; Distraction; Emotional support   Response to Interventions Tolerated   Functional Standing Tolerance   Time 10 min   Activity UE exercise in stance   Comments Unilat support of wall w/o AD   Bed Mobility   Supine to Sit 6  Modified independent   Additional items Increased time required   Sit to Supine 6  Modified independent   Additional items Increased time required   Additional Comments HOB slightly elevated; no HR   Transfers   Sit to Stand 6  Modified independent   Additional items Increased time required   Stand to Sit 6  Modified independent   Additional items Increased time required   Stand pivot 6  Modified independent   Additional items Increased time required  (With and w/o use of RW)   Additional Comments Transfers completed w/ and w/o use of RW in room   Functional Mobility   Functional Mobility 6  Modified independent   Additional Comments Functional mobility in room    Additional items Rolling walker  (w/ and w/o use of RW)   Therapeutic Exercise - ROM   UE-ROM Yes   ROM- Right Upper Extremities   R Shoulder AROM; Flexion   R Weight/Reps/Sets 1x10 w/ 1 rest break    RUE ROM Comment Wall walk-ups in stance position w/o use of RW for support   Cognition   Overall Cognitive Status WFL   Arousal/Participation Alert; Cooperative;Lethargic   Attention Attends with cues to redirect   Orientation Level Oriented X4   Memory Within functional limits   Following Commands Follows one step commands without difficulty   Comments Pt required increased encouragement to participate in therapy  At times required redirection to task 2' Pt easily distracted      Additional Activities   Additional Activities Comments Dynamic standing balance: Good w/ unilat UE support   Activity Tolerance   Activity Tolerance Patient limited by fatigue;Patient limited by pain;Treatment limited secondary to medical complications (Comment)  (Headache x3 days)   Medical Staff Made Aware LYNETTE Avitia cleared Pt for OT sessoin   Assessment   Assessment Patient participated in Skilled OT session this date with interventions consisting of ADL re training with the use of correct body mechnaics, Energy Conservation techniques, deep breathing technique, safety awareness and fall prevention techniques, therapeutic exercise to: increase functional use of BUEs, increase BUE muscle strength , increase dynamic sit/ stand balance during functional activity and increase OOB/ sitting tolerance   Patient agreeable to OT treatment session, upon arrival patient was found seated OOB to Chair  In comparison to previous session, patient with improvements in functional transfers/mobility, dynamic standing balance, standing tolerance  Please refer to chart above for functional levels  Patient requiring frequent re direction, frequent rest periods and ocassional safety reminders  Patient continues to be functioning below baseline level, occupational performance remains limited secondary to factors listed above and increased risk for falls and injury  From OT standpoint, recommendation at time of d/c would be Home OT  Patient to benefit from continued Occupational Therapy treatment while in the hospital to address deficits as defined above and maximize level of functional independence with ADLs and functional mobility  Plan   Treatment Interventions ADL retraining;Functional transfer training;UE strengthening/ROM; Endurance training;Patient/family training;Equipment evaluation/education; Energy conservation; Activityengagement; Compensatory technique education   Goal Expiration Date 02/18/19   Treatment Day 6   OT Frequency Other (comment)  (6x/wk)   Recommendation   OT Discharge Recommendation Home OT   OT - OK to Discharge No       Lilliam Fishman MS, OTR/L

## 2019-02-10 LAB
GLUCOSE SERPL-MCNC: 138 MG/DL (ref 65–140)
GLUCOSE SERPL-MCNC: 153 MG/DL (ref 65–140)
GLUCOSE SERPL-MCNC: 233 MG/DL (ref 65–140)
GLUCOSE SERPL-MCNC: 253 MG/DL (ref 65–140)
INR PPP: 2.24 (ref 0.89–1.1)
PROTHROMBIN TIME: 22.7 SECONDS (ref 9.5–11.6)

## 2019-02-10 PROCEDURE — 85610 PROTHROMBIN TIME: CPT | Performed by: FAMILY MEDICINE

## 2019-02-10 PROCEDURE — 82948 REAGENT STRIP/BLOOD GLUCOSE: CPT

## 2019-02-10 RX ADMIN — OXYCODONE HYDROCHLORIDE 10 MG: 5 TABLET ORAL at 23:41

## 2019-02-10 RX ADMIN — OXYBUTYNIN CHLORIDE 2.5 MG: 5 TABLET ORAL at 21:56

## 2019-02-10 RX ADMIN — LATANOPROST 1 DROP: 50 SOLUTION OPHTHALMIC at 21:56

## 2019-02-10 RX ADMIN — METOPROLOL SUCCINATE 50 MG: 50 TABLET, EXTENDED RELEASE ORAL at 08:30

## 2019-02-10 RX ADMIN — INSULIN LISPRO 1 UNITS: 100 INJECTION, SOLUTION INTRAVENOUS; SUBCUTANEOUS at 12:27

## 2019-02-10 RX ADMIN — OXYCODONE HYDROCHLORIDE 5 MG: 5 TABLET ORAL at 08:31

## 2019-02-10 RX ADMIN — WARFARIN SODIUM 5 MG: 5 TABLET ORAL at 17:38

## 2019-02-10 RX ADMIN — INSULIN LISPRO 3 UNITS: 100 INJECTION, SOLUTION INTRAVENOUS; SUBCUTANEOUS at 17:38

## 2019-02-10 RX ADMIN — FERROUS SULFATE TAB 325 MG (65 MG ELEMENTAL FE) 325 MG: 325 (65 FE) TAB at 08:30

## 2019-02-10 RX ADMIN — INDAPAMIDE 2.5 MG: 2.5 TABLET, FILM COATED ORAL at 08:30

## 2019-02-10 RX ADMIN — ALLOPURINOL 100 MG: 100 TABLET ORAL at 08:30

## 2019-02-10 RX ADMIN — METHOCARBAMOL 500 MG: 500 TABLET, FILM COATED ORAL at 17:38

## 2019-02-10 RX ADMIN — METHOCARBAMOL 500 MG: 500 TABLET, FILM COATED ORAL at 08:30

## 2019-02-10 RX ADMIN — OXYCODONE HYDROCHLORIDE 5 MG: 5 TABLET ORAL at 15:59

## 2019-02-10 RX ADMIN — ALLOPURINOL 100 MG: 100 TABLET ORAL at 17:38

## 2019-02-10 RX ADMIN — INSULIN LISPRO 3 UNITS: 100 INJECTION, SOLUTION INTRAVENOUS; SUBCUTANEOUS at 21:58

## 2019-02-10 RX ADMIN — GABAPENTIN 600 MG: 300 CAPSULE ORAL at 21:56

## 2019-02-10 RX ADMIN — MAGNESIUM OXIDE TAB 400 MG (241.3 MG ELEMENTAL MG) 400 MG: 400 (241.3 MG) TAB at 08:30

## 2019-02-10 RX ADMIN — INSULIN DETEMIR 60 UNITS: 100 INJECTION, SOLUTION SUBCUTANEOUS at 21:58

## 2019-02-10 NOTE — NURSING NOTE
2/9/19 2230- weekly skin assessment done, skin intact, ecchymosis noted on both arms, right thigh and knee inner and lateral

## 2019-02-10 NOTE — NURSING NOTE
Echymosis to R thigh and swollen unchanged , medicated with one oxy with adequate relief ,INR 2 24 , Lovenox was DC and spoke to DR Ajit Yoo stated continue with 5 mg of coumadin and repeat INR in am

## 2019-02-10 NOTE — NURSING NOTE
Pt stated that ecchymosis of R leg looks worse " extensive black and blue noted thigh and behind the  Knee , MD was made aware and came to see patient , INR 1 96 coumadin 5 mg as ordered and clarified by MD , will monitor closely

## 2019-02-11 PROBLEM — K59.00 CONSTIPATION: Status: RESOLVED | Noted: 2019-02-01 | Resolved: 2019-02-11

## 2019-02-11 PROBLEM — T14.8XXA HEMATOMA: Status: RESOLVED | Noted: 2019-01-29 | Resolved: 2019-02-11

## 2019-02-11 LAB
ANION GAP SERPL CALCULATED.3IONS-SCNC: 9 MMOL/L (ref 5–14)
BUN SERPL-MCNC: 26 MG/DL (ref 5–25)
CALCIUM SERPL-MCNC: 8.8 MG/DL (ref 8.4–10.2)
CHLORIDE SERPL-SCNC: 91 MMOL/L (ref 97–108)
CO2 SERPL-SCNC: 32 MMOL/L (ref 22–30)
CREAT SERPL-MCNC: 0.97 MG/DL (ref 0.6–1.2)
ERYTHROCYTE [DISTWIDTH] IN BLOOD BY AUTOMATED COUNT: 17.7 %
GFR SERPL CREATININE-BSD FRML MDRD: 55 ML/MIN/1.73SQ M
GLUCOSE SERPL-MCNC: 128 MG/DL (ref 70–99)
GLUCOSE SERPL-MCNC: 140 MG/DL (ref 65–140)
GLUCOSE SERPL-MCNC: 183 MG/DL (ref 65–140)
GLUCOSE SERPL-MCNC: 204 MG/DL (ref 65–140)
GLUCOSE SERPL-MCNC: 94 MG/DL (ref 65–140)
HCT VFR BLD AUTO: 29.4 % (ref 36–46)
HGB BLD-MCNC: 9.5 G/DL (ref 12–16)
INR PPP: 2.07 (ref 0.89–1.1)
MCH RBC QN AUTO: 30.9 PG (ref 26–34)
MCHC RBC AUTO-ENTMCNC: 32.4 G/DL (ref 31–36)
MCV RBC AUTO: 96 FL (ref 80–100)
PLATELET # BLD AUTO: 307 THOUSANDS/UL (ref 150–450)
PMV BLD AUTO: 7.4 FL (ref 8.9–12.7)
POTASSIUM SERPL-SCNC: 4.4 MMOL/L (ref 3.6–5)
PROTHROMBIN TIME: 21.1 SECONDS (ref 9.5–11.6)
RBC # BLD AUTO: 3.08 MILLION/UL (ref 4–5.2)
SODIUM SERPL-SCNC: 132 MMOL/L (ref 137–147)
WBC # BLD AUTO: 8.5 THOUSAND/UL (ref 4.5–11)

## 2019-02-11 PROCEDURE — 82948 REAGENT STRIP/BLOOD GLUCOSE: CPT

## 2019-02-11 PROCEDURE — 85027 COMPLETE CBC AUTOMATED: CPT | Performed by: INTERNAL MEDICINE

## 2019-02-11 PROCEDURE — 97116 GAIT TRAINING THERAPY: CPT

## 2019-02-11 PROCEDURE — 97530 THERAPEUTIC ACTIVITIES: CPT

## 2019-02-11 PROCEDURE — 97110 THERAPEUTIC EXERCISES: CPT

## 2019-02-11 PROCEDURE — 99308 SBSQ NF CARE LOW MDM 20: CPT | Performed by: INTERNAL MEDICINE

## 2019-02-11 PROCEDURE — 85610 PROTHROMBIN TIME: CPT | Performed by: INTERNAL MEDICINE

## 2019-02-11 PROCEDURE — 80048 BASIC METABOLIC PNL TOTAL CA: CPT | Performed by: INTERNAL MEDICINE

## 2019-02-11 RX ORDER — WARFARIN SODIUM 5 MG/1
5 TABLET ORAL
Status: DISCONTINUED | OUTPATIENT
Start: 2019-02-11 | End: 2019-02-18 | Stop reason: HOSPADM

## 2019-02-11 RX ADMIN — METHOCARBAMOL 500 MG: 500 TABLET, FILM COATED ORAL at 17:20

## 2019-02-11 RX ADMIN — INSULIN LISPRO 2 UNITS: 100 INJECTION, SOLUTION INTRAVENOUS; SUBCUTANEOUS at 17:19

## 2019-02-11 RX ADMIN — METOPROLOL SUCCINATE 50 MG: 50 TABLET, EXTENDED RELEASE ORAL at 09:04

## 2019-02-11 RX ADMIN — GABAPENTIN 600 MG: 300 CAPSULE ORAL at 21:04

## 2019-02-11 RX ADMIN — INSULIN DETEMIR 60 UNITS: 100 INJECTION, SOLUTION SUBCUTANEOUS at 21:03

## 2019-02-11 RX ADMIN — METHOCARBAMOL 500 MG: 500 TABLET, FILM COATED ORAL at 09:05

## 2019-02-11 RX ADMIN — INSULIN LISPRO 1 UNITS: 100 INJECTION, SOLUTION INTRAVENOUS; SUBCUTANEOUS at 21:03

## 2019-02-11 RX ADMIN — LATANOPROST 1 DROP: 50 SOLUTION OPHTHALMIC at 21:03

## 2019-02-11 RX ADMIN — INDAPAMIDE 2.5 MG: 2.5 TABLET, FILM COATED ORAL at 09:05

## 2019-02-11 RX ADMIN — OXYCODONE HYDROCHLORIDE 10 MG: 5 TABLET ORAL at 21:10

## 2019-02-11 RX ADMIN — ALLOPURINOL 100 MG: 100 TABLET ORAL at 17:20

## 2019-02-11 RX ADMIN — MAGNESIUM OXIDE TAB 400 MG (241.3 MG ELEMENTAL MG) 400 MG: 400 (241.3 MG) TAB at 09:05

## 2019-02-11 RX ADMIN — ALLOPURINOL 100 MG: 100 TABLET ORAL at 09:05

## 2019-02-11 RX ADMIN — OXYCODONE HYDROCHLORIDE 10 MG: 5 TABLET ORAL at 15:43

## 2019-02-11 RX ADMIN — FERROUS SULFATE TAB 325 MG (65 MG ELEMENTAL FE) 325 MG: 325 (65 FE) TAB at 09:04

## 2019-02-11 RX ADMIN — OXYBUTYNIN CHLORIDE 2.5 MG: 5 TABLET ORAL at 21:04

## 2019-02-11 RX ADMIN — WARFARIN SODIUM 5 MG: 5 TABLET ORAL at 17:20

## 2019-02-11 RX ADMIN — OXYCODONE HYDROCHLORIDE 10 MG: 5 TABLET ORAL at 09:05

## 2019-02-11 NOTE — ASSESSMENT & PLAN NOTE
Lab Results   Component Value Date    HGBA1C 8 5 (H) 01/29/2019       Recent Labs     02/10/19  0626 02/10/19  1203 02/10/19  1642 02/10/19  2054   POCGLU 138 153* 233* 253*     · Fair control with levamir 60u at HS and sliding scale  · Continue SS number 3

## 2019-02-11 NOTE — PROGRESS NOTES
RECREATIONAL THERAPY PARTICIPATION LOG      ACTIVITY:    GAMES:        BINGO:        MUSIC STIM:        ARTS & CRAFTS:        EXERCISE:        CLUBS & MEETING:        SOCIALS:        SPIRITUAL:        INDEPENDENT: Resident anticipated returning her 's phone call at another time  Resident watches television, as well  Resident enjoys quiet time on her own         1:1:    Resident participated in a 1:1 Recreational Therapy visit  Resident was very inquisitive during therapeutic conversation about international adoption, specifically [facility] adoption, as experienced by recreational therapy staff member; this may be exceptionally interesting to resident/patient because of her work in the [employer]  Resident received a phone call from her  that she chose to make a short greeting to continue the recreational therapy visit  Visit continued during interaction with Nurse while resident/patient had her blood drawn for medical reasons  Resident was offered a variety of possible recreational therapy activities to be played, worked on in her room; resident declined all of these  Resident welcomes 1:1 recreational therapy visits of social conversation  JEANNINE Palafox

## 2019-02-11 NOTE — PHYSICAL THERAPY NOTE
Physical Therapy Daily Treatment Note & Weekly Progress Note    Since 2/4/19 physical therapy evaluation, patient was seen for 6/6 skilled treatment sessions for therapeutic exercise, therapeutic activity, and gait/elevation training  Patient with very good progress in skilled PT this past week  Improvement assessed with RLE ROM, BLE MMT strength, functional strength, transfers, sit and stand balance, endurance, and gait/elevation training  Patient continues to be hindered by RLE echymosis and edema, decreased functional strength, decreased endurance, and pain  Patient may benefit from requesting pain medication prior to therapy to increase functional activity tolerance  As per POC, continue skilled PT 6x/wk x 1 wk  Plan to work toward achievement of remaining STG's/LTG's in preparation for return to home with   STG's = LTG's ( Expiration Date: 2/18/19)     1  Patient will perform sit <->supine transfer ( HOB flat, no rail, RIGHT side of bed) with MOD I ( in order to get in/out of right side of bed at home) DISCHARGE GOAL (effective 2/11/19)    MODIFIED GOAL (effective 2/11/19): Patient will perform sit <-> supine transfer (HOB flat, no rail, LEFT side of bed) with MOD I SURPASSED  CURRENTLY: Patient performs sit <-> supine transfer (HOB flat, no rail) independently  Patient requires min A for RLE management when entering from right side of bed      2  Patient will perform all functional transfers with: MOD I (in order to transfer from one surface to another) ACHIEVED  CURRENTLY: Patient performs all functional transfers MOD I with or without RW and with use of armrests      3  Patient will ambulate with a RW  > or = 100 feet with MOD I (in order to safely access all necessary areas of home)   PARTIALLY ACHIEVED (for distance, not assistance level)  CURRENTLY: Patient ambulates distances > or = 100 feet with bariatric RW and supervision       4  Patient will ascend/descend a curb step with RW and MOD I (to allow pt to safely enter and exit home over door stoop)  ACHIEVED     5  Patient will ascend/descend a full flight of steps with left handrail up, with device prn, with MOD I (to access bedroom/full bathroom level of home) ONGOING  CURRENTLY: Patient able to ascend/descend 8 steps on 2/11/19 with L HR up and SC on R with supervision  Patient ascends forward with LLE up and descends backward with RLE down

## 2019-02-11 NOTE — PLAN OF CARE
Problem: PHYSICAL THERAPY ADULT  Goal: Performs mobility at highest level of function for planned discharge setting  See evaluation for individualized goals  Description  Treatment/Interventions: ADL retraining, Functional transfer training, LE strengthening/ROM, Endurance training, Elevations, Therapeutic exercise, Cognitive reorientation, Patient/family training, Equipment eval/education, Bed mobility, Gait training, Compensatory technique education, Spoke to MD, Spoke to nursing, Spoke to case management, Spoke to advanced practitioner, OT, Family  Equipment Recommended:  (RW ( to allow pt to keep a RW on each level))       See flowsheet documentation for full assessment, interventions and recommendations     Outcome: Progressing

## 2019-02-11 NOTE — ASSESSMENT & PLAN NOTE
· Admitted to hospital on 1/28/19 with blood loss due to thigh hematoma, s/p diagnostic catheterization done 1/23/2019  · Also on coumadin for mechanical AV and hx of afib - currently subtherapeutic, therefore, bridging with lovenox  · Hg has stabilized  Tranfuse if less than 8 and continue to monitor     Repeat Hgb today  Results from last 7 days   Lab Units 02/08/19  0512 02/07/19  0500 02/06/19  0445   HEMOGLOBIN g/dL 9 6* 8 3* 8 7*

## 2019-02-11 NOTE — ASSESSMENT & PLAN NOTE
· Rate controlled on metoprolol      · Coumadin therapeutic on 2/8  · For repeat INR today d/w nursing

## 2019-02-11 NOTE — PHYSICAL THERAPY NOTE
Physical Therapy Daily Treatment Note & Weekly Progress Note       02/11/19 7609-4709 (65 minutes)   Pain Assessment   Pain Assessment 0-10   Pain Score 7   Pain Location Arm;Knee;Leg   Pain Orientation Inner;Posterior   Pain Descriptors Aching;Dull  (Arm is "tearing" pain)   Pain Frequency Constant/continuous   Pain Onset Gradual   Clinical Progression Gradually improving   Patient's Stated Pain Goal No pain   Restrictions/Precautions   Weight Bearing Precautions Per Order No   Other Precautions Fall Risk;Pain;Visual impairment  (Pacemaker, take manual BP & HR per pt request )   General   Chart Reviewed Yes   Response to Previous Treatment   (Patient reporting soreness)   Family/Caregiver Present Yes   Cognition   Overall Cognitive Status WFL   Arousal/Participation Alert   Attention Within functional limits   Orientation Level Oriented X4   Memory Within functional limits   Following Commands Follows all commands and directions without difficulty   Subjective   Subjective "Every day I'm getting better "   Bed Mobility   Rolling L 7  Independent   Supine to Sit 7  Independent   Sit to Supine 7  Independent   Additional Comments   (HOB flat, no rails; entering from L side of bed)   Transfers   Sit to Stand 6  Modified independent   Stand to Sit 6  Modified independent   Stand pivot 6  Modified independent  (MOD with RW / S no AD)   Ambulation/Elevation   Gait pattern Antalgic; Forward Flexion;Decreased foot clearance; Short stride; Excessively slow   Gait Assistance 5  Supervision   Assistive Device Bariatric Rolling walker   Distance 175' x 2   Stair Management Assistance 5  Supervision   Additional items Verbal cues; Increased time required  (VCs for SC placement on steps)   Stair Management Technique One rail L;Step to pattern;Nonreciprocal   Number of Stairs   (8)   Balance   Static Sitting Good   Dynamic Sitting Good   Static Standing   (Good - without RW)   Dynamic Standing   (Good - without RW)   Ambulatory Good  (with RW)   Endurance Deficit   Endurance Deficit Yes   Endurance Deficit Description   (Decreased endurance for activity; dyspnea on exertion)   Activity Tolerance   Activity Tolerance Patient limited by pain   Exercises   Quad Sets Supine;Bilateral  (1 x 10 (5 sec hold); opposite knee flexed)   Heelslides Supine;Bilateral  (1 x 10 on L, 2 x 5 on R (painful))   Ankle Pumps Supine;20 reps;Bilateral   Assessment   Prognosis Good   Problem List Decreased strength;Decreased range of motion;Decreased endurance; Impaired balance;Decreased mobility; Impaired vision; Impaired hearing;Obesity; Decreased skin integrity;Pain   Assessment Since 2/4/19 physical therapy evaluation, patient was seen for 6/6 skilled treatment sessions for therapeutic exercise, therapeutic activity, and gait/elevation training  Patient with very good progress in skilled PT this past week  Improvement assessed with RLE ROM, BLE MMT strength (see below), functional strength, transfers, sit and stand balance, endurance, and gait/elevation training (see above grid for current functional status)  Patient continues to be hindered by RLE echymosis and edema, decreased functional strength, decreased endurance, and pain  Patient may benefit from requesting pain medication prior to therapy to increase functional activity tolerance  As per POC, continue skilled PT 6x/wk x 1 wk  Plan to work toward achievement of remaining STG's/LTG's in preparation for return to home with   Barriers to Discharge Inaccessible home environment  (1 curb step to enter home, FF to 2nd floor bed/bath)   Goals   Patient Goals   ("No pain, freedom of motion, strength")   STG Expiration Date 02/18/19   Short Term Goal #1 STG's = LTG's   LTG Expiration Date 02/18/19   Long Term Goal #1 STG's = LTG's ( Expiration Date: 2/18/19)     1   Patient will perform sit <->supine transfer ( HOB flat, no rail, RIGHT side of bed) with MOD I ( in order to get in/out of right side of bed at home) DISCHARGE GOAL (effective 2/11/19)     MODIFIED GOAL (effective 2/11/19): Patient will perform sit <-> supine transfer (HOB flat, no rail, LEFT side of bed) with MOD I SURPASSED  CURRENTLY: Patient performs sit <-> supine transfer (HOB flat, no rail) independently  Patient requires min A for RLE management when entering from right side of bed      2  Patient will perform all functional transfers with: MOD I (in order to transfer from one surface to another) ACHIEVED  CURRENTLY: Patient performs all functional transfers MOD I with or without RW and with use of armrests      3  Patient will ambulate with a RW  > or = 100 feet with MOD I (in order to safely access all necessary areas of home)  PARTIALLY ACHIEVED (for distance, not assistance level)  CURRENTLY: Patient ambulates distances > or = 100 feet with bariatric RW and supervision       4  Patient will ascend/descend a curb step with RW and MOD I (to allow pt to safely enter and exit home over door stoop)  ACHIEVED     5  Patient will ascend/descend a full flight of steps with left handrail up, with device prn, with MOD I (to access bedroom/full bathroom level of home) ONGOING  CURRENTLY: Patient able to ascend/descend 8 steps on 2/11/19 with L HR up and SC on R with supervision  Patient ascends forward with LLE up and descends backward with RLE down  Treatment Day 6   Plan   Treatment/Interventions Functional transfer training;LE strengthening/ROM; Elevations; Therapeutic exercise; Endurance training;Patient/family training;Equipment eval/education; Bed mobility;Gait training   Progress Progressing toward goals   PT Frequency   (6x/wk x 1 wk)     Vitals  Post Treatment (semi-recumbent): /72    BLE MMT Strength  Right hip flexion: 2/5 (no change; painful)  Left hip flexion: 4/5 (increased, was 3-/5)   Right knee /: 4+/5 (improved, was 3/5; painful)   Left knee /: 5/5 (improved, was 4/5)   Right ankle DF (within available range): 4+/5 (no change)  Left ankle DF: 5/5 (normal; no change)   Right ankle PF: 4+/5 (no change)  Left ankle PF: 5/5 (normal; no change)     BLE ROM Testing  Bilateral knee flexion: grossly 95 degrees (no change; per pt, only has 95 degrees bilateral knee flexion since TKAs)  Right ankle DF AROM: 0 degrees (improved, was - 8 degrees)    Chapito Wang, PT

## 2019-02-11 NOTE — PROGRESS NOTES
Progress Note - Kellee Davis 1935, 81 y o  female MRN: [id number]    Unit/Bed#: -01 Encounter: [id number]    Primary Care Provider: Salomon Irene MD   Date and time admitted to hospital: 2/2/2019  2:40 PM        * Ambulatory dysfunction  Assessment & Plan  · Deconditioning 2/2 recent hospital admission for thigh hematoma, 2/2 diagnostic cardiac cath done on 1/23/19  · Continue inpatient rehab  Chronic atrial fibrillation (HCC)  Assessment & Plan  · Rate controlled on metoprolol  · Coumadin therapeutic on 2/8  · For repeat INR today d/w nursing    Diabetes mellitus [facility])  Assessment & Plan  Lab Results   Component Value Date    HGBA1C 8 5 (H) 01/29/2019       Recent Labs     02/10/19  0626 02/10/19  1203 02/10/19  1642 02/10/19  2054   POCGLU 138 153* 1* 253*     · Fair control with levamir 60u at HS and sliding scale  · Continue SS number 3     Acute blood loss anemia  Assessment & Plan  · Admitted to hospital on 1/28/19 with blood loss due to thigh hematoma, s/p diagnostic catheterization done 1/23/2019  · Also on coumadin for mechanical AV and hx of afib - currently subtherapeutic, therefore, bridging with lovenox  · Hg has stabilized  Tranfuse if less than 8 and continue to monitor  Repeat Hgb today  Results from last 7 days   Lab Units 02/08/19  0512 02/07/19  0500 02/06/19  0445   HEMOGLOBIN g/dL 9 6* 8 3* 8 7*         Hyponatremia  Assessment & Plan  · Repeat labs this has been stable      VTE Pharmacologic Prophylaxis:   Pharmacologic: Warfarin (Coumadin)  Mechanical VTE Prophylaxis in Place: No    Patient Centered Rounds: case d/w nursing in Critical access hospital    Discussions with Specialists or Other Care Team Provider:     Education and Discussions with Family / Patient:     Time Spent for Care: 20 minutes  More than 50% of total time spent on counseling and coordination of care as described above      Current Length of Stay: 9 day(s)    Current Patient Status: SNF Short Term Inpatient   Certification Statement: patient in SNF    Discharge Plan: per P/T    Code Status: Level 1 - Full Code      Subjective:   Patient feels better    Objective:     Vitals:   Temp (24hrs), Av °F (36 1 °C), Min:96 3 °F (35 7 °C), Max:97 7 °F (36 5 °C)    Temp:  [96 3 °F (35 7 °C)-97 7 °F (36 5 °C)] 96 3 °F (35 7 °C)  HR:  [85-86] 86  Resp:  [16-20] 16  BP: (132-138)/(59-72) 138/72  SpO2:  [98 %-100 %] 98 %  Body mass index is 39 03 kg/m²  Input and Output Summary (last 24 hours): Intake/Output Summary (Last 24 hours) at 2019 1007  Last data filed at 2019 0656  Gross per 24 hour   Intake 720 ml   Output 1350 ml   Net -630 ml       Physical Exam:     Physical Exam   Constitutional: No distress  Cardiovascular: Normal rate, normal heart sounds and intact distal pulses  Exam reveals no gallop and no friction rub  No murmur heard  Pulmonary/Chest: Effort normal and breath sounds normal  No stridor  She has no wheezes  Abdominal: Soft  Bowel sounds are normal  She exhibits no distension  There is no tenderness  There is no guarding  Musculoskeletal: She exhibits edema  Skin: She is not diaphoretic         Additional Data:     Labs:    Results from last 7 days   Lab Units 19  0512 19  0500   WBC Thousand/uL 8 20 8 20   HEMOGLOBIN g/dL 9 6* 8 3*   HEMATOCRIT % 29 8* 26 2*   PLATELETS Thousands/uL 366 359   BANDS PCT % 1  --    NEUTROS PCT %  --  73*   LYMPHS PCT %  --  18*   LYMPHO PCT % 25  --    MONOS PCT %  --  5   MONO PCT % 9  --    EOS PCT % 4 3     Results from last 7 days   Lab Units 19  0512   SODIUM mmol/L 130*   POTASSIUM mmol/L 4 2   CHLORIDE mmol/L 89*   CO2 mmol/L 32*   BUN mg/dL 31*   CREATININE mg/dL 1 06   ANION GAP mmol/L 9   CALCIUM mg/dL 9 0   GLUCOSE RANDOM mg/dL 123*     Results from last 7 days   Lab Units 02/10/19  0717   INR  2 24*     Results from last 7 days   Lab Units 02/10/19  2054 02/10/19  1642 02/10/19  1203 02/10/19  1259 02/09/19  2058 02/09/19  1643 02/09/19  1207 02/09/19  0621 02/08/19 2012 02/08/19  1703 02/08/19  1153 02/08/19  0625   POC GLUCOSE mg/dl 253* 233* 153* 138 170* 172* 161* 153* 210* 151* 225* 108                   * I Have Reviewed All Lab Data Listed Above  * Additional Pertinent Lab Tests Reviewed: All Labs Within Last 24 Hours Reviewed    Imaging:    Imaging Reports Reviewed Today Include:   Imaging Personally Reviewed by Myself Includes:      Recent Cultures (last 7 days):           Last 24 Hours Medication List:     Current Facility-Administered Medications:  acetaminophen 650 mg Oral Q6H PRN Binu Hernandez MD   allopurinol 100 mg Oral BID Binu Hernandez, MD   ferrous sulfate 325 mg Oral Daily With Breakfast Binu Hernandez MD   gabapentin 600 mg Oral HS Binu Hernandez MD   indapamide 2 5 mg Oral QAM Binu Hernandez MD   insulin detemir 60 Units Subcutaneous HS Binu Hernandez MD   insulin lispro 1-6 Units Subcutaneous 4x Daily (AC & HS) Binu Hernandez MD   lactulose 20 g Oral TID PRN Binu Hernandez MD   latanoprost 1 drop Both Eyes HS Binu Hernandez MD   magnesium oxide 400 mg Oral Daily Binu Slosilvana, MD   methocarbamol 500 mg Oral BID Binu Hernandez MD   metoprolol succinate 50 mg Oral Daily Binu Hernandez MD   oxybutynin 2 5 mg Oral HS Binu Hernandez MD   oxyCODONE 10 mg Oral Q4H PRN Binujitendra Hernandez, MD   oxyCODONE 5 mg Oral Q4H PRN Binu SloMD silvana   pantoprazole 40 mg Oral Early Morning Binu Hernandez MD   polyethylene glycol 17 g Oral Daily Binu Hernandez MD   tuberculin 5 Units Intradermal PRN Mitesh Sosa MD   warfarin 5 mg Oral Daily (warfarin) [place] Solorio MD        Today, Patient Was Seen By: Phil Wilson MD    ** Please Note: Dictation voice to text software may have been used in the creation of this document   **

## 2019-02-11 NOTE — PLAN OF CARE
Patient progressing in therapy skill sets and continued OT recommended at this time  D/C plan home with spouse

## 2019-02-11 NOTE — OCCUPATIONAL THERAPY NOTE
Occupational Therapy Treatment Note    Name:  Pool Hernandez   MRN:   [id number]  Age:     81 y o  Patient Active Problem List   Diagnosis    Hypertension    Chronic kidney disease, stage 3 (HCC)    Gout    Mechanical heart valve present    Chronic atrial fibrillation (HCC)    Gastritis    Anticoagulated    Acute cystitis without hematuria    Benign hypertensive CKD    Cardiomegaly    Diastolic dysfunction    DJD (degenerative joint disease), multiple sites    Esophagitis, reflux    Fibromyalgia    Fibrous breast lumps    H/O mechanical aortic valve replacement    Hyperuricemia without signs inflammatory arthritis/tophaceous disease    MCI (mild cognitive impairment)    Class 2 severe obesity due to excess calories with serious comorbidity and body mass index (BMI) of 39 0 to 39 9 in adult (HCC)    Osteoarthritis    Primary osteoarthritis involving multiple joints    Proteinuria    Pulmonary artery hypertension (HCC)    RLS (restless legs syndrome)    Secondary hyperparathyroidism, renal (HCC)    Increased frequency of urination    Lump of right breast    Leg cramps    Chronic diastolic congestive heart failure (HCC)    Diabetes mellitus (HCC)    Acute blood loss anemia    Ambulatory dysfunction    Hyponatremia     Groin hematoma [S30 1XXA]      Subjective/Goals: "to get movement back in my arm and no pain"    Vitals: see nursing summary sheet    Treatment Time: (4128-1985) 53 min    Additional goals & Comments: There ex:    · Patient completed 2# bilateral tableslides in forward flexion and lateral "V" pattern 2x10 with good tolerance  Pain noted to right of 'V" with increased rest breaks encouraged  · Isometric ther ex with ball in pressing into table seated and standing completed-- both methods with good tolerance therefore left to patient comfort level completing x10 in stance    · Towel roll under right arm for isometric x10 tolerated without issue  · Wall walk x4 completed to point of tolerance  · PORTER establishing HEP of same above for continuation upon d/c  Patient agreeable and receptive       02/11/19 1206   Restrictions/Precautions   Weight Bearing Precautions Per Order No   Other Precautions Visual impairment;Pain   Pain Assessment   Pain Assessment 0-10   Pain Score 8  (6/10 without mvmt, 8/10 with mvmt- + pain medications)   Pain Type Acute pain   Pain Location Shoulder   Pain Orientation Right   Pain Descriptors   (toothache)   Pain Frequency Constant/continuous   Clinical Progression Not changed   Patient's Stated Pain Goal No pain   Hospital Pain Intervention(s) Medication (See MAR); Repositioned; Ambulation/increased activity; Emotional support;Rest;Heat applied   ADL   Grooming Comments MI standing sinkside   UB Bathing Comments MI   LB Bathing Comments MI   UB Dressing Comments MI with hospital gown   LB Dressing Comments MI with socks and AE   Toileting Comments MI CM and hygiene   Meal Prep   Meal Preparation patient to complete this date and along way to kitchen patient was called for blood work-- patient was able to obtain items in room with MI and no LOB without AD for short distance     Patient states "i really an not worried about the kitchen"  Reports spouse to assist   Light Housekeeping   Light Housekeeping Goal to be d/c'd at PN per patient request from care conferance   Kitchen Mobility   Kitchen Mobility Comments S/MI   Functional Standing Tolerance   Time 10 min   Bed Mobility   Supine to Sit 6  Modified independent   Sit to Supine 6  Modified independent   Additional Comments increased time, left of bed, no AE,   Transfers   Sit to Stand 6  Modified independent   Stand to Sit 6  Modified independent   Stand pivot 6  Modified independent   Toilet transfer 6  Modified independent   Additional Comments + RW distance and no AD short room distance   Functional Mobility   Functional Mobility 6  Modified independent   Additional Comments + RW   Tub Transfers   Tub Transfers Comments unable to manage with right leg at this time   Therapeutic Excerise-Strength   UE Strength   (see OT note for details)   Cognition   Overall Cognitive Status [facility]   Arousal/Participation Alert; Cooperative   Attention Within functional limits   Orientation Level Oriented X4   Memory Within functional limits   Following Commands Follows all commands and directions without difficulty   Additional Activities   Additional Activities Comments Balance:  Seated fair/Fair+, Dynamic and unsupported stance Fair+/good-   Activity Tolerance   Activity Tolerance Patient tolerated treatment well  (Fair+)   Assessment   Assessment Patient seen this date for OT with focus on goals as set by OTR  Patient making progress in ADL functioning but is not presently wearing more then a hospital gown as she reports getting in/out of bed frequently throughout day  AE utilized for LE management  Pain medication is being provided prior to treatment sessions which is effective however pain is still noted with movement  Patient plans are to d/c home with spouse  Continue OT at this time  At end of session patient remains in room with all needs within reach  Plan   Treatment Interventions ADL retraining;Functional transfer training;UE strengthening/ROM; Energy conservation; Activityengagement   Goal Expiration Date 02/18/19   Treatment Day 7   OT Frequency   (6x/week)   Recommendation   OT Discharge Recommendation Home OT   OT - OK to Discharge Olga Varner  2/11/2019

## 2019-02-12 LAB
GLUCOSE SERPL-MCNC: 141 MG/DL (ref 65–140)
GLUCOSE SERPL-MCNC: 165 MG/DL (ref 65–140)
GLUCOSE SERPL-MCNC: 176 MG/DL (ref 65–140)
GLUCOSE SERPL-MCNC: 93 MG/DL (ref 65–140)

## 2019-02-12 PROCEDURE — 97530 THERAPEUTIC ACTIVITIES: CPT

## 2019-02-12 PROCEDURE — 97110 THERAPEUTIC EXERCISES: CPT

## 2019-02-12 PROCEDURE — 82948 REAGENT STRIP/BLOOD GLUCOSE: CPT

## 2019-02-12 PROCEDURE — 97116 GAIT TRAINING THERAPY: CPT

## 2019-02-12 RX ADMIN — FERROUS SULFATE TAB 325 MG (65 MG ELEMENTAL FE) 325 MG: 325 (65 FE) TAB at 06:30

## 2019-02-12 RX ADMIN — METHOCARBAMOL 500 MG: 500 TABLET, FILM COATED ORAL at 17:00

## 2019-02-12 RX ADMIN — OXYCODONE HYDROCHLORIDE 10 MG: 5 TABLET ORAL at 07:48

## 2019-02-12 RX ADMIN — INDAPAMIDE 2.5 MG: 2.5 TABLET, FILM COATED ORAL at 08:09

## 2019-02-12 RX ADMIN — ALLOPURINOL 100 MG: 100 TABLET ORAL at 17:01

## 2019-02-12 RX ADMIN — OXYCODONE HYDROCHLORIDE 10 MG: 5 TABLET ORAL at 14:03

## 2019-02-12 RX ADMIN — INSULIN LISPRO 1 UNITS: 100 INJECTION, SOLUTION INTRAVENOUS; SUBCUTANEOUS at 21:01

## 2019-02-12 RX ADMIN — LATANOPROST 1 DROP: 50 SOLUTION OPHTHALMIC at 21:01

## 2019-02-12 RX ADMIN — INSULIN DETEMIR 60 UNITS: 100 INJECTION, SOLUTION SUBCUTANEOUS at 21:02

## 2019-02-12 RX ADMIN — OXYCODONE HYDROCHLORIDE 10 MG: 5 TABLET ORAL at 03:23

## 2019-02-12 RX ADMIN — METOPROLOL SUCCINATE 50 MG: 50 TABLET, EXTENDED RELEASE ORAL at 08:10

## 2019-02-12 RX ADMIN — METHOCARBAMOL 500 MG: 500 TABLET, FILM COATED ORAL at 08:10

## 2019-02-12 RX ADMIN — WARFARIN SODIUM 5 MG: 5 TABLET ORAL at 17:00

## 2019-02-12 RX ADMIN — GABAPENTIN 600 MG: 300 CAPSULE ORAL at 21:01

## 2019-02-12 RX ADMIN — INSULIN LISPRO 1 UNITS: 100 INJECTION, SOLUTION INTRAVENOUS; SUBCUTANEOUS at 12:08

## 2019-02-12 RX ADMIN — MAGNESIUM OXIDE TAB 400 MG (241.3 MG ELEMENTAL MG) 400 MG: 400 (241.3 MG) TAB at 08:09

## 2019-02-12 RX ADMIN — ALLOPURINOL 100 MG: 100 TABLET ORAL at 08:09

## 2019-02-12 RX ADMIN — OXYBUTYNIN CHLORIDE 2.5 MG: 5 TABLET ORAL at 21:01

## 2019-02-12 RX ADMIN — OXYCODONE HYDROCHLORIDE 10 MG: 5 TABLET ORAL at 20:01

## 2019-02-12 NOTE — PLAN OF CARE
Problem: OCCUPATIONAL THERAPY ADULT  Goal: Performs self-care activities at highest level of function for planned discharge setting  See evaluation for individualized goals  Description  Treatment Interventions: ADL retraining, Functional transfer training, UE strengthening/ROM, Endurance training, Patient/family training, Equipment evaluation/education, Activity engagement, Energy conservation  Equipment Recommended:  (to be further assessed)       See flowsheet documentation for full assessment, interventions and recommendations  Outcome: Progressing  Note:   Limitation: Decreased ADL status, Decreased UE ROM, Decreased UE strength, Decreased endurance, Decreased self-care trans, Decreased high-level ADLs  Prognosis: Good  Assessment: Pt participated in skilled OT session 2/12/2019 with interventions consisting of ADL re-training, therapeutic activity, therapeutic exercise, energy conservation techniques to: increase B UE strength/ROM, increase activity tolerance needed for ADLs, increase safety with ADLs, increase independence with ADLs  Pt agreeable and pleasant for OT treatment session  Upon arrival, patient was found at EOB  Pt reports 8/10 pain R shoulder  OT recommend ortho consult for ROM/strength precautions  OT notified nursing staff  RN notified on liquid intake during OT session  Vitals: 56 BPM, 81%O2  Pt educated on pursed lip breathing, O2 increased to 94%  Pt reported "I want to get back to work  That's my main goal " In comparison to previous sessions, patient with improvements in LB dressing, ADL transfers, balance, standing tolerance, item retrieval  Pt performed B UE therex 2# slides 2x10  Pt (I) with grooming, Pt mod(I) with LB dressing, Pt mod(I) ADL transfers/item retrieval  Pt demonstrated F+/Good standing balance, Good seated balance  Pt limited during session d/t fatigue and pain in R shoulder   Pt continues to be functioning below baseline level, occupational performance remains limited secondary to factors listed above and increased risk for falls and injury  From OT standpoint, recommendation at time of d/c would be home with family support and home OT  Pt to benefit from continued OT while in the hospital to address deficits as defined above and maximize level of functional independence with occupational performance of ADLs and functional mobility  Pt at EOB, all needs met at end of session  PROGRESS NOTE:   Grooming- (I) NOT MET currently (I) 2/3 consecutive days  UB ADL- (I) NOT MET currently (I), needs consistency  LB ADL- mod (I) MET  Toileting- mod (I) with hygiene and clothing management MET  Bed Mobility- (I) using log roll technique d/t back pain with bed flat and no SR to prep for purposeful tasks NOT MET currently mod (I)/(I)  ADL Txfs- mod (I) with RW for ADLs MET   Stand Balance- Good dynamic & unsupported for clothing management NOT MET currently F+/G  Stand Tolerance- 5 minutes for hygiene MET  Seated Balance- Good dynamic for LB dressing  NOT MET currently F/F+ d/t stomach pain  Tub/Shower- mod (I) using most appropriate method; educate on AE NOT MET currently pt is sponge bathing d/t pain  Meal Prep- (I) with good safety GOAL D/C per pt's request  Spouse will complete meal prep PRN  Home Management (laundry & bed making)- mod (I) D/C GOAL per pt's request  Spouse will complete home management PRN  Item Retrieval- mod (I) with good safety NOT MET currently S/mod(I)  Activity Tolerance- 35 minutes for ADLs NOT MET currently 20 minutes  Pt will participate in UE there ex 3xs to maximize strength for ADL txfs MET  Pt will perform UE HEP to maximize strength for ADL/IADL tasks MET, continue to educate on HEP  Pt will identify 3-5 energy conservation techniques to maximize endurance with occupational performance NOT MET currently recalling 3 EC techniques 2/3 consecutive days     Pt currently making gains toward OT goals  Pt participated in 8/8 OT session to date   Pt achieved 6/15 goals; however, additional OT goals to be achieved with consistency  Deficits remain in UB dressing, ID edcuation, bed mobility, standing balance, seated balance, tub/shower transfers, item retrieval  Barriers to goal achievement include pain, fatigue  Pt's goal is to return to work once home  POC set to  19  POC remains appropriate  Please continue with focus on remaining deficits          OT Discharge Recommendation: Home OT  OT - OK to Discharge: No

## 2019-02-12 NOTE — SOCIAL WORK
Attending unavailable to complete FMLA form Monday  SW followed up with the attending today  Per attending, will complete tomorrow  SW to follow-up  SW advised by Reynaldo Hartley that wide RW is not covered, OOP would be $60  SW advised pt and she would like SW to double check that her secondary does not cover any of this  SW sent message via [facility] to [facility] regarding the same

## 2019-02-12 NOTE — PHYSICAL THERAPY NOTE
65' session     02/12/19 1421   Pain Assessment   Pain Assessment 0-10   Pain Score 7   Pain Type Acute pain   Pain Location Leg   Pain Orientation Right   Pain Radiating Towards groin to knee   Pain Descriptors Aching   Pain Frequency Constant/continuous   Pain Onset Ongoing   Clinical Progression Not changed   Patient's Stated Pain Goal No pain   Hospital Pain Intervention(s) Medication (See MAR); Ambulation/increased activity;Repositioned   Response to Interventions reports decrease pain after activity and ex   General   Chart Reviewed Yes   Family/Caregiver Present No   Cognition   Overall Cognitive Status WFL   Arousal/Participation Alert; Cooperative   Attention Within functional limits   Following Commands Follows all commands and directions without difficulty   Subjective   Subjective it is still so swollena nd sore- but it is gettign better every day   Bed Mobility   Supine to Sit 7  Independent   Sit to Supine 7  Independent   Additional Comments bed flat no rail   Transfers   Sit to Stand 6  Modified independent   Stand to Sit 6  Modified independent   Stand pivot 6  Modified independent   Ambulation/Elevation   Gait pattern Excessively slow; Short stride   Gait Assistance 6  Modified independent   Assistive Device Bariatric Rolling walker   Distance 92',51',04'   Stair Management Assistance 5  Supervision   Stair Management Technique One rail L;With cane; Step to pattern   Number of Stairs 10   Curbs 1 + 1 with RW and  mod I   Endurance Deficit   Endurance Deficit Yes   Activity Tolerance   Activity Tolerance Patient limited by pain; Patient limited by fatigue   Exercises   Hip Flexion Sitting;Bilateral;AROM;20 reps   Hip Abduction   (isometrics x 20)   Hip Adduction   (ball squeeze x 20)   Knee AROM Long Arc Quad Sitting;Bilateral;AROM;20 reps   Ankle Pumps Sitting;Bilateral;AROM;20 reps   Assessment   Prognosis Good   Problem List Decreased strength;Decreased endurance;Pain   Assessment Pt cont to report aching pain in R groin to thigh but reports improvement  BP after steps 160/88 HR 85 sats 96  After resting , /80 HR 88 sat s96  ( Bps performed manually) Pt able to complete 10 steps today - goes down backwards  Pt needs to rest frequently 2* fatigue and SOB  Issued I sign for pt to use RW to BR and in the finn   Pt understands that she may call for assist as needed   Barriers to Discharge Inaccessible home environment   Goals   Patient Goals strenghtening    STG Expiration Date 02/18/19   LTG Expiration Date 02/18/19   Treatment Day 7   Plan   Treatment/Interventions   (cont as per POC)   Progress   (cont as per POC)   PT Frequency   (6x/wk)   Recommendation   Equipment Recommended   (bariatric RW)

## 2019-02-12 NOTE — OCCUPATIONAL THERAPY NOTE
Occupational Therapy Treatment Note and Progress Note      Lauren Quinn    2/12/2019    Patient Active Problem List   Diagnosis    Hypertension    Chronic kidney disease, stage 3 ([facility])    Gout    Mechanical heart valve present    Chronic atrial fibrillation ([facility])    Gastritis    Anticoagulated    Acute cystitis without hematuria    Benign hypertensive CKD    Cardiomegaly    Diastolic dysfunction    DJD (degenerative joint disease), multiple sites    Esophagitis, reflux    Fibromyalgia    Fibrous breast lumps    H/O mechanical aortic valve replacement    Hyperuricemia without signs inflammatory arthritis/tophaceous disease    MCI (mild cognitive impairment)    Class 2 severe obesity due to excess calories with serious comorbidity and body mass index (BMI) of 39 0 to 39 9 in adult ([facility])    Osteoarthritis    Primary osteoarthritis involving multiple joints    Proteinuria    Pulmonary artery hypertension ([facility])    RLS (restless legs syndrome)    Secondary hyperparathyroidism, renal ([facility])    Increased frequency of urination    Lump of right breast    Leg cramps    Chronic diastolic congestive heart failure ([facility])    Diabetes mellitus ([address] )    Acute blood loss anemia    Ambulatory dysfunction    Hyponatremia       Past Medical History:   Diagnosis Date    Chronic atrial fibrillation ([address] ) 9/13/2017    Chronic gout 9/13/2017    Chronic rhinitis 11/08/2018    CKD (chronic kidney disease) stage 3, GFR 30-59 ml/min ([facility]) 9/13/2017    Colon polyp 9/13/2017    Coronary artery disease     Diabetes mellitus ([address] )     COMBS (dyspnea on exertion) 11/02/2018    Essential hypertension 9/13/2017    Gastritis 9/13/2017    Hypertension     Irregular heart beat     Mechanical heart valve present 9/13/2017    Osteoarthritis     Pacemaker     S/P AVR     Spinal stenosis     disk herniation        Past Surgical History:   Procedure Laterality Date    AORTIC VALVE REPLACEMENT      CARDIAC SURGERY      CATARACT EXTRACTION      CHOLECYSTECTOMY      COLONOSCOPY      ESOPHAGOGASTRODUODENOSCOPY      HERNIA REPAIR      x3    HYSTERECTOMY      INSERT / REPLACE / REMOVE PACEMAKER      IR PELVIC ANGIOGRAPHY / INTERVENTION  1/29/2019    JOINT REPLACEMENT Bilateral     knees    WY COLONOSCOPY FLX DX W/COLLJ SPEC WHEN PFRMD N/A 9/12/2017    Procedure: Rosa Pretty bxAND COLONOSCOPY with polypectomies;  Surgeon: Wilberto Pompa MD;  Location: [facility]; Service: Gastroenterology    WY SIGMOIDOSCOPY FLX DX W/COLLJ SPEC BR/WA IF PFRMD N/A 9/13/2017    Procedure: colonoscopy with hemo clip x 2;  Surgeon: Wilberto Pompa MD;  Location: [facility]; Service: Gastroenterology    ROTATOR CUFF REPAIR Left     SKIN BIOPSY      TONSILLECTOMY       TREATMENT NOTE: (Time 3239-1803)  Pt participated in skilled OT session 2/12/2019 with interventions consisting of ADL re-training, therapeutic activity, therapeutic exercise, energy conservation techniques to: increase B UE strength/ROM, increase activity tolerance needed for ADLs, increase safety with ADLs, increase independence with ADLs  Pt agreeable and pleasant for OT treatment session  Upon arrival, patient was found at EOB  Pt reports 8/10 pain R shoulder  OT recommend ortho consult for ROM/strength precautions  OT notified nursing staff  RN notified on liquid intake during OT session  Vitals: 56 BPM, 81%O2  Pt educated on pursed lip breathing, O2 increased to 94%  Pt reported "I want to get back to work  That's my main goal " In comparison to previous sessions, patient with improvements in LB dressing, ADL transfers, balance, standing tolerance, item retrieval  Pt performed B UE therex 2# slides 2x10  Pt (I) with grooming, Pt mod(I) with LB dressing, Pt mod(I) ADL transfers/item retrieval  Pt demonstrated F+/Good standing balance, Good seated balance  Pt limited during session d/t fatigue and pain in R shoulder   Pt continues to be functioning below baseline level, occupational performance remains limited secondary to factors listed above and increased risk for falls and injury  From OT standpoint, recommendation at time of d/c would be home with family support and home OT  Pt to benefit from continued OT while in the hospital to address deficits as defined above and maximize level of functional independence with occupational performance of ADLs and functional mobility  Pt at EOB, all needs met at end of session  PROGRESS NOTE:   Grooming- (I) NOT MET currently (I) 2/3 consecutive days  UB ADL- (I) NOT MET currently (I), needs consistency  LB ADL- mod (I) MET  Toileting- mod (I) with hygiene and clothing management MET  Bed Mobility- (I) using log roll technique d/t back pain with bed flat and no SR to prep for purposeful tasks NOT MET currently mod (I)/(I)  ADL Txfs- mod (I) with RW for ADLs MET   Stand Balance- Good dynamic & unsupported for clothing management NOT MET currently F+/G  Stand Tolerance- 5 minutes for hygiene MET  Seated Balance- Good dynamic for LB dressing  NOT MET currently F/F+ d/t stomach pain  Tub/Shower- mod (I) using most appropriate method; educate on AE NOT MET currently pt is sponge bathing d/t pain  Meal Prep- (I) with good safety GOAL D/C per pt's request  Spouse will complete meal prep PRN  Home Management (laundry & bed making)- mod (I) D/C GOAL per pt's request  Spouse will complete home management PRN  Item Retrieval- mod (I) with good safety NOT MET currently S/mod(I)  Activity Tolerance- 35 minutes for ADLs NOT MET currently 20 minutes  Pt will participate in UE there ex 3xs to maximize strength for ADL txfs MET  Pt will perform UE HEP to maximize strength for ADL/IADL tasks MET, continue to educate on HEP  Pt will identify 3-5 energy conservation techniques to maximize endurance with occupational performance NOT MET currently recalling 3 EC techniques 2/3 consecutive days    Pt currently making gains toward OT goals   Pt participated in  OT session to date  Pt achieved 6/15 goals; however, additional OT goals to be achieved with consistency  Deficits remain in UB dressing, ID edcuation, bed mobility, standing balance, seated balance, tub/shower transfers, item retrieval  Barriers to goal achievement include pain, fatigue  Pt's goal is to return to work once home  POC set to  19  POC remains appropriate  Please continue with focus on remaining deficits       Rogers Clark, MS, OTR/L

## 2019-02-12 NOTE — PLAN OF CARE
Problem: PHYSICAL THERAPY ADULT  Goal: Performs mobility at highest level of function for planned discharge setting  See evaluation for individualized goals  Description  Treatment/Interventions: ADL retraining, Functional transfer training, LE strengthening/ROM, Endurance training, Elevations, Therapeutic exercise, Cognitive reorientation, Patient/family training, Equipment eval/education, Bed mobility, Gait training, Compensatory technique education, Spoke to MD, Spoke to nursing, Spoke to case management, Spoke to advanced practitioner, OT, Family  Equipment Recommended:  (RW ( to allow pt to keep a RW on each level))       See flowsheet documentation for full assessment, interventions and recommendations  Outcome: Progressing  Note:   Prognosis: Good  Problem List: Decreased strength, Decreased endurance, Pain  Assessment: Pt cont to report aching pain in R groin to thigh but reports improvement  BP after steps 160/88 HR 85 sats 96  After resting , /80 HR 88 sat s96  ( Bps performed manually) Pt able to complete 10 steps today - goes down backwards  Pt needs to rest frequently 2* fatigue and SOB  Issued I sign for pt to use RW to BR and in the finn  Pt understands that she may call for assist as needed  Barriers to Discharge: Inaccessible home environment  Barriers to Discharge Comments: (1 [place] home, FF of step sto access 2nd fl bedroom/full bath)             See flowsheet documentation for full assessment

## 2019-02-13 LAB
ANION GAP SERPL CALCULATED.3IONS-SCNC: 9 MMOL/L (ref 5–14)
BUN SERPL-MCNC: 31 MG/DL (ref 5–25)
CALCIUM SERPL-MCNC: 9.1 MG/DL (ref 8.4–10.2)
CHLORIDE SERPL-SCNC: 95 MMOL/L (ref 97–108)
CO2 SERPL-SCNC: 31 MMOL/L (ref 22–30)
CREAT SERPL-MCNC: 1.22 MG/DL (ref 0.6–1.2)
GFR SERPL CREATININE-BSD FRML MDRD: 41 ML/MIN/1.73SQ M
GLUCOSE SERPL-MCNC: 116 MG/DL (ref 65–140)
GLUCOSE SERPL-MCNC: 124 MG/DL (ref 70–99)
GLUCOSE SERPL-MCNC: 142 MG/DL (ref 65–140)
GLUCOSE SERPL-MCNC: 147 MG/DL (ref 65–140)
GLUCOSE SERPL-MCNC: 188 MG/DL (ref 65–140)
INR PPP: 2.65 (ref 0.89–1.1)
POTASSIUM SERPL-SCNC: 4.2 MMOL/L (ref 3.6–5)
PROTHROMBIN TIME: 26.6 SECONDS (ref 9.5–11.6)
SODIUM SERPL-SCNC: 135 MMOL/L (ref 137–147)

## 2019-02-13 PROCEDURE — 97116 GAIT TRAINING THERAPY: CPT

## 2019-02-13 PROCEDURE — 82948 REAGENT STRIP/BLOOD GLUCOSE: CPT

## 2019-02-13 PROCEDURE — 97530 THERAPEUTIC ACTIVITIES: CPT

## 2019-02-13 PROCEDURE — 97110 THERAPEUTIC EXERCISES: CPT

## 2019-02-13 PROCEDURE — 85610 PROTHROMBIN TIME: CPT | Performed by: FAMILY MEDICINE

## 2019-02-13 PROCEDURE — 80048 BASIC METABOLIC PNL TOTAL CA: CPT | Performed by: FAMILY MEDICINE

## 2019-02-13 PROCEDURE — 97535 SELF CARE MNGMENT TRAINING: CPT

## 2019-02-13 RX ADMIN — INSULIN LISPRO 1 UNITS: 100 INJECTION, SOLUTION INTRAVENOUS; SUBCUTANEOUS at 21:39

## 2019-02-13 RX ADMIN — FERROUS SULFATE TAB 325 MG (65 MG ELEMENTAL FE) 325 MG: 325 (65 FE) TAB at 09:58

## 2019-02-13 RX ADMIN — METHOCARBAMOL 500 MG: 500 TABLET, FILM COATED ORAL at 09:59

## 2019-02-13 RX ADMIN — INSULIN DETEMIR 60 UNITS: 100 INJECTION, SOLUTION SUBCUTANEOUS at 21:39

## 2019-02-13 RX ADMIN — LATANOPROST 1 DROP: 50 SOLUTION OPHTHALMIC at 21:40

## 2019-02-13 RX ADMIN — ALLOPURINOL 100 MG: 100 TABLET ORAL at 18:01

## 2019-02-13 RX ADMIN — GABAPENTIN 600 MG: 300 CAPSULE ORAL at 21:38

## 2019-02-13 RX ADMIN — METHOCARBAMOL 500 MG: 500 TABLET, FILM COATED ORAL at 18:01

## 2019-02-13 RX ADMIN — OXYCODONE HYDROCHLORIDE 10 MG: 5 TABLET ORAL at 21:51

## 2019-02-13 RX ADMIN — MAGNESIUM OXIDE TAB 400 MG (241.3 MG ELEMENTAL MG) 400 MG: 400 (241.3 MG) TAB at 09:59

## 2019-02-13 RX ADMIN — WARFARIN SODIUM 5 MG: 5 TABLET ORAL at 18:01

## 2019-02-13 RX ADMIN — OXYCODONE HYDROCHLORIDE 10 MG: 5 TABLET ORAL at 07:23

## 2019-02-13 RX ADMIN — METOPROLOL SUCCINATE 50 MG: 50 TABLET, EXTENDED RELEASE ORAL at 09:59

## 2019-02-13 RX ADMIN — ALLOPURINOL 100 MG: 100 TABLET ORAL at 09:59

## 2019-02-13 RX ADMIN — OXYCODONE HYDROCHLORIDE 10 MG: 5 TABLET ORAL at 14:04

## 2019-02-13 RX ADMIN — OXYBUTYNIN CHLORIDE 2.5 MG: 5 TABLET ORAL at 21:38

## 2019-02-13 NOTE — PHYSICAL THERAPY NOTE
53' session     02/13/19 8742   Pain Assessment   Pain Assessment 0-10   Pain Score 8   Pain Type Chronic pain   Pain Location Leg   Pain Orientation Right   Pain Radiating Towards groin to knee   Pain Descriptors Aching   Pain Frequency Constant/continuous   Pain Onset Ongoing   Clinical Progression Not changed   Patient's Stated Pain Goal No pain   Hospital Pain Intervention(s) Medication (See MAR); Cold applied; Ambulation/increased activity   Response to Interventions no report of increase pain with activity   Restrictions/Precautions   Weight Bearing Precautions Per Order No   Other Precautions Pain   General   Chart Reviewed Yes   Family/Caregiver Present No   Cognition   Overall Cognitive Status WFL   Arousal/Participation Alert; Cooperative   Attention Within functional limits   Following Commands Follows all commands and directions without difficulty   Subjective   Subjective i don't wanttogo home before i am ready    Transfers   Sit to Stand 6  Modified independent   Stand to Sit 6  Modified independent   Stand pivot 6  Modified independent   Toilet transfer 6  Modified independent   Additional items   (managed door and all needs)   Ambulation/Elevation   Gait pattern   (increase speed - sl decrease wt thru R LE)   Gait Assistance 6  Modified independent   Assistive Device Bariatric Rolling walker   Distance 55',120' x 2 35' x 2   Stair Management Assistance   (DS)   Stair Management Technique One rail L;Step to pattern  (down backwards all but alast 3 steps turned and went forward)   Number of Stairs 12   Curbs 1 + 1 with RW and mod I - down backwards   Balance   Dynamic Standing Good  (with RW)   Ambulatory Good   Endurance Deficit   Endurance Deficit Yes   Endurance Deficit Description   (but improving)   Activity Tolerance   Activity Tolerance Patient tolerated treatment well   Exercises   Hip Flexion Sitting  (2# L and active R x 20)   Knee AROM Long Arc Quad   (2# L and active R x 20)   Ankle Pumps Bilateral;AROM;20 reps   Assessment   Prognosis Good   Problem List Decreased strength;Pain;Decreased endurance   Assessment Increase distance/ endurance with amb- pt wanted to try down steps forward - performed last 3 steps forward with S and bolivar this well - pt leased that she was able to do it   Pt cont to have pain in R groin/thigh but is tolerating activity better  CP to thigh after session  /59 HR 95 sats 95 after activity   Barriers to Discharge Decreased caregiver support; Inaccessible home environment   Barriers to Discharge Comments reports that spouse will not be bale to assist much   Goals   Patient Goals get better   STG Expiration Date 02/18/19   LTG Expiration Date 02/18/19   Treatment Day 8   Plan   Treatment/Interventions   (cont as per pOC)   Progress Progressing toward goals   PT Frequency   (6x/wk)   Recommendation   Equipment Recommended   (bariatric RW)

## 2019-02-13 NOTE — PLAN OF CARE
Problem: PHYSICAL THERAPY ADULT  Goal: Performs mobility at highest level of function for planned discharge setting  See evaluation for individualized goals  Description  Treatment/Interventions: ADL retraining, Functional transfer training, LE strengthening/ROM, Endurance training, Elevations, Therapeutic exercise, Cognitive reorientation, Patient/family training, Equipment eval/education, Bed mobility, Gait training, Compensatory technique education, Spoke to MD, Spoke to nursing, Spoke to case management, Spoke to advanced practitioner, OT, Family  Equipment Recommended:  (RW ( to allow pt to keep a RW on each level))       See flowsheet documentation for full assessment, interventions and recommendations  Outcome: Progressing  Note:   Prognosis: Good  Problem List: Decreased strength, Pain, Decreased endurance  Assessment: Increase distance/ endurance with amb- pt wanted to try down steps forward - performed last 3 steps forward with S and bolivar this well - pt leased that she was able to do it   Pt cont to have pain in R groin/thigh but is tolerating activity better  CP to thigh after session  /59 HR 95 sats 95 after activity  Barriers to Discharge: Decreased caregiver support, Inaccessible home environment  Barriers to Discharge Comments: reports that spouse will not be bale to assist much             See flowsheet documentation for full assessment

## 2019-02-13 NOTE — PLAN OF CARE
Problem: OCCUPATIONAL THERAPY ADULT  Goal: Performs self-care activities at highest level of function for planned discharge setting  See evaluation for individualized goals  Description  Treatment Interventions: ADL retraining, Functional transfer training, UE strengthening/ROM, Endurance training, Patient/family training, Equipment evaluation/education, Activity engagement, Energy conservation  Equipment Recommended:  (to be further assessed)       See flowsheet documentation for full assessment, interventions and recommendations  Outcome: Progressing  Note:   Limitation: Decreased ADL status, Decreased UE ROM, Decreased UE strength, Decreased endurance, Decreased self-care trans, Decreased high-level ADLs  Prognosis: Good  Assessment: Patient seen this date for OT with goals as set by OTR  Per OTR PN ortho was consulted to assess right shoulder but patient not yet seen  Patient completed tableslides at thist nuris in 3 planes for HEP with good tolerance as patient was instructed to only go to point of tolerance  Isometric was completed x5 in 4 planes standing with pillow however it appears patient tolerated with ball best-- will continue to assess  Patient reports her arm to be a concern as of yet but that her right LE appears to be healing and progressing  Conitnue OT at this time  At end of session patient remains in room with spouse at bedside         OT Discharge Recommendation: Home OT  OT - OK to Discharge: No

## 2019-02-13 NOTE — OCCUPATIONAL THERAPY NOTE
Occupational Therapy Treatment Note    Name:  Torrey Jarrell   MRN:   [id number]  Age:     81 y o  Patient Active Problem List   Diagnosis    Hypertension    Chronic kidney disease, stage 3 ([facility]CC)    Gout    Mechanical heart valve present    Chronic atrial fibrillation (HCC)    Gastritis    Anticoagulated    Acute cystitis without hematuria    Benign hypertensive CKD    Cardiomegaly    Diastolic dysfunction    DJD (degenerative joint disease), multiple sites    Esophagitis, reflux    Fibromyalgia    Fibrous breast lumps    H/O mechanical aortic valve replacement    Hyperuricemia without signs inflammatory arthritis/tophaceous disease    MCI (mild cognitive impairment)    Class 2 severe obesity due to excess calories with serious comorbidity and body mass index (BMI) of 39 0 to 39 9 in adult ([facility])    Osteoarthritis    Primary osteoarthritis involving multiple joints    Proteinuria    Pulmonary artery hypertension ([facility]CC)    RLS (restless legs syndrome)    Secondary hyperparathyroidism, renal ([facility])    Increased frequency of urination    Lump of right breast    Leg cramps    Chronic diastolic [facility]ive [facility] failure [facility]    Diabetes mellitus ([facility]    Acute blood loss anemia    Ambulatory dysfunction    Hyponatremia     Groin hematoma [S30 1XXA]      Subjective/Goals: "my arm still bothers me"    Vitals: see nursing summary sheet    Treatment Time: (0774-8054) 54 min    Additional goals & Comments:       02/13/19 1211   Restrictions/Precautions   Weight Bearing Precautions Per Order No   Other Precautions Visual impairment;Pain   Pain Assessment   Pain Assessment 0-10   Pain Score 8   Pain Type Acute pain   Pain Location Shoulder;Leg   Pain Orientation Right   Pain Descriptors Aching  ("toothache")   Pain Frequency Constant/continuous   Pain Onset Ongoing   Patient's Stated Pain Goal No pain   Hospital Pain Intervention(s) Medication (See MAR); Heat applied;Cold applied;Repositioned;Rest;Distraction; Ambulation/increased activity   Diversional Activities Television   ADL   Grooming Comments MI standing sinkside   UB Bathing Comments MI   UB Dressing Comments MI with hospital gown    LB Dressing Comments MI   Toileting Comments MI CM and hygiene   Meal Prep   Meal Preparation goal d/c   Light Housekeeping   Light Housekeeping goal d/c   Kitchen Mobility   Kitchen Mobility Comments MI short distance without walker and good safety   Functional Standing Tolerance   Time 8+ min   Bed Mobility   Supine to Sit 7  Independent   Sit to Supine 7  Independent   Additional Comments flat bed, no rail   Transfers   Sit to Stand 6  Modified independent   Stand to Sit 6  Modified independent   Stand pivot 6  Modified independent   Toilet transfer 6  Modified independent   Additional Comments + RW distance and independent short dstance in room without AD   Functional Mobility   Functional Mobility 6  Modified independent   Additional Comments + RW    Tub Transfers   Tub Transfers Comments unable at this time due to pain in right LE-- reports plan to sponge bath if needed upon d/c    Therapeutic Exercise - ROM   UE-ROM   (2# B table slides 3 planes 2x10 gd bolivar, ed isometric at wall)   Cognition   Overall Cognitive Status [facility]   Arousal/Participation Alert; Cooperative   Attention Within functional limits   Orientation Level Oriented X4   Memory Within functional limits   Following Commands Follows all commands and directions without difficulty   Additional Activities   Additional Activities Comments Balance:  seated good (improvement noted), standing Fair+/Good- dynamic and unsupported   Activity Tolerance   Activity Tolerance Patient tolerated treatment well  (Fair)   Assessment   Assessment Patient seen this date for OT with goals as set by OTR  Per OTR PN ortho was consulted to assess right shoulder but patient not yet seen    Patient completed tableslides at thist nuris in 3 planes for HEP with good tolerance as patient was instructed to only go to point of tolerance  Isometric was completed x5 in 4 planes standing with pillow however it appears patient tolerated with ball best-- will continue to assess  Patient reports her arm to be a concern as of yet but that her right LE appears to be healing and progressing  Conitnue OT at this time  At end of session patient remains in room with spouse at bedside  Plan   Treatment Interventions ADL retraining;Functional transfer training;UE strengthening/ROM; Energy conservation; Activityengagement   Goal Expiration Date 02/18/19   Treatment Day 8   OT Frequency   (6x/week)   Recommendation   OT Discharge Recommendation Home OT   OT - OK to Discharge Olga Silva  2/13/2019

## 2019-02-14 LAB
GLUCOSE SERPL-MCNC: 161 MG/DL (ref 65–140)
GLUCOSE SERPL-MCNC: 188 MG/DL (ref 65–140)
GLUCOSE SERPL-MCNC: 227 MG/DL (ref 65–140)
GLUCOSE SERPL-MCNC: 93 MG/DL (ref 65–140)

## 2019-02-14 PROCEDURE — 97116 GAIT TRAINING THERAPY: CPT

## 2019-02-14 PROCEDURE — 97535 SELF CARE MNGMENT TRAINING: CPT

## 2019-02-14 PROCEDURE — 97530 THERAPEUTIC ACTIVITIES: CPT

## 2019-02-14 PROCEDURE — 82948 REAGENT STRIP/BLOOD GLUCOSE: CPT

## 2019-02-14 RX ADMIN — OXYCODONE HYDROCHLORIDE 10 MG: 5 TABLET ORAL at 06:46

## 2019-02-14 RX ADMIN — OXYBUTYNIN CHLORIDE 2.5 MG: 5 TABLET ORAL at 21:47

## 2019-02-14 RX ADMIN — METHOCARBAMOL 500 MG: 500 TABLET, FILM COATED ORAL at 17:35

## 2019-02-14 RX ADMIN — METHOCARBAMOL 500 MG: 500 TABLET, FILM COATED ORAL at 08:28

## 2019-02-14 RX ADMIN — FERROUS SULFATE TAB 325 MG (65 MG ELEMENTAL FE) 325 MG: 325 (65 FE) TAB at 08:27

## 2019-02-14 RX ADMIN — INSULIN DETEMIR 60 UNITS: 100 INJECTION, SOLUTION SUBCUTANEOUS at 21:48

## 2019-02-14 RX ADMIN — OXYCODONE HYDROCHLORIDE 10 MG: 5 TABLET ORAL at 21:52

## 2019-02-14 RX ADMIN — INSULIN LISPRO 1 UNITS: 100 INJECTION, SOLUTION INTRAVENOUS; SUBCUTANEOUS at 12:28

## 2019-02-14 RX ADMIN — OXYCODONE HYDROCHLORIDE 10 MG: 5 TABLET ORAL at 17:56

## 2019-02-14 RX ADMIN — WARFARIN SODIUM 5 MG: 5 TABLET ORAL at 17:35

## 2019-02-14 RX ADMIN — ALLOPURINOL 100 MG: 100 TABLET ORAL at 17:35

## 2019-02-14 RX ADMIN — INSULIN LISPRO 2 UNITS: 100 INJECTION, SOLUTION INTRAVENOUS; SUBCUTANEOUS at 21:47

## 2019-02-14 RX ADMIN — OXYCODONE HYDROCHLORIDE 10 MG: 5 TABLET ORAL at 12:27

## 2019-02-14 RX ADMIN — METOPROLOL SUCCINATE 50 MG: 50 TABLET, EXTENDED RELEASE ORAL at 08:27

## 2019-02-14 RX ADMIN — ALLOPURINOL 100 MG: 100 TABLET ORAL at 08:26

## 2019-02-14 RX ADMIN — GABAPENTIN 600 MG: 300 CAPSULE ORAL at 21:47

## 2019-02-14 RX ADMIN — INSULIN LISPRO 1 UNITS: 100 INJECTION, SOLUTION INTRAVENOUS; SUBCUTANEOUS at 17:35

## 2019-02-14 RX ADMIN — LATANOPROST 1 DROP: 50 SOLUTION OPHTHALMIC at 21:47

## 2019-02-14 RX ADMIN — MAGNESIUM OXIDE TAB 400 MG (241.3 MG ELEMENTAL MG) 400 MG: 400 (241.3 MG) TAB at 08:27

## 2019-02-14 NOTE — OCCUPATIONAL THERAPY NOTE
Occupational Therapy Treatment Note      Cal Thompson    2/14/2019    Patient Active Problem List   Diagnosis    Hypertension    Chronic [facility] disease, stage 3 ([facility])    Gout    Mechanical heart valve present    Chronic atrial fibrillation ([facility])    Gastritis    Anticoagulated    Acute cystitis without hematuria    Benign hypertensive CKD    Cardiomegaly    Diastolic dysfunction    DJD (degenerative joint disease), multiple sites    Esophagitis, reflux    Fibromyalgia    Fibrous breast lumps    H/O mechanical aortic valve replacement    Hyperuricemia without signs inflammatory arthritis/tophaceous disease    MCI (mild cognitive impairment)    Class 2 severe obesity due to excess calories with serious comorbidity and body mass index (BMI) of 39 0 to 39 9 in adult ([facility])    Osteoarthritis    Primary osteoarthritis involving multiple joints    Proteinuria    Pulmonary artery hypertension ([facility])    RLS (restless legs syndrome)    Secondary hyperparathyroidism, renal ([facility])    Increased frequency of urination    Lump of right breast    Leg cramps    Chronic diastolic congestive heart failure ([facility])    Diabetes mellitus ([address] )    Acute blood loss anemia    Ambulatory dysfunction    Hyponatremia       Past Medical History:   Diagnosis Date    Chronic atrial fibrillation ([address] ) 9/13/2017    Chronic gout 9/13/2017    Chronic rhinitis 11/08/2018    CKD (chronic kidney disease) stage 3, GFR 30-59 ml/min ([facility]) 9/13/2017    Colon polyp 9/13/2017    Coronary artery disease     Diabetes mellitus ([address] )     COMBS (dyspnea on exertion) 11/02/2018    Essential hypertension 9/13/2017    Gastritis 9/13/2017    Hypertension     Irregular heart beat     Mechanical heart valve present 9/13/2017    Osteoarthritis     Pacemaker     S/P AVR     Spinal stenosis     disk herniation        Past Surgical History:   Procedure Laterality Date    AORTIC VALVE REPLACEMENT      CARDIAC SURGERY      CATARACT EXTRACTION      CHOLECYSTECTOMY      COLONOSCOPY      ESOPHAGOGASTRODUODENOSCOPY      HERNIA REPAIR      x3    HYSTERECTOMY      INSERT / REPLACE / REMOVE PACEMAKER      IR PELVIC ANGIOGRAPHY / INTERVENTION  1/29/2019    JOINT REPLACEMENT Bilateral     knees    VA COLONOSCOPY FLX DX W/COLLJ SPEC WHEN PFRMD N/A 9/12/2017    Procedure: Jocelyn Hester bxAND COLONOSCOPY with polypectomies;  Surgeon: Sisi Lopez MD;  Location: [facility]; Service: Gastroenterology    VA SIGMOIDOSCOPY FLX DX W/COLLJ SPEC BR/WA IF PFRMD N/A 9/13/2017    Procedure: colonoscopy with hemo clip x 2;  Surgeon: Sisi Lopez MD;  Location: [facility]; Service: Gastroenterology    ROTATOR CUFF REPAIR Left     SKIN BIOPSY      TONSILLECTOMY          02/14/19 1315   Restrictions/Precautions   Weight Bearing Precautions Per Order No   Other Precautions O2   General   Response to Previous Treatment Patient with no complaints from previous session   Pain Assessment   Pain Score 7   Pain Type Chronic pain   Pain Location Leg   Pain Orientation Right   ADL   Where Assessed Chair   Functional Standing Tolerance   Time 4 5   Activity mobility on unit   Transfers   Sit to Stand 7  Independent   Stand to Sit 7  Independent   Stand pivot 6  Modified independent   Functional Mobility   Functional Mobility 6  Modified independent   Additional Comments 1 episode noted where pt stepped on gown  Pt was able to self correct latter incident, however OT provided a clip for gown to pin up excess length for inc'd saefty with transfers  Additional items Rolling walker   Tub Transfers   Tub Transfers Comments OT discussed at length removal of shower doors in order to eventually use tub/shower again  Pt states, "funny you should say that, my husnand and I were speaking of that this morning " Pt's grandson to visit soon and pt plans to ask him to remove doors   Pt unfamiliar with transfer bench and agreeable to "test it out" to familiarize self with technique  OT demonstrated transfer and had pt return demonstration  Pt attempted to use SPC as a lef  and states ,"I have used leg lifters in the past and I like this better  It feels more sturdy " Pt unsuccessful with bringing RLE in to tub/shower  OT encouraged pt to follow through with door removal at home and continue to work on transfer with home OT servvices and pt agreeable to this  Pt declines having spouse assist with bringing LE into shower when OT made this suggesstion and prefers to shower in private  ROM- Right Upper Extremities   RUE ROM Comment no therex done during session today but OT encouraged pt to complete (I)ly and pt agrees  Additional Activities   Additional Activities Comments Energy Conservation Handout provided from "OT Toolkit" OT reviewed first page at length and discussed points  Pt verbalized understanding and contributer to conversation re: techniques  Pt with desire to read remaining on own time  NOTE: At start of session, pt only able to recall "resting" as a means to conserve energy and unable to identify any additional techniques  Activity Tolerance   Activity Tolerance Patient tolerated treatment well  (pt took self initiated rest breaks occasionally t/o session )   Assessment   Assessment Pt seen at bedside for OT tx session focused on having pt become familiar with tub/transfer technique with transfer bench as well as an at length review of energy conservation techniques  Anticipate pt to remove shower doors at home and therefore recommend home OT to follow up and continue to work with pt on increasing independence with this  As noted in previous session, pt will continue to sponge bathe upon d/c home but with desire to return to showering  Pt with poor recall of energy conservation techniques however handout provided with plan for pt to read remaining pages (OT reviewed 1/3 on this date)  Recommend OT/PORTER to follow up with pt re: remainder of handout   Pt would benefit from continued OT tx to address deficits and goals not yet met (refer to most recent progress note)  Plan   Treatment Interventions ADL retraining;Functional transfer training;UE strengthening/ROM; Endurance training;Patient/family training;Equipment evaluation/education; Compensatory technique education   Goal Expiration Date 02/18/19   Treatment Day 9   OT Frequency   (6x/wk)     Aaron Peña, OT

## 2019-02-14 NOTE — SOCIAL WORK
Per PT/OT anticipating d/c from therapy services early next week  FMLA paperwork faxed on 2/13 and given back to patient completed by attending

## 2019-02-14 NOTE — NURSING NOTE
Pain meds given as requested  No c/o chest pain or respiratory distress    Ecchymosis on right leg with edema noted   Pt participating with therapy

## 2019-02-14 NOTE — PLAN OF CARE
Problem: OCCUPATIONAL THERAPY ADULT  Goal: Performs self-care activities at highest level of function for planned discharge setting  See evaluation for individualized goals  Description  Treatment Interventions: ADL retraining, Functional transfer training, UE strengthening/ROM, Endurance training, Patient/family training, Equipment evaluation/education, Activity engagement, Energy conservation  Equipment Recommended:  (to be further assessed)       See flowsheet documentation for full assessment, interventions and recommendations      Outcome: Progressing

## 2019-02-14 NOTE — PHYSICAL THERAPY NOTE
33' session     02/14/19 1008   Pain Assessment   Pain Assessment 0-10   Pain Score 6   Pain Type Chronic pain   Pain Location Leg   Pain Orientation Right   Pain Radiating Towards   (thigh)   Pain Descriptors Aching   Pain Frequency Constant/continuous   Pain Onset Ongoing   Clinical Progression Gradually improving   Patient's Stated Pain Goal No pain   Hospital Pain Intervention(s) Medication (See MAR); Cold applied; Ambulation/increased activity   Response to Interventions   (decrease pain to 5 after session)   Restrictions/Precautions   Other Precautions Pain   General   Chart Reviewed Yes   Family/Caregiver Present No   Cognition   Overall Cognitive Status WFL   Arousal/Participation Alert; Cooperative   Attention Within functional limits   Following Commands Follows all commands and directions without difficulty   Subjective   Subjective it is feeling better every day   Bed Mobility   Supine to Sit 7  Independent   Sit to Supine 7  Independent   Additional Comments bed flat no rail   Transfers   Sit to Stand 7  Independent   Stand to Sit 7  Independent   Stand pivot 6  Modified independent   Ambulation/Elevation   Gait pattern Forward Flexion;Decreased foot clearance   Gait Assistance 6  Modified independent   Assistive Device Rolling walker   Distance 55' x 2, 120' x 2 35' x 2   Stair Management Assistance 6  Modified independent   Stair Management Technique One rail L;Step to pattern  (down forward today)   Number of Stairs 12   Curbs 1 + 1 with RW and down forward with [place]   Endurance Deficit   Endurance Deficit Yes   Endurance Deficit Description but improved   Activity Tolerance   Activity Tolerance Patient tolerated treatment well   Exercises   Neuro re-ed   (sci fit stepper 5' level one with LEs only)   Assessment   Prognosis Good   Problem List Decreased strength;Pain   Assessment Pt cont to improve - mod I on steps and is now able to descent forward   Pt pleased with this 2* worried about her cat on the steps   Pt in and OOB L side of the bed with bed flat , no rail - independently  CP to thigh after session   Pt reports decrease pain after mobility and ex on sci fit   Goals   Patient Goals keep improving   STG Expiration Date 02/18/19   LTG Expiration Date 02/18/19   Treatment Day 9   Plan   Treatment/Interventions   (cont as per POC)   Progress Progressing toward goals   PT Frequency   (6x/wk)   Recommendation   Equipment Recommended   (bariatric RW)

## 2019-02-14 NOTE — PLAN OF CARE
Problem: PHYSICAL THERAPY ADULT  Goal: Performs mobility at highest level of function for planned discharge setting  See evaluation for individualized goals  Description  Treatment/Interventions: ADL retraining, Functional transfer training, LE strengthening/ROM, Endurance training, Elevations, Therapeutic exercise, Cognitive reorientation, Patient/family training, Equipment eval/education, Bed mobility, Gait training, Compensatory technique education, Spoke to MD, Spoke to nursing, Spoke to case management, Spoke to advanced practitioner, OT, Family  Equipment Recommended:  (RW ( to allow pt to keep a RW on each level))       See flowsheet documentation for full assessment, interventions and recommendations  Outcome: Progressing  Note:   Prognosis: Good  Problem List: Decreased strength, Pain  Assessment: Pt cont to improve - mod I on steps and is now able to descent forward  Pt pleased with this 2* worried about her cat on the steps   Pt in and OOB L side of the bed with bed flat , no rail - independently  CP to thigh after session  Pt reports decrease pain after mobility and ex on sci fit  Barriers to Discharge: Decreased caregiver support, Inaccessible home environment  Barriers to Discharge Comments: reports that spouse will not be bale to assist much             See flowsheet documentation for full assessment

## 2019-02-15 LAB
ANION GAP SERPL CALCULATED.3IONS-SCNC: 5 MMOL/L (ref 5–14)
BUN SERPL-MCNC: 30 MG/DL (ref 5–25)
CALCIUM SERPL-MCNC: 9.1 MG/DL (ref 8.4–10.2)
CHLORIDE SERPL-SCNC: 93 MMOL/L (ref 97–108)
CO2 SERPL-SCNC: 34 MMOL/L (ref 22–30)
CREAT SERPL-MCNC: 1.14 MG/DL (ref 0.6–1.2)
GFR SERPL CREATININE-BSD FRML MDRD: 45 ML/MIN/1.73SQ M
GLUCOSE P FAST SERPL-MCNC: 106 MG/DL (ref 70–99)
GLUCOSE SERPL-MCNC: 102 MG/DL (ref 65–140)
GLUCOSE SERPL-MCNC: 106 MG/DL (ref 70–99)
GLUCOSE SERPL-MCNC: 156 MG/DL (ref 65–140)
GLUCOSE SERPL-MCNC: 156 MG/DL (ref 65–140)
GLUCOSE SERPL-MCNC: 200 MG/DL (ref 65–140)
INR PPP: 2.8 (ref 0.89–1.1)
POTASSIUM SERPL-SCNC: 4.1 MMOL/L (ref 3.6–5)
PROTHROMBIN TIME: 28 SECONDS (ref 9.5–11.6)
SODIUM SERPL-SCNC: 132 MMOL/L (ref 137–147)

## 2019-02-15 PROCEDURE — 97530 THERAPEUTIC ACTIVITIES: CPT

## 2019-02-15 PROCEDURE — 97110 THERAPEUTIC EXERCISES: CPT

## 2019-02-15 PROCEDURE — 85610 PROTHROMBIN TIME: CPT | Performed by: FAMILY MEDICINE

## 2019-02-15 PROCEDURE — 97116 GAIT TRAINING THERAPY: CPT

## 2019-02-15 PROCEDURE — 97535 SELF CARE MNGMENT TRAINING: CPT

## 2019-02-15 PROCEDURE — 80048 BASIC METABOLIC PNL TOTAL CA: CPT | Performed by: FAMILY MEDICINE

## 2019-02-15 PROCEDURE — 82948 REAGENT STRIP/BLOOD GLUCOSE: CPT

## 2019-02-15 RX ORDER — SODIUM CHLORIDE 9 MG/ML
50 INJECTION, SOLUTION INTRAVENOUS ONCE
Status: COMPLETED | OUTPATIENT
Start: 2019-02-15 | End: 2019-02-15

## 2019-02-15 RX ORDER — WARFARIN SODIUM 2 MG/1
4 TABLET ORAL
Status: COMPLETED | OUTPATIENT
Start: 2019-02-15 | End: 2019-02-15

## 2019-02-15 RX ADMIN — METHOCARBAMOL 500 MG: 500 TABLET, FILM COATED ORAL at 17:14

## 2019-02-15 RX ADMIN — INSULIN LISPRO 1 UNITS: 100 INJECTION, SOLUTION INTRAVENOUS; SUBCUTANEOUS at 17:14

## 2019-02-15 RX ADMIN — LATANOPROST 1 DROP: 50 SOLUTION OPHTHALMIC at 21:20

## 2019-02-15 RX ADMIN — OXYCODONE HYDROCHLORIDE 10 MG: 5 TABLET ORAL at 06:10

## 2019-02-15 RX ADMIN — OXYCODONE HYDROCHLORIDE 10 MG: 5 TABLET ORAL at 21:17

## 2019-02-15 RX ADMIN — METHOCARBAMOL 500 MG: 500 TABLET, FILM COATED ORAL at 08:10

## 2019-02-15 RX ADMIN — OXYCODONE HYDROCHLORIDE 10 MG: 5 TABLET ORAL at 11:09

## 2019-02-15 RX ADMIN — WARFARIN SODIUM 4 MG: 2 TABLET ORAL at 17:14

## 2019-02-15 RX ADMIN — OXYBUTYNIN CHLORIDE 2.5 MG: 5 TABLET ORAL at 21:18

## 2019-02-15 RX ADMIN — OXYCODONE HYDROCHLORIDE 10 MG: 5 TABLET ORAL at 17:13

## 2019-02-15 RX ADMIN — GABAPENTIN 600 MG: 300 CAPSULE ORAL at 21:17

## 2019-02-15 RX ADMIN — SODIUM CHLORIDE 50 ML/HR: 9 INJECTION, SOLUTION INTRAVENOUS at 11:00

## 2019-02-15 RX ADMIN — INSULIN LISPRO 2 UNITS: 100 INJECTION, SOLUTION INTRAVENOUS; SUBCUTANEOUS at 12:24

## 2019-02-15 RX ADMIN — MAGNESIUM OXIDE TAB 400 MG (241.3 MG ELEMENTAL MG) 400 MG: 400 (241.3 MG) TAB at 08:09

## 2019-02-15 RX ADMIN — FERROUS SULFATE TAB 325 MG (65 MG ELEMENTAL FE) 325 MG: 325 (65 FE) TAB at 08:09

## 2019-02-15 RX ADMIN — INSULIN DETEMIR 60 UNITS: 100 INJECTION, SOLUTION SUBCUTANEOUS at 21:19

## 2019-02-15 RX ADMIN — METOPROLOL SUCCINATE 50 MG: 50 TABLET, EXTENDED RELEASE ORAL at 08:10

## 2019-02-15 RX ADMIN — INSULIN LISPRO 1 UNITS: 100 INJECTION, SOLUTION INTRAVENOUS; SUBCUTANEOUS at 21:18

## 2019-02-15 RX ADMIN — ALLOPURINOL 100 MG: 100 TABLET ORAL at 17:14

## 2019-02-15 RX ADMIN — ALLOPURINOL 100 MG: 100 TABLET ORAL at 08:10

## 2019-02-15 NOTE — PLAN OF CARE
Problem: PHYSICAL THERAPY ADULT  Goal: Performs mobility at highest level of function for planned discharge setting  See evaluation for individualized goals  Description  Treatment/Interventions: ADL retraining, Functional transfer training, LE strengthening/ROM, Endurance training, Elevations, Therapeutic exercise, Cognitive reorientation, Patient/family training, Equipment eval/education, Bed mobility, Gait training, Compensatory technique education, Spoke to MD, Spoke to nursing, Spoke to case management, Spoke to advanced practitioner, OT, Family  Equipment Recommended:  (RW ( to allow pt to keep a RW on each level))       See flowsheet documentation for full assessment, interventions and recommendations  Outcome: Progressing  Note:   Prognosis: Good  Problem List: Decreased strength, Pain  Assessment: Pt cont to show improvement with endurance and steps- very comfortable descending forward  /78 after activity   Pt does have some SOB with activity but rest and PLB  alleviate this quickly  Pt feels much less stiff and has decrease pain with sessions - CP to thigh after session  Plan fordc to home on Monday 2-  Barriers to Discharge: Decreased caregiver support, Inaccessible home environment  Barriers to Discharge Comments: reports that spouse will not be bale to assist much             See flowsheet documentation for full assessment

## 2019-02-15 NOTE — PHYSICAL THERAPY NOTE
60' session     02/15/19 0911   Pain Assessment   Pain Assessment 0-10   Pain Score 7   Pain Type Chronic pain   Pain Location Leg   Pain Orientation Right   Pain Radiating Towards   (ant thigh)   Pain Descriptors Aching   Pain Frequency Constant/continuous   Pain Onset Ongoing   Clinical Progression Gradually improving   Patient's Stated Pain Goal No pain   Hospital Pain Intervention(s) Medication (See MAR); Cold applied; Ambulation/increased activity   Response to Interventions   (decrease pain and stiffness with session)   General   Chart Reviewed Yes   Family/Caregiver Present No   Cognition   Overall Cognitive Status WFL   Arousal/Participation Alert; Cooperative   Attention Within functional limits   Following Commands Follows all commands and directions without difficulty   Subjective   Subjective it gets better every day    Transfers   Sit to Stand 7  Independent   Stand to Sit 7  Independent   Stand pivot 6  Modified independent   Ambulation/Elevation   Gait pattern Forward Flexion;Decreased foot clearance   Gait Assistance 6  Modified independent   Assistive Device Rolling walker   Distance 55' x 2, 132' x 2,35' x 2   Stair Management Assistance 6  Modified independent   Stair Management Technique One rail L;With cane; Step to pattern  (down forward)   Number of Stairs 12   Curbs 1 + 1 with RW and mod I- down forward   Balance   Dynamic Standing   (G-)   Ambulatory   (G-)   Endurance Deficit   Endurance Deficit Yes   Endurance Deficit Description but improving   Activity Tolerance   Activity Tolerance Patient tolerated treatment well   Exercises   Hip Flexion   (2#L and active R x 20)   Knee AROM Long Arc Quad   (2# L and active R x 20)   Ankle Pumps Bilateral;AROM;20 reps   Neuro re-ed   (sci fit stepper with B LEs, level1 for 8')   Assessment   Prognosis Good   Problem List Decreased strength;Pain   Assessment Pt cont to show improvement with endurance and steps- very comfortable descending forward   BP 144/78 after activity   Pt does have some SOB with activity but rest and PLB  alleviate this quickly  Pt feels much less stiff and has decrease pain with sessions - CP to thigh after session   Plan fordc to home on Monday 2-   Goals   Patient Goals keep gettign better   STG Expiration Date 02/18/19   LTG Expiration Date 02/18/19   Treatment Day 10   Plan   Treatment/Interventions   (cont as per pOC)   Progress Improving as expected   PT Frequency   (6x/wk)   Recommendation   Equipment Recommended   (bariatric RW)

## 2019-02-15 NOTE — PLAN OF CARE
Problem: OCCUPATIONAL THERAPY ADULT  Goal: Performs self-care activities at highest level of function for planned discharge setting  See evaluation for individualized goals  Description  Treatment Interventions: ADL retraining, Functional transfer training, UE strengthening/ROM, Endurance training, Patient/family training, Equipment evaluation/education, Activity engagement, Energy conservation  Equipment Recommended:  (to be further assessed)       See flowsheet documentation for full assessment, interventions and recommendations  Outcome: Progressing  Note:   Limitation: Decreased ADL status, Decreased UE ROM, Decreased UE strength, Decreased endurance, Decreased self-care trans, Decreased high-level ADLs  Prognosis: Good  Assessment: Pt received seated in bedside chair and agreeable to therapy and stating "my right arm still hurts (ortho consult requested)  Pt performed table top towel glides to RUE 3x10 rep, standing tolerance with weight bearing to RUE to decrease pain increase strength 20 x, standing tolerance approx~10 minutes with F+static standing balance, Mod I functional mobility with RW, mod I with chair transfers, continued follow up on  energy conservation technique education from last treatment session~ pt able to recall 3/3 techniques, initiated home safety education and  education to decrease fall risk, RW safety with item with access, retrieval, and transportion,   pt receptive and agreeable  Pt making good progress with item retrieval, transfers, mobility, pt declined meal prep tasks stating "I already did that"  Pt continues with endurance/activity tolerance deficits to return to work  Pt continues to function below baseline levels, occupational performance deficits noted above, continue plan of care to maximize endurance and I with ADL's/mobility  OT Discharge Recommendation: Home OT  OT - OK to Discharge:  Yes

## 2019-02-15 NOTE — SOCIAL WORK
Pt is agreeable to plan for discharge on Monday  SW discussed [address] with pt  SW explained that if pt is not medically cleared Monday, [address] would be waived and new one to be signed  Pt is agreeable, copy placed in scan bin

## 2019-02-15 NOTE — OCCUPATIONAL THERAPY NOTE
[address] Treatment Note     Patient Name: Eleni Larsen  Today's Date: 2/15/2019  Problem List  Patient Active Problem List   Diagnosis    Hypertension    Chronic [facility] disease[facility] 3 ([facility]    Gout    Mechanical heart valve present    Chronic atrial fibrillation ([facility])    Gastritis    Anticoagulated    Acute cystitis without hematuria    Benign hypertensive CKD    Cardiomegaly    Diastolic dysfunction    DJD (degenerative joint disease), multiple sites    Esophagitis, reflux    Fibromyalgia    Fibrous breast lumps    H/O mechanical aortic valve replacement    Hyperuricemia without signs inflammatory arthritis/tophaceous disease    MCI (mild cognitive impairment)    Class 2 severe obesity due to excess calories with serious comorbidity and body mass index (BMI) of 39 0 to 39 9 in adult ([facility])    Osteoarthritis    Primary osteoarthritis involving multiple joints    Proteinuria    Pulmonary artery hypertension ([facility])    RLS (restless legs syndrome)    Secondary hyperparathyroidism, renal ([facility])    Increased frequency of urination    Lump of right breast    Leg cramps    Chronic diastolic congestive heart failure ([facility])    Diabetes mellitus ([facility] )    Acute blood loss anemia    Ambulatory dysfunction    Hyponatremia     Occupational Therapy Treatment time- 60 minutes     02/15/19 1125   Restrictions/Precautions   Weight Bearing Precautions Per Order No   Other Precautions Multiple lines;Pain   Lifestyle   Autonomy PTA pt states independence with all aspects of her ADLs, transfers, ambulation--w/o device; neg falls, ocassionally home alone, +   Reciprocal Relationships 4 children   Service to Others works parttime at [employer] as a    Intrinsic Gratification watching TV   Pain Assessment   Pain Assessment 0-10   Pain Score 7   Pain Location Arm   Pain Orientation [facility]   Hospital Pain Intervention(s) Repositioned;Rest;Emotional support   Functional Standing Tolerance   Time 10  minutes    Activity RUE strengthening exercises   Comments good balance   Transfers   Sit to Stand 6  Modified independent   Stand to Sit 6  Modified independent   Toilet transfer 6  Modified independent   Additional items Standard toilet  (+grab bars)   Functional Mobility   Functional Mobility 6  Modified independent   Additional Comments mod I with RW room mobility to hallway gym   Additional items Rolling walker   ROM- Right Upper Extremities   R Shoulder AROM; Flexion   R Elbow AROM  (gravity eliminated)   R Position Seated;Standing   Equipment   (towe;)   RUE ROM Comment 3x10 sets   Cognition   Overall Cognitive Status WFL   Arousal/Participation Alert; Cooperative   Attention Within functional limits   Orientation Level Oriented X4   Memory Within functional limits   Following Commands Follows all commands and directions without difficulty   Comments able to recall 3/3 energy conseration techniques   Additional Activities   Additional Activities   (Home safety education &energy conservation technique educati)   Activity Tolerance   Activity Tolerance Patient tolerated treatment well   Assessment   Assessment Pt received seated in bedside chair and agreeable to therapy and stating "my right arm still hurts (ortho consult requested)  Pt performed table top towel glides to RUE 3x10 rep, standing tolerance and isometric holds to RUE to decrease pain increase strength 20 x, standing tolerance approx~10 minutes with F+static standing balance, Mod I functional mobility with RW, mod I with chair transfers, continued follow up on  energy conservation technique education from last treatment session~ pt able to recall 3/3 techniques, initiated home safety education and  education to decrease fall risk, RW safety with item with access, retrieval, and transportion,   pt receptive and agreeable   Pt making good progress with item retrieval, transfers, mobility, pt declined meal prep tasks stating "I already did that"  Pt continues with endurance/activity tolerance deficits to return to work  Pt continues to function below baseline levels, occupational performance deficits noted above, continue plan of care to maximize endurance and I with ADL's/mobility  Plan   Treatment Interventions Functional transfer training; Endurance training;Patient/family training;Neuromuscular reeducation; Compensatory technique education; Energy conservation; Activityengagement   Goal Expiration Date 02/18/19   Treatment Day 10   OT Frequency Other (comment)  (6x week)   Recommendation   OT Discharge Recommendation Home OT   OT - OK to Discharge Yes       Mirta Cheng OT

## 2019-02-16 LAB
ANION GAP SERPL CALCULATED.3IONS-SCNC: 12 MMOL/L (ref 5–14)
BUN SERPL-MCNC: 28 MG/DL (ref 5–25)
CALCIUM SERPL-MCNC: 8.4 MG/DL (ref 8.4–10.2)
CHLORIDE SERPL-SCNC: 95 MMOL/L (ref 97–108)
CO2 SERPL-SCNC: 22 MMOL/L (ref 22–30)
CREAT SERPL-MCNC: 1.03 MG/DL (ref 0.6–1.2)
GFR SERPL CREATININE-BSD FRML MDRD: 51 ML/MIN/1.73SQ M
GLUCOSE SERPL-MCNC: 111 MG/DL (ref 65–140)
GLUCOSE SERPL-MCNC: 142 MG/DL (ref 65–140)
GLUCOSE SERPL-MCNC: 142 MG/DL (ref 70–99)
GLUCOSE SERPL-MCNC: 188 MG/DL (ref 65–140)
GLUCOSE SERPL-MCNC: 201 MG/DL (ref 65–140)
POTASSIUM SERPL-SCNC: 4.9 MMOL/L (ref 3.6–5)
SODIUM SERPL-SCNC: 129 MMOL/L (ref 137–147)

## 2019-02-16 PROCEDURE — 82948 REAGENT STRIP/BLOOD GLUCOSE: CPT

## 2019-02-16 PROCEDURE — 80048 BASIC METABOLIC PNL TOTAL CA: CPT | Performed by: FAMILY MEDICINE

## 2019-02-16 PROCEDURE — 97530 THERAPEUTIC ACTIVITIES: CPT | Performed by: PHYSICAL THERAPIST

## 2019-02-16 PROCEDURE — 83935 ASSAY OF URINE OSMOLALITY: CPT | Performed by: FAMILY MEDICINE

## 2019-02-16 PROCEDURE — 97530 THERAPEUTIC ACTIVITIES: CPT

## 2019-02-16 PROCEDURE — 84300 ASSAY OF URINE SODIUM: CPT | Performed by: FAMILY MEDICINE

## 2019-02-16 PROCEDURE — 97116 GAIT TRAINING THERAPY: CPT | Performed by: PHYSICAL THERAPIST

## 2019-02-16 PROCEDURE — 97110 THERAPEUTIC EXERCISES: CPT

## 2019-02-16 RX ORDER — FUROSEMIDE 20 MG/1
20 TABLET ORAL ONCE
Status: DISCONTINUED | OUTPATIENT
Start: 2019-02-16 | End: 2019-02-16

## 2019-02-16 RX ADMIN — METHOCARBAMOL 500 MG: 500 TABLET, FILM COATED ORAL at 18:16

## 2019-02-16 RX ADMIN — OXYCODONE HYDROCHLORIDE 10 MG: 5 TABLET ORAL at 06:32

## 2019-02-16 RX ADMIN — OXYBUTYNIN CHLORIDE 2.5 MG: 5 TABLET ORAL at 21:01

## 2019-02-16 RX ADMIN — OXYCODONE HYDROCHLORIDE 10 MG: 5 TABLET ORAL at 20:54

## 2019-02-16 RX ADMIN — ALLOPURINOL 100 MG: 100 TABLET ORAL at 18:16

## 2019-02-16 RX ADMIN — OXYCODONE HYDROCHLORIDE 10 MG: 5 TABLET ORAL at 12:26

## 2019-02-16 RX ADMIN — MAGNESIUM OXIDE TAB 400 MG (241.3 MG ELEMENTAL MG) 400 MG: 400 (241.3 MG) TAB at 09:31

## 2019-02-16 RX ADMIN — INSULIN DETEMIR 60 UNITS: 100 INJECTION, SOLUTION SUBCUTANEOUS at 21:00

## 2019-02-16 RX ADMIN — GABAPENTIN 600 MG: 300 CAPSULE ORAL at 21:01

## 2019-02-16 RX ADMIN — ALLOPURINOL 100 MG: 100 TABLET ORAL at 09:31

## 2019-02-16 RX ADMIN — INSULIN LISPRO 2 UNITS: 100 INJECTION, SOLUTION INTRAVENOUS; SUBCUTANEOUS at 12:27

## 2019-02-16 RX ADMIN — LATANOPROST 1 DROP: 50 SOLUTION OPHTHALMIC at 21:01

## 2019-02-16 RX ADMIN — METHOCARBAMOL 500 MG: 500 TABLET, FILM COATED ORAL at 09:32

## 2019-02-16 RX ADMIN — FERROUS SULFATE TAB 325 MG (65 MG ELEMENTAL FE) 325 MG: 325 (65 FE) TAB at 09:31

## 2019-02-16 RX ADMIN — METOPROLOL SUCCINATE 50 MG: 50 TABLET, EXTENDED RELEASE ORAL at 09:32

## 2019-02-16 NOTE — NURSING NOTE
Unable to get am labs by 2 R N  And other units busy  Dr Mino Richard notified per MD try to get it later

## 2019-02-16 NOTE — PLAN OF CARE
Problem: PHYSICAL THERAPY ADULT  Goal: Performs mobility at highest level of function for planned discharge setting  See evaluation for individualized goals  Description  Treatment/Interventions: ADL retraining, Functional transfer training, LE strengthening/ROM, Endurance training, Elevations, Therapeutic exercise, Cognitive reorientation, Patient/family training, Equipment eval/education, Bed mobility, Gait training, Compensatory technique education, Spoke to MD, Spoke to nursing, Spoke to case management, Spoke to advanced practitioner, OT, Family  Equipment Recommended:  (RW ( to allow pt to keep a RW on each level))       See flowsheet documentation for full assessment, interventions and recommendations  Outcome: Progressing  Note:   Prognosis: Good  Problem List: Decreased strength, Decreased endurance, Obesity, Pain  Assessment: (Pt requested gait training tx, good bolivar, no increase in pain)  Barriers to Discharge: Inaccessible home environment, Decreased caregiver support  Barriers to Discharge Comments: reports that spouse will not be bale to assist much             See flowsheet documentation for full assessment

## 2019-02-16 NOTE — PROGRESS NOTES
Sodium worsened - asymptomatic- was on restriction and has received fluids yesterday   Discussed with nephrology - keep restriction and give lasix - cant give lasix as patients states she cant take it cause it makes her gout flair  I have stopped indapamide on 2/13- and explained to patient as indapamide is thiazide and has side effect of hyponatremia    Will leave patient on fluid restriction 1500  Repeated sodium studies  Also bmp in am   Consulted nephrology to danis in am after blood work and make recommendations  inr is unable to be obtained as multiple people attempted she is a difficult stick -> patient will not take coumadine without inr- will attempt in am

## 2019-02-16 NOTE — PROGRESS NOTES
RECREATIONAL THERAPY PARTICIPATION LOG      ACTIVITY:    GAMES:        BINGO:        MUSIC STIM:        ARTS & CRAFTS:        EXERCISE:        CLUBS & MEETING:        SOCIALS:        SPIRITUAL:        INDEPENDENT:        1:1:    Resident received a Recreational Therapy greeting this afternoon  Resident was resting in bed  Resident was offered a visit; however, resident declined/refused, due to wishing to have more time on her own  Resident will continue to be invited and encouraged to participate in recreational therapy 1:1 sessions for socialization purposes, as her health permits  JEANNINE Kuhn

## 2019-02-16 NOTE — OCCUPATIONAL THERAPY NOTE
[address] Treatment Note     Patient Name: Giuliana Gonzalez  Today's Date: 2/16/2019  Problem List  Patient Active Problem List   Diagnosis    Hypertension    Chronic kidney disease, stage 3 ([facility] )    Gout    Mechanical heart valve present    Chronic atrial fibrillation (HCC)    Gastritis    Anticoagulated    Acute cystitis without hematuria    Benign hypertensive CKD    Cardiomegaly    Diastolic dysfunction    DJD (degenerative joint disease), multiple sites    Esophagitis, reflux    Fibromyalgia    Fibrous breast lumps    H/O mechanical aortic valve replacement    Hyperuricemia without signs inflammatory arthritis/tophaceous disease    MCI (mild cognitive impairment)    Class 2 severe obesity due to excess calories with serious comorbidity and body mass index (BMI) of 39 0 to 39 9 in adult (HCC)    Osteoarthritis    Primary osteoarthritis involving multiple joints    Proteinuria    Pulmonary artery hypertension (HCC)    RLS (restless legs syndrome)    Secondary hyperparathyroidism, renal (HCC)    Increased frequency of urination    Lump of right breast    Leg cramps    Chronic diastolic congestive heart failure (HCC)    Diabetes mellitus ([facility] )    Acute blood loss anemia    Ambulatory dysfunction    Hyponatremia     Occupational Therapy Treatment time - 60 minutes     02/16/19 0700   Restrictions/Precautions   Weight Bearing Precautions Per Order No   Other Precautions Pain   Lifestyle   Autonomy PTA pt states independence with all aspects of her ADLs, transfers, ambulation--w/o device; neg falls, ocassionally home alone, +   Reciprocal Relationships 4 children   Service to Others works parttime at [employer] as a    Intrinsic Gratification watching TV   Pain Assessment   Pain Assessment 0-10   Pain Score 6   Pain Location Arm   Pain Orientation Right   Transfers   Sit to Stand 6  Modified independent   Additional items Assist x 1   Stand to Sit 6 Modified independent   Additional items Assist x 1   Functional Mobility   Functional Mobility 6  Modified independent   Additional Comments mod I with RW household distances with lt item access/retreival/transportation with walker bag   Additional items Rolling walker   ROM- Right Upper Extremities   R Shoulder AROM; Flexion  (gravity eliminated)   R Elbow AROM   Equipment Dumbbell  (1 Lbs with table top towel glides to decrease pain and increase shoulder AROM)   R Weight/Reps/Sets 3x10 reps   Cognition   Overall Cognitive Status WFL   Arousal/Participation Alert; Cooperative   Attention Within functional limits   Orientation Level Oriented X4   Memory Within functional limits   Following Commands Follows all commands and directions without difficulty   Comments Good safety awareness , good recall of current medical events   Activity Tolerance   Activity Tolerance Patient tolerated treatment well  (/69 HR 86, Sp02 96%)   Medical Staff Made Aware Rn cleared for therapy   Assessment   Assessment Pt received ambulating in hallway and motivated for therapy  Pt participated in functional mobility, transfers, RUE AROM- gravity eliminated  Pt performed functional mobility with mod I and RW, functional transfers with mod I to chair, able to recall 3/3 energy conservation techniques, mod I with item retrieval with walker bag  Pt POC ends 2/18/19  Pt making good progress and working toward meeting all goals continue endurance/activity tolerance and LUE strengthening exercises  Plan   Treatment Interventions ADL retraining;Functional transfer training; Endurance training;Patient/family training;Equipment evaluation/education; Neuromuscular reeducation; Compensatory technique education; Energy conservation   Goal Expiration Date 02/18/19   Treatment Day 11   OT Frequency Other (comment)  (6x week)   Recommendation   OT Discharge Recommendation Home OT   OT - OK to Discharge Yes   [facility], OT

## 2019-02-17 LAB
ANION GAP SERPL CALCULATED.3IONS-SCNC: 7 MMOL/L (ref 5–14)
BUN SERPL-MCNC: 27 MG/DL (ref 5–25)
CALCIUM SERPL-MCNC: 9 MG/DL (ref 8.4–10.2)
CHLORIDE SERPL-SCNC: 95 MMOL/L (ref 97–108)
CO2 SERPL-SCNC: 32 MMOL/L (ref 22–30)
CREAT SERPL-MCNC: 1.06 MG/DL (ref 0.6–1.2)
GFR SERPL CREATININE-BSD FRML MDRD: 49 ML/MIN/1.73SQ M
GLUCOSE P FAST SERPL-MCNC: 120 MG/DL (ref 70–99)
GLUCOSE SERPL-MCNC: 114 MG/DL (ref 65–140)
GLUCOSE SERPL-MCNC: 120 MG/DL (ref 70–99)
GLUCOSE SERPL-MCNC: 159 MG/DL (ref 65–140)
GLUCOSE SERPL-MCNC: 176 MG/DL (ref 65–140)
GLUCOSE SERPL-MCNC: 219 MG/DL (ref 65–140)
INR PPP: 2.54 (ref 0.89–1.1)
OSMOLALITY UR: 263 MMOL/KG
POTASSIUM SERPL-SCNC: 4.2 MMOL/L (ref 3.6–5)
PROTHROMBIN TIME: 25.5 SECONDS (ref 9.5–11.6)
SODIUM 24H UR-SCNC: 17 MOL/L
SODIUM SERPL-SCNC: 134 MMOL/L (ref 137–147)

## 2019-02-17 PROCEDURE — 80048 BASIC METABOLIC PNL TOTAL CA: CPT | Performed by: FAMILY MEDICINE

## 2019-02-17 PROCEDURE — 85610 PROTHROMBIN TIME: CPT | Performed by: FAMILY MEDICINE

## 2019-02-17 PROCEDURE — 82948 REAGENT STRIP/BLOOD GLUCOSE: CPT

## 2019-02-17 PROCEDURE — 99222 1ST HOSP IP/OBS MODERATE 55: CPT | Performed by: INTERNAL MEDICINE

## 2019-02-17 RX ORDER — WARFARIN SODIUM 5 MG/1
5 TABLET ORAL
Status: COMPLETED | OUTPATIENT
Start: 2019-02-17 | End: 2019-02-17

## 2019-02-17 RX ORDER — TORSEMIDE 5 MG/1
10 TABLET ORAL DAILY
Status: DISCONTINUED | OUTPATIENT
Start: 2019-02-17 | End: 2019-02-18 | Stop reason: HOSPADM

## 2019-02-17 RX ADMIN — OXYBUTYNIN CHLORIDE 2.5 MG: 5 TABLET ORAL at 21:21

## 2019-02-17 RX ADMIN — OXYCODONE HYDROCHLORIDE 10 MG: 5 TABLET ORAL at 14:59

## 2019-02-17 RX ADMIN — MAGNESIUM OXIDE TAB 400 MG (241.3 MG ELEMENTAL MG) 400 MG: 400 (241.3 MG) TAB at 08:09

## 2019-02-17 RX ADMIN — OXYCODONE HYDROCHLORIDE 10 MG: 5 TABLET ORAL at 05:25

## 2019-02-17 RX ADMIN — METHOCARBAMOL 500 MG: 500 TABLET, FILM COATED ORAL at 08:09

## 2019-02-17 RX ADMIN — OXYCODONE HYDROCHLORIDE 10 MG: 5 TABLET ORAL at 21:22

## 2019-02-17 RX ADMIN — TORSEMIDE 10 MG: 5 TABLET ORAL at 14:57

## 2019-02-17 RX ADMIN — FERROUS SULFATE TAB 325 MG (65 MG ELEMENTAL FE) 325 MG: 325 (65 FE) TAB at 08:09

## 2019-02-17 RX ADMIN — ALLOPURINOL 100 MG: 100 TABLET ORAL at 18:01

## 2019-02-17 RX ADMIN — ALLOPURINOL 100 MG: 100 TABLET ORAL at 08:09

## 2019-02-17 RX ADMIN — METHOCARBAMOL 500 MG: 500 TABLET, FILM COATED ORAL at 18:01

## 2019-02-17 RX ADMIN — METOPROLOL SUCCINATE 50 MG: 50 TABLET, EXTENDED RELEASE ORAL at 10:20

## 2019-02-17 RX ADMIN — WARFARIN SODIUM 5 MG: 5 TABLET ORAL at 18:01

## 2019-02-17 RX ADMIN — OXYCODONE HYDROCHLORIDE 10 MG: 5 TABLET ORAL at 10:20

## 2019-02-17 RX ADMIN — INSULIN DETEMIR 60 UNITS: 100 INJECTION, SOLUTION SUBCUTANEOUS at 21:21

## 2019-02-17 RX ADMIN — GABAPENTIN 600 MG: 300 CAPSULE ORAL at 21:21

## 2019-02-17 RX ADMIN — INSULIN LISPRO 2 UNITS: 100 INJECTION, SOLUTION INTRAVENOUS; SUBCUTANEOUS at 21:22

## 2019-02-17 RX ADMIN — LATANOPROST 1 DROP: 50 SOLUTION OPHTHALMIC at 21:22

## 2019-02-17 NOTE — NURSING NOTE
Dr Kirstie Snellen notified of sodium level 129, and that patient refusing Lasix states makes her gout worse and refusing coumadin without INR  ER ICU nurses busy all day and numerous attempts to get INR without enough blood to run test   Dr Kirstie Snellen also notified unable to get INR

## 2019-02-17 NOTE — CONSULTS
Consultation - Nephrology   Jackie Clemons 81 y o  female MRN: [id number]  Unit/Bed#: -01 Encounter: [id number]      Assessment/Plan:  1  Hyponatremia, likely multifactorial, suspect some component secondary to indapamide, also some component of relative volume overload, improved after IV Lasix, urine sodium 17  2  History of stage 3 chronic kidney disease, baseline creatinine near 1 0  3  Recent acute on chronic anemia secondary to thigh hematoma  4  Chronic atrial fibrillation  5  History of aortic valve replacement  6  Hypertension, blood pressure appears stable  7  History of gout, continue with allopurinol    Plan:  · Given low urine sodium and relative volume overload, suspect hyponatremia secondary to this  · Would continue with fluid restriction, was not tolerant to Lasix in the past, try torsemide 20 mg daily  · Stable renal function    History of Present Illness   Physician Requesting Consult: Nicholas Dinh MD  Reason for Consult / Principal Problem:  Hyponatremia  HPI: Jackie Clemons is a 81y o  year old female who presents with deconditioning  Patient is a 42-year-old female who was recently hospitalized at [place] [address] for a right groin hematoma status post cardiac catheterization  Since the 31st, patient's sodium level has been relatively low between 129 and 135  Most recently was low as 129  Patient does state she has had some increasing lower extremity swelling as well as weight gain  Some abdominal distension  Denies any chest pain or shortness of Breath  Appetite has been stable  She is also on indapamide for blood pressure control  Given her sodium level of 129 yesterday she was given empiric Lasix with improvement in her sodium level of 134          History obtained from chart review and the patient    Review of Systems    Review of Systems: pertinent findings as noted above otherwise negative    Historical Information   Patient Active Problem List   Diagnosis  Hypertension    Chronic kidney disease, stage 3 (HCC)    Gout    Mechanical heart valve present    Chronic atrial fibrillation (HCC)    Gastritis    Anticoagulated    Acute cystitis without hematuria    Benign hypertensive CKD    Cardiomegaly    Diastolic dysfunction    DJD (degenerative joint disease), multiple sites    Esophagitis, reflux    Fibromyalgia    Fibrous breast lumps    H/O mechanical aortic valve replacement    Hyperuricemia without signs inflammatory arthritis/tophaceous disease    MCI (mild cognitive impairment)    Class 2 severe obesity due to excess calories with serious comorbidity and body mass index (BMI) of 39 0 to 39 9 in adult [facility])    Osteoarthritis    Primary osteoarthritis involving multiple joints    Proteinuria    Pulmonary artery hypertension (HCC)    RLS (restless legs syndrome)    Secondary hyperparathyroidism, renal (HCC)    Increased frequency of urination    Lump of right breast    Leg cramps    Chronic diastolic congestive heart failure ([facility])    Diabetes mellitus ([facility])    Acute blood loss anemia    Ambulatory dysfunction    Hyponatremia     Past Medical History:   Diagnosis Date    [facility] 9/13/2017    Chronic gout 9/13/2017    Chronic rhinitis 11/08/2018    CKD (chronic kidney disease) stage 3, GFR 30-59 ml/min ([facility]) 9/13/2017    Colon polyp 9/13/2017    Coronary artery disease     Diabetes mellitus ([facility] [address] )     COMBS (dyspnea on exertion) 11/02/2018    Essential hypertension 9/13/2017    Gastritis 9/13/2017    Hypertension     Irregular heart beat     Mechanical heart valve present 9/13/2017    Osteoarthritis     Pacemaker     S/P AVR     Spinal stenosis     disk herniation      Past Surgical History:   Procedure Laterality Date    AORTIC VALVE REPLACEMENT      CARDIAC SURGERY      CATARACT EXTRACTION      CHOLECYSTECTOMY      COLONOSCOPY      ESOPHAGOGASTRODUODENOSCOPY      HERNIA REPAIR      x3  HYSTERECTOMY      INSERT / REPLACE / REMOVE PACEMAKER      IR PELVIC ANGIOGRAPHY / INTERVENTION  1/29/2019    JOINT REPLACEMENT Bilateral     knees    AL COLONOSCOPY FLX DX W/COLLJ SPEC WHEN PFRMD N/A 9/12/2017    Procedure: Skip Meth bxAND COLONOSCOPY with polypectomies;  Surgeon: Gonsalo Delong MD;  [facility]; Service: Gastroenterology    AL SIGMOIDOSCOPY FLX DX W/COLLJ SPEC BR/WA IF PFRMD N/A 9/13/2017    Procedure: colonoscopy with hemo clip x 2;  Surgeon: Gonsalo Delong MD;  [facility];   Service: Gastroenterology    ROTATOR CUFF REPAIR Left     SKIN BIOPSY      TONSILLECTOMY       Social History   Social History     Substance and Sexual Activity   Alcohol Use No     Social History     Substance and Sexual Activity   Drug Use No     Social History     Tobacco Use   Smoking Status Former Smoker   Smokeless Tobacco Never Used   Tobacco Comment    quit more than 36 yr     Family History   Problem Relation Age of Onset    Diabetes Mother     Hypertension Mother     Heart disease Father     Emphysema Father     Asthma Sister     Cancer Sister     Hypertension Sister        Meds/Allergies   current meds:   Current Facility-Administered Medications   Medication Dose Route Frequency    acetaminophen (TYLENOL) tablet 650 mg  650 mg Oral Q6H PRN    allopurinol (ZYLOPRIM) tablet 100 mg  100 mg Oral BID    ferrous sulfate tablet 325 mg  325 mg Oral Daily With Breakfast    gabapentin (NEURONTIN) capsule 600 mg  600 mg Oral HS    insulin detemir (LEVEMIR) subcutaneous injection 60 Units  60 Units Subcutaneous HS    insulin lispro (HumaLOG) 100 units/mL subcutaneous injection 1-6 Units  1-6 Units Subcutaneous 4x Daily (AC & HS)    lactulose 20 g/30 mL oral solution 20 g  20 g Oral TID PRN    latanoprost (XALATAN) 0 005 % ophthalmic solution 1 drop  1 drop Both Eyes HS    magnesium oxide (MAG-OX) tablet 400 mg  400 mg Oral Daily    methocarbamol (ROBAXIN) tablet 500 mg  500 mg Oral BID    metoprolol succinate (TOPROL-XL) 24 hr tablet 50 mg  50 mg Oral Daily    oxybutynin (DITROPAN) tablet 2 5 mg  2 5 mg Oral HS    oxyCODONE (ROXICODONE) IR tablet 10 mg  10 mg Oral Q4H PRN    oxyCODONE (ROXICODONE) IR tablet 5 mg  5 mg Oral Q4H PRN    pantoprazole (PROTONIX) EC tablet 40 mg  40 mg Oral Early Morning    polyethylene glycol (MIRALAX) packet 17 g  17 g Oral Daily    tuberculin injection 5 Units  5 Units Intradermal PRN    warfarin (COUMADIN) tablet 5 mg  5 mg Oral Once (warfarin)    warfarin (COUMADIN) tablet 5 mg  5 mg Oral Once (warfarin)       Allergies   Allergen Reactions    Cephalosporins Rash and Anaphylaxis     Anaphylaxis  Other reaction(s): Itching    Acetazolamide GI Intolerance    Allopurinol     Cefazolin Other (See Comments)     rash    Colchicine     Diltiazem     Docusate Other (See Comments)     impaction    Eszopiclone     Febuxostat     Glycerin     Hydrochlorothiazide Other (See Comments) and Headache     Other reaction(s): Other (see comments)  Generalized swelling    Latex      Other reaction(s): Other (See Comments)  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    Metformin      Other reaction(s): GI intolerance    Nortriptyline      Other reaction(s): Other (See Comments)  rash bolivar flexeril      Penicillins      Other reaction(s): Other (See Comments)  RASH-anaphylaxis  Other reaction(s): Anaphylaxis    Povidone Iodine Other (See Comments)     VAGINAL ITCHING    Ropinirole      Back pain    Simvastatin Other (See Comments)     muscle aches    Sulfa Antibiotics      Other reaction(s): Other (See Comments)  bolivar Lasix, GI upset, rash  Other reaction(s): GI Upset    Valsartan      Other reaction(s):  Other (See Comments)  bolivar olmesartan    Fluticasone      Leg cramps         Objective   /60   Pulse 84   Temp 98 3 °F (36 8 °C) (Temporal)   Resp 19   Ht 5' 6" (1 676 m)   Wt 113 kg (248 lb 14 4 oz)   SpO2 97%   BMI 40 17 kg/m²     Intake/Output Summary (Last 24 hours) at 2/17/2019 1421  Last data filed at 2/17/2019 1318  Gross per 24 hour   Intake 1360 ml   Output 1700 ml   Net -340 ml       Current Weight: Weight - Scale: 113 kg (248 lb 14 4 oz)    Physical Exam   Constitutional: She is oriented to person, place, and time  No distress  HENT:   Head: Normocephalic  Neck: Neck supple  Cardiovascular: Normal rate and regular rhythm  Pulmonary/Chest: Effort normal and breath sounds normal    Abdominal: Soft  She exhibits no distension  Musculoskeletal: She exhibits edema (2+ edema)  Neurological: She is alert and oriented to person, place, and time  Skin: Skin is warm and dry           Lab Results:    Results from last 7 days   Lab Units 02/11/19  1220   WBC Thousand/uL 8 50   HEMOGLOBIN g/dL 9 5*   HEMATOCRIT % 29 4*   PLATELETS Thousands/uL 307     Results from last 7 days   Lab Units 02/17/19  0503   POTASSIUM mmol/L 4 2   CHLORIDE mmol/L 95*   CO2 mmol/L 32*   BUN mg/dL 27*   CREATININE mg/dL 1 06   CALCIUM mg/dL 9 0

## 2019-02-18 ENCOUNTER — DOCUMENTATION (OUTPATIENT)
Dept: INPATIENT UNIT | Facility: HOSPITAL | Age: 84
End: 2019-02-18

## 2019-02-18 VITALS
BODY MASS INDEX: 39.58 KG/M2 | TEMPERATURE: 97.5 F | HEART RATE: 85 BPM | OXYGEN SATURATION: 96 % | DIASTOLIC BLOOD PRESSURE: 64 MMHG | HEIGHT: 66 IN | WEIGHT: 246.25 LBS | SYSTOLIC BLOOD PRESSURE: 98 MMHG | RESPIRATION RATE: 19 BRPM

## 2019-02-18 LAB
ANION GAP SERPL CALCULATED.3IONS-SCNC: 8 MMOL/L (ref 5–14)
BUN SERPL-MCNC: 33 MG/DL (ref 5–25)
CALCIUM SERPL-MCNC: 8.5 MG/DL (ref 8.4–10.2)
CHLORIDE SERPL-SCNC: 93 MMOL/L (ref 97–108)
CO2 SERPL-SCNC: 32 MMOL/L (ref 22–30)
CREAT SERPL-MCNC: 1.19 MG/DL (ref 0.6–1.2)
GFR SERPL CREATININE-BSD FRML MDRD: 43 ML/MIN/1.73SQ M
GLUCOSE SERPL-MCNC: 124 MG/DL (ref 70–99)
GLUCOSE SERPL-MCNC: 128 MG/DL (ref 65–140)
GLUCOSE SERPL-MCNC: 203 MG/DL (ref 65–140)
INR PPP: 1.99 (ref 0.89–1.1)
POTASSIUM SERPL-SCNC: 3.8 MMOL/L (ref 3.6–5)
PROTHROMBIN TIME: 20.3 SECONDS (ref 9.5–11.6)
SODIUM SERPL-SCNC: 133 MMOL/L (ref 137–147)

## 2019-02-18 PROCEDURE — 80048 BASIC METABOLIC PNL TOTAL CA: CPT | Performed by: FAMILY MEDICINE

## 2019-02-18 PROCEDURE — 99316 NF DSCHRG MGMT 30 MIN+: CPT | Performed by: FAMILY MEDICINE

## 2019-02-18 PROCEDURE — 85610 PROTHROMBIN TIME: CPT | Performed by: FAMILY MEDICINE

## 2019-02-18 PROCEDURE — 82948 REAGENT STRIP/BLOOD GLUCOSE: CPT

## 2019-02-18 RX ORDER — WARFARIN SODIUM 5 MG/1
5 TABLET ORAL
Qty: 20 TABLET | Refills: 0 | Status: SHIPPED | OUTPATIENT
Start: 2019-02-18

## 2019-02-18 RX ORDER — METHOCARBAMOL 500 MG/1
750 TABLET, FILM COATED ORAL 2 TIMES DAILY
Qty: 10 TABLET | Refills: 0 | Status: SHIPPED | OUTPATIENT
Start: 2019-02-18

## 2019-02-18 RX ORDER — TORSEMIDE 10 MG/1
5 TABLET ORAL DAILY
Qty: 30 TABLET | Refills: 0 | Status: SHIPPED | OUTPATIENT
Start: 2019-02-19 | End: 2019-03-08 | Stop reason: ALTCHOICE

## 2019-02-18 RX ORDER — HYDROCODONE BITARTRATE AND ACETAMINOPHEN 5; 325 MG/1; MG/1
1 TABLET ORAL EVERY 6 HOURS PRN
Qty: 20 TABLET | Refills: 0 | Status: SHIPPED | OUTPATIENT
Start: 2019-02-18

## 2019-02-18 RX ADMIN — OXYCODONE HYDROCHLORIDE 5 MG: 5 TABLET ORAL at 08:26

## 2019-02-18 RX ADMIN — MAGNESIUM OXIDE TAB 400 MG (241.3 MG ELEMENTAL MG) 400 MG: 400 (241.3 MG) TAB at 08:26

## 2019-02-18 RX ADMIN — OXYCODONE HYDROCHLORIDE 10 MG: 5 TABLET ORAL at 03:38

## 2019-02-18 RX ADMIN — ALLOPURINOL 100 MG: 100 TABLET ORAL at 08:26

## 2019-02-18 RX ADMIN — INSULIN LISPRO 2 UNITS: 100 INJECTION, SOLUTION INTRAVENOUS; SUBCUTANEOUS at 12:32

## 2019-02-18 RX ADMIN — METHOCARBAMOL 500 MG: 500 TABLET, FILM COATED ORAL at 08:26

## 2019-02-18 RX ADMIN — FERROUS SULFATE TAB 325 MG (65 MG ELEMENTAL FE) 325 MG: 325 (65 FE) TAB at 08:26

## 2019-02-18 NOTE — DISCHARGE SUMMARY
Discharge- Cal Moat 1935, 81 y o  female MRN: [id number]    Unit/Bed#: -01 Encounter: [id number]    Primary Care Provider: Can Drummond MD   Date and time admitted to hospital: 2/2/2019  2:40 PM        Hyponatremia  Assessment & Plan  · Secondary to hypervolemia and also she was on indapamide which has been discontinued evaluated by Nephrology I did discuss with them to continue the torsemide today I will decrease to 5 mg as she did have extensive diuresis  And will repeat a BMP Friday  Acute blood loss anemia  Assessment & Plan  · Admitted to hospital on 1/28/19 with blood loss due to thigh hematoma, s/p diagnostic catheterization done 1/23/2019  · Also on coumadin for mechanical AV and hx of afib - currently subtherapeutic, therefore, bridging with lovenox  · Hg has stabilized- hemoglobin stable  Tranfuse if less than 8 and continue to monitor  Repeat Hgb today  Results from last 7 days   Lab Units 02/11/19  1220   HEMOGLOBIN g/dL 9 5*         Diabetes mellitus [facility])  Assessment & Plan  Lab Results   Component Value Date    HGBA1C 8 5 (H) 01/29/2019       Recent Labs     02/17/19  1208 02/17/19  1630 02/17/19 2010 02/18/19  0558   POCGLU 176* 159* 219* 128     · Fair control with levamir 60u at HS and sliding scale  · No need for pre meals with sliding scale as an outpatient    Diastolic dysfunction  Assessment & Plan  · Pt has dyspnea on exertion at baseline  · Denies any change from baseline  · Her edema has improved she was placed on torsemide yesterday as her indapamide was discontinued secondary to hyponatremia  Her blood pressure did drop and she diuresed a lot as discussed will decrease her torsemide to 5 mg a day  Chronic atrial fibrillation (HCC)  Assessment & Plan  · Rate controlled on metoprolol      · Coumadin subtherapeutic at 1 9 discussed with the patient increase her Coumadin to 5 mg daily she [facility] she will proceed to do INR tomorrow she does have an INR machine on-call [facility] with further adjustment of Coumadin  Mechanical heart valve present  Assessment & Plan  · Patient skipped 2 days ago a Coumadin dose -> INR is 1 9- DC on increased Coumadin 5 mg daily tomorrow she will check INR at home- call [facility] to adjust Coumadin dosing  Results from last 7 days   Lab Units 02/18/19  0524 02/17/19  0503 02/15/19  0454 02/13/19  0507 02/11/19  1220   INR  1 99* 2 54* 2 80* 2 65* 2 07*       Gout  Assessment & Plan  · Continue allopurinol  · No acute flair  Chronic kidney disease, stage 3 (HCC)  Assessment & Plan  · At baseline as of 1/31/19  · At baseline  · Avoid nephrotoxins  · Avoid hypotension  Results from last 7 days   Lab Units 02/18/19  0524 02/17/19  0503 02/16/19  1718 02/15/19  0454 02/13/19  0507 02/11/19  1220   BUN mg/dL 33* 27* 28* 30* 31* 26*   CREATININE mg/dL 1 19 1 06 1 03 1 14 1 22* 0 97       Hypertension  Assessment & Plan  · Continue torsemide but decreased to 5 mg daily and metoprolol  · Blood pressure was in the 90s in the morning as she did have a extensive diuresis she states    * Ambulatory dysfunction  Assessment & Plan  · Deconditioning 2/2 recent hospital admission for thigh hematoma, 2/2 diagnostic cardiac cath done on 1/23/19    · Completed rehab stay clear to be discharged today        Discharging Physician / Practitioner: Sumanth Orosco MD  PCP: Carlita Hays MD  Admission Date:   Admission Orders (From admission, onward)    Ordered        02/18/19 1128  Admit Patient to  Once     Order ID Start Status   [id number] 02/18/19 1129 Completed              Discharge Date: 02/18/19    Resolved Problems  Date Reviewed: 2/18/2019          Resolved    Hematoma 2/11/2019     Resolved by  Ilya Chavez MD    Constipation 2/11/2019     Resolved by  Ilya Chavez MD          Consultations During Hospital Stay:  · Nephrology    Procedures Performed:   · None    Significant Findings / Test Results:   · See chart    Incidental Findings:   · None     Test Results Pending at Discharge (will require follow up): · None     Outpatient Tests Requested:  · INR on 02/19  · BMP Friday    Complications:  Hyponatremia    Reason for Admission:  Deconditioning ambulatory dysfunction    Hospital Course:     Aide Downs is a 81 y o  female patient who originally presented to the hospital on 2/2/2019 due to deconditioning ambulatory dysfunction  Patient initially was hospitalized the till set 2nd of February secondary to hematoma in her leg, and that happened after she had a previous catheterization  She completed her course of occupational physical therapy at transitional care  Complicated course with hyponatremia as her addendum I had was discontinued with consultation to Nephrology she was changed torsemide most likely secondary to hypervolemia  The sodium has stabilized will have another repeat a BMP on Friday reduced her torsemide to 5 mg daily as an outpatient  Also her Coumadin number on discharge was 1 9 increased her Coumadin as an outpatient to 5 mg daily she will do INR check on 02/19 call [facility] for Coumadin dose adjustment  Clear to be discharged home  Please see above list of diagnoses and related plan for additional information  Condition at Discharge: stable     Discharge Day Visit / Exam:     Subjective:  Patient is seen and examined, states had diuresed a lot during the night that were her out  Denies any chest pain or shortness of breath  Just some leg pain  But her swelling has come down    Vitals: Blood Pressure: 98/64 (02/18/19 0830)  Pulse: 85 (02/18/19 0631)  Temperature: 97 5 °F (36 4 °C) (02/18/19 0631)  Temp Source: Temporal (02/18/19 0631)  Respirations: 19 (02/18/19 0631)  Height: 5' 6" (167 6 cm) (02/02/19 1420)  Weight - Scale: 112 kg (246 lb 4 1 oz) (02/18/19 0700)  SpO2: 96 % (02/18/19 0631)  Exam:   Physical Exam   Constitutional: She is oriented to person, place, and time  She appears well-developed and well-nourished  HENT:   Head: Normocephalic and atraumatic  Eyes: Pupils are equal, round, and reactive to light  EOM are normal    Neck: Normal range of motion  Cardiovascular: Normal rate, regular rhythm and normal heart sounds  Pulmonary/Chest: Effort normal and breath sounds normal    Abdominal: Soft  Bowel sounds are normal    Musculoskeletal: Normal range of motion  She exhibits edema (+1 to 2 pitting edema bilateral lower extremities)  Neurological: She is alert and oriented to person, place, and time  She has normal reflexes  Skin: Skin is warm  Psychiatric: She has a normal mood and affect  Discussion with Family:  Patient    Discharge instructions/Information to patient and family:   See after visit summary for information provided to patient and family  Provisions for Follow-Up Care:  See after visit summary for information related to follow-up care and any pertinent home health orders  Disposition:     Home    For Discharges to   [address] SNF:   · Not Applicable to this Patient - Not Applicable to this Patient    Planned Readmission: no     Discharge Statement:  I spent >35 minutes discharging the patient  This time was spent on the day of discharge  I had direct contact with the patient on the day of discharge  Greater than 50% of the total time was spent examining patient, answering all patient questions, arranging and discussing plan of care with patient as well as directly providing post-discharge instructions  Additional time then spent on discharge activities  Discharge Medications:  See after visit summary for reconciled discharge medications provided to patient and family        ** Please Note: This note has been constructed using a voice recognition system **

## 2019-02-18 NOTE — ASSESSMENT & PLAN NOTE
· Continue torsemide but decreased to 5 mg daily and metoprolol    · Blood pressure was in the 90s in the morning as she did have a extensive diuresis she states

## 2019-02-18 NOTE — ASSESSMENT & PLAN NOTE
· Pt has dyspnea on exertion at baseline  · Denies any change from baseline  · Her edema has improved she was placed on torsemide yesterday as her indapamide was discontinued secondary to hyponatremia  Her blood pressure did drop and she diuresed a lot as discussed will decrease her torsemide to 5 mg a day

## 2019-02-18 NOTE — ASSESSMENT & PLAN NOTE
· Rate controlled on metoprolol  · Coumadin subtherapeutic at 1 9 discussed with the patient increase her Coumadin to 5 mg daily she does [facility] she will proceed to do INR tomorrow she does have an INR machine on-call [facility] with further adjustment of Coumadin

## 2019-02-18 NOTE — ASSESSMENT & PLAN NOTE
· Deconditioning 2/2 recent hospital admission for thigh hematoma, 2/2 diagnostic cardiac cath done on 1/23/19    · Completed rehab stay clear to be discharged today

## 2019-02-18 NOTE — OCCUPATIONAL THERAPY NOTE
Occupational Therapy Discharge Summary    Disposition: Home with family 2/18/2019    AE/DME: [facility] and [employer] provided     Discharge Summary: Pt d/c home with family on 2/18/2019  Pt participated in a total of 11/11 OT sessions  Pt achieved 10/15 goals  Deficits remain in dynamic standing balance, tub/shower transfers, meal preparation  Barriers to goal achievement include pain, fatigue  Pt appears safe to return home with family support and close to functional baseline  Recommend home OT vs outpatient OT to focus on noted deficits and maximize occupational performance for ADLs, IADLs, functional mobility  No further concerns noted       Recommendations: Home with family support and home OT vs outpatient OT

## 2019-02-18 NOTE — ASSESSMENT & PLAN NOTE
Lab Results   Component Value Date    HGBA1C 8 5 (H) 01/29/2019       Recent Labs     02/17/19  1208 02/17/19  1630 02/17/19 2010 02/18/19  0558   POCGLU 176* 159* 219* 128     · Fair control with levamir 60u at HS and sliding scale  · No need for pre meals with sliding scale as an outpatient

## 2019-02-18 NOTE — ASSESSMENT & PLAN NOTE
· Patient skipped 2 days ago a Coumadin dose -> INR is 1 9- DC on increased Coumadin 5 mg daily tomorrow she will check INR at home- [facility] to adjust Coumadin dosing        Results from last 7 days   Lab Units 02/18/19  0524 02/17/19  0503 02/15/19  0454 02/13/19  0507 02/11/19  1220   INR  1 99* 2 54* 2 80* 2 65* 2 07*

## 2019-02-18 NOTE — SOCIAL WORK
Transfer letter provided to patient, original placed in scan bin  SW notified [facility] pt was discharged  No further questions/concerns

## 2019-02-18 NOTE — ASSESSMENT & PLAN NOTE
· Admitted to hospital on 1/28/19 with blood loss due to thigh hematoma, s/p diagnostic catheterization done 1/23/2019  · Also on coumadin for mechanical AV and hx of afib - currently subtherapeutic, therefore, bridging with lovenox  · Hg has stabilized- hemoglobin stable  Tranfuse if less than 8 and continue to monitor     Repeat Hgb today  Results from last 7 days   Lab Units 02/11/19  1220   HEMOGLOBIN g/dL 9 5*

## 2019-02-18 NOTE — ASSESSMENT & PLAN NOTE
· At baseline as of 1/31/19    · At baseline  · Avoid nephrotoxins  · Avoid hypotension  Results from last 7 days   Lab Units 02/18/19  0524 02/17/19  0503 02/16/19  1718 02/15/19  0454 02/13/19  0507 02/11/19  1220   BUN mg/dL 33* 27* 28* 30* 31* 26*   CREATININE mg/dL 1 19 1 06 1 03 1 14 1 22* 0 97

## 2019-02-18 NOTE — ASSESSMENT & PLAN NOTE
· Secondary to hypervolemia and also she was on indapamide which has been discontinued evaluated by Nephrology I did discuss with them to continue the torsemide today I will decrease to 5 mg as she did have extensive diuresis  And will repeat a BMP Friday

## 2019-02-18 NOTE — PLAN OF CARE
Problem: OCCUPATIONAL THERAPY ADULT  Goal: Performs self-care activities at highest level of function for planned discharge setting  See evaluation for individualized goals  Description  Treatment Interventions: ADL retraining, Functional transfer training, UE strengthening/ROM, Endurance training, Patient/family training, Equipment evaluation/education, Activity engagement, Energy conservation  Equipment Recommended:  (to be further assessed)       See flowsheet documentation for full assessment, interventions and recommendations  Outcome: Adequate for Discharge  Note:   Limitation: Decreased ADL status, Decreased UE ROM, Decreased UE strength, Decreased endurance, Decreased self-care trans, Decreased high-level ADLs  Prognosis: Good  Assessment:Occupational Therapy Discharge Summary    Disposition: Home with family 2/18/2019    AE/DME: Trisha Cellar and [employer] provided     Discharge Summary: Pt d/c home with family on 2/18/2019  Pt participated in a total of 11/11 OT sessions  Pt achieved 10/15 goals  Deficits remain in dynamic standing balance, tub/shower transfers, meal preparation  Barriers to goal achievement include pain, fatigue  Pt appears safe to return home with family support and close to functional baseline  Recommend home OT vs outpatient OT to focus on noted deficits and maximize occupational performance for ADLs, IADLs, functional mobility  No further concerns noted  Recommendations: Home with family support and home OT vs outpatient OT     OT Discharge Recommendation: Home OT vs outpatient OT  OT - OK to Discharge:  Yes

## 2019-02-19 ENCOUNTER — TELEPHONE (OUTPATIENT)
Dept: NEPHROLOGY | Facility: CLINIC | Age: 84
End: 2019-02-19

## 2019-02-19 NOTE — TELEPHONE ENCOUNTER
----- Message from Serina Members, DO sent at 2/18/2019  2:29 PM EST -----  Regarding: hospital follow up  [facility]  Discharged from [facility] 2/18/19

## 2019-02-19 NOTE — PHYSICAL THERAPY NOTE
Physical Therapy Discharge Summary    Pt is discharged from skilled PT  Pt returned to home with her  on 2/18/19  Since 2/11/19 PT Progress Note, pt was seen for 5/5 skilled PT tx sessions for therex, theract, and gait/elevation training  Pt with good progress in skilled PT this past week  Improvement assessed with: functional strength, balance, activity tolerance, safety, and gait/elevaitons  Please see STG's/LTG's below for details on pt's functional mobility status at discharge:      STG's = LTG's ( Expiration Date: 2/18/19)     1  MODIFIED GOAL (effective 2/11/19): Patient will perform sit <-> supine transfer (HOB flat, no rail, LEFT side of bed) with MOD I  SURPASSED CONSISTENTLY ( with [place])       2  Patient will perform all functional transfers with: MOD I (in order to transfer from one surface to another) ACHIEVED CONSISTENTLY ( sit <->stand transfers, SPT's with and without bariatric RW)      3  Patient will ambulate with a RW  > or = 100 feet with MOD I (in order to safely access all necessary areas of home)  ACHIEVED CONSISTENTLY  CURRENTLY: Patient ambulates 132 feet with bariatric RW and MOD I      4  Patient will ascend/descend a curb step with RW and MOD I (to allow pt to safely enter and exit home over door stoop)  ACHIEVED CONSISTENTLY ( for  1 + 1 curb steps with RW; up and down forward)     5  Patient will ascend/descend a full flight of steps with left handrail up, with device prn, with MOD I (to access bedroom/full bathroom level of home) ACHIEVED CONSISTENTLY ( with SPC on full flight of steps,  up and down forward)        Charlie Bryan Recommendations  1  Home PT to maximize safe Independently mobility within pt's personal environment    2   Supervision and assistance as needed on all elevations; until Home PT formally assesses pt on her own stairs        DME: pt was provided with wide RW ( via [employer] )

## 2019-02-19 NOTE — TELEPHONE ENCOUNTER
Called and spoke with Mrs Derek Benitez grand daughter Anastasia Newton in regards to scheduling her for a hospital follow up   Patient was currently not at home   I gave Anastasia Newton our call back number so Segun Stanley can give us a call to schedule her follow up appointment for 2-3 weeks in the [facility] for Hyponatremia

## 2019-02-21 ENCOUNTER — TELEPHONE (OUTPATIENT)
Dept: NEPHROLOGY | Facility: CLINIC | Age: 84
End: 2019-02-21

## 2019-02-21 NOTE — TELEPHONE ENCOUNTER
Called and spoke with patient's grand daughter again in regards to scheduling her for a hospital follow up  [phone] daughter stated that she still has our number from the other day and she will have [facility] give us a call back to schedule

## 2019-02-21 NOTE — TELEPHONE ENCOUNTER
----- Message from rFed Whitney DO sent at 2/18/2019  2:29 PM EST -----  Regarding: hospital follow up  Patient should have hospital follow up for hyponatremia in 2-3 weeks in [facility] office with repeat BMP prior to visit  Discharged from [facility] 2/18/19

## 2019-03-07 ENCOUNTER — TELEPHONE (OUTPATIENT)
Dept: NEPHROLOGY | Facility: CLINIC | Age: 84
End: 2019-03-07

## 2019-03-08 ENCOUNTER — OFFICE VISIT (OUTPATIENT)
Dept: NEPHROLOGY | Facility: CLINIC | Age: 84
End: 2019-03-08
Payer: MEDICARE

## 2019-03-08 VITALS
WEIGHT: 233.4 LBS | BODY MASS INDEX: 37.51 KG/M2 | HEART RATE: 85 BPM | DIASTOLIC BLOOD PRESSURE: 66 MMHG | SYSTOLIC BLOOD PRESSURE: 112 MMHG | HEIGHT: 66 IN

## 2019-03-08 DIAGNOSIS — I27.21 PULMONARY ARTERY HYPERTENSION (HCC): ICD-10-CM

## 2019-03-08 DIAGNOSIS — R80.9 PROTEINURIA: ICD-10-CM

## 2019-03-08 DIAGNOSIS — N25.81 SECONDARY HYPERPARATHYROIDISM, RENAL (HCC): Primary | ICD-10-CM

## 2019-03-08 DIAGNOSIS — E87.1 HYPONATREMIA: ICD-10-CM

## 2019-03-08 DIAGNOSIS — N18.30 CHRONIC KIDNEY DISEASE, STAGE 3 (HCC): Chronic | ICD-10-CM

## 2019-03-08 DIAGNOSIS — E79.0 HYPERURICEMIA WITHOUT SIGNS INFLAMMATORY ARTHRITIS/TOPHACEOUS DISEASE: ICD-10-CM

## 2019-03-08 DIAGNOSIS — Z95.2 MECHANICAL HEART VALVE PRESENT: Chronic | ICD-10-CM

## 2019-03-08 DIAGNOSIS — I51.89 DIASTOLIC DYSFUNCTION: ICD-10-CM

## 2019-03-08 DIAGNOSIS — I50.32 CHRONIC DIASTOLIC CONGESTIVE HEART FAILURE (HCC): ICD-10-CM

## 2019-03-08 PROCEDURE — 99214 OFFICE O/P EST MOD 30 MIN: CPT | Performed by: INTERNAL MEDICINE

## 2019-03-08 RX ORDER — INDAPAMIDE 2.5 MG/1
2.5 TABLET, FILM COATED ORAL EVERY MORNING
COMMUNITY
End: 2021-11-15 | Stop reason: SDUPTHER

## 2019-03-08 NOTE — PATIENT INSTRUCTIONS
Chronic Kidney Disease   WHAT YOU NEED TO KNOW:   Chronic kidney disease (CKD) is the gradual and permanent loss of kidney function  It is also called chronic kidney failure, or chronic renal insufficiency  Normally, the kidneys remove fluid, chemicals, and waste from your blood  These wastes are turned into urine by your kidneys  CKD may worsen over time and lead to kidney failure  DISCHARGE INSTRUCTIONS:   Return to the emergency department if:   · You are confused and very drowsy  · You have a seizure  · You have shortness of breath  Contact your healthcare provider if:   · You suddenly gain or lose more weight than your healthcare provider has told you is okay  · You have itchy skin or a rash  · You urinate more or less than you normally do  · You have blood in your urine  · You have nausea and repeated vomiting  · You have fatigue or muscle weakness  · You have hiccups that will not stop  · You have questions or concerns about your condition or care  Medicines:   · Medicines  may be given to decrease blood pressure and get rid of extra fluid  You may also receive medicine to manage health conditions that may occur with CKD, such as anemia, diabetes, and heart disease  · Take your medicine as directed  Contact your healthcare provider if you think your medicine is not helping or if you have side effects  Tell him or her if you are allergic to any medicine  Keep a list of the medicines, vitamins, and herbs you take  Include the amounts, and when and why you take them  Bring the list or the pill bottles to follow-up visits  Carry your medicine list with you in case of an emergency  Follow up with your healthcare provider as directed: You will need to return for tests to monitor your kidney function  You may also be referred to a kidney specialist  Write down your questions so you remember to ask them during your visits  Manage other health conditions:   Follow your healthcare provider's directions on how to manage diabetes, high blood pressure, and heart disease  These conditions can make CKD worse  Talk to your healthcare provider before you take over-the-counter medicine  Medicines such as NSAIDs, stomach medicine, or laxatives may harm your kidneys  Weigh yourself daily:  Ask your healthcare provider what your weight should be  Ask how much liquid you should drink each day  CKD may cause you to gain or lose weight rapidly  Weigh yourself every day  Write down your weight, how much liquid you drink or eat, and how much you urinate each day  Contact your healthcare provider if your weight is higher or lower than it should be  Manage CKD:   · Maintain a healthy weight  Ask your healthcare provider how much you should weigh  Ask him to help you create a weight loss plan if you are overweight  · Exercise 30 to 60 minutes a day, 4 to 7 times a week, or as directed  Ask about the best exercise plan for you  Regular exercise can help you manage CKD, high blood pressure, and diabetes  · Follow your healthcare provider's advice about what to eat and drink  He may tell you to eat food low in sodium (salt), potassium, phosphorus, or protein  You may need to see a dietitian if you need help planning meals  Ask how much liquid to drink each day and which liquids are best for you  · Limit alcohol  Ask how much alcohol is safe for you to drink  A drink of alcohol is 12 ounces of beer, 5 ounces of wine, or 1½ ounces of liquor  · Do not smoke  Nicotine and other chemicals in cigarettes and cigars can cause lung and kidney damage  Ask your healthcare provider for information if you currently smoke and need help to quit  E-cigarettes or smokeless tobacco still contain nicotine  Talk to your healthcare provider before you use these products  · Ask your healthcare provider if you need vaccines    Infections such as pneumonia, influenza, and hepatitis can be more harmful or more likely to occur in a person who has CKD  Vaccines reduce your risk of infection with these viruses  © 2017 [facility] Information is for End User's use only and may not be sold, redistributed or otherwise used for commercial purposes  All illustrations and images included in CareNotes® are the copyrighted property of A D A M , Inc  or Miguel Fisher  The above information is an  only  It is not intended as medical advice for individual conditions or treatments  Talk to your doctor, nurse or pharmacist before following any medical regimen to see if it is safe and effective for you

## 2019-03-08 NOTE — PROGRESS NOTES
OFFICE FOLLOW UP - Nephrology   Priscilla Guzman 81 y o  female MRN: [id number]    Encounter: [id number]        ASSESSMENT and PLAN:  Carl Helms was seen today for follow-up  Diagnoses and all orders for this visit:    Carl Helms is 81years old and has a history of diastolic congestive heart failure, pulmonary hypertension, stage 3 chronic kidney disease who presents for follow-up     1  Secondary hyperparathyroidism, renal ([facility] [address] )  -     will repeat a parathyroid hormone level  - she remains on vitamin D3 1000 units daily     2  Essential hypertension  - her blood pressure is well controlled today  - she is currently indapamide  And metoprolol  - her edema has been well controlled actually with indapamide she did tried torsemide but is having significant nausea so this was discontinued her sodium on indapamide was 141     3  Cardiomegaly  - follows up with cardiologist at [facility] and rosalie had a recent cardiac catheterization minor complication overall remains stable did not require blood transfusion and required rehab for 2 weeks now doing better     4  Chronic gout without tophus, unspecified cause, unspecified site  -now on allopurinol 100 mg b i d   - tolerating this well  With no recent gout attacks     5  Diastolic dysfunction  - no episodes of flash pulmonary edema does have lower extremity edema but stable and improved  - continue ongoing cardiology follow-up     6  Chronic kidney disease, stage 3  -Creatinine is slightly elevated from her baseline will repeat in 1 month she had a recent cardiac catheterization dye exposure also with volume overload    7  Pulmonary arterial hypertension  -had a right heart catheterization and follows up with Cardiology    8   Hyponatremia  -recent episode of hyponatremia while in the hospital this has since resolved her last sodium was 141 this was thought to be due to volume overload and medication induced     Repeat blood work in 1 month and if stable f/u with repeat blood work in 3 months    HPI: Jorge Luis Childs is a 81 y o  female who is here for Follow-up    Since her last office visit she has had complication of a recent hospitalization after a cardiac catheterization with subsequent acute blood loss anemia after having a thigh hematoma status post diagnostic catheterization on January 23rd she required 2 weeks of rehabilitation she did relatively well with her rehab she denies any new chest pain or shortness of Breath fevers chills nausea vomiting diarrhea or constipation    ROS:   All the systems were reviewed and were negative except as documented on the HPI  Allergies: Acetazolamide; Allopurinol; Colchicine; Diltiazem; Docusate; Eszopiclone; Febuxostat; Glycerin; Hydrochlorothiazide; Latex; Metformin; Nortriptyline; Penicillins; Povidone iodine; Ropinirole; Simvastatin; Sulfa antibiotics;  Torsemide; Valsartan; and Fluticasone    Medications:   Current Outpatient Medications:     allopurinol (ZYLOPRIM) 100 mg tablet, Take 100 mg by mouth 2 (two) times a day  , Disp: , Rfl:     bimatoprost (LUMIGAN) 0 01 % ophthalmic drops, Administer 1 drop to both eyes daily at bedtime, Disp: , Rfl:     calcium citrate-vitamin D (CITRACAL+D) 315-200 MG-UNIT per tablet, Take 1 tablet by mouth 2 (two) times a day, Disp: , Rfl:     cholecalciferol (VITAMIN D3) 1,000 units tablet, Take 1,000 Units by mouth daily, Disp: , Rfl:     ferrous sulfate 325 (65 Fe) mg tablet, Take 325 mg by mouth daily with breakfast, Disp: , Rfl:     gabapentin (NEURONTIN) 300 mg capsule, Take 2 capsules (600 mg total) by mouth daily at bedtime, Disp: 180 capsule, Rfl: 1    HYDROcodone-acetaminophen (NORCO) 5-325 mg per tablet, Take 1 tablet by mouth every 6 (six) hours as needed for pain for up to 10 dosesMax Daily Amount: 4 tablets, Disp: 20 tablet, Rfl: 0    indapamide (LOZOL) 2 5 mg tablet, Take 2 5 mg by mouth every morning, Disp: , Rfl:     insulin detemir (LEVEMIR) 100 units/mL subcutaneous injection, Inject 40 Units under the skin daily at bedtime , Disp: , Rfl:     Magnesium 250 MG TABS, Take 1 each by mouth daily, Disp: , Rfl:     methocarbamol (ROBAXIN) 500 mg tablet, Take 1 5 tablets (750 mg total) by mouth 2 (two) times a day, Disp: 10 tablet, Rfl: 0    metoprolol succinate (TOPROL-XL) 50 mg 24 hr tablet, Take 50 mg by mouth daily, Disp: , Rfl:     Multiple Vitamin (MULTIVITAMIN) tablet, Take 1 tablet by mouth daily, Disp: , Rfl:     Multiple Vitamins-Minerals (PRESERVISION AREDS 2 PO), Take by mouth 2 (two) times a day, Disp: , Rfl:     polyethylene glycol-propylene glycol (SYSTANE) 0 4-0 3 %, 1 drop every 3 (three) hours as needed , Disp: , Rfl:     RABEprazole (ACIPHEX) 20 MG tablet, Take 20 mg by mouth daily, Disp: , Rfl:     tolterodine (DETROL) 1 mg tablet, Take 1 mg by mouth daily at bedtime, Disp: , Rfl:     warfarin (COUMADIN) 5 mg tablet, Take 1 tablet (5 mg total) by mouth daily Adjust based on inr (Patient taking differently: Take 3 75 mg by mouth daily Adjust based on inr), Disp: 20 tablet, Rfl: 0    Past Medical History:   Diagnosis Date    Chronic atrial fibrillation (HCC) 9/13/2017    Chronic gout 9/13/2017    Chronic rhinitis 11/08/2018    CKD (chronic kidney disease) stage 3, GFR 30-59 ml/min (HCC) 9/13/2017    Colon polyp 9/13/2017    Coronary artery disease     Diabetes mellitus ([facility] [address] )     COMBS (dyspnea on exertion) 11/02/2018    Essential hypertension 9/13/2017    Gastritis 9/13/2017    Hypertension     Irregular heart beat     Mechanical heart valve present 9/13/2017    Osteoarthritis     Pacemaker     S/P AVR     Spinal stenosis     disk herniation      Past Surgical History:   Procedure Laterality Date    AORTIC VALVE REPLACEMENT      CARDIAC SURGERY      CATARACT EXTRACTION      CHOLECYSTECTOMY      COLONOSCOPY      ESOPHAGOGASTRODUODENOSCOPY      HERNIA REPAIR      x3    HYSTERECTOMY      INSERT / REPLACE / REMOVE PACEMAKER      IR PELVIC ANGIOGRAPHY / INTERVENTION  1/29/2019    JOINT REPLACEMENT Bilateral     knees    PA COLONOSCOPY FLX DX W/COLLJ SPEC WHEN PFRMD N/A 9/12/2017    Procedure: Vanessa Camara COLONOSCOPY with polypectomies;  Surgeon: May Rivas MD;  Location: [facility]; Service: Gastroenterology    PA SIGMOIDOSCOPY FLX DX W/COLLJ SPEC BR/WA IF PFRMD N/A 9/13/2017    Procedure: colonoscopy with hemo clip x 2;  Surgeon: May Rivas MD;  Location: [facility]; Service: Gastroenterology    ROTATOR CUFF REPAIR Left     SKIN BIOPSY      TONSILLECTOMY       Family History   Problem Relation Age of Onset    Diabetes Mother     Hypertension Mother     Heart disease Father     Emphysema Father     Asthma Sister     Cancer Sister     Hypertension Sister       reports that she has quit smoking  She has never used smokeless tobacco  She reports that she does not drink alcohol or use drugs  Physical Exam:   Vitals:    03/08/19 0858   BP: 112/66   BP Location: Right arm   Patient Position: Sitting   Cuff Size: Large   Pulse: 85   Weight: 106 kg (233 lb 6 4 oz)   Height: 5' 6" (1 676 m)     Body mass index is 37 67 kg/m²  General: conscious, cooperative, in not acute distress  Eyes: conjunctivae pink, anicteric sclerae  ENT: lips and mucous membranes moist  Neck: supple, no JVD  Chest: clear breath sounds bilateral, no crackles, ronchus or wheezings  CVS: distinct S1 & S2, normal rate, regular rhythm  Abdomen: soft, non-tender, non-distended, normoactive bowel sounds  Extremities:  2+ edema  Skin: no rash  Neuro: awake, alert, oriented    Lab Results:  Last hemoglobin was 9 last sodium was 141 creatinine 1 37 from [facility] labs on December 24    Portions of the record may have been created with voice recognition software  Occasional wrong word or "sound a like" substitutions may have occurred due to the inherent limitations of voice recognition software   Read the chart carefully and recognize, using context, where substitutions have occurred  If you have any questions, please contact the dictating provider

## 2019-03-18 ENCOUNTER — HOSPITAL ENCOUNTER (OUTPATIENT)
Dept: RADIOLOGY | Facility: HOSPITAL | Age: 84
Discharge: HOME | End: 2019-03-18
Attending: ORTHOPAEDIC SURGERY
Payer: MEDICARE

## 2019-03-18 ENCOUNTER — OFFICE VISIT (OUTPATIENT)
Dept: OBGYN CLINIC | Facility: HOSPITAL | Age: 84
End: 2019-03-18
Payer: MEDICARE

## 2019-03-18 VITALS
BODY MASS INDEX: 37.45 KG/M2 | SYSTOLIC BLOOD PRESSURE: 147 MMHG | HEIGHT: 66 IN | WEIGHT: 233 LBS | HEART RATE: 86 BPM | DIASTOLIC BLOOD PRESSURE: 85 MMHG

## 2019-03-18 DIAGNOSIS — M25.511 RIGHT SHOULDER PAIN, UNSPECIFIED CHRONICITY: ICD-10-CM

## 2019-03-18 DIAGNOSIS — M75.01 ADHESIVE CAPSULITIS OF RIGHT SHOULDER: Primary | ICD-10-CM

## 2019-03-18 PROCEDURE — 73030 X-RAY EXAM OF SHOULDER: CPT

## 2019-03-18 PROCEDURE — 20610 DRAIN/INJ JOINT/BURSA W/O US: CPT | Performed by: ORTHOPAEDIC SURGERY

## 2019-03-18 PROCEDURE — 99213 OFFICE O/P EST LOW 20 MIN: CPT | Performed by: ORTHOPAEDIC SURGERY

## 2019-03-18 RX ORDER — BUPIVACAINE HYDROCHLORIDE 2.5 MG/ML
2 INJECTION, SOLUTION INFILTRATION; PERINEURAL
Status: COMPLETED | OUTPATIENT
Start: 2019-03-18 | End: 2019-03-18

## 2019-03-18 RX ORDER — BETAMETHASONE SODIUM PHOSPHATE AND BETAMETHASONE ACETATE 3; 3 MG/ML; MG/ML
6 INJECTION, SUSPENSION INTRA-ARTICULAR; INTRALESIONAL; INTRAMUSCULAR; SOFT TISSUE
Status: COMPLETED | OUTPATIENT
Start: 2019-03-18 | End: 2019-03-18

## 2019-03-18 RX ADMIN — BUPIVACAINE HYDROCHLORIDE 2 ML: 2.5 INJECTION, SOLUTION INFILTRATION; PERINEURAL at 14:15

## 2019-03-18 RX ADMIN — BETAMETHASONE SODIUM PHOSPHATE AND BETAMETHASONE ACETATE 6 MG: 3; 3 INJECTION, SUSPENSION INTRA-ARTICULAR; INTRALESIONAL; INTRAMUSCULAR; SOFT TISSUE at 14:15

## 2019-03-18 NOTE — PROGRESS NOTES
Assessment  Diagnoses and all orders for this visit:    Adhesive capsulitis of right shoulder    Right shoulder pain, unspecified chronicity        Discussion and Plan:    · It was explained to the patient the pathoanatomy and natural history of adhesive capsulitis of the shoulder as well as the conservative treatments to combat this diagnosis  She understood and all questions were answered  · She was given a Glenohumeral joint injection today in the office  She will also begin formal physical therapy for adhesive capsulitis  · She will follow up in 3 months for repeat evaluation of her right shoulder  · I am concerned about the weakness she presents with today and we did discuss obtaining advanced imaging the form of a CT arthrogram to evaluate the rotator cuff, she stated she is not interested in looking for surgical problems and therefore we did defer on the imaging and besides I would prefer to treat her adhesive capsulitis 1st before we consider evaluating for other structures  She is in agreement with this and we will discuss further surgical options in the future if she fails to improve with the appropriate care    Subjective:   Patient ID: Lauren Quinn is a 81 y o  female      The patient presents with a chief complaint of right shoulder pain  The pain began 2 month(s) ago and is not associated with an acute injury  Patient states that her pain began in January after she had a cardiac cath performed and had to be admitted into the hospital for bleeding  The patient describes the pain as aching and dull in intensity,  constant in timing, and localizes the pain to the  right glenohumeral joint, deltoid  The pain is worse with movement and raising arm over head and relieved by rest, avoiding the painful activities  The pain is not associated with numbness and tingling  The pain is not associated with constitutional symptoms  The patient is awoken at night by the pain      The patient has been performing formal physical therapy for RTC tendinitis which has provided her with minimal to no relief  The following portions of the patient's history were reviewed and updated as appropriate: allergies, current medications, past family history, past medical history, past social history, past surgical history and problem list     Review of Systems   Constitutional: Negative for chills, fever and unexpected weight change  HENT: Negative for hearing loss, nosebleeds and sore throat  Eyes: Negative for pain, redness and visual disturbance  Respiratory: Negative for cough, shortness of breath and wheezing  Cardiovascular: Negative for chest pain, palpitations and leg swelling  Gastrointestinal: Negative for abdominal distention, nausea and vomiting  Endocrine: Negative for polydipsia and polyuria  Genitourinary: Negative for dysuria and hematuria  Skin: Negative for rash and wound  Neurological: Negative for dizziness, numbness and headaches  Psychiatric/Behavioral: Negative for decreased concentration and suicidal ideas  Objective:  Right Shoulder Exam     Range of Motion   External rotation: 50   Forward flexion: 100   Internal rotation 0 degrees: Sacrum     Muscle Strength   External rotation: 3/5   Supraspinatus: 4/5     Tests   Drop arm: positive    Other   Erythema: absent  Sensation: normal  Pulse: present    Comments:  Patient is neurovascular intact distally              Physical Exam   Constitutional: She is oriented to person, place, and time  She appears well-developed and well-nourished  Eyes: Pupils are equal, round, and reactive to light  Pulmonary/Chest: Effort normal and breath sounds normal    Neurological: She is alert and oriented to person, place, and time  Skin: Skin is warm and dry  Psychiatric: She has a normal mood and affect   Her behavior is normal  Judgment and thought content normal      Large joint arthrocentesis: R glenohumeral  Date/Time: 3/18/2019 2:15 PM  Consent given by: patient  Site marked: site marked  Timeout: Immediately prior to procedure a time out was called to verify the correct patient, procedure, equipment, support staff and site/side marked as required   Supporting Documentation  Indications: pain and diagnostic evaluation   Procedure Details  Location: shoulder - R glenohumeral  Preparation: Patient was prepped and draped in the usual sterile fashion  Needle size: 22 G  Ultrasound guidance: no  Approach: posterior  Medications administered: 2 mL bupivacaine 0 25 %; 6 mg betamethasone acetate-betamethasone sodium phosphate 6 (3-3) mg/mL    Patient tolerance: patient tolerated the procedure well with no immediate complications  Dressing:  Sterile dressing applied          I have personally reviewed pertinent films in PACS and my interpretation is as follows      X Ray Right Shoulder: No acute osseous abnormality    Scribe Attestation    I,:   Whitley Leahy am acting as a scribe while in the presence of the attending physician :        I,:   Jewel Almonte MD personally performed the services described in this documentation    as scribed in my presence :

## 2019-03-18 NOTE — PATIENT INSTRUCTIONS

## 2019-03-26 DIAGNOSIS — G25.81 RLS (RESTLESS LEGS SYNDROME): ICD-10-CM

## 2019-03-26 RX ORDER — GABAPENTIN 100 MG/1
CAPSULE ORAL
Qty: 360 CAPSULE | Refills: 1 | Status: SHIPPED | OUTPATIENT
Start: 2019-03-26 | End: 2020-04-30 | Stop reason: SDUPTHER

## 2019-03-27 ENCOUNTER — TELEPHONE (OUTPATIENT)
Dept: NEPHROLOGY | Facility: CLINIC | Age: 84
End: 2019-03-27

## 2019-03-27 NOTE — TELEPHONE ENCOUNTER
I spoke with Carl Helms and made her aware that her labs were reviewed, her creatinine has improved and her electrolytes are stable  I did let her know that her iron saturation is low but because her hemoglobin is stable she may continue on the oral iron supplement and we can monitoring for now  Carl Helms verbalized understanding and has no questions or concerns at this time

## 2019-03-27 NOTE — TELEPHONE ENCOUNTER
----- Message from Tara Allen DO sent at 3/27/2019 12:54 PM EDT -----  Please let her know that labs reviewed creatinine has improved electrolytes stable, hgn is stable at 12, iron saturation is low but since hgn is stable we can monitor and she can continue oral iron supplement  Thanks

## 2019-04-01 ENCOUNTER — EVALUATION (OUTPATIENT)
Dept: PHYSICAL THERAPY | Facility: MEDICAL CENTER | Age: 84
End: 2019-04-01
Payer: MEDICARE

## 2019-04-01 DIAGNOSIS — M25.511 RIGHT SHOULDER PAIN, UNSPECIFIED CHRONICITY: ICD-10-CM

## 2019-04-01 DIAGNOSIS — M75.01 ADHESIVE CAPSULITIS OF RIGHT SHOULDER: Primary | ICD-10-CM

## 2019-04-01 PROCEDURE — 97161 PT EVAL LOW COMPLEX 20 MIN: CPT | Performed by: PHYSICAL THERAPIST

## 2019-04-04 ENCOUNTER — OFFICE VISIT (OUTPATIENT)
Dept: PHYSICAL THERAPY | Facility: MEDICAL CENTER | Age: 84
End: 2019-04-04
Payer: MEDICARE

## 2019-04-04 DIAGNOSIS — M25.511 RIGHT SHOULDER PAIN, UNSPECIFIED CHRONICITY: ICD-10-CM

## 2019-04-04 DIAGNOSIS — M75.01 ADHESIVE CAPSULITIS OF RIGHT SHOULDER: Primary | ICD-10-CM

## 2019-04-04 PROCEDURE — 97110 THERAPEUTIC EXERCISES: CPT | Performed by: PHYSICAL THERAPIST

## 2019-04-04 PROCEDURE — 97140 MANUAL THERAPY 1/> REGIONS: CPT | Performed by: PHYSICAL THERAPIST

## 2019-04-10 ENCOUNTER — OFFICE VISIT (OUTPATIENT)
Dept: PHYSICAL THERAPY | Facility: MEDICAL CENTER | Age: 84
End: 2019-04-10
Payer: MEDICARE

## 2019-04-10 DIAGNOSIS — M75.01 ADHESIVE CAPSULITIS OF RIGHT SHOULDER: Primary | ICD-10-CM

## 2019-04-10 DIAGNOSIS — M25.511 RIGHT SHOULDER PAIN, UNSPECIFIED CHRONICITY: ICD-10-CM

## 2019-04-10 PROCEDURE — 97140 MANUAL THERAPY 1/> REGIONS: CPT | Performed by: PHYSICAL THERAPIST

## 2019-04-10 PROCEDURE — 97110 THERAPEUTIC EXERCISES: CPT | Performed by: PHYSICAL THERAPIST

## 2019-04-12 ENCOUNTER — OFFICE VISIT (OUTPATIENT)
Dept: PHYSICAL THERAPY | Facility: MEDICAL CENTER | Age: 84
End: 2019-04-12
Payer: MEDICARE

## 2019-04-12 DIAGNOSIS — M25.511 RIGHT SHOULDER PAIN, UNSPECIFIED CHRONICITY: ICD-10-CM

## 2019-04-12 DIAGNOSIS — M75.01 ADHESIVE CAPSULITIS OF RIGHT SHOULDER: Primary | ICD-10-CM

## 2019-04-12 PROCEDURE — 97140 MANUAL THERAPY 1/> REGIONS: CPT | Performed by: PHYSICAL THERAPIST

## 2019-04-12 PROCEDURE — 97110 THERAPEUTIC EXERCISES: CPT | Performed by: PHYSICAL THERAPIST

## 2019-04-17 ENCOUNTER — OFFICE VISIT (OUTPATIENT)
Dept: PHYSICAL THERAPY | Facility: MEDICAL CENTER | Age: 84
End: 2019-04-17
Payer: MEDICARE

## 2019-04-17 DIAGNOSIS — M75.01 ADHESIVE CAPSULITIS OF RIGHT SHOULDER: Primary | ICD-10-CM

## 2019-04-17 DIAGNOSIS — M25.511 RIGHT SHOULDER PAIN, UNSPECIFIED CHRONICITY: ICD-10-CM

## 2019-04-17 PROCEDURE — 97140 MANUAL THERAPY 1/> REGIONS: CPT | Performed by: PHYSICAL THERAPIST

## 2019-04-17 PROCEDURE — 97110 THERAPEUTIC EXERCISES: CPT | Performed by: PHYSICAL THERAPIST

## 2019-04-19 ENCOUNTER — APPOINTMENT (OUTPATIENT)
Dept: PHYSICAL THERAPY | Facility: MEDICAL CENTER | Age: 84
End: 2019-04-19
Payer: MEDICARE

## 2019-04-24 ENCOUNTER — OFFICE VISIT (OUTPATIENT)
Dept: PHYSICAL THERAPY | Facility: MEDICAL CENTER | Age: 84
End: 2019-04-24
Payer: MEDICARE

## 2019-04-24 DIAGNOSIS — M25.511 RIGHT SHOULDER PAIN, UNSPECIFIED CHRONICITY: ICD-10-CM

## 2019-04-24 DIAGNOSIS — M75.01 ADHESIVE CAPSULITIS OF RIGHT SHOULDER: Primary | ICD-10-CM

## 2019-04-24 PROCEDURE — 97110 THERAPEUTIC EXERCISES: CPT | Performed by: PHYSICAL THERAPIST

## 2019-04-24 PROCEDURE — 97140 MANUAL THERAPY 1/> REGIONS: CPT | Performed by: PHYSICAL THERAPIST

## 2019-04-26 ENCOUNTER — OFFICE VISIT (OUTPATIENT)
Dept: PHYSICAL THERAPY | Facility: MEDICAL CENTER | Age: 84
End: 2019-04-26
Payer: MEDICARE

## 2019-04-26 DIAGNOSIS — M25.511 RIGHT SHOULDER PAIN, UNSPECIFIED CHRONICITY: ICD-10-CM

## 2019-04-26 DIAGNOSIS — M75.01 ADHESIVE CAPSULITIS OF RIGHT SHOULDER: Primary | ICD-10-CM

## 2019-04-26 PROCEDURE — 97110 THERAPEUTIC EXERCISES: CPT

## 2019-04-26 PROCEDURE — 97140 MANUAL THERAPY 1/> REGIONS: CPT

## 2019-04-29 ENCOUNTER — OFFICE VISIT (OUTPATIENT)
Dept: PHYSICAL THERAPY | Facility: MEDICAL CENTER | Age: 84
End: 2019-04-29
Payer: MEDICARE

## 2019-04-29 DIAGNOSIS — M75.01 ADHESIVE CAPSULITIS OF RIGHT SHOULDER: Primary | ICD-10-CM

## 2019-04-29 DIAGNOSIS — M25.511 RIGHT SHOULDER PAIN, UNSPECIFIED CHRONICITY: ICD-10-CM

## 2019-04-29 PROCEDURE — 97140 MANUAL THERAPY 1/> REGIONS: CPT

## 2019-04-29 PROCEDURE — 97110 THERAPEUTIC EXERCISES: CPT

## 2019-05-08 ENCOUNTER — TELEPHONE (OUTPATIENT)
Dept: NEPHROLOGY | Facility: CLINIC | Age: 84
End: 2019-05-08

## 2019-05-16 ENCOUNTER — OFFICE VISIT (OUTPATIENT)
Dept: SLEEP CENTER | Facility: CLINIC | Age: 84
End: 2019-05-16
Payer: MEDICARE

## 2019-05-16 VITALS
DIASTOLIC BLOOD PRESSURE: 68 MMHG | HEIGHT: 66 IN | BODY MASS INDEX: 38.22 KG/M2 | WEIGHT: 237.8 LBS | HEART RATE: 80 BPM | SYSTOLIC BLOOD PRESSURE: 130 MMHG

## 2019-05-16 DIAGNOSIS — I27.21 PULMONARY ARTERY HYPERTENSION (HCC): ICD-10-CM

## 2019-05-16 DIAGNOSIS — I10 HYPERTENSION, UNSPECIFIED TYPE: Chronic | ICD-10-CM

## 2019-05-16 DIAGNOSIS — G25.81 RLS (RESTLESS LEGS SYNDROME): Primary | ICD-10-CM

## 2019-05-16 DIAGNOSIS — E66.01 CLASS 2 SEVERE OBESITY DUE TO EXCESS CALORIES WITH SERIOUS COMORBIDITY AND BODY MASS INDEX (BMI) OF 39.0 TO 39.9 IN ADULT (HCC): ICD-10-CM

## 2019-05-16 PROCEDURE — 99215 OFFICE O/P EST HI 40 MIN: CPT | Performed by: PSYCHIATRY & NEUROLOGY

## 2019-05-29 ENCOUNTER — TELEPHONE (OUTPATIENT)
Dept: NEPHROLOGY | Facility: HOSPITAL | Age: 84
End: 2019-05-29

## 2019-06-12 ENCOUNTER — TELEPHONE (OUTPATIENT)
Dept: NEPHROLOGY | Facility: CLINIC | Age: 84
End: 2019-06-12

## 2019-06-14 DIAGNOSIS — G25.81 RLS (RESTLESS LEGS SYNDROME): ICD-10-CM

## 2019-06-14 RX ORDER — GABAPENTIN 300 MG/1
CAPSULE ORAL
Qty: 180 CAPSULE | Refills: 1 | Status: SHIPPED | OUTPATIENT
Start: 2019-06-14 | End: 2019-11-21 | Stop reason: SDUPTHER

## 2019-06-26 ASSESSMENT — ENCOUNTER SYMPTOMS
PSYCHIATRIC NEGATIVE: 1
HEMATOLOGIC/LYMPHATIC NEGATIVE: 1
ENDOCRINE NEGATIVE: 1
DYSPNEA ON EXERTION: 0
WEIGHT GAIN: 0
GASTROINTESTINAL NEGATIVE: 1
EYES NEGATIVE: 1
WEAKNESS: 0
SHORTNESS OF BREATH: 0

## 2019-06-26 NOTE — PROGRESS NOTES
Electrophysiology  Outpatient Progress Note       Reason for visit:   No chief complaint on file.      PRADIP St is a 83 y.o. female who is seen today for follow up of her  implanted biventricular pacemaker indicated for complete heart block during AV node ablation in 2013.  She has chronic atrial fibrillation.  She has a new LV lead along with a new RV lead.  Her previous RV lead had chronically elevated thresholds and had been turned off due to this.  That RV lead is capped.      She recently underwent a cardiac catheterization at an outside institution which showed no obstructive disease. Overall, she is doing well and previous dyspnea has resolved. Echo performed earlier this month showed an EF of 55%.    Past Medical History:   Diagnosis Date   • Abnormal ECG    • Anticoagulated on Coumadin 12/20/2018   • Arthritis    • Fatigue 7/16/2018   • GERD (gastroesophageal reflux disease)    • Hypertension    • Osteoporosis    • S/P AVR 8/8/2018   • SOB (shortness of breath) 12/31/2018   • Type 2 diabetes mellitus (CMS/HCC) ([facility])      Past Surgical History:   Procedure Laterality Date   • ADENOIDECTOMY     • AORTIC VALVE REPLACEMENT  1998   • CARDIAC CATHETERIZATION     • CHOLECYSTECTOMY     • TONSILLECTOMY     • TOTAL ABDOMINAL HYSTERECTOMY W/ BILATERAL SALPINGOOPHORECTOMY         Social History   Substance Use Topics   • Smoking status: Former Smoker     Types: Cigarettes     Quit date: 1966   • Smokeless tobacco: Never Used      Comment: stoppe in 1966   • Alcohol use Yes      Comment: rarely     History reviewed. No pertinent family history.    ALLERGY:  Cephalosporins; Penicillins; Colace [docusate sodium]; Hydrochlorothiazide; Losartan potassium; Metformin; Requip [ropinirole]; Sulfa (sulfonamide antibiotics); Valsartan; and Latex    Current Outpatient Prescriptions   Medication Sig Dispense Refill   • allopurinol (ZYLOPRIM) 300 mg tablet Take 300 mg by mouth 2 (two) times a day.     • B-complex  with vitamin C tablet Take 1 tablet by mouth daily.     • bimatoprost (LUMIGAN) 0.01 % ophthalmic drops 1 drop nightly.     • calcium carbonate-vitamin D3 250-125 mg-unit tablet Take by mouth daily.     • cholecalciferol, vitamin D3, 1,000 unit tablet Take 1,000 Units by mouth daily.     • ferrous sulfate 325 mg (65 mg iron) EC tablet Take 325 mg by mouth daily with breakfast.     • gabapentin (NEURONTIN) 600 mg tablet Take 600 mg by mouth nightly.       • HYDROcodone-acetaminophen (NORCO) 5-325 mg per tablet Take 1 tablet by mouth every 6 (six) hours as needed for moderate pain (taken at night).     • indapamide (LOZOL) 2.5 mg tablet Take 2.5 mg by mouth daily.     • insulin aspart U-100 (NovoLOG) 100 unit/mL subcutaneous pen Inject under the skin 3 (three) times a day before meals. Sliding scale as follows:  160-190 give 3 units, 191-210 give 5 units, 211-240 give 7 units, 241-270 give 9 units, greater than 271 give 10 units and recheck sugars in 2-3 hours     • insulin detemir U-100 (LEVEMIR) 100 unit/mL (3 mL) subcutaneous pen Inject 60 Units under the skin nightly. Patient took 30 units last evening     • methocarbamol (ROBAXIN) 750 mg tablet Take 750 mg by mouth 2 (two) times a day.     • metoprolol succinate XL (TOPROL-XL) 50 mg 24 hr tablet Take 50 mg by mouth daily.     • RABEprazole (ACIPHEX) 20 mg EC tablet Take 20 mg by mouth daily.     • tolterodine (DETROL) 2 mg tablet Take 2 mg by mouth nightly.     • vit C/E/Zn/coppr/lutein/zeaxan (PRESERVISION AREDS-2 ORAL) Take by mouth.     • warfarin (COUMADIN) 3 mg tablet Take 3.75 mg by mouth once daily. Taken for 6 days then 5 mg on Friday.  Held X 3 days per order.     • pantoprazole (PROTONIX) 40 mg EC tablet Take 40 mg by mouth. Normally takes aciphex 30 mg daily.       No current facility-administered medications for this visit.        Review of Systems   Constitution: Negative for weakness, malaise/fatigue and weight gain.   HENT: Negative.    Eyes:  Negative.    Cardiovascular: Negative for dyspnea on exertion.   Respiratory: Negative for shortness of breath.    Endocrine: Negative.    Hematologic/Lymphatic: Negative.    Skin: Negative.    Musculoskeletal: Negative for muscle weakness.   Gastrointestinal: Negative.    Genitourinary: Negative.    Psychiatric/Behavioral: Negative.        Objective   Vitals:    06/27/19 1541   BP: 124/80   Pulse: 83   SpO2: 96%       Physical Exam   Constitutional: She is oriented to person, place, and time. She appears well-nourished.   HENT:   Head: Normocephalic.   Eyes: Pupils are equal, round, and reactive to light. Conjunctivae and EOM are normal.   Neck: Normal range of motion.   Cardiovascular: Normal rate, regular rhythm and normal heart sounds.    Pulmonary/Chest: Effort normal and breath sounds normal.   Abdominal: Soft. Bowel sounds are normal. She exhibits no distension.   Musculoskeletal: Normal range of motion. She exhibits no edema.   Neurological: She is alert and oriented to person, place, and time.   Skin: Skin is warm and dry.   Incision well-healed.    Psychiatric: Her mood appears not anxious. Her affect is not blunt.       Lab Results   Component Value Date    WBC 6.87 06/01/2018    HGB 10.8 (L) 06/01/2018     06/01/2018     06/01/2018    K 3.7 06/01/2018    CREATININE 1.1 06/01/2018    INR 1.4 06/01/2018     Cardiovascular Procedures:    ECHO/MUGA:  Echo (Scanned) - 9/7/2012  Echo 6/13/2019 - EF 55%; Normal AVR      ELECTROPHYSIOLOGY:  Devices (Bi-Ventricular PPM (Biotronik-Evia), Evia Dr-T serial# [id number] PID [id number]. DDDR 80-130ppm. Normal function. Changes made=sensor rate and gain. Base rate decreased to 75ppm.) - 10/30/2013   RFA (Indication A Fib, AVN ablation with pacemaker implantation) - 10/22/2013     STRESS TESTS:  Unruly MPI (Scanned) - 10/24/2011   ETT (Lexiscan. Had SOB and lightheadedness during recoevry, otherwise normal.) - 1/22/2016    CT SURGERY-   AVR 1998 -  mechanical    Catheter[facility] 1/23/19 ([facility])  HEMODYNAMICS: There was mild pulmonary hypertension. RA 10mmHg, RV 45/10, PA 45/25 mean 32, PCWP 22 with v wave, Pa sat 68.8%, CO 4.5 L/min, CI 2.1 L/min/m2  VALVES:  AORTIC VALVE:   --  A bileaflet mechanical prosthesis was observed in the aortic position. Both leaflet open well.  CORONARY VESSELS:   --  The coronary circulation is right dominant.  --  Left main: Normal.  --  LAD: Angiography showed minor luminal irregularities.  --  Circumflex: Normal.  --  RCA: Angiography showed minor luminal irregularities.      Assessment   Problem List Items Addressed This Visit        Circulatory    CHB (complete heart block) (CMS/HCC) ([facility]) - Primary     She remains in complete heart block following her AV node ablation for uncontrolled atrial fibrillation.  A normal functioning pacemaker is in place.         Complication of Procedure: Insert single lead to existing PPM/ICD (05/31/2018)    Complication of Admission: Admission (02/19/2018)    Chronic atrial fibrillation (CMS/HCC) ([facility])     She underwent an AV node ablation in 2013.  She continues with complete heart block and a pacemaker in place.  She is asymptomatic with the chronic atrial fibrillation now that heart block has been induced.            Other    Biventricular pacemaker check     She underwent implantation of a Biotronik biventricular pacemaker in May 2018.  Her previous RV lead was malfunctioning therefore a new RV lead was implanted along with the new LV lead.  Her previous RV lead is capped.      Biotronik Entrinsa implanted May 31, 2018.  LV threshold 0.9 V at 0.4 ms and lead impedance 1120 ohms.  Parameters VVIR 75 bpm.  BiV pacing is 100%.  Underlying rhythm is atrial fibrillation with complete heart block.  Battery longevity is 8 years.                     Simon Mcknight MD  7/15/2019

## 2019-06-26 NOTE — ASSESSMENT & PLAN NOTE
She underwent implantation of a Biotronik biventricular pacemaker in May 2018.  Her previous RV lead was malfunctioning therefore a new RV lead was implanted along with the new LV lead.  Her previous RV lead is capped.      Biotronik Entrinsa implanted May 31, 2018.  LV threshold 0.9 V at 0.4 ms and lead impedance 1120 ohms.  Parameters VVIR 75 bpm.  BiV pacing is 100%.  Underlying rhythm is atrial fibrillation with complete heart block.  Battery longevity is 8 years.

## 2019-06-26 NOTE — ASSESSMENT & PLAN NOTE
She underwent an AV node ablation in 2013.  She continues with complete heart block and a pacemaker in place.  She is asymptomatic with the chronic atrial fibrillation now that heart block has been induced.

## 2019-06-27 ENCOUNTER — OFFICE VISIT (OUTPATIENT)
Dept: CARDIOLOGY | Facility: CLINIC | Age: 84
End: 2019-06-27
Payer: MEDICARE

## 2019-06-27 VITALS
HEART RATE: 83 BPM | WEIGHT: 237 LBS | BODY MASS INDEX: 38.09 KG/M2 | DIASTOLIC BLOOD PRESSURE: 80 MMHG | OXYGEN SATURATION: 96 % | HEIGHT: 66 IN | SYSTOLIC BLOOD PRESSURE: 124 MMHG

## 2019-06-27 DIAGNOSIS — I44.2 CHB (COMPLETE HEART BLOCK) (CMS/HCC): Primary | ICD-10-CM

## 2019-06-27 DIAGNOSIS — Z45.018 BIVENTRICULAR PACEMAKER CHECK: ICD-10-CM

## 2019-06-27 DIAGNOSIS — I48.20 CHRONIC ATRIAL FIBRILLATION (CMS/HCC): ICD-10-CM

## 2019-06-27 PROCEDURE — 99214 OFFICE O/P EST MOD 30 MIN: CPT | Performed by: INTERNAL MEDICINE

## 2019-06-28 ENCOUNTER — APPOINTMENT (OUTPATIENT)
Dept: PHYSICAL THERAPY | Facility: MEDICAL CENTER | Age: 84
End: 2019-06-28
Payer: MEDICARE

## 2019-07-08 ENCOUNTER — OFFICE VISIT (OUTPATIENT)
Dept: OBGYN CLINIC | Facility: HOSPITAL | Age: 84
End: 2019-07-08
Payer: MEDICARE

## 2019-07-08 VITALS
BODY MASS INDEX: 37.77 KG/M2 | HEIGHT: 66 IN | HEART RATE: 85 BPM | DIASTOLIC BLOOD PRESSURE: 77 MMHG | WEIGHT: 235 LBS | SYSTOLIC BLOOD PRESSURE: 150 MMHG

## 2019-07-08 DIAGNOSIS — M75.01 ADHESIVE CAPSULITIS OF RIGHT SHOULDER: Primary | ICD-10-CM

## 2019-07-08 PROCEDURE — 99213 OFFICE O/P EST LOW 20 MIN: CPT | Performed by: PHYSICIAN ASSISTANT

## 2019-07-08 NOTE — PROGRESS NOTES
Assessment  Diagnoses and all orders for this visit:    Adhesive capsulitis of right shoulder  -     Ambulatory referral to Physical Therapy; Future        Discussion and Plan:    Oneil Sparks requests a new prescription for therapy  She wants to try local modalities for her adhesive capsulitis as she is limited on medication she can take due to Coumadin  She will resume her home stretching and exercises  I do not recommend a repeat steroid injection today as she only got 1 day of relief  We can revisit injections at next visit  Cristy requests pain medication for sleeping  Tylenol is my recommendation  I understand this does not help the whole night  I do not think Hydrocodone is an appropriate medication so this is not recommended  For sleeping, I suggest a wedge or sleeping in a recliner  Oneil Sparks will try this tonight  Oneil Sparks will follow up in 3 months or sooner if symptoms worsen  Oneil Sparks is weak I would recommend advanced imaging if she does not improve  She remains adamant that she avoid surgery, so imaging is deferred at this time  If she changes her mind, she will let me know  She would have to stop her Coumadin prior to the CT arthrogram     Subjective:   Patient ID: Swetha Schmidt is a 81 y o  female      HPI        The following portions of the patient's history were reviewed and updated as appropriate: allergies, current medications, past family history, past medical history, past social history, past surgical history and problem list     Review of Systems   Constitutional: Negative for chills and fever  HENT: Negative for hearing loss  Eyes: Negative for visual disturbance  Respiratory: Negative for shortness of breath  Cardiovascular: Negative for chest pain  Gastrointestinal: Negative for abdominal pain  Musculoskeletal:        As reviewed in the HPI   Skin: Negative for rash  Neurological:        As reviewed in the HPI   Psychiatric/Behavioral: Negative for agitation  Objective:  Right Shoulder Exam     Tenderness   Right shoulder tenderness location: trapezius  Range of Motion   Active abduction: 70   Passive abduction: 90   External rotation: normal   Forward flexion: 110   Internal rotation 0 degrees: Lumbar     Muscle Strength   External rotation: 4/5   Supraspinatus: 3/5   Subscapularis: 4/5     Tests   Weiner test: positive  Impingement: positive  Drop arm: positive    Other   Sensation: normal  Pulse: present    Comments:    Positive Empty can  Positive Crepitation with motion  Physical Exam   Constitutional: She is oriented to person, place, and time  She appears well-developed and well-nourished  HENT:   Head: Normocephalic  Eyes: EOM are normal    Neck: Normal range of motion  Pulmonary/Chest: Breath sounds normal  She has no wheezes  Neurological: She is alert and oriented to person, place, and time  Skin: Skin is warm and dry  Psychiatric: She has a normal mood and affect   Her behavior is normal  Judgment and thought content normal

## 2019-07-12 ENCOUNTER — EVALUATION (OUTPATIENT)
Dept: PHYSICAL THERAPY | Facility: MEDICAL CENTER | Age: 84
End: 2019-07-12
Payer: MEDICARE

## 2019-07-12 DIAGNOSIS — M75.01 ADHESIVE CAPSULITIS OF RIGHT SHOULDER: Primary | ICD-10-CM

## 2019-07-12 PROCEDURE — 97161 PT EVAL LOW COMPLEX 20 MIN: CPT | Performed by: PHYSICAL THERAPIST

## 2019-07-12 NOTE — PROGRESS NOTES
PT Evaluation     Today's date: 2019  Patient name: Padmini Escobedo  : 1935  MRN: [id number]  Referring provider: Sindy Low MD  Dx:   Encounter Diagnosis     ICD-10-CM    1  Adhesive capsulitis of right shoulder M75 01                   Assessment  Assessment details: Padmini Escobedo is a 81 y o  female was evaluated on 2019  for Adhesive capsulitis of right shoulder  (primary encounter diagnosis)  Padmini Escobedo has the above listed impairments resulting in functional deficits and negative impact to quality of life  Patient is appropriate for skilled PT intervention to promote maximal return to function and patient specific goals  Patient agrees with outlined treatment plan and all questions were answered to their satisfaction  Impairments: abnormal muscle firing, abnormal muscle tone, abnormal or restricted ROM, impaired physical strength, lacks appropriate home exercise program and pain with function  Understanding of Dx/Px/POC: good   Prognosis: good    Goals  Patient will successfully transition to home exercise program   Patient will be able to manage symptoms independently      Patent will improve AROM by 50%   Patient will report no limitation overhead reaching to cabinet  Patient will report no limitation in sleeping     Plan  Patient would benefit from: skilled PT  Referral necessary: No  Planned modality interventions: thermotherapy: hydrocollator packs  Planned therapy interventions: home exercise program, manual therapy, neuromuscular re-education, patient education, functional ROM exercises, strengthening, stretching, joint mobilization, graded activity, graded exercise, therapeutic exercise, body mechanics training, motor coordination training and activity modification  Frequency: 2x week  Duration in weeks: 12  Treatment plan discussed with: patient        Subjective Evaluation    History of Present Illness  Mechanism of injury: Padmini Escobedo is a 81 y o  female who came to outpatient PT on 19  Patient presents with complaints of R shoulder pain, weakness, and decreased function  The pain is now chronic and 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 from Dr Andreina Olson in May  The patient is experiencing pain in her anterolateral R arm and her R UT  At worst, her pain is 10/10 at best it's a 4/10  Patient is doing pulleys and arm slides at home most days  The patient's greatest concerns are  worry over not knowing what's wrong and fear of not being able to keep active and have her normal function in her arm  Denied any Red Flags  Patient finds aggravating factors are:  1 ) reaching/lifting  2 ) sleeping    Patients finds most relieving factors are:  1 ) Tylenol  2 ) rest/ not using her arm    No further referral appears necessary at this time based upon examination results  Patient's goals are to be able to sleep without pain and be able to use her arm as she needs  Pain  Current pain ratin  At best pain ratin  At worst pain ratin  Quality: dull ache          Objective     Palpation     Right   Tenderness of the latissimus, levator scapulae, subscapularis, supraspinatus and upper trapezius       Additional Palpation Details  Deferred due to high pain irritability     Cervical/Thoracic Screen   Cervical range of motion within normal limits  Thoracic range of motion within normal limits with the following exceptions: Significant thoracic kyphosis       Neurological Testing     Sensation     Shoulder   Left Shoulder   Intact: light touch    Right Shoulder   Intact: light touch    Active Range of Motion   Left Shoulder   Normal active range of motion    Right Shoulder   Flexion: 120 degrees   Abduction: 90 degrees   External rotation BTH: C3     Passive Range of Motion     Additional Passive Range of Motion Details  PROM empty end feel in all directions with pain     Scapular Mobility     Right Shoulder   Scapular mobility: fair             Precautions: Osteoporosis, high pain irritability       Manual  7/12            PRom AF            MFR to scap and UR AF                                                       Exercise Diary              Scap retractions             Seated ER                                                                                                                                                                                                                                                           Modalities

## 2019-07-17 ENCOUNTER — OFFICE VISIT (OUTPATIENT)
Dept: PHYSICAL THERAPY | Facility: MEDICAL CENTER | Age: 84
End: 2019-07-17
Payer: MEDICARE

## 2019-07-17 DIAGNOSIS — M75.01 ADHESIVE CAPSULITIS OF RIGHT SHOULDER: Primary | ICD-10-CM

## 2019-07-17 PROCEDURE — 97110 THERAPEUTIC EXERCISES: CPT | Performed by: PHYSICAL THERAPIST

## 2019-07-17 PROCEDURE — 97140 MANUAL THERAPY 1/> REGIONS: CPT | Performed by: PHYSICAL THERAPIST

## 2019-07-18 NOTE — PROGRESS NOTES
Daily Note     Today's date: 2019  Patient name: Jacques Lozoya  : 1935  MRN: [id number]  Referring provider: Bonny Monte MD  Dx:   Encounter Diagnosis     ICD-10-CM    1  Adhesive capsulitis of right shoulder M75 01                   Subjective: Cristy reprort no issues        Objective: See treatment diary below      Assessment: Tolerated treatment well  Patient exhibited good technique with therapeutic exercises and would benefit from continued PT'      Plan: Continue per plan of care        Precautions: Osteoporosis, high pain irritability       Manual             PRom AF AF           MFR to scap and UR AF AF                                                      Exercise Diary              Scap retractions  30            Seated ER             Ball roll outs  30 each                                                                                                                                                                                                                                               Modalities

## 2019-07-31 ENCOUNTER — OFFICE VISIT (OUTPATIENT)
Dept: PHYSICAL THERAPY | Facility: MEDICAL CENTER | Age: 84
End: 2019-07-31
Payer: MEDICARE

## 2019-07-31 DIAGNOSIS — M75.01 ADHESIVE CAPSULITIS OF RIGHT SHOULDER: Primary | ICD-10-CM

## 2019-07-31 PROCEDURE — 97110 THERAPEUTIC EXERCISES: CPT | Performed by: PHYSICAL THERAPIST

## 2019-07-31 PROCEDURE — 97140 MANUAL THERAPY 1/> REGIONS: CPT | Performed by: PHYSICAL THERAPIST

## 2019-07-31 NOTE — PROGRESS NOTES
Daily Note     Today's date: 2019  Patient name: Brittany Childs  : 1935  MRN: [id number]  Referring provider: Gloria Fisher MD  Dx:   Encounter Diagnosis     ICD-10-CM    1  Adhesive capsulitis of right shoulder M75 01                   Subjective:    Ted Landau reports that she is still having shoulder pain that is very limiting to her       Objective: See treatment diary below      Assessment: Tolerated treatment well  Patient with reports of increase in shoulder pain with mobility exercises at home  Reduced all to pendulums to promote pain control before resuming pulleys and other mobility TE  Plan: Continue per plan of care        Precautions: Osteoporosis, high pain irritability       Manual            PRom AF AF AF          MFR to scap and UR AF AF AF                                                     Exercise Diary              Scap retractions  30            Seated ER             Ball roll outs  30 each              pendulums   30                                                                                                                                                                                                                              Modalities

## 2019-08-02 ENCOUNTER — APPOINTMENT (OUTPATIENT)
Dept: PHYSICAL THERAPY | Facility: MEDICAL CENTER | Age: 84
End: 2019-08-02
Payer: MEDICARE

## 2019-08-15 ENCOUNTER — OFFICE VISIT (OUTPATIENT)
Dept: NEPHROLOGY | Facility: CLINIC | Age: 84
End: 2019-08-15
Payer: MEDICARE

## 2019-08-15 VITALS
HEIGHT: 67 IN | DIASTOLIC BLOOD PRESSURE: 78 MMHG | BODY MASS INDEX: 37.7 KG/M2 | HEART RATE: 83 BPM | SYSTOLIC BLOOD PRESSURE: 124 MMHG | WEIGHT: 240.2 LBS

## 2019-08-15 DIAGNOSIS — I12.9 BENIGN HYPERTENSIVE KIDNEY DISEASE WITH CHRONIC KIDNEY DISEASE STAGE I THROUGH STAGE IV, OR UNSPECIFIED: ICD-10-CM

## 2019-08-15 DIAGNOSIS — E87.1 HYPONATREMIA: ICD-10-CM

## 2019-08-15 DIAGNOSIS — I50.32 CHRONIC DIASTOLIC CONGESTIVE HEART FAILURE (HCC): ICD-10-CM

## 2019-08-15 DIAGNOSIS — N25.81 SECONDARY HYPERPARATHYROIDISM, RENAL (HCC): Primary | ICD-10-CM

## 2019-08-15 DIAGNOSIS — M1A.9XX0 CHRONIC GOUT WITHOUT TOPHUS, UNSPECIFIED CAUSE, UNSPECIFIED SITE: Chronic | ICD-10-CM

## 2019-08-15 DIAGNOSIS — I27.21 PULMONARY ARTERY HYPERTENSION (HCC): ICD-10-CM

## 2019-08-15 DIAGNOSIS — N18.30 CHRONIC KIDNEY DISEASE, STAGE 3 (HCC): Chronic | ICD-10-CM

## 2019-08-15 DIAGNOSIS — I10 HYPERTENSION, UNSPECIFIED TYPE: Chronic | ICD-10-CM

## 2019-08-15 PROCEDURE — 99214 OFFICE O/P EST MOD 30 MIN: CPT | Performed by: INTERNAL MEDICINE

## 2019-08-15 PROCEDURE — 1123F ACP DISCUSS/DSCN MKR DOCD: CPT | Performed by: INTERNAL MEDICINE

## 2019-08-15 NOTE — PROGRESS NOTES
OFFICE FOLLOW UP - Nephrology   Chula Duff 81 y o  female MRN: [id number]    Encounter: [id number]        ASSESSMENT and PLAN:  Jazz Cordova was seen today for follow-up  Diagnoses and all orders for this visit:    Jazz Cordova is 81years old and has a history of diastolic congestive heart failure, pulmonary hypertension, stage 3 chronic kidney disease who presents for follow-up     1  Secondary hyperparathyroidism, renal ([facility] [address] )  - will repeat a parathyroid hormone level  - she remains on vitamin D3 1000 units daily     2  Essential hypertension  - her blood pressure is well controlled today  - she is currently indapamide  And metoprolol  - her edema has been well controlled actually with indapamide she did tried torsemide but is having significant nausea so this was discontinued her sodium on indapamide was 141  -she had an isolated blood pressure of around 716 systolic, she has had difficulties with ARB is a in the past and ACE-inhibitor given that her blood pressures average 681-501 systolic will hold off on further changes in medication at this point     3  Cardiomegaly  - follows up with cardiologist at [facility] and rosalie had a recent cardiac catheterization minor complication overall remains stable did not require blood transfusion and required rehab for 2 weeks now doing better at that time and now doing better     4  Chronic gout without tophus, unspecified cause, unspecified site  -now on allopurinol 100 mg b i d   - tolerating this well  With no recent gout attacks     5  Diastolic dysfunction  - no episodes of flash pulmonary edema does have lower extremity edema but stable and improved  - continue ongoing cardiology follow-up  -compression stockings and leg elevation     6  Chronic kidney disease, stage 3  -creatinine currently stable with an estimated GFR of 45-50  -continue to monitor for anemia related to chronic kidney disease and bone mineral disease    7   Pulmonary arterial hypertension  -had a right heart catheterization and follows up with Cardiology    8  Hyponatremia  -recent episode of hyponatremia while in the hospital this has since resolved her last sodium was 141 this was thought to be due to volume overload and medication induced   Given the relative stability will follow up in 6 months    Healthcare maintenance  Patient has a living will and power of  already  Continue cancer screenings and vaccination    HPI: Brain Herrera is a 81 y o  female who is here for Follow-up    Since her last office visit she has been doing relatively well did see her cardiologist and had an isolated high blood pressure pressure in the 654 systolic otherwise has been relatively stable and her blood pressures at home and at work have been between 599-156 systolic n    She has been on and gap might have metoprolol for blood pressure control she currently denies any chest pain or shortness of breath no fevers chills no nausea vomiting diarrhea or constipation she denies any foamy urine or any bloody urine    ROS:   All the systems were reviewed and were negative except as documented on the HPI  Allergies: Acetazolamide; Allopurinol; Colchicine; Diltiazem; Docusate; Eszopiclone; Febuxostat; Glycerin; Hydrochlorothiazide; Latex; Metformin; Nortriptyline; Penicillins; Povidone iodine; Ropinirole; Simvastatin; Sulfa antibiotics;  Torsemide; Valsartan; and Fluticasone    Medications:   Current Outpatient Medications:     allopurinol (ZYLOPRIM) 100 mg tablet, Take 100 mg by mouth 2 (two) times a day  , Disp: , Rfl:     bimatoprost (LUMIGAN) 0 01 % ophthalmic drops, Administer 1 drop to both eyes daily at bedtime, Disp: , Rfl:     calcium citrate-vitamin D (CITRACAL+D) 315-200 MG-UNIT per tablet, Take 1 tablet by mouth 2 (two) times a day, Disp: , Rfl:     cholecalciferol (VITAMIN D3) 1,000 units tablet, Take 1,000 Units by mouth daily, Disp: , Rfl:     ferrous sulfate 325 (65 Fe) mg tablet, Take 325 mg by mouth daily with breakfast, Disp: , Rfl:     gabapentin (NEURONTIN) 300 mg capsule, take 2 capsules by mouth at bedtime, Disp: 180 capsule, Rfl: 1    HYDROcodone-acetaminophen (NORCO) 5-325 mg per tablet, Take 1 tablet by mouth every 6 (six) hours as needed for pain for up to 10 dosesMax Daily Amount: 4 tablets, Disp: 20 tablet, Rfl: 0    indapamide (LOZOL) 2 5 mg tablet, Take 2 5 mg by mouth every morning, Disp: , Rfl:     insulin detemir (LEVEMIR) 100 units/mL subcutaneous injection, Inject 60 Units under the skin daily at bedtime , Disp: , Rfl:     Magnesium 400 MG CAPS, Take 1 each by mouth daily , Disp: , Rfl:     methocarbamol (ROBAXIN) 500 mg tablet, Take 1 5 tablets (750 mg total) by mouth 2 (two) times a day, Disp: 10 tablet, Rfl: 0    metoprolol succinate (TOPROL-XL) 50 mg 24 hr tablet, Take 50 mg by mouth daily, Disp: , Rfl:     Multiple Vitamin (MULTIVITAMIN) tablet, Take 1 tablet by mouth daily, Disp: , Rfl:     polyethylene glycol-propylene glycol (SYSTANE) 0 4-0 3 %, 1 drop every 3 (three) hours as needed , Disp: , Rfl:     RABEprazole (ACIPHEX) 20 MG tablet, Take 20 mg by mouth daily, Disp: , Rfl:     tolterodine (DETROL) 1 mg tablet, Take 1 mg by mouth daily at bedtime, Disp: , Rfl:     warfarin (COUMADIN) 5 mg tablet, Take 1 tablet (5 mg total) by mouth daily Adjust based on inr (Patient taking differently: Take 3 75 mg by mouth daily Adjust based on inr), Disp: 20 tablet, Rfl: 0    gabapentin (NEURONTIN) 100 mg capsule, take 1 capsule by mouth four times a day (Patient not taking: Reported on 5/16/2019), Disp: 360 capsule, Rfl: 1    Multiple Vitamins-Minerals (PRESERVISION AREDS 2 PO), Take by mouth 2 (two) times a day, Disp: , Rfl:     Past Medical History:   Diagnosis Date    Chronic atrial fibrillation (HCC) 9/13/2017    Chronic gout 9/13/2017    Chronic rhinitis 11/08/2018    CKD (chronic kidney disease) stage 3, GFR 30-59 ml/min (HCC) 9/13/2017    Colon polyp 9/13/2017    Coronary artery disease     Diabetes mellitus ([facility] [address] )     COMBS (dyspnea on exertion) 11/02/2018    Essential hypertension 9/13/2017    Gastritis 9/13/2017    Hypertension     Irregular heart beat     Mechanical heart valve present 9/13/2017    Osteoarthritis     Pacemaker     S/P AVR     Spinal stenosis     disk herniation      Past Surgical History:   Procedure Laterality Date    AORTIC VALVE REPLACEMENT      CARDIAC SURGERY      CATARACT EXTRACTION      CHOLECYSTECTOMY      COLONOSCOPY      ESOPHAGOGASTRODUODENOSCOPY      HERNIA REPAIR      x3    HYSTERECTOMY      INSERT / REPLACE / REMOVE PACEMAKER      IR PELVIC ANGIOGRAPHY / INTERVENTION  1/29/2019    JOINT REPLACEMENT Bilateral     knees    WI COLONOSCOPY FLX DX W/COLLJ SPEC WHEN PFRMD N/A 9/12/2017    Procedure: EGDwith bxAND COLONOSCOPY with polypectomies;  Surgeon: Mandy Dewey MD;  Location: [facility]; Service: Gastroenterology    WI SIGMOIDOSCOPY FLX DX W/COLLJ SPEC BR/WA IF PFRMD N/A 9/13/2017    Procedure: colonoscopy with hemo clip x 2;  Surgeon: Mandy Dewey MD;  Location: [facility]; Service: Gastroenterology    ROTATOR CUFF REPAIR Left     SKIN BIOPSY      TONSILLECTOMY       Family History   Problem Relation Age of Onset    Diabetes Mother     Hypertension Mother     Heart disease Father     Emphysema Father     Asthma Sister     Cancer Sister     Hypertension Sister       reports that she has quit smoking  She has never used smokeless tobacco  She reports that she does not drink alcohol or use drugs  Physical Exam:   Vitals:    08/15/19 1431   Weight: 109 kg (240 lb 3 2 oz)   Height: 5' 7" (1 702 m)     Body mass index is 37 62 kg/m²      General: conscious, cooperative, in not acute distress  Eyes: conjunctivae pink, anicteric sclerae  ENT: lips and mucous membranes moist  Neck: supple, no JVD  Chest: clear breath sounds bilateral, no crackles, ronchus or wheezings  CVS: distinct S1 & S2, normal rate, regular rhythm  Abdomen: soft, non-tender, non-distended, normoactive bowel sounds  Extremities: no edema of both legs  Skin: no rash  Neuro: awake, alert, oriented    Lab Results:    Results for orders placed or performed during the hospital encounter of 02/02/19   Protime-INR   Result Value Ref Range    Protime 29 6 (H) 9 5 - 11 6 seconds    INR 2 97 (H) 0 89 - 1 10   Hemoglobin and hematocrit, blood   Result Value Ref Range    Hemoglobin 8 0 (L) 12 0 - 16 0 g/dL    Hematocrit 24 8 (L) 36 0 - 46 0 %   Protime-INR   Result Value Ref Range    Protime 28 2 (H) 9 5 - 11 6 seconds    INR 2 82 (H) 0 89 - 1 10   Hemoglobin and hematocrit, blood   Result Value Ref Range    Hemoglobin 8 6 (L) 12 0 - 16 0 g/dL    Hematocrit 26 4 (L) 36 0 - 46 0 %   Protime-INR   Result Value Ref Range    Protime 16 8 (H) 9 5 - 11 6 seconds    INR 1 63 (H) 0 89 - 1 10   Hemoglobin and hematocrit, blood   Result Value Ref Range    Hemoglobin 8 7 (L) 12 0 - 16 0 g/dL    Hematocrit 26 8 (L) 36 0 - 46 0 %   Platelet count   Result Value Ref Range    Platelets 650 183 - 152 Thousands/uL   Protime-INR   Result Value Ref Range    Protime 17 0 (H) 9 5 - 11 6 seconds    INR 1 65 (H) 0 89 - 1 10   CBC and differential   Result Value Ref Range    WBC 8 20 4 50 - 11 00 Thousand/uL    RBC 2 75 (L) 4 00 - 5 20 Million/uL    Hemoglobin 8 3 (L) 12 0 - 16 0 g/dL    Hematocrit 26 2 (L) 36 0 - 46 0 %    MCV 95 80 - 100 fL    MCH 30 3 26 0 - 34 0 pg    MCHC 31 8 31 0 - 36 0 g/dL    RDW 17 3 (H) <15 3 %    MPV 7 6 (L) 8 9 - 12 7 fL    Platelets 489 196 - 632 Thousands/uL    Neutrophils Relative 73 (H) 45 - 65 %    Lymphocytes Relative 18 (L) 25 - 45 %    Monocytes Relative 5 1 - 10 %    Eosinophils Relative 3 0 - 6 %    Basophils Relative 1 0 - 1 %    Neutrophils Absolute 6 00 1 80 - 7 80 Thousands/µL    Lymphocytes Absolute 1 50 0 50 - 4 00 Thousands/µL    Monocytes Absolute 0 40 0 20 - 0 90 Thousand/µL    Eosinophils Absolute 0 30 0 00 - 0 40 Thousand/µL Basophils Absolute 0 10 0 00 - 0 10 Thousands/µL   Basic metabolic panel   Result Value Ref Range    Sodium 129 (L) 137 - 147 mmol/L    Potassium 4 2 3 6 - 5 0 mmol/L    Chloride 89 (L) 97 - 108 mmol/L    CO2 32 (H) 22 - 30 mmol/L    ANION GAP 8 5 - 14 mmol/L    BUN 32 (H) 5 - 25 mg/dL    Creatinine 1 09 0 60 - 1 20 mg/dL    Glucose 140 (H) 70 - 99 mg/dL    Calcium 8 7 8 4 - 10 2 mg/dL    eGFR 47 (L) >60 ml/min/1 73sq m   Sodium, urine, random   Result Value Ref Range    Sodium, Ur 36    Osmolality, urine   Result Value Ref Range    Osmolality, Ur 238 (L) 250 - 900 mmol/KG   Osmolality   Result Value Ref Range    Osmolality Serum 290 282 - 298 mmol/KG   Protime-INR   Result Value Ref Range    Protime 17 2 (H) 9 5 - 11 6 seconds    INR 1 67 (H) 0 89 - 1 10   CBC and differential   Result Value Ref Range    WBC 8 20 4 50 - 11 00 Thousand/uL    RBC 3 13 (L) 4 00 - 5 20 Million/uL    Hemoglobin 9 6 (L) 12 0 - 16 0 g/dL    Hematocrit 29 8 (L) 36 0 - 46 0 %    MCV 95 80 - 100 fL    MCH 30 7 26 0 - 34 0 pg    MCHC 32 3 31 0 - 36 0 g/dL    RDW 17 5 (H) <15 3 %    MPV 7 7 (L) 8 9 - 12 7 fL    Platelets 969 409 - 002 Thousands/uL   Basic metabolic panel   Result Value Ref Range    Sodium 130 (L) 137 - 147 mmol/L    Potassium 4 2 3 6 - 5 0 mmol/L    Chloride 89 (L) 97 - 108 mmol/L    CO2 32 (H) 22 - 30 mmol/L    ANION GAP 9 5 - 14 mmol/L    BUN 31 (H) 5 - 25 mg/dL    Creatinine 1 06 0 60 - 1 20 mg/dL    Glucose 123 (H) 70 - 99 mg/dL    Calcium 9 0 8 4 - 10 2 mg/dL    eGFR 49 (L) >60 ml/min/1 73sq m   Protime-INR   Result Value Ref Range    Protime 20 1 (H) 9 5 - 11 6 seconds    INR 1 97 (H) 0 89 - 1 10   Protime-INR   Result Value Ref Range    Protime 22 7 (H) 9 5 - 11 6 seconds    INR 2 24 (H) 0 89 - 1 10   Protime-INR   Result Value Ref Range    Protime 21 1 (H) 9 5 - 11 6 seconds    INR 2 07 (H) 0 89 - 9 91   Basic metabolic panel   Result Value Ref Range    Sodium 132 (L) 137 - 147 mmol/L    Potassium 4 4 3 6 - 5 0 mmol/L Chloride 91 (L) 97 - 108 mmol/L    CO2 32 (H) 22 - 30 mmol/L    ANION GAP 9 5 - 14 mmol/L    BUN 26 (H) 5 - 25 mg/dL    Creatinine 0 97 0 60 - 1 20 mg/dL    Glucose 128 (H) 70 - 99 mg/dL    Calcium 8 8 8 4 - 10 2 mg/dL    eGFR 55 (L) >60 ml/min/1 73sq m   CBC   Result Value Ref Range    WBC 8 50 4 50 - 11 00 Thousand/uL    RBC 3 08 (L) 4 00 - 5 20 Million/uL    Hemoglobin 9 5 (L) 12 0 - 16 0 g/dL    Hematocrit 29 4 (L) 36 0 - 46 0 %    MCV 96 80 - 100 fL    MCH 30 9 26 0 - 34 0 pg    MCHC 32 4 31 0 - 36 0 g/dL    RDW 17 7 (H) <15 3 %    Platelets 197 051 - 336 Thousands/uL    MPV 7 4 (L) 8 9 - 12 7 fL   Protime-INR   Result Value Ref Range    Protime 26 6 (H) 9 5 - 11 6 seconds    INR 2 65 (H) 0 89 - 8 69   Basic metabolic panel   Result Value Ref Range    Sodium 135 (L) 137 - 147 mmol/L    Potassium 4 2 3 6 - 5 0 mmol/L    Chloride 95 (L) 97 - 108 mmol/L    CO2 31 (H) 22 - 30 mmol/L    ANION GAP 9 5 - 14 mmol/L    BUN 31 (H) 5 - 25 mg/dL    Creatinine 1 22 (H) 0 60 - 1 20 mg/dL    Glucose 124 (H) 70 - 99 mg/dL    Calcium 9 1 8 4 - 10 2 mg/dL    eGFR 41 (L) >60 ml/min/1 73sq m   Protime-INR   Result Value Ref Range    Protime 28 0 (H) 9 5 - 11 6 seconds    INR 2 80 (H) 0 89 - 9 40   Basic metabolic panel   Result Value Ref Range    Sodium 132 (L) 137 - 147 mmol/L    Potassium 4 1 3 6 - 5 0 mmol/L    Chloride 93 (L) 97 - 108 mmol/L    CO2 34 (H) 22 - 30 mmol/L    ANION GAP 5 5 - 14 mmol/L    BUN 30 (H) 5 - 25 mg/dL    Creatinine 1 14 0 60 - 1 20 mg/dL    Glucose 106 (H) 70 - 99 mg/dL    Glucose, Fasting 106 (H) 70 - 99 mg/dL    Calcium 9 1 8 4 - 10 2 mg/dL    eGFR 45 (L) >60 ml/min/1 73sq m   Basic metabolic panel   Result Value Ref Range    Sodium 129 (L) 137 - 147 mmol/L    Potassium 4 9 3 6 - 5 0 mmol/L    Chloride 95 (L) 97 - 108 mmol/L    CO2 22 22 - 30 mmol/L    ANION GAP 12 5 - 14 mmol/L    BUN 28 (H) 5 - 25 mg/dL    Creatinine 1 03 0 60 - 1 20 mg/dL    Glucose 142 (H) 70 - 99 mg/dL    Calcium 8 4 8 4 - 10 2 mg/dL    eGFR 51 (L) >60 ml/min/1 73sq m   Osmolality, urine   Result Value Ref Range    Osmolality, Ur 263 250 - 900 mmol/KG   Sodium, urine, random   Result Value Ref Range    Sodium, Ur 17    Basic metabolic panel   Result Value Ref Range    Sodium 134 (L) 137 - 147 mmol/L    Potassium 4 2 3 6 - 5 0 mmol/L    Chloride 95 (L) 97 - 108 mmol/L    CO2 32 (H) 22 - 30 mmol/L    ANION GAP 7 5 - 14 mmol/L    BUN 27 (H) 5 - 25 mg/dL    Creatinine 1 06 0 60 - 1 20 mg/dL    Glucose 120 (H) 70 - 99 mg/dL    Glucose, Fasting 120 (H) 70 - 99 mg/dL    Calcium 9 0 8 4 - 10 2 mg/dL    eGFR 49 (L) >60 ml/min/1 73sq m   Protime-INR   Result Value Ref Range    Protime 25 5 (H) 9 5 - 11 6 seconds    INR 2 54 (H) 0 89 - 5 79   Basic metabolic panel   Result Value Ref Range    Sodium 133 (L) 137 - 147 mmol/L    Potassium 3 8 3 6 - 5 0 mmol/L    Chloride 93 (L) 97 - 108 mmol/L    CO2 32 (H) 22 - 30 mmol/L    ANION GAP 8 5 - 14 mmol/L    BUN 33 (H) 5 - 25 mg/dL    Creatinine 1 19 0 60 - 1 20 mg/dL    Glucose 124 (H) 70 - 99 mg/dL    Calcium 8 5 8 4 - 10 2 mg/dL    eGFR 43 (L) >60 ml/min/1 73sq m   Protime-INR   Result Value Ref Range    Protime 20 3 (H) 9 5 - 11 6 seconds    INR 1 99 (H) 0 89 - 1 10   Fingerstick Glucose (POCT)   Result Value Ref Range    POC Glucose 162 (H) 70 - 99 mg/dl   Fingerstick Glucose (POCT)   Result Value Ref Range    POC Glucose 199 (H) 70 - 99 mg/dl   Fingerstick Glucose (POCT)   Result Value Ref Range    POC Glucose 132 (H) 70 - 99 mg/dl   Fingerstick Glucose (POCT)   Result Value Ref Range    POC Glucose 142 (H) 70 - 99 mg/dl   Fingerstick Glucose (POCT)   Result Value Ref Range    POC Glucose 186 (H) 70 - 99 mg/dl   Fingerstick Glucose (POCT)   Result Value Ref Range    POC Glucose  70 - 99 mg/dl   Fingerstick Glucose (POCT)   Result Value Ref Range    POC Glucose 221 (H) 70 - 99 mg/dl   Fingerstick Glucose (POCT)   Result Value Ref Range    POC Glucose 177 (H) 70 - 99 mg/dl   Fingerstick Glucose (POCT)   Result Value Ref Range    POC Glucose 151 (H) 70 - 99 mg/dl   Fingerstick Glucose (POCT)   Result Value Ref Range    POC Glucose 189 (H) 70 - 99 mg/dl   Fingerstick Glucose (POCT)   Result Value Ref Range    POC Glucose 190 (H) 70 - 99 mg/dl   Fingerstick Glucose (POCT)   Result Value Ref Range    POC Glucose 146 (H) 70 - 99 mg/dl   Fingerstick Glucose (POCT)   Result Value Ref Range    POC Glucose 192 (H) 65 - 140 mg/dl   Fingerstick Glucose (POCT)   Result Value Ref Range    POC Glucose 185 (H) 65 - 140 mg/dl   Fingerstick Glucose (POCT)   Result Value Ref Range    POC Glucose 200 (H) 65 - 140 mg/dl   Fingerstick Glucose (POCT)   Result Value Ref Range    POC Glucose 114 65 - 140 mg/dl   Fingerstick Glucose (POCT)   Result Value Ref Range    POC Glucose 206 (H) 65 - 140 mg/dl   Fingerstick Glucose (POCT)   Result Value Ref Range    POC Glucose 131 65 - 140 mg/dl   Fingerstick Glucose (POCT)   Result Value Ref Range    POC Glucose 240 (H) 65 - 140 mg/dl   Fingerstick Glucose (POCT)   Result Value Ref Range    POC Glucose 130 65 - 140 mg/dl   Fingerstick Glucose (POCT)   Result Value Ref Range    POC Glucose 155 (H) 65 - 140 mg/dl   Fingerstick Glucose (POCT)   Result Value Ref Range    POC Glucose 157 (H) 65 - 140 mg/dl   Fingerstick Glucose (POCT)   Result Value Ref Range    POC Glucose 166 (H) 65 - 140 mg/dl   Fingerstick Glucose (POCT)   Result Value Ref Range    POC Glucose 216 (H) 65 - 140 mg/dl   Fingerstick Glucose (POCT)   Result Value Ref Range    POC Glucose 108 65 - 140 mg/dl   Fingerstick Glucose (POCT)   Result Value Ref Range    POC Glucose 225 (H) 65 - 140 mg/dl   Fingerstick Glucose (POCT)   Result Value Ref Range    POC Glucose 151 (H) 65 - 140 mg/dl   Fingerstick Glucose (POCT)   Result Value Ref Range    POC Glucose 210 (H) 65 - 140 mg/dl   Fingerstick Glucose (POCT)   Result Value Ref Range    POC Glucose 153 (H) 65 - 140 mg/dl   Fingerstick Glucose (POCT)   Result Value Ref Range POC Glucose 161 (H) 65 - 140 mg/dl   Fingerstick Glucose (POCT)   Result Value Ref Range    POC Glucose 172 (H) 65 - 140 mg/dl   Fingerstick Glucose (POCT)   Result Value Ref Range    POC Glucose 170 (H) 65 - 140 mg/dl   Fingerstick Glucose (POCT)   Result Value Ref Range    POC Glucose 138 65 - 140 mg/dl   Fingerstick Glucose (POCT)   Result Value Ref Range    POC Glucose 153 (H) 65 - 140 mg/dl   Fingerstick Glucose (POCT)   Result Value Ref Range    POC Glucose 233 (H) 65 - 140 mg/dl   Fingerstick Glucose (POCT)   Result Value Ref Range    POC Glucose 253 (H) 65 - 140 mg/dl   Fingerstick Glucose (POCT)   Result Value Ref Range    POC Glucose 94 65 - 140 mg/dl   Fingerstick Glucose (POCT)   Result Value Ref Range    POC Glucose 140 65 - 140 mg/dl   Fingerstick Glucose (POCT)   Result Value Ref Range    POC Glucose 204 (H) 65 - 140 mg/dl   Fingerstick Glucose (POCT)   Result Value Ref Range    POC Glucose 183 (H) 65 - 140 mg/dl   Fingerstick Glucose (POCT)   Result Value Ref Range    POC Glucose 93 65 - 140 mg/dl   Fingerstick Glucose (POCT)   Result Value Ref Range    POC Glucose 176 (H) 65 - 140 mg/dl   Fingerstick Glucose (POCT)   Result Value Ref Range    POC Glucose 141 (H) 65 - 140 mg/dl   Fingerstick Glucose (POCT)   Result Value Ref Range    POC Glucose 165 (H) 65 - 140 mg/dl   Fingerstick Glucose (POCT)   Result Value Ref Range    POC Glucose 116 65 - 140 mg/dl   Fingerstick Glucose (POCT)   Result Value Ref Range    POC Glucose 142 (H) 65 - 140 mg/dl   Fingerstick Glucose (POCT)   Result Value Ref Range    POC Glucose 147 (H) 65 - 140 mg/dl   Fingerstick Glucose (POCT)   Result Value Ref Range    POC Glucose 188 (H) 65 - 140 mg/dl   Fingerstick Glucose (POCT)   Result Value Ref Range    POC Glucose 93 65 - 140 mg/dl   Fingerstick Glucose (POCT)   Result Value Ref Range    POC Glucose 188 (H) 65 - 140 mg/dl   Fingerstick Glucose (POCT)   Result Value Ref Range    POC Glucose 161 (H) 65 - 140 mg/dl Fingerstick Glucose (POCT)   Result Value Ref Range    POC Glucose 227 (H) 65 - 140 mg/dl   Fingerstick Glucose (POCT)   Result Value Ref Range    POC Glucose 102 65 - 140 mg/dl   Fingerstick Glucose (POCT)   Result Value Ref Range    POC Glucose 200 (H) 65 - 140 mg/dl   Fingerstick Glucose (POCT)   Result Value Ref Range    POC Glucose 156 (H) 65 - 140 mg/dl   Fingerstick Glucose (POCT)   Result Value Ref Range    POC Glucose 156 (H) 65 - 140 mg/dl   Fingerstick Glucose (POCT)   Result Value Ref Range    POC Glucose 201 (H) 65 - 140 mg/dl   Fingerstick Glucose (POCT)   Result Value Ref Range    POC Glucose 111 65 - 140 mg/dl   Fingerstick Glucose (POCT)   Result Value Ref Range    POC Glucose 142 (H) 65 - 140 mg/dl   Fingerstick Glucose (POCT)   Result Value Ref Range    POC Glucose 188 (H) 65 - 140 mg/dl   Fingerstick Glucose (POCT)   Result Value Ref Range    POC Glucose 114 65 - 140 mg/dl   Fingerstick Glucose (POCT)   Result Value Ref Range    POC Glucose 176 (H) 65 - 140 mg/dl   Fingerstick Glucose (POCT)   Result Value Ref Range    POC Glucose 159 (H) 65 - 140 mg/dl   Fingerstick Glucose (POCT)   Result Value Ref Range    POC Glucose 219 (H) 65 - 140 mg/dl   Fingerstick Glucose (POCT)   Result Value Ref Range    POC Glucose 128 65 - 140 mg/dl   Fingerstick Glucose (POCT)   Result Value Ref Range    POC Glucose 203 (H) 65 - 140 mg/dl   Manual Differential (Non Wam)   Result Value Ref Range    Segmented % 61 45 - 65 %    Bands % 1 0 - 8 %    Lymphocytes % 25 25 - 45 %    Monocytes % 9 1 - 10 %    Eosinophils, % 4 0 - 6 %    Neutrophils Absolute 5 08 1 80 - 7 80 Thousand/uL    Lymphocytes Absolute 2 05 0 50 - 4 00 Thousand/uL    Monocytes Absolute 0 74 0 20 - 0 90 Thousand/uL    Eosinophils Absolute 0 33 0 00 - 0 40 Thousand/uL    Total Counted 100     RBC Morphology abnormal     Anisocytosis Present     Platelet Estimate Adequate Adequate         Portions of the record may have been created with voice recognition software  Occasional wrong word or "sound a like" substitutions may have occurred due to the inherent limitations of voice recognition software  Read the chart carefully and recognize, using context, where substitutions have occurred  If you have any questions, please contact the dictating provider

## 2019-08-15 NOTE — PATIENT INSTRUCTIONS
Chronic Kidney Disease   WHAT YOU NEED TO KNOW:   Chronic kidney disease (CKD) is the gradual and permanent loss of kidney function  It is also called chronic kidney failure, or chronic renal insufficiency  Normally, the kidneys remove fluid, chemicals, and waste from your blood  These wastes are turned into urine by your kidneys  CKD may worsen over time and lead to kidney failure  DISCHARGE INSTRUCTIONS:   Return to the emergency department if:   · You are confused and very drowsy  · You have a seizure  · You have shortness of breath  Contact your healthcare provider if:   · You suddenly gain or lose more weight than your healthcare provider has told you is okay  · You have itchy skin or a rash  · You urinate more or less than you normally do  · You have blood in your urine  · You have nausea and repeated vomiting  · You have fatigue or muscle weakness  · You have hiccups that will not stop  · You have questions or concerns about your condition or care  Medicines:   · Medicines  may be given to decrease blood pressure and get rid of extra fluid  You may also receive medicine to manage health conditions that may occur with CKD, such as anemia, diabetes, and heart disease  · Take your medicine as directed  Contact your healthcare provider if you think your medicine is not helping or if you have side effects  Tell him or her if you are allergic to any medicine  Keep a list of the medicines, vitamins, and herbs you take  Include the amounts, and when and why you take them  Bring the list or the pill bottles to follow-up visits  Carry your medicine list with you in case of an emergency  Follow up with your healthcare provider as directed: You will need to return for tests to monitor your kidney function  You may also be referred to a kidney specialist  Write down your questions so you remember to ask them during your visits  Manage other health conditions:   Follow your healthcare provider's directions on how to manage diabetes, high blood pressure, and heart disease  These conditions can make CKD worse  Talk to your healthcare provider before you take over-the-counter medicine  Medicines such as NSAIDs, stomach medicine, or laxatives may harm your kidneys  Weigh yourself daily:  Ask your healthcare provider what your weight should be  Ask how much liquid you should drink each day  CKD may cause you to gain or lose weight rapidly  Weigh yourself every day  Write down your weight, how much liquid you drink or eat, and how much you urinate each day  Contact your healthcare provider if your weight is higher or lower than it should be  Manage CKD:   · Maintain a healthy weight  Ask your healthcare provider how much you should weigh  Ask him to help you create a weight loss plan if you are overweight  · Exercise 30 to 60 minutes a day, 4 to 7 times a week, or as directed  Ask about the best exercise plan for you  Regular exercise can help you manage CKD, high blood pressure, and diabetes  · Follow your healthcare provider's advice about what to eat and drink  He may tell you to eat food low in sodium (salt), potassium, phosphorus, or protein  You may need to see a dietitian if you need help planning meals  Ask how much liquid to drink each day and which liquids are best for you  · Limit alcohol  Ask how much alcohol is safe for you to drink  A drink of alcohol is 12 ounces of beer, 5 ounces of wine, or 1½ ounces of liquor  · Do not smoke  Nicotine and other chemicals in cigarettes and cigars can cause lung and kidney damage  Ask your healthcare provider for information if you currently smoke and need help to quit  E-cigarettes or smokeless tobacco still contain nicotine  Talk to your healthcare provider before you use these products  · Ask your healthcare provider if you need vaccines    Infections such as pneumonia, influenza, and hepatitis can be more harmful or more likely to occur in a person who has CKD  Vaccines reduce your risk of infection with these viruses  © 2017 [facility] Information is for End User's use only and may not be sold, redistributed or otherwise used for commercial purposes  All illustrations and images included in CareNotes® are the copyrighted property of A D A M [employer]  or [employer]  The above information is an  only  It is not intended as medical advice for individual conditions or treatments  Talk to your doctor, nurse or pharmacist before following any medical regimen to see if it is safe and effective for you

## 2019-08-16 ENCOUNTER — APPOINTMENT (OUTPATIENT)
Dept: PHYSICAL THERAPY | Facility: MEDICAL CENTER | Age: 84
End: 2019-08-16
Payer: MEDICARE

## 2019-08-23 ENCOUNTER — OFFICE VISIT (OUTPATIENT)
Dept: PHYSICAL THERAPY | Facility: MEDICAL CENTER | Age: 84
End: 2019-08-23
Payer: MEDICARE

## 2019-08-23 DIAGNOSIS — M75.01 ADHESIVE CAPSULITIS OF RIGHT SHOULDER: Primary | ICD-10-CM

## 2019-08-23 PROCEDURE — 97110 THERAPEUTIC EXERCISES: CPT | Performed by: PHYSICAL THERAPIST

## 2019-08-23 PROCEDURE — 97140 MANUAL THERAPY 1/> REGIONS: CPT | Performed by: PHYSICAL THERAPIST

## 2019-08-24 NOTE — PROGRESS NOTES
Daily Note     Today's date: 2019  Patient name: Mikki Muñoz  : 1935  MRN: [id number]  Referring provider: Km Villalpando MD  Dx:   Encounter Diagnosis     ICD-10-CM    1  Adhesive capsulitis of right shoulder M75 01                   Subjective: Flynn Tripp reports that she is doing better, has been working on light mobility at home with reduced pain       Objective: See treatment diary below      Assessment: Tolerated treatment well  Patient with low tolerance to overall TE however can perform low level mobility without issue and has reduced pain with scapular soft tissue work  Plan: Continue per plan of care        Precautions: Osteoporosis, high pain irritability       Manual           PRom AF AF AF AF         MFR to scap and UR AF AF AF AF                                                    Exercise Diary              Scap retractions  30   3-         Seated ER             Ball roll outs  30 each              pendulums   30 30                                                                                                                                                                                                                             Modalities

## 2019-08-30 ENCOUNTER — OFFICE VISIT (OUTPATIENT)
Dept: PHYSICAL THERAPY | Facility: MEDICAL CENTER | Age: 84
End: 2019-08-30
Payer: MEDICARE

## 2019-08-30 DIAGNOSIS — M75.01 ADHESIVE CAPSULITIS OF RIGHT SHOULDER: Primary | ICD-10-CM

## 2019-08-30 PROCEDURE — 97110 THERAPEUTIC EXERCISES: CPT | Performed by: PHYSICAL THERAPIST

## 2019-08-30 PROCEDURE — 97140 MANUAL THERAPY 1/> REGIONS: CPT | Performed by: PHYSICAL THERAPIST

## 2019-09-03 NOTE — PROGRESS NOTES
Daily Note     Today's date: 2019  Patient name: Juan David Hanks  : 1935  MRN: [id number]  Referring provider: Isela Varghese MD  Dx:   Encounter Diagnosis     ICD-10-CM    1  Adhesive capsulitis of right shoulder M75 01                   Subjective: Heidi Delacruz reports that she is feeling better and having less pain lately in he shoudler       Objective: See treatment diary below      Assessment: Tolerated treatment well  Patient tolerated low grade passive motion well and soft tissue releases which improve her pain      Plan: Continue per plan of care        Precautions: Osteoporosis, high pain irritability       Manual          PRom AF AF AF AF AF        MFR to scap and UR AF AF AF AF AF                                                   Exercise Diary              Scap retractions  30   3- 30        Seated ER             Ball roll outs  30 each              pendulums   30 30 30                                                                                                                                                                                                                            Modalities

## 2019-09-06 ENCOUNTER — OFFICE VISIT (OUTPATIENT)
Dept: PHYSICAL THERAPY | Facility: MEDICAL CENTER | Age: 84
End: 2019-09-06
Payer: MEDICARE

## 2019-09-06 DIAGNOSIS — M75.01 ADHESIVE CAPSULITIS OF RIGHT SHOULDER: Primary | ICD-10-CM

## 2019-09-06 PROCEDURE — 97140 MANUAL THERAPY 1/> REGIONS: CPT | Performed by: PHYSICAL THERAPIST

## 2019-09-06 PROCEDURE — 97110 THERAPEUTIC EXERCISES: CPT | Performed by: PHYSICAL THERAPIST

## 2019-09-09 ENCOUNTER — TELEPHONE (OUTPATIENT)
Dept: NEPHROLOGY | Facility: CLINIC | Age: 84
End: 2019-09-09

## 2019-09-09 NOTE — PROGRESS NOTES
Daily Note     Today's date: 2019  Patient name: Allison Martines  : 1935  MRN: [id number]  Referring provider: Paulina Vasquez MD  Dx:   Encounter Diagnosis     ICD-10-CM    1  Adhesive capsulitis of right shoulder M75 01                   Subjective: Augusto Reich with no new complaints       Objective: See treatment diary below      Assessment: Tolerated treatment well  Patient with significant scapular soft tissue restrictions  Improved post manuals      Plan: Continue per plan of care        Precautions: Osteoporosis, high pain irritability       Manual   9/       PRom AF AF AF AF AF AF       MFR to scap and UR AF AF AF AF AF AF                                                  Exercise Diary              Scap retractions  30   3- 30 30       Seated ER             Ball roll outs  30 each              pendulums   30 30 30 30                                                                                                                                                                                                                           Modalities

## 2019-09-09 NOTE — TELEPHONE ENCOUNTER
----- Message from Michele Carreon DO sent at 9/9/2019  2:29 PM EDT -----  Please let her know that I reviewed her blood work and its stable no changes to her medications

## 2019-09-12 NOTE — TELEPHONE ENCOUNTER
Anandlukasz returned my call and left a voicemail asking for a call back  I called Dianne back & made her aware of her stable lab results  Dianne also understands to continue her current medications  No other concerns at the moment   9/12/19

## 2019-09-13 ENCOUNTER — OFFICE VISIT (OUTPATIENT)
Dept: PHYSICAL THERAPY | Facility: MEDICAL CENTER | Age: 84
End: 2019-09-13
Payer: MEDICARE

## 2019-09-13 DIAGNOSIS — M75.01 ADHESIVE CAPSULITIS OF RIGHT SHOULDER: Primary | ICD-10-CM

## 2019-09-13 PROCEDURE — 97140 MANUAL THERAPY 1/> REGIONS: CPT | Performed by: PHYSICAL THERAPIST

## 2019-09-13 NOTE — PROGRESS NOTES
Daily Note     Today's date: 2019  Patient name: Gerhardt Server  : 1935  MRN: [id number]  Referring provider: Jasson Hartley MD  Dx:   Encounter Diagnosis     ICD-10-CM    1  Adhesive capsulitis of right shoulder M75 01                   Subjective: Jeanna Sanz reports that she is having more pain today in shoulder and unsure why       Objective: See treatment diary below      Assessment: Tolerated treatment well  Patient would benefit from continued PT as she receives pain relief and improved function post manuals aimed at soft tissue release and reduction of muscle spasms         Plan: Continue per plan of care        Precautions: Osteoporosis, high pain irritability       Manual   9/      PRom AF AF AF AF AF AF AF      MFR to scap and UR AF AF AF AF AF AF AF                                                 Exercise Diary              Scap retractions  30   3- 30 30       Seated ER             Ball roll outs  30 each              pendulums   30 30 30 30                                                                                                                                                                                                                           Modalities

## 2019-09-20 ENCOUNTER — OFFICE VISIT (OUTPATIENT)
Dept: PHYSICAL THERAPY | Facility: MEDICAL CENTER | Age: 84
End: 2019-09-20
Payer: MEDICARE

## 2019-09-20 DIAGNOSIS — M75.01 ADHESIVE CAPSULITIS OF RIGHT SHOULDER: Primary | ICD-10-CM

## 2019-09-20 PROCEDURE — 97110 THERAPEUTIC EXERCISES: CPT | Performed by: PHYSICAL THERAPIST

## 2019-09-20 PROCEDURE — 97140 MANUAL THERAPY 1/> REGIONS: CPT | Performed by: PHYSICAL THERAPIST

## 2019-09-20 NOTE — PROGRESS NOTES
Daily Note     Today's date: 2019  Patient name: Ashley Odell  : 1935  MRN: [id number]  Referring provider: Melody Cedillo MD  Dx:   Encounter Diagnosis     ICD-10-CM    1  Adhesive capsulitis of right shoulder M75 01                   Subjective: Alisia Serum reports that she is having increased shoulder soreness today,  Unsure why       Objective: See treatment diary below      Assessment: Tolerated treatment well  Patient responds well to UT soft tissue release which helps he shoulder pain  Plan: Continue per plan of care        Precautions: Osteoporosis, high pain irritability       Manual   9/t6      PRom AF AF AF AF AF AF AF AF     MFR to scap and UR AF AF AF AF AF AF AF AF                                                Exercise Diary              Scap retractions  30   3- 30 30  30     Seated ER             Ball roll outs  30 each              pendulums   30 30 30 30  30                                                                                                                                                                                                                         Modalities

## 2019-09-26 ENCOUNTER — OFFICE VISIT (OUTPATIENT)
Dept: PHYSICAL THERAPY | Facility: MEDICAL CENTER | Age: 84
End: 2019-09-26
Payer: MEDICARE

## 2019-09-26 DIAGNOSIS — M75.01 ADHESIVE CAPSULITIS OF RIGHT SHOULDER: Primary | ICD-10-CM

## 2019-09-26 PROCEDURE — 97140 MANUAL THERAPY 1/> REGIONS: CPT | Performed by: PHYSICAL THERAPIST

## 2019-09-27 ENCOUNTER — TRANSCRIBE ORDERS (OUTPATIENT)
Dept: ADMINISTRATIVE | Facility: HOSPITAL | Age: 84
End: 2019-09-27

## 2019-09-27 DIAGNOSIS — Z12.39 BREAST SCREENING: ICD-10-CM

## 2019-09-27 DIAGNOSIS — Z12.39 BREAST SCREENING, UNSPECIFIED: Primary | ICD-10-CM

## 2019-09-27 NOTE — PROGRESS NOTES
Daily Note     Today's date: 2019  Patient name: Lopez Rosenthal  : 1935  MRN: [id number]  Referring provider: Maritza Sullivan MD  Dx:   Encounter Diagnosis     ICD-10-CM    1  Adhesive capsulitis of right shoulder M75 01                   Subjective: Darshan Long reports that she is having more shoulder soreness today       Objective: See treatment diary below      Assessment: Tolerated treatment well  Patient continues to respond well to soft tissue release surrounding shoulder  Plan: Continue per plan of care        Precautions: Osteoporosis, high pain irritability       Manual  /    PRom AF AF AF AF AF AF AF AF AF    MFR to scap and UR AF AF AF AF AF AF AF AF AF                                               Exercise Diary              Scap retractions  30   3- 30 30  30     Seated ER             Ball roll outs  30 each              pendulums   30 30 30 30  30                                                                                                                                                                                                                         Modalities

## 2019-10-04 ENCOUNTER — TRANSCRIBE ORDERS (OUTPATIENT)
Dept: ADMINISTRATIVE | Age: 84
End: 2019-10-04

## 2019-10-04 ENCOUNTER — APPOINTMENT (OUTPATIENT)
Dept: RADIOLOGY | Age: 84
End: 2019-10-04
Payer: MEDICARE

## 2019-10-04 ENCOUNTER — HOSPITAL ENCOUNTER (OUTPATIENT)
Dept: RADIOLOGY | Age: 84
Discharge: HOME | End: 2019-10-04
Payer: MEDICARE

## 2019-10-04 ENCOUNTER — OFFICE VISIT (OUTPATIENT)
Dept: PHYSICAL THERAPY | Facility: MEDICAL CENTER | Age: 84
End: 2019-10-04
Payer: MEDICARE

## 2019-10-04 VITALS — HEIGHT: 66 IN | BODY MASS INDEX: 38.57 KG/M2 | WEIGHT: 240 LBS

## 2019-10-04 DIAGNOSIS — M54.50 LOW BACK PAIN, UNSPECIFIED BACK PAIN LATERALITY, UNSPECIFIED CHRONICITY, UNSPECIFIED WHETHER SCIATICA PRESENT: ICD-10-CM

## 2019-10-04 DIAGNOSIS — M75.01 ADHESIVE CAPSULITIS OF RIGHT SHOULDER: Primary | ICD-10-CM

## 2019-10-04 DIAGNOSIS — M54.50 LOW BACK PAIN, UNSPECIFIED BACK PAIN LATERALITY, UNSPECIFIED CHRONICITY, UNSPECIFIED WHETHER SCIATICA PRESENT: Primary | ICD-10-CM

## 2019-10-04 DIAGNOSIS — Z12.39 BREAST SCREENING: ICD-10-CM

## 2019-10-04 PROCEDURE — 77067 SCR MAMMO BI INCL CAD: CPT

## 2019-10-04 PROCEDURE — 97140 MANUAL THERAPY 1/> REGIONS: CPT | Performed by: PHYSICAL THERAPIST

## 2019-10-04 PROCEDURE — 72110 X-RAY EXAM L-2 SPINE 4/>VWS: CPT

## 2019-10-07 NOTE — PROGRESS NOTES
Daily Note     Today's date: 10/6/2019  Patient name: Jairo Ovalle  : 1935  MRN: [id number]  Referring provider: Serenity Rahman MD  Dx:   Encounter Diagnosis     ICD-10-CM    1  Adhesive capsulitis of right shoulder M75 01                   Subjective: Jairo Ovalle reports that manuals and current routine seem to help her pain the most       Objective: See treatment diary below      Assessment: Tolerated treatment well  Patient exhibited good technique with therapeutic exercises and would benefit from continued PT      Plan: Continue per plan of care        Precautions: Osteoporosis, high pain irritability       Manual   9/t6 9/13 9/20 9/26 10/4   PRom AF AF AF AF AF AF AF AF AF AF   MFR to scap and UR AF AF AF AF AF AF AF AF AF AF                                              Exercise Diary              Scap retractions  30   3- 30 30  30     Seated ER             Ball roll outs  30 each              pendulums   30 30 30 30  30                                                                                                                                                                                                                         Modalities

## 2019-10-18 ENCOUNTER — OFFICE VISIT (OUTPATIENT)
Dept: PHYSICAL THERAPY | Facility: MEDICAL CENTER | Age: 84
End: 2019-10-18
Payer: MEDICARE

## 2019-10-18 DIAGNOSIS — M75.01 ADHESIVE CAPSULITIS OF RIGHT SHOULDER: Primary | ICD-10-CM

## 2019-10-18 PROCEDURE — 97140 MANUAL THERAPY 1/> REGIONS: CPT | Performed by: PHYSICAL THERAPIST

## 2019-10-18 NOTE — PROGRESS NOTES
Daily Note     Today's date: 10/18/2019  Patient name: Patricia Thapa  : 1935  MRN: [id number]  Referring provider: Krystal Hernandez MD  Dx:   Encounter Diagnosis     ICD-10-CM    1  Adhesive capsulitis of right shoulder M75 01                   Subjective: [facility] [place] RIGOBERTO reports that the weather has made her shoulder somewhat more sore  Objective: See treatment diary below      Assessment: Tolerated treatment well  Patient would benefit from continued PT to address muscular spasm and provide pain relief for improved tolerance to activity and home exercises       Plan: Continue per plan of care        Precautions: Osteoporosis, high pain irritability       Manual  10/18 7/18 7/31 8/23 8/30 9/t6 9/13 9/20 9/26 10/4   PRom AF AF AF AF AF AF AF AF AF AF   MFR to scap and UR AF AF AF AF AF AF AF AF AF AF                                              Exercise Diary              Scap retractions  30   3- 30 30  30     Seated ER             Ball roll outs                pendulums   30 30 30 30  30                                                                                                                                                                                                                         Modalities

## 2019-10-25 ENCOUNTER — OFFICE VISIT (OUTPATIENT)
Dept: PHYSICAL THERAPY | Facility: MEDICAL CENTER | Age: 84
End: 2019-10-25
Payer: MEDICARE

## 2019-10-25 DIAGNOSIS — M75.01 ADHESIVE CAPSULITIS OF RIGHT SHOULDER: Primary | ICD-10-CM

## 2019-10-25 PROCEDURE — 97140 MANUAL THERAPY 1/> REGIONS: CPT | Performed by: PHYSICAL THERAPIST

## 2019-10-25 NOTE — PROGRESS NOTES
PT Re-Evaluation     Today's date: 10/25/2019  Patient name: Lindsey Solares  : 1935  MRN: [id number]  Referring provider: Honorio Montgomery MD  Dx:   Encounter Diagnosis     ICD-10-CM    1  Adhesive capsulitis of right shoulder M75 01                   Assessment  Assessment details: Lindsey Solares has been compliant with attending PT and home exercise program since initial eval   Marcella Rae  has made improvements in objective data and achieved desired functional goals  Patient reports having returned to their prior level or function  It was mutually agreed to Discharge to home exercise program at this time  Patient has good understanding of provided home exercise program and was instructed to call with any questions or if issues should arise  Impairments: abnormal muscle firing, abnormal muscle tone, abnormal or restricted ROM, impaired physical strength, lacks appropriate home exercise program and pain with function  Understanding of Dx/Px/POC: good   Prognosis: good    Goals  Patient will successfully transition to home exercise program   Patient will be able to manage symptoms independently      Patent will improve AROM by 50%   Patient will report no limitation overhead reaching to cabinet  Patient will report no limitation in sleeping     **All goals progressing as expected and DC to HEP     Plan  Plan details: Discharge from therapy   Patient would benefit from: skilled PT  Referral necessary: No  Planned modality interventions: thermotherapy: hydrocollator packs  Planned therapy interventions: home exercise program, manual therapy, neuromuscular re-education, patient education, functional ROM exercises, strengthening, stretching, joint mobilization, graded activity, graded exercise, therapeutic exercise, body mechanics training, motor coordination training and activity modification  Duration in weeks: 4  Treatment plan discussed with: patient        Subjective Evaluation    History of Present Illness  Mechanism of injury: 10/25/19    Patient reports that her shoulder has it's good and bad days  She notes that the manual therapy really helps to relax and relieve the pain  She still finds it difficult to use her arm very much due to fatigue and then pain      Pain  Current pain ratin  At best pain ratin  At worst pain ratin  Quality: dull ache          Objective     Palpation     Right   Tenderness of the latissimus, levator scapulae, subscapularis, supraspinatus and upper trapezius       Additional Palpation Details  Deferred due to high pain irritability     Cervical/Thoracic Screen   Cervical range of motion within normal limits  Thoracic range of motion within normal limits with the following exceptions: Significant thoracic kyphosis       Neurological Testing     Sensation     Shoulder   Left Shoulder   Intact: light touch    Right Shoulder   Intact: light touch    Active Range of Motion   Left Shoulder   Normal active range of motion    Right Shoulder   Flexion: 130 degrees   Abduction: 110 degrees   External rotation BTH: C3     Passive Range of Motion     Additional Passive Range of Motion Details  PROM empty end feel in all directions with pain     Scapular Mobility     Right Shoulder   Scapular mobility: fair             Precautions: Osteoporosis, high pain irritability       Manual  10/25            PRom AF            MFR to scap and UR AF                                                       Exercise Diary              Scap retractions             Seated ER                                                                                                                                                                                                                                                           Modalities

## 2019-11-21 ENCOUNTER — OFFICE VISIT (OUTPATIENT)
Dept: SLEEP CENTER | Facility: CLINIC | Age: 84
End: 2019-11-21
Payer: MEDICARE

## 2019-11-21 VITALS
SYSTOLIC BLOOD PRESSURE: 132 MMHG | HEIGHT: 66 IN | WEIGHT: 244 LBS | HEART RATE: 88 BPM | DIASTOLIC BLOOD PRESSURE: 70 MMHG | BODY MASS INDEX: 39.21 KG/M2

## 2019-11-21 DIAGNOSIS — G47.00 INSOMNIA, UNSPECIFIED TYPE: ICD-10-CM

## 2019-11-21 DIAGNOSIS — G47.33 OBSTRUCTIVE SLEEP APNEA: ICD-10-CM

## 2019-11-21 DIAGNOSIS — E66.01 CLASS 2 SEVERE OBESITY DUE TO EXCESS CALORIES WITH SERIOUS COMORBIDITY AND BODY MASS INDEX (BMI) OF 39.0 TO 39.9 IN ADULT (HCC): ICD-10-CM

## 2019-11-21 DIAGNOSIS — Z79.891 CHRONIC PRESCRIPTION OPIATE USE: ICD-10-CM

## 2019-11-21 DIAGNOSIS — G25.81 RLS (RESTLESS LEGS SYNDROME): Primary | ICD-10-CM

## 2019-11-21 PROCEDURE — 99214 OFFICE O/P EST MOD 30 MIN: CPT | Performed by: NURSE PRACTITIONER

## 2019-11-21 RX ORDER — GABAPENTIN 300 MG/1
600 CAPSULE ORAL
Qty: 180 CAPSULE | Refills: 1 | Status: SHIPPED | OUTPATIENT
Start: 2019-11-21 | End: 2020-04-30 | Stop reason: SDUPTHER

## 2019-11-21 NOTE — PATIENT INSTRUCTIONS
1   Continue gabapentin 600mg at bedtime, may add 100mg later, if needed  2   You may try melatonin 5mg 9-10 hours before WAKE UP TIME  - monitor PT -INR, as this may increase with warfarin  3  Keep bedtime and wake up time consistent  4   Schedule follow up visit in 6 months  5  Call to schedule diagnostic sleep study  Nursing Support:  When: Monday through Friday 7A-5PM except holidays  Where: Our direct line is [phone]  If you are having a true emergency please call 911  In the event that the line is busy or it is after hours please leave a voice message and we will return your call  Please speak clearly, leaving your full name, birth date, best number to reach you and the reason for your call  Medication refills: We will need the name of the medication, the dosage, the ordering provider, whether you get a 30 or 90 day refill, and the pharmacy name and address  Medications will be ordered by the provider only  Nurses cannot call in prescriptions  Please allow 7 days for medication refills  Physician requested updates: If your provider requested that you call with an update after starting medication, please be ready to provide us the medication and dosage, what time you take your medication, the time you attempt to fall asleep, time you fall asleep, when you wake up, and what time you get out of bed  Sleep Study Results: We will contact you with sleep study results and/or next steps after the physician has reviewed your testing

## 2019-11-21 NOTE — PROGRESS NOTES
Progress Note - [address] Catrachito Girard 81 y o  female   :1935, MRN: [id number]  2019          Follow Up Evaluation / Problem:     Restless Legs Syndrome      Thank you for the opportunity of participating in the evaluation and care of this patient in the Sleep Clinic at [facility]  HPI: Andreas Jaquez is a 81y o  year old female  She presents for follow up of restless legs syndrome  She initially presented to Sleep Medicine in 2017 for complaint of insomnia, including frequent night time awakenings and non-restorative sleep  She also complained of excessive daytime sleepiness  She had had a sleep study at [facility] more than 20 years ago, which showed no evidence of sleep apnea  She reported evidence of restless legs syndrome and has been taking gabapentin 600mg, which improves these symptoms  She also takes an iron supplement daily for a low ferritin level  Her comorbid conditions include atrial fibrillation, CHF, mechanical heart valve, pulmonary arterial HTN, CKD stage 3, obesity, chronic back pain with chronic prescription opiate use, taken at bedtime      Review of Systems      Genitourinary need to urinate more than twice a night   Cardiology ankle/leg swelling   Gastrointestinal none   Neurology numbness/tingling of an extremity   Constitutional claustrophobia and fatigue   Integumentary none   Psychiatry none   Musculoskeletal joint pain, muscle aches, back pain and leg cramps   Pulmonary shortness of breath with activity and difficulty breathing when lying flat    ENT none   Endocrine frequent urination   Hematological none       Current Outpatient Medications:     allopurinol (ZYLOPRIM) 100 mg tablet, Take 100 mg by mouth 2 (two) times a day  , Disp: , Rfl:     bimatoprost (LUMIGAN) 0 01 % ophthalmic drops, Administer 1 drop to both eyes daily at bedtime, Disp: , Rfl:     calcium citrate-vitamin D (CITRACAL+D) 315-200 MG-UNIT per tablet, Take 1 tablet by mouth 2 (two) times a day, Disp: , Rfl:     cholecalciferol (VITAMIN D3) 1,000 units tablet, Take 1,000 Units by mouth daily, Disp: , Rfl:     ferrous sulfate 325 (65 Fe) mg tablet, Take 325 mg by mouth daily with breakfast, Disp: , Rfl:     gabapentin (NEURONTIN) 300 mg capsule, Take 2 capsules (600 mg total) by mouth daily at bedtime, Disp: 180 capsule, Rfl: 1    HYDROcodone-acetaminophen (NORCO) 5-325 mg per tablet, Take 1 tablet by mouth every 6 (six) hours as needed for pain for up to 10 dosesMax Daily Amount: 4 tablets, Disp: 20 tablet, Rfl: 0    indapamide (LOZOL) 2 5 mg tablet, Take 2 5 mg by mouth every morning, Disp: , Rfl:     insulin detemir (LEVEMIR) 100 units/mL subcutaneous injection, Inject 60 Units under the skin daily at bedtime , Disp: , Rfl:     Magnesium 400 MG CAPS, Take 1 each by mouth daily , Disp: , Rfl:     methocarbamol (ROBAXIN) 500 mg tablet, Take 1 5 tablets (750 mg total) by mouth 2 (two) times a day, Disp: 10 tablet, Rfl: 0    metoprolol succinate (TOPROL-XL) 50 mg 24 hr tablet, Take 50 mg by mouth daily, Disp: , Rfl:     Multiple Vitamin (MULTIVITAMIN) tablet, Take 1 tablet by mouth daily, Disp: , Rfl:     Multiple Vitamins-Minerals (PRESERVISION AREDS 2 PO), Take by mouth 2 (two) times a day, Disp: , Rfl:     polyethylene glycol-propylene glycol (SYSTANE) 0 4-0 3 %, 1 drop every 3 (three) hours as needed , Disp: , Rfl:     RABEprazole (ACIPHEX) 20 MG tablet, Take 20 mg by mouth daily, Disp: , Rfl:     tolterodine (DETROL) 1 mg tablet, Take 1 mg by mouth daily at bedtime, Disp: , Rfl:     warfarin (COUMADIN) 5 mg tablet, Take 1 tablet (5 mg total) by mouth daily Adjust based on inr (Patient taking differently: Take 3 75 mg by mouth daily Adjust based on inr), Disp: 20 tablet, Rfl: 0    gabapentin (NEURONTIN) 100 mg capsule, take 1 capsule by mouth four times a day (Patient not taking: Reported on 5/16/2019), Disp: 360 capsule, Rfl: 1    [place] Sleepiness Scale  Sitting and reading: Moderate chance of dozing  Watching TV: Slight chance of dozing  Sitting, inactive in a public place (e g  a theatre or a meeting): Would never doze  As a passenger in a car for an hour without a break: Would never doze  Lying down to rest in the afternoon when circumstances permit: High chance of dozing  Sitting and talking to someone: Would never doze  Sitting quietly after a lunch without alcohol: Would never doze  In a car, while stopped for a few minutes in traffic: Would never doze  Total score: 6              Vitals:    11/21/19 0900   BP: 132/70   Pulse: 88   Weight: 111 kg (244 lb)   Height: 5' 6" (1 676 m)       Body mass index is 39 38 kg/m²  Neck Circumference: 16       EPWORTH SLEEPINESS SCORE  Total score: 6      Past History Since Last Sleep Center Visit:   She denies any significant changes to her health since her last visit  She has some late day sleepiness, especially on work days  She would take naps on the afternoon, if given the opportunity  She is currently working 3 days per week, awakening at 5:45am for work  On other days, sleeps until 7:00am and does not feel rested upon awakening  She goes to bed between 10 and 11:00pm   She has been having more difficulty lately with sleep initiation  She takes Percocet at bedtime for back pain, as well as gabapentin 600mg for restless legs and non-restorative sleep  She awakens approximately 2 times per night for urination  She denies awakening due to difficulty breathing  She occasionally takes an extra 100mg of gabapentin if she awakens, which helps with back pain and sleep        The review of systems and following portions of the patient's history were reviewed and updated as appropriate: allergies, current medications, past family history, past medical history, past social history, past surgical history, and problem list         OBJECTIVE    Physical Exam: General Appearance:   Alert, cooperative, no distress, appears stated age, obese     Head:   Normocephalic, without obvious abnormality, atraumatic     Eyes:   PERRL, conjunctiva/corneas clear, EOM's intact          Nose:  Nares normal, septum midline, no drainage or sinus tenderness           Throat:  Lips, teeth and gums normal; tongue normal size and  shape and midline mucosa moist with low-lying soft palatal tissue, uvula barely visible, tonsils not visualized, Mallampati class 4       Neck:  Supple, symmetrical, trachea midline, no adenopathy; Thyroid: No enlargement, tenderness or nodules; no carotid bruit or JVD     Lungs:      Clear to auscultation but decreased bases bilaterally, respirations unlabored     Heart:   Regular rate and rhythm, S1 and S2 normal, no murmur       Extremities:  Extremities normal, atraumatic, no cyanosis and trace edema in LE bilaterally       Skin:  Skin color, texture, turgor normal, no rashes or lesions       Neurologic:  No focal deficits noted  Normal strength, sensation throughout       ASSESSMENT / PLAN    1  RLS (restless legs syndrome)  gabapentin (NEURONTIN) 300 mg capsule   2  Class 2 severe obesity due to excess calories with serious comorbidity and body mass index (BMI) of 39 0 to 39 9 in adult ([facility] )     3  Obstructive sleep apnea  Diagnostic Sleep Study   4  Insomnia, unspecified type     5  Chronic prescription opiate use             Counseling / Coordination of Care  Total clinic time spent today 30 minutes  Greater than 50% of total time was spent with the patient and / or family counseling and / or coordination of care       A description of the counseling / coordination of care:     Impressions, Diagnostic results, Prognosis, Instructions for management, Risks and benefits of treatment, Patient and family education, Risk factor reductions and Importance of compliance with treatment    Today, we discussed her worsening of sleep initiation and frequent night time awakenings  We discussed considering the use of melatonin 5mg 9-10 hours prior to her wake up time to improve circadian rhythm and help with sleep initiation insomnia  The medication can increase bleeding time, so she will monitor her PT/INR, which she currently does  She feels her restless legs symptoms are controlled with gabapentin 600mg and an additional 100mg on occasion  She takes percocet at bedtime for chronic back pain, which also helps  She continues to have EDS and does not feel refreshed upon awakening  I recommended that she schedule a diagnostic sleep study  Even though she did not have sleep apnea during her last sleep study 20 years ago, her symptoms are consistent with sleep apnea  In addition to symptoms of SUZETTE, she has gained weight, mallampati score is 4, she has developed atrial fibrillation and CHF and also takes a narcotic pain medication at bedtime, which are all risk factors for sleep apnea  She does not feel she has sleep apnea but will consider scheduling the sleep study  She will follow up in 6 months  She was encouraged to call with any questions or concerns prior to her next visit  The following instructions have been given to the patient today:    Patient Instructions   1  Continue gabapentin 600mg at bedtime, may add 100mg later, if needed  2   You may try melatonin 5mg 9-10 hours before WAKE UP TIME  - monitor PT -INR, as this may increase with warfarin  3  Keep bedtime and wake up time consistent  4   Schedule follow up visit in 6 months  5  Call to schedule diagnostic sleep study  Nursing Support:  When: Monday through Friday 7A-5PM except holidays  Where: Our direct line is [phone]  If you are having a true emergency please call 911  In the event that the line is busy or it is after hours please leave a voice message and we will return your call    Please speak clearly, leaving your full name, birth date, best number to reach you and the reason for your call  Medication refills: We will need the name of the medication, the dosage, the ordering provider, whether you get a 30 or 90 day refill, and the pharmacy name and address  Medications will be ordered by the provider only  Nurses cannot call in prescriptions  Please allow 7 days for medication refills  Physician requested updates: If your provider requested that you call with an update after starting medication, please be ready to provide us the medication and dosage, what time you take your medication, the time you attempt to fall asleep, time you fall asleep, when you wake up, and what time you get out of bed  Sleep Study Results: We will contact you with sleep study results and/or next steps after the physician has reviewed your testing        Jericho Rojas, [address] [facility]

## 2019-12-11 ASSESSMENT — ENCOUNTER SYMPTOMS
WEAKNESS: 0
HEMATOLOGIC/LYMPHATIC NEGATIVE: 1
PSYCHIATRIC NEGATIVE: 1
EYES NEGATIVE: 1
WEIGHT GAIN: 0
GASTROINTESTINAL NEGATIVE: 1
SHORTNESS OF BREATH: 0
ENDOCRINE NEGATIVE: 1
DYSPNEA ON EXERTION: 0

## 2019-12-11 NOTE — ASSESSMENT & PLAN NOTE
S/p AVR 1998.  Cardiac catheterization January 23, 2019 demonstrated a bileaflet mechanical prosthesis was observed in the aortic position. Both leaflet open well.

## 2019-12-11 NOTE — PROGRESS NOTES
Electrophysiology  Outpatient Progress Note       Reason for visit:   Chief Complaint   Patient presents with   • Atrial Fibrillation       HPI   Ailyn St is a 84 y.o. female who is seen today for follow up of her  implanted biventricular pacemaker indicated for complete heart block during AV node ablation in .  She has chronic atrial fibrillation.  She has a new LV lead along with a new RV lead.  Her previous RV lead had chronically elevated thresholds and had been turned off due to this.  That RV lead is abandoned and capped.      In 2019 she underwent a cardiac catheterization at an outside institution which showed no obstructive disease. Overall, she is doing well and previous dyspnea has resolved. Echo performed earlier this month showed an EF of 55%.    Past Medical History:   Diagnosis Date   • Abnormal ECG    • Anticoagulated on Coumadin 2018   • Arthritis    • Fatigue 2018   • GERD (gastroesophageal reflux disease)    • Hypertension    • Osteoporosis    • S/P AVR 2018   • SOB (shortness of breath) 2018   • Type 2 diabetes mellitus (CMS/HCC)      Past Surgical History:   Procedure Laterality Date   • ADENOIDECTOMY     • AORTIC VALVE REPLACEMENT     • CARDIAC CATHETERIZATION     • CHOLECYSTECTOMY     • TONSILLECTOMY     • TOTAL ABDOMINAL HYSTERECTOMY W/ BILATERAL SALPINGOOPHORECTOMY         Social History     Tobacco Use   • Smoking status: Former Smoker     Types: Cigarettes     Last attempt to quit:      Years since quittin.0   • Smokeless tobacco: Never Used   • Tobacco comment: carmen in    Substance Use Topics   • Alcohol use: Yes     Comment: rarely   • Drug use: Defer     Family History   Problem Relation Age of Onset   • Diabetes Biological Mother    • Heart disease Biological Father        ALLERGY:  Cephalosporins; Penicillins; Colace [docusate sodium]; Hydrochlorothiazide; Losartan potassium; Metformin; Requip [ropinirole]; Sulfa (sulfonamide  antibiotics); Valsartan; and Latex    Current Outpatient Medications   Medication Sig Dispense Refill   • allopurinol (ZYLOPRIM) 300 mg tablet Take 300 mg by mouth 2 (two) times a day.     • B-complex with vitamin C tablet Take 1 tablet by mouth daily.     • bimatoprost (LUMIGAN) 0.01 % ophthalmic drops 1 drop nightly.     • calcium carbonate-vitamin D3 250-125 mg-unit tablet Take by mouth daily.     • cholecalciferol, vitamin D3, 1,000 unit tablet Take 1,000 Units by mouth daily.     • ferrous sulfate 325 mg (65 mg iron) EC tablet Take 325 mg by mouth daily with breakfast.     • gabapentin (NEURONTIN) 600 mg tablet Take 600 mg by mouth nightly.       • HYDROcodone-acetaminophen (NORCO) 5-325 mg per tablet Take 1 tablet by mouth every 6 (six) hours as needed for moderate pain (taken at night).     • indapamide (LOZOL) 2.5 mg tablet Take 2.5 mg by mouth daily.     • insulin aspart U-100 (NovoLOG) 100 unit/mL subcutaneous pen Inject under the skin 3 (three) times a day before meals. Sliding scale as follows:  160-190 give 3 units, 191-210 give 5 units, 211-240 give 7 units, 241-270 give 9 units, greater than 271 give 10 units and recheck sugars in 2-3 hours     • insulin detemir U-100 (LEVEMIR) 100 unit/mL (3 mL) subcutaneous pen Inject 60 Units under the skin nightly. Patient took 30 units last evening     • methocarbamol (ROBAXIN) 750 mg tablet Take 750 mg by mouth 2 (two) times a day.     • metoprolol succinate XL (TOPROL-XL) 50 mg 24 hr tablet Take 50 mg by mouth daily.     • RABEprazole (ACIPHEX) 20 mg EC tablet Take 20 mg by mouth daily.     • tolterodine (DETROL) 2 mg tablet Take 2 mg by mouth nightly.     • vit C/E/Zn/coppr/lutein/zeaxan (PRESERVISION AREDS-2 ORAL) Take by mouth.     • warfarin (COUMADIN) 3 mg tablet Take 3.75 mg by mouth once daily. Taken for 6 days then 5 mg on Friday.  Held X 3 days per order.     • pantoprazole (PROTONIX) 40 mg EC tablet Take 40 mg by mouth. Normally takes aciphex 30 mg  daily.       No current facility-administered medications for this visit.        Review of Systems   Constitution: Negative for malaise/fatigue and weight gain.   HENT: Negative.    Eyes: Negative.    Cardiovascular: Negative for dyspnea on exertion.   Respiratory: Negative for shortness of breath.    Endocrine: Negative.    Hematologic/Lymphatic: Negative.    Skin: Negative.    Musculoskeletal: Negative for muscle weakness.   Gastrointestinal: Negative.    Genitourinary: Negative.    Neurological: Negative for weakness.   Psychiatric/Behavioral: Negative.        Objective   Vitals:    12/12/19 1547   BP: (!) 148/80   Pulse: 84   SpO2: 93%       Physical Exam   Constitutional: She is oriented to person, place, and time. She appears well-nourished.   HENT:   Head: Normocephalic.   Eyes: Pupils are equal, round, and reactive to light. Conjunctivae and EOM are normal.   Neck: Normal range of motion.   Cardiovascular: Normal rate, regular rhythm and normal heart sounds.   Pulmonary/Chest: Effort normal and breath sounds normal.   Abdominal: Soft. Bowel sounds are normal. She exhibits no distension.   Musculoskeletal: Normal range of motion. She exhibits no edema.   Neurological: She is alert and oriented to person, place, and time.   Skin: Skin is warm and dry.   Incision well-healed.    Psychiatric: Her mood appears not anxious. Her affect is not blunt.       Lab Results   Component Value Date    WBC 6.87 06/01/2018    HGB 10.8 (L) 06/01/2018     06/01/2018     06/01/2018    K 3.7 06/01/2018    CREATININE 1.1 06/01/2018    INR 1.4 06/01/2018     Cardiovascular Procedures:    ECHO/MUGA:  Echo (Scanned) - 9/7/2012  Echo 6/13/2019 - EF 55%; Normal AVR      ELECTROPHYSIOLOGY:  Devices (Bi-Ventricular PPM (Biotronik-Evia), Evia Dr-T serial# [id number] PID [id number]. DDDR 80-130ppm. Normal function. Changes made=sensor rate and gain. Base rate decreased to 75ppm.) - 10/30/2013   RFA (Indication A Fib, AVN ablation with  pacemaker implantation) - 10/22/2013     STRESS TESTS:  Unruly MPI (Scanned) - 10/24/2011   ETT (Lexiscan. Had SOB and lightheadedness during recoevry, otherwise normal.) - 1/22/2016    CT SURGERY-   AVR 1998 - mechanical    Catheterization 1/23/19 ([facility])  HEMODYNAMICS: There was mild pulmonary hypertension. RA 10mmHg, RV 45/10, PA 45/25 mean 32, PCWP 22 with v wave, Pa sat 68.8%, CO 4.5 L/min, CI 2.1 L/min/m2  VALVES:  AORTIC VALVE:   --  A bileaflet mechanical prosthesis was observed in the aortic position. Both leaflet open well.  CORONARY VESSELS:   --  The coronary circulation is right dominant.  --  Left main: Normal.  --  LAD: Angiography showed minor luminal irregularities.  --  Circumflex: Normal.  --  RCA: Angiography showed minor luminal irregularities.    ECG-Pacemaker ECG    Assessment   Problem List Items Addressed This Visit        Circulatory    CHB (complete heart block) (CMS/HCC) - Primary     She remains in complete heart block following her AV node ablation for uncontrolled atrial fibrillation.  A normal functioning pacemaker is in place.  She has had no episodes of lightheadedness, dizziness, syncope or near syncope.         Complication of Procedure: Insert single lead to existing PPM/ICD (05/31/2018)    Complication of Admission: Admission (02/19/2018)    Relevant Orders    ECG 12 LEAD-OFFICE PERFORMED (Completed)    Chronic atrial fibrillation     She underwent an AV node ablation in 2013.  She continues with complete heart block and a pacemaker in place.  She is asymptomatic with the chronic atrial fibrillation.         Hypertension     BP Readings from Last 3 Encounters:   12/12/19 (!) 148/80   06/27/19 124/80   12/27/18 130/80   Although her blood pressure is slightly elevated at today's appointment she reports stability at home.            Hematologic    Anticoagulated on Coumadin     She is compliant with INR follow up.             Other    Biventricular pacemaker check     She  underwent implantation of a Biotronik biventricular pacemaker in May 2018.  Her previous RV lead was malfunctioning therefore a new RV lead was implanted along with the new LV lead.  Her previous RV lead is abandoned and capped.       Biotronik Entrinsa implanted May 31, 2018.  RV threshold 0.6 V at 0.4 ms lead impedance is 663 ohms. LV threshold 0.8 V at 0.4 ms and lead impedance 1189 ohms. Chronic atrial fibrillation parameters VVIR 85 bpm/night rate 75 bpm.  BiV pacing is 100%.  Underlying rhythm is atrial fibrillation with complete heart block.  Battery longevity is 8 years.          S/P AVR     S/p AVR 1998.  Cardiac catheterization January 23, 2019 demonstrated a bileaflet mechanical prosthesis was observed in the aortic position. Both leaflet open well.                    Simon Mcknight MD  12/22/2019

## 2019-12-11 NOTE — ASSESSMENT & PLAN NOTE
She underwent implantation of a Biotronik biventricular pacemaker in May 2018.  Her previous RV lead was malfunctioning therefore a new RV lead was implanted along with the new LV lead.  Her previous RV lead is abandoned and capped.       Biotronik Entrinsa implanted May 31, 2018.  RV threshold 0.6 V at 0.4 ms lead impedance is 663 ohms. LV threshold 0.8 V at 0.4 ms and lead impedance 1189 ohms. Chronic atrial fibrillation parameters VVIR 85 bpm/night rate 75 bpm.  BiV pacing is 100%.  Underlying rhythm is atrial fibrillation with complete heart block.  Battery longevity is 8 years.

## 2019-12-12 ENCOUNTER — OFFICE VISIT (OUTPATIENT)
Dept: CARDIOLOGY | Facility: CLINIC | Age: 84
End: 2019-12-12
Payer: MEDICARE

## 2019-12-12 VITALS
HEART RATE: 84 BPM | WEIGHT: 242 LBS | HEIGHT: 66 IN | BODY MASS INDEX: 38.89 KG/M2 | DIASTOLIC BLOOD PRESSURE: 80 MMHG | SYSTOLIC BLOOD PRESSURE: 148 MMHG | OXYGEN SATURATION: 93 %

## 2019-12-12 DIAGNOSIS — I48.20 CHRONIC ATRIAL FIBRILLATION (CMS/HCC): ICD-10-CM

## 2019-12-12 DIAGNOSIS — Z79.01 ANTICOAGULATED ON COUMADIN: ICD-10-CM

## 2019-12-12 DIAGNOSIS — I44.2 CHB (COMPLETE HEART BLOCK) (CMS/HCC): Primary | ICD-10-CM

## 2019-12-12 DIAGNOSIS — I10 HYPERTENSION, UNSPECIFIED TYPE: ICD-10-CM

## 2019-12-12 DIAGNOSIS — Z45.018 BIVENTRICULAR PACEMAKER CHECK: ICD-10-CM

## 2019-12-12 DIAGNOSIS — Z95.2 S/P AVR: ICD-10-CM

## 2019-12-12 PROCEDURE — 99214 OFFICE O/P EST MOD 30 MIN: CPT | Performed by: INTERNAL MEDICINE

## 2019-12-12 PROCEDURE — 93000 ELECTROCARDIOGRAM COMPLETE: CPT | Performed by: INTERNAL MEDICINE

## 2019-12-15 NOTE — ASSESSMENT & PLAN NOTE
She underwent an AV node ablation in 2013.  She continues with complete heart block and a pacemaker in place.  She is asymptomatic with the chronic atrial fibrillation.

## 2019-12-15 NOTE — ASSESSMENT & PLAN NOTE
She remains in complete heart block following her AV node ablation for uncontrolled atrial fibrillation.  A normal functioning pacemaker is in place.  She has had no episodes of lightheadedness, dizziness, syncope or near syncope.

## 2020-01-01 NOTE — CONSULTS
Assessed and weighed the baby.Baby gained weight.Questions answered.MOB happy .   Consultation - Orthopedics   Jong Gomes 81 y o  female MRN: [id number]  Unit/Bed#: [place] 2 -01 Encounter: [id number]      Assessment/Plan     Assessment:  82 yo female hematoma of right thigh   Plan:  Unlikely compartment syndrome   Continue to monitor  NV checks  Pain control prn  Med mgt per SLIM       History of Present Illness   Physician Requesting Consult: Eleazar Johnson DO  Reason for Consult / Principal Problem: right thigh hematoma  HPI: Jong Gomes is a 81y o  year old female who presents with right thigh pain  She states she had a cardiac catheterization on 1/23/2019  She is on Coumadin at baseline  She states after the cardiac cath she started having increased right thigh pain  She then had a CT scan done that showed she had a hematoma medial aspect of her thigh  She then had IR embolization done to stop bleeding  She then had a repeat CT scan on 1/31/2019 which showed a hematoma knee adductor of the medial thigh  She complains of increased pain in her right lower extremity  She complains of decreased sensation in her thigh is denies any numbness or tingling  She does have neuropathy at baseline due to diabetes  Her blood thinners up and held  She does have bruising on her left forearm due to the catheterization  She has minimal pain in her left forearm and denies any numbness or tingling in her arm  She does have a history of bilateral total knee replacements done 20 years ago  She denies any issues with her knee replacements  Consults    ROS: see HPI, all other systems negative      Historical Information   Past Medical History:   Diagnosis Date    Chronic atrial fibrillation ([address] ) 9/13/2017    Chronic gout 9/13/2017    Chronic rhinitis 11/08/2018    CKD (chronic kidney disease) stage 3, GFR 30-59 ml/min ([facility]) 9/13/2017    Colon polyp 9/13/2017    Coronary artery disease     Diabetes mellitus ([address] )     COMBS (dyspnea on exertion) 11/02/2018    Essential hypertension 9/13/2017    Gastritis 9/13/2017    Hypertension     Irregular heart beat     Mechanical heart valve present 9/13/2017    Osteoarthritis     Pacemaker     S/P AVR     Spinal stenosis     disk herniation      Past Surgical History:   Procedure Laterality Date    AORTIC VALVE REPLACEMENT      CARDIAC SURGERY      CATARACT EXTRACTION      CHOLECYSTECTOMY      COLONOSCOPY      ESOPHAGOGASTRODUODENOSCOPY      HERNIA REPAIR      x3    HYSTERECTOMY      INSERT / REPLACE / REMOVE PACEMAKER      IR PELVIC ANGIOGRAPHY / INTERVENTION  1/29/2019    JOINT REPLACEMENT Bilateral     knees    [place] COLONOSCOPY FLX DX W/COLLJ SPEC WHEN PFRMD N/A 9/12/2017    Procedure: EGDwith bxAND COLONOSCOPY with polypectomies;  Surgeon: Demetria Heredia MD;  Location: [facility]; Service: Gastroenterology    NJ SIGMOIDOSCOPY FLX DX W/COLLJ SPEC BR/WA IF PFRMD N/A 9/13/2017    Procedure: colonoscopy with hemo clip x 2;  Surgeon: Demetria Heredia MD;  Location: [facility];   Service: Gastroenterology    ROTATOR CUFF REPAIR Left     SKIN BIOPSY      TONSILLECTOMY       Social History   History   Alcohol Use No     History   Drug Use No     History   Smoking Status    Former Smoker   Smokeless Tobacco    Never Used     Comment: quit more than 36 yr     Family History:   Family History   Problem Relation Age of Onset    Diabetes Mother     Hypertension Mother     Heart disease Father     Emphysema Father     Asthma Sister     Cancer Sister     Hypertension Sister        Meds/Allergies   Prescriptions Prior to Admission   Medication    allopurinol (ZYLOPRIM) 100 mg tablet    aspirin (ECOTRIN LOW STRENGTH) 81 mg EC tablet    bimatoprost (LUMIGAN) 0 01 % ophthalmic drops    calcium citrate-vitamin D (CITRACAL+D) 315-200 MG-UNIT per tablet    cholecalciferol (VITAMIN D3) 1,000 units tablet    ferrous sulfate 325 (65 Fe) mg tablet    gabapentin (NEURONTIN) 300 mg capsule    HYDROcodone-acetaminophen (NORCO) 5-325 mg per tablet    indapamide (LOZOL) 2 5 mg tablet    insulin aspart (NovoLOG) 100 units/mL injection    insulin detemir (LEVEMIR) 100 units/mL subcutaneous injection    Magnesium 250 MG TABS    methocarbamol (ROBAXIN) 500 mg tablet    metoprolol succinate (TOPROL-XL) 50 mg 24 hr tablet    Multiple Vitamin (MULTIVITAMIN) tablet    Multiple Vitamins-Minerals (ICAPS AREDS 2 PO)    polyethylene glycol-propylene glycol (SYSTANE) 0 4-0 3 %    RABEprazole (ACIPHEX) 20 MG tablet    tolterodine (DETROL) 1 mg tablet    warfarin (COUMADIN) 2 5 mg tablet     Allergies   Allergen Reactions    Cephalosporins Rash and Anaphylaxis     Anaphylaxis  Other reaction(s): Itching    Acetazolamide GI Intolerance    Allopurinol     Cefazolin Other (See Comments)     rash    Colchicine     Diltiazem     Docusate Other (See Comments)     impaction    Eszopiclone     Febuxostat     Glycerin     Hydrochlorothiazide Other (See Comments) and Headache     Other reaction(s): Other (see comments)  Generalized swelling    Latex      Other reaction(s): Other (See Comments)  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    Metformin      Other reaction(s): GI intolerance    Nortriptyline      Other reaction(s): Other (See Comments)  rash bolivar flexeril      Penicillins      Other reaction(s): Other (See Comments)  RASH-anaphylaxis  Other reaction(s): Anaphylaxis    Povidone Iodine Other (See Comments)     VAGINAL ITCHING    Ropinirole      Back pain    Simvastatin Other (See Comments)     muscle aches    Sulfa Antibiotics      Other reaction(s): Other (See Comments)  bolivar Lasix, GI upset, rash  Other reaction(s): GI Upset    Valsartan      Other reaction(s): Other (See Comments)  bolivar olmesartan    Fluticasone      Leg cramps       Objective   Vitals: Blood pressure 133/73, pulse 83, temperature 98 5 °F (36 9 °C), temperature source Temporal, resp  rate 18, SpO2 100 %  ,There is no height or weight on file to calculate BMI      No intake or output data in the 24 hours ending 02/01/19 0804  No intake/output data recorded  Invasive Devices     Peripheral Intravenous Line            Peripheral IV 01/31/19 Left Antecubital 1 day    Peripheral IV 01/31/19 Right Wrist 1 day                PE:  Gen:  Awake and alert  HEENT:  Hearing intact  Heart:  Regular rate  Lungs: no audible wheezing  GI: no abdominal distension    Ortho Exam RLE  +ecchymosis R medial thigh  Compartments compressible  +pedal pulse  Knee ROM 10-95 with pain  No pain with hip flexion  Pain with Abduction and Adduction of the hip  Sensation decreased in thigh  Sensation L4,L5,1 intact distally  Pt has neuropathy at baseline  LUE  LUE:  Median/radial/ulnar/axillary nerves motor and sensory intact, AIN/PIN/musculocutaneous nerve motor intact, palpable radial pulse  + ecchymosis  No pain with wrist or elbow ROM        Lab Results:   I have personally reviewed pertinent lab results    CBC:   Lab Results   Component Value Date    HGB 8 1 (L) 02/01/2019    HCT 25 3 (L) 02/01/2019     CMP: No results found for: SODIUM, CL, CO2, ANIONGAP, BUN, CREATININE, GLUCOSE, CALCIUM, AST, ALT, ALKPHOS, PROT, BILITOT, EGFR  PT/INR:   Lab Results   Component Value Date    INR 2 54 (H) 02/01/2019     Imaging Studies: I have personally reviewed pertinent films in PACS  CT scan- hematoma right adductor compartment   Code Status: Level 1 - Full Code  Advance Directive and Living Will:      Power of :    POLST:

## 2020-01-07 ENCOUNTER — TRANSCRIBE ORDERS (OUTPATIENT)
Dept: LAB | Facility: CLINIC | Age: 85
End: 2020-01-07

## 2020-01-07 ENCOUNTER — APPOINTMENT (OUTPATIENT)
Dept: LAB | Facility: CLINIC | Age: 85
End: 2020-01-07
Payer: MEDICARE

## 2020-01-07 DIAGNOSIS — E11.8 DIABETIC COMPLICATION (HCC): ICD-10-CM

## 2020-01-07 DIAGNOSIS — Z95.2 HEART VALVE REPLACED BY TRANSPLANT: ICD-10-CM

## 2020-01-07 DIAGNOSIS — E11.649 UNCONTROLLED TYPE 2 DIABETES MELLITUS WITH HYPOGLYCEMIA, UNSPECIFIED HYPOGLYCEMIA COMA STATUS (HCC): ICD-10-CM

## 2020-01-07 DIAGNOSIS — Z79.01 LONG TERM (CURRENT) USE OF ANTICOAGULANTS: ICD-10-CM

## 2020-01-07 LAB
EST. AVERAGE GLUCOSE BLD GHB EST-MCNC: 189 MG/DL
HBA1C MFR BLD: 8.2 % (ref 4.2–6.3)
INR PPP: 2.31 (ref 0.84–1.19)
PROTHROMBIN TIME: 24.9 SECONDS (ref 11.6–14.5)

## 2020-01-07 PROCEDURE — 83036 HEMOGLOBIN GLYCOSYLATED A1C: CPT

## 2020-01-07 PROCEDURE — 85610 PROTHROMBIN TIME: CPT

## 2020-01-07 PROCEDURE — 36415 COLL VENOUS BLD VENIPUNCTURE: CPT

## 2020-01-14 ENCOUNTER — APPOINTMENT (OUTPATIENT)
Dept: LAB | Facility: CLINIC | Age: 85
End: 2020-01-14
Payer: MEDICARE

## 2020-01-14 DIAGNOSIS — Z95.2 HEART VALVE REPLACED BY TRANSPLANT: ICD-10-CM

## 2020-01-14 DIAGNOSIS — Z79.01 LONG TERM (CURRENT) USE OF ANTICOAGULANTS: ICD-10-CM

## 2020-01-14 LAB
INR PPP: 3.07 (ref 0.84–1.19)
PROTHROMBIN TIME: 31.2 SECONDS (ref 11.6–14.5)

## 2020-01-14 PROCEDURE — 85610 PROTHROMBIN TIME: CPT

## 2020-01-14 PROCEDURE — 36415 COLL VENOUS BLD VENIPUNCTURE: CPT

## 2020-02-10 ENCOUNTER — TELEPHONE (OUTPATIENT)
Dept: SLEEP CENTER | Facility: CLINIC | Age: 85
End: 2020-02-10

## 2020-02-10 NOTE — TELEPHONE ENCOUNTER
Returned the patient call she was unable to talk   Advised she could call [phone] for rescheduling appointment when she had time

## 2020-02-17 DIAGNOSIS — G25.81 RLS (RESTLESS LEGS SYNDROME): ICD-10-CM

## 2020-02-17 RX ORDER — GABAPENTIN 300 MG/1
CAPSULE ORAL
Qty: 180 CAPSULE | Refills: 1 | OUTPATIENT
Start: 2020-02-17

## 2020-02-17 NOTE — TELEPHONE ENCOUNTER
Rx on 11/21/19 was for gabapentin 300mg , take 2 capsules at bedtime #180 with one refill was given

## 2020-02-21 ENCOUNTER — HOSPITAL ENCOUNTER (OUTPATIENT)
Dept: SLEEP CENTER | Facility: CLINIC | Age: 85
Discharge: HOME | End: 2020-02-21
Payer: MEDICARE

## 2020-02-21 DIAGNOSIS — G47.33 OBSTRUCTIVE SLEEP APNEA: ICD-10-CM

## 2020-02-21 PROCEDURE — 95810 POLYSOM 6/> YRS 4/> PARAM: CPT | Performed by: INTERNAL MEDICINE

## 2020-02-21 PROCEDURE — 95810 POLYSOM 6/> YRS 4/> PARAM: CPT

## 2020-02-22 NOTE — PROGRESS NOTES
Sleep Study Documentation    Pre-Sleep Study       Sleep testing procedure explained to patient:YES    Patient napped prior to study:YES- more than 30 minutes  Napped after 2PM: no    Caffeine:Dayshift worker after 12PM   Caffeine use:YES- tea  6 to 18 ounces and soda  12 ounces    Alcohol:Dayshift workers after 5PM: Alcohol use:NO    Typical day for patient:YES       Study Documentation    Sleep Study Indications:     Sleep Study: Diagnostic   Snore:Severe  Supplemental O2: no        Minimum SaO2 84  Baseline SaO2 93        EKG abnormalities: no     EEG abnormalities: no    Sleep Study Recorded < 2 hours: N/A    Sleep Study Recorded > 2 hours but incomplete study: N/A    Sleep Study Recorded 6 hours but no sleep obtained: NO    Patient classification: employed       Post-Sleep Study    Medication used at bedtime or during sleep study:YES other prescription medications    Patient reports time it took to fall asleep:less than 20 minutes    Patient reports waking up during study:1 to 2 times  Patient reports returning to sleep in 10 to 30 minutes  Patient reports sleeping 4 to 6 hours without dreaming  Patient reports sleep during study:worse than usual    Patient rated sleepiness: Somewhat sleepy or tired    PAP treatment:no

## 2020-02-28 ENCOUNTER — TELEPHONE (OUTPATIENT)
Dept: SLEEP CENTER | Facility: CLINIC | Age: 85
End: 2020-02-28

## 2020-02-28 DIAGNOSIS — G47.33 OBSTRUCTIVE SLEEP APNEA: Primary | ICD-10-CM

## 2020-02-28 NOTE — TELEPHONE ENCOUNTER
Left message for the patient to call back for sleep study results  CPAP titration study ordered   Patient needs to schedule

## 2020-02-28 NOTE — TELEPHONE ENCOUNTER
----- Message from Evelyn Mtz Rui De Oliveira sent at 2/28/2020  7:59 AM EST -----  Mild SUZETTE with associated hypoxia was identified during the diagnostic sleep study  CPAP titration is recommended, due to the hypoxia with her comorbid significant cardiac history  Order has been placed to allow for scheduling  If she would like to further discuss treatment options, prior to proceeding with CPAP titration study, please attempt to offer appointment sooner, as it is currently rescheduled for May

## 2020-03-02 NOTE — TELEPHONE ENCOUNTER
I spoke with patient, advised above  Patient not willing to use CPAP    Rescheduled follow up with Lashonda Cadena for 4/30/20 at 1400 to discuss further

## 2020-04-30 ENCOUNTER — TELEMEDICINE (OUTPATIENT)
Dept: SLEEP CENTER | Facility: CLINIC | Age: 85
End: 2020-04-30
Payer: MEDICARE

## 2020-04-30 VITALS — HEIGHT: 66 IN | BODY MASS INDEX: 39.21 KG/M2 | WEIGHT: 244 LBS

## 2020-04-30 DIAGNOSIS — G47.33 MILD OBSTRUCTIVE SLEEP APNEA: Primary | ICD-10-CM

## 2020-04-30 DIAGNOSIS — G25.81 RLS (RESTLESS LEGS SYNDROME): ICD-10-CM

## 2020-04-30 PROCEDURE — 99213 OFFICE O/P EST LOW 20 MIN: CPT | Performed by: NURSE PRACTITIONER

## 2020-04-30 RX ORDER — GABAPENTIN 100 MG/1
CAPSULE ORAL
Qty: 30 CAPSULE | Refills: 5 | Status: SHIPPED | OUTPATIENT
Start: 2020-04-30 | End: 2021-01-04 | Stop reason: SDUPTHER

## 2020-04-30 RX ORDER — GABAPENTIN 300 MG/1
600 CAPSULE ORAL
Qty: 180 CAPSULE | Refills: 1 | Status: SHIPPED | OUTPATIENT
Start: 2020-04-30 | End: 2021-01-04 | Stop reason: SDUPTHER

## 2020-06-24 ENCOUNTER — TELEPHONE (OUTPATIENT)
Dept: CARDIOLOGY | Facility: CLINIC | Age: 85
End: 2020-06-24

## 2020-06-25 ENCOUNTER — OFFICE VISIT (OUTPATIENT)
Dept: CARDIOLOGY | Facility: CLINIC | Age: 85
End: 2020-06-25
Payer: MEDICARE

## 2020-06-25 VITALS
SYSTOLIC BLOOD PRESSURE: 160 MMHG | DIASTOLIC BLOOD PRESSURE: 100 MMHG | WEIGHT: 241 LBS | BODY MASS INDEX: 38.73 KG/M2 | HEIGHT: 66 IN

## 2020-06-25 DIAGNOSIS — I48.20 CHRONIC ATRIAL FIBRILLATION (CMS/HCC): Primary | ICD-10-CM

## 2020-06-25 DIAGNOSIS — I44.2 CHB (COMPLETE HEART BLOCK) (CMS/HCC): ICD-10-CM

## 2020-06-25 DIAGNOSIS — Z45.018 BIVENTRICULAR PACEMAKER CHECK: ICD-10-CM

## 2020-06-25 DIAGNOSIS — I10 HYPERTENSION, UNSPECIFIED TYPE: ICD-10-CM

## 2020-06-25 PROCEDURE — 99214 OFFICE O/P EST MOD 30 MIN: CPT | Performed by: INTERNAL MEDICINE

## 2020-06-25 PROCEDURE — 93000 ELECTROCARDIOGRAM COMPLETE: CPT | Performed by: INTERNAL MEDICINE

## 2020-06-25 ASSESSMENT — ENCOUNTER SYMPTOMS
DYSPNEA ON EXERTION: 0
ENDOCRINE NEGATIVE: 1
SHORTNESS OF BREATH: 0
HEMATOLOGIC/LYMPHATIC NEGATIVE: 1
EYES NEGATIVE: 1
WEAKNESS: 0
WEIGHT GAIN: 0
PSYCHIATRIC NEGATIVE: 1
GASTROINTESTINAL NEGATIVE: 1

## 2020-06-25 NOTE — LETTER
July 1, 2020     Damien Perdue Jr., MD  [address]  [place] [address]    Patient: Ailyn St  YOB: 1935  Date of Visit: 6/25/2020      Dear Dr. Perdue:    Thank you for referring Ailyn St to me for evaluation. Below are my notes for this consultation.    If you have questions, please do not hesitate to call me. I look forward to following your patient along with you.         Sincerely,        Simon Mcknight MD        CC: No Recipients  Simon Mcknight MD  7/1/2020  4:45 PM  Signed       Electrophysiology  Outpatient Progress Note       Reason for visit:   Chief Complaint   Patient presents with   • Follow-up       HPI   Ailyn St is a 84 y.o. female who is seen today for follow up of her  implanted biventricular pacemaker indicated for complete heart block during AV node ablation in 2013.  She has chronic atrial fibrillation.  She has a new LV lead along with a new RV lead.  Her previous RV lead had chronically elevated thresholds and had been turned off due to this.  That RV lead is abandoned and capped.      In 2019 she underwent a cardiac catheterization at an outside institution which showed no obstructive disease. Overall, she is doing well and previous dyspnea has resolved. Echo performed earlier this month showed an EF of 55%.    Past Medical History:   Diagnosis Date   • Abnormal ECG    • Anticoagulated on Coumadin 12/20/2018   • Arthritis    • Fatigue 7/16/2018   • GERD (gastroesophageal reflux disease)    • Hypertension    • Osteoporosis    • S/P AVR 8/8/2018   • SOB (shortness of breath) 12/31/2018   • Type 2 diabetes mellitus (CMS/HCC)      Past Surgical History:   Procedure Laterality Date   • ADENOIDECTOMY     • AORTIC VALVE REPLACEMENT  1998   • CARDIAC CATHETERIZATION     • CHOLECYSTECTOMY     • TONSILLECTOMY     • TOTAL ABDOMINAL HYSTERECTOMY W/ BILATERAL SALPINGOOPHORECTOMY         Social History     Tobacco Use   • Smoking status: Former Smoker     Types:  Cigarettes     Last attempt to quit:      Years since quittin.5   • Smokeless tobacco: Never Used   • Tobacco comment: stoppe in    Substance Use Topics   • Alcohol use: Yes     Comment: rarely   • Drug use: Defer     Family History   Problem Relation Age of Onset   • Diabetes Biological Mother    • Heart disease Biological Father        ALLERGY:  Cephalosporins; Penicillins; Colace [docusate sodium]; Hydrochlorothiazide; Losartan potassium; Metformin; Requip [ropinirole]; Sulfa (sulfonamide antibiotics); Valsartan; and Latex    Current Outpatient Medications   Medication Sig Dispense Refill   • allopurinol (ZYLOPRIM) 300 mg tablet Take 300 mg by mouth 2 (two) times a day.     • B-complex with vitamin C tablet Take 1 tablet by mouth daily.     • bimatoprost (LUMIGAN) 0.01 % ophthalmic drops 1 drop nightly.     • calcium carbonate-vitamin D3 250-125 mg-unit tablet Take by mouth daily.     • cholecalciferol, vitamin D3, 1,000 unit tablet Take 1,000 Units by mouth daily.     • ferrous sulfate 325 mg (65 mg iron) EC tablet Take 325 mg by mouth daily with breakfast.     • gabapentin (NEURONTIN) 600 mg tablet Take 600 mg by mouth nightly.       • HYDROcodone-acetaminophen (NORCO) 5-325 mg per tablet Take 1 tablet by mouth every 6 (six) hours as needed for moderate pain (taken at night).     • indapamide (LOZOL) 2.5 mg tablet Take 2.5 mg by mouth daily.     • insulin aspart U-100 (NovoLOG) 100 unit/mL subcutaneous pen Inject under the skin 3 (three) times a day before meals. Sliding scale as follows:  160-190 give 3 units, 191-210 give 5 units, 211-240 give 7 units, 241-270 give 9 units, greater than 271 give 10 units and recheck sugars in 2-3 hours     • insulin detemir U-100 (LEVEMIR) 100 unit/mL (3 mL) subcutaneous pen Inject 60 Units under the skin nightly. Patient took 30 units last evening     • methocarbamol (ROBAXIN) 750 mg tablet Take 750 mg by mouth 2 (two) times a day.     • metoprolol succinate XL  (TOPROL-XL) 50 mg 24 hr tablet Take 50 mg by mouth daily.     • pantoprazole (PROTONIX) 40 mg EC tablet Take 40 mg by mouth. Normally takes aciphex 30 mg daily.     • RABEprazole (ACIPHEX) 20 mg EC tablet Take 20 mg by mouth daily.     • tolterodine (DETROL) 2 mg tablet Take 2 mg by mouth nightly.     • vit C/E/Zn/coppr/lutein/zeaxan (PRESERVISION AREDS-2 ORAL) Take by mouth.     • warfarin (COUMADIN) 3 mg tablet Take 3.75 mg by mouth once daily. Taken for 6 days then 5 mg on Friday.  Held X 3 days per order.       No current facility-administered medications for this visit.        Review of Systems   Constitution: Negative for malaise/fatigue and weight gain.   HENT: Negative.    Eyes: Negative.    Cardiovascular: Negative for dyspnea on exertion.   Respiratory: Negative for shortness of breath.    Endocrine: Negative.    Hematologic/Lymphatic: Negative.    Skin: Negative.    Musculoskeletal: Negative for muscle weakness.   Gastrointestinal: Negative.    Genitourinary: Negative.    Neurological: Negative for weakness.   Psychiatric/Behavioral: Negative.        Objective   Vitals:    06/25/20 1432   BP: (!) 160/100         BP Readings from Last 3 Encounters:   06/25/20 (!) 160/100   12/12/19 (!) 148/80   06/27/19 124/80     Physical Exam   Constitutional: She is oriented to person, place, and time. She appears well-nourished.   HENT:   Head: Normocephalic.   Eyes: Pupils are equal, round, and reactive to light. Conjunctivae and EOM are normal.   Neck: Normal range of motion.   Cardiovascular: Normal rate, regular rhythm and normal heart sounds.   Pulmonary/Chest: Effort normal and breath sounds normal.   Abdominal: Soft. Bowel sounds are normal. She exhibits no distension.   Musculoskeletal: Normal range of motion. She exhibits no edema.   Neurological: She is alert and oriented to person, place, and time.   Skin: Skin is warm and dry.   Incision well-healed.    Psychiatric: Her mood appears not anxious. Her affect  is not blunt.       Lab Results   Component Value Date    WBC 6.87 06/01/2018    HGB 10.8 (L) 06/01/2018     06/01/2018     06/01/2018    K 3.7 06/01/2018    CREATININE 1.1 06/01/2018    INR 1.4 06/01/2018     Cardiovascular Procedures:    ECHO/MUGA:  Echo (Scanned) - 9/7/2012  Echo 6/13/2019 - EF 55%; Normal AVR      ELECTROPHYSIOLOGY:  Devices (Bi-Ventricular PPM (Biotronik-Evia), Evia Dr-T serial# [id number] PID [id number]. DDDR 80-130ppm. Normal function. Changes made=sensor rate and gain. Base rate decreased to 75ppm.) - 10/30/2013   RFA (Indication A Fib, AVN ablation with pacemaker implantation) - 10/22/2013     STRESS TESTS:  Unruly MPI (Scanned) - 10/24/2011   ETT (Lexiscan. Had SOB and lightheadedness during recoevry, otherwise normal.) - 1/22/2016    CT SURGERY-   AVR 1998 - mechanical    Catheterization 1/23/19 ([facility])  HEMODYNAMICS: There was mild pulmonary hypertension. RA 10mmHg, RV 45/10, PA 45/25 mean 32, PCWP 22 with v wave, Pa sat 68.8%, CO 4.5 L/min, CI 2.1 L/min/m2  VALVES:  AORTIC VALVE:   --  A bileaflet mechanical prosthesis was observed in the aortic position. Both leaflet open well.  CORONARY VESSELS:   --  The coronary circulation is right dominant.  --  Left main: Normal.  --  LAD: Angiography showed minor luminal irregularities.  --  Circumflex: Normal.  --  RCA: Angiography showed minor luminal irregularities.    ECG-Pacemaker ECG    Assessment   Problem List Items Addressed This Visit        Circulatory    CHB (complete heart block) (CMS/HCC)     She remains in complete heart block following her AV node ablation for uncontrolled atrial fibrillation.  A normal functioning pacemaker is in place.         Complication of Procedure: Insert single lead to existing PPM/ICD (05/31/2018)    Complication of Admission: Admission (02/19/2018)    Chronic atrial fibrillation (CMS/HCC) - Primary     Asymptomatic since AV node ablation. Anticoagulated on Coumadin.          Relevant Orders    ECG  12 lead (Completed)    Hypertension     She is hypertensive in the office today but reports stable BP's at home. I asked her to monitor her blood pressures consistently at home with a diary kept and bring to next appointment. If remain elevated we may to consider a change or addition to medications.             Other    Biventricular pacemaker check     She underwent implantation of a Biotronik biventricular pacemaker in May 2018.  Her previous RV lead was malfunctioning therefore a new RV lead was implanted along with the new LV lead.  Her previous RV lead is abandoned and capped.       Biotronik Entrinsa implanted May 31, 2018.  RV threshold 0.6 V at 0.4 ms lead impedance is 663 ohms. LV threshold 0.8 V at 0.4 ms and lead impedance 1248 ohms. Chronic atrial fibrillation parameters VVIR 85 bpm/night rate 75 bpm.  BiV pacing is 100%.  Underlying rhythm is atrial fibrillation with complete heart block.  Battery longevity is 7.6 years.                     Simon Mcknight MD  7/1/2020

## 2020-06-25 NOTE — PROGRESS NOTES
Electrophysiology  Outpatient Progress Note       Reason for visit:   Chief Complaint   Patient presents with   • Follow-up       HPI   Ailyn St is a 84 y.o. female who is seen today for follow up of her  implanted biventricular pacemaker indicated for complete heart block during AV node ablation in .  She has chronic atrial fibrillation.  She has a new LV lead along with a new RV lead.  Her previous RV lead had chronically elevated thresholds and had been turned off due to this.  That RV lead is abandoned and capped.      In 2019 she underwent a cardiac catheterization at an outside institution which showed no obstructive disease. Overall, she is doing well and previous dyspnea has resolved. Echo performed earlier this month showed an EF of 55%.    Past Medical History:   Diagnosis Date   • Abnormal ECG    • Anticoagulated on Coumadin 2018   • Arthritis    • Fatigue 2018   • GERD (gastroesophageal reflux disease)    • Hypertension    • Osteoporosis    • S/P AVR 2018   • SOB (shortness of breath) 2018   • Type 2 diabetes mellitus (CMS/HCC)      Past Surgical History:   Procedure Laterality Date   • ADENOIDECTOMY     • AORTIC VALVE REPLACEMENT     • CARDIAC CATHETERIZATION     • CHOLECYSTECTOMY     • TONSILLECTOMY     • TOTAL ABDOMINAL HYSTERECTOMY W/ BILATERAL SALPINGOOPHORECTOMY         Social History     Tobacco Use   • Smoking status: Former Smoker     Types: Cigarettes     Last attempt to quit:      Years since quittin.5   • Smokeless tobacco: Never Used   • Tobacco comment: carmen in    Substance Use Topics   • Alcohol use: Yes     Comment: rarely   • Drug use: Defer     Family History   Problem Relation Age of Onset   • Diabetes Biological Mother    • Heart disease Biological Father        ALLERGY:  Cephalosporins; Penicillins; Colace [docusate sodium]; Hydrochlorothiazide; Losartan potassium; Metformin; Requip [ropinirole]; Sulfa (sulfonamide antibiotics);  Valsartan; and Latex    Current Outpatient Medications   Medication Sig Dispense Refill   • allopurinol (ZYLOPRIM) 300 mg tablet Take 300 mg by mouth 2 (two) times a day.     • B-complex with vitamin C tablet Take 1 tablet by mouth daily.     • bimatoprost (LUMIGAN) 0.01 % ophthalmic drops 1 drop nightly.     • calcium carbonate-vitamin D3 250-125 mg-unit tablet Take by mouth daily.     • cholecalciferol, vitamin D3, 1,000 unit tablet Take 1,000 Units by mouth daily.     • ferrous sulfate 325 mg (65 mg iron) EC tablet Take 325 mg by mouth daily with breakfast.     • gabapentin (NEURONTIN) 600 mg tablet Take 600 mg by mouth nightly.       • HYDROcodone-acetaminophen (NORCO) 5-325 mg per tablet Take 1 tablet by mouth every 6 (six) hours as needed for moderate pain (taken at night).     • indapamide (LOZOL) 2.5 mg tablet Take 2.5 mg by mouth daily.     • insulin aspart U-100 (NovoLOG) 100 unit/mL subcutaneous pen Inject under the skin 3 (three) times a day before meals. Sliding scale as follows:  160-190 give 3 units, 191-210 give 5 units, 211-240 give 7 units, 241-270 give 9 units, greater than 271 give 10 units and recheck sugars in 2-3 hours     • insulin detemir U-100 (LEVEMIR) 100 unit/mL (3 mL) subcutaneous pen Inject 60 Units under the skin nightly. Patient took 30 units last evening     • methocarbamol (ROBAXIN) 750 mg tablet Take 750 mg by mouth 2 (two) times a day.     • metoprolol succinate XL (TOPROL-XL) 50 mg 24 hr tablet Take 50 mg by mouth daily.     • pantoprazole (PROTONIX) 40 mg EC tablet Take 40 mg by mouth. Normally takes aciphex 30 mg daily.     • RABEprazole (ACIPHEX) 20 mg EC tablet Take 20 mg by mouth daily.     • tolterodine (DETROL) 2 mg tablet Take 2 mg by mouth nightly.     • vit C/E/Zn/coppr/lutein/zeaxan (PRESERVISION AREDS-2 ORAL) Take by mouth.     • warfarin (COUMADIN) 3 mg tablet Take 3.75 mg by mouth once daily. Taken for 6 days then 5 mg on Friday.  Held X 3 days per order.       No  current facility-administered medications for this visit.        Review of Systems   Constitution: Negative for malaise/fatigue and weight gain.   HENT: Negative.    Eyes: Negative.    Cardiovascular: Negative for dyspnea on exertion.   Respiratory: Negative for shortness of breath.    Endocrine: Negative.    Hematologic/Lymphatic: Negative.    Skin: Negative.    Musculoskeletal: Negative for muscle weakness.   Gastrointestinal: Negative.    Genitourinary: Negative.    Neurological: Negative for weakness.   Psychiatric/Behavioral: Negative.        Objective   Vitals:    06/25/20 1432   BP: (!) 160/100         BP Readings from Last 3 Encounters:   06/25/20 (!) 160/100   12/12/19 (!) 148/80   06/27/19 124/80     Physical Exam   Constitutional: She is oriented to person, place, and time. She appears well-nourished.   HENT:   Head: Normocephalic.   Eyes: Pupils are equal, round, and reactive to light. Conjunctivae and EOM are normal.   Neck: Normal range of motion.   Cardiovascular: Normal rate, regular rhythm and normal heart sounds.   Pulmonary/Chest: Effort normal and breath sounds normal.   Abdominal: Soft. Bowel sounds are normal. She exhibits no distension.   Musculoskeletal: Normal range of motion. She exhibits no edema.   Neurological: She is alert and oriented to person, place, and time.   Skin: Skin is warm and dry.   Incision well-healed.    Psychiatric: Her mood appears not anxious. Her affect is not blunt.       Lab Results   Component Value Date    WBC 6.87 06/01/2018    HGB 10.8 (L) 06/01/2018     06/01/2018     06/01/2018    K 3.7 06/01/2018    CREATININE 1.1 06/01/2018    INR 1.4 06/01/2018     Cardiovascular Procedures:    ECHO/MUGA:  Echo (Scanned) - 9/7/2012  Echo 6/13/2019 - EF 55%; Normal AVR      ELECTROPHYSIOLOGY:  Devices (Bi-Ventricular PPM (Biotronik-Evia), Herminio Corey serial# [id number] PID [id number]. DDDR 80-130ppm. Normal function. Changes made=sensor rate and gain. Base rate decreased to  75ppm.) - 10/30/2013   RFA (Indication A Fib, AVN ablation with pacemaker implantation) - 10/22/2013     STRESS TESTS:  Unruly MPI (Scanned) - 10/24/2011   ETT (Lexiscan. Had SOB and lightheadedness during recoevry, otherwise normal.) - 1/22/2016    CT SURGERY-   AVR 1998 - mechanical    Catheterization 1/23/19 ([facility])  HEMODYNAMICS: There was mild pulmonary hypertension. RA 10mmHg, RV 45/10, PA 45/25 mean 32, PCWP 22 with v wave, Pa sat 68.8%, CO 4.5 L/min, CI 2.1 L/min/m2  VALVES:  AORTIC VALVE:   --  A bileaflet mechanical prosthesis was observed in the aortic position. Both leaflet open well.  CORONARY VESSELS:   --  The coronary circulation is right dominant.  --  Left main: Normal.  --  LAD: Angiography showed minor luminal irregularities.  --  Circumflex: Normal.  --  RCA: Angiography showed minor luminal irregularities.    ECG-Pacemaker ECG    Assessment   Problem List Items Addressed This Visit        Circulatory    CHB (complete heart block) (CMS/HCC)     She remains in complete heart block following her AV node ablation for uncontrolled atrial fibrillation.  A normal functioning pacemaker is in place.         Complication of Procedure: Insert single lead to existing PPM/ICD (05/31/2018)    Complication of Admission: Admission (02/19/2018)    Chronic atrial fibrillation (CMS/HCC) - Primary     Asymptomatic since AV node ablation. Anticoagulated on Coumadin.          Relevant Orders    ECG 12 lead (Completed)    Hypertension     She is hypertensive in the office today but reports stable BP's at home. I asked her to monitor her blood pressures consistently at home with a diary kept and bring to next appointment. If remain elevated we may to consider a change or addition to medications.             Other    Biventricular pacemaker check     She underwent implantation of a Biotronik biventricular pacemaker in May 2018.  Her previous RV lead was malfunctioning therefore a new RV lead was implanted along with  the new LV lead.  Her previous RV lead is abandoned and capped.       Biotronik Entrinsa implanted May 31, 2018.  RV threshold 0.6 V at 0.4 ms lead impedance is 663 ohms. LV threshold 0.8 V at 0.4 ms and lead impedance 1248 ohms. Chronic atrial fibrillation parameters VVIR 85 bpm/night rate 75 bpm.  BiV pacing is 100%.  Underlying rhythm is atrial fibrillation with complete heart block.  Battery longevity is 7.6 years.                     Simon Mcknight MD  7/1/2020

## 2020-06-25 NOTE — ASSESSMENT & PLAN NOTE
She is hypertensive in the office today but reports stable BP's at home. I asked her to monitor her blood pressures consistently at home with a diary kept and bring to next appointment. If remain elevated we may to consider a change or addition to medications.

## 2020-06-25 NOTE — ASSESSMENT & PLAN NOTE
She underwent implantation of a Biotronik biventricular pacemaker in May 2018.  Her previous RV lead was malfunctioning therefore a new RV lead was implanted along with the new LV lead.  Her previous RV lead is abandoned and capped.       Biotronik Entrinsa implanted May 31, 2018.  RV threshold 0.6 V at 0.4 ms lead impedance is 663 ohms. LV threshold 0.8 V at 0.4 ms and lead impedance 1248 ohms. Chronic atrial fibrillation parameters VVIR 85 bpm/night rate 75 bpm.  BiV pacing is 100%.  Underlying rhythm is atrial fibrillation with complete heart block.  Battery longevity is 7.6 years.

## 2020-12-11 ENCOUNTER — OFFICE VISIT (OUTPATIENT)
Dept: CARDIOLOGY | Facility: CLINIC | Age: 85
End: 2020-12-11
Payer: MEDICARE

## 2020-12-11 VITALS
OXYGEN SATURATION: 97 % | HEART RATE: 61 BPM | SYSTOLIC BLOOD PRESSURE: 124 MMHG | HEIGHT: 66 IN | DIASTOLIC BLOOD PRESSURE: 62 MMHG | WEIGHT: 239 LBS | BODY MASS INDEX: 38.41 KG/M2

## 2020-12-11 DIAGNOSIS — I48.20 CHRONIC ATRIAL FIBRILLATION (CMS/HCC): ICD-10-CM

## 2020-12-11 DIAGNOSIS — I10 HYPERTENSION, UNSPECIFIED TYPE: ICD-10-CM

## 2020-12-11 DIAGNOSIS — Z45.018 BIVENTRICULAR PACEMAKER CHECK: ICD-10-CM

## 2020-12-11 DIAGNOSIS — I44.2 CHB (COMPLETE HEART BLOCK) (CMS/HCC): Primary | ICD-10-CM

## 2020-12-11 PROCEDURE — 93000 ELECTROCARDIOGRAM COMPLETE: CPT | Performed by: INTERNAL MEDICINE

## 2020-12-11 PROCEDURE — 99213 OFFICE O/P EST LOW 20 MIN: CPT | Performed by: INTERNAL MEDICINE

## 2020-12-11 RX ORDER — SPIRONOLACTONE 50 MG/1
50 TABLET, FILM COATED ORAL
COMMUNITY
Start: 2020-12-01

## 2020-12-11 RX ORDER — METOLAZONE 5 MG/1
TABLET ORAL
COMMUNITY
Start: 2020-12-08 | End: 2021-06-24

## 2020-12-11 RX ORDER — SPIRONOLACTONE AND HYDROCHLOROTHIAZIDE 25; 25 MG/1; MG/1
5 TABLET ORAL DAILY
COMMUNITY
End: 2021-06-24

## 2020-12-31 DIAGNOSIS — E87.1 HYPONATREMIA: ICD-10-CM

## 2020-12-31 DIAGNOSIS — I12.9 BENIGN HYPERTENSIVE KIDNEY DISEASE WITH CHRONIC KIDNEY DISEASE STAGE I THROUGH STAGE IV, OR UNSPECIFIED: ICD-10-CM

## 2020-12-31 DIAGNOSIS — R80.9 PROTEINURIA, UNSPECIFIED TYPE: ICD-10-CM

## 2020-12-31 DIAGNOSIS — N18.30 STAGE 3 CHRONIC KIDNEY DISEASE, UNSPECIFIED WHETHER STAGE 3A OR 3B CKD (HCC): Primary | Chronic | ICD-10-CM

## 2020-12-31 ASSESSMENT — ENCOUNTER SYMPTOMS
ENDOCRINE NEGATIVE: 1
EYES NEGATIVE: 1
GASTROINTESTINAL NEGATIVE: 1
WEAKNESS: 0
HEMATOLOGIC/LYMPHATIC NEGATIVE: 1
SHORTNESS OF BREATH: 0
WEIGHT GAIN: 0
PSYCHIATRIC NEGATIVE: 1
DYSPNEA ON EXERTION: 0

## 2020-12-31 NOTE — ASSESSMENT & PLAN NOTE
She underwent implantation of a Biotronik biventricular pacemaker in May 2018.  Her previous RV lead was malfunctioning therefore a new RV lead was implanted along with the new LV lead.  Her previous RV lead is abandoned and capped.       Biotronik Etrinsa implanted May 31, 2018.  RV threshold 0.6 V at 0.4 ms lead impedance is 643 ohms. LV threshold 0.8 V at 0.4 ms and lead impedance 1189 ohms. Chronic atrial fibrillation parameters VVIR 85 bpm/night rate 75 bpm.  BiV pacing is 100%.  Underlying rhythm is atrial fibrillation with complete heart block.  Battery longevity is 7.2 years.

## 2020-12-31 NOTE — PROGRESS NOTES
Electrophysiology  Outpatient Progress Note       Reason for visit:   Chief Complaint   Patient presents with   • Follow-up     6 mnth       HPI   Ailyn St is a 85 y.o. female who is seen today for follow up of her  implanted biventricular pacemaker indicated for complete heart block during AV node ablation in .  She has chronic atrial fibrillation.  She has a new LV lead along with a new RV lead.  Her previous RV lead had chronically elevated thresholds and had been turned off due to this.  That RV lead is abandoned and capped.      In 2019 she underwent a cardiac catheterization at an outside institution which showed no obstructive disease. Overall, she is doing well and previous dyspnea has resolved. Most recent echocardiogram showed an EF of 55%.    Past Medical History:   Diagnosis Date   • Abnormal ECG    • Anticoagulated on Coumadin 2018   • Arthritis    • Fatigue 2018   • GERD (gastroesophageal reflux disease)    • Hypertension    • Osteoporosis    • S/P AVR 2018   • SOB (shortness of breath) 2018   • Type 2 diabetes mellitus (CMS/HCC)      Past Surgical History:   Procedure Laterality Date   • ADENOIDECTOMY     • AORTIC VALVE REPLACEMENT     • CARDIAC CATHETERIZATION     • CHOLECYSTECTOMY     • TONSILLECTOMY     • TOTAL ABDOMINAL HYSTERECTOMY W/ BILATERAL SALPINGOOPHORECTOMY         Social History     Tobacco Use   • Smoking status: Former Smoker     Types: Cigarettes     Quit date:      Years since quittin.0   • Smokeless tobacco: Never Used   • Tobacco comment: carmen in    Substance Use Topics   • Alcohol use: Yes     Comment: rarely   • Drug use: Defer     Family History   Problem Relation Age of Onset   • Diabetes Biological Mother    • Heart disease Biological Father        ALLERGY:  Penicillins, Colace [docusate sodium], Hydrochlorothiazide, Losartan potassium, Metformin, Requip [ropinirole], Sulfa (sulfonamide antibiotics), Valsartan, and  Latex    Current Outpatient Medications   Medication Sig Dispense Refill   • allopurinol (ZYLOPRIM) 300 mg tablet Take 300 mg by mouth 2 (two) times a day.     • B-complex with vitamin C tablet Take 1 tablet by mouth daily.     • bimatoprost (LUMIGAN) 0.01 % ophthalmic drops 1 drop nightly.     • calcium carbonate-vitamin D3 250-125 mg-unit tablet Take by mouth daily.     • cholecalciferol, vitamin D3, 1,000 unit tablet Take 1,000 Units by mouth daily.     • ferrous sulfate 325 mg (65 mg iron) EC tablet Take 325 mg by mouth daily with breakfast.     • gabapentin (NEURONTIN) 600 mg tablet Take 600 mg by mouth nightly.       • HYDROcodone-acetaminophen (XODOL) 7.5-300 mg per tablet Take 1 tablet by mouth every 6 (six) hours as needed for moderate pain (taken at night).       • indapamide (LOZOL) 2.5 mg tablet Take 2.5 mg by mouth daily.     • insulin aspart U-100 (NovoLOG) 100 unit/mL subcutaneous pen Inject under the skin 3 (three) times a day before meals. Sliding scale as follows:  160-190 give 3 units, 191-210 give 5 units, 211-240 give 7 units, 241-270 give 9 units, greater than 271 give 10 units and recheck sugars in 2-3 hours     • insulin detemir U-100 (LEVEMIR) 100 unit/mL (3 mL) subcutaneous pen Inject 60 Units under the skin nightly. Patient took 30 units last evening     • methocarbamol (ROBAXIN) 750 mg tablet Take 750 mg by mouth 2 (two) times a day.     • metOLazone (ZAROXOLYN) 5 mg tablet take 1 tablet by oral route  daily as directed     • metoprolol succinate XL (TOPROL-XL) 50 mg 24 hr tablet Take 50 mg by mouth daily.     • pantoprazole (PROTONIX) 40 mg EC tablet Take 40 mg by mouth. Normally takes aciphex 30 mg daily.     • RABEprazole (ACIPHEX) 20 mg EC tablet Take 20 mg by mouth daily.     • spironolacton-hydrochlorothiazide (ALDACTAZIDE) 5-5 mg/mL oral suspension Take 5 mL by mouth daily.     • spironolactone (ALDACTONE) 50 mg tablet Take 100 mg by mouth once daily.     • vit  C/E/Zn/coppr/lutein/zeaxan (PRESERVISION AREDS-2 ORAL) Take by mouth.     • warfarin (COUMADIN) 3 mg tablet Take 3.75 mg by mouth once daily. Taken for 6 days then 5 mg on Friday.  Held X 3 days per order.     • tolterodine (DETROL) 2 mg tablet Take 2 mg by mouth nightly.       No current facility-administered medications for this visit.        Review of Systems   Constitution: Negative for malaise/fatigue and weight gain.   HENT: Negative.    Eyes: Negative.    Cardiovascular: Negative for dyspnea on exertion.   Respiratory: Negative for shortness of breath.    Endocrine: Negative.    Hematologic/Lymphatic: Negative.    Skin: Negative.    Musculoskeletal: Negative for muscle weakness.   Gastrointestinal: Negative.    Genitourinary: Negative.    Neurological: Negative for weakness.   Psychiatric/Behavioral: Negative.        Objective   Vitals:    12/11/20 1540   BP: 124/62   Pulse: 61   SpO2: 97%         BP Readings from Last 3 Encounters:   12/11/20 124/62   06/25/20 (!) 160/100   12/12/19 (!) 148/80     Physical Exam   Constitutional: She is oriented to person, place, and time. She appears well-nourished.   HENT:   Head: Normocephalic.   Eyes: Pupils are equal, round, and reactive to light. Conjunctivae and EOM are normal.   Neck: Normal range of motion.   Cardiovascular: Normal rate, regular rhythm and normal heart sounds.   Pulmonary/Chest: Effort normal and breath sounds normal.   Abdominal: Soft. Bowel sounds are normal. She exhibits no distension.   Musculoskeletal: Normal range of motion.         General: No edema.   Neurological: She is alert and oriented to person, place, and time.   Skin: Skin is warm and dry.   Incision well-healed.    Psychiatric: Her mood appears not anxious. Her affect is not blunt.       Lab Results   Component Value Date    WBC 6.87 06/01/2018    HGB 10.8 (L) 06/01/2018     06/01/2018     06/01/2018    K 3.7 06/01/2018    CREATININE 1.1 06/01/2018    INR 1.4 06/01/2018      Cardiovascular Procedures:    ECHO/MUGA:  Echo (Scanned) - 9/7/2012  Echo 6/13/2019 - EF 55%; Normal AVR      ELECTROPHYSIOLOGY:  Devices (Bi-Ventricular PPM (Biotronik-Evia), Herminio Corey serial# [id number] PID 76. DDDR 80-130ppm. Normal function. Changes made=sensor rate and gain. Base rate decreased to 75ppm.) - 10/30/2013   RFA (Indication A Fib, AVN ablation with pacemaker implantation) - 10/22/2013     STRESS TESTS:  Unruly MPI (Scanned) - 10/24/2011   ETT (Lexiscan. Had SOB and lightheadedness during recoevry, otherwise normal.) - 1/22/2016    CT SURGERY-   AVR 1998 - mechanical    Catheterization 1/23/19 ([facility])  HEMODYNAMICS: There was mild pulmonary hypertension. RA 10mmHg, RV 45/10, PA 45/25 mean 32, PCWP 22 with v wave, Pa sat 68.8%, CO 4.5 L/min, CI 2.1 L/min/m2  VALVES:  AORTIC VALVE:   --  A bileaflet mechanical prosthesis was observed in the aortic position. Both leaflet open well.  CORONARY VESSELS:   --  The coronary circulation is right dominant.  --  Left main: Normal.  --  LAD: Angiography showed minor luminal irregularities.  --  Circumflex: Normal.  --  RCA: Angiography showed minor luminal irregularities.    ECG-Pacemaker ECG    Assessment   Problem List Items Addressed This Visit        Circulatory    CHB (complete heart block) (CMS/HCC) - Primary     She remains in complete heart block following her AV node ablation for uncontrolled atrial fibrillation.  A normal functioning pacemaker is in place.         Complication of Procedure: Insert single lead to existing PPM/ICD (05/31/2018)    Complication of Admission: Admission (02/19/2018)    Relevant Orders    ECG 12 LEAD-OFFICE PERFORMED (Completed)    Chronic atrial fibrillation (CMS/HCC)     She underwent an AV node ablation in 2013.  She continues with complete heart block and a pacemaker in place.  She is asymptomatic with the chronic atrial fibrillation.         Relevant Orders    ECG 12 LEAD-OFFICE PERFORMED (Completed)    Hypertension      Improved blood pressure in the office today.  She reports adequate blood pressure control at home.         Relevant Medications    spironolacton-hydrochlorothiazide (ALDACTAZIDE) 5-5 mg/mL oral suspension    metOLazone (ZAROXOLYN) 5 mg tablet    spironolactone (ALDACTONE) 50 mg tablet       Other    Biventricular pacemaker check     She underwent implantation of a Biotronik biventricular pacemaker in May 2018.  Her previous RV lead was malfunctioning therefore a new RV lead was implanted along with the new LV lead.  Her previous RV lead is abandoned and capped.       Biotronik Etrinsa implanted May 31, 2018.  RV threshold 0.6 V at 0.4 ms lead impedance is 643 ohms. LV threshold 0.8 V at 0.4 ms and lead impedance 1189 ohms. Chronic atrial fibrillation parameters VVIR 85 bpm/night rate 75 bpm.  BiV pacing is 100%.  Underlying rhythm is atrial fibrillation with complete heart block.  Battery longevity is 7.2 years.                     Simon Mcknight MD  1/3/2021

## 2020-12-31 NOTE — ASSESSMENT & PLAN NOTE
Improved blood pressure in the office today.  She reports adequate blood pressure control at home.

## 2021-01-04 ENCOUNTER — OFFICE VISIT (OUTPATIENT)
Dept: SLEEP CENTER | Facility: CLINIC | Age: 86
End: 2021-01-04
Payer: MEDICARE

## 2021-01-04 VITALS
SYSTOLIC BLOOD PRESSURE: 122 MMHG | HEART RATE: 86 BPM | WEIGHT: 231 LBS | BODY MASS INDEX: 37.12 KG/M2 | HEIGHT: 66 IN | DIASTOLIC BLOOD PRESSURE: 62 MMHG

## 2021-01-04 DIAGNOSIS — G47.33 MILD OBSTRUCTIVE SLEEP APNEA: ICD-10-CM

## 2021-01-04 DIAGNOSIS — G25.81 RLS (RESTLESS LEGS SYNDROME): Primary | ICD-10-CM

## 2021-01-04 PROCEDURE — 99214 OFFICE O/P EST MOD 30 MIN: CPT | Performed by: NURSE PRACTITIONER

## 2021-01-04 RX ORDER — GABAPENTIN 100 MG/1
CAPSULE ORAL
Qty: 90 CAPSULE | Refills: 1 | Status: SHIPPED | OUTPATIENT
Start: 2021-01-04 | End: 2021-06-18

## 2021-01-04 RX ORDER — SPIRONOLACTONE 50 MG/1
50 TABLET, FILM COATED ORAL DAILY
COMMUNITY
Start: 2020-12-01

## 2021-01-04 RX ORDER — GABAPENTIN 300 MG/1
600 CAPSULE ORAL
Qty: 180 CAPSULE | Refills: 1 | Status: SHIPPED | OUTPATIENT
Start: 2021-01-04 | End: 2021-07-10 | Stop reason: SDUPTHER

## 2021-01-04 RX ORDER — BLOOD SUGAR DIAGNOSTIC
STRIP MISCELLANEOUS
COMMUNITY
Start: 2020-12-07

## 2021-01-04 RX ORDER — METOLAZONE 5 MG/1
5 TABLET ORAL
COMMUNITY
Start: 2020-12-08 | End: 2021-11-15

## 2021-01-04 RX ORDER — INSULIN ASPART 100 [IU]/ML
INJECTION, SOLUTION INTRAVENOUS; SUBCUTANEOUS AS NEEDED
COMMUNITY

## 2021-01-04 NOTE — PATIENT INSTRUCTIONS
1   Try using gabapentin 300mg with additional 200mg instead of 2 300mg capsules to see if edema improves  2   If that works better, call for a change in prescription, if not, you may continue the 600mg at bedtime  3   Schedule a follow up visit in 6 months  Nursing Support:  When: Monday through Friday 7A-5PM except holidays  Where: Our direct line is [phone]  If you are having a true emergency please call 911  In the event that the line is busy or it is after hours please leave a voice message and we will return your call  Please speak clearly, leaving your full name, birth date, best number to reach you and the reason for your call  Medication refills: We will need the name of the medication, the dosage, the ordering provider, whether you get a 30 or 90 day refill, and the pharmacy name and address  Medications will be ordered by the provider only  Nurses cannot call in prescriptions  Please allow 7 days for medication refills  Physician requested updates: If your provider requested that you call with an update after starting medication, please be ready to provide us the medication and dosage, what time you take your medication, the time you attempt to fall asleep, time you fall asleep, when you wake up, and what time you get out of bed  Sleep Study Results: We will contact you with sleep study results and/or next steps after the physician has reviewed your testing

## 2021-01-04 NOTE — PROGRESS NOTES
Progress Note - [address] Moran 81 y o  female   :1935, MRN: [id number]  2021      Follow Up Evaluation / Problem:  Restless Legs Syndrome  Mild obstructive sleep apnea  Chronic atrial fibrillation  Valvular heart disease  Pulmonary arterial hypertension  CKD      Thank you for the opportunity of participating in the evaluation and care of this patient in the Sleep Clinic at [facility]  HPI: Lynette Sims is a 81y o  year old female  She presents for follow up of restless legs syndrome  She initially presented to Sleep Medicine in  for complaint of insomnia, including frequent night time awakenings and non-restorative sleep  She also complained of excessive daytime sleepiness  She had had a sleep study at [facility] more than 20 years ago, which showed no evidence of sleep apnea  She reported evidence of restless legs syndrome and has been taking gabapentin 600mg, which improves these symptoms  She also takes an iron supplement daily for a low ferritin level  She completed a diagnostic sleep study in 2020 with AHI of 5 0 and lowest oxygen desat to 84%  Titration study using CPAP was recommended, due to her significant cardiac history and pulmonary HTN, however, she declined treatment  Her comorbid conditions include atrial fibrillation, CHF, mechanical heart valve, pulmonary arterial HTN, CKD stage 3, obesity, chronic back pain with chronic prescription opiate use, taken at bedtime      Review of Systems      Genitourinary need to urinate more than twice a night and post menopausal (no peroids)   Cardiology ankle/leg swelling   Gastrointestinal none   Neurology numbness/tingling of an extremity   Constitutional none   Integumentary none   Psychiatry none   Musculoskeletal joint pain and back pain   Pulmonary shortness of breath with activity   ENT none   Endocrine none   Hematological none       Current Outpatient Medications:     Accu-Chek Lila Plus test strip, CHECK BLOOD SUGAR three times a day, Disp: , Rfl:     allopurinol (ZYLOPRIM) 100 mg tablet, Take 100 mg by mouth 2 (two) times a day  , Disp: , Rfl:     bimatoprost (LUMIGAN) 0 01 % ophthalmic drops, Administer 1 drop to both eyes daily at bedtime, Disp: , Rfl:     calcium citrate-vitamin D (CITRACAL+D) 315-200 MG-UNIT per tablet, Take 1 tablet by mouth 2 (two) times a day, Disp: , Rfl:     Carbonyl Iron 45 MG TABS, Take 45 mg by mouth, Disp: , Rfl:     cholecalciferol (VITAMIN D3) 1,000 units tablet, Take 1,000 Units by mouth daily, Disp: , Rfl:     ferrous sulfate 325 (65 Fe) mg tablet, Take 325 mg by mouth daily with breakfast, Disp: , Rfl:     gabapentin (NEURONTIN) 100 mg capsule, Take one capsule at bedtime in addition to 600mg dose, as needed for RLS, Disp: 90 capsule, Rfl: 1    gabapentin (NEURONTIN) 300 mg capsule, Take 2 capsules (600 mg total) by mouth daily at bedtime, Disp: 180 capsule, Rfl: 1    HYDROcodone-acetaminophen (NORCO) 5-325 mg per tablet, Take 1 tablet by mouth every 6 (six) hours as needed for pain for up to 10 dosesMax Daily Amount: 4 tablets, Disp: 20 tablet, Rfl: 0    indapamide (LOZOL) 2 5 mg tablet, Take 2 5 mg by mouth every morning, Disp: , Rfl:     insulin aspart (NovoLOG FlexPen) 100 UNIT/ML injection pen, Inject under the skin, Disp: , Rfl:     insulin detemir (LEVEMIR) 100 units/mL subcutaneous injection, Inject 60 Units under the skin daily at bedtime , Disp: , Rfl:     Magnesium 400 MG CAPS, Take 1 each by mouth daily , Disp: , Rfl:     methocarbamol (ROBAXIN) 500 mg tablet, Take 1 5 tablets (750 mg total) by mouth 2 (two) times a day (Patient taking differently: Take 750 mg by mouth as needed ), Disp: 10 tablet, Rfl: 0    metolazone (ZAROXOLYN) 5 mg tablet, take 1 tablet by oral route  daily as directed, Disp: , Rfl:     metoprolol succinate (TOPROL-XL) 50 mg 24 hr tablet, Take 50 mg by mouth daily, Disp: , Rfl:     Multiple Vitamin (MULTIVITAMIN) tablet, Take 1 tablet by mouth daily, Disp: , Rfl:     Multiple Vitamins-Minerals (PRESERVISION AREDS 2 PO), Take by mouth 2 (two) times a day, Disp: , Rfl:     polyethylene glycol-propylene glycol (SYSTANE) 0 4-0 3 %, 1 drop every 3 (three) hours as needed , Disp: , Rfl:     RABEprazole (ACIPHEX) 20 MG tablet, Take 20 mg by mouth daily, Disp: , Rfl:     spironolactone (ALDACTONE) 50 mg tablet, Take 100 mg by mouth daily, Disp: , Rfl:     warfarin (COUMADIN) 5 mg tablet, Take 1 tablet (5 mg total) by mouth daily Adjust based on inr (Patient taking differently: Take 3 75 mg by mouth daily Adjust based on inr), Disp: 20 tablet, Rfl: 0    tolterodine (DETROL) 1 mg tablet, Take 1 mg by mouth as needed , Disp: , Rfl:     [place] Sleepiness Scale  Sitting and reading: Slight chance of dozing  Watching TV: Slight chance of dozing  Sitting, inactive in a public place (e g  a theatre or a meeting): Would never doze  As a passenger in a car for an hour without a break: Would never doze  Lying down to rest in the afternoon when circumstances permit: Slight chance of dozing  Sitting and talking to someone: Would never doze  Sitting quietly after a lunch without alcohol: Would never doze  In a car, while stopped for a few minutes in traffic: Would never doze  Total score: 3              Vitals:    01/04/21 0909   BP: 122/62   Pulse: 86   Weight: 105 kg (231 lb)   Height: 5' 6" (1 676 m)       Body mass index is 37 28 kg/m²  [place] SLEEPINESS SCORE  Total score: 3      Past History Since Last Sleep Center Visit:   She had a fall in July, resulting in a compression fracture  She has been sleeping in an adjustable bed in an elevated position with legs elevated as well  Initially, sleep was interrupted by pain  Lately, pain has been improving, however, she is now awakening several times per night for urination    She reports that she has been taking several diuretics daily, in the morning, due to an increase in edema in her lower extremities  Her restless leg symptoms have been well controlled with the use of gabapentin 600mg at bedtimes with an additional 100mg, that she takes only when needed  She retired in September and has had a decrease in physical activity, due to same, as well as due to pain from the compression fracture  The review of systems and following portions of the patient's history were reviewed and updated as appropriate: allergies, current medications, past family history, past medical history, past social history, past surgical history, and problem list         OBJECTIVE    Physical Exam:     General Appearance:   Alert, cooperative, no distress, appears stated age, obese     Head:   Normocephalic, without obvious abnormality, atraumatic     Eyes:   PERRL, conjunctiva/corneas clear, EOM's intact          Nose:  Nares normal, septum midline, no drainage or sinus tenderness           Throat:  Lips, teeth and gums normal; tongue normal size and  shape and midline mucosa moist with low-lying soft palatal tissue, uvula barely visible, tonsils not visualized, Mallampati class 4       Neck:  Supple, symmetrical, trachea midline, no adenopathy; Thyroid: No enlargement, tenderness or nodules; no carotid bruit or JVD     Lungs:      Clear to auscultation but decreased bases bilaterally, respirations unlabored     Heart:   Regular rate and rhythm, S1 and S2 normal, slight murmur noted at left sternal border       Extremities:  Extremities normal, atraumatic, no cyanosis and +1 pitting edema in LE bilaterally       Skin:  Skin color, texture, turgor normal, no rashes or lesions       Neurologic:  No focal deficits noted  Normal strength, sensation throughout       ASSESSMENT / PLAN    1  RLS (restless legs syndrome)  gabapentin (NEURONTIN) 300 mg capsule    gabapentin (NEURONTIN) 100 mg capsule   2   Mild obstructive sleep apnea Counseling / Coordination of Care  Total clinic time spent today 30 minutes  Greater than 50% of total time was spent with the patient and / or family counseling and / or coordination of care  A description of the counseling / coordination of care:     Impressions, Diagnostic results, Prognosis, Instructions for management, Risks and benefits of treatment, Patient and family education, Risk factor reductions and Importance of compliance with treatment    We discussed her symptoms of RLS  Symptoms are well controlled with use of gabapentin 600mg-700mg at bedtime  We discussed that edema can be a side effect of gabapentin  Symptoms do not typically occur suddenly and she has been using gabapentin for quite some time  She will consider decreasing dose to 500mg at bedtime to see if RLS symptoms are controlled with an improvement in peripheral edema  She was encouraged to use the lowest effective dose  Refill of 300mg and 100mg strengths of gabapentin were sent to pharmacy electronically  She will call if a lower dose is helping and she needs refill  She will schedule a follow up visit in 6 months  The following instructions have been given to the patient today:    Patient Instructions   1  Try using gabapentin 300mg with additional 200mg instead of 2 300mg capsules to see if edema improves  2   If that works better, call for a change in prescription, if not, you may continue the 600mg at bedtime  3   Schedule a follow up visit in 6 months  Nursing Support:  When: Monday through Friday 7A-5PM except holidays  Where: Our direct line is [phone]  If you are having a true emergency please call 911  In the event that the line is busy or it is after hours please leave a voice message and we will return your call  Please speak clearly, leaving your full name, birth date, best number to reach you and the reason for your call  Medication refills:  We will need the name of the medication, the dosage, the ordering provider, whether you get a 30 or 90 day refill, and the pharmacy name and address  Medications will be ordered by the provider only  Nurses cannot call in prescriptions  Please allow 7 days for medication refills  Physician requested updates: If your provider requested that you call with an update after starting medication, please be ready to provide us the medication and dosage, what time you take your medication, the time you attempt to fall asleep, time you fall asleep, when you wake up, and what time you get out of bed  Sleep Study Results: We will contact you with sleep study results and/or next steps after the physician has reviewed your testing        Tuan Overton, [address] [facility]

## 2021-01-20 DIAGNOSIS — Z23 ENCOUNTER FOR IMMUNIZATION: ICD-10-CM

## 2021-01-21 ENCOUNTER — IMMUNIZATIONS (OUTPATIENT)
Dept: FAMILY MEDICINE CLINIC | Facility: HOSPITAL | Age: 86
End: 2021-01-21

## 2021-01-21 DIAGNOSIS — Z23 ENCOUNTER FOR IMMUNIZATION: Primary | ICD-10-CM

## 2021-01-21 PROCEDURE — 91301 SARS-COV-2 / COVID-19 MRNA VACCINE (MODERNA) 100 MCG: CPT

## 2021-01-21 PROCEDURE — 0011A SARS-COV-2 / COVID-19 MRNA VACCINE (MODERNA) 100 MCG: CPT

## 2021-02-20 ENCOUNTER — IMMUNIZATIONS (OUTPATIENT)
Dept: FAMILY MEDICINE CLINIC | Facility: HOSPITAL | Age: 86
End: 2021-02-20

## 2021-02-20 DIAGNOSIS — Z23 ENCOUNTER FOR IMMUNIZATION: Primary | ICD-10-CM

## 2021-02-20 PROCEDURE — 91301 SARS-COV-2 / COVID-19 MRNA VACCINE (MODERNA) 100 MCG: CPT

## 2021-02-20 PROCEDURE — 0012A SARS-COV-2 / COVID-19 MRNA VACCINE (MODERNA) 100 MCG: CPT

## 2021-03-05 LAB
CREAT ?TM UR-SCNC: 65.2 UMOL/L
EXT PROTEIN URINE: 9.2
PROT/CREAT UR: 0.14 MG/G{CREAT}

## 2021-03-11 ENCOUNTER — OFFICE VISIT (OUTPATIENT)
Dept: NEPHROLOGY | Facility: CLINIC | Age: 86
End: 2021-03-11
Payer: MEDICARE

## 2021-03-11 VITALS
SYSTOLIC BLOOD PRESSURE: 134 MMHG | DIASTOLIC BLOOD PRESSURE: 76 MMHG | HEIGHT: 66 IN | WEIGHT: 229.5 LBS | BODY MASS INDEX: 36.88 KG/M2 | HEART RATE: 84 BPM

## 2021-03-11 DIAGNOSIS — N25.81 SECONDARY HYPERPARATHYROIDISM, RENAL (HCC): ICD-10-CM

## 2021-03-11 DIAGNOSIS — E87.1 HYPONATREMIA: ICD-10-CM

## 2021-03-11 DIAGNOSIS — R80.9 PROTEINURIA, UNSPECIFIED TYPE: ICD-10-CM

## 2021-03-11 DIAGNOSIS — N18.30 STAGE 3 CHRONIC KIDNEY DISEASE, UNSPECIFIED WHETHER STAGE 3A OR 3B CKD (HCC): Primary | ICD-10-CM

## 2021-03-11 DIAGNOSIS — I50.32 CHRONIC DIASTOLIC CONGESTIVE HEART FAILURE (HCC): ICD-10-CM

## 2021-03-11 DIAGNOSIS — M1A.9XX0 CHRONIC GOUT WITHOUT TOPHUS, UNSPECIFIED CAUSE, UNSPECIFIED SITE: Chronic | ICD-10-CM

## 2021-03-11 DIAGNOSIS — I10 HYPERTENSION, UNSPECIFIED TYPE: Chronic | ICD-10-CM

## 2021-03-11 PROCEDURE — 99214 OFFICE O/P EST MOD 30 MIN: CPT | Performed by: INTERNAL MEDICINE

## 2021-03-11 NOTE — PATIENT INSTRUCTIONS
Chronic Kidney Disease   WHAT YOU NEED TO KNOW:   Chronic kidney disease (CKD) is the gradual and permanent loss of kidney function  It is also called chronic kidney failure, or chronic renal insufficiency  Normally, the kidneys remove fluid, chemicals, and waste from your blood  These wastes are turned into urine by your kidneys  CKD may worsen over time and lead to kidney failure  Your CKD team will help you and your family plan for your care at home  The team will help you create goals and find ways to meet your goals  Your care plan may change over time as your needs change  DISCHARGE INSTRUCTIONS:   Call your local emergency number (911 in the [address]) if:   · You have a seizure  · You have shortness of breath  Call your doctor or nephrologist if:   · You are confused and very drowsy  · You suddenly gain or lose more weight than your healthcare provider has told you is okay  · You have itchy skin or a rash  · You urinate more or less than you normally do  · You have blood in your urine  · You have nausea and are vomiting  · You have fatigue or muscle weakness  · You have hiccups that will not stop  · You have questions or concerns about your condition or care  Medicines:   · Medicines  may be given to decrease blood pressure and get rid of extra fluid  You may also receive medicine to manage health conditions that may occur with CKD, such as anemia, diabetes, and heart disease  · Take your medicine as directed  Contact your healthcare provider if you think your medicine is not helping or if you have side effects  Tell him or her if you are allergic to any medicine  Keep a list of the medicines, vitamins, and herbs you take  Include the amounts, and when and why you take them  Bring the list or the pill bottles to follow-up visits  Carry your medicine list with you in case of an emergency  What you can do to manage CKD: Management may include making some lifestyle changes  Tell your healthcare provider if you have any concerns about being able to make the changes  He or she can help you find solutions, including working with specialists  Ask for help creating a plan to break large goals into smaller steps  Your plan may include any of the following:  · Manage other health conditions  Your healthcare provider will work with you to make a care plan that meets your needs  You will be checked regularly for heart disease or other conditions that can make CKD worse, such as diabetes  Your blood pressure will be closely monitored  You will also get a target blood pressure and help making a plan to reach your target  This may include taking your blood pressure at home  · Maintain a healthy weight  Extra weight can strain your kidneys  Ask what a healthy weight is for you  Your provider can help you create a weight loss plan if you are overweight  · Create an exercise plan  Regular exercise can help you manage CKD, high blood pressure, and diabetes  Exercise also helps control weight  Your provider can help you create exercise goals and a plan to reach those goals  For example, your goal may be to exercise for 30 minutes in a day  Your plan can include breaking exercise into 10 minute sessions, 3 times during the day  · Create a healthy eating plan  Your provider may tell you to eat food low in sodium (salt), potassium, phosphorus, or protein  A dietitian can help you plan meals if needed  Ask how much liquid to drink each day and which liquids are best for you  · Limit alcohol as directed  Alcohol can cause fluid retention and can affect your kidneys  Ask how much alcohol is safe for you  A drink of alcohol is 12 ounces of beer, 5 ounces of wine, or 1½ ounces of liquor  · Do not smoke  Nicotine and other chemicals in cigarettes and cigars can cause kidney damage  Ask your provider for information if you currently smoke and need help to quit   E-cigarettes or smokeless tobacco still contain nicotine  Talk to your provider before you use these products  · Ask about over-the-counter medicines  Medicines such as NSAIDs and laxatives may harm your kidneys  Some cough and cold medicines can raise your blood pressure  Always ask if a medicine is safe before you take it  · Ask about vaccines you may need  Infections such as pneumonia, influenza, and hepatitis can be more harmful or more likely to occur in a person who has CKD  Vaccines lower your risk for infection  Follow up with your doctor as directed: You will need to return for tests to monitor your kidney and nerve function, and your parathyroid hormone level  Your medicines may be changed, based on certain test results  You may also be referred to a nephrologist (kidney specialist)  Write down your questions so you remember to ask them during your visits  © Copyright [address] Information is for End User's use only and may not be sold, redistributed or otherwise used for commercial purposes  All illustrations and images included in CareNotes® are the copyrighted property of A D A M [employer] Inc  or [facility]   The above information is an  only  It is not intended as medical advice for individual conditions or treatments  Talk to your doctor, nurse or pharmacist before following any medical regimen to see if it is safe and effective for you

## 2021-03-11 NOTE — PROGRESS NOTES
OFFICE FOLLOW UP - Nephrology   Jairo Ovalle 81 y o  female MRN: [id number]    Encounter: [id number]        ASSESSMENT and PLAN:  Ernesto Michelle was seen today for follow-up  Diagnoses and all orders for this visit:    Ernesto Michelle is 81years old and has a history of diastolic congestive heart failure, pulmonary hypertension, stage 3 chronic kidney disease who presents for follow-up     1  Secondary hyperparathyroidism, renal (HCC)  - PTH is currently acceptable  - she remains on vitamin D3 1000 along with a multivitamin     2  Essential hypertension  - her blood pressure is well controlled today  - she is currently indapamide 2 5 mg every morning  And metoprolol XL 50 mg daily  -she was started on spironolactone and metolazone weekly because of increased edema with chronic ulcers this has improved her edema  -she has had some low blood pressures in the interim did have a fall back in July but this was because she missed a step she states she was not lightheaded continue to monitor blood pressures low threshold to down titrate medication     3  Cardiomegaly  - follows up with cardiologist at [facility] and [facility]     4  Chronic gout without tophus, unspecified cause, unspecified site  -now on allopurinol 100 mg b i d   - tolerating this well  With no recent gout attacks     5  Diastolic dysfunction  - no episodes of flash pulmonary edema does have lower extremity edema but stable and improved  - continue ongoing cardiology follow-up  -compression stockings and leg elevation     6  Chronic kidney disease, stage 3  -creatinine currently stable with an estimated GFR of 45-50  -continue to monitor for anemia related to chronic kidney disease and bone mineral disease    7  Pulmonary arterial hypertension  -following with Cardiology    8   Hyponatremia  -sodiums remained stable    Continue semi annual monitoring    HPI: Jairo Ovalle is a 81 y o  female who is here for Follow-up    Since her last office visit she has been doing relatively well she denies any acute chest pain or shortness of breath no fevers or chills no nausea vomiting diarrhea constipation foamy or bloody urine back in July she had a fall she had a fracture of her vertebra she has been [place] since that time doing okay she also had increased edema she was started on metolazone and spironolactone in addition to her in daptomycin she was having chronic ulcerations the edema is now improved    ROS:   All the systems were reviewed and were negative except as documented on the HPI      Allergies: Acetazolamide, Colchicine, Diltiazem, Docusate, Eszopiclone, Febuxostat, Glycerin, Hydrochlorothiazide, Latex, Lorazepam, Metformin, Nortriptyline, Penicillins, Povidone iodine, Ropinirole, Simvastatin, Sulfa antibiotics, Torsemide, Valsartan, and Fluticasone    Medications:   Current Outpatient Medications:     Accu-Chek Lila Plus test strip, CHECK BLOOD SUGAR three times a day, Disp: , Rfl:     allopurinol (ZYLOPRIM) 100 mg tablet, Take 100 mg by mouth 2 (two) times a day  , Disp: , Rfl:     bimatoprost (LUMIGAN) 0 01 % ophthalmic drops, Administer 1 drop to both eyes daily at bedtime, Disp: , Rfl:     calcium citrate-vitamin D (CITRACAL+D) 315-200 MG-UNIT per tablet, Take 1 tablet by mouth 2 (two) times a day, Disp: , Rfl:     Carbonyl Iron 45 MG TABS, Take 45 mg by mouth, Disp: , Rfl:     cholecalciferol (VITAMIN D3) 1,000 units tablet, Take 1,000 Units by mouth daily, Disp: , Rfl:     ferrous sulfate 325 (65 Fe) mg tablet, Take 325 mg by mouth daily with breakfast, Disp: , Rfl:     gabapentin (NEURONTIN) 100 mg capsule, Take one capsule at bedtime in addition to 600mg dose, as needed for RLS (Patient taking differently: Take 2 capsule at bedtime), Disp: 90 capsule, Rfl: 1    gabapentin (NEURONTIN) 300 mg capsule, Take 2 capsules (600 mg total) by mouth daily at bedtime (Patient taking differently: Take 300 mg by mouth daily at bedtime ), Disp: 180 capsule, Rfl: 1    indapamide (LOZOL) 2 5 mg tablet, Take 2 5 mg by mouth every morning, Disp: , Rfl:     insulin aspart (NovoLOG FlexPen) 100 UNIT/ML injection pen, Inject under the skin, Disp: , Rfl:     insulin detemir (LEVEMIR) 100 units/mL subcutaneous injection, Inject under the skin 25 units in the AM and 20 units in the PM, Disp: , Rfl:     metolazone (ZAROXOLYN) 5 mg tablet, take 1 tablet by oral route  daily as directed, Disp: , Rfl:     metoprolol succinate (TOPROL-XL) 50 mg 24 hr tablet, Take 50 mg by mouth daily, Disp: , Rfl:     Multiple Vitamin (MULTIVITAMIN) tablet, Take 1 tablet by mouth daily, Disp: , Rfl:     Multiple Vitamins-Minerals (PRESERVISION AREDS 2 PO), Take by mouth 2 (two) times a day, Disp: , Rfl:     polyethylene glycol-propylene glycol (SYSTANE) 0 4-0 3 %, 1 drop every 3 (three) hours as needed , Disp: , Rfl:     RABEprazole (ACIPHEX) 20 MG tablet, Take 20 mg by mouth daily, Disp: , Rfl:     spironolactone (ALDACTONE) 50 mg tablet, Take 50 mg by mouth daily , Disp: , Rfl:     warfarin (COUMADIN) 5 mg tablet, Take 1 tablet (5 mg total) by mouth daily Adjust based on inr (Patient taking differently: Take 3 75 mg by mouth daily Adjust based on inr), Disp: 20 tablet, Rfl: 0    HYDROcodone-acetaminophen (NORCO) 5-325 mg per tablet, Take 1 tablet by mouth every 6 (six) hours as needed for pain for up to 10 dosesMax Daily Amount: 4 tablets (Patient not taking: Reported on 3/11/2021), Disp: 20 tablet, Rfl: 0    Magnesium 400 MG CAPS, Take 1 each by mouth daily , Disp: , Rfl:     methocarbamol (ROBAXIN) 500 mg tablet, Take 1 5 tablets (750 mg total) by mouth 2 (two) times a day (Patient taking differently: Take 750 mg by mouth as needed ), Disp: 10 tablet, Rfl: 0    tolterodine (DETROL) 1 mg tablet, Take 1 mg by mouth as needed , Disp: , Rfl:     Past Medical History:   Diagnosis Date    [facility] 9/13/2017    Chronic gout 9/13/2017    Chronic rhinitis 11/08/2018  CKD (chronic kidney disease) stage 3, GFR 30-59 ml/min 9/13/2017    Colon polyp 9/13/2017    Coronary artery disease     Diabetes mellitus ([address] )     COMBS (dyspnea on exertion) 11/02/2018    Essential hypertension 9/13/2017    Gastritis 9/13/2017    GERD (gastroesophageal reflux disease)     Hx of hiatal hernia     Hypertension     Irregular heart beat     Mechanical heart valve present 9/13/2017    Osteoarthritis     Osteoporosis     Pacemaker     S/P AVR     Spinal stenosis     disk herniation      Past Surgical History:   Procedure Laterality Date    AORTIC VALVE REPLACEMENT      BREAST EXCISIONAL BIOPSY Left 1995    benign    CARDIAC SURGERY      CATARACT EXTRACTION      CHOLECYSTECTOMY      COLONOSCOPY      COLONOSCOPY  09/2012    tubular adenoma    EGD AND COLONOSCOPY  09/2017    adenomatous polyp/ benign gastric polyp    ESOPHAGOGASTRODUODENOSCOPY  08/2012    HERNIA REPAIR      x3    HYSTERECTOMY      age 62   [place] [id number] [facility] / REMOVE PACEMAKER      IR PELVIC ANGIOGRAM  1/29/2019    JOINT REPLACEMENT Bilateral     knees    OOPHORECTOMY Bilateral     age 62    SC COLONOSCOPY FLX DX W/COLLJ SPEC WHEN PFRMD N/A 9/12/2017    Procedure: Julia Tristan COLONOSCOPY with polypectomies;  Surgeon: Lilly Lorenzo MD;  [facility]; Service: Gastroenterology    SC SIGMOIDOSCOPY FLX DX W/COLLJ SPEC BR/WA IF PFRMD N/A 9/13/2017    Procedure: colonoscopy with hemo clip x 2;  Surgeon: Lilly Lorenzo MD;  Location[facility];   Service: Gastroenterology    ROTATOR CUFF REPAIR Left     SKIN BIOPSY      TONSILLECTOMY       Family History   Problem Relation Age of Onset    Diabetes Mother     Hypertension Mother     Heart disease Father     Emphysema Father     Asthma Sister     Cancer Sister     Hypertension Sister     Ovarian cancer Sister 59    No Known Problems Daughter     No Known Problems Maternal Grandmother     No Known Problems Maternal Grandfather     No Known Problems Paternal Grandmother     No Known Problems Paternal Grandfather     Breast cancer Cousin 54    Breast cancer Cousin 54    Breast cancer Cousin 79    Colon cancer Sister     No Known Problems Daughter     No Known Problems Paternal Aunt     No Known Problems Paternal Aunt       reports that she has quit smoking  She has never used smokeless tobacco  She reports that she does not drink alcohol or use drugs  Physical Exam:   Vitals:    03/11/21 1000   BP: 134/76   Pulse: 84   Weight: 104 kg (229 lb 8 oz)   Height: 5' 6" (1 676 m)     Body mass index is 37 04 kg/m²  General: conscious, cooperative, in not acute distress  Eyes: conjunctivae pink, anicteric sclerae  ENT: lips and mucous membranes moist  Neck: supple, no JVD  Chest: clear breath sounds bilateral, no crackles, ronchus or wheezings  CVS: distinct S1 & S2, normal rate, regular rhythm  Abdomen: soft, non-tender, non-distended, normoactive bowel sounds  Extremities: no edema of both legs  Skin: no rash  Neuro: awake, alert, oriented    Lab Results:    Results for orders placed or performed in visit on 03/05/21   Protein / creatinine ratio, urine   Result Value Ref Range    PROTEIN UA 9 2     EXT Creatinine Urine 65 2     EXTERNAL Ur Prot/Creat Ratio 0 14          Portions of the record may have been created with voice recognition software  Occasional wrong word or "sound a like" substitutions may have occurred due to the inherent limitations of voice recognition software  Read the chart carefully and recognize, using context, where substitutions have occurred  If you have any questions, please contact the dictating provider

## 2021-03-22 NOTE — TELEPHONE ENCOUNTER
----- Message from Francois Kawasaki, DO sent at 3/27/2019 12:54 PM EDT -----  Please let her know that labs reviewed creatinine has improved electrolytes stable, hgn is stable at 12, iron saturation is low but since hgn is stable we can monitor and she can continue oral iron supplement  Thanks  Tetracycline Counseling: Patient counseled regarding possible photosensitivity and increased risk for sunburn.  Patient instructed to avoid sunlight, if possible.  When exposed to sunlight, patients should wear protective clothing, sunglasses, and sunscreen.  The patient was instructed to call the office immediately if the following severe adverse effects occur:  hearing changes, easy bruising/bleeding, severe headache, or vision changes.  The patient verbalized understanding of the proper use and possible adverse effects of tetracycline.  All of the patient's questions and concerns were addressed. Patient understands to avoid pregnancy while on therapy due to potential birth defects.

## 2021-04-07 NOTE — H&P (VIEW-ONLY)
[facility] - Outpatient Note  Collette Pierini 81 y o  female MRN: [id number]  Unit/Bed#:  Encounter: [id number]          ASSESSMENT AND PLAN:        I think an upper endoscopy as needed  She does have a polyp remaining which I do not want to remove  To that and I will leave her on Coumadin for the procedure  If the polyp seems like is leaking blood then I would  refer her to an interesting endoscopist for removal  She did have a post-polypectomy bleed in 2017 following polypectomy in her colon  I would like a copy of her blood work and would leave it up to hematology as to whether IV iron is indicated as below  Diagnoses and all orders for this visit:    Anemia, unspecified type  -     EGD; Future    Gastric polyp  -     EGD; Future    Recurrent abdominal hernia without obstruction or gangrene, unspecified hernia type    Other orders  -     Lifitegrast [place] OP); Apply 1 drop to eye 2 (two) times a day  -     aspirin (ECOTRIN LOW STRENGTH) 81 mg EC tablet; Take 81 mg by mouth daily  -     Omega-3 Fatty Acids (FISH OIL PO); Take 750 mg by mouth daily  -     Diet NPO; Sips with meds; Standing  -     Void on call to OR; Standing  -     Insert peripheral IV; Standing          ______________________________________________________________________    HPI:  The patient returns the office  She has been anemic  She had a period where she was quite exhausted and that seems a little bit improved although she still has dyspnea when she climbs a flight of stairs  This would seem to be related to her anemia  She does have a gastric polyp and was last visualized in 2017  He remains on Coumadin  Apparently ordered some iron studies and referred her to hematology  I would be in favor of IV iron if indeed that is the problem  Apparently Procrit is a consideration as well she tells me in the office        REVIEW OF SYSTEMS:    CONSTITUTIONAL: Denies any fever, chills, rigors, and weight loss   HEENT: No earache or tinnitus  Denies hearing loss or visual disturbances  CARDIOVASCULAR: No chest pain or palpitations  RESPIRATORY: Denies any cough, hemoptysis, shortness of breath or dyspnea on exertion  GASTROINTESTINAL: As noted in the History of Present Illness  GENITOURINARY: No problems with urination  Denies any hematuria or dysuria  NEUROLOGIC: No dizziness or vertigo, denies headaches  MUSCULOSKELETAL: Denies any muscle or joint pain  SKIN: Denies skin rashes or itching  ENDOCRINE: Denies excessive thirst  Denies intolerance to heat or cold  PSYCHOSOCIAL: Denies depression or anxiety  Denies any recent memory loss         Historical Information   Past Medical History:   Diagnosis Date    [facility] ([facility] ) 9/13/2017    Chronic gout 9/13/2017    Chronic rhinitis 11/08/2018    CKD (chronic kidney disease) stage 3, GFR 30-59 ml/min 9/13/2017    Colon polyp 9/13/2017    Coronary artery disease     [facility]     COMBS (dyspnea on exertion) 11/02/2018    Essential hypertension 9/13/2017    Gastritis 9/13/2017    GERD (gastroesophageal reflux disease)     Hx of hiatal hernia     Hypertension     Irregular heart beat     Mechanical heart valve present 9/13/2017    Osteoarthritis     Osteoporosis     Pacemaker     S/P AVR     Spinal stenosis     disk herniation      Past Surgical History:   Procedure Laterality Date    AORTIC VALVE REPLACEMENT      BREAST EXCISIONAL BIOPSY Left 1995    benign    CARDIAC SURGERY      CATARACT EXTRACTION      CHOLECYSTECTOMY      COLONOSCOPY      COLONOSCOPY  09/2012    tubular adenoma    EGD AND COLONOSCOPY  09/2017    adenomatous polyp/ benign gastric polyp    ESOPHAGOGASTRODUODENOSCOPY  08/2012    HERNIA REPAIR      x3    HYSTERECTOMY      age 62    INSERT / REPLACE / REMOVE PACEMAKER      IR PELVIC ANGIOGRAM  1/29/2019    JOINT REPLACEMENT Bilateral     knees    OOPHORECTOMY Bilateral     age 62  TX COLONOSCOPY FLX DX W/COLLJ SPEC WHEN PFRMD N/A 9/12/2017    Procedure: Durwood Angles COLONOSCOPY with polypectomies;  Surgeon: Gonsalo Delong MD;  [facility]: [facility]; Service: Gastroenterology    TX SIGMOIDOSCOPY FLX DX W/COLLJ SPEC BR/WA IF PFRMD N/A 9/13/2017    Procedure: colonoscopy with hemo clip x 2;  Surgeon: Gonsalo Delong MD;  [facility];   Service: Gastroenterology    ROTATOR CUFF REPAIR Left     SKIN BIOPSY      TONSILLECTOMY       Social History   Social History     Substance and Sexual Activity   Alcohol Use No     Social History     Substance and Sexual Activity   Drug Use No     Social History     Tobacco Use   Smoking Status Former Smoker   Smokeless Tobacco Never Used   Tobacco Comment    quit more than 36 yr     Family History   Problem Relation Age of Onset    Diabetes Mother     Hypertension Mother     Heart disease Father     Emphysema Father     Asthma Sister     Cancer Sister     Hypertension Sister     Ovarian cancer Sister 59    No Known Problems Daughter     No Known Problems Maternal Grandmother     No Known Problems Maternal Grandfather     No Known Problems Paternal Grandmother     No Known Problems Paternal Grandfather     Breast cancer Cousin 54    Breast cancer Cousin 54    Breast cancer Cousin 79    Colon cancer Sister     No Known Problems Daughter     No Known Problems Paternal Aunt     No Known Problems Paternal Aunt        Meds/Allergies       Current Outpatient Medications:     Accu-Chek Lila Plus test strip    allopurinol (ZYLOPRIM) 100 mg tablet    aspirin (ECOTRIN LOW STRENGTH) 81 mg EC tablet    bimatoprost (LUMIGAN) 0 01 % ophthalmic drops    calcium citrate-vitamin D (CITRACAL+D) 315-200 MG-UNIT per tablet    cholecalciferol (VITAMIN D3) 1,000 units tablet    ferrous sulfate 325 (65 Fe) mg tablet    gabapentin (NEURONTIN) 100 mg capsule    gabapentin (NEURONTIN) 300 mg capsule    HYDROcodone-acetaminophen ([address]) 5-325 mg per tablet    indapamide (LOZOL) 2 5 mg tablet    insulin aspart (NovoLOG FlexPen) 100 UNIT/ML injection pen    insulin detemir (LEVEMIR) 100 units/mL subcutaneous injection    Lifitegrast (XIIDRA OP)    Magnesium 400 MG CAPS    methocarbamol (ROBAXIN) 500 mg tablet    metolazone (ZAROXOLYN) 5 mg tablet    metoprolol succinate (TOPROL-XL) 50 mg 24 hr tablet    Multiple Vitamin (MULTIVITAMIN) tablet    Multiple Vitamins-Minerals (PRESERVISION AREDS 2 PO)    Omega-3 Fatty Acids (FISH OIL PO)    RABEprazole (ACIPHEX) 20 MG tablet    spironolactone (ALDACTONE) 50 mg tablet    tolterodine (DETROL) 1 mg tablet    warfarin (COUMADIN) 5 mg tablet    Allergies   Allergen Reactions    Acetazolamide GI Intolerance    Colchicine     Diltiazem     Docusate Other (See Comments)     impaction    Eszopiclone     Febuxostat     Glycerin     Hydrochlorothiazide Other (See Comments) and Headache     Other reaction(s): Other (see comments)  Generalized swelling    Latex - Food Allergy      Other reaction(s): Other (See Comments)  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    Lorazepam Nausea Only     Other reaction(s): not sleeping, made her feel "wired"    Metformin      Other reaction(s): GI intolerance    Nortriptyline      Other reaction(s): Other (See Comments)  rash bolivar flexeril      Penicillins      Other reaction(s): Other (See Comments)  RASH-anaphylaxis  Other reaction(s): Anaphylaxis    Povidone Iodine Other (See Comments)     VAGINAL ITCHING    Ropinirole      Back pain    Simvastatin Other (See Comments)     muscle aches    Sulfa Antibiotics      Other reaction(s): Other (See Comments)  bolivar Lasix, GI upset, rash  Other reaction(s): GI Upset    Torsemide Nausea Only and Vomiting     Pt states "it does not work for me"   Valsartan      Other reaction(s):  Other (See Comments)  bolivar olmesartan    Fluticasone      Leg cramps           Objective     Blood pressure 132/68, pulse 85, temperature 97 7 °F (36 5 °C), temperature source Temporal, height 5' 5" (1 651 m), weight 102 kg (225 lb 9 6 oz)  Body mass index is 37 54 kg/m²  PHYSICAL EXAM:      General Appearance:   Alert, cooperative, no distress   HEENT:   Normocephalic, atraumatic, anicteric  Neck:  Supple, symmetrical, trachea midline   Lungs:   Clear to auscultation bilaterally; no rales, rhonchi or wheezing; respirations unlabored    Heart[de-identified]   Regular rate and rhythm; no murmur, rub, or gallop  Abdomen:   Soft, non-tender, non-distended; normal bowel sounds; no masses, no organomegaly    Genitalia:   Deferred    Rectal:   Deferred    Extremities:  No cyanosis, clubbing or edema    Pulses:  2+ and symmetric all extremities    Skin:  No jaundice, rashes, or lesions    Lymph nodes:  No palpable cervical lymphadenopathy        Lab Results:   No visits with results within 1 Day(s) from this visit     Latest known visit with results is:   Orders Only on 03/05/2021   Component Date Value    PROTEIN UA 03/05/2021 9 2     EXT Creatinine Urine 03/05/2021 65 2     EXTERNAL Ur Prot/Creat R* 03/05/2021 0 14

## 2021-04-28 ENCOUNTER — ANESTHESIA EVENT (OUTPATIENT)
Dept: GASTROENTEROLOGY | Facility: HOSPITAL | Age: 86
End: 2021-04-28

## 2021-04-30 ENCOUNTER — HOSPITAL ENCOUNTER (OUTPATIENT)
Dept: GASTROENTEROLOGY | Facility: HOSPITAL | Age: 86
Setting detail: OUTPATIENT SURGERY
Discharge: HOME | End: 2021-04-30
Attending: INTERNAL MEDICINE
Payer: MEDICARE

## 2021-04-30 ENCOUNTER — ANESTHESIA (OUTPATIENT)
Dept: GASTROENTEROLOGY | Facility: HOSPITAL | Age: 86
End: 2021-04-30

## 2021-04-30 VITALS
RESPIRATION RATE: 18 BRPM | SYSTOLIC BLOOD PRESSURE: 112 MMHG | OXYGEN SATURATION: 96 % | BODY MASS INDEX: 37.49 KG/M2 | DIASTOLIC BLOOD PRESSURE: 58 MMHG | HEIGHT: 65 IN | HEART RATE: 84 BPM | WEIGHT: 225 LBS | TEMPERATURE: 98.5 F

## 2021-04-30 DIAGNOSIS — D64.9 ANEMIA, UNSPECIFIED TYPE: ICD-10-CM

## 2021-04-30 DIAGNOSIS — K31.7 GASTRIC POLYP: ICD-10-CM

## 2021-04-30 LAB — GLUCOSE SERPL-MCNC: 149 MG/DL (ref 65–140)

## 2021-04-30 PROCEDURE — 43235 EGD DIAGNOSTIC BRUSH WASH: CPT | Performed by: INTERNAL MEDICINE

## 2021-04-30 PROCEDURE — 82948 REAGENT STRIP/BLOOD GLUCOSE: CPT

## 2021-04-30 RX ORDER — PROPOFOL 10 MG/ML
INJECTION, EMULSION INTRAVENOUS AS NEEDED
Status: DISCONTINUED | OUTPATIENT
Start: 2021-04-30 | End: 2021-04-30

## 2021-04-30 RX ORDER — SODIUM CHLORIDE 9 MG/ML
125 INJECTION, SOLUTION INTRAVENOUS CONTINUOUS
Status: DISCONTINUED | OUTPATIENT
Start: 2021-04-30 | End: 2021-05-04 | Stop reason: HOSPADM

## 2021-04-30 RX ORDER — LIDOCAINE HYDROCHLORIDE 20 MG/ML
INJECTION, SOLUTION EPIDURAL; INFILTRATION; INTRACAUDAL; PERINEURAL AS NEEDED
Status: DISCONTINUED | OUTPATIENT
Start: 2021-04-30 | End: 2021-04-30

## 2021-04-30 RX ADMIN — PROPOFOL 100 MG: 10 INJECTION, EMULSION INTRAVENOUS at 10:11

## 2021-04-30 RX ADMIN — LIDOCAINE HYDROCHLORIDE 100 MG: 20 INJECTION, SOLUTION EPIDURAL; INFILTRATION; INTRACAUDAL; PERINEURAL at 10:11

## 2021-04-30 RX ADMIN — SODIUM CHLORIDE 125 ML/HR: 0.9 INJECTION, SOLUTION INTRAVENOUS at 10:03

## 2021-04-30 NOTE — ANESTHESIA PREPROCEDURE EVALUATION
Review of Systems/Medical History  Patient summary reviewed  Chart reviewed      Cardiovascular  Hypertension controlled, CAD, , Aortic disease,    Pulmonary       GI/Hepatic  Negative GI/hepatic ROS          Negative  ROS        Endo/Other  Diabetes type 2 Insulin, Arthritis     GYN       Hematology  Negative hematology ROS      Musculoskeletal  Negative musculoskeletal ROS        Neurology  Negative neurology ROS      Psychology   Negative psychology ROS            Physical Exam    Airway    Mallampati score: II  TM Distance: <3 FB  Neck ROM: full     Dental       Cardiovascular  Rhythm: regular, Rate: normal,     Pulmonary  Breath sounds clear to auscultation,     Other Findings  partial        Anesthesia Plan  ASA Score- 3 Emergent      Anesthesia Type- IV sedation with anesthesia        Induction- intravenous      Informed Consent  Anesthetic plan and risks discussed with patient  Procedure:  EGD    Relevant Problems   CARDIO   (+) Chronic atrial fibrillation (HCC)   (+) Chronic diastolic congestive heart failure (HCC)   (+) H/O mechanical aortic valve replacement   (+) Hypertension      ENDO   (+) Secondary hyperparathyroidism, renal (HCC)      /RENAL   (+) Benign hypertensive CKD   (+) Chronic kidney disease, stage 3 (HCC)      HEMATOLOGY   (+) Acute blood loss anemia      MUSCULOSKELETAL   (+) Adhesive capsulitis of right shoulder   (+) DJD (degenerative joint disease), multiple sites   (+) Fibromyalgia   (+) Gout   (+) Osteoarthritis   (+) Primary osteoarthritis involving multiple joints      NEURO/PSYCH   (+) Fibromyalgia      PULMONARY   (+) Mild obstructive sleep apnea        Physical Exam    Airway    Mallampati score: II  TM Distance: >3 FB  Neck ROM: full     Dental   No notable dental hx     Cardiovascular  Rhythm: regular, Rate: normal, Murmur,     Pulmonary  Breath sounds clear to auscultation, Decreased breath sounds,     Other Findings        Anesthesia Plan  ASA Score- 3     Anesthesia Type- general and IV sedation with anesthesia with ASA Monitors  Additional Monitors:   Airway Plan:           Plan Factors-    Chart reviewed  Patient summary reviewed  Patient is not a current smoker  Patient instructed to abstain from smoking on day of procedure  Patient did not smoke on day of surgery  Induction- intravenous  Postoperative Plan-     Informed Consent- Anesthetic plan and risks discussed with patient  I personally reviewed this patient with the CRNA  Discussed and agreed on the Anesthesia Plan with the CRNA  Faby Hardin

## 2021-04-30 NOTE — DISCHARGE INSTRUCTIONS
Please call [phone] with any problems  He do have multiple polyps however they have not appear to enlarged significantly  They do not appear to be bleeding actively  Please follow-up with Hematology        Upper Endoscopy   WHAT YOU NEED TO KNOW:   An upper endoscopy is also called an upper gastrointestinal (GI) endoscopy, or an esophagogastroduodenoscopy (EGD)  You may feel bloated, gassy, or have some abdominal discomfort after your procedure  Your throat may be sore for 24 to 36 hours  You may burp or pass gas from air that is still inside your body  DISCHARGE INSTRUCTIONS:   Call [phone] if:   · You have sudden chest pain or trouble breathing  Seek care immediately if:   · You feel dizzy or faint  · You have trouble swallowing  · You have severe throat pain  · Your bowel movements are very dark or black  · Your abdomen is hard and firm and you have severe pain  · You vomit blood  Contact your healthcare provider if:   · You feel full or bloated and cannot burp or pass gas  · You have not had a bowel movement for 3 days after your procedure  · You have neck pain  · You have a fever or chills  · You have nausea or are vomiting  · You have a rash or hives  · You have questions or concerns about your endoscopy  Relieve a sore throat:  Suck on throat lozenges or crushed ice  Gargle with a small amount of warm salt water  Mix 1 teaspoon of salt and 1 cup of warm water to make salt water  Relieve gas and discomfort from bloating:  Lie on your right side with a heating pad on your abdomen  Take short walks to help pass gas  Eat small meals until bloating is relieved  Rest after your procedure:  Do not drive or make important decisions until the day after your procedure  Return to your normal activity as directed  You can usually return to work the day after your procedure    Follow up with your healthcare provider as directed:  Write down your questions so you remember to ask them during your visits  © Copyright [address] Information is for End User's use only and may not be sold, redistributed or otherwise used for commercial purposes  All illustrations and images included in CareNotes® are the copyrighted property of A D [employer] M [employer]  or Mary De Oliveira  The above information is an  only  It is not intended as medical advice for individual conditions or treatments  Talk to your doctor, nurse or pharmacist before following any medical regimen to see if it is safe and effective for you  Gastric Polyps   WHAT YOU NEED TO KNOW:   What are gastric polyps? Gastric polyps are growths that form in the lining of your stomach  They are not cancerous, but certain types of polyps can change into cancer  What puts me at risk for gastric polyps? · Chronic gastritis caused by NSAIDs use or ulcers    · Long-term use of proton pump inhibitor medicines (used to decrease stomach acid)    · An infection in your stomach caused by H  pylori bacteria    What are the symptoms of gastric polyps? You may have no symptoms  Large polyps may cause any of the following:  · Abdominal pain    · Indigestion    · Vomiting after meals or vomiting blood    · Dark or bloody bowel movements    How are gastric polyps diagnosed? Gastric polyps are usually found during an endoscopy for another reason  All or part of the polyp will be removed during the test  Your healthcare provider may also remove tissue from your stomach  The polyps and tissue are sent to the lab for testing  How are gastric polyps treated? Some types of polyps go away on their own  Other types may be removed if they are large, you have symptoms, or abnormal cells are found  Large polyps and abnormal cells increase your risk for cancer  You may also need antibiotics if you have an infection caused by H  pylori bacteria   Part of your stomach may be removed if the polyps cannot be removed and abnormal cells are found  When should I seek immediate care? · You have blood in your vomit  · You have dark or bloody bowel movements  · You have severe pain in your abdomen that does not go away after you take medicine  When should I contact my healthcare provider? · You have indigestion that does not go away with treatment  · You vomit after meals  · You have questions or concerns about your condition or care  CARE AGREEMENT:   You have the right to help plan your care  Learn about your health condition and how it may be treated  Discuss treatment options with your healthcare providers to decide what care you want to receive  You always have the right to refuse treatment  The above information is an  only  It is not intended as medical advice for individual conditions or treatments  Talk to your doctor, nurse or pharmacist before following any medical regimen to see if it is safe and effective for you  © Copyright [address] Information is for End User's use only and may not be sold, redistributed or otherwise used for commercial purposes   All illustrations and images included in CareNotes® are the copyrighted property of A D A M , Inc  or [address] [place] [address]

## 2021-04-30 NOTE — INTERVAL H&P NOTE
H&P reviewed  After examining the patient I find no changes in the patients condition since the H&P had been written      Vitals:    04/30/21 0948   BP: 136/68   Pulse: 85   Resp: 20   Temp: 98 5 °F (36 9 °C)   SpO2: 97%

## 2021-06-23 ASSESSMENT — ENCOUNTER SYMPTOMS
ENDOCRINE NEGATIVE: 1
WEIGHT GAIN: 0
HEMATOLOGIC/LYMPHATIC NEGATIVE: 1
GASTROINTESTINAL NEGATIVE: 1
EYES NEGATIVE: 1
WEAKNESS: 0
DYSPNEA ON EXERTION: 0
SHORTNESS OF BREATH: 0
PSYCHIATRIC NEGATIVE: 1

## 2021-06-23 NOTE — PROGRESS NOTES
Electrophysiology  Outpatient Progress Note       Reason for visit:   Chief Complaint   Patient presents with   • Follow-up       HPI   Ailyn St is a 85 y.o. female who is seen today for follow up of her  implanted biventricular pacemaker indicated for complete heart block during AV node ablation in .  She has chronic atrial fibrillation.  She had a new LV lead along with a new RV lead placed in 2018.  Her previous RV lead had chronically elevated thresholds and had been turned off due to this.  That RV lead is abandoned and capped.      In 2019 she underwent a cardiac catheterization at an outside institution which showed no obstructive disease. Overall, she is doing well and previous dyspnea has resolved. Most recent echocardiogram showed an EF of 55%.    Past Medical History:   Diagnosis Date   • Abnormal ECG    • Anticoagulated on Coumadin 2018   • Arthritis    • Fatigue 2018   • GERD (gastroesophageal reflux disease)    • Hypertension    • Osteoporosis    • S/P AVR 2018   • SOB (shortness of breath) 2018   • Type 2 diabetes mellitus (CMS/HCC)      Past Surgical History:   Procedure Laterality Date   • ADENOIDECTOMY     • AORTIC VALVE REPLACEMENT     • CARDIAC CATHETERIZATION     • CHOLECYSTECTOMY     • TONSILLECTOMY     • TOTAL ABDOMINAL HYSTERECTOMY W/ BILATERAL SALPINGOOPHORECTOMY         Social History     Tobacco Use   • Smoking status: Former Smoker     Types: Cigarettes     Quit date:      Years since quittin.5   • Smokeless tobacco: Never Used   • Tobacco comment: stoppe in    Vaping Use   • Vaping Use: Never used   Substance Use Topics   • Alcohol use: Yes     Comment: rarely   • Drug use: Defer     Family History   Problem Relation Age of Onset   • Diabetes Biological Mother    • Heart disease Biological Father        ALLERGY:  Penicillins, Colace [docusate sodium], Hydrochlorothiazide, Losartan potassium, Metformin, Requip [ropinirole], Sulfa  (sulfonamide antibiotics), Valsartan, and Latex    Current Outpatient Medications   Medication Sig Dispense Refill   • allopurinoL (ZYLOPRIM) 100 mg tablet Take 100 mg by mouth 2 (two) times a day.     • B-complex with vitamin C tablet Take 1 tablet by mouth daily.     • bimatoprost (LUMIGAN) 0.01 % ophthalmic drops 1 drop nightly.     • calcium carbonate-vitamin D3 250-125 mg-unit tablet Take by mouth daily.     • cholecalciferol, vitamin D3, 1,000 unit tablet Take 1,000 Units by mouth daily.     • ferrous sulfate 325 mg (65 mg iron) EC tablet Take 325 mg by mouth daily with breakfast.     • gabapentin (NEURONTIN) 100 mg capsule Take 2 capsules by mouth daily.     • gabapentin (NEURONTIN) 300 mg capsule Take 600 mg by mouth nightly.     • HYDROcodone-acetaminophen (XODOL) 7.5-300 mg per tablet Take 1 tablet by mouth every 6 (six) hours as needed for moderate pain (taken at night).       • indapamide (LOZOL) 2.5 mg tablet Take 2.5 mg by mouth daily.     • insulin aspart U-100 (NovoLOG) 100 unit/mL subcutaneous pen Inject under the skin 3 (three) times a day before meals. Sliding scale as follows:  160-190 give 3 units, 191-210 give 5 units, 211-240 give 7 units, 241-270 give 9 units, greater than 271 give 10 units and recheck sugars in 2-3 hours     • insulin detemir U-100 (LEVEMIR) 100 unit/mL (3 mL) subcutaneous pen Inject 60 Units under the skin nightly. Patient took 30 units last evening     • methocarbamol (ROBAXIN) 750 mg tablet Take 750 mg by mouth 2 (two) times a day.     • metoprolol succinate XL (TOPROL-XL) 50 mg 24 hr tablet Take 50 mg by mouth daily.     • pantoprazole (PROTONIX) 40 mg EC tablet Take 40 mg by mouth. Normally takes aciphex 30 mg daily.     • RABEprazole (ACIPHEX) 20 mg EC tablet Take 20 mg by mouth daily.     • spironolactone (ALDACTONE) 50 mg tablet Take 100 mg by mouth once daily.     • tolterodine (DETROL) 2 mg tablet Take 2 mg by mouth nightly.     • vit C/E/Zn/coppr/lutein/zeaxan  (PRESERVISION AREDS-2 ORAL) Take by mouth.     • warfarin (COUMADIN) 2.5 mg tablet Take 1.5 tablets by mouth once daily.       • warfarin (COUMADIN) 5 mg tablet Take 5 mg by mouth once a week on Monday. as directed       No current facility-administered medications for this visit.       Review of Systems   Constitutional: Negative for malaise/fatigue and weight gain.   HENT: Negative.    Eyes: Negative.    Cardiovascular: Negative for dyspnea on exertion.   Respiratory: Negative for shortness of breath.    Endocrine: Negative.    Hematologic/Lymphatic: Negative.    Skin: Negative.    Musculoskeletal: Negative for muscle weakness.   Gastrointestinal: Negative.    Genitourinary: Negative.    Neurological: Negative for weakness.   Psychiatric/Behavioral: Negative.        Objective   Vitals:    06/24/21 1501   BP: 138/84   Pulse: 91   SpO2: 98%         BP Readings from Last 3 Encounters:   06/24/21 138/84   12/11/20 124/62   06/25/20 (!) 160/100     Physical Exam  HENT:      Head: Normocephalic.   Eyes:      Conjunctiva/sclera: Conjunctivae normal.      Pupils: Pupils are equal, round, and reactive to light.   Cardiovascular:      Rate and Rhythm: Normal rate and regular rhythm.      Heart sounds: Normal heart sounds.   Pulmonary:      Effort: Pulmonary effort is normal.      Breath sounds: Normal breath sounds.   Abdominal:      General: Bowel sounds are normal. There is no distension.      Palpations: Abdomen is soft.   Musculoskeletal:         General: Normal range of motion.      Cervical back: Normal range of motion.   Skin:     General: Skin is warm and dry.      Comments: Incision well-healed.    Neurological:      Mental Status: She is alert and oriented to person, place, and time.   Psychiatric:         Mood and Affect: Mood is not anxious. Affect is not blunt.         Lab Results   Component Value Date    WBC 6.87 06/01/2018    HGB 10.8 (L) 06/01/2018     06/01/2018     06/01/2018    K 3.7  06/01/2018    CREATININE 1.1 06/01/2018    INR 1.4 06/01/2018     Cardiovascular Procedures:    ECHO/MUGA:  Echo (Scanned) - 9/7/2012  Echo 6/13/2019 - EF 55%; Normal AVR      ELECTROPHYSIOLOGY:  Devices (Bi-Ventricular PPM (Biotronik-Evia), Herminio Corey serial# [id number] PID 76. DDDR 80-130ppm. Normal function. Changes made=sensor rate and gain. Base rate decreased to 75ppm.) - 10/30/2013   RFA (Indication A Fib, AVN ablation with pacemaker implantation) - 10/22/2013     STRESS TESTS:  Unruly MPI (Scanned) - 10/24/2011   ETT (Lexiscan. Had SOB and lightheadedness during recoevry, otherwise normal.) - 1/22/2016    CT SURGERY-   AVR 1998 - mechanical    Catheterization 1/23/19 ([facility])  HEMODYNAMICS: There was mild pulmonary hypertension. RA 10mmHg, RV 45/10, PA 45/25 mean 32, PCWP 22 with v wave, Pa sat 68.8%, CO 4.5 L/min, CI 2.1 L/min/m2  VALVES:  AORTIC VALVE:   --  A bileaflet mechanical prosthesis was observed in the aortic position. Both leaflet open well.  CORONARY VESSELS:   --  The coronary circulation is right dominant.  --  Left main: Normal.  --  LAD: Angiography showed minor luminal irregularities.  --  Circumflex: Normal.  --  RCA: Angiography showed minor luminal irregularities.    ECG-Pacemaker ECG    Assessment   Problem List Items Addressed This Visit        Circulatory    CHB (complete heart block) (CMS/HCC)     She remains in complete heart block following her AV node ablation for uncontrolled atrial fibrillation.  A normal functioning pacemaker is in place.         Complication of Procedure: Insert single lead to existing PPM/ICD (05/31/2018)    Complication of Admission: Admission (02/19/2018)    Relevant Medications    warfarin (COUMADIN) 2.5 mg tablet    warfarin (COUMADIN) 5 mg tablet    Other Relevant Orders    ECG 12 LEAD-OFFICE PERFORMED (Completed)    Chronic atrial fibrillation (CMS/HCC)     She underwent an AV node ablation in 2013.  She continues with complete heart block and a pacemaker  in place.  She is asymptomatic with the chronic atrial fibrillation.         Relevant Medications    warfarin (COUMADIN) 2.5 mg tablet    warfarin (COUMADIN) 5 mg tablet    Other Relevant Orders    ECG 12 LEAD-OFFICE PERFORMED (Completed)    Hypertension     She reports adequate blood pressure control at home.         Relevant Orders    ECG 12 LEAD-OFFICE PERFORMED (Completed)       Hematologic    Anticoagulated on Coumadin - Primary     She is compliant with INR follow up.          Relevant Orders    ECG 12 LEAD-OFFICE PERFORMED (Completed)       Other    Biventricular pacemaker check     She underwent implantation of a Biotronik biventricular pacemaker in May 2018.  Her previous RV lead was malfunctioning therefore a new RV lead was implanted along with the new LV lead.  Her previous RV lead is abandoned and capped.       Biotronik Etrinsa implanted May 31, 2018.  RV threshold 0.6 V at 0.4 ms lead impedance is 682 ohms. LV threshold 0.8 V at 0.4 ms and lead impedance 1287ohms. Chronic atrial fibrillation parameters VVIR 85 bpm. BiV pacing is 100%.  Underlying rhythm is atrial fibrillation with complete heart block.  Battery longevity is 7 years.          Relevant Orders    ECG 12 LEAD-OFFICE PERFORMED (Completed)    S/P AVR     S/p AVR 1998.  Cardiac catheterization January 23, 2019 demonstrated a bileaflet mechanical prosthesis was observed in the aortic position. Both leaflets open well.         Relevant Orders    ECG 12 LEAD-OFFICE PERFORMED (Completed)               Simon Mcknight MD  6/29/2021

## 2021-06-24 ENCOUNTER — OFFICE VISIT (OUTPATIENT)
Dept: CARDIOLOGY | Facility: CLINIC | Age: 86
End: 2021-06-24
Payer: MEDICARE

## 2021-06-24 VITALS
BODY MASS INDEX: 36.32 KG/M2 | SYSTOLIC BLOOD PRESSURE: 138 MMHG | DIASTOLIC BLOOD PRESSURE: 84 MMHG | HEIGHT: 66 IN | OXYGEN SATURATION: 98 % | HEART RATE: 91 BPM | WEIGHT: 226 LBS

## 2021-06-24 DIAGNOSIS — I44.2 CHB (COMPLETE HEART BLOCK) (CMS/HCC): ICD-10-CM

## 2021-06-24 DIAGNOSIS — I48.20 CHRONIC ATRIAL FIBRILLATION (CMS/HCC): ICD-10-CM

## 2021-06-24 DIAGNOSIS — Z45.018 BIVENTRICULAR PACEMAKER CHECK: ICD-10-CM

## 2021-06-24 DIAGNOSIS — I10 HYPERTENSION, UNSPECIFIED TYPE: ICD-10-CM

## 2021-06-24 DIAGNOSIS — Z95.2 S/P AVR: ICD-10-CM

## 2021-06-24 DIAGNOSIS — Z79.01 ANTICOAGULATED ON COUMADIN: Primary | ICD-10-CM

## 2021-06-24 PROCEDURE — 99214 OFFICE O/P EST MOD 30 MIN: CPT | Performed by: INTERNAL MEDICINE

## 2021-06-24 PROCEDURE — G8752 SYS BP LESS 140: HCPCS | Performed by: INTERNAL MEDICINE

## 2021-06-24 PROCEDURE — 93000 ELECTROCARDIOGRAM COMPLETE: CPT | Performed by: INTERNAL MEDICINE

## 2021-06-24 PROCEDURE — G8754 DIAS BP LESS 90: HCPCS | Performed by: INTERNAL MEDICINE

## 2021-06-24 RX ORDER — GABAPENTIN 300 MG/1
300 CAPSULE ORAL NIGHTLY
COMMUNITY
Start: 2021-03-28

## 2021-06-24 RX ORDER — GABAPENTIN 100 MG/1
200 CAPSULE ORAL NIGHTLY
COMMUNITY
Start: 2021-06-18

## 2021-06-24 RX ORDER — WARFARIN 2.5 MG/1
3.75 TABLET ORAL DAILY
COMMUNITY

## 2021-06-24 RX ORDER — ALLOPURINOL 100 MG/1
100 TABLET ORAL
COMMUNITY
Start: 2021-05-23

## 2021-06-24 RX ORDER — WARFARIN SODIUM 5 MG/1
5 TABLET ORAL 2 TIMES WEEKLY
COMMUNITY
Start: 2021-06-04

## 2021-06-24 ASSESSMENT — PAIN SCALES - GENERAL: PAINLEVEL: 0-NO PAIN

## 2021-06-24 NOTE — LETTER
June 29, 2021     Damien Perdue Jr., MD  [address]  [address]  [facility] [address]    Patient: Ailyn St  YOB: 1935  Date of Visit: 6/24/2021      Dear Dr. Perdue:    Thank you for referring Ailyn St to me for evaluation. Below are my notes for this consultation.    If you have questions, please do not hesitate to call me. I look forward to following your patient along with you.         Sincerely,        Simon Mcknight MD        CC: No Recipients  Simon Mcknight MD  6/29/2021  7:29 AM  Signed       Electrophysiology  Outpatient Progress Note       Reason for visit:   Chief Complaint   Patient presents with   • Follow-up       HPI   Ailyn St is a 85 y.o. female who is seen today for follow up of her  implanted biventricular pacemaker indicated for complete heart block during AV node ablation in 2013.  She has chronic atrial fibrillation.  She had a new LV lead along with a new RV lead placed in 2018.  Her previous RV lead had chronically elevated thresholds and had been turned off due to this.  That RV lead is abandoned and capped.      In 2019 she underwent a cardiac catheterization at an outside institution which showed no obstructive disease. Overall, she is doing well and previous dyspnea has resolved. Most recent echocardiogram showed an EF of 55%.    Past Medical History:   Diagnosis Date   • Abnormal ECG    • Anticoagulated on Coumadin 12/20/2018   • Arthritis    • Fatigue 7/16/2018   • GERD (gastroesophageal reflux disease)    • Hypertension    • Osteoporosis    • S/P AVR 8/8/2018   • SOB (shortness of breath) 12/31/2018   • Type 2 diabetes mellitus (CMS/HCC)      Past Surgical History:   Procedure Laterality Date   • ADENOIDECTOMY     • AORTIC VALVE REPLACEMENT  1998   • CARDIAC CATHETERIZATION     • CHOLECYSTECTOMY     • TONSILLECTOMY     • TOTAL ABDOMINAL HYSTERECTOMY W/ BILATERAL SALPINGOOPHORECTOMY         Social History     Tobacco Use   • Smoking status:  Former Smoker     Types: Cigarettes     Quit date:      Years since quittin.5   • Smokeless tobacco: Never Used   • Tobacco comment: stoppe in    Vaping Use   • Vaping Use: Never used   Substance Use Topics   • Alcohol use: Yes     Comment: rarely   • Drug use: Defer     Family History   Problem Relation Age of Onset   • Diabetes Biological Mother    • Heart disease Biological Father        ALLERGY:  Penicillins, Colace [docusate sodium], Hydrochlorothiazide, Losartan potassium, Metformin, Requip [ropinirole], Sulfa (sulfonamide antibiotics), Valsartan, and Latex    Current Outpatient Medications   Medication Sig Dispense Refill   • allopurinoL (ZYLOPRIM) 100 mg tablet Take 100 mg by mouth 2 (two) times a day.     • B-complex with vitamin C tablet Take 1 tablet by mouth daily.     • bimatoprost (LUMIGAN) 0.01 % ophthalmic drops 1 drop nightly.     • calcium carbonate-vitamin D3 250-125 mg-unit tablet Take by mouth daily.     • cholecalciferol, vitamin D3, 1,000 unit tablet Take 1,000 Units by mouth daily.     • ferrous sulfate 325 mg (65 mg iron) EC tablet Take 325 mg by mouth daily with breakfast.     • gabapentin (NEURONTIN) 100 mg capsule Take 2 capsules by mouth daily.     • gabapentin (NEURONTIN) 300 mg capsule Take 600 mg by mouth nightly.     • HYDROcodone-acetaminophen (XODOL) 7.5-300 mg per tablet Take 1 tablet by mouth every 6 (six) hours as needed for moderate pain (taken at night).       • indapamide (LOZOL) 2.5 mg tablet Take 2.5 mg by mouth daily.     • insulin aspart U-100 (NovoLOG) 100 unit/mL subcutaneous pen Inject under the skin 3 (three) times a day before meals. Sliding scale as follows:  160-190 give 3 units, 191-210 give 5 units, 211-240 give 7 units, 241-270 give 9 units, greater than 271 give 10 units and recheck sugars in 2-3 hours     • insulin detemir U-100 (LEVEMIR) 100 unit/mL (3 mL) subcutaneous pen Inject 60 Units under the skin nightly. Patient took 30 units last evening      • methocarbamol (ROBAXIN) 750 mg tablet Take 750 mg by mouth 2 (two) times a day.     • metoprolol succinate XL (TOPROL-XL) 50 mg 24 hr tablet Take 50 mg by mouth daily.     • pantoprazole (PROTONIX) 40 mg EC tablet Take 40 mg by mouth. Normally takes aciphex 30 mg daily.     • RABEprazole (ACIPHEX) 20 mg EC tablet Take 20 mg by mouth daily.     • spironolactone (ALDACTONE) 50 mg tablet Take 100 mg by mouth once daily.     • tolterodine (DETROL) 2 mg tablet Take 2 mg by mouth nightly.     • vit C/E/Zn/coppr/lutein/zeaxan (PRESERVISION AREDS-2 ORAL) Take by mouth.     • warfarin (COUMADIN) 2.5 mg tablet Take 1.5 tablets by mouth once daily.       • warfarin (COUMADIN) 5 mg tablet Take 5 mg by mouth once a week on Monday. as directed       No current facility-administered medications for this visit.       Review of Systems   Constitutional: Negative for malaise/fatigue and weight gain.   HENT: Negative.    Eyes: Negative.    Cardiovascular: Negative for dyspnea on exertion.   Respiratory: Negative for shortness of breath.    Endocrine: Negative.    Hematologic/Lymphatic: Negative.    Skin: Negative.    Musculoskeletal: Negative for muscle weakness.   Gastrointestinal: Negative.    Genitourinary: Negative.    Neurological: Negative for weakness.   Psychiatric/Behavioral: Negative.        Objective   Vitals:    06/24/21 1501   BP: 138/84   Pulse: 91   SpO2: 98%         BP Readings from Last 3 Encounters:   06/24/21 138/84   12/11/20 124/62   06/25/20 (!) 160/100     Physical Exam  HENT:      Head: Normocephalic.   Eyes:      Conjunctiva/sclera: Conjunctivae normal.      Pupils: Pupils are equal, round, and reactive to light.   Cardiovascular:      Rate and Rhythm: Normal rate and regular rhythm.      Heart sounds: Normal heart sounds.   Pulmonary:      Effort: Pulmonary effort is normal.      Breath sounds: Normal breath sounds.   Abdominal:      General: Bowel sounds are normal. There is no distension.       Palpations: Abdomen is soft.   Musculoskeletal:         General: Normal range of motion.      Cervical back: Normal range of motion.   Skin:     General: Skin is warm and dry.      Comments: Incision well-healed.    Neurological:      Mental Status: She is alert and oriented to person, place, and time.   Psychiatric:         Mood and Affect: Mood is not anxious. Affect is not blunt.         Lab Results   Component Value Date    WBC 6.87 06/01/2018    HGB 10.8 (L) 06/01/2018     06/01/2018     06/01/2018    K 3.7 06/01/2018    CREATININE 1.1 06/01/2018    INR 1.4 06/01/2018     Cardiovascular Procedures:    ECHO/MUGA:  Echo (Scanned) - 9/7/2012  Echo 6/13/2019 - EF 55%; Normal AVR      ELECTROPHYSIOLOGY:  Devices (Bi-Ventricular PPM (Biotronik-Evia), Evia Dr-T serial# [id number] PID [id number]. DDDR 80-130ppm. Normal function. Changes made=sensor rate and gain. Base rate decreased to 75ppm.) - 10/30/2013   RFA (Indication A Fib, AVN ablation with pacemaker implantation) - 10/22/2013     STRESS TESTS:  Unruly MPI (Scanned) - 10/24/2011   ETT (Lexiscan. Had SOB and lightheadedness during recoevry, otherwise normal.) - 1/22/2016    CT SURGERY-   AVR 1998 - mechanical    Catheterization 1/23/19 ([facility])  HEMODYNAMICS: There was mild pulmonary hypertension. RA 10mmHg, RV 45/10, PA 45/25 mean 32, PCWP 22 with v wave, Pa sat 68.8%, CO 4.5 L/min, CI 2.1 L/min/m2  VALVES:  AORTIC VALVE:   --  A bileaflet mechanical prosthesis was observed in the aortic position. Both leaflet open well.  CORONARY VESSELS:   --  The coronary circulation is right dominant.  --  Left main: Normal.  --  LAD: Angiography showed minor luminal irregularities.  --  Circumflex: Normal.  --  RCA: Angiography showed minor luminal irregularities.    ECG-Pacemaker ECG    Assessment   Problem List Items Addressed This Visit        Circulatory    CHB (complete heart block) (CMS/HCC)     She remains in complete heart block following her AV node ablation  for uncontrolled atrial fibrillation.  A normal functioning pacemaker is in place.         Complication of Procedure: Insert single lead to existing PPM/ICD (05/31/2018)    Complication of Admission: Admission (02/19/2018)    Relevant Medications    warfarin (COUMADIN) 2.5 mg tablet    warfarin (COUMADIN) 5 mg tablet    Other Relevant Orders    ECG 12 LEAD-OFFICE PERFORMED (Completed)    Chronic atrial fibrillation (CMS/HCC)     She underwent an AV node ablation in 2013.  She continues with complete heart block and a pacemaker in place.  She is asymptomatic with the chronic atrial fibrillation.         Relevant Medications    warfarin (COUMADIN) 2.5 mg tablet    warfarin (COUMADIN) 5 mg tablet    Other Relevant Orders    ECG 12 LEAD-OFFICE PERFORMED (Completed)    Hypertension     She reports adequate blood pressure control at home.         Relevant Orders    ECG 12 LEAD-OFFICE PERFORMED (Completed)       Hematologic    Anticoagulated on Coumadin - Primary     She is compliant with INR follow up.          Relevant Orders    ECG 12 LEAD-OFFICE PERFORMED (Completed)       Other    Biventricular pacemaker check     She underwent implantation of a Biotronik biventricular pacemaker in May 2018.  Her previous RV lead was malfunctioning therefore a new RV lead was implanted along with the new LV lead.  Her previous RV lead is abandoned and capped.       Biotronik Etrinsa implanted May 31, 2018.  RV threshold 0.6 V at 0.4 ms lead impedance is 682 ohms. LV threshold 0.8 V at 0.4 ms and lead impedance 1287ohms. Chronic atrial fibrillation parameters VVIR 85 bpm. BiV pacing is 100%.  Underlying rhythm is atrial fibrillation with complete heart block.  Battery longevity is 7 years.          Relevant Orders    ECG 12 LEAD-OFFICE PERFORMED (Completed)    S/P AVR     S/p AVR 1998.  Cardiac catheterization January 23, 2019 demonstrated a bileaflet mechanical prosthesis was observed in the aortic position. Both leaflets open well.          Relevant Orders    ECG 12 LEAD-OFFICE PERFORMED (Completed)               Simon Mcknight MD  6/29/2021

## 2021-06-25 NOTE — ASSESSMENT & PLAN NOTE
S/p AVR 1998.  Cardiac catheterization January 23, 2019 demonstrated a bileaflet mechanical prosthesis was observed in the aortic position. Both leaflets open well.

## 2021-06-25 NOTE — ASSESSMENT & PLAN NOTE
She underwent implantation of a Biotronik biventricular pacemaker in May 2018.  Her previous RV lead was malfunctioning therefore a new RV lead was implanted along with the new LV lead.  Her previous RV lead is abandoned and capped.       Biotronik Etrinsa implanted May 31, 2018.  RV threshold 0.6 V at 0.4 ms lead impedance is 682 ohms. LV threshold 0.8 V at 0.4 ms and lead impedance 1287ohms. Chronic atrial fibrillation parameters VVIR 85 bpm. BiV pacing is 100%.  Underlying rhythm is atrial fibrillation with complete heart block.  Battery longevity is 7 years.

## 2021-07-09 ENCOUNTER — OFFICE VISIT (OUTPATIENT)
Dept: SLEEP CENTER | Facility: CLINIC | Age: 86
End: 2021-07-09
Payer: MEDICARE

## 2021-07-09 VITALS
WEIGHT: 230.2 LBS | HEIGHT: 65 IN | BODY MASS INDEX: 38.35 KG/M2 | DIASTOLIC BLOOD PRESSURE: 78 MMHG | SYSTOLIC BLOOD PRESSURE: 116 MMHG

## 2021-07-09 DIAGNOSIS — G47.33 OBSTRUCTIVE SLEEP APNEA: ICD-10-CM

## 2021-07-09 DIAGNOSIS — G25.81 RLS (RESTLESS LEGS SYNDROME): Primary | ICD-10-CM

## 2021-07-09 PROCEDURE — 99214 OFFICE O/P EST MOD 30 MIN: CPT | Performed by: NURSE PRACTITIONER

## 2021-07-09 RX ORDER — GABAPENTIN 300 MG/1
600 CAPSULE ORAL DAILY
COMMUNITY
Start: 2021-01-25 | End: 2021-11-05 | Stop reason: DRUGHIGH

## 2021-07-09 RX ORDER — INDAPAMIDE 2.5 MG/1
TABLET, FILM COATED ORAL
COMMUNITY
Start: 2021-05-25 | End: 2021-11-10 | Stop reason: SDUPTHER

## 2021-07-09 RX ORDER — BIMATOPROST 0.3 MG/ML
1 SOLUTION/ DROPS OPHTHALMIC
COMMUNITY
End: 2021-11-10 | Stop reason: SDUPTHER

## 2021-07-09 RX ORDER — LIDOCAINE HYDROCHLORIDE 10 MG/ML
INJECTION, SOLUTION INFILTRATION; PERINEURAL
COMMUNITY

## 2021-07-09 RX ORDER — WARFARIN SODIUM 5 MG/1
5 TABLET ORAL WEEKLY
COMMUNITY
Start: 2021-06-04

## 2021-07-09 RX ORDER — GABAPENTIN 100 MG/1
CAPSULE ORAL
Qty: 180 CAPSULE | Refills: 1 | Status: SHIPPED | OUTPATIENT
Start: 2021-07-09 | End: 2021-07-10 | Stop reason: SDUPTHER

## 2021-07-09 RX ORDER — SENNA PLUS 8.6 MG/1
TABLET ORAL
COMMUNITY

## 2021-07-09 RX ORDER — HYDROCODONE BITARTRATE AND ACETAMINOPHEN 7.5; 3 MG/1; MG/1
TABLET ORAL EVERY 6 HOURS
COMMUNITY
Start: 2021-06-24

## 2021-07-09 RX ORDER — INSULIN DETEMIR 100 [IU]/ML
INJECTION, SOLUTION SUBCUTANEOUS
COMMUNITY
Start: 2021-04-06

## 2021-07-09 RX ORDER — CYANOCOBALAMIN 1000 UG/ML
1 INJECTION INTRAMUSCULAR; SUBCUTANEOUS
COMMUNITY
Start: 2021-06-25

## 2021-07-09 RX ORDER — GABAPENTIN 100 MG/1
2 CAPSULE ORAL DAILY
COMMUNITY
Start: 2021-01-25 | End: 2021-09-01

## 2021-07-09 RX ORDER — AMMONIUM LACTATE 12 G/100G
LOTION TOPICAL
COMMUNITY
Start: 2021-05-23

## 2021-07-09 RX ORDER — ALLOPURINOL 100 MG/1
100 TABLET ORAL 2 TIMES DAILY
COMMUNITY
Start: 2021-05-23

## 2021-07-09 RX ORDER — GUAIFENESIN AND CODEINE PHOSPHATE 100; 10 MG/5ML; MG/5ML
SOLUTION ORAL
COMMUNITY

## 2021-07-09 RX ORDER — SPIRONOLACTONE 50 MG/1
TABLET, FILM COATED ORAL DAILY
COMMUNITY
Start: 2021-04-08 | End: 2021-11-10 | Stop reason: SDUPTHER

## 2021-07-09 RX ORDER — CLINDAMYCIN HYDROCHLORIDE 300 MG/1
CAPSULE ORAL
COMMUNITY

## 2021-07-09 RX ORDER — CALCIUM CARBONATE-CHOLECALCIFEROL TAB 250 MG-125 UNIT 250-125 MG-UNIT
TAB ORAL DAILY
COMMUNITY

## 2021-07-09 RX ORDER — MAGNESIUM OXIDE 400 MG/1
400 TABLET ORAL DAILY
COMMUNITY

## 2021-07-09 RX ORDER — LANOLIN ALCOHOL/MO/W.PET/CERES
325 CREAM (GRAM) TOPICAL DAILY
COMMUNITY

## 2021-07-09 RX ORDER — WARFARIN SODIUM 2.5 MG/1
1.5 TABLET ORAL DAILY
COMMUNITY

## 2021-07-09 NOTE — PATIENT INSTRUCTIONS
1   Continue gabapentin 300mg plus 200mg nightly - call when you need refill of 300mg gabapentin  2  It is recommended that you consider use of CPAP, as previously discussed  3   Schedule follow up visit in 6 months  The mask you liked at the visit was the ResMed F-30 full face mask      Nursing Support:  When: Monday through Friday 7A-5PM except holidays  Where: Our direct line is [phone]  If you are having a true emergency please call 911  In the event that the line is busy or it is after hours please leave a voice message and we will return your call  Please speak clearly, leaving your full name, birth date, best number to reach you and the reason for your call  Medication refills: We will need the name of the medication, the dosage, the ordering provider, whether you get a 30 or 90 day refill, and the pharmacy name and address  Medications will be ordered by the provider only  Nurses cannot call in prescriptions  Please allow 7 days for medication refills  Physician requested updates: If your provider requested that you call with an update after starting medication, please be ready to provide us the medication and dosage, what time you take your medication, the time you attempt to fall asleep, time you fall asleep, when you wake up, and what time you get out of bed  Sleep Study Results: We will contact you with sleep study results and/or next steps after the physician has reviewed your testing

## 2021-07-09 NOTE — PROGRESS NOTES
Progress Note - [address] Jit[address] Catrachito Griard 81 y o  female   :1935, MRN: [id number]  2021      Follow Up Evaluation / Problem:  Restless Legs Syndrome  Mild obstructive sleep apnea  Chronic atrial fibrillation  Valvular heart disease  Pulmonary arterial hypertension  CKD      Thank you for the opportunity of participating in the evaluation and care of this patient in the Sleep Clinic at [facility]  HPI: Sonal Mays is a 81y o  year old female  She presents for follow up of restless legs syndrome  She initially presented to Sleep Medicine in  for complaint of insomnia, including frequent night time awakenings and non-restorative sleep  She also complained of excessive daytime sleepiness  She had had a sleep study at [facility] more than 20 years ago, which showed no evidence of sleep apnea  She reported evidence of restless legs syndrome and has been taking gabapentin 600mg, which improves these symptoms  She also takes an iron supplement daily to treat low ferritin  She completed a diagnostic sleep study in 2020 with AHI of 5 0 and lowest oxygen desat to 84%  Titration study using CPAP was recommended, due to her significant cardiac history and pulmonary HTN, however, she declined treatment  Her comorbid conditions include atrial fibrillation, CHF, mechanical heart valve, pulmonary arterial HTN, CKD stage 3, obesity, chronic back pain with chronic prescription opiate use      Review of Systems      Genitourinary none   Cardiology none   Gastrointestinal none   Neurology numbness/tingling of an extremity and difficulty with memory   Constitutional claustrophobia and fatigue   Integumentary none   Psychiatry none   Musculoskeletal muscle aches, back pain and leg cramps   Pulmonary shortness of breath with activity and difficulty breathing when lying flat    ENT none   Endocrine frequent urination Hematological none       Current Outpatient Medications:     Accu-Chek Lila Plus test strip, CHECK BLOOD SUGAR three times a day, Disp: , Rfl:     allopurinol (ZYLOPRIM) 100 mg tablet, Take 100 mg by mouth 2 (two) times a day  , Disp: , Rfl:     allopurinol (ZYLOPRIM) 100 mg tablet, Take 100 mg by mouth 2 (two) times a day, Disp: , Rfl:     ammonium lactate (LAC-HYDRIN) 12 % lotion, apply to affected area OF feet once daily, Disp: , Rfl:     aspirin (ECOTRIN LOW STRENGTH) 81 mg EC tablet, Take 81 mg by mouth daily, Disp: , Rfl:     bimatoprost (LUMIGAN) 0 01 % ophthalmic drops, Administer 1 drop to both eyes daily at bedtime, Disp: , Rfl:     calcium citrate-vitamin D (CITRACAL+D) 315-200 MG-UNIT per tablet, Take 1 tablet by mouth 2 (two) times a day, Disp: , Rfl:     cholecalciferol (VITAMIN D3) 1,000 units tablet, Take 1,000 Units by mouth daily, Disp: , Rfl:     clindamycin (CLEOCIN) 300 MG capsule, clindamycin HCl 300 mg capsule, Disp: , Rfl:     cyanocobalamin 1,000 mcg/mL, Inject 1 mL into a muscle Every month, Disp: , Rfl:     ferrous sulfate 325 (65 FE) MG EC tablet, Take 325 mg by mouth daily, Disp: , Rfl:     gabapentin (NEURONTIN) 100 mg capsule, Take 2 capsules by mouth daily, Disp: , Rfl:     gabapentin (NEURONTIN) 100 mg capsule, take 1 capsule by mouth at bedtime IN ADDITION  MGS if needed for RLS, Disp: 180 capsule, Rfl: 1    gabapentin (NEURONTIN) 300 mg capsule, Take 2 capsules (600 mg total) by mouth daily at bedtime (Patient taking differently: Take 300 mg by mouth daily at bedtime ), Disp: 180 capsule, Rfl: 1    gabapentin (NEURONTIN) 300 mg capsule, Take 600 mg by mouth daily, Disp: , Rfl:     glucose blood test strip, Accu-Chek Lila Plus test strips  CHECK BLOOD SUGAR three times a day, Disp: , Rfl:     guaifenesin-codeine (GUAIFENESIN AC) 100-10 MG/5ML liquid, Iophen C-NR 10 mg-100 mg/5 mL oral liquid, Disp: , Rfl:     HYDROcodone-acetaminophen (NORCO) 5-325 mg per tablet, Take 1 tablet by mouth every 6 (six) hours as needed for pain for up to 10 dosesMax Daily Amount: 4 tablets, Disp: 20 tablet, Rfl: 0    HYDROcodone-acetaminophen (XODOL) 7 5-300 MG per tablet, Take by mouth every 6 (six) hours, Disp: , Rfl:     indapamide (LOZOL) 2 5 mg tablet, Take 2 5 mg by mouth every morning, Disp: , Rfl:     indapamide (LOZOL) 2 5 mg tablet, take 1 tablet (2 5MG)  by oral route  every day in the morning, Disp: , Rfl:     insulin aspart (NovoLOG FlexPen) 100 UNIT/ML injection pen, Inject under the skin, Disp: , Rfl:     insulin aspart, w/niacinamide, (FIASP) 100 Units/mL injection pen, Inject under the skin Three times a day, Disp: , Rfl:     insulin detemir (Levemir FlexTouch) 100 Units/mL injection pen, inject by subcutaneous route as per insulin sliding scale protocol- currently using  50-- units daily---DX: E11 9 ( 3/14/18), Disp: , Rfl:     lidocaine (XYLOCAINE) 1 %, lidocaine HCl 10 mg/mL (1 %) injection solution  administered, Disp: , Rfl:     Lifitegrast (Karle Vivian OP), Apply 1 drop to eye 2 (two) times a day, Disp: , Rfl:     Magnesium 400 MG CAPS, Take 1 each by mouth daily , Disp: , Rfl:     methocarbamol (ROBAXIN) 500 mg tablet, Take 1 5 tablets (750 mg total) by mouth 2 (two) times a day (Patient taking differently: Take 750 mg by mouth as needed ), Disp: 10 tablet, Rfl: 0    metolazone (ZAROXOLYN) 5 mg tablet, Take 5 mg by mouth every 14 (fourteen) days , Disp: , Rfl:     metoprolol succinate (TOPROL-XL) 50 mg 24 hr tablet, Take 50 mg by mouth daily, Disp: , Rfl:     Multiple Vitamin (MULTIVITAMIN) tablet, Take 1 tablet by mouth daily, Disp: , Rfl:     Omega-3 Fatty Acids (FISH OIL PO), Take 750 mg by mouth daily, Disp: , Rfl:     RABEprazole (ACIPHEX) 20 MG tablet, Take 1 tablet (20 mg total) by mouth daily, Disp: 30 tablet, Rfl: 5    senna (SENOKOT) 8 6 MG tablet, Senna Lax 8 6 mg tablet  take 2 tablets by mouth at bedtime, Disp: , Rfl:     spironolactone (ALDACTONE) 50 mg tablet, Take 50 mg by mouth daily , Disp: , Rfl:     spironolactone (Aldactone) 50 mg tablet, Take by mouth Daily, Disp: , Rfl:     warfarin (COUMADIN) 2 5 mg tablet, Take 1 5 tablets by mouth daily, Disp: , Rfl:     warfarin (COUMADIN) 5 mg tablet, Take 1 tablet (5 mg total) by mouth daily Adjust based on inr (Patient taking differently: Take 3 75 mg by mouth daily Adjust based on inr), Disp: 20 tablet, Rfl: 0    warfarin (COUMADIN) 5 mg tablet, Take 5 mg by mouth once a week, Disp: , Rfl:     bimatoprost (LUMIGAN) 0 03 % ophthalmic drops, Apply 1 drop to eye, Disp: , Rfl:     Calcium Carb-Cholecalciferol 250-125 MG-UNIT TABS, Take by mouth daily, Disp: , Rfl:     Cholecalciferol 50 MCG (2000 UT) CAPS, Take 1 capsule by mouth, Disp: , Rfl:     Cyanocobalamin (VITAMIN B-12 IJ), Inject 1 Dose as directed once a week, Disp: , Rfl:     ferrous sulfate 325 (65 Fe) mg tablet, Take 325 mg by mouth daily with breakfast, Disp: , Rfl:     insulin detemir (LEVEMIR) 100 units/mL subcutaneous injection, Inject under the skin 25 units in the AM and 20 units in the PM, Disp: , Rfl:     magnesium oxide (MAG-OX) 400 mg tablet, Take 400 mg by mouth daily, Disp: , Rfl:     Multiple Vitamins-Minerals (PRESERVISION AREDS 2 PO), Take by mouth 2 (two) times a day, Disp: , Rfl:     Omega-3 Fatty Acids (FISH OIL PO), Hyrdo eyes--2 softgels twice a day, Disp: , Rfl:     [place] Sleepiness Scale  Sitting and reading: Would never doze  Watching TV: Slight chance of dozing  Sitting, inactive in a public place (e g  a theatre or a meeting):  Would never doze  As a passenger in a car for an hour without a break: Would never doze  Lying down to rest in the afternoon when circumstances permit: Moderate chance of dozing  Sitting and talking to someone: Would never doze  Sitting quietly after a lunch without alcohol: Would never doze  In a car, while stopped for a few minutes in traffic: Would never doze  Total score: 3              Vitals:    07/09/21 0914   BP: 116/78   Weight: 104 kg (230 lb 3 2 oz)   Height: 5' 5" (1 651 m)       Body mass index is 38 31 kg/m²  EPWORTH SLEEPINESS SCORE  Total score: 3      Past History Since Last Sleep Center Visit:   She has had a home sleep study through a research study recommended by her cardiologist   She was informed that her oxygen level decreased to 83%  She was again advised to use CPAP equipment  She is considering PAP therapy and plans to discuss it with the cardiologist     Her restless leg symptoms have been well controlled with the use of gabapentin 500mg at bedtime  She noticed improvement in leg edema after decreasing dose of gabapentin  She reports that she does not use hydrocodone at bedtime anymore - she uses it at 6:00pm and 6:00am   She sleeps in a slightly inclined position with her legs elevated as well  The review of systems and following portions of the patient's history were reviewed and updated as appropriate: allergies, current medications, past family history, past medical history, past social history, past surgical history, and problem list         OBJECTIVE    Physical Exam:     General Appearance:   Alert, cooperative, no distress, appears stated age, obese     Head:   Normocephalic, without obvious abnormality, atraumatic     Eyes:   PERRL, conjunctiva/corneas clear, EOM's intact          Nose:  Nares normal, septum midline, no drainage or sinus tenderness           Throat:  Lips, teeth and gums normal; tongue normal size and  shape and midline mucosa moist with low-lying soft palatal tissue, uvula barely visible, tonsils not visualized, Mallampati class 4       Neck:  Supple, symmetrical, trachea midline, no adenopathy;  Thyroid: No enlargement, tenderness or nodules; no carotid bruit or JVD     Lungs:      Clear to auscultation bilaterally, respirations unlabored     Heart:   Regular rate and rhythm, S1 and S2 normal, slight murmur noted at left sternal border       Extremities:  Extremities normal, atraumatic, no cyanosis and +1 pitting edema in LE bilaterally       Skin:  Skin color, texture, turgor normal, no rashes or lesions       Neurologic:  No focal deficits noted  Normal strength, sensation throughout       ASSESSMENT / PLAN    1  RLS (restless legs syndrome)  gabapentin (NEURONTIN) 100 mg capsule   2  Obstructive sleep apnea         Counseling / Coordination of Care  Total clinic time spent today 30 minutes  Greater than 50% of total time was spent with the patient and / or family counseling and / or coordination of care  A description of the counseling / coordination of care:     Impressions, Diagnostic results, Prognosis, Instructions for management, Risks and benefits of treatment, Patient and family education, Risk factor reductions and Importance of compliance with treatment    We discussed her symptoms of RLS  As recommended, she decreased the dose of gabapentin to 500mg at bedtime  She tried further decreasing, but symptoms were intolerable  She was encouraged to use the lowest effective dose  Refill of 100m gabapentin was sent to pharmacy electronically  She will call when she needs refill of 300mg  We again discussed the importance of treatment of SUZETTE  She is considering treatment and plans to discuss it with the cardiologist who referred her for the study  A titration study is advised, due to her cardiac history  She will call if she needs any further assistance in SUZETTE treatment  She will schedule a routine follow up visit in 6 months  The following instructions have been given to the patient today:    Patient Instructions   1  Continue gabapentin 300mg plus 200mg nightly - call when you need refill of 300mg gabapentin  2  It is recommended that you consider use of CPAP, as previously discussed  3   Schedule follow up visit in 6 months      The mask you liked at the visit was the ResMed F-30 full face mask      Nursing Support:  When: Monday through Friday 7A-5PM except holidays  Where: Our direct line is [phone]  If you are having a true emergency please call 911  In the event that the line is busy or it is after hours please leave a voice message and we will return your call  Please speak clearly, leaving your full name, birth date, best number to reach you and the reason for your call  Medication refills: We will need the name of the medication, the dosage, the ordering provider, whether you get a 30 or 90 day refill, and the pharmacy name and address  Medications will be ordered by the provider only  Nurses cannot call in prescriptions  Please allow 7 days for medication refills  Physician requested updates: If your provider requested that you call with an update after starting medication, please be ready to provide us the medication and dosage, what time you take your medication, the time you attempt to fall asleep, time you fall asleep, when you wake up, and what time you get out of bed  Sleep Study Results: We will contact you with sleep study results and/or next steps after the physician has reviewed your testing        Brooklyn Hardin, [address] [facility]

## 2021-09-23 ENCOUNTER — HOSPITAL ENCOUNTER (OUTPATIENT)
Dept: MAMMOGRAPHY | Facility: CLINIC | Age: 86
Discharge: HOME | End: 2021-09-23
Payer: MEDICARE

## 2021-09-23 VITALS — HEIGHT: 65 IN | BODY MASS INDEX: 38.32 KG/M2 | WEIGHT: 230 LBS

## 2021-09-23 DIAGNOSIS — N63.10 LUMP OF BREAST, RIGHT: ICD-10-CM

## 2021-09-23 DIAGNOSIS — N64.4 BREAST PAIN, RIGHT: ICD-10-CM

## 2021-09-23 DIAGNOSIS — N60.01 SOLITARY CYST OF RIGHT BREAST: ICD-10-CM

## 2021-09-23 DIAGNOSIS — S20.01XA CONTUSION OF RIGHT BREAST, INITIAL ENCOUNTER: ICD-10-CM

## 2021-09-23 PROCEDURE — 76642 ULTRASOUND BREAST LIMITED: CPT

## 2021-11-05 ENCOUNTER — TELEPHONE (OUTPATIENT)
Dept: SLEEP CENTER | Facility: CLINIC | Age: 86
End: 2021-11-05

## 2021-11-05 DIAGNOSIS — G25.81 RLS (RESTLESS LEGS SYNDROME): Primary | ICD-10-CM

## 2021-11-05 RX ORDER — GABAPENTIN 100 MG/1
200 CAPSULE ORAL
Qty: 180 CAPSULE | Refills: 0 | Status: SHIPPED | OUTPATIENT
Start: 2021-11-05 | End: 2022-02-07 | Stop reason: SDUPTHER

## 2021-11-05 RX ORDER — GABAPENTIN 300 MG/1
300 CAPSULE ORAL
Qty: 90 CAPSULE | Refills: 0 | Status: SHIPPED | OUTPATIENT
Start: 2021-11-05 | End: 2022-02-07 | Stop reason: SDUPTHER

## 2021-11-11 ENCOUNTER — HOSPITAL ENCOUNTER (OUTPATIENT)
Dept: MAMMOGRAPHY | Facility: CLINIC | Age: 86
Discharge: HOME | End: 2021-11-11
Payer: MEDICARE

## 2021-11-11 ENCOUNTER — HOSPITAL ENCOUNTER (OUTPATIENT)
Dept: CT IMAGING | Facility: HOSPITAL | Age: 86
Discharge: HOME | End: 2021-11-11
Payer: MEDICARE

## 2021-11-11 VITALS — HEIGHT: 65 IN | BODY MASS INDEX: 38.32 KG/M2 | WEIGHT: 230 LBS

## 2021-11-11 DIAGNOSIS — N64.4 BREAST PAIN, RIGHT: ICD-10-CM

## 2021-11-11 DIAGNOSIS — N63.10 MASS OF RIGHT BREAST: ICD-10-CM

## 2021-11-11 DIAGNOSIS — N63.10 LUMP OF BREAST, RIGHT: ICD-10-CM

## 2021-11-11 DIAGNOSIS — R10.9 ABDOMINAL PAIN, UNSPECIFIED ABDOMINAL LOCATION: ICD-10-CM

## 2021-11-11 PROCEDURE — 76642 ULTRASOUND BREAST LIMITED: CPT

## 2021-11-11 PROCEDURE — G0279 TOMOSYNTHESIS, MAMMO: HCPCS

## 2021-11-11 PROCEDURE — 74177 CT ABD & PELVIS W/CONTRAST: CPT

## 2021-11-11 PROCEDURE — 77066 DX MAMMO INCL CAD BI: CPT

## 2021-11-11 PROCEDURE — G1004 CDSM NDSC: HCPCS

## 2021-11-11 RX ADMIN — IOHEXOL 100 ML: 350 INJECTION, SOLUTION INTRAVENOUS at 12:56

## 2021-11-15 ENCOUNTER — OFFICE VISIT (OUTPATIENT)
Dept: NEPHROLOGY | Facility: CLINIC | Age: 86
End: 2021-11-15
Payer: MEDICARE

## 2021-11-15 VITALS
BODY MASS INDEX: 38.82 KG/M2 | HEART RATE: 85 BPM | DIASTOLIC BLOOD PRESSURE: 78 MMHG | RESPIRATION RATE: 16 BRPM | SYSTOLIC BLOOD PRESSURE: 132 MMHG | WEIGHT: 233 LBS | HEIGHT: 65 IN

## 2021-11-15 DIAGNOSIS — E11.628 TYPE 2 DIABETES MELLITUS WITH OTHER SKIN COMPLICATIONS (HCC): ICD-10-CM

## 2021-11-15 DIAGNOSIS — I10 PRIMARY HYPERTENSION: Primary | ICD-10-CM

## 2021-11-15 DIAGNOSIS — R60.1 GENERALIZED EDEMA: ICD-10-CM

## 2021-11-15 DIAGNOSIS — N18.32 STAGE 3B CHRONIC KIDNEY DISEASE (HCC): ICD-10-CM

## 2021-11-15 PROCEDURE — 99214 OFFICE O/P EST MOD 30 MIN: CPT | Performed by: INTERNAL MEDICINE

## 2021-11-15 RX ORDER — INDAPAMIDE 2.5 MG/1
2.5 TABLET, FILM COATED ORAL EVERY MORNING
Start: 2021-11-15 | End: 2022-06-07

## 2021-11-15 RX ORDER — BUMETANIDE 1 MG/1
1 TABLET ORAL DAILY
Qty: 30 TABLET | Refills: 3 | Status: SHIPPED | OUTPATIENT
Start: 2021-11-15

## 2021-11-24 ENCOUNTER — TELEPHONE (OUTPATIENT)
Dept: NEPHROLOGY | Facility: CLINIC | Age: 86
End: 2021-11-24

## 2021-12-03 ENCOUNTER — APPOINTMENT (OUTPATIENT)
Dept: RADIOLOGY | Facility: MEDICAL CENTER | Age: 86
End: 2021-12-03
Payer: MEDICARE

## 2021-12-03 ENCOUNTER — OFFICE VISIT (OUTPATIENT)
Dept: URGENT CARE | Facility: MEDICAL CENTER | Age: 86
End: 2021-12-03
Payer: MEDICARE

## 2021-12-03 VITALS
WEIGHT: 223 LBS | HEART RATE: 89 BPM | BODY MASS INDEX: 37.11 KG/M2 | OXYGEN SATURATION: 98 % | TEMPERATURE: 99.2 F | RESPIRATION RATE: 20 BRPM | SYSTOLIC BLOOD PRESSURE: 158 MMHG | DIASTOLIC BLOOD PRESSURE: 90 MMHG

## 2021-12-03 DIAGNOSIS — L03.032 CELLULITIS OF GREAT TOE OF LEFT FOOT: Primary | ICD-10-CM

## 2021-12-03 DIAGNOSIS — M79.672 LEFT FOOT PAIN: ICD-10-CM

## 2021-12-03 PROCEDURE — 99213 OFFICE O/P EST LOW 20 MIN: CPT | Performed by: PHYSICIAN ASSISTANT

## 2021-12-03 PROCEDURE — 73630 X-RAY EXAM OF FOOT: CPT

## 2021-12-03 PROCEDURE — G0463 HOSPITAL OUTPT CLINIC VISIT: HCPCS | Performed by: PHYSICIAN ASSISTANT

## 2021-12-03 RX ORDER — HYDROCODONE BITARTRATE AND ACETAMINOPHEN 7.5; 325 MG/1; MG/1
1 TABLET ORAL
COMMUNITY
Start: 2021-11-18

## 2021-12-03 RX ORDER — CEPHALEXIN 500 MG/1
500 CAPSULE ORAL EVERY 6 HOURS SCHEDULED
Qty: 40 CAPSULE | Refills: 0 | Status: SHIPPED | OUTPATIENT
Start: 2021-12-03 | End: 2021-12-13

## 2022-02-07 DIAGNOSIS — G25.81 RLS (RESTLESS LEGS SYNDROME): ICD-10-CM

## 2022-02-07 RX ORDER — GABAPENTIN 300 MG/1
300 CAPSULE ORAL
Qty: 90 CAPSULE | Refills: 0 | Status: SHIPPED | OUTPATIENT
Start: 2022-02-07 | End: 2022-02-24

## 2022-02-07 RX ORDER — GABAPENTIN 100 MG/1
200 CAPSULE ORAL
Qty: 180 CAPSULE | Refills: 0 | Status: SHIPPED | OUTPATIENT
Start: 2022-02-07 | End: 2022-02-24

## 2022-02-07 NOTE — TELEPHONE ENCOUNTER
Patient left voice message requesting refills of gabapentin (Neurontin) 100 mg capsule and gabapentin (Neurontin) 300 mg capsule       Please send to [facility] on [address]       Patient states she missed her follow up appointment due to Dagmar  Rescheduled follow up to 7/11/22

## 2022-02-07 NOTE — TELEPHONE ENCOUNTER
Erin Johnson did offer patient appointment tomorrow but she was unable to make it  After that, you are scheduling into July  I added patient to wait list to schedule if you have a cancellation

## 2022-02-08 ENCOUNTER — TELEPHONE (OUTPATIENT)
Dept: UROLOGY | Facility: AMBULATORY SURGERY CENTER | Age: 87
End: 2022-02-08

## 2022-02-08 NOTE — TELEPHONE ENCOUNTER
Please Triage:     What is the reason for the patients appointment? Pt calling bc she was told by PCP to call us again bc she has another UTI  She said it is very sore and she is in a lot of pain        PT said they will call pcp back confirming when her appt is with us     Appt with us with Amanda Lockwood 3/25/22

## 2022-02-09 NOTE — TELEPHONE ENCOUNTER
Return call to patient   Patient states that she has been treated by pcp for several occurences  Patient state two UTI over the past 6 months  Patient has an appt with pcp today  Patient states that she has pain , pressure, frequency and urgency  Patient feels like she is not emptying   Patient states ua show + for infection , when she get culture results it states mixed contaminates   Her Urethra is so swollen they have difficulty with obtaining urine via strait cath  Patient states that her vaginal area is very inflamed  Patient was seen by Dr Jaz Chang who did cystoscopy  Patient was told she had a narrow urethra and chronic urethritis  Records from previous visit are not in epic       Sending to provider for recommendation

## 2022-02-24 ENCOUNTER — OFFICE VISIT (OUTPATIENT)
Dept: SLEEP CENTER | Facility: CLINIC | Age: 87
End: 2022-02-24
Payer: MEDICARE

## 2022-02-24 VITALS — WEIGHT: 223 LBS | BODY MASS INDEX: 37.15 KG/M2 | HEIGHT: 65 IN

## 2022-02-24 DIAGNOSIS — G25.81 RLS (RESTLESS LEGS SYNDROME): Primary | ICD-10-CM

## 2022-02-24 DIAGNOSIS — G47.33 OBSTRUCTIVE SLEEP APNEA: ICD-10-CM

## 2022-02-24 DIAGNOSIS — E61.1 IRON DEFICIENCY: ICD-10-CM

## 2022-02-24 PROCEDURE — 99214 OFFICE O/P EST MOD 30 MIN: CPT | Performed by: PSYCHIATRY & NEUROLOGY

## 2022-02-24 RX ORDER — GABAPENTIN ENACARBIL 300 MG/1
300 TABLET, EXTENDED RELEASE ORAL EVERY EVENING
Qty: 30 TABLET | Refills: 1 | Status: SHIPPED | OUTPATIENT
Start: 2022-02-24 | End: 2022-05-24

## 2022-02-24 NOTE — ASSESSMENT & PLAN NOTE
Fairly well controlled on current regimen  We reviewed that she has some slight concern her dose of  gabapentin may be a little bit too high  Given her renal function and symptoms, I would recommend changing to Horizant 300 mg at 5:00 p m  if not tolerated, she can switch back to generic gabapentin and try 300 or 400 mg at night instead of her current dose of 500 mg  We reviewed that the risk of side effects increases when gabapentin or Horizant is taking with other sedating medications such as hydrocodone which she is also prescribed  Therefore I recommend she try to avoid taking the 2 in combination, if she uses hydrocodone at night, she should generally avoid gabapentin  This may less than daytime sleepiness    We also reviewed side effects of Horizant which are very similar to gabapentin

## 2022-02-24 NOTE — PROGRESS NOTES
Review of Systems      Genitourinary none   Cardiology ankle/leg swelling   Gastrointestinal none   Neurology need to move extremities, numbness/tingling of an extremity, difficulty with memory and balance problems   Constitutional claustrophobia and fatigue   Integumentary none   Psychiatry none   Musculoskeletal joint pain, muscle aches, back pain, legs twitching/jerking and leg cramps   Pulmonary shortness of breath with activity and difficulty breathing when lying flat    ENT throat clearing   Endocrine frequent urination   Hematological none

## 2022-02-24 NOTE — ASSESSMENT & PLAN NOTE
I reviewed the report of her previous home sleep test which shows mild obstructive sleep apnea although it is very close to moderate disease  We reviewed that I would recommend a trial of CPAP, something that others have recommended to her before  She would rather not pursue this  We discussed that potentially if she were to use CPAP there is a possibility her daytime sleepiness could lessen  She understands but does not seem motivated to follow-up on this

## 2022-02-24 NOTE — PATIENT INSTRUCTIONS
The new medicaiton we will try is Horizant- The dose is 300 mg, taken at 5 pm with food  If you do not find it effective please let me know  It may be expensive, there is a pharmacy in [place] [place] I can also send the script to where it may cost $40   If you want to pursue that let me know  It is best to avoid taking Horizant (or gabapentin) around the same time as hydrocodone as the combination can cause increased sleepiness    As we discussed, you have mild sleep apnea- this may be contributing to daytime sleepiness  If you want to pursue CPAP , let me know (I'd recommend trying it)    Be careful to avoid driving while drowsy

## 2022-02-24 NOTE — PROGRESS NOTES
Assessment/Plan:    1  RLS (restless legs syndrome)  Assessment & Plan:  Fairly well controlled on current regimen  We reviewed that she has some slight concern her lanier of gabapentin may be a little bit too high  Given her renal function and symptoms, I would recommend changing to Horizant 300 mg at 5:00 p m  if not tolerated, she can switch back to generic gabapentin and try 300 or 400 mg at night instead of her current dose of 500 mg  We reviewed that the risk of side effects increases when gabapentin or Horizant is taking with other sedating medications such as hydrocodone which she is also prescribed  Therefore I recommend she try to avoid taking the 2 in combination, if she uses hydrocodone at night, she should generally avoid gabapentin  This may less than daytime sleepiness  We also reviewed side effects of Horizant which are very similar to gabapentin  Orders:  -     Gabapentin Enacarbil ER (Horizant) 300 MG TBCR; Take 1 tablet (300 mg total) by mouth every evening Take at 5 pm with food    2  Obstructive sleep apnea  Assessment & Plan:  I reviewed the report of her previous home sleep test which shows mild obstructive sleep apnea although it is very close to moderate disease  We reviewed that I would recommend a trial of CPAP, something that others have recommended to her before  She would rather not pursue this  We discussed that potentially if she were to use CPAP there is a possibility her daytime sleepiness could lessen  She understands but does not seem motivated to follow-up on this  3  Iron deficiency  Comments:  Recent lab show acceptable ferritin level  She notes this is managed by other doctors, she should have iron levels checked regularly to rule out iron deficiency      Subjective:      Patient ID: Yolande File is a 81 y o  female  This is an 81year old woman who presents in follow-up, she has been followed by our clinic for restless leg syndrome    Of note, she also has been diagnosed with obstructive sleep apnea  A polysomnogram in 2019 showed an AHI of 5  She also had a home sleep test, ordered by her cardiologist, in December 2021 which showed an RDI of 14 6  For restless leg syndrome, she has been treated with gabapentin 500 mg at bedtime  With this she has occasional restless legs, a few times a week  This may occur 4 AM , this may sometimes keep her up  She has hydrocodone prescribed every 6 hours as needed for back pain  She thinks it may help her restless legs  She has taken this at night with gabapentin, feels more sleepy after     She lays down to sleep from 11 pm - 1 am   She falls asleep easily  She typically has an awakening at 4 AM  If her RLS is not active, she returns to sleep easily  If her RLS is active, she falls asleep in 30 minutes  She finds that raising the legs helps her RLS  She finally rises from 6 AM-630 AM  Estimates 6 hours a night  She describes RLS as an uncomfortable feeling, has twinges in her legs and they jerk  It is uneasy, like something is moving her legs  Movement seems helps    She describes neuropathy as loss of feeling in her feet, numbness to ankle, not painful  She has neuropathy laying in bed,     She sometimes is refreshed when she first awakens  She dozes in the afternoon sometimes doing crosswords, other times may doze in the AM   No drowsy driving  She tries to avoid naps as short naps do not help and long naps affect her sleep at night    [place] Sleepiness Scale   Sitting and reading: Moderate chance of dozing  Watching TV: Slight chance of dozing  Sitting, inactive in a public place (e g  a theatre or a meeting):  Would never doze  As a passenger in a car for an hour without a break: Would never doze  Lying down to rest in the afternoon when circumstances permit: High chance of dozing  Sitting and talking to someone: Would never doze  Sitting quietly after a lunch without alcohol: Would never doze  In a car, while stopped for a few minutes in traffic: Would never doze  Total score: 6    She drinks tea in the AM, 8 oz coffee at lunch  She has non-caffeinated diet soda at night  Does not drink alcohol  Labs 9/21-Ferritin 107 ng/ml, iton saturation 14%, TIBC 357     The following portions of the patient's history were reviewed and updated as appropriate: allergies, current medications, past family history, past medical history, past social history, past surgical history and problem list     Review of Systems    Genitourinary none   Cardiology ankle/leg swelling   Gastrointestinal none   Neurology need to move extremities, numbness/tingling of an extremity, difficulty with memory and balance problems   Constitutional claustrophobia and fatigue   Integumentary none   Psychiatry none   Musculoskeletal joint pain, muscle aches, back pain, legs twitching/jerking and leg cramps   Pulmonary shortness of breath with activity and difficulty breathing when lying flat    ENT throat clearing   Endocrine frequent urination   Hematological none        Objective:      Ht 5' 5" (1 651 m)   Wt 101 kg (223 lb)   BMI 37 11 kg/m²          Physical Exam  Constitutional:       Appearance: Normal appearance  HENT:      Head: Normocephalic and atraumatic  Mouth/Throat:      Mouth: Mucous membranes are dry  Comments: mallmapati 3  Cardiovascular:      Rate and Rhythm: Normal rate and regular rhythm  Pulses: Normal pulses  Heart sounds: Normal heart sounds  Pulmonary:      Effort: Pulmonary effort is normal  No respiratory distress  Breath sounds: Normal breath sounds  Musculoskeletal:      Right lower leg: Edema (2+) present  Left lower leg: Edema (2+) present  Neurological:      Mental Status: She is alert  Psychiatric:         Mood and Affect: Mood normal          Behavior: Behavior normal          Thought Content:  Thought content normal          Judgment: Judgment normal

## 2022-02-25 ENCOUNTER — TELEPHONE (OUTPATIENT)
Dept: SCHEDULING | Facility: CLINIC | Age: 87
End: 2022-02-25
Payer: MEDICARE

## 2022-02-25 NOTE — TELEPHONE ENCOUNTER
Pt is calling to reschedule her January pacemaker check - she is requesting an appt sooner than next available in April in [facility]- she can be reached at: [phone]

## 2022-03-10 ENCOUNTER — TELEPHONE (OUTPATIENT)
Dept: NEPHROLOGY | Facility: CLINIC | Age: 87
End: 2022-03-10

## 2022-03-10 NOTE — TELEPHONE ENCOUNTER
Called patient to schedule follow up appointment  Patient is currently admitted at [facility] and would like her recall pushed back  I have edited the recall

## 2022-04-08 ENCOUNTER — TELEPHONE (OUTPATIENT)
Dept: NEPHROLOGY | Facility: CLINIC | Age: 87
End: 2022-04-08

## 2022-04-08 LAB
EXT GLUCOSE BLD: 111
EXTERNAL ANION GAP: 6
EXTERNAL BUN: 28
EXTERNAL CALCIUM: 9.2
EXTERNAL CHLORIDE: 108
EXTERNAL CO2: 31
EXTERNAL CREATININE: 1.33
EXTERNAL EGFR: 39
EXTERNAL POTASSIUM: 4.9
EXTERNAL SODIUM: 145

## 2022-04-08 NOTE — TELEPHONE ENCOUNTER
Patient called the office and advised that she was admitted at [facility] for 3 weeks due to renal failure  Patient called to try and schedule a follow up with us but we have no openings until June with Melody Middleton or the Ap[facility]  She did go for blood work after discharge which I am waiting to receive so you can review

## 2022-04-11 ENCOUNTER — TELEPHONE (OUTPATIENT)
Dept: NEPHROLOGY | Facility: CLINIC | Age: 87
End: 2022-04-11

## 2022-04-11 NOTE — TELEPHONE ENCOUNTER
----- Message from Alisha Greene DO sent at 4/8/2022  4:25 PM EDT -----  Renal function and electrolytes are stable from 4 days ago    Will monitor and discuss further with her at her follow-up

## 2022-05-24 ENCOUNTER — OFFICE VISIT (OUTPATIENT)
Dept: SLEEP CENTER | Facility: CLINIC | Age: 87
End: 2022-05-24
Payer: MEDICARE

## 2022-05-24 VITALS
BODY MASS INDEX: 37.82 KG/M2 | HEIGHT: 65 IN | HEART RATE: 85 BPM | DIASTOLIC BLOOD PRESSURE: 71 MMHG | WEIGHT: 227 LBS | SYSTOLIC BLOOD PRESSURE: 113 MMHG

## 2022-05-24 DIAGNOSIS — F51.01 PRIMARY INSOMNIA: ICD-10-CM

## 2022-05-24 DIAGNOSIS — G47.33 OBSTRUCTIVE SLEEP APNEA: Primary | ICD-10-CM

## 2022-05-24 DIAGNOSIS — G25.81 RLS (RESTLESS LEGS SYNDROME): ICD-10-CM

## 2022-05-24 PROCEDURE — 99214 OFFICE O/P EST MOD 30 MIN: CPT | Performed by: PSYCHIATRY & NEUROLOGY

## 2022-05-24 RX ORDER — GABAPENTIN 300 MG/1
300 CAPSULE ORAL EVERY EVENING
Qty: 90 CAPSULE | Refills: 1 | Status: SHIPPED | OUTPATIENT
Start: 2022-05-24

## 2022-05-24 RX ORDER — LATANOPROSTENE BUNOD 0.24 MG/ML
SOLUTION/ DROPS OPHTHALMIC
COMMUNITY
Start: 2022-02-23

## 2022-05-24 RX ORDER — GABAPENTIN 100 MG/1
100 CAPSULE ORAL
Qty: 90 CAPSULE | Refills: 1 | Status: SHIPPED | OUTPATIENT
Start: 2022-05-24

## 2022-05-24 NOTE — PROGRESS NOTES
Review of Systems      Genitourinary need to urinate more than twice a night   Cardiology ankle/leg swelling   Gastrointestinal none   Neurology need to move extremities, muscle weakness, numbness/tingling of an extremity, difficulty with memory and balance problems   Constitutional claustrophobia and fatigue   Integumentary rash or dry skin   Psychiatry none   Musculoskeletal muscle aches, back pain, legs twitching/jerking and leg cramps   Pulmonary shortness of breath with activity and difficulty breathing when lying flat    ENT none   Endocrine excessive thirst and frequent urination   Hematological none

## 2022-05-24 NOTE — PATIENT INSTRUCTIONS
Nursing Support:  When: Monday through Friday 7A-5PM except holidays  Where: Our direct line is [phone]  If you are having a true emergency please call 911  In the event that the line is busy or it is after hours please leave a voice message and we will return your call  Please speak clearly, leaving your full name, birth date, best number to reach you and the reason for your call  Medication refills: We will need the name of the medication, the dosage, the ordering provider, whether you get a 30 or 90 day refill, and the pharmacy name and address  Medications will be ordered by the provider only  Nurses cannot call in prescriptions  Please allow 7 days for medication refills  Physician requested updates: If your provider requested that you call with an update after starting medication, please be ready to provide us the medication and dosage, what time you take your medication, the time you attempt to fall asleep, time you fall asleep, when you wake up, and what time you get out of bed  Sleep Study Results: We will contact you with sleep study results and/or next steps after the physician has reviewed your testing         As we discussed, I would recommend changing Gabapentin to   300 mg at 9 pm  100 mg when you get in bed  If you feel dizzy or more groggy when you awaken, you can change back to your previous schedule or start taking it all at 400 mg at 10 pm

## 2022-05-24 NOTE — ASSESSMENT & PLAN NOTE
We reviewed this again as in the past she has been reluctant to have obstructive sleep apnea treated  I advised that she has many comorbidities that are potentially affected by obstructive sleep apnea including congestive heart failure, pulmonary hypertension, atrial fibrillation,   Moreover, she identifies daytime sleepiness as well as insomnia symptoms which potentially may be affected by untreated obstructive sleep apnea  I reviewed her last home sleep test which is performed through her cardiologist's office which showed mild obstructive sleep apnea  Unfortunately, that test was 13 months ago so an updated test will be required per Medicare rules prior to initiating treatment  Given her history of congestive heart failure, if obstructive sleep apnea is again demonstrated, she will need a CPAP titration study as opposed to auto-CPAP  She reluctantly agrees with this plan  She has some claustrophobia-I advised that she should try to put her CPAP mask on while awake and practice breathing with it in the future to try to desensitize to discomfort or anxiety from CPAP

## 2022-05-24 NOTE — ASSESSMENT & PLAN NOTE
Not optimally treated but also challenging to treat  Unfortunately, Ashley Hernández was not covered by insurance  Due to impaired renal function, there are limits in recommended gabapentin dose  As she has breakthrough restless legs at 4:00 a m , I advised that she should change her dosing regimen-I would recommend she take 300 mg at 9:00 p m  intake the final 100 mg when she gets in bed  I advised her of a backup plan in case she has morning side effects  She has a history of iron deficiency and is on an iron supplement, this is managed by another office-the goal ferritin should be above 75 ng/mL

## 2022-05-24 NOTE — PROGRESS NOTES
Assessment/Plan:      1  Obstructive sleep apnea  Assessment & Plan: We reviewed this again as in the past she has been reluctant to have obstructive sleep apnea treated  I advised that she has many comorbidities that are potentially affected by obstructive sleep apnea including congestive heart failure, pulmonary hypertension, atrial fibrillation,   Moreover, she identifies daytime sleepiness as well as insomnia symptoms which potentially may be affected by untreated obstructive sleep apnea  I reviewed her last home sleep test which is performed through her cardiologist's office which showed mild obstructive sleep apnea  Unfortunately, that test was 13 months ago so an updated test will be required per Medicare rules prior to initiating treatment  Given her history of congestive heart failure, if obstructive sleep apnea is again demonstrated, she will need a CPAP titration study as opposed to auto-CPAP  She reluctantly agrees with this plan  She has some claustrophobia-I advised that she should try to put her CPAP mask on while awake and practice breathing with it in the future to try to desensitize to discomfort or anxiety from CPAP  Orders:  -     Home Study; Future; Expected date: 05/24/2022    2  RLS (restless legs syndrome)  Assessment & Plan:  Not optimally treated but also challenging to treat  Unfortunately, Denis Haile was not covered by insurance  Due to impaired renal function, there are limits in recommended gabapentin dose  As she has breakthrough restless legs at 4:00 a m , I advised that she should change her dosing regimen-I would recommend she take 300 mg at 9:00 p m  intake the final 100 mg when she gets in bed  I advised her of a backup plan in case she has morning side effects  She has a history of iron deficiency and is on an iron supplement, this is managed by another office-the goal ferritin should be above 75 ng/mL  Orders:  -     gabapentin (Neurontin) 300 mg capsule;  Take 1 capsule (300 mg total) by mouth every evening  -     gabapentin (Neurontin) 100 mg capsule; Take 1 capsule (100 mg total) by mouth daily at bedtime    3  Primary insomnia  Comments:  Multifactorial, we discussed it is possible untreated obstructive sleep apnea is contributing to this  Subjective:      Patient ID: Nydia Herrera is a 81 y o  female  Ms Brayan Gaytan returns in followup  She continues to have RLS which "are alright"  At her last visit, I had prescribed Horizant but it was not covered by insurance so she has remained on gabapentin  Larri Ashley 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, prescribed prednisone which further worsened her sleep  She finally tapered off of this about 3 weeks ago  She dozes in the evening doing crosswords at 10 pm   Then gets in bed and cannot return to sleep  Often can stay awake until 2 AM   Finally awake at 6 am   In the past has had a sleep study showing SUZETTE, does not want to pursue CPAP  Wayne 6     She rarely naps  Dozes up to twice a day    Takes gabapentin 300 mg and 100 mg at 10 pm   Her RLS starts as early as 6-7 PM   Gabapentin eliminates this but then RLS returns in the middle of the night- 4 AM  This sometimes makes it hard to return to sleep    Takes hydrocodone taken three times daily, last dose before sleep or at 9 pm       [place] Sleepiness Scale:     Sitting and reading: Slight chance of dozing  Watching TV: Moderate chance of dozing  Sitting, inactive in a public place (e g  a theatre or a meeting):  Would never doze  As a passenger in a car for an hour without a break: Would never doze  Lying down to rest in the afternoon when circumstances permit: High chance of dozing  Sitting and talking to someone: Would never doze  Sitting quietly after a lunch without alcohol: Would never doze  In a car, while stopped for a few minutes in traffic: Would never doze  Total score: 6     The following portions of the patient's history were reviewed and updated as appropriate: allergies, current medications, past family history, past medical history, past social history, past surgical history, and problem list     Review of Systems    Genitourinary need to urinate more than twice a night   Cardiology ankle/leg swelling   Gastrointestinal none   Neurology need to move extremities, muscle weakness, numbness/tingling of an extremity, difficulty with memory and balance problems   Constitutional claustrophobia and fatigue   Integumentary rash or dry skin   Psychiatry none   Musculoskeletal muscle aches, back pain, legs twitching/jerking and leg cramps   Pulmonary shortness of breath with activity and difficulty breathing when lying flat    ENT none   Endocrine excessive thirst and frequent urination   Hematological none        Objective:          /71 (BP Location: Left arm, Patient Position: Sitting, Cuff Size: Large)   Pulse 85   Ht 5' 5" (1 651 m)   Wt 103 kg (227 lb)   BMI 37 77 kg/m²         Data reviewed-notes from hospital discharge, recent blood work including serum creatinine level of 1 46 which is stable if not improved from January 2022

## 2022-06-06 ENCOUNTER — TELEPHONE (OUTPATIENT)
Dept: NEPHROLOGY | Facility: CLINIC | Age: 87
End: 2022-06-06

## 2022-06-06 NOTE — TELEPHONE ENCOUNTER
Appointment Confirmation   Person confirmed appointment with  If not patient, name of the person Patient    Date and time of appointment 6/7 11:00    Patient acknowledged and will be at appointment? yes    Did you advise the patient that they will need a urine sample if they are a new patient?  N/A    Did you advise the patient to bring their current medications for verification? (including any OTC) Yes    Additional Information

## 2022-06-07 ENCOUNTER — OFFICE VISIT (OUTPATIENT)
Dept: NEPHROLOGY | Facility: CLINIC | Age: 87
End: 2022-06-07
Payer: MEDICARE

## 2022-06-07 VITALS
WEIGHT: 225 LBS | OXYGEN SATURATION: 97 % | SYSTOLIC BLOOD PRESSURE: 140 MMHG | HEART RATE: 85 BPM | BODY MASS INDEX: 37.49 KG/M2 | DIASTOLIC BLOOD PRESSURE: 68 MMHG | HEIGHT: 65 IN

## 2022-06-07 DIAGNOSIS — I50.32 CHRONIC DIASTOLIC CONGESTIVE HEART FAILURE (HCC): ICD-10-CM

## 2022-06-07 DIAGNOSIS — N18.32 STAGE 3B CHRONIC KIDNEY DISEASE (HCC): ICD-10-CM

## 2022-06-07 DIAGNOSIS — E11.69 HYPERLIPIDEMIA ASSOCIATED WITH TYPE 2 DIABETES MELLITUS (HCC): Primary | ICD-10-CM

## 2022-06-07 DIAGNOSIS — E78.5 HYPERLIPIDEMIA ASSOCIATED WITH TYPE 2 DIABETES MELLITUS (HCC): Primary | ICD-10-CM

## 2022-06-07 DIAGNOSIS — N25.81 SECONDARY HYPERPARATHYROIDISM, RENAL (HCC): ICD-10-CM

## 2022-06-07 DIAGNOSIS — I10 PRIMARY HYPERTENSION: ICD-10-CM

## 2022-06-07 PROCEDURE — 99214 OFFICE O/P EST MOD 30 MIN: CPT | Performed by: INTERNAL MEDICINE

## 2022-06-07 RX ORDER — INDAPAMIDE 2.5 MG/1
5 TABLET, FILM COATED ORAL DAILY
Start: 2022-06-07 | End: 2022-06-07

## 2022-06-07 RX ORDER — INDAPAMIDE 2.5 MG/1
5 TABLET, FILM COATED ORAL DAILY
Qty: 180 TABLET | Refills: 3 | Status: SHIPPED | OUTPATIENT
Start: 2022-06-07

## 2022-06-07 NOTE — PROGRESS NOTES
OFFICE FOLLOW UP - Nephrology   Lynette Sims 81 y o  female MRN: [id number]    Encounter: [id number]        ASSESSMENT and PLAN:  [facility] [place] by [facility] RIGOBERTO was seen today for follow-up  Diagnoses and all orders for this visit:    [facility], [place] [place] by [facility] RIGOBERTO is 81years old and has a history of diastolic congestive heart failure, pulmonary hypertension, stage 3 chronic kidney disease who presents for follow-up     1  Secondary hyperparathyroidism, renal (HCC)  - PTH is currently acceptable  - she remains on vitamin D3 1000 along with a multivitamin     2  Essential hypertension  - her blood pressure is well controlled today  - she is currently indapamide 2 5 mg every morning  And metoprolol XL 50 mg daily  -she was started on spironolactone 25 mg daily  - blood pressures are now stable still with some increased edema,  Have she has tried torsemide with some GI intolerance, she has tried furosemide as well, she has not tried bumetanide will attempt 1 mg daily and see if this improves her overall volume status     3  Cardiomegaly  - follows up with cardiologist at [facility]     4  Chronic gout without tophus, unspecified cause, unspecified site  -now on allopurinol 100 mg b i d   - tolerating this well  With no recent gout attacks     5  Diastolic dysfunction  - no episodes of flash pulmonary edema does have lower extremity edema but stable and improved  - continue ongoing cardiology follow-up  -compression stockings and leg elevation  -we did try Bumex however remains intolerant to loop diuretics had a urinary tract infection and prolonged hospitalization will hold for now  -for diuresis her weight is up and she does have increased abdominal pressure and thigh and upper extremity edema  -she can increase her diet and gap might to 5 mg daily and continue spironolactone 50 mg daily     6  Chronic kidney disease, stage 3  -creatinine currently stable with an estimated GFR of 40 ml/min  -continue to monitor for anemia related to chronic kidney disease and bone mineral disease  -creatinine remained stable    7  Pulmonary arterial hypertension  -following with Cardiology    8  Hyponatremia  -sodiums remained stable    Continue semi annual monitoring    HPI: Jairo Ovalle is a 81 y o  female who is here for Follow-up (Dx primary hypertension )    Since her last office visit she she was diagnosed with a worsening urinary tract infection, she had significant worsening in symptoms and had blood work and was found to have a sodium of 116 she subsequently was brought into the hospital with an acute kidney injury and sepsis was treated for this she had a IgA vasculitis relegated to this Skin, she has no current complaints her creatinine levels have been stable she denies any chest pain shortness of breath fevers chills nausea vomiting diarrhea or constipation    ROS:   All the systems were reviewed and were negative except as documented on the HPI      Allergies: Acetazolamide, Colchicine, Diltiazem, Docusate, Eszopiclone, Febuxostat, Glycerin, Hydrochlorothiazide, Latex, Lorazepam, Losartan, Metformin, Nortriptyline, Other, Penicillin g, Penicillins, Povidone iodine, Ropinirole, Simvastatin, Sulfa antibiotics, Torsemide, Valsartan, and Fluticasone    Medications:   Current Outpatient Medications:     Accu-Chek Lila Plus test strip, CHECK BLOOD SUGAR three times a day, Disp: , Rfl:     aspirin (ECOTRIN LOW STRENGTH) 81 mg EC tablet, Take 81 mg by mouth daily, Disp: , Rfl:     calcium citrate-vitamin D (CITRACAL+D) 315-200 MG-UNIT per tablet, Take 1 tablet by mouth 2 (two) times a day, Disp: , Rfl:     cholecalciferol (VITAMIN D3) 1,000 units tablet, Take 1,000 Units by mouth daily, Disp: , Rfl:     ferrous sulfate 325 (65 FE) MG EC tablet, Take 325 mg by mouth daily, Disp: , Rfl:     gabapentin (Neurontin) 100 mg capsule, Take 1 capsule (100 mg total) by mouth daily at bedtime, Disp: 90 capsule, Rfl: 1    gabapentin (Neurontin) 300 mg capsule, Take 1 capsule (300 mg total) by mouth every evening, Disp: 90 capsule, Rfl: 1    HYDROcodone-acetaminophen (XODOL) 7 5-300 MG per tablet, Take by mouth every 6 (six) hours, Disp: , Rfl:     indapamide (LOZOL) 2 5 mg tablet, Take 2 tablets (5 mg total) by mouth daily, Disp: 180 tablet, Rfl: 3    insulin aspart (NovoLOG FlexPen) 100 UNIT/ML injection pen, Inject under the skin as needed  , Disp: , Rfl:     insulin detemir (Levemir FlexTouch) 100 Units/mL injection pen, inject by subcutaneous route as per insulin sliding scale protocol- currently using  50-- units daily---DX: E11 9 ( 3/14/18), Disp: , Rfl:     insulin detemir (LEVEMIR) 100 units/mL subcutaneous injection, Inject under the skin 25 units in the AM and 20 units in the PM, Disp: , Rfl:     magnesium oxide (MAG-OX) 400 mg tablet, Take 400 mg by mouth daily, Disp: , Rfl:     metoprolol succinate (TOPROL-XL) 50 mg 24 hr tablet, Take 50 mg by mouth daily, Disp: , Rfl:     Multiple Vitamin (MULTIVITAMIN) tablet, Take 1 tablet by mouth daily, Disp: , Rfl:     Multiple Vitamins-Minerals (PRESERVISION AREDS 2 PO), Take by mouth 2 (two) times a day, Disp: , Rfl:     Omega-3 Fatty Acids (FISH OIL PO), Take 1,000 mg by mouth 4 (four) times a day Hydrieye 4 capsule daily  , Disp: , Rfl:     RABEprazole (ACIPHEX) 20 MG tablet, Take 1 tablet (20 mg total) by mouth daily, Disp: 30 tablet, Rfl: 5    spironolactone (ALDACTONE) 50 mg tablet, Take 50 mg by mouth daily , Disp: , Rfl:     VITAMIN E PO, Take by mouth daily, Disp: , Rfl:     Vyzulta 0 024 % SOLN, instill 1 drop into both eyes at bedtime, Disp: , Rfl:     warfarin (COUMADIN) 2 5 mg tablet, Take 1 5 tablets by mouth daily 3 75 mg 5 days 2 5 mg 2 days, Disp: , Rfl:     warfarin (COUMADIN) 5 mg tablet, Take 1 tablet (5 mg total) by mouth daily Adjust based on inr (Patient taking differently: Take 3 75 mg by mouth daily Adjust based on inr), Disp: 20 tablet, Rfl: 0    allopurinol (ZYLOPRIM) 100 mg tablet, Take 100 mg by mouth 2 (two) times a day (Patient not taking: No sig reported), Disp: , Rfl:     ammonium lactate (LAC-HYDRIN) 12 % lotion, apply to affected area OF feet once daily (Patient not taking: No sig reported), Disp: , Rfl:     bimatoprost (LUMIGAN) 0 01 % ophthalmic drops, Administer 1 drop to both eyes daily at bedtime (Patient not taking: No sig reported), Disp: , Rfl:     bumetanide (BUMEX) 1 mg tablet, Take 1 tablet (1 mg total) by mouth daily (Patient not taking: No sig reported), Disp: 30 tablet, Rfl: 3    Calcium Carb-Cholecalciferol 250-125 MG-UNIT TABS, Take by mouth daily (Patient not taking: Reported on 6/7/2022), Disp: , Rfl:     Cholecalciferol 50 MCG (2000 UT) CAPS, Take 1 capsule by mouth (Patient not taking: No sig reported), Disp: , Rfl:     clindamycin (CLEOCIN) 300 MG capsule, clindamycin HCl 300 mg capsule (Patient not taking: No sig reported), Disp: , Rfl:     Cyanocobalamin (VITAMIN B-12 IJ), Inject 1 Dose as directed once a week   (Patient not taking: No sig reported), Disp: , Rfl:     cyanocobalamin 1,000 mcg/mL, Inject 1 mL into a muscle Every month   (Patient not taking: Reported on 6/7/2022), Disp: , Rfl:     ferrous sulfate 325 (65 Fe) mg tablet, Take 325 mg by mouth daily with breakfast (Patient not taking: Reported on 6/7/2022), Disp: , Rfl:     glucose blood test strip, Accu-Chek Lila Plus test strips  CHECK BLOOD SUGAR three times a day (Patient not taking: Reported on 6/7/2022), Disp: , Rfl:     guaifenesin-codeine (GUAIFENESIN AC) 100-10 MG/5ML liquid, Iophen C-NR 10 mg-100 mg/5 mL oral liquid (Patient not taking: No sig reported), Disp: , Rfl:     HYDROcodone-acetaminophen (NORCO) 5-325 mg per tablet, Take 1 tablet by mouth every 6 (six) hours as needed for pain for up to 10 dosesMax Daily Amount: 4 tablets (Patient not taking: No sig reported), Disp: 20 tablet, Rfl: 0    HYDROcodone-acetaminophen (NORCO) 7 5-325 mg per tablet, Take 1 tablet by mouth every 4 to 6 hours if needed for pain (Patient not taking: Reported on 6/7/2022), Disp: , Rfl:     insulin aspart, w/niacinamide, (FIASP) 100 Units/mL injection pen, Inject under the skin Three times a day (Patient not taking: Reported on 6/7/2022), Disp: , Rfl:     lidocaine (XYLOCAINE) 1 %, lidocaine HCl 10 mg/mL (1 %) injection solution  administered (Patient not taking: No sig reported), Disp: , Rfl:     Lifitegrast (Amanda Luis Carlos OP), Apply 1 drop to eye 2 (two) times a day (Patient not taking: No sig reported), Disp: , Rfl:     methocarbamol (ROBAXIN) 500 mg tablet, Take 1 5 tablets (750 mg total) by mouth 2 (two) times a day (Patient not taking: No sig reported), Disp: 10 tablet, Rfl: 0    naloxegol oxalate (Movantik) 25 MG tablet, Take 1 tablet (25 mg total) by mouth daily, Disp: 30 tablet, Rfl: 3    senna (SENOKOT) 8 6 MG tablet, Senna Lax 8 6 mg tablet  take 2 tablets by mouth at bedtime (Patient not taking: No sig reported), Disp: , Rfl:     warfarin (COUMADIN) 5 mg tablet, Take 5 mg by mouth once a week   (Patient not taking: No sig reported), Disp: , Rfl:     Past Medical History:   Diagnosis Date    Chronic atrial fibrillation ([facility] [address] ) 9/13/2017    Chronic gout 9/13/2017    Chronic kidney disease     Chronic rhinitis 11/08/2018    CKD (chronic kidney disease) stage 3, GFR 30-59 ml/min ([address] ) 9/13/2017    Colon polyp 9/13/2017    Coronary artery disease     Diabetes mellitus ([address] )     COMBS (dyspnea on exertion) 11/02/2018    Endometrial cancer [facility])     age 62    Essential hypertension 9/13/2017    Gastritis 9/13/2017    GERD (gastroesophageal reflux disease)     Heart murmur     Hiatal hernia     Hx of hiatal hernia     Hyperlipidemia     Hypertension     Irregular heart beat     Mechanical heart valve present 9/13/2017    Osteoarthritis     Osteoporosis     Pacemaker     S/P AVR     Spinal stenosis     disk herniation     Urinary tract infection      Past Surgical History:   Procedure Laterality Date    AORTIC VALVE REPLACEMENT      BREAST EXCISIONAL BIOPSY Left 1995    benign    CARDIAC SURGERY      CATARACT EXTRACTION      CHOLECYSTECTOMY      COLONOSCOPY      COLONOSCOPY  09/2012    tubular adenoma    EGD AND COLONOSCOPY  09/2017    adenomatous polyp/ benign gastric polyp    ESOPHAGOGASTRODUODENOSCOPY  08/2012    FIXATION KYPHOPLASTY      HERNIA REPAIR      x3    HYSTERECTOMY      age 62    [de-identified] / REPLACE / REMOVE PACEMAKER      IR PELVIC ANGIOGRAM  1/29/2019    JOINT REPLACEMENT Bilateral     knees    OOPHORECTOMY Bilateral     age 62    OH COLONOSCOPY FLX DX W/COLLJ SPEC WHEN PFRMD N/A 9/12/2017    Procedure: [place] COLONOSCOPY with polypectomies;  Surgeon: Moiz Nichols MD;  Location: [facility]; Service: Gastroenterology    OH SIGMOIDOSCOPY FLX DX W/COLLJ SPEC BR/WA IF PFRMD N/A 9/13/2017    Procedure: colonoscopy with hemo clip x 2;  Surgeon: Moiz Nichols MD;  Location: [facility]; Service: Gastroenterology    SKIN BIOPSY      TONSILLECTOMY       Family History   Problem Relation Age of Onset    Diabetes Mother     Hypertension Mother     Heart disease Father     Emphysema Father     Asthma Sister    [place] Cancer Sister     Hypertension Sister     Colon cancer Sister     No Known Problems Daughter     No Known Problems Maternal Grandmother     No Known Problems Maternal Grandfather     No Known Problems Paternal Grandmother     No Known Problems Paternal Grandfather     Breast cancer Cousin 54    Breast cancer Cousin 54    Breast cancer Cousin 79    Ovarian cancer Sister     No Known Problems Daughter     No Known Problems Paternal Aunt     No Known Problems Paternal Aunt     Breast cancer Cousin     Breast cancer Cousin       reports that she has quit smoking  She has never used smokeless tobacco  She reports that she does not drink alcohol and does not use drugs        Physical Exam:   Vitals:    06/07/22 1100   BP: 140/68   BP Location: Left arm   Patient Position: Sitting   Cuff Size: Large   Pulse: 85   SpO2: 97%   Weight: 102 kg (225 lb)   Height: 5' 5" (1 651 m)     Body mass index is 37 44 kg/m²  General: conscious, cooperative, in not acute distress  Eyes: conjunctivae pink, anicteric sclerae  ENT: lips and mucous membranes moist  Neck: supple, no JVD  Chest: clear breath sounds bilateral, no crackles, ronchus or wheezings  CVS: distinct S1 & S2, normal rate, regular rhythm  Abdomen:  Abdomen distended  Extremities:  2+ pitting edema  Skin: no rash  Neuro: awake, alert, oriented    Lab Results:    Results for orders placed or performed in visit on 01/70/45   Basic metabolic panel   Result Value Ref Range    SODIUM 145     POTASSIUM 4 9     CHLORIDE 108     CO2 31     BUN 28     CREATININE 1 33     Glucose 111     EXTERNAL CALCIUM 9 2     ANION GAP 6     EXTERNAL EGFR 39          Portions of the record may have been created with voice recognition software  Occasional wrong word or "sound a like" substitutions may have occurred due to the inherent limitations of voice recognition software  Read the chart carefully and recognize, using context, where substitutions have occurred  If you have any questions, please contact the dictating provider

## 2022-06-07 NOTE — PATIENT INSTRUCTIONS
Low-Sodium Diet   WHAT YOU NEED TO KNOW:   What is a low-sodium diet? A low-sodium diet limits foods that are high in sodium (salt)  You will need to follow a low-sodium diet if you have high blood pressure, kidney disease, or heart failure  You may also need to follow this diet if you have a condition that is causing your body to retain (hold) extra fluid  You may need to limit the amount of sodium you eat in a day to 1,500 to 2,000 mg  Ask your healthcare provider how much sodium you can have each day  How can I use food labels to choose foods that are low in sodium? Read food labels to find the amount of sodium they contain  The amount of sodium is listed in milligrams (mg)  The % Daily Value (DV) column tells you how much of your daily needs are met by 1 serving of the food for each nutrient listed  Choose foods that have less than 5% of the DV of sodium  These foods are considered low in sodium  Foods that have 20% or more of the DV of sodium are considered high in sodium  Some food labels may also list any of the following terms that tell you about the sodium content in the food:  Sodium-free:  Less than 5 mg in each serving    Very low sodium:  35 mg of sodium or less in each serving    Low sodium:  140 mg of sodium or less in each serving    Reduced sodium: At least 25% less sodium in each serving than the regular type    Light in sodium:  50% less sodium in each serving    Unsalted or no added salt:  No extra salt is added during processing (the food may still contain sodium)       Which foods should I avoid? Salty foods are high in sodium   You should avoid the following:  Processed foods:      Mixes for cornbread, biscuits, cake, and pudding     Instant foods, such as potatoes, cereals, noodles, and rice     Packaged foods, such as bread stuffing, rice and pasta mixes, snack dip mixes, and macaroni and cheese     Canned foods, such as canned vegetables, soups, broths, sauces, and vegetable or tomato juice    Snack foods, such as salted chips, popcorn, pretzels, pork rinds, salted crackers, and salted nuts    Frozen foods, such as dinners, entrees, vegetables with sauces, and breaded meats    Sauerkraut, pickled vegetables, and other foods prepared in brine    Meats and cheeses:      Smoked or cured meat, such as corned beef, pool, ham, hot dogs, and sausage    Canned meats or spreads, such as potted meats, sardines, anchovies, and imitation seafood    Deli or lunch meats, such as bologna, ham, turkey, and roast beef    Processed cheese, such as American cheese and cheese spreads    Condiments, sauces, and seasonings:      Salt (¼ teaspoon of salt contains 575 mg of sodium)    Seasonings made with salt, such as garlic salt, celery salt, onion salt, and seasoned salt    Regular soy sauce, barbecue sauce, teriyaki sauce, steak sauce, Worcestershire sauce, and most flavored vinegars    Canned gravy and mixes     Regular condiments, such as mustard, ketchup, and salad dressings    Pickles and olives    Meat tenderizers and monosodium glutamate (MSG)    Which foods can I include? Read the food label to find the amount of sodium in each serving  Bread and cereal:  Try to choose breads with less than 80 mg of sodium per serving  Bread, roll, lito, tortilla, or unsalted crackers  Ready-to-eat cereals with less than 5% DV of sodium (examples include shredded wheat and puffed rice)    Pasta    Vegetables and fruits:      Unsalted fresh, frozen, or canned vegetables    Fresh, frozen, or canned fruits    Fruit juice    Dairy:  One serving has about 150 mg of sodium  Milk, all types    Yogurt    Hard cheese, such as cheddar, Swiss, [employer], or [employer] and other protein foods:  Some raw meats may have added sodium       Plain meats, fish, and poultry     Egg    Other foods:      Homemade pudding    Unsalted nuts, popcorn, or pretzels    Unsalted butter or margarine    What are some ways that I can decrease sodium? Add spices and herbs to foods instead of salt during cooking  Use salt-free seasonings to add flavor to foods  Examples include onion powder, garlic powder, basil, kraft powder, paprika, and parsley  Try lemon or lime juice or vinegar to give foods a tart flavor  Use hot peppers, pepper, or cayenne pepper to add a spicy flavor  Do not keep a salt shaker at your kitchen table  This may help keep you from adding salt to food at the table  A teaspoon of salt has 2,300 mg of sodium  It may take time to get used to enjoying the natural flavor of food instead of adding salt  Talk to your healthcare provider before you use salt substitutes  Some salt substitutes have a high amount of potassium and need to be avoided if you have kidney disease  Choose low-sodium foods at restaurants  Meals from restaurants are often high in sodium  Some restaurants have nutrition information on the menu that tells you the amount of sodium in their foods  If possible, ask for your food to be prepared with less, or no salt  Shop for unsalted or low-sodium foods and snacks at the grocery store  Examples include unsalted or low-sodium broths, soups, and canned vegetables  Choose fresh or frozen vegetables instead  Choose unsalted nuts or seeds or fresh fruits or vegetables as snacks  Read food labels and choose salt-free, very low-sodium, or low-sodium foods  CARE AGREEMENT:   You have the right to help plan your care  Discuss treatment options with your healthcare provider to decide what care you want to receive  You always have the right to refuse treatment  The above information is an  only  It is not intended as medical advice for individual conditions or treatments  Talk to your doctor, nurse or pharmacist before following any medical regimen to see if it is safe and effective for you    © Copyright [employer] 2022 Information is for End User's use only and may not be sold, redistributed or otherwise used for commercial purposes   All illustrations and images included in CareNotes® are the copyrighted property of A D A M , Inc  or [facility] Cecilia De Oliveira

## 2022-07-14 ENCOUNTER — OFFICE VISIT (OUTPATIENT)
Dept: CARDIOLOGY | Facility: CLINIC | Age: 87
End: 2022-07-14
Payer: MEDICARE

## 2022-07-14 VITALS
WEIGHT: 218 LBS | SYSTOLIC BLOOD PRESSURE: 120 MMHG | DIASTOLIC BLOOD PRESSURE: 70 MMHG | OXYGEN SATURATION: 99 % | HEIGHT: 65 IN | BODY MASS INDEX: 36.32 KG/M2 | RESPIRATION RATE: 20 BRPM | HEART RATE: 88 BPM

## 2022-07-14 DIAGNOSIS — Z45.018 BIVENTRICULAR PACEMAKER CHECK: ICD-10-CM

## 2022-07-14 DIAGNOSIS — Z95.2 S/P AVR: Primary | ICD-10-CM

## 2022-07-14 DIAGNOSIS — I48.20 CHRONIC ATRIAL FIBRILLATION (CMS/HCC): ICD-10-CM

## 2022-07-14 PROCEDURE — 99214 OFFICE O/P EST MOD 30 MIN: CPT | Performed by: INTERNAL MEDICINE

## 2022-07-14 PROCEDURE — 93000 ELECTROCARDIOGRAM COMPLETE: CPT | Performed by: INTERNAL MEDICINE

## 2022-07-14 RX ORDER — LATANOPROSTENE BUNOD 0.24 MG/ML
1 SOLUTION/ DROPS OPHTHALMIC NIGHTLY
COMMUNITY

## 2022-07-14 ASSESSMENT — ENCOUNTER SYMPTOMS
PSYCHIATRIC NEGATIVE: 1
DYSPNEA ON EXERTION: 0
HEMATOLOGIC/LYMPHATIC NEGATIVE: 1
ENDOCRINE NEGATIVE: 1
SHORTNESS OF BREATH: 0
EYES NEGATIVE: 1
WEAKNESS: 0
WEIGHT GAIN: 0
GASTROINTESTINAL NEGATIVE: 1

## 2022-07-14 NOTE — PROGRESS NOTES
Electrophysiology  Outpatient Progress Note       Reason for visit:   Chief Complaint   Patient presents with   • Follow-up       HPI   Ailyn St is a 87 y.o. female who is seen today for follow up of her  implanted biventricular pacemaker indicated for complete heart block during AV node ablation in .  She has chronic atrial fibrillation.  She had a new LV lead along with a new RV lead placed in 2018.  Her previous RV lead had chronically elevated thresholds and had been turned off due to this.  That RV lead is abandoned and capped.  The atrial port is capped.     In 2019 she underwent a cardiac catheterization at an outside institution which showed no obstructive disease. Overall, she is doing well and previous dyspnea has resolved. Most recent echocardiogram showed an EF of 55%.    Past Medical History:   Diagnosis Date   • Abnormal ECG    • Anticoagulated on Coumadin 2018   • Arthritis    • Fatigue 2018   • GERD (gastroesophageal reflux disease)    • Hypertension    • Osteoporosis    • S/P AVR 2018   • SOB (shortness of breath) 2018   • Type 2 diabetes mellitus (CMS/HCC)      Past Surgical History:   Procedure Laterality Date   • ADENOIDECTOMY     • AORTIC VALVE REPLACEMENT     • CARDIAC CATHETERIZATION     • CHOLECYSTECTOMY     • TONSILLECTOMY     • TOTAL ABDOMINAL HYSTERECTOMY W/ BILATERAL SALPINGOOPHORECTOMY         Social History     Tobacco Use   • Smoking status: Former Smoker     Types: Cigarettes     Quit date:      Years since quittin.6   • Smokeless tobacco: Never Used   • Tobacco comment: stoppe in    Vaping Use   • Vaping Use: Never used   Substance Use Topics   • Alcohol use: Yes     Comment: rarely   • Drug use: Defer     Family History   Problem Relation Age of Onset   • Diabetes Biological Mother    • Heart disease Biological Father        ALLERGY:  Penicillins, Colace [docusate sodium], Hydrochlorothiazide, Losartan potassium, Metformin, Requip  [ropinirole], Sulfa (sulfonamide antibiotics), Valsartan, and Latex    Current Outpatient Medications   Medication Sig Dispense Refill   • allopurinoL (ZYLOPRIM) 100 mg tablet Take 100 mg by mouth 2 (two) times a day.     • B-complex with vitamin C tablet Take 1 tablet by mouth daily.     • calcium carbonate-vitamin D3 250-125 mg-unit tablet Take by mouth daily.     • cholecalciferol, vitamin D3, 1,000 unit tablet Take 1,000 Units by mouth daily.     • ferrous sulfate 325 mg (65 mg iron) EC tablet Take 325 mg by mouth daily with breakfast.     • gabapentin (NEURONTIN) 300 mg capsule Take 300 mg by mouth nightly.     • HYDROcodone-acetaminophen (XODOL) 7.5-300 mg per tablet Take 1 tablet by mouth every 6 (six) hours as needed for moderate pain (taken at night).       • indapamide (LOZOL) 2.5 mg tablet Take 2.5 mg by mouth daily.     • insulin aspart U-100 (NovoLOG) 100 unit/mL subcutaneous pen Inject under the skin 3 (three) times a day before meals. Sliding scale as follows:  160-190 give 3 units, 191-210 give 5 units, 211-240 give 7 units, 241-270 give 9 units, greater than 271 give 10 units and recheck sugars in 2-3 hours     • insulin detemir U-100 (LEVEMIR) 100 unit/mL (3 mL) subcutaneous pen Inject 22 Units under the skin daily. In morning     • latanoprostene bunod (VYZULTA) 0.024 % drops Administer 1 drop into affected eye(s) nightly. In both eyes     • metoprolol succinate XL (TOPROL-XL) 50 mg 24 hr tablet Take 50 mg by mouth daily.     • NON FORMULARY MEDICATION REQUEST Take 1 tablet by mouth 4 (four) times a day. Hydro Eye supplement     • RABEprazole (ACIPHEX) 20 mg EC tablet Take 20 mg by mouth daily.     • spironolactone (ALDACTONE) 50 mg tablet Take 50 mg by mouth once daily.     • vit C/E/Zn/coppr/lutein/zeaxan (PRESERVISION AREDS-2 ORAL) Take by mouth.     • warfarin (COUMADIN) 2.5 mg tablet Take 5 mg by mouth once daily. 3.75 daily     • bimatoprost (LUMIGAN) 0.01 % ophthalmic drops 1 drop nightly.      • gabapentin (NEURONTIN) 100 mg capsule Take 2 capsules by mouth daily.     • methocarbamol (ROBAXIN) 750 mg tablet Take 750 mg by mouth 2 (two) times a day.     • pantoprazole (PROTONIX) 40 mg EC tablet Take 40 mg by mouth. Normally takes aciphex 30 mg daily.     • tolterodine (DETROL) 2 mg tablet Take 2 mg by mouth nightly.     • warfarin (COUMADIN) 5 mg tablet Take 5 mg by mouth once a week on Monday. as directed       No current facility-administered medications for this visit.       Review of Systems   Constitutional: Negative for malaise/fatigue and weight gain.   HENT: Negative.    Eyes: Negative.    Cardiovascular: Negative for dyspnea on exertion.   Respiratory: Negative for shortness of breath.    Endocrine: Negative.    Hematologic/Lymphatic: Negative.    Skin: Negative.    Musculoskeletal: Negative for muscle weakness.   Gastrointestinal: Negative.    Genitourinary: Negative.    Neurological: Negative for weakness.   Psychiatric/Behavioral: Negative.        Objective   Vitals:    07/14/22 1109   BP: 120/70   Pulse: 88   Resp: 20   SpO2: 99%         BP Readings from Last 3 Encounters:   07/14/22 120/70   06/24/21 138/84   12/11/20 124/62     Physical Exam  HENT:      Head: Normocephalic.   Eyes:      Conjunctiva/sclera: Conjunctivae normal.      Pupils: Pupils are equal, round, and reactive to light.   Cardiovascular:      Rate and Rhythm: Normal rate and regular rhythm.      Heart sounds: Normal heart sounds.   Pulmonary:      Effort: Pulmonary effort is normal.      Breath sounds: Normal breath sounds.   Abdominal:      General: Bowel sounds are normal. There is no distension.      Palpations: Abdomen is soft.   Musculoskeletal:         General: Normal range of motion.      Cervical back: Normal range of motion.   Skin:     General: Skin is warm and dry.      Comments: Incision well-healed.    Neurological:      Mental Status: She is alert and oriented to person, place, and time.   Psychiatric:          Mood and Affect: Mood is not anxious. Affect is not blunt.         Lab Results   Component Value Date    WBC 6.87 06/01/2018    HGB 10.8 (L) 06/01/2018     06/01/2018     06/01/2018    K 3.7 06/01/2018    CREATININE 1.1 06/01/2018    INR 1.4 06/01/2018     Cardiovascular Procedures:    ECHO/MUGA:  Echo (Scanned) - 9/7/2012  Echo 6/13/2019 - EF 55%; Normal AVR      ELECTROPHYSIOLOGY:  Devices (Bi-Ventricular PPM (Biotronik-Evia), Evia Dr-T serial# [id number] PID [id number]. DDDR 80-130ppm. Normal function. Changes made=sensor rate and gain. Base rate decreased to 75ppm.) - 10/30/2013   RFA (Indication A Fib, AVN ablation with pacemaker implantation) - 10/22/2013     STRESS TESTS:  Unruly MPI (Scanned) - 10/24/2011   ETT (Lexiscan. Had SOB and lightheadedness during recoevry, otherwise normal.) - 1/22/2016    CT SURGERY-   AVR 1998 - mechanical    Catheterization 1/23/19 ([facility])  HEMODYNAMICS: There was mild pulmonary hypertension. RA 10mmHg, RV 45/10, PA 45/25 mean 32, PCWP 22 with v wave, Pa sat 68.8%, CO 4.5 L/min, CI 2.1 L/min/m2  VALVES:  AORTIC VALVE:   --  A bileaflet mechanical prosthesis was observed in the aortic position. Both leaflet open well.  CORONARY VESSELS:   --  The coronary circulation is right dominant.  --  Left main: Normal.  --  LAD: Angiography showed minor luminal irregularities.  --  Circumflex: Normal.  --  RCA: Angiography showed minor luminal irregularities.    ECG-Pacemaker ECG    Assessment   Problem List Items Addressed This Visit        Circulatory    Chronic atrial fibrillation (CMS/HCC)     She underwent an AV node ablation in 2013.  She continues with complete heart block and a pacemaker in place.  She is asymptomatic with the chronic atrial fibrillation. Compliant with warfarin.               Other    Biventricular pacemaker check     She underwent implantation of a Biotronik biventricular pacemaker in May 2018.  Her previous RV lead was malfunctioning therefore a new RV  lead was implanted along with the new LV lead.  Her previous RV lead is abandoned and capped.       Biotronik Etrinsa implanted May 31, 2018.  RV threshold 0.8 V at 0.4 ms lead impedance is 619 ohms. LV threshold 0.7 V at 0.4 ms and lead impedance 1033 ohms. Chronic atrial fibrillation parameters VVIR 85 bpm. BiV pacing is 100%.  Underlying rhythm is atrial fibrillation with complete heart block.  Battery longevity is 6 years.            S/P AVR - Primary     S/p AVR 1998.  Cardiac catheterization January 23, 2019 demonstrated a bileaflet mechanical prosthesis was observed in the aortic position. Both leaflets open well. No symptoms of CHF, chest pain or increasing SOB.            Relevant Orders    ECG 12 LEAD-OFFICE PERFORMED (Completed)               Simon Mcnkight MD  7/31/2022

## 2022-07-14 NOTE — ASSESSMENT & PLAN NOTE
She underwent an AV node ablation in 2013.  She continues with complete heart block and a pacemaker in place.  She is asymptomatic with the chronic atrial fibrillation. Compliant with warfarin.

## 2022-07-18 NOTE — ASSESSMENT & PLAN NOTE
She underwent implantation of a Biotronik biventricular pacemaker in May 2018.  Her previous RV lead was malfunctioning therefore a new RV lead was implanted along with the new LV lead.  Her previous RV lead is abandoned and capped.       Biotronik Etrinsa implanted May 31, 2018.  RV threshold 0.8 V at 0.4 ms lead impedance is 619 ohms. LV threshold 0.7 V at 0.4 ms and lead impedance 1033 ohms. Chronic atrial fibrillation parameters VVIR 85 bpm. BiV pacing is 100%.  Underlying rhythm is atrial fibrillation with complete heart block.  Battery longevity is 6 years.

## 2022-07-18 NOTE — ASSESSMENT & PLAN NOTE
S/p AVR 1998.  Cardiac catheterization January 23, 2019 demonstrated a bileaflet mechanical prosthesis was observed in the aortic position. Both leaflets open well. No symptoms of CHF, chest pain or increasing SOB.

## 2022-07-20 ENCOUNTER — TELEPHONE (OUTPATIENT)
Dept: NEPHROLOGY | Facility: CLINIC | Age: 87
End: 2022-07-20

## 2022-07-20 NOTE — TELEPHONE ENCOUNTER
She can stop the indapamide when she starts the 101 Dates   Hopefully she tolerates well  If she is having blood work after she starts please let her know to carbon copy me  THanks

## 2022-07-20 NOTE — TELEPHONE ENCOUNTER
Patient called the office, her cardiologist wants to start her on Farxiga 10 mg daily but patient is concerned as it also works as a diuretic and she is already on two  Patient wants to know if either spironolactone or indapamide can be D/C   She feels her kidneys may be effected and want some guidance on what to do

## 2022-07-29 NOTE — TELEPHONE ENCOUNTER
Per pharmacy patient picked up refill on 2/17/2020 We've got you covered from head to toe    Our specialty programs:    [facility]  [phone]  [email or web address]    Sports Program  [phone]  [email or web address]       (Scheduling) team hours of operation:  Monday through Thursday, 7am to 8:30pm  Friday, 7am to 7pm  Saturday, 8am to 4pm    Knee Pain  WHAT YOU NEED TO KNOW:  Knee pain may start suddenly, or it may be a long-term problem. You may have pain on the side, front, or back of your knee. You may have knee stiffness and swelling. You may hear popping sounds or feel like your knee is giving way or locking up as you walk. You may feel pain when you sit, stand, walk, or climb up and down stairs. Knee pain can be caused by conditions such as obesity, inflammation, or strains or tears in ligaments or tendons.   DISCHARGE INSTRUCTIONS:  Return to the emergency department if:   •Your pain is worse, even after treatment.   •You cannot bend or straighten your leg completely.   •The swelling around your knee does not go down even with treatment.  •Your knee is painful and hot to the touch.   Contact your healthcare provider if:   •You have questions or concerns about your condition or care.   Medicines: You may need any of the following:   •NSAIDs help decrease swelling and pain or fever. This medicine is available with or without a doctor's order. NSAIDs can cause stomach bleeding or kidney problems in certain people. If you take blood thinner medicine, always ask your healthcare provider if NSAIDs are safe for you. Always read the medicine label and follow directions.  •Acetaminophen decreases pain and fever. It is available without a doctor's order. Ask how much to take and how often to take it. Follow directions. Read the labels of all other medicines you are using to see if they also contain acetaminophen, or ask your doctor or pharmacist. Acetaminophen can cause liver damage if not taken correctly. Do not use more than 4 grams (4,000 milligrams) total of acetaminophen in one day.   •Prescription pain medicine may be given. Ask your healthcare provider how to take this medicine safely. Some prescription pain medicines contain acetaminophen. Do not take other medicines that contain acetaminophen without talking to your healthcare provider. Too much acetaminophen may cause liver damage. Prescription pain medicine may cause constipation. Ask your healthcare provider how to prevent or treat constipation.   •Take your medicine as directed. Contact your healthcare provider if you think your medicine is not helping or if you have side effects. Tell him or her if you are allergic to any medicine. Keep a list of the medicines, vitamins, and herbs you take. Include the amounts, and when and why you take them. Bring the list or the pill bottles to follow-up visits. Carry your medicine list with you in case of an emergency.  What you can do to manage your symptoms:   •Rest your knee so it can heal. Limit activities that increase your pain. Do low-impact exercises, such as walking or swimming.   •Apply ice to help reduce swelling and pain. Use an ice pack, or put crushed ice in a plastic bag. Cover it with a towel before you apply it to your knee. Apply ice for 15 to 20 minutes every hour, or as directed.  •Apply compression to help reduce swelling. Use a brace or bandage only as directed.  •Elevate your knee to help decrease pain and swelling. Elevate your knee while you are sitting or lying down. Prop your leg on pillows to keep your knee above the level of your heart.  •Prevent your knee from moving as directed. Your healthcare provider may put on a cast or splint. You may need to wear a leg brace to stabilize your knee. A leg brace can be adjusted to increase your range of motion as your knee heals.  Hinged Knee Braces    What you can do to prevent knee pain:   •Maintain a healthy weight. Extra weight increases your risk for knee pain. Ask your healthcare provider how much you should weigh. He or she can help you create a safe weight loss plan if you need to lose weight.  •Exercise or train properly. Use the correct equipment for sports. Wear shoes that provide good support. Check your posture often as you exercise, play sports, or train for an event. This can help prevent stress and strain on your knees. Rest between sessions so you do not overwork your knees.  Follow up with your healthcare provider within 24 hours or as directed: You may need follow-up treatments, such as steroid injections to decrease pain. Write down your questions so you remember to ask them during your visits.

## 2022-09-06 DIAGNOSIS — G25.81 RLS (RESTLESS LEGS SYNDROME): ICD-10-CM

## 2022-09-06 RX ORDER — GABAPENTIN 100 MG/1
CAPSULE ORAL
Qty: 90 CAPSULE | Refills: 1 | Status: SHIPPED | OUTPATIENT
Start: 2022-09-06

## 2022-09-06 RX ORDER — GABAPENTIN 300 MG/1
CAPSULE ORAL
Qty: 90 CAPSULE | Refills: 1 | Status: SHIPPED | OUTPATIENT
Start: 2022-09-06

## 2022-09-13 LAB
CREAT ?TM UR-SCNC: 67.5 UMOL/L
EXT PROTEIN URINE: 12
PROT/CREAT UR: 0.18 MG/G{CREAT}

## 2022-09-16 ENCOUNTER — TELEPHONE (OUTPATIENT)
Dept: NEPHROLOGY | Facility: CLINIC | Age: 87
End: 2022-09-16

## 2022-09-16 NOTE — TELEPHONE ENCOUNTER
Appointment Confirmation   Person confirmed appointment with  If not patient, name of the person Patient     Date and time of appointment 09/19   Patient acknowledged and will be at appointment?  yes   Did you advise the patient that they will need a urine sample if they are a new patient? no   Did you advise the patient to bring their current medications for verification? (including any OTC) yes   Additional Information

## 2022-09-19 ENCOUNTER — OFFICE VISIT (OUTPATIENT)
Dept: NEPHROLOGY | Facility: CLINIC | Age: 87
End: 2022-09-19
Payer: MEDICARE

## 2022-09-19 VITALS
DIASTOLIC BLOOD PRESSURE: 74 MMHG | HEIGHT: 65 IN | BODY MASS INDEX: 35.16 KG/M2 | SYSTOLIC BLOOD PRESSURE: 142 MMHG | HEART RATE: 85 BPM | WEIGHT: 211 LBS

## 2022-09-19 DIAGNOSIS — E87.5 HYPERKALEMIA: ICD-10-CM

## 2022-09-19 DIAGNOSIS — N18.32 STAGE 3B CHRONIC KIDNEY DISEASE (HCC): Primary | ICD-10-CM

## 2022-09-19 PROCEDURE — 99214 OFFICE O/P EST MOD 30 MIN: CPT | Performed by: INTERNAL MEDICINE

## 2022-09-19 NOTE — PATIENT INSTRUCTIONS
Chronic Kidney Disease   WHAT YOU NEED TO KNOW:   Chronic kidney disease (CKD) is the gradual and permanent loss of kidney function  It is also called chronic kidney failure, or chronic renal insufficiency  Normally, the kidneys remove fluid, chemicals, and waste from your blood  These wastes are turned into urine by your kidneys  CKD may worsen over time and lead to kidney failure  Your CKD team will help you and your family plan for your care at home  The team will help you create goals and find ways to meet your goals  Your care plan may change over time as your needs change  DISCHARGE INSTRUCTIONS:   Call your local emergency number (911 in the [address]) if:   You have a seizure  You have shortness of breath  Return to the emergency department if:   You are confused and very drowsy  Call your doctor or nephrologist if:   You suddenly gain or lose more weight than your healthcare provider has told you is okay  You have itchy skin or a rash  You urinate more or less than you normally do  You have blood in your urine  You have nausea and are vomiting  You have fatigue or muscle weakness  You have hiccups that will not stop  You have questions or concerns about your condition or care  Medicines:   Medicines  may be given to decrease blood pressure and get rid of extra fluid  You may also receive medicine to manage health conditions that may occur with CKD, such as anemia, diabetes, and heart disease  Take your medicine as directed  Contact your healthcare provider if you think your medicine is not helping or if you have side effects  Tell him or her if you are allergic to any medicine  Keep a list of the medicines, vitamins, and herbs you take  Include the amounts, and when and why you take them  Bring the list or the pill bottles to follow-up visits  Carry your medicine list with you in case of an emergency  What you can do to manage CKD:   Management may include making some lifestyle changes  Tell your healthcare provider if you have any concerns about being able to make changes  He or she can help you find solutions, including working with specialists  Ask for help creating a plan to break large goals into smaller steps  Your plan may include any of the following:  Manage other health conditions  Your healthcare provider will work with you to make a care plan that meets your needs  You will be checked regularly for heart disease or other conditions that can make CKD worse, such as diabetes  Your blood pressure will be closely monitored  You will also get a target blood pressure and help making a plan to reach your target  This may include taking your blood pressure at home  Maintain a healthy weight  Your weight and body mass index (BMI) will be checked regularly  BMI helps find if your weight is healthy for your height  Your healthcare provider will use other tests to check your muscle and protein levels  Extra weight can strain your kidneys  A low weight or low muscle mass can make you feel more tired  You may have trouble doing your daily activities  Ask your provider what a healthy weight is for you  He or she can help you create a plan to lose or gain weight safely, if needed  The plan may include keeping a food diary  This is a list of foods and liquids you have each day  Your provider will use the diary to help you make changes, if needed  Changes are based on your health and any other conditions you have, such as diabetes  Create an exercise plan  Regular exercise can help you manage CKD, high blood pressure, and diabetes  Exercise also helps control weight  Your provider can help you create exercise goals and a plan to reach those goals  For example, your goal may be to exercise for 30 minutes in a day  Your plan can include breaking exercise into 10 minute sessions, 3 times during the day  Create a healthy eating plan    Your provider may tell you to eat food low in potassium, phosphorus, or protein  Your provider may also recommend vitamin or mineral supplements  Do not take any supplements without talking to your provider  A dietitian can help you plan meals if needed  Ask how much liquid to drink each day and which liquids are best for you  Limit sodium (salt) as directed  You may need to limit sodium to less than 2,300 milligrams (mg) each day  Ask your dietitian or healthcare provider how much sodium you can have each day  The amount depends on your stage of kidney disease  Table salt, canned foods, soups, salted snacks, and processed meats, like deli meats and sausage, are high in sodium  Your provider or a dietitian can show you how to read food labels for sodium  Limit alcohol as directed  Alcohol can cause fluid retention and can affect your kidneys  Ask how much alcohol is safe for you  A drink of alcohol is 12 ounces of beer, 5 ounces of wine, or 1½ ounces of liquor  Do not smoke  Nicotine and other chemicals in cigarettes and cigars can cause kidney damage  Ask your provider for information if you currently smoke and need help to quit  E-cigarettes or smokeless tobacco still contain nicotine  Talk to your provider before you use these products  Ask about over-the-counter medicines  Medicines such as NSAIDs and laxatives may harm your kidneys  Some cough and cold medicines can raise your blood pressure  Always ask if a medicine is safe before you take it  Ask about vaccines you may need  CKD can increase your risk for infections such as pneumonia, influenza, and hepatitis  Vaccines lower your risk for infection  Your healthcare provider will tell you which vaccines you need and when to get them  Follow up with your doctor or nephrologist as directed: You will need to return for tests to monitor your kidney and nerve function, and your parathyroid hormone level   Your medicines may be changed, based on certain test results  Write down your questions so you remember to ask them during your visits  © Copyright [employer] 2022 Information is for End User's use only and may not be sold, redistributed or otherwise used for commercial purposes  All illustrations and images included in CareNotes® are the copyrighted property of [employer]  or Mary De Oliveira  The above information is an  only  It is not intended as medical advice for individual conditions or treatments  Talk to your doctor, nurse or pharmacist before following any medical regimen to see if it is safe and effective for you

## 2022-09-19 NOTE — PROGRESS NOTES
OFFICE FOLLOW UP - Nephrology   Denita Huffman 81 y o  female MRN: [id number]    Encounter: [id number]        ASSESSMENT and PLAN:  Sarah Ramires was seen today for follow-up  Diagnoses and all orders for this visit:    Sarah Ramires is 81years old and has a history of diastolic congestive heart failure, pulmonary hypertension, stage 3 chronic kidney disease who presents for follow-up     1  Secondary hyperparathyroidism, renal (HCC)  - PTH is currently acceptable  - she remains on vitamin D3 1000 along with a multivitamin     2  Essential hypertension  - her blood pressure is well controlled today  - she is now on Lilly Ramana was indapamide was discontinued  -spironolactone 50 mg daily  -she has seen Cardiology at [facility]  -she has some increased lower extremity edema but overall this is stable     3  Cardiomegaly  - follows up with cardiologist at [facility] and [facility] stable     4  Chronic gout without tophus, unspecified cause, unspecified site  -now on allopurinol 100 mg b i d   - tolerating this well  With no recent gout attacks     5  Diastolic dysfunction  - no episodes of flash pulmonary edema does have lower extremity edema but stable and improved  - continue ongoing cardiology follow-up  -compression stockings and leg elevation  -we did try Bumex however remains intolerant to loop diuretics had a urinary tract infection and prolonged hospitalization will hold for now  -continue Lilly [place] and spironolactone monitor salt intake  -her potassium was slightly elevated so will repeat a BMP now to ensure stability     6  Chronic kidney disease, stage 3  -creatinine currently stable with an estimated GFR of 40 ml/min  -continue to monitor for anemia related to chronic kidney disease and bone mineral disease  -creatinine remained stable  -she had a positive cryoglobulin urinalysis shows no albuminuria or hematuria  -renal function has remained relatively stable    7   Pulmonary arterial hypertension  -following with Cardiology    8  Hyponatremia  -sodiums remained stable    Continue semi annual monitoring    HPI: Jazmin Gillette is a 81 y o  female who is here for No chief complaint on file  Since her last office visit she has been relatively stable she currently denies any acute chest pain or shortness of breath no fevers or chills no nausea vomiting she is tolerating Farxiga at this point she has some lower extremity edema that is stable, otherwise tolerating her medications and has no acute complaints    ROS:   All the systems were reviewed and were negative except as documented on the HPI      Allergies: Acetazolamide, Colchicine, Diltiazem, Docusate, Eszopiclone, Febuxostat, Glycerin, Hydrochlorothiazide, Latex, Lorazepam, Losartan, Metformin, Nortriptyline, Other, Penicillin g, Penicillins, Povidone iodine, Ropinirole, Simvastatin, Sulfa antibiotics, Torsemide, Valsartan, and Fluticasone    Medications:   Current Outpatient Medications:     Accu-Chek Lila Plus test strip, CHECK BLOOD SUGAR three times a day, Disp: , Rfl:     allopurinol (ZYLOPRIM) 100 mg tablet, Take 100 mg by mouth 2 (two) times a day, Disp: , Rfl:     aspirin (ECOTRIN LOW STRENGTH) 81 mg EC tablet, Take 81 mg by mouth daily, Disp: , Rfl:     calcium citrate-vitamin D (CITRACAL+D) 315-200 MG-UNIT per tablet, Take 1 tablet by mouth 2 (two) times a day, Disp: , Rfl:     cholecalciferol (VITAMIN D3) 1,000 units tablet, Take 1,000 Units by mouth daily, Disp: , Rfl:     dapagliflozin (Farxiga) 10 MG tablet, Take by mouth daily, Disp: , Rfl:     ferrous sulfate 325 (65 FE) MG EC tablet, Take 325 mg by mouth daily, Disp: , Rfl:     gabapentin (NEURONTIN) 100 mg capsule, TAKE 1 CAPSULE BY MOUTH EVERYDAY AT BEDTIME (Patient taking differently: if needed), Disp: 90 capsule, Rfl: 1    gabapentin (NEURONTIN) 300 mg capsule, TAKE 1 CAPSULE BY MOUTH EVERY EVENING, Disp: 90 capsule, Rfl: 1    glucose blood test strip, , Disp: , Rfl:    HYDROcodone-acetaminophen (NORCO) 7 5-325 mg per tablet, Take 1 tablet by mouth every 4 to 6 hours if needed for pain, Disp: , Rfl:     insulin aspart (NovoLOG FlexPen) 100 UNIT/ML injection pen, Inject under the skin as needed  , Disp: , Rfl:     insulin detemir (LEVEMIR) 100 units/mL subcutaneous injection, Inject under the skin 16 daily, Disp: , Rfl:     magnesium oxide (MAG-OX) 400 mg tablet, Take 400 mg by mouth daily, Disp: , Rfl:     metoprolol succinate (TOPROL-XL) 50 mg 24 hr tablet, Take 50 mg by mouth daily, Disp: , Rfl:     Multiple Vitamin (MULTIVITAMIN) tablet, Take 1 tablet by mouth daily, Disp: , Rfl:     Multiple Vitamins-Minerals (PRESERVISION AREDS 2 PO), Take by mouth 2 (two) times a day, Disp: , Rfl:     Omega-3 Fatty Acids (FISH OIL PO), Take 1,000 mg by mouth 4 (four) times a day Hydrieye 4 capsule daily  , Disp: , Rfl:     RABEprazole (ACIPHEX) 20 MG tablet, Take 1 tablet (20 mg total) by mouth daily, Disp: 30 tablet, Rfl: 5    spironolactone (ALDACTONE) 50 mg tablet, Take 50 mg by mouth daily , Disp: , Rfl:     VITAMIN E PO, Take by mouth daily, Disp: , Rfl:     Vyzulta 0 024 % SOLN, instill 1 drop into both eyes at bedtime, Disp: , Rfl:     warfarin (COUMADIN) 2 5 mg tablet, Take 1 5 tablets by mouth daily 3 75 mg 5 days 2 5 mg 2 days, Disp: , Rfl:     ammonium lactate (LAC-HYDRIN) 12 % lotion, apply to affected area OF feet once daily (Patient not taking: No sig reported), Disp: , Rfl:     bimatoprost (LUMIGAN) 0 01 % ophthalmic drops, Administer 1 drop to both eyes daily at bedtime (Patient not taking: No sig reported), Disp: , Rfl:     bumetanide (BUMEX) 1 mg tablet, Take 1 tablet (1 mg total) by mouth daily (Patient not taking: No sig reported), Disp: 30 tablet, Rfl: 3    Calcium Carb-Cholecalciferol 250-125 MG-UNIT TABS, Take by mouth daily (Patient not taking: Reported on 9/19/2022), Disp: , Rfl:     Cholecalciferol 50 MCG (2000 UT) CAPS, Take 1 capsule by mouth (Patient not taking: No sig reported), Disp: , Rfl:     clindamycin (CLEOCIN) 300 MG capsule, clindamycin HCl 300 mg capsule (Patient not taking: No sig reported), Disp: , Rfl:     Cyanocobalamin (VITAMIN B-12 IJ), Inject 1 Dose as directed once a week   (Patient not taking: No sig reported), Disp: , Rfl:     cyanocobalamin 1,000 mcg/mL, Inject 1 mL into a muscle Every month   (Patient not taking: No sig reported), Disp: , Rfl:     ferrous sulfate 325 (65 Fe) mg tablet, Take 325 mg by mouth daily with breakfast (Patient not taking: No sig reported), Disp: , Rfl:     guaifenesin-codeine (GUAIFENESIN AC) 100-10 MG/5ML liquid, Iophen C-NR 10 mg-100 mg/5 mL oral liquid (Patient not taking: No sig reported), Disp: , Rfl:     HYDROcodone-acetaminophen (NORCO) 5-325 mg per tablet, Take 1 tablet by mouth every 6 (six) hours as needed for pain for up to 10 dosesMax Daily Amount: 4 tablets (Patient not taking: Reported on 9/19/2022), Disp: 20 tablet, Rfl: 0    HYDROcodone-acetaminophen (XODOL) 7 5-300 MG per tablet, Take by mouth every 6 (six) hours, Disp: , Rfl:     indapamide (LOZOL) 2 5 mg tablet, Take 2 tablets (5 mg total) by mouth daily, Disp: 180 tablet, Rfl: 3    insulin aspart, w/niacinamide, (FIASP) 100 Units/mL injection pen, Inject under the skin Three times a day (Patient not taking: Reported on 6/7/2022), Disp: , Rfl:     insulin detemir (Levemir FlexTouch) 100 Units/mL injection pen, inject by subcutaneous route as per insulin sliding scale protocol- currently using  50-- units daily---DX: E11 9 ( 3/14/18) (Patient not taking: Reported on 9/19/2022), Disp: , Rfl:     lidocaine (XYLOCAINE) 1 %, lidocaine HCl 10 mg/mL (1 %) injection solution  administered (Patient not taking: No sig reported), Disp: , Rfl:     Lifitegrast (XIIDRA OP), Apply 1 drop to eye 2 (two) times a day (Patient not taking: No sig reported), Disp: , Rfl:     methocarbamol (ROBAXIN) 500 mg tablet, Take 1 5 tablets (750 mg total) by mouth 2 (two) times a day (Patient not taking: No sig reported), Disp: 10 tablet, Rfl: 0    naloxegol oxalate (Movantik) 25 MG tablet, Take 1 tablet (25 mg total) by mouth daily, Disp: 30 tablet, Rfl: 3    senna (SENOKOT) 8 6 MG tablet, Senna Lax 8 6 mg tablet  take 2 tablets by mouth at bedtime (Patient not taking: No sig reported), Disp: , Rfl:     warfarin (COUMADIN) 5 mg tablet, Take 1 tablet (5 mg total) by mouth daily Adjust based on inr (Patient taking differently: Take 3 75 mg by mouth daily Adjust based on inr), Disp: 20 tablet, Rfl: 0    warfarin (COUMADIN) 5 mg tablet, Take 5 mg by mouth once a week   (Patient not taking: No sig reported), Disp: , Rfl:     Past Medical History:   Diagnosis Date    Anemia     Arthritis     also osteoporosis of spine    [facility]     endometrial    [facility]estive [facility]     Chronic atrial fibrillation (HCC) 09/13/2017    Chronic gout 09/13/2017    Chronic kidney disease     Chronic rhinitis 11/08/2018    CKD (chronic kidney disease) stage 3, GFR 30-59 ml/min ([facility]) 09/13/2017    [facility] disorder [facility]     on warfarin    Colon polyp 09/13/2017    Coronary artery disease     Diabetes mellitus ([facility] )     COMBS (dyspnea on exertion) 11/02/2018    Endometrial cancer [facility])     age 62    Essential hypertension 09/13/2017    Gastritis 09/13/2017    GERD (gastroesophageal reflux disease)     Heart murmur     Hiatal hernia     Hx of hiatal hernia     Hyperlipidemia     Hypertension     Irregular heart beat     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    Osteoarthritis     Osteoporosis     Pacemaker     Peripheral vascular disease ([facility]     S/P AVR     Spinal stenosis     disk herniation     Urinary tract infection      Past Surgical History:   Procedure Laterality Date    AORTIC VALVE REPLACEMENT      BREAST EXCISIONAL BIOPSY Left 1995    benign    CARDIAC SURGERY      CATARACT EXTRACTION      CHOLECYSTECTOMY      COLONOSCOPY      COLONOSCOPY  09/2012    tubular adenoma    EGD AND COLONOSCOPY  09/2017    adenomatous polyp/ benign gastric polyp    ESOPHAGOGASTRODUODENOSCOPY  08/2012    FIXATION KYPHOPLASTY      HERNIA REPAIR      x3    HYSTERECTOMY      age 62    [de-identified] / REPLACE / REMOVE PACEMAKER      IR PELVIC ANGIOGRAM  1/29/2019    JOINT REPLACEMENT Bilateral     knees    OOPHORECTOMY Bilateral     age 62    MD COLONOSCOPY FLX DX W/COLLJ SPEC WHEN PFRMD N/A 9/12/2017    Procedure: Pamela Arita COLONOSCOPY with polypectomies;  Surgeon: Wilberto Pompa MD;  Location: [facility]; Service: Gastroenterology    MD SIGMOIDOSCOPY FLX DX W/COLLJ SPEC BR/WA IF PFRMD N/A 9/13/2017    Procedure: colonoscopy with hemo clip x 2;  Surgeon: Wilberto Pompa MD;  Location: [facility]; Service: Gastroenterology    SKIN BIOPSY      TONSILLECTOMY       Family History   Problem Relation Age of Onset    Diabetes Mother     Hypertension Mother     Heart disease Father     Emphysema Father     Asthma Sister    Gregoria Riis Cancer Sister         colon    Hypertension Sister     Colon cancer Sister     Autoimmune disease Sister         Diabetes    Diabetes Sister     No Known Problems Daughter     No Known Problems Maternal Grandmother     No Known Problems Maternal Grandfather     No Known Problems Paternal Grandmother     No Known Problems Paternal Grandfather     Breast cancer Cousin 54    Breast cancer Cousin 54    Breast cancer Cousin 79    Ovarian cancer Sister     Autoimmune disease Sister         Hashimoto's thyroiditis    Cancer Sister         ovarian    No Known Problems Daughter     No Known Problems Paternal Aunt     No Known Problems Paternal Aunt     Breast cancer Cousin     Breast cancer Cousin     Cancer Paternal Uncle         prostate      reports that she has quit smoking  Her smoking use included cigarettes  She has a 60 00 pack-year smoking history   She has never used smokeless tobacco  She reports that she does not drink alcohol and does not use drugs  Physical Exam:   Vitals:    09/19/22 1130   BP: 142/74   BP Location: Left arm   Patient Position: Sitting   Cuff Size: Large   Pulse: 85   Weight: 95 7 kg (211 lb)   Height: 5' 5" (1 651 m)     Body mass index is 35 11 kg/m²  General: conscious, cooperative, in not acute distress  Eyes: conjunctivae pink, anicteric sclerae  ENT: lips and mucous membranes moist  Neck: supple, no JVD  Chest: clear breath sounds bilateral, no crackles, ronchus or wheezings  CVS: distinct S1 & S2, normal rate, regular rhythm  Abdomen:  Abdomen distended  Extremities:  2+ pitting edema  Skin: no rash  Neuro: awake, alert, oriented    Lab Results:    Results for orders placed or performed in visit on 09/13/22   Protein / creatinine ratio, urine   Result Value Ref Range    PROTEIN UA 12 0     EXT Creatinine Urine 67 5     EXTERNAL Ur Prot/Creat Ratio 0 18          Portions of the record may have been created with voice recognition software  Occasional wrong word or "sound a like" substitutions may have occurred due to the inherent limitations of voice recognition software  Read the chart carefully and recognize, using context, where substitutions have occurred  If you have any questions, please contact the dictating provider

## 2022-09-22 ENCOUNTER — TELEPHONE (OUTPATIENT)
Dept: NEPHROLOGY | Facility: CLINIC | Age: 87
End: 2022-09-22

## 2022-09-22 DIAGNOSIS — E87.5 HYPERKALEMIA: ICD-10-CM

## 2022-09-22 DIAGNOSIS — N18.32 STAGE 3B CHRONIC KIDNEY DISEASE (HCC): Primary | ICD-10-CM

## 2022-09-22 NOTE — TELEPHONE ENCOUNTER
----- Message from Jane Mchugh DO sent at 9/22/2022  9:41 AM EDT -----  Her potassium remains slightly high at 5 5  Will not change in urine medications at this point can you have her start a low-potassium diet    So please send her a listing of foods and have her repeat a BMP in 2 weeks

## 2022-09-22 NOTE — TELEPHONE ENCOUNTER
I spoke to [facility] and she is are to avoid all high potassium foods I did give her some examples and also mailing out a diet to follow  She will f/u in 2 weeks with another BMP as requested

## 2022-12-12 ENCOUNTER — OFFICE VISIT (OUTPATIENT)
Dept: SLEEP CENTER | Facility: CLINIC | Age: 87
End: 2022-12-12

## 2022-12-12 VITALS
HEART RATE: 84 BPM | HEIGHT: 65 IN | DIASTOLIC BLOOD PRESSURE: 73 MMHG | SYSTOLIC BLOOD PRESSURE: 152 MMHG | WEIGHT: 215 LBS | BODY MASS INDEX: 35.82 KG/M2

## 2022-12-12 DIAGNOSIS — G25.81 RLS (RESTLESS LEGS SYNDROME): ICD-10-CM

## 2022-12-12 DIAGNOSIS — G47.33 OBSTRUCTIVE SLEEP APNEA: Primary | ICD-10-CM

## 2022-12-12 RX ORDER — GABAPENTIN 300 MG/1
300 CAPSULE ORAL EVERY EVENING
Qty: 90 CAPSULE | Refills: 1 | Status: SHIPPED | OUTPATIENT
Start: 2022-12-12

## 2022-12-12 RX ORDER — PEN NEEDLE, DIABETIC 32GX 5/32"
NEEDLE, DISPOSABLE MISCELLANEOUS
COMMUNITY
Start: 2022-10-13

## 2022-12-12 NOTE — PROGRESS NOTES
Assessment/Plan:      1  Obstructive sleep apnea  Assessment & Plan:  We discussed that she should be treated for obstructive sleep apnea given her medical history of atrial fibrillation and hypertension as well as symptoms including poor sleep at night  Unfortunately, her previous home sleep test is over 1 year ago so she needs a repeat test to qualify for CPAP  She seems to reluctantly agree to pursue a home sleep test and auto-CPAP at home  2  RLS (restless legs syndrome)  Assessment & Plan:  Not optimally treated but also challenging to treat  Unfortunately, Saeed Sellers was not covered by insurance  Due to impaired renal function, there are limits in recommended gabapentin dose  Now patient is taking 300 mg of gabapentin at 7pm and that is helping  She has a history of iron deficiency and is on an iron supplement, this is managed by another office-the goal ferritin should be above 75 ng/mL  Venous insufficiency can be contributing, I advised to use comprssion stockings and review further with her primary care physician if any further work-up/assessment is needed with regard to her lower extremity edema           Subjective:      Patient ID: Lauren Quinn is a 81 y o  female  Ms Debbi Mendez returns in follow-up for history of mild obstructive sleep apnea, untreated, as well as restless leg syndrome treated with gabapentin  She was last seen by me in May  At her last visit we discussed that she had breakthrough restless leg symptoms, I recommended a change in her dose  Treatment has been limited by renal insufficiency  She also has a history of iron deficiency, most recent iron studies were within acceptable range  Since her last visit, her restless leg symptoms have somewhat improved  She is in taking gabapentin early around 7 PM and notes benefit    She still has breakthrough symptoms at 4 AM   She sometimes will take gabapentin 100 mg at that time but does not like to do so as she feels medicated when she awakens in the morning  Furthermore, she has vision limitations including macular degeneration feels like gabapentin at higher dose may contribute to difficulty focusing  She has not scheduled the repeat sleep test nor is she on CPAP at home  Her last sleep test was a home sleep test through her cardiologist in April 2021-this showed mild sleep apnea with a respiratory event index of 14 6  [place] Sleepiness Scale:     Sitting and reading: Moderate chance of dozing  Watching TV: Moderate chance of dozing  Sitting, inactive in a public place (e g  a theatre or a meeting): Would never doze  As a passenger in a car for an hour without a break: Would never doze  Lying down to rest in the afternoon when circumstances permit: High chance of dozing  Sitting and talking to someone: Would never doze  Sitting quietly after a lunch without alcohol: Would never doze  In a car, while stopped for a few minutes in traffic: Slight chance of dozing  Total score: 8     The following portions of the patient's history were reviewed and updated as appropriate: allergies, current medications, past family history, past medical history, past social history, past surgical history, and problem list     Review of Systems   Constitutional: Positive for appetite change and fatigue  Negative for activity change and fever  Eyes: Negative for visual disturbance  Respiratory: Positive for shortness of breath  Cardiovascular: Positive for leg swelling  Negative for chest pain and palpitations  Gastrointestinal: Positive for constipation  Negative for nausea and vomiting  Genitourinary: Positive for frequency  Negative for difficulty urinating  Musculoskeletal: Positive for back pain, myalgias and neck pain  Skin: Positive for rash  Neurological: Positive for headaches  Psychiatric/Behavioral: Positive for sleep disturbance           Objective:        /73 (BP Location: Left arm, Patient Position: Sitting, Cuff Size: Large)   Pulse 84   Ht 5' 5" (1 651 m)   Wt 97 5 kg (215 lb)   BMI 35 78 kg/m²     Physical Exam  Vitals reviewed  Constitutional:       General: She is not in acute distress  Appearance: She is not toxic-appearing or diaphoretic  HENT:      Head: Normocephalic and atraumatic  Nose: Nose normal       Mouth/Throat:      Mouth: Mucous membranes are moist    Eyes:      General: No scleral icterus  Cardiovascular:      Rate and Rhythm: Normal rate  Heart sounds: Murmur heard  Pulmonary:      Effort: No respiratory distress  Breath sounds: Normal breath sounds  Abdominal:      General: There is no distension  Palpations: Abdomen is soft  Tenderness: There is no abdominal tenderness  There is no guarding  Musculoskeletal:         General: Swelling and deformity present  Right lower leg: Edema present  Left lower leg: Edema present  Skin:     General: Skin is warm and dry  Findings: Rash present  Neurological:      Mental Status: She is alert  Mental status is at baseline     Psychiatric:         Mood and Affect: Mood normal

## 2022-12-12 NOTE — PROGRESS NOTES
Review of Systems      Genitourinary none   Cardiology ankle/leg swelling   Gastrointestinal frequent heartburn/acid reflux   Neurology frequent headaches, muscle weakness, numbness/tingling of an extremity, difficulty with memory and balance problems   Constitutional claustrophobia, fatigue and weight change   Integumentary rash or dry skin   Psychiatry none   Musculoskeletal joint pain, muscle aches, back pain, legs twitching/jerking and leg cramps   Pulmonary shortness of breath with activity and difficulty breathing when lying flat    ENT none   Endocrine frequent urination   Hematological none

## 2022-12-12 NOTE — ASSESSMENT & PLAN NOTE
Not optimally treated but also challenging to treat  Unfortunately, Martha Francois was not covered by insurance  Due to impaired renal function, there are limits in recommended gabapentin dose  Now patient is taking 300 mg of gabapentin at 7pm and that is helping  She has a history of iron deficiency and is on an iron supplement, this is managed by another office-the goal ferritin should be above 75 ng/mL    Venous insufficiency can be contributing, I advised to use comprssion stockings and consider lymphedema therapy

## 2022-12-12 NOTE — ASSESSMENT & PLAN NOTE
We reviewed this again as in the past she has been reluctant to have obstructive sleep apnea treated  I advised that she has many comorbidities that are potentially affected by obstructive sleep apnea including congestive heart failure, pulmonary hypertension, atrial fibrillation,   Moreover, she identifies daytime sleepiness as well as insomnia symptoms which potentially may be affected by untreated obstructive sleep apnea  I reviewed her last home sleep test which is performed through her cardiologist's office which showed mild obstructive sleep apnea  Given her history of congestive heart failure, if obstructive sleep apnea is again demonstrated, she will need a CPAP titration study as opposed to auto-CPAP  She canceled her appointment, states a home study will be a better option for her  She has some claustrophobia-I advised that she should try to put her CPAP mask on while awake and practice breathing with it in the future to try to desensitize to discomfort or anxiety from CPAP

## 2023-01-23 ASSESSMENT — ENCOUNTER SYMPTOMS
ENDOCRINE NEGATIVE: 1
WEAKNESS: 0
EYES NEGATIVE: 1
HEMATOLOGIC/LYMPHATIC NEGATIVE: 1
WEIGHT GAIN: 0
SHORTNESS OF BREATH: 0
DYSPNEA ON EXERTION: 0
PSYCHIATRIC NEGATIVE: 1
GASTROINTESTINAL NEGATIVE: 1

## 2023-01-23 NOTE — PROGRESS NOTES
Electrophysiology  Outpatient Progress Note       Reason for visit:   Chief Complaint   Patient presents with   • Follow-up       HPI   Ailyn St is a 87 y.o. female who is seen today for follow up of her  implanted biventricular pacemaker indicated for complete heart block during AV node ablation in .  She has chronic atrial fibrillation.  She had a new LV lead along with a new RV lead placed in 2018.  Her previous RV lead had chronically elevated thresholds and had been turned off due to this.  That RV lead is abandoned and capped.  The atrial port is capped.     In 2019 she underwent a cardiac catheterization at an outside institution which showed no obstructive disease. Overall, she is doing well and previous dyspnea has resolved. Most recent echocardiogram showed an EF of 55%.    Past Medical History:   Diagnosis Date   • Abnormal ECG    • Anticoagulated on Coumadin 2018   • Arthritis    • Fatigue 2018   • GERD (gastroesophageal reflux disease)    • Hypertension    • Osteoporosis    • S/P AVR 2018   • SOB (shortness of breath) 2018   • Type 2 diabetes mellitus (CMS/HCC)      Past Surgical History:   Procedure Laterality Date   • ADENOIDECTOMY     • AORTIC VALVE REPLACEMENT     • CARDIAC CATHETERIZATION     • CHOLECYSTECTOMY     • TONSILLECTOMY     • TOTAL ABDOMINAL HYSTERECTOMY W/ BILATERAL SALPINGOOPHORECTOMY         Social History     Tobacco Use   • Smoking status: Former     Types: Cigarettes     Quit date:      Years since quittin.1   • Smokeless tobacco: Never   • Tobacco comments:     stoppe in    Vaping Use   • Vaping Use: Never used   Substance Use Topics   • Alcohol use: Yes     Comment: rarely   • Drug use: Defer     Family History   Problem Relation Age of Onset   • Diabetes Biological Mother    • Heart disease Biological Father        ALLERGY:  Penicillins, Colace [docusate sodium], Hydrochlorothiazide, Losartan potassium, Metformin, Requip  [ropinirole], Sulfa (sulfonamide antibiotics), Valsartan, and Latex    Current Outpatient Medications   Medication Sig Dispense Refill   • acetaminophen/diphenhydramine (TYLENOL PM EXTRA STRENGTH ORAL) Take by mouth as needed.     • allopurinoL (ZYLOPRIM) 100 mg tablet Take 100 mg by mouth 2 (two) times a day.     • B-complex with vitamin C tablet Take 1 tablet by mouth daily.     • calcium carbonate-vitamin D3 250-125 mg-unit tablet Take by mouth daily.     • cholecalciferol, vitamin D3, 1,000 unit tablet Take 1,000 Units by mouth daily.     • ferrous sulfate 325 mg (65 mg iron) EC tablet Take 325 mg by mouth daily with breakfast.     • gabapentin (NEURONTIN) 100 mg capsule Take 200 mg by mouth nightly.     • HYDROcodone-acetaminophen (XODOL) 7.5-300 mg per tablet Take 1 tablet by mouth every 6 (six) hours as needed for moderate pain (taken at night).       • insulin aspart U-100 (NovoLOG) 100 unit/mL subcutaneous pen Inject under the skin 3 (three) times a day before meals. Sliding scale as follows:  160-190 give 3 units, 191-210 give 5 units, 211-240 give 7 units, 241-270 give 9 units, greater than 271 give 10 units and recheck sugars in 2-3 hours     • insulin detemir U-100 (LEVEMIR) 100 unit/mL (3 mL) subcutaneous pen Inject 22 Units under the skin daily. In morning     • latanoprostene bunod (VYZULTA) 0.024 % drops Administer 1 drop into affected eye(s) nightly. In both eyes     • MAGNESIUM ORAL Take 750 mg by mouth daily.     • metoprolol succinate XL (TOPROL-XL) 50 mg 24 hr tablet Take 50 mg by mouth daily.     • NON FORMULARY MEDICATION REQUEST Take 1 tablet by mouth 4 (four) times a day. Hydro Eye supplement     • RABEprazole (ACIPHEX) 20 mg EC tablet Take 20 mg by mouth daily.     • spironolactone (ALDACTONE) 50 mg tablet Take 50 mg by mouth once daily.     • vit A/vit C/vit E/zinc/copper (PRESERVISION AREDS ORAL) Take by mouth 2 (two) times a day.     • warfarin (COUMADIN) 2.5 mg tablet Take 3.75 mg by  mouth once daily. Takes 3.75 mg five times weekly     • warfarin (COUMADIN) 5 mg tablet Take 5 mg by mouth 2 (two) times a week (Mon, Thu).     • bimatoprost (LUMIGAN) 0.01 % ophthalmic drops 1 drop nightly.     • FARXIGA 10 mg tablet tablet Take 10 mg by mouth daily.     • gabapentin (NEURONTIN) 300 mg capsule Take 300 mg by mouth nightly.     • indapamide (LOZOL) 2.5 mg tablet Take 2.5 mg by mouth daily.     • methocarbamol (ROBAXIN) 750 mg tablet Take 750 mg by mouth 2 (two) times a day.     • pantoprazole (PROTONIX) 40 mg EC tablet Take 40 mg by mouth. Normally takes aciphex 30 mg daily.     • tolterodine (DETROL) 2 mg tablet Take 2 mg by mouth nightly.     • vit C/E/Zn/coppr/lutein/zeaxan (PRESERVISION AREDS-2 ORAL) Take by mouth.     • vitamin E acetate (VITAMIN E ORAL) Take by mouth daily.       No current facility-administered medications for this visit.       Review of Systems   Constitutional: Negative for malaise/fatigue and weight gain.   HENT: Negative.    Eyes: Negative.    Cardiovascular: Negative for dyspnea on exertion.   Respiratory: Negative for shortness of breath.    Endocrine: Negative.    Hematologic/Lymphatic: Negative.    Skin: Negative.    Musculoskeletal: Negative for muscle weakness.   Gastrointestinal: Negative.    Genitourinary: Negative.    Neurological: Negative for weakness.   Psychiatric/Behavioral: Negative.        Objective   Vitals:    01/26/23 1531   BP: 134/76   Pulse: 89   Resp: 18   SpO2: 98%         BP Readings from Last 3 Encounters:   01/26/23 134/76   07/14/22 120/70   06/24/21 138/84     Physical Exam  HENT:      Head: Normocephalic.   Eyes:      Conjunctiva/sclera: Conjunctivae normal.      Pupils: Pupils are equal, round, and reactive to light.   Cardiovascular:      Rate and Rhythm: Normal rate and regular rhythm.      Heart sounds: Normal heart sounds.   Pulmonary:      Effort: Pulmonary effort is normal.      Breath sounds: Normal breath sounds.   Abdominal:       General: Bowel sounds are normal. There is no distension.      Palpations: Abdomen is soft.   Musculoskeletal:         General: Normal range of motion.      Cervical back: Normal range of motion.   Skin:     General: Skin is warm and dry.      Comments: Incision well-healed.    Neurological:      Mental Status: She is alert and oriented to person, place, and time.   Psychiatric:         Mood and Affect: Mood is not anxious. Affect is not blunt.         Lab Results   Component Value Date    WBC 6.87 06/01/2018    HGB 10.8 (L) 06/01/2018     06/01/2018     06/01/2018    K 3.7 06/01/2018    CREATININE 1.1 06/01/2018    INR 1.4 06/01/2018     Cardiovascular Procedures:    ECHO/MUGA:  Echo (Scanned) - 9/7/2012  Echo 6/13/2019 - EF 55%; Normal AVR      ELECTROPHYSIOLOGY:  Devices (Bi-Ventricular PPM (Biotronik-Evia), Evia Dr-T serial# [id number] PID [id number]. DDDR 80-130ppm. Normal function. Changes made=sensor rate and gain. Base rate decreased to 75ppm.) - 10/30/2013   RFA (Indication A Fib, AVN ablation with pacemaker implantation) - 10/22/2013     STRESS TESTS:  Unruly MPI (Scanned) - 10/24/2011   ETT (Lexiscan. Had SOB and lightheadedness during recoevry, otherwise normal.) - 1/22/2016    CT SURGERY-   AVR 1998 - mechanical    Catheterization 1/23/19 ([facility])  HEMODYNAMICS: There was mild pulmonary hypertension. RA 10mmHg, RV 45/10, PA 45/25 mean 32, PCWP 22 with v wave, Pa sat 68.8%, CO 4.5 L/min, CI 2.1 L/min/m2  VALVES:  AORTIC VALVE:   --  A bileaflet mechanical prosthesis was observed in the aortic position. Both leaflet open well.  CORONARY VESSELS:   --  The coronary circulation is right dominant.  --  Left main: Normal.  --  LAD: Angiography showed minor luminal irregularities.  --  Circumflex: Normal.  --  RCA: Angiography showed minor luminal irregularities.    ECG-Pacemaker ECG    Assessment   Problem List Items Addressed This Visit        Circulatory    CHB (complete heart block) (CMS/HCC)     Complication of Procedure: Insert single lead to existing PPM/ICD (05/31/2018)    Complication of Admission: Admission (02/19/2018)    Relevant Orders    ECG 12 LEAD-OFFICE PERFORMED (Completed)    Chronic atrial fibrillation (CMS/HCC)     She underwent an AV node ablation in 2013.  She continues with complete heart block and a pacemaker in place.  She is asymptomatic with the chronic atrial fibrillation. Compliant with warfarin.          Relevant Orders    ECG 12 LEAD-OFFICE PERFORMED (Completed)    Hypertension     She reports adequate blood pressure control at home.         Relevant Orders    ECG 12 LEAD-OFFICE PERFORMED (Completed)    S/P AVR - Primary     S/p AVR 1998.  Cardiac catheterization January 23, 2019 demonstrated a bileaflet mechanical prosthesis was observed in the aortic position. Both leaflets open well.     She denies symptoms of CHF, chest pain or increasing SOB.          Relevant Orders    ECG 12 LEAD-OFFICE PERFORMED (Completed)       Hematologic    Anticoagulated on Coumadin    Relevant Orders    ECG 12 LEAD-OFFICE PERFORMED (Completed)       Other    Biventricular pacemaker check     She underwent implantation of a Biotronik biventricular pacemaker in May 2018.  Her previous RV lead was malfunctioning therefore a new RV lead was implanted along with the new LV lead.  Her previous RV lead is abandoned and capped.       Biotronik Etrinsa implanted May 31, 2018.  RV threshold 0.6 V at 0.4 ms lead impedance is 643 ohms. LV threshold 0.6 V at 0.4 ms and lead impedance 1248 ohms. Chronic atrial fibrillation. parameters VVIR 85 bpm. BiV pacing is 100%.  Underlying rhythm is atrial fibrillation with complete heart block.  Battery longevity is ~6 years.          Relevant Orders    ECG 12 LEAD-OFFICE PERFORMED (Completed)            Simon Mcknight MD  01/26/2023

## 2023-01-26 ENCOUNTER — OFFICE VISIT (OUTPATIENT)
Dept: CARDIOLOGY | Facility: CLINIC | Age: 88
End: 2023-01-26
Payer: MEDICARE

## 2023-01-26 VITALS
OXYGEN SATURATION: 98 % | RESPIRATION RATE: 18 BRPM | HEART RATE: 89 BPM | DIASTOLIC BLOOD PRESSURE: 76 MMHG | HEIGHT: 65 IN | SYSTOLIC BLOOD PRESSURE: 134 MMHG | BODY MASS INDEX: 35.16 KG/M2 | WEIGHT: 211 LBS

## 2023-01-26 DIAGNOSIS — I44.2 CHB (COMPLETE HEART BLOCK) (CMS/HCC): ICD-10-CM

## 2023-01-26 DIAGNOSIS — I10 HYPERTENSION, UNSPECIFIED TYPE: ICD-10-CM

## 2023-01-26 DIAGNOSIS — Z95.2 S/P AVR: Primary | ICD-10-CM

## 2023-01-26 DIAGNOSIS — Z79.01 ANTICOAGULATED ON COUMADIN: ICD-10-CM

## 2023-01-26 DIAGNOSIS — I48.20 CHRONIC ATRIAL FIBRILLATION (CMS/HCC): ICD-10-CM

## 2023-01-26 DIAGNOSIS — Z45.018 BIVENTRICULAR PACEMAKER CHECK: ICD-10-CM

## 2023-01-26 PROCEDURE — 93000 ELECTROCARDIOGRAM COMPLETE: CPT | Performed by: INTERNAL MEDICINE

## 2023-01-26 PROCEDURE — 99214 OFFICE O/P EST MOD 30 MIN: CPT | Performed by: INTERNAL MEDICINE

## 2023-01-26 RX ORDER — DAPAGLIFLOZIN 10 MG/1
10 TABLET, FILM COATED ORAL DAILY
COMMUNITY
Start: 2022-12-28

## 2023-02-02 NOTE — ASSESSMENT & PLAN NOTE
She underwent implantation of a Biotronik biventricular pacemaker in May 2018.  Her previous RV lead was malfunctioning therefore a new RV lead was implanted along with the new LV lead.  Her previous RV lead is abandoned and capped.       Biotronik Etrinsa implanted May 31, 2018.  RV threshold 0.6 V at 0.4 ms lead impedance is 643 ohms. LV threshold 0.6 V at 0.4 ms and lead impedance 1248 ohms. Chronic atrial fibrillation. parameters VVIR 85 bpm. BiV pacing is 100%.  Underlying rhythm is atrial fibrillation with complete heart block.  Battery longevity is ~6 years.

## 2023-02-02 NOTE — ASSESSMENT & PLAN NOTE
S/p AVR 1998.  Cardiac catheterization January 23, 2019 demonstrated a bileaflet mechanical prosthesis was observed in the aortic position. Both leaflets open well.     She denies symptoms of CHF, chest pain or increasing SOB.

## 2023-02-09 ENCOUNTER — HOSPITAL ENCOUNTER (OUTPATIENT)
Dept: SLEEP CENTER | Facility: CLINIC | Age: 88
Discharge: HOME | End: 2023-02-09

## 2023-02-09 DIAGNOSIS — G47.33 OBSTRUCTIVE SLEEP APNEA: ICD-10-CM

## 2023-02-11 NOTE — PROGRESS NOTES
Home Sleep Study Documentation    HOME STUDY DEVICE: Noxturnal no                                           Janiya G3 yes      Pre-Sleep Home Study:    Set-up and instructions performed by: CB    Technician performed demonstration for Patient: yes    Return demonstration performed by Patient: yes    Written instructions provided to Patient: yes    Patient signed consent form: yes        Post-Sleep Home Study:    Additional comments by Patient: See Patient Diary in [address] [place] [address] Sleep Study Failed:no:    Failure reason: N/A    Reported or Detected: N/A    Scored by: Jay No

## 2023-02-19 DIAGNOSIS — G47.33 OBSTRUCTIVE SLEEP APNEA: Primary | ICD-10-CM

## 2023-02-20 ENCOUNTER — TELEPHONE (OUTPATIENT)
Dept: SLEEP CENTER | Facility: CLINIC | Age: 88
End: 2023-02-20

## 2023-02-20 NOTE — TELEPHONE ENCOUNTER
----- Message from Harriet Carrington MD sent at 2/19/2023  8:31 PM EST -----  Test shows significant radha - I would recommend CPAP given her medical history  , I have ordered CPAP for home use    She did not seem very enthralled with this but seem to reluctantly agree to trying

## 2023-02-20 NOTE — TELEPHONE ENCOUNTER
Spoke to the patient advised sleep study shows moderate SUZETTE and Dr Kathryn Lopes ordered APAP    Patient will keep follow up with Dr Kathryn Lopes to discuss APAP

## 2023-03-09 ENCOUNTER — OFFICE VISIT (OUTPATIENT)
Dept: SLEEP CENTER | Facility: CLINIC | Age: 88
End: 2023-03-09

## 2023-03-09 VITALS
HEIGHT: 65 IN | SYSTOLIC BLOOD PRESSURE: 132 MMHG | WEIGHT: 216 LBS | DIASTOLIC BLOOD PRESSURE: 78 MMHG | HEART RATE: 74 BPM | BODY MASS INDEX: 35.99 KG/M2

## 2023-03-09 DIAGNOSIS — G25.81 RLS (RESTLESS LEGS SYNDROME): ICD-10-CM

## 2023-03-09 DIAGNOSIS — G47.33 OSA (OBSTRUCTIVE SLEEP APNEA): Primary | ICD-10-CM

## 2023-03-09 RX ORDER — SODIUM FLUORIDE 1.1 G/100G
GEL, DENTIFRICE ORAL
COMMUNITY
Start: 2023-02-17

## 2023-03-09 NOTE — PROGRESS NOTES
Assessment/Plan:      1  SUZETTE (obstructive sleep apnea)    2  RLS (restless legs syndrome)  Reviewed her sleep study in detail, I showed her the raw data from the test   She is open to a trial of CPAP, after recent discussion with her cardiologist   A auto CPAP has already been ordered for home use  She prefers to start with this as opposed to a CPAP titration study as she has significant kyphosis and comfort issues which she feels would limit success during an in lab CPAP titration  We discussed the goal is to use CPAP on a nightly basis, I asked her to contact me if she has difficulty acclimating to it  If she has a high residual AHI on her machine, she may need an in lab titration study, though accommodations would be needed as she needs to sleep with her head very propped up as well as legs elevated as well  With regard to restless leg syndrome, that is under good control  I agree with her dose reduction in gabapentin    I would like her to follow-up approxi-1 month after starting CPAP at home  Subjective:      Patient ID: Camille Ortiz is a 81 y o  female  Ms Leandra Manzanares returns in a follow-up visit  She was last seen in December, she has a history of mild obstructive sleep apnea untreated as well as restless leg syndrome treated with gabapentin  She had a repeat home sleep test since her last visit, this test showed moderate obstructive sleep apnea with a respiratory event index of 23  Oxygenation was essentially normal during the test with event related desaturations  She notes her RLS is under good control  Takes gabapentin 200 mg which works well, takes within 1 hour of sleep  She wakes in the middle of the night, but does not have RLS then  Bedtime varies from 1130 pm- 130 am to as late as 3 AM  Wakes 5-7 AM     She is kyphotic, sleeps with her head propped and legs elevated       [place] Sleepiness Scale:     Sitting and reading: Slight chance of dozing  Watching TV: Moderate chance of dozing  Sitting, inactive in a public place (e g  a theatre or a meeting): Would never doze  As a passenger in a car for an hour without a break: Would never doze  Lying down to rest in the afternoon when circumstances permit: High chance of dozing  Sitting and talking to someone: Would never doze  Sitting quietly after a lunch without alcohol: Would never doze  In a car, while stopped for a few minutes in traffic: Would never doze  Total score: 6     The following portions of the patient's history were reviewed and updated as appropriate: allergies, current medications, past family history, past medical history, past social history, past surgical history, and problem list     Review of Systems    + dry mouth  No seasonal allergies   No nasal  Congestion     No dyspnea at rest, has some with stairs  No palpitations, + fatigue   + blurry vision,less on lower dose of gabapentin   Objective:        /78 (BP Location: Left arm, Patient Position: Sitting, Cuff Size: Large)   Pulse 74   Ht 5' 5" (1 651 m)   Wt 98 kg (216 lb)   BMI 35 94 kg/m²     Physical Exam   Kyphosis  Lungs cta  Rrr, + murmur  3+ edema    I have spent a total time of 27 minutes on 03/09/23 in caring for this patient including Diagnostic results, Prognosis, Risks and benefits of tx options, Instructions for management, Importance of tx compliance, Impressions, Counseling / Coordination of care, Documenting in the medical record and Obtaining or reviewing history

## 2023-03-10 ENCOUNTER — TELEPHONE (OUTPATIENT)
Dept: SLEEP CENTER | Facility: CLINIC | Age: 88
End: 2023-03-10

## 2023-03-10 LAB

## 2023-03-14 LAB

## 2023-03-22 ENCOUNTER — OFFICE VISIT (OUTPATIENT)
Dept: NEPHROLOGY | Facility: CLINIC | Age: 88
End: 2023-03-22

## 2023-03-22 VITALS
DIASTOLIC BLOOD PRESSURE: 70 MMHG | SYSTOLIC BLOOD PRESSURE: 126 MMHG | BODY MASS INDEX: 35.82 KG/M2 | HEIGHT: 65 IN | WEIGHT: 215 LBS

## 2023-03-22 DIAGNOSIS — R80.9 TYPE 2 DIABETES MELLITUS WITH MICROALBUMINURIA, WITHOUT LONG-TERM CURRENT USE OF INSULIN (HCC): ICD-10-CM

## 2023-03-22 DIAGNOSIS — N18.32 CKD STAGE G3B/A2, GFR 30-44 AND ALBUMIN CREATININE RATIO 30-299 MG/G (HCC): Primary | ICD-10-CM

## 2023-03-22 DIAGNOSIS — I48.20 CHRONIC ATRIAL FIBRILLATION (HCC): Chronic | ICD-10-CM

## 2023-03-22 DIAGNOSIS — E66.01 CLASS 2 SEVERE OBESITY DUE TO EXCESS CALORIES WITH SERIOUS COMORBIDITY AND BODY MASS INDEX (BMI) OF 39.0 TO 39.9 IN ADULT (HCC): ICD-10-CM

## 2023-03-22 DIAGNOSIS — Z95.2 MECHANICAL HEART VALVE PRESENT: Chronic | ICD-10-CM

## 2023-03-22 DIAGNOSIS — R10.10 UPPER ABDOMINAL PAIN, UNSPECIFIED: ICD-10-CM

## 2023-03-22 DIAGNOSIS — E87.5 HYPERKALEMIA: ICD-10-CM

## 2023-03-22 DIAGNOSIS — I10 HYPERTENSION, UNSPECIFIED TYPE: Chronic | ICD-10-CM

## 2023-03-22 DIAGNOSIS — M1A.9XX0 CHRONIC GOUT WITHOUT TOPHUS, UNSPECIFIED CAUSE, UNSPECIFIED SITE: Chronic | ICD-10-CM

## 2023-03-22 DIAGNOSIS — H04.123 DRY EYE SYNDROME OF BOTH EYES: ICD-10-CM

## 2023-03-22 DIAGNOSIS — E87.1 HYPONATREMIA: ICD-10-CM

## 2023-03-22 DIAGNOSIS — N25.81 SECONDARY HYPERPARATHYROIDISM, RENAL (HCC): ICD-10-CM

## 2023-03-22 DIAGNOSIS — I50.32 CHRONIC DIASTOLIC CONGESTIVE HEART FAILURE (HCC): ICD-10-CM

## 2023-03-22 DIAGNOSIS — R80.9 PROTEINURIA, UNSPECIFIED TYPE: ICD-10-CM

## 2023-03-22 DIAGNOSIS — E11.29 TYPE 2 DIABETES MELLITUS WITH MICROALBUMINURIA, WITHOUT LONG-TERM CURRENT USE OF INSULIN (HCC): ICD-10-CM

## 2023-03-22 LAB
DME PARACHUTE DELIVERY DATE EXPECTED: NORMAL
DME PARACHUTE DELIVERY DATE REQUESTED: NORMAL
DME PARACHUTE DELIVERY NOTE: NORMAL
DME PARACHUTE ITEM DESCRIPTION: NORMAL
DME PARACHUTE ORDER STATUS: NORMAL
DME PARACHUTE SUPPLIER NAME: NORMAL
DME PARACHUTE SUPPLIER PHONE: NORMAL

## 2023-03-22 NOTE — PATIENT INSTRUCTIONS
Low-Sodium Diet   WHAT YOU NEED TO KNOW:   What is a low-sodium diet? A low-sodium diet limits foods that are high in sodium (salt)  You will need to follow a low-sodium diet if you have high blood pressure, kidney disease, or heart failure  You may also need to follow this diet if you have a condition that is causing your body to retain (hold) extra fluid  You may need to limit the amount of sodium you eat in a day to 1,500 to 2,000 mg  Ask your healthcare provider how much sodium you can have each day  How can I use food labels to choose foods that are low in sodium? Read food labels to find the amount of sodium they contain  The amount of sodium is listed in milligrams (mg)  The % Daily Value (DV) column tells you how much of your daily needs are met by 1 serving of the food for each nutrient listed  Choose foods that have less than 5% of the DV of sodium  These foods are considered low in sodium  Foods that have 20% or more of the DV of sodium are considered high in sodium  Some food labels may also list any of the following terms that tell you about the sodium content in the food:  Sodium-free:  Less than 5 mg in each serving    Very low sodium:  35 mg of sodium or less in each serving    Low sodium:  140 mg of sodium or less in each serving    Reduced sodium: At least 25% less sodium in each serving than the regular type    Light in sodium:  50% less sodium in each serving    Unsalted or no added salt:  No extra salt is added during processing (the food may still contain sodium)       Which foods should I avoid? Salty foods are high in sodium   You should avoid the following:  Processed foods:      Mixes for cornbread, biscuits, cake, and pudding     Instant foods, such as potatoes, cereals, noodles, and rice     Packaged foods, such as bread stuffing, rice and pasta mixes, snack dip mixes, and macaroni and cheese     Canned foods, such as canned vegetables, soups, broths, sauces, and vegetable or tomato juice    Snack foods, such as salted chips, popcorn, pretzels, pork rinds, salted crackers, and salted nuts    Frozen foods, such as dinners, entrees, vegetables with sauces, and breaded meats    Sauerkraut, pickled vegetables, and other foods prepared in brine    Meats and cheeses:      Smoked or cured meat, such as corned beef, pool, ham, hot dogs, and sausage    Canned meats or spreads, such as potted meats, sardines, anchovies, and imitation seafood    Deli or lunch meats, such as bologna, ham, turkey, and roast beef    Processed cheese, such as American cheese and cheese spreads    Condiments, sauces, and seasonings:      Salt (¼ teaspoon of salt contains 575 mg of sodium)    Seasonings made with salt, such as garlic salt, celery salt, onion salt, and seasoned salt    Regular soy sauce, barbecue sauce, teriyaki sauce, steak sauce, Worcestershire sauce, and most flavored vinegars    Canned gravy and mixes     Regular condiments, such as mustard, ketchup, and salad dressings    Pickles and olives    Meat tenderizers and monosodium glutamate (MSG)    Which foods can I include? Read the food label to find the amount of sodium in each serving  Bread and cereal:  Try to choose breads with less than 80 mg of sodium per serving  Bread, roll, lito, tortilla, or unsalted crackers  Ready-to-eat cereals with less than 5% DV of sodium (examples include shredded wheat and puffed rice)    Pasta    Vegetables and fruits:      Unsalted fresh, frozen, or canned vegetables    Fresh, frozen, or canned fruits    Fruit juice    Dairy:  One serving has about 150 mg of sodium  Milk, all types    Yogurt    Hard cheese, such as cheddar, Swiss, [employer], or [employer] and other protein foods:  Some raw meats may have added sodium       Plain meats, fish, and poultry     Egg    Other foods:      Homemade pudding    Unsalted nuts, popcorn, or pretzels    Unsalted butter or margarine    What are some ways I can get less sodium? If you are used to the flavor of salt, it will take time to get used to low-sodium foods  Your healthcare provider or dietitian can help you create a plan for lowering sodium  The plan will be specific to your needs and your family's needs  You may focus on 1 or 2 changes each week, such as the following: Add spices and herbs to foods instead of salt during cooking  Use salt-free seasonings to add flavor to foods  Examples include onion powder, garlic powder, basil, kraft powder, paprika, and parsley  Try lemon or lime juice or vinegar to give foods a tart flavor  Use hot peppers, pepper, or cayenne pepper to add a spicy flavor  Do not keep a salt shaker at your kitchen table  This may help keep you from adding salt to food at the table  A teaspoon of salt has 2,300 mg of sodium  It may take time to get used to enjoying the natural flavor of food instead of adding salt  Talk to your healthcare provider before you use salt substitutes  Some salt substitutes have a high amount of a chemical called potassium chloride  This form of potassium needs to be avoided if you have kidney disease  Choose low-sodium foods at restaurants  Meals from restaurants are often high in sodium  Some restaurants have nutrition information on the menu that tells you the amount of sodium in their foods  If possible, ask for your food to be prepared with less, or no salt  Shop for unsalted or low-sodium foods and snacks at the grocery store  Examples include unsalted or low-sodium broths, soups, and canned vegetables  Choose fresh or frozen vegetables instead  Choose unsalted nuts or seeds or fresh fruits or vegetables as snacks  Read food labels and choose salt-free, very low-sodium, or low-sodium foods  You can also rinse canned vegetables under running water to remove extra sodium before you cook them  CARE AGREEMENT:   You have the right to help plan your care   Discuss treatment options with your healthcare provider to decide what care you want to receive  You always have the right to refuse treatment  The above information is an  only  It is not intended as medical advice for individual conditions or treatments  Talk to your doctor, nurse or pharmacist before following any medical regimen to see if it is safe and effective for you  © Copyright [employer] 2022 Information is for End User's use only and may not be sold, redistributed or otherwise used for commercial purposes

## 2023-03-22 NOTE — PROGRESS NOTES
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/A2, GFR 30-44 and albumin creatinine ratio  mg/g ([facility])  -Her creatinine is remained around 1 5-1 6  -GFR is around 30 to 40 mL/min  -Urine protein to creatinine ratio 0 19  -Urinalysis positive for glucose, trace leukocytes  -CT scan of the abdomen pelvis with contrast from November 2021 shows kidneys with renal cortical cysts no solid renal masses urinary tract calculi or hydronephrosis  -On SGL 2 inhibitor  -Pressures well controlled  -Blood sugar stable  -Medications appropriately dosed  -We will continue to monitor    Secondary hyperparathyroidism, renal ([facility])  -PTH is acceptable in normal range   -On vitamin D3 1000 units daily as well as a multivitamin    Type 2 diabetes mellitus with microalbuminuria, without long-term current use of insulin ([facility])  -Farxiga 10 mg daily  -Insulin    Chronic atrial fibrillation ([facility])  -Anticoagulated with Coumadin  -Rate control with metoprolol XL 50 mg daily    Chronic diastolic congestive heart failure ([facility])  -Volume status is currently stable  -On Farxiga, spironolactone  -No other diuretics at this time    Hypertension, unspecified type  -Blood pressures are stable  -Blood pressure lowering medications include  -Dapagliflozin 10 mg daily  -Spironolactone 50 mg daily  -Metoprolol XL 50 mg daily    Chronic gout without tophus, unspecified cause, unspecified site  -Allopurinol 100 mg 2 times daily  -This is relatively well controlled    Hyponatremia  -Sodium is improved and at baseline    Proteinuria, unspecified type  -Continue to monitor urine protein to creatinine ratio  -Pressure control, glycemic control  -Serologies    Class 2 severe obesity due to excess calories with serious comorbidity and body mass index (BMI) of 39 0 to 39 9 in [facility])  -With some recent weight loss    Upper abdominal pain, unspecified  -Ambulatory Referral to Gastroenterology; Future  -Requested a GI referral which was placed in the chart [facility]'s GI group    Hyperkalemia  -Continue current medications her electrolytes remained stable    Mechanical heart valve present  -Requires anticoagulation with Coumadin    Dry eye syndrome of both eyes  -Also with worsening arthritis  -Proteinuria  -We will send off serologies  -He has a rheumatology appointment in May      DISCUSSION:    It was nice seeing Donna Leyden today  She has not been feeling great  She has had worsening arthritis, dry mouth, dry eyes she is taking her medications regularly her renal function remained stable  She is going to be evaluated further for the symptoms by rheumatology  We will send off a serologic work-up at this point as she does have some proteinuria as well  Monitor and continue glycemic control  She is tolerating [place] well  We will review blood work, and if everything is stable follow-up every 6 months    HPI: Pool Hernandez is a 81 y o  female who is here for Follow-up and Chronic Kidney Disease    Since her last office visit she is doing okay, she is tolerating her medications but she is having now some ecchymotic lesions in her lower extremities, and worsening arthritis, dry eyes and dry mouth  She states she is not ambulating as well as she used to either  ROS:   Review of Systems   Constitutional: Negative  HENT: Negative  Eyes: Negative  Respiratory: Negative  Cardiovascular: Negative  Gastrointestinal: Negative  Endocrine: Negative  Genitourinary: Negative  Musculoskeletal: Negative  Skin: Negative  Allergic/Immunologic: Negative  Neurological: Negative  Psychiatric/Behavioral: Negative  All other systems reviewed and are negative        Allergies: Acetazolamide, Colchicine, Diltiazem, Docusate, Eszopiclone, Febuxostat, Glycerin, Hydrochlorothiazide, Latex, Lorazepam, Losartan, Metformin, Nortriptyline, Other, Penicillin g, Penicillins, Povidone iodine, Ropinirole, Simvastatin, Sulfa antibiotics, Torsemide, Valsartan, and Fluticasone    Medications:   Current Outpatient Medications:   •  allopurinol (ZYLOPRIM) 100 mg tablet, Take 100 mg by mouth 2 (two) times a day, Disp: , Rfl:   •  aspirin (ECOTRIN LOW STRENGTH) 81 mg EC tablet, Take 81 mg by mouth daily, Disp: , Rfl:   •  BD Pen Needle Rena 2nd Gen 32G X 4 MM MISC, , Disp: , Rfl:   •  Calcium Carb-Cholecalciferol 250-125 MG-UNIT TABS, Take by mouth daily, Disp: , Rfl:   •  calcium citrate-vitamin D (CITRACAL+D) 315-200 MG-UNIT per tablet, Take 1 tablet by mouth 2 (two) times a day, Disp: , Rfl:   •  cholecalciferol (VITAMIN D3) 1,000 units tablet, Take 1,000 Units by mouth daily, Disp: , Rfl:   •  dapagliflozin (Farxiga) 10 MG tablet, Take by mouth daily, Disp: , Rfl:   •  Denta 5000 Plus 1 1 % CREA, BRUSH TWICE A DAY AS DIRECTED, Disp: , Rfl:   •  ferrous sulfate 325 (65 FE) MG EC tablet, Take 325 mg by mouth daily, Disp: , Rfl:   •  glucose blood test strip, , Disp: , Rfl:   •  HYDROcodone-acetaminophen (NORCO) 7 5-325 mg per tablet, Take 1 tablet by mouth every 4 to 6 hours if needed for pain, Disp: , Rfl:   •  insulin aspart (NovoLOG FlexPen) 100 UNIT/ML injection pen, Inject 6 Units under the skin as needed, Disp: , Rfl:   •  insulin detemir (Levemir FlexTouch) 100 Units/mL injection pen, 13 Units, Disp: , Rfl:   •  magnesium oxide (MAG-OX) 400 mg tablet, Take 400 mg by mouth daily, Disp: , Rfl:   •  metoprolol succinate (TOPROL-XL) 50 mg 24 hr tablet, Take 50 mg by mouth daily, Disp: , Rfl:   •  Multiple Vitamin (MULTIVITAMIN) tablet, Take 1 tablet by mouth daily, Disp: , Rfl:   •  Multiple Vitamins-Minerals (PRESERVISION AREDS 2 PO), Take by mouth 2 (two) times a day, Disp: , Rfl:   •  naloxegol oxalate (Movantik) 25 MG tablet, Take 1 tablet (25 mg total) by mouth every other day, Disp: 15 tablet, Rfl: 5  •  NON FORMULARY, Take 1 tablet by mouth 4 (four) times a day HydroEye, Disp: , Rfl:   •  RABEprazole (ACIPHEX) 20 MG tablet, TAKE 1 TABLET BY MOUTH EVERY DAY, Disp: 90 tablet, Rfl: 1  •  spironolactone (ALDACTONE) 50 mg tablet, Take 50 mg by mouth daily , Disp: , Rfl:   •  VITAMIN E PO, Take by mouth daily, Disp: , Rfl:   •  Vyzulta 0 024 % SOLN, instill 1 drop into both eyes at bedtime, Disp: , Rfl:   •  warfarin (COUMADIN) 2 5 mg tablet, Take 1 5 tablets by mouth daily 3 75 mg 6 days 5 mg on 1 day, Disp: , Rfl:   •  warfarin (COUMADIN) 5 mg tablet, Take 5 mg by mouth once a week, Disp: , Rfl:   •  Accu-Chek Lila Plus test strip, [employer]   (Patient not taking: Reported on 12/12/2022), Disp: , Rfl:   •  bumetanide (BUMEX) 1 mg tablet, Take 1 tablet (1 mg total) by mouth daily (Patient not taking: Reported on 2/24/2022), Disp: 30 tablet, Rfl: 3  •  Cholecalciferol 50 MCG (2000 UT) CAPS, Take 1 capsule by mouth (Patient not taking: Reported on 11/15/2021), Disp: , Rfl:   •  ferrous sulfate 325 (65 Fe) mg tablet, Take 325 mg by mouth daily with breakfast (Patient not taking: Reported on 6/7/2022), Disp: , Rfl:   •  gabapentin (NEURONTIN) 100 mg capsule, TAKE 1 CAPSULE BY MOUTH EVERYDAY AT BEDTIME (Patient not taking: Reported on 3/22/2023), Disp: 90 capsule, Rfl: 1  •  gabapentin (NEURONTIN) 300 mg capsule, Take 1 capsule (300 mg total) by mouth every evening (Patient not taking: Reported on 3/9/2023), Disp: 90 capsule, Rfl: 1  •  guaifenesin-codeine (GUAIFENESIN AC) 100-10 MG/5ML liquid, Iophen C-NR 10 mg-100 mg/5 mL oral liquid (Patient not taking: No sig reported), Disp: , Rfl:   •  HYDROcodone-acetaminophen (NORCO) 5-325 mg per tablet, Take 1 tablet by mouth every 6 (six) hours as needed for pain for up to 10 dosesMax Daily Amount: 4 tablets (Patient not taking: Reported on 9/19/2022), Disp: 20 tablet, Rfl: 0  •  HYDROcodone-acetaminophen (XODOL) 7 5-300 MG per tablet, Take by mouth every 6 (six) hours as needed (Patient not taking: Reported on 11/22/2022), Disp: , Rfl:   •  indapamide (LOZOL) 2 5 mg tablet, Take 2 tablets (5 mg total) by mouth daily (Patient not taking: Reported on 11/22/2022), Disp: 180 tablet, Rfl: 3  •  insulin detemir (LEVEMIR) 100 units/mL subcutaneous injection, Inject under the skin 16 daily (Patient not taking: Reported on 11/22/2022), Disp: , Rfl:   •  lidocaine (XYLOCAINE) 1 %, lidocaine HCl 10 mg/mL (1 %) injection solution  administered (Patient not taking: No sig reported), Disp: , Rfl:   •  methocarbamol (ROBAXIN) 500 mg tablet, Take 1 5 tablets (750 mg total) by mouth 2 (two) times a day (Patient not taking: Reported on 5/24/2022), Disp: 10 tablet, Rfl: 0  •  Omega-3 Fatty Acids (FISH OIL PO), Take 1,000 mg by mouth 4 (four) times a day Hydrieye 4 capsule daily   (Patient not taking: Reported on 3/9/2023), Disp: , Rfl:   •  warfarin (COUMADIN) 5 mg tablet, Take 1 tablet (5 mg total) by mouth daily Adjust based on inr (Patient not taking: Reported on 11/22/2022), Disp: 20 tablet, Rfl: 0    Past Medical History:   Diagnosis Date   • Anemia    • Arthritis     also osteoporosis of spine   • Cancer [facility])     endometrial   • CHF (congestive heart failure) ([facility])    • Chronic atrial fibrillation ([facility] ) 09/13/2017   • Chronic gout 09/13/2017   • Chronic kidney disease    • Chronic rhinitis 11/08/2018   • CKD (chronic kidney disease) stage 3, GFR 30-59 ml/min ([facility]) 09/13/2017   • Clotting disorder ([facility] )     on warfarin   • Colon polyp 09/13/2017   • Coronary artery disease    • Diabetes mellitus ([facility] )    • COMBS (dyspnea on exertion) 11/02/2018   • Endometrial cancer [facility])     age 62   • Essential hypertension 09/13/2017   • Gastritis 09/13/2017   • GERD (gastroesophageal reflux disease)    • Heart murmur    • Hiatal hernia    • Hx of hiatal hernia    • Hyperlipidemia    • Hypertension    • Irregular heart beat    • 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   • Osteoarthritis    • Osteoporosis    • Pacemaker    • Peripheral vascular disease ([facility] )    • S/P AVR    • Spinal stenosis     disk herniation    • Urinary tract infection      Past Surgical History:   Procedure Laterality Date   • AORTIC VALVE REPLACEMENT     • BREAST EXCISIONAL BIOPSY Left 1995    benign   • CARDIAC SURGERY     • CATARACT EXTRACTION     • CHOLECYSTECTOMY     • COLONOSCOPY     • COLONOSCOPY  09/2012    tubular adenoma   • EGD AND COLONOSCOPY  09/2017    adenomatous polyp/ benign gastric polyp   • ESOPHAGOGASTRODUODENOSCOPY  08/2012   • FIXATION KYPHOPLASTY     • HERNIA REPAIR      x3   • HYSTERECTOMY      age 62   • [de-identified] / REPLACE / REMOVE PACEMAKER     • IR PELVIC ANGIOGRAM  1/29/2019   • JOINT REPLACEMENT Bilateral     knees   • OOPHORECTOMY Bilateral     age 62   • CT COLONOSCOPY FLX DX W/COLLJ SPEC WHEN PFRMD N/A 9/12/2017    Procedure: EGDwith bxAND COLONOSCOPY with polypectomies;  Surgeon: Derek Ramirez MD;  Location: [facility]; Service: Gastroenterology   • CT SIGMOIDOSCOPY FLX DX W/COLLJ SPEC BR/WA IF PFRMD N/A 9/13/2017    Procedure: colonoscopy with hemo clip x 2;  Surgeon: Derek Ramirez MD;  Location: [facility];   Service: Gastroenterology   • SKIN BIOPSY     • TONSILLECTOMY       Family History   Problem Relation Age of Onset   • Diabetes Mother    • Hypertension Mother    • Heart disease Father    • Emphysema Father    • Asthma Sister    • Cancer Sister         colon   • Hypertension Sister    • Colon cancer Sister    • Autoimmune disease Sister         Diabetes   • Diabetes Sister    • No Known Problems Daughter    • No Known Problems Maternal Grandmother    • No Known Problems Maternal Grandfather    • No Known Problems Paternal Grandmother    • No Known Problems Paternal Grandfather    • Breast cancer Cousin 54   • Breast cancer Cousin 54   • Breast cancer Cousin 79   • Ovarian cancer Sister    • Autoimmune disease Sister         Hashimoto's thyroiditis   • Cancer Sister         ovarian   • No Known Problems Daughter    • No Known Problems Paternal Aunt    • No Known Problems Paternal Aunt    • Breast cancer Cousin    • Breast cancer Cousin    • Cancer Paternal Uncle         prostate      reports that she has quit smoking  Her smoking use included cigarettes  She has a 60 00 pack-year smoking history  She has never used smokeless tobacco  She reports that she does not drink alcohol and does not use drugs  OBJECTIVE:    Vitals:    03/22/23 0936   BP: 126/70   BP Location: Left arm   Patient Position: Sitting   Cuff Size: Adult   Weight: 97 5 kg (215 lb)   Height: 5' 5" (1 651 m)        Body mass index is 35 78 kg/m²  [unfilled]     Weight (last 2 days)     Date/Time Weight    03/22/23 0936 97 5 (215)             Physical exam:  Physical Exam  Vitals reviewed  Constitutional:       Appearance: Normal appearance  She is normal weight  HENT:      Head: Normocephalic and atraumatic  Nose: Nose normal       Mouth/Throat:      Mouth: Mucous membranes are dry  Pharynx: Oropharynx is clear  Eyes:      Extraocular Movements: Extraocular movements intact  Pupils: Pupils are equal, round, and reactive to light  Cardiovascular:      Rate and Rhythm: Normal rate  Pulses: Normal pulses  Heart sounds: Normal heart sounds  Pulmonary:      Effort: Pulmonary effort is normal       Breath sounds: Normal breath sounds  Abdominal:      General: Abdomen is flat  Bowel sounds are normal       Palpations: Abdomen is soft  Genitourinary:     Rectum: Normal    Musculoskeletal:         General: Normal range of motion  Cervical back: Normal range of motion  Right lower leg: Edema present  Left lower leg: Edema present  Skin:     General: Skin is warm and dry  Findings: Bruising present  Neurological:      General: No focal deficit present  Mental Status: She is oriented to person, place, and time  Mental status is at baseline     Psychiatric:         Mood and Affect: Mood normal          Behavior: Behavior normal          Thought Content: Thought content normal          Judgment: Judgment normal              Lab Results:        Invalid input(s): ALBUMIN    Portions of the record may have been created with voice recognition software  Occasional wrong word or "sound a like" substitutions may have occurred due to the inherent limitations of voice recognition software  Read the chart carefully and recognize, using context, where substitutions have occurred  If you have any questions, please contact the dictating provider

## 2023-03-24 LAB — HCV AB SER-ACNC: NEGATIVE

## 2023-03-30 DIAGNOSIS — G25.81 RLS (RESTLESS LEGS SYNDROME): ICD-10-CM

## 2023-03-30 RX ORDER — GABAPENTIN 100 MG/1
200 CAPSULE ORAL
Qty: 180 CAPSULE | Refills: 1 | Status: SHIPPED | OUTPATIENT
Start: 2023-03-30

## 2023-03-30 NOTE — TELEPHONE ENCOUNTER
Patient requesting refill of gabapentin (NEURONTIN) 100 mg capsule     Patient reports taking 2(100mg) capsules per night (200mg total)  Patient is no longer taking the gabapentin 300mg capsules  Patient requesting Rx be sent to [id number]  Last office visit 3/9/2023  Next office visit 6/13/2023  Dr Soren Sebastian, I teed up the Rx  Please review Rx and sign if appropriate  Thank you

## 2023-03-31 ENCOUNTER — DOCUMENTATION (OUTPATIENT)
Dept: NEPHROLOGY | Facility: CLINIC | Age: 88
End: 2023-03-31

## 2023-04-03 ENCOUNTER — TELEPHONE (OUTPATIENT)
Dept: NEPHROLOGY | Facility: CLINIC | Age: 88
End: 2023-04-03

## 2023-04-03 NOTE — TELEPHONE ENCOUNTER
Called and spoke with patient  Patient aware Reviewed her serologic workup  CHRISTOPHER was positive but otherwise workup acceptable   COntinue follow up with rheumatology  No changes

## 2023-04-03 NOTE — TELEPHONE ENCOUNTER
----- Message from Lilly Qureshi DO sent at 4/3/2023  2:44 PM EDT -----  Reviewed her serologic workup  CHRISTOPHER was positive but otherwise workup acceptable  COntinue follow up with rheumatology  No changes from my end Thanks

## 2023-04-05 LAB

## 2023-04-19 PROBLEM — G47.33 OSA (OBSTRUCTIVE SLEEP APNEA): Status: ACTIVE | Noted: 2023-04-19

## 2023-05-23 ENCOUNTER — HOSPITAL ENCOUNTER (OUTPATIENT)
Dept: RADIOLOGY | Age: 88
Discharge: HOME | End: 2023-05-23

## 2023-05-23 VITALS — HEIGHT: 65 IN | WEIGHT: 215 LBS | BODY MASS INDEX: 35.82 KG/M2

## 2023-05-23 DIAGNOSIS — Z12.31 ENCOUNTER FOR SCREENING MAMMOGRAM FOR MALIGNANT NEOPLASM OF BREAST: ICD-10-CM

## 2023-06-13 ENCOUNTER — OFFICE VISIT (OUTPATIENT)
Dept: SLEEP CENTER | Facility: CLINIC | Age: 88
End: 2023-06-13
Payer: MEDICARE

## 2023-06-13 VITALS
BODY MASS INDEX: 35.65 KG/M2 | HEART RATE: 85 BPM | HEIGHT: 65 IN | DIASTOLIC BLOOD PRESSURE: 63 MMHG | SYSTOLIC BLOOD PRESSURE: 138 MMHG | WEIGHT: 214 LBS

## 2023-06-13 DIAGNOSIS — E61.1 IRON DEFICIENCY: ICD-10-CM

## 2023-06-13 DIAGNOSIS — G47.33 OSA (OBSTRUCTIVE SLEEP APNEA): Primary | ICD-10-CM

## 2023-06-13 DIAGNOSIS — G25.81 RLS (RESTLESS LEGS SYNDROME): ICD-10-CM

## 2023-06-13 PROCEDURE — 99214 OFFICE O/P EST MOD 30 MIN: CPT | Performed by: PSYCHIATRY & NEUROLOGY

## 2023-06-13 NOTE — PROGRESS NOTES
Assessment/Plan:    1  SUZETTE (obstructive sleep apnea)    2  RLS (restless legs syndrome)  -     Iron Panel (Includes Ferritin, Iron Sat%, Iron, and TIBC); Future  -     gabapentin (NEURONTIN) 100 mg capsule; Take 2 capsules (200 mg total) by mouth daily at bedtime Take 2 capsules by mouth everyday at bedtime (200mg total)    3  Iron deficiency  -     Iron Panel (Includes Ferritin, Iron Sat%, Iron, and TIBC); Future      We discussed that it would be important to treat obstructive sleep apnea and I recommend continued treatment  She did not seem open to this  We discussed that if she is to return her CPAP machine, it would be AGAINST MEDICAL ADVICE  Another potential treatment option would be an oral appliance (mandibular advancing device)  She did not want to pursue this at present  Regard to restless leg syndrome, this is well controlled on a low-dose of gabapentin  Interestingly, her symptoms improved when her legs are propped up  She has poor venous circulation in her legs, we discussed 1 mimicker of restless leg syndrome would be venous disease/varicosities  The fact that she is able to prop up her legs which may help lessen swelling and she does not have active RLS with doing so suggest it is conceivable she may have a RLS mimicker     She has a history of iron deficiency, I would like to recheck iron panels, this was ordered today  Subjective:      Patient ID: Dennis Petty is a 81 y o  female  HPI    Ms Ely Sawyer returns in a follow-up visit, I last saw her in March  She has a history of moderate obstructive sleep apnea, auto-CPAP was ordered, this is her first follow-up since starting treatment  Of note, she discontinued CPAP in April  She also has a history of restless leg syndrome that has been controlled with gabapentin  She has a history of iron deficiency, has been taking an over-the-counter iron supplement  She tried a nasal mask, had significant rhinitis and sinus issues    Tried a "full face mask which leaked and she could not use     RLS is \"fine\"  She has neuropathy, props her legs up  Legs feel numb, no urge to move  No longer takes iron, also does not take magnesium  Takes gabapentin 200 mg at 10-11 pm     Without CPAP usually does not have sinus problems  She notes dryness in the nose or mouth  In bed 11 - 1 am ,  Asleep in 30 min  Wakes once, often hard to fall asleep  Sometimes cannot fall back asleep at 3 am   Sometimes can sleep until 5 AM  Sleeps 5-6 hours    Has been napping  Sometimes dozes       CPAP data reviewed today  AirSense 11 AutoSet at 5-15 cm H2O  Usage-10 out of 30 days, average session 4 hours 6 minutes  AHI 7 4  95% pressure-10 cm H20  No usage since May    [place] Sleepiness Scale  Sitting and reading: Moderate chance of dozing  Watching TV: Slight chance of dozing  Sitting, inactive in a public place (e g  a theatre or a meeting):  Would never doze  As a passenger in a car for an hour without a break: Would never doze  Lying down to rest in the afternoon when circumstances permit: High chance of dozing  Sitting and talking to someone: Would never doze  Sitting quietly after a lunch without alcohol: Would never doze  In a car, while stopped for a few minutes in traffic: Would never doze  Total score: 6      Review of Systems    AS ABOVE, denies problems with balance or depression    Objective:      /63 (BP Location: Left arm, Patient Position: Sitting, Cuff Size: Adult)   Pulse 85   Ht 5' 5\" (1 651 m)   Wt 97 1 kg (214 lb)   BMI 35 61 kg/m²          Physical Exam    rrr  Lungs cta b/l  "

## 2023-06-13 NOTE — PATIENT INSTRUCTIONS
I would recommend continuing to treat sleep apnea, if you want to have another sleep study to optimize CPAP please let me know   Another option is a dental mouth piece for sleep apnea

## 2023-06-15 ENCOUNTER — TELEPHONE (OUTPATIENT)
Dept: SLEEP CENTER | Facility: CLINIC | Age: 88
End: 2023-06-15

## 2023-06-15 NOTE — TELEPHONE ENCOUNTER
Pt came into the [facility] office to return her CPAP machine due to complaints of discomfort  Jose Rodriguez spoke with pt and also Dr Nicholas Bello to discuss options for the pt  Pressure was changed to 4-11cm Rx is on file    Mask was also swapped for an Airtouch F-20 size (M)

## 2023-06-16 RX ORDER — GABAPENTIN 100 MG/1
200 CAPSULE ORAL
Qty: 180 CAPSULE | Refills: 1 | Status: SHIPPED | OUTPATIENT
Start: 2023-06-16

## 2023-06-19 ENCOUNTER — TELEPHONE (OUTPATIENT)
Dept: SLEEP CENTER | Facility: CLINIC | Age: 88
End: 2023-06-19

## 2023-06-19 DIAGNOSIS — G47.33 OSA (OBSTRUCTIVE SLEEP APNEA): Primary | ICD-10-CM

## 2023-06-19 NOTE — TELEPHONE ENCOUNTER
----- Message from Rei Kang on behalf of Harvey Rosario sent at 6/18/2023  4:54 PM EDT -----  Regarding: CPAP  Contact: [phone]  This message is being sent by Rei Kang on behalf of Harvey Kyle:  THE REVISED SETTINGS ON THE MACHINE WORK MUCH BETTER  HOWEVER, THERE IS A MAJOR PROBLEM WITH THE FACE MASK AS I APPEAR TO BE ALLERGIC TO THE FOAMLIKE MATERIAL AROUND IT   MY EYES WERE VERY SWOLLEN AFTER JUST A LITTLE USE, AND THE SKIN ON MY NOSE WAS PEELING  I STILL HAVE A SORE ON IT!  ANY SUGGESTIONS? I'M WILLING TO TRY THE NASAL MASK AGAIN  PERHAPS WITH THE LOWER SETTINGS, MY SINUSES WON'T BE AS AFFECTED  THANKS FOR YOUR INPUT    HANS OLSEN, [birth year]

## 2023-06-22 NOTE — TELEPHONE ENCOUNTER
Rx for PAP resupply sent to [facility] via [facility]  Called patient and scheduled a mask fitting for tomorrow 6/23/23 in [place]

## 2023-06-26 ENCOUNTER — TELEPHONE (OUTPATIENT)
Dept: SLEEP CENTER | Facility: CLINIC | Age: 88
End: 2023-06-26

## 2023-06-26 NOTE — TELEPHONE ENCOUNTER
----- Message from Hardik Tsang on behalf of Seema Anaya sent at 6/24/2023 12:31 PM EDT -----  Regarding: checking in  Contact: [phone]  This message is being sent by Hardik Tsang on behalf of Seema Anaya. [address] [place]. I WAS ABLE TO FIND OUT, ACTUALLY ONLINE, THAT THE MATERIAL ON THAT RESMED MASK (CALLED [employer]) IS MADE OF SILICONE POLYESTER. NOW I JUST NEED TO MAKE SURE THAT THE POLYESTER HAS NO CYANOACRYLATE IN ITS CHEMICAL COMPOUND. I'VE MADE ANOTHER APPOINTMENT WITH YOU IN EARLY AUGUST TO DISCUSS THE INSPIRE DEVICE. I HAVE A PACEMAKER AND DON'T KNOW WHETHER THERE WOULD BE ANY CONTRAINDICATION BECAUSE OF THAT. MY BMI, UNFORTUNATELY, IS SLIGHTLY OVER THE LIMIT AT  35.61. I HAD NO PROBLEM WITH THE PACER PLACEMENT DESPITE BEING ON WARFARIN. IF YOU THINK THIS IS NOT SOMETHING I SHOULD PURSUE, I'LL CANCEL THE AUGUST APPOINTMENT. BY THE WAY, I'M HAVING A LITTLE PROBLEM (BECAUSE OF CLAUSTROPHOBIA) WITH THE NEWEST MASK [facility] PROVIDED. IT'S A SHAME THE OTHER MASK CAUSED SUCH A BAD REACTION AS IT WAS VERY COMFORTABLE! THANKS, AGAIN, FOR YOUR INPUT.   HANS OLSEN  7/22/36

## 2023-06-27 LAB

## 2023-06-30 ENCOUNTER — TELEPHONE (OUTPATIENT)
Dept: NEPHROLOGY | Facility: CLINIC | Age: 88
End: 2023-06-30

## 2023-06-30 NOTE — TELEPHONE ENCOUNTER
Pt called and stated she thinks she needs to see Dr. Deirdre Adams before her scheduled September appt. She said she has been having pain from her lower back up to her ribs. She also stated that in the last two months she's had loss of bladder control. Sometimes she stands up and just urinates. She believes it might be from the Pauline. Pt has not called urology. She believes it is a kidney issue. Please advise pt.

## 2023-07-03 NOTE — TELEPHONE ENCOUNTER
Can you talk to [address] [place] [address], I am opening up 7/14 for a half day and maybe I can see her then?

## 2023-07-05 ENCOUNTER — TELEPHONE (OUTPATIENT)
Dept: SLEEP CENTER | Facility: CLINIC | Age: 88
End: 2023-07-05

## 2023-07-05 NOTE — TELEPHONE ENCOUNTER
----- Message from Teto Clement on behalf of Angélica Crook sent at 6/28/2023 12:33 PM EDT -----  Regarding: CPAP  Contact: [phone]  This message is being sent by Teto Clement on behalf of Angélica Crook. DEAR DR Michelet Vaughan:  I CANNOT TOLERATE THE NEW MASK. I FEEL LIKE I'M SUFFOCATING! I PLAN TO RETURN THE CPAP TO [facility] AND WILL BE ARRANGING AN APPOINTMENT WITH THEM TO DO THAT SHORTLY. UNFORTUNATELY, I'M HAVING A PROBLEM WITH MY BACK WHICH IS PREVENTING MY ABILITY TO DRIVE. IT ALSO IS AFFECTING MY LYING IN THE BED, STANDING, AND BARELY BEING ABLE TO SIT. I'M ALSO GOING TO CANCEL MY RATHER IMPETUOUS APPOINTMENT WITH YOU IN AUGUST TO DISCUSS THE IMPLANTED MONITOR. AT MY AGE, ON BLOOD THINNERS, THERE MAY BE TOO MANY PROBLEMS WITH IMPLANTATION. I WOULD LIKE TO THANK YOU FOR YOUR PATIENCE.   HOPE THIS IS A GOOD WEEK FOR YOU.  HANS OLSEN  07/[birth year]

## 2023-07-06 ENCOUNTER — HOSPITAL ENCOUNTER (EMERGENCY)
Facility: HOSPITAL | Age: 88
Discharge: HOME | End: 2023-07-06
Attending: EMERGENCY MEDICINE
Payer: MEDICARE

## 2023-07-06 ENCOUNTER — TELEPHONE (OUTPATIENT)
Dept: NEPHROLOGY | Facility: CLINIC | Age: 88
End: 2023-07-06

## 2023-07-06 ENCOUNTER — APPOINTMENT (EMERGENCY)
Dept: CT IMAGING | Facility: HOSPITAL | Age: 88
End: 2023-07-06
Payer: MEDICARE

## 2023-07-06 VITALS
OXYGEN SATURATION: 97 % | HEART RATE: 85 BPM | DIASTOLIC BLOOD PRESSURE: 66 MMHG | SYSTOLIC BLOOD PRESSURE: 149 MMHG | TEMPERATURE: 98.1 F | RESPIRATION RATE: 18 BRPM

## 2023-07-06 DIAGNOSIS — S32.020A COMPRESSION FRACTURE OF L2 VERTEBRA, INITIAL ENCOUNTER (HCC): Primary | ICD-10-CM

## 2023-07-06 LAB
ALBUMIN SERPL BCP-MCNC: 3.7 G/DL (ref 3.5–5)
ALP SERPL-CCNC: 150 U/L (ref 34–104)
ALT SERPL W P-5'-P-CCNC: 38 U/L (ref 7–52)
ANION GAP SERPL CALCULATED.3IONS-SCNC: 6 MMOL/L
AST SERPL W P-5'-P-CCNC: 27 U/L (ref 13–39)
BASOPHILS # BLD AUTO: 0.03 THOUSANDS/ÂΜL (ref 0–0.1)
BASOPHILS NFR BLD AUTO: 0 % (ref 0–1)
BILIRUB SERPL-MCNC: 0.47 MG/DL (ref 0.2–1)
BILIRUB UR QL STRIP: NEGATIVE
BUN SERPL-MCNC: 37 MG/DL (ref 5–25)
CALCIUM SERPL-MCNC: 8.9 MG/DL (ref 8.4–10.2)
CHLORIDE SERPL-SCNC: 99 MMOL/L (ref 96–108)
CLARITY UR: CLEAR
CO2 SERPL-SCNC: 30 MMOL/L (ref 21–32)
COLOR UR: YELLOW
CREAT SERPL-MCNC: 1.31 MG/DL (ref 0.6–1.3)
EOSINOPHIL # BLD AUTO: 0.17 THOUSAND/ÂΜL (ref 0–0.61)
EOSINOPHIL NFR BLD AUTO: 2 % (ref 0–6)
ERYTHROCYTE [DISTWIDTH] IN BLOOD BY AUTOMATED COUNT: 15.2 % (ref 11.6–15.1)
GFR SERPL CREATININE-BSD FRML MDRD: 36 ML/MIN/1.73SQ M
GLUCOSE SERPL-MCNC: 123 MG/DL (ref 65–140)
GLUCOSE UR STRIP-MCNC: NEGATIVE MG/DL
HCT VFR BLD AUTO: 40.5 % (ref 34.8–46.1)
HGB BLD-MCNC: 12.7 G/DL (ref 11.5–15.4)
HGB UR QL STRIP.AUTO: NEGATIVE
IMM GRANULOCYTES # BLD AUTO: 0.07 THOUSAND/UL (ref 0–0.2)
IMM GRANULOCYTES NFR BLD AUTO: 1 % (ref 0–2)
INR PPP: 3.09 (ref 0.84–1.19)
KETONES UR STRIP-MCNC: NEGATIVE MG/DL
LEUKOCYTE ESTERASE UR QL STRIP: NEGATIVE
LYMPHOCYTES # BLD AUTO: 1.53 THOUSANDS/ÂΜL (ref 0.6–4.47)
LYMPHOCYTES NFR BLD AUTO: 21 % (ref 14–44)
MCH RBC QN AUTO: 30.2 PG (ref 26.8–34.3)
MCHC RBC AUTO-ENTMCNC: 31.4 G/DL (ref 31.4–37.4)
MCV RBC AUTO: 96 FL (ref 82–98)
MONOCYTES # BLD AUTO: 0.67 THOUSAND/ÂΜL (ref 0.17–1.22)
MONOCYTES NFR BLD AUTO: 9 % (ref 4–12)
NEUTROPHILS # BLD AUTO: 4.8 THOUSANDS/ÂΜL (ref 1.85–7.62)
NEUTS SEG NFR BLD AUTO: 67 % (ref 43–75)
NITRITE UR QL STRIP: NEGATIVE
NRBC BLD AUTO-RTO: 0 /100 WBCS
PH UR STRIP.AUTO: 5.5 [PH] (ref 4.5–8)
PLATELET # BLD AUTO: 276 THOUSANDS/UL (ref 149–390)
PMV BLD AUTO: 10 FL (ref 8.9–12.7)
POTASSIUM SERPL-SCNC: 4.7 MMOL/L (ref 3.5–5.3)
PROT SERPL-MCNC: 7.4 G/DL (ref 6.4–8.4)
PROT UR STRIP-MCNC: NEGATIVE MG/DL
PROTHROMBIN TIME: 31.7 SECONDS (ref 11.6–14.5)
RBC # BLD AUTO: 4.21 MILLION/UL (ref 3.81–5.12)
SODIUM SERPL-SCNC: 135 MMOL/L (ref 135–147)
SP GR UR STRIP.AUTO: <=1.005 (ref 1–1.03)
UROBILINOGEN UR QL STRIP.AUTO: 0.2 E.U./DL
WBC # BLD AUTO: 7.27 THOUSAND/UL (ref 4.31–10.16)

## 2023-07-06 PROCEDURE — 74176 CT ABD & PELVIS W/O CONTRAST: CPT

## 2023-07-06 PROCEDURE — G1004 CDSM NDSC: HCPCS

## 2023-07-06 PROCEDURE — 36415 COLL VENOUS BLD VENIPUNCTURE: CPT

## 2023-07-06 PROCEDURE — 85610 PROTHROMBIN TIME: CPT

## 2023-07-06 PROCEDURE — 85025 COMPLETE CBC W/AUTO DIFF WBC: CPT

## 2023-07-06 PROCEDURE — 99285 EMERGENCY DEPT VISIT HI MDM: CPT

## 2023-07-06 PROCEDURE — 81003 URINALYSIS AUTO W/O SCOPE: CPT

## 2023-07-06 PROCEDURE — 80053 COMPREHEN METABOLIC PANEL: CPT

## 2023-07-06 RX ORDER — HYDROCODONE BITARTRATE AND ACETAMINOPHEN 5; 325 MG/1; MG/1
1 TABLET ORAL ONCE
Status: COMPLETED | OUTPATIENT
Start: 2023-07-06 | End: 2023-07-06

## 2023-07-06 RX ORDER — METHOCARBAMOL 500 MG/1
500 TABLET, FILM COATED ORAL 2 TIMES DAILY
Qty: 30 TABLET | Refills: 0 | Status: SHIPPED | OUTPATIENT
Start: 2023-07-06

## 2023-07-06 RX ORDER — LIDOCAINE 50 MG/G
1 PATCH TOPICAL ONCE
Status: DISCONTINUED | OUTPATIENT
Start: 2023-07-06 | End: 2023-07-06 | Stop reason: HOSPADM

## 2023-07-06 RX ORDER — METHOCARBAMOL 500 MG/1
500 TABLET, FILM COATED ORAL ONCE
Status: COMPLETED | OUTPATIENT
Start: 2023-07-06 | End: 2023-07-06

## 2023-07-06 RX ORDER — LIDOCAINE 50 MG/G
2 PATCH TOPICAL ONCE
Status: DISCONTINUED | OUTPATIENT
Start: 2023-07-06 | End: 2023-07-06 | Stop reason: HOSPADM

## 2023-07-06 RX ADMIN — LIDOCAINE 2 PATCH: 700 PATCH TOPICAL at 18:28

## 2023-07-06 RX ADMIN — METHOCARBAMOL 500 MG: 500 TABLET ORAL at 20:03

## 2023-07-06 RX ADMIN — HYDROCODONE BITARTRATE AND ACETAMINOPHEN 1 TABLET: 5; 325 TABLET ORAL at 19:30

## 2023-07-06 RX ADMIN — LIDOCAINE PATCH 5% 1 PATCH: 700 PATCH TOPICAL at 20:03

## 2023-07-06 NOTE — TELEPHONE ENCOUNTER
Received a voicemail from patient and called patient back. Patient first experienced activity related spinal pain last Wednesday in addition to that flank pain on the right side/terrible ache. Treated for an infection. Patient called the office requesting to see Dr. Elia Davis sooner than 7/14- I told patient if a sooner appointment opens up we will call her but he is currently booked. I did make patient aware that Dr. Elia Davis is off this week. I advised patient call her PCP. She told me she did call her PCP and that they told her to go to the ER. Patient told me she will go to [facility] ED.

## 2023-07-06 NOTE — ED PROVIDER NOTES
History  Chief Complaint   Patient presents with   • Flank Pain     States R flank pain starting last Wednesday.  was told has kidney infection and given antibiotics.  has not gotten any better. 81 y.o F w/ extensive PMH as outlined below, presents to the ED due to bilateral back pain. Patient initially developed midline back pain while moving boxes last Saturday which was then followed by R flank pain starting on Wednesday. This has since progressed to pain in her left flank as well. She was concerned for possible UTI/pyelo and treated for this but has not had any relief of symptoms. The pain does also intermittently radiate down towards the hips and sides of her thighs. She denies any numbness/weakness, incontinence, f/c, n/v, dysuria, or other complaints. Prior to Admission Medications   Prescriptions Last Dose Informant Patient Reported? Taking? Accu-Chek Lila Plus test strip  Self Yes No   Sig: Freedom Lite Brand.    BD Pen Needle Rena 2nd Gen 32G X 4 MM MISC   Yes No   Calcium Carb-Cholecalciferol 250-125 MG-UNIT TABS   Yes No   Sig: Take by mouth daily   Cholecalciferol 50 MCG (2000 UT) CAPS   Yes No   Sig: Take 1 capsule by mouth   Denta 5000 Plus 1.1 % CREA   Yes No   Sig: BRUSH TWICE A DAY AS DIRECTED   HYDROcodone-acetaminophen (NORCO) 5-325 mg per tablet   No No   Sig: Take 1 tablet by mouth every 6 (six) hours as needed for pain for up to 10 dosesMax Daily Amount: 4 tablets   Patient not taking: Reported on 9/19/2022   HYDROcodone-acetaminophen (NORCO) 7.5-325 mg per tablet   Yes No   Sig: Take 1 tablet by mouth every 4 to 6 hours if needed for pain   HYDROcodone-acetaminophen (XODOL) 7.5-300 MG per tablet   Yes No   Sig: Take by mouth every 6 (six) hours as needed   Patient not taking: Reported on 11/22/2022   Multiple Vitamin (MULTIVITAMIN) tablet  Self Yes No   Sig: Take 1 tablet by mouth daily   Multiple Vitamins-Minerals (PRESERVISION AREDS 2 PO)   Yes No   Sig: Take by mouth 2 (two) times a day   NON FORMULARY   Yes No   Sig: Take 1 tablet by mouth 4 (four) times a day HydroEye   Omega-3 Fatty Acids (FISH OIL PO)  Self Yes No   Sig: Take 1,000 mg by mouth 4 (four) times a day Hydrieye 4 capsule daily.    Patient not taking: Reported on 3/9/2023   RABEprazole (ACIPHEX) 20 MG tablet   No No   Sig: Take 1 tablet (20 mg total) by mouth daily   VITAMIN E PO   Yes No   Sig: Take by mouth daily   Vyzulta 0.024 % SOLN   Yes No   Sig: instill 1 drop into both eyes at bedtime   allopurinol (ZYLOPRIM) 100 mg tablet   Yes No   Sig: Take 100 mg by mouth 2 (two) times a day   aspirin (ECOTRIN LOW STRENGTH) 81 mg EC tablet  Self Yes No   Sig: Take 81 mg by mouth daily   bumetanide (BUMEX) 1 mg tablet   No No   Sig: Take 1 tablet (1 mg total) by mouth daily   Patient not taking: Reported on 2/24/2022   calcium citrate-vitamin D (CITRACAL+D) 315-200 MG-UNIT per tablet  Self Yes No   Sig: Take 1 tablet by mouth 2 (two) times a day   cholecalciferol (VITAMIN D3) 1,000 units tablet  Self Yes No   Sig: Take 1,000 Units by mouth daily   dapagliflozin (Farxiga) 10 MG tablet   Yes No   Sig: Take by mouth daily   ferrous sulfate 325 (65 FE) MG EC tablet   Yes No   Sig: Take 325 mg by mouth daily   ferrous sulfate 325 (65 Fe) mg tablet   Yes No   Sig: Take 325 mg by mouth daily with breakfast   Patient not taking: Reported on 6/7/2022   gabapentin (NEURONTIN) 100 mg capsule   No No   Sig: Take 2 capsules (200 mg total) by mouth daily at bedtime Take 2 capsules by mouth everyday at bedtime (200mg total)   glucose blood test strip   Yes No   guaifenesin-codeine (GUAIFENESIN AC) 100-10 MG/5ML liquid   Yes No   Sig: Iophen C-NR 10 mg-100 mg/5 mL oral liquid   Patient not taking: No sig reported   indapamide (LOZOL) 2.5 mg tablet   No No   Sig: Take 2 tablets (5 mg total) by mouth daily   Patient not taking: Reported on 11/22/2022   insulin aspart (NovoLOG FlexPen) 100 UNIT/ML injection pen  Self Yes No   Sig: Inject 6 Units under the skin as needed   insulin detemir (LEVEMIR) 100 units/mL subcutaneous injection  Self Yes No   Sig: Inject under the skin 16 daily   insulin detemir (Levemir FlexTouch) 100 Units/mL injection pen   Yes No   Si Units   lidocaine (XYLOCAINE) 1 %   Yes No   Sig: lidocaine HCl 10 mg/mL (1 %) injection solution   administered   Patient not taking: No sig reported   magnesium oxide (MAG-OX) 400 mg tablet   Yes No   Sig: Take 400 mg by mouth daily   methocarbamol (ROBAXIN) 500 mg tablet   No No   Sig: Take 1.5 tablets (750 mg total) by mouth 2 (two) times a day   Patient not taking: Reported on 2022   metoprolol succinate (TOPROL-XL) 50 mg 24 hr tablet  Self Yes No   Sig: Take 50 mg by mouth daily   naloxegol oxalate (Movantik) 25 MG tablet   No No   Sig: Take 1 tablet (25 mg total) by mouth every other day   Patient taking differently: Take 25 mg by mouth if needed   spironolactone (ALDACTONE) 50 mg tablet  Self Yes No   Sig: Take 50 mg by mouth daily    warfarin (COUMADIN) 2.5 mg tablet   Yes No   Sig: Take 1.5 tablets by mouth daily 3.75 mg 6 days 5 mg on 1 day   warfarin (COUMADIN) 5 mg tablet   No No   Sig: Take 1 tablet (5 mg total) by mouth daily Adjust based on inr   Patient not taking: Reported on 2022   warfarin (COUMADIN) 5 mg tablet   Yes No   Sig: Take 5 mg by mouth once a week      Facility-Administered Medications: None       Past Medical History:   Diagnosis Date   • Anemia    • Arthritis     also osteoporosis of spine   • Cancer (HCC)     endometrial   • CHF (congestive heart failure) (HCC)    • Chronic atrial fibrillation (HCC) 2017   • Chronic gout 2017   • Chronic kidney disease    • Chronic rhinitis 2018   • CKD (chronic kidney disease) stage 3, GFR 30-59 ml/min (HCC) 2017   • Clotting disorder ([address])     on warfarin   • Colon polyp 2017   • Coronary artery disease    • Diabetes mellitus ([address])    • COMBS (dyspnea on exertion) 2018   • Endometrial cancer [facility])     age 62   • Essential hypertension 09/13/2017   • Gastritis 09/13/2017   • GERD (gastroesophageal reflux disease)    • Heart murmur    • Hiatal hernia    • Hx of hiatal hernia    • Hyperlipidemia    • Hypertension    • Irregular heart beat    • 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   • Osteoarthritis    • Osteoporosis    • Pacemaker    • Peripheral vascular disease ([address])    • S/P AVR    • Spinal stenosis     disk herniation    • Urinary tract infection        Past Surgical History:   Procedure Laterality Date   • AORTIC VALVE REPLACEMENT     • BREAST EXCISIONAL BIOPSY Left 1995    benign   • CARDIAC SURGERY     • CATARACT EXTRACTION     • CHOLECYSTECTOMY     • COLONOSCOPY     • COLONOSCOPY  09/2012    tubular adenoma   • EGD  04/30/2021    [facility] Aisha Ortega MD.- Multiple stomach polyps, small amount of heme or coffee-grounds noted but do not feel significant evidence for bleeding. • EGD AND COLONOSCOPY  09/2017    adenomatous polyp/ benign gastric polyp   • ESOPHAGOGASTRODUODENOSCOPY  08/2012   • FIXATION KYPHOPLASTY     • HERNIA REPAIR      x3   • HYSTERECTOMY      age 62   • INSERT / REPLACE / REMOVE PACEMAKER     • IR PELVIC ANGIOGRAM  01/29/2019   • JOINT REPLACEMENT Bilateral     knees   • OOPHORECTOMY Bilateral     age 62   • WI COLONOSCOPY FLX DX W/COLLJ SPEC WHEN PFRMD N/A 09/12/2017    Procedure: Benjaminarmando Crystal COLONOSCOPY with polypectomies;  Surgeon: Jackie Viveros MD;  Location: [facility]; Service: Gastroenterology   • WI SIGMOIDOSCOPY FLX DX W/COLLJ SPEC BR/WA IF PFRMD N/A 09/13/2017    Procedure: colonoscopy with hemo clip x 2;  Surgeon: Jackie Viveros MD;  Location: [facility];   Service: Gastroenterology   • SKIN BIOPSY     • TONSILLECTOMY         Family History   Problem Relation Age of Onset   • Diabetes Mother    • Hypertension Mother    • Heart disease Father    • Emphysema Father    • Asthma Sister    • Cancer Sister         colon   • Hypertension Sister • Colon cancer Sister    • Autoimmune disease Sister         Diabetes   • Diabetes Sister    • No Known Problems Daughter    • No Known Problems Maternal Grandmother    • No Known Problems Maternal Grandfather    • No Known Problems Paternal Grandmother    • No Known Problems Paternal Grandfather    • Breast cancer Cousin 54   • Breast cancer Cousin 54   • Breast cancer Cousin 79   • Ovarian cancer Sister    • Autoimmune disease Sister         Hashimoto's thyroiditis   • Cancer Sister         ovarian   • No Known Problems Daughter    • No Known Problems Paternal Aunt    • No Known Problems Paternal Aunt    • Breast cancer Cousin    • Breast cancer Cousin    • Cancer Paternal Uncle         prostate     I have reviewed and agree with the history as documented. E-Cigarette/Vaping   • E-Cigarette Use Never User      E-Cigarette/Vaping Substances   • Nicotine No    • THC No    • CBD No    • Flavoring No    • Other No    • Unknown No      Social History     Tobacco Use   • Smoking status: Former     Packs/day: 3.00     Years: 20.00     Total pack years: 60.00     Types: Cigarettes   • Smokeless tobacco: Never   • Tobacco comments:     have not smoked for at least 50 years   Vaping Use   • Vaping Use: Never used   Substance Use Topics   • Alcohol use: No   • Drug use: No        Review of Systems   All other systems reviewed and are negative. Physical Exam  ED Triage Vitals [07/06/23 1743]   Temperature Pulse Respirations Blood Pressure SpO2   98.1 °F (36.7 °C) 85 18 149/66 97 %      Temp Source Heart Rate Source Patient Position - Orthostatic VS BP Location FiO2 (%)   Oral Monitor Sitting Right arm --      Pain Score       9             Orthostatic Vital Signs  Vitals:    07/06/23 1743   BP: 149/66   Pulse: 85   Patient Position - Orthostatic VS: Sitting       Physical Exam  Vitals and nursing note reviewed. Constitutional:       General: She is not in acute distress. Appearance: She is well-developed. HENT:      Head: Normocephalic and atraumatic. Eyes:      Conjunctiva/sclera: Conjunctivae normal.   Cardiovascular:      Rate and Rhythm: Normal rate and regular rhythm. Heart sounds: No murmur heard. Pulmonary:      Effort: Pulmonary effort is normal. No respiratory distress. Breath sounds: Normal breath sounds. Abdominal:      Palpations: Abdomen is soft. Tenderness: There is no abdominal tenderness. There is right CVA tenderness and left CVA tenderness. Musculoskeletal:         General: Tenderness (midline lumbar spine) present. No swelling. Cervical back: Neck supple. Skin:     General: Skin is warm and dry. Capillary Refill: Capillary refill takes less than 2 seconds. Neurological:      General: No focal deficit present. Mental Status: She is alert. Sensory: No sensory deficit. Motor: No weakness.    Psychiatric:         Mood and Affect: Mood normal.         ED Medications  Medications   HYDROcodone-acetaminophen (NORCO) 5-325 mg per tablet 1 tablet (1 tablet Oral Given 7/6/23 1930)   methocarbamol (ROBAXIN) tablet 500 mg (500 mg Oral Given 7/6/23 2003)       Diagnostic Studies  Results Reviewed     Procedure Component Value Units Date/Time    Protime-INR [[id number]]  (Abnormal) Collected: 07/06/23 1827    Lab Status: Final result Specimen: Blood from Arm, Left Updated: 07/06/23 1935     Protime 31.7 seconds      INR 3.09    Comprehensive metabolic panel [[id number]]  (Abnormal) Collected: 07/06/23 1821    Lab Status: Final result Specimen: Blood from Arm, Left Updated: 07/06/23 1916     Sodium 135 mmol/L      Potassium 4.7 mmol/L      Chloride 99 mmol/L      CO2 30 mmol/L      ANION GAP 6 mmol/L      BUN 37 mg/dL      Creatinine 1.31 mg/dL      Glucose 123 mg/dL      Calcium 8.9 mg/dL      AST 27 U/L      ALT 38 U/L      Alkaline Phosphatase 150 U/L      Total Protein 7.4 g/dL      Albumin 3.7 g/dL      Total Bilirubin 0.47 mg/dL      eGFR 36 ml/min/1.73sq m Narrative:      National Kidney Disease Foundation guidelines for Chronic Kidney Disease (CKD):   •  Stage 1 with normal or high GFR (GFR > 90 mL/min/1.73 square meters)  •  Stage 2 Mild CKD (GFR = 60-89 mL/min/1.73 square meters)  •  Stage 3A Moderate CKD (GFR = 45-59 mL/min/1.73 square meters)  •  Stage 3B Moderate CKD (GFR = 30-44 mL/min/1.73 square meters)  •  Stage 4 Severe CKD (GFR = 15-29 mL/min/1.73 square meters)  •  Stage 5 End Stage CKD (GFR <15 mL/min/1.73 square meters)  Note: GFR calculation is accurate only with a steady state creatinine    CBC and differential [[id number]]  (Abnormal) Collected: 07/06/23 1821    Lab Status: Final result Specimen: Blood from Arm, Left Updated: 07/06/23 1845     WBC 7.27 Thousand/uL      RBC 4.21 Million/uL      Hemoglobin 12.7 g/dL      Hematocrit 40.5 %      MCV 96 fL      MCH 30.2 pg      MCHC 31.4 g/dL      RDW 15.2 %      MPV 10.0 fL      Platelets 091 Thousands/uL      nRBC 0 /100 WBCs      Neutrophils Relative 67 %      Immat GRANS % 1 %      Lymphocytes Relative 21 %      Monocytes Relative 9 %      Eosinophils Relative 2 %      Basophils Relative 0 %      Neutrophils Absolute 4.80 Thousands/µL      Immature Grans Absolute 0.07 Thousand/uL      Lymphocytes Absolute 1.53 Thousands/µL      Monocytes Absolute 0.67 Thousand/µL      Eosinophils Absolute 0.17 Thousand/µL      Basophils Absolute 0.03 Thousands/µL     Urine Macroscopic, POC [[id number]] Collected: 07/06/23 1819    Lab Status: Final result Specimen: Urine Updated: 07/06/23 1820     Color, UA Yellow     Clarity, UA Clear     pH, UA 5.5     Leukocytes, UA Negative     Nitrite, UA Negative     Protein, UA Negative mg/dl      Glucose, UA Negative mg/dl      Ketones, UA Negative mg/dl      Urobilinogen, UA 0.2 E.U./dl      Bilirubin, UA Negative     Occult Blood, UA Negative     Specific Gravity, UA <=1.005    Narrative:      CLINITEK RESULT                 CT abdomen pelvis wo contrast   Final Result by Pineda Armijo MD (07/06 1926)      New inferior endplate compression fracture of L3 vertebral body with minimal loss of vertebral body height. Workstation performed: [id number]               Procedures  Procedures      ED Course                             SBIRT 22yo+    Flowsheet Row Most Recent Value   Initial Alcohol Screen: US AUDIT-C     1. How often do you have a drink containing alcohol? 0 Filed at: 07/06/2023 1752   2. How many drinks containing alcohol do you have on a typical day you are drinking? 0 Filed at: 07/06/2023 1752   3a. Male UNDER 65: How often do you have five or more drinks on one occasion? 0 Filed at: 07/06/2023 1752   3b. FEMALE Any Age, or MALE 65+: How often do you have 4 or more drinks on one occassion? 0 Filed at: 07/06/2023 1752   Audit-C Score 0 Filed at: 07/06/2023 1752   KIMANI: How many times in the past year have you. .. Used an illegal drug or used a prescription medication for non-medical reasons? Never Filed at: 07/06/2023 1752                Medical Decision Making  81 y.o F presenting to the Ed due to bilateral flank pain w/ radiation towards thighs. Differential includes MSK such as fracture/strain given recent activity causing first episode of symptoms, less likely to be bilateral nephrolithiasis/pyelonephritis causing pain. Discussed negative UA results with patient and need for further evaluation. She is agreeable to repeat labs, as well as CTAP to evaluate alterantive causes. CT showing compression fracture. Patient placed in TLSO by nursing staff. Symptomatic management provided. Dsicharged with amb ref comp spine and return precautions. Amount and/or Complexity of Data Reviewed  Labs: ordered. Radiology: ordered. Risk  Prescription drug management.             Disposition  Final diagnoses:   Compression fracture of L2 vertebra, initial encounter ([address])     Time reflects when diagnosis was documented in both MDM as applicable and the Disposition within this note     Time User Action Codes Description Comment    7/6/2023  7:57 PM Ame Manifold Add [Z59.986O] Compression fracture of L2 vertebra, initial encounter [facility])       ED Disposition     ED Disposition   Discharge    Condition   Stable    Date/Time   Thu Jul 6, 2023  7:56 PM    Comment   Halima Florian discharge to home/self care. Follow-up Information     Follow up With Specialties Details Why Contact Info Additional Information    [facility]   [phone]          Discharge Medication List as of 7/6/2023  7:59 PM      START taking these medications    Details   !! methocarbamol (ROBAXIN) 500 mg tablet Take 1 tablet (500 mg total) by mouth 2 (two) times a day, Starting Thu 7/6/2023, Normal       !! - Potential duplicate medications found. Please discuss with provider. CONTINUE these medications which have NOT CHANGED    Details   ! !  Accu-Chek Lila Plus test strip Freedom Lite Brand., Historical Med      allopurinol (ZYLOPRIM) 100 mg tablet Take 100 mg by mouth 2 (two) times a day, Starting Sun 5/23/2021, Historical Med      aspirin (ECOTRIN LOW STRENGTH) 81 mg EC tablet Take 81 mg by mouth daily, Historical Med      BD Pen Needle Rena 2nd Gen 32G X 4 MM MISC Starting Thu 10/13/2022, Historical Med      bumetanide (BUMEX) 1 mg tablet Take 1 tablet (1 mg total) by mouth daily, Starting Mon 11/15/2021, Normal      Calcium Carb-Cholecalciferol 250-125 MG-UNIT TABS Take by mouth daily, Historical Med      calcium citrate-vitamin D (CITRACAL+D) 315-200 MG-UNIT per tablet Take 1 tablet by mouth 2 (two) times a day, Historical Med      cholecalciferol (VITAMIN D3) 1,000 units tablet Take 1,000 Units by mouth daily, Historical Med      Cholecalciferol 50 MCG (2000 UT) CAPS Take 1 capsule by mouth, Historical Med      dapagliflozin (Farxiga) 10 MG tablet Take by mouth daily, Historical Med      Denta 5000 Plus 1.1 % CREA BRUSH TWICE A DAY AS DIRECTED, Historical Med      ferrous sulfate 325 (65 FE) MG EC tablet Take 325 mg by mouth daily, Historical Med      ferrous sulfate 325 (65 Fe) mg tablet Take 325 mg by mouth daily with breakfast, Historical Med      gabapentin (NEURONTIN) 100 mg capsule Take 2 capsules (200 mg total) by mouth daily at bedtime Take 2 capsules by mouth everyday at bedtime (200mg total), Starting Fri 6/16/2023, Normal      !! glucose blood test strip Historical Med      guaifenesin-codeine (GUAIFENESIN AC) 100-10 MG/5ML liquid Iophen C-NR 10 mg-100 mg/5 mL oral liquid, Historical Med      HYDROcodone-acetaminophen (NORCO) 5-325 mg per tablet Take 1 tablet by mouth every 6 (six) hours as needed for pain for up to 10 dosesMax Daily Amount: 4 tablets, Starting Mon 2/18/2019, Print      HYDROcodone-acetaminophen (NORCO) 7.5-325 mg per tablet Take 1 tablet by mouth every 4 to 6 hours if needed for pain, Starting Thu 11/18/2021, Historical Med      HYDROcodone-acetaminophen (XODOL) 7.5-300 MG per tablet Take by mouth every 6 (six) hours as needed, Starting Thu 6/24/2021, Historical Med      indapamide (LOZOL) 2.5 mg tablet Take 2 tablets (5 mg total) by mouth daily, Starting Tue 6/7/2022, Normal      insulin aspart (NovoLOG FlexPen) 100 UNIT/ML injection pen Inject 6 Units under the skin as needed, Historical Med      insulin detemir (Levemir FlexTouch) 100 Units/mL injection pen 13 Units, Historical Med      insulin detemir (LEVEMIR) 100 units/mL subcutaneous injection Inject under the skin 16 daily, Historical Med      lidocaine (XYLOCAINE) 1 % lidocaine HCl 10 mg/mL (1 %) injection solution   administered, Historical Med      magnesium oxide (MAG-OX) 400 mg tablet Take 400 mg by mouth daily, Historical Med      !! methocarbamol (ROBAXIN) 500 mg tablet Take 1.5 tablets (750 mg total) by mouth 2 (two) times a day, Starting Mon 2/18/2019, Print      metoprolol succinate (TOPROL-XL) 50 mg 24 hr tablet Take 50 mg by mouth daily, Historical Med      Multiple Vitamin (MULTIVITAMIN) tablet Take 1 tablet by mouth daily, Historical Med      Multiple Vitamins-Minerals (PRESERVISION AREDS 2 PO) Take by mouth 2 (two) times a day, Historical Med      naloxegol oxalate (Movantik) 25 MG tablet Take 1 tablet (25 mg total) by mouth every other day, Starting Wed 12/7/2022, Until Wed 5/24/2023, Normal      NON FORMULARY Take 1 tablet by mouth 4 (four) times a day HydroEye, Historical Med      Omega-3 Fatty Acids (FISH OIL PO) Take 1,000 mg by mouth 4 (four) times a day Hydrieye 4 capsule daily. , Historical Med      RABEprazole (ACIPHEX) 20 MG tablet Take 1 tablet (20 mg total) by mouth daily, Starting Wed 5/24/2023, Normal      spironolactone (ALDACTONE) 50 mg tablet Take 50 mg by mouth daily , Starting Tue 12/1/2020, Historical Med      VITAMIN E PO Take by mouth daily, Historical Med      Vyzulta 0.024 % SOLN instill 1 drop into both eyes at bedtime, Historical Med      !! warfarin (COUMADIN) 2.5 mg tablet Take 1.5 tablets by mouth daily 3.75 mg 6 days 5 mg on 1 day, Historical Med      !! warfarin (COUMADIN) 5 mg tablet Take 1 tablet (5 mg total) by mouth daily Adjust based on inr, Starting Mon 2/18/2019, Print      !! warfarin (COUMADIN) 5 mg tablet Take 5 mg by mouth once a week, Starting Fri 6/4/2021, Historical Med       !! - Potential duplicate medications found. Please discuss with provider. PDMP Review     None           ED Provider  Attending physically available and evaluated Halima Florian. I managed the patient along with the ED Attending.     Electronically Signed by         Ame Ulloa MD  07/08/23 9336

## 2023-07-06 NOTE — DISCHARGE INSTRUCTIONS
Thank you for coming to the ED for your care. We recommend following up with the comprehensive spine program for further care of your fracture. We also recommend using the TLSO brace for comfort, if it makes the pain worse then we recommend discontinuing its use. We have sent a prescription for the robaxin to the pharmacy for you to use if it helps with your symptoms.

## 2023-07-06 NOTE — ED ATTENDING ATTESTATION
7/6/2023  I, Farshad Abarca DO, saw and evaluated the patient. I have discussed the patient with the resident/non-physician practitioner and agree with the resident's/non-physician practitioner's findings, Plan of Care, and MDM as documented in the resident's/non-physician practitioner's note, except where noted. All available labs and Radiology studies were reviewed. I was present for key portions of any procedure(s) performed by the resident/non-physician practitioner and I was immediately available to provide assistance. At this point I agree with the current assessment done in the Emergency Department. I have conducted an independent evaluation of this patient a history and physical is as follows:    80 yo F presenting for evaluation of back pain. Pt denies any direct trauma, did have some lifting that she does not feel is related. Pain is to low back/R low back/hip and some radiation down R thigh. On chronic narcotics and taking more frequently 2/2 the pain. Reports that she was told she had a "kidney infection" and has been taking Ciprofloxacin, but not getting better. Per review of UA from 7/3 no evidence of UTI. Denies falls, CP, SOB, abdominal pain, N/V/D/C, urinary sx, numbness, weakness, bladder/bowel incontinence  Exam with small area of ecchymosis just to the right of midline around mid L spine with ttp over this area and R low back.      MDM: 80 yo F with back pain- will treat sx, INR given pt on Coumadin, CBC to assess for leukocytosis/anemia, BMP to assess for RUBY/electrolyte derangement, UA to r/o UTI, CT A/P to assess for lumbar spine pathology/retroperitonal abnormality/kidney stone/etc., dispo pending workup        ED Course         Critical Care Time  Procedures

## 2023-07-07 ENCOUNTER — NURSE TRIAGE (OUTPATIENT)
Dept: PHYSICAL THERAPY | Facility: OTHER | Age: 88
End: 2023-07-07

## 2023-07-07 DIAGNOSIS — S32.030A COMPRESSION FRACTURE OF L3 VERTEBRA, INITIAL ENCOUNTER (HCC): Primary | ICD-10-CM

## 2023-07-07 NOTE — TELEPHONE ENCOUNTER
Additional Information  • Negative: Is this related to a work injury? • Negative: Is this related to an MVA? • Negative: Are you currently recieving homecare services? • Negative: Has the patient had unexplained weight loss? • Negative: Does the patient have a fever? • Negative: Is the patient experiencing blood in sputum? • Negative: Is the patient experiencing urine retention? • Negative: Is the patient experiencing acute drop foot or paralysis? • Negative: Has the patient experienced major trauma? (fall from height, high speed collision, direct blow to spine) and is also experiencing nausea, light-headedness, or loss of consciousness? • Negative: Is this a chronic condition? Background - Initial Assessment  Clinical complaint: midline lower back pain. Radiates into both legs. No numbness or tingling but patient states a history of neuropathy. States this pain started 1 week. NKI. History of 2 other back fractures. Ct Abd and Pelvis showed: New inferior endplate compression fracture of L3 vertebral body with minimal loss of vertebral body height. Date of onset: 1 week  Frequency of pain: constant  Quality of pain: constant dull ache with occassional spasms. Protocols used: [facility] COMPREHENSIVE SPINE PROGRAM PROTOCOL    This RN did review in detail the Comprehensive Spine Program and what we can provide for their back pain/compression fracture. Patient is agreeable to being triaged by this RN and would like to proceed with Orthopedic Surgery appointment. Patient informed that a referral would be sent to that office and they would be contacting her to schedule her appointment. No further questions and/or concerns were voiced by the patient at this time. Patient states understanding of the referral that was placed. Referral Closed.

## 2023-07-14 ENCOUNTER — OFFICE VISIT (OUTPATIENT)
Dept: NEPHROLOGY | Facility: CLINIC | Age: 88
End: 2023-07-14
Payer: MEDICARE

## 2023-07-14 VITALS
SYSTOLIC BLOOD PRESSURE: 124 MMHG | BODY MASS INDEX: 35.65 KG/M2 | WEIGHT: 214 LBS | DIASTOLIC BLOOD PRESSURE: 72 MMHG | HEIGHT: 65 IN

## 2023-07-14 DIAGNOSIS — N18.32 STAGE 3B CHRONIC KIDNEY DISEASE (HCC): Primary | ICD-10-CM

## 2023-07-14 PROCEDURE — 99214 OFFICE O/P EST MOD 30 MIN: CPT | Performed by: INTERNAL MEDICINE

## 2023-07-14 RX ORDER — FENTANYL 25 UG/1
20 PATCH TRANSDERMAL
COMMUNITY
Start: 2023-07-12

## 2023-07-14 NOTE — PROGRESS NOTES
OFFICE FOLLOW UP - Nephrology   Elaine Mendieta 81 y.o. 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/A2, GFR 30-44 and albumin creatinine ratio  mg/g ([facility])  -Her creatinine is stable at 1.3  -GFR is around 30 to 40 mL/min  -Urine protein to creatinine ratio 0.19  -Urinalysis positive for glucose, trace leukocytes  -CT scan of the abdomen pelvis with contrast from November 2021 shows kidneys with renal cortical cysts no solid renal masses urinary tract calculi or hydronephrosis  -Now off the SGL 2 inhibitor because of incontinence   -Pressures well controlled  -Blood sugar stable  -Medications appropriately dosed  -We will continue to monitor    Secondary hyperparathyroidism, renal ([facility])  -PTH is acceptable in normal range   -On vitamin D3 1000 units daily as well as a multivitamin    Type 2 diabetes mellitus with microalbuminuria, without long-term current use of insulin ([facility])  -Discontinued Farxiga  -Insulin    Chronic atrial fibrillation ([facility])  -Anticoagulated with Coumadin  -Rate control with metoprolol XL 50 mg daily    Chronic diastolic congestive heart failure ([facility])  -Volume status is currently stable  -On Farxiga, spironolactone  -No other diuretics at this time    Hypertension, unspecified type  -Blood pressures are stable  -Blood pressure lowering medications include  -Spironolactone 50 mg daily  -Metoprolol XL 50 mg daily    Chronic gout without tophus, unspecified cause, unspecified site  -Allopurinol 100 mg 2 times daily  -This is relatively well controlled    Hyponatremia  -Sodium is improved and at baseline    Proteinuria, unspecified type  -Continue to monitor urine protein to creatinine ratio  -Pressure control, glycemic control  -Serologies show positive CHRISTOPHER, free light chain ratio that is slightly elevated, does see rheumatology, renal function is stable, no hematuria    Class 2 severe obesity due to excess calories with serious comorbidity and body mass index (BMI) of 39.0 to 39.9 in adult [facility])  -With some recent weight loss    Upper abdominal pain, unspecified  -This is now stable    Hyperkalemia  -Continue current medications her electrolytes remained stable    Mechanical heart valve present  -Requires anticoagulation with Coumadin    Dry eye syndrome of both eyes  -Also with worsening arthritis  -Proteinuria  -Serologies negative  -Did see rheumatologist      DISCUSSION:    Rowan Earing is stable from a renal standpoint, creatinine is stable. Renal function is stable, she is seeing orthopedic surgery next week, blood pressure is at goal we will follow-up in 6 months    HPI: Sanju Cadena is a 81 y.o. female who is here for Follow-up and Chronic Kidney Disease    Since her last office visit she has been feeling okay, denies any acute shortness of breath fevers chills no nausea vomiting diarrhea foamy or bloody urine    ROS:   Review of Systems   Constitutional: Negative. HENT: Negative. Eyes: Negative. Respiratory: Negative. Cardiovascular: Negative. Gastrointestinal: Negative. Endocrine: Negative. Genitourinary: Negative. Musculoskeletal: Negative. Skin: Negative. Allergic/Immunologic: Negative. Neurological: Negative. Psychiatric/Behavioral: Negative. All other systems reviewed and are negative.       Allergies: Acetazolamide, Colchicine, Diltiazem, Docusate, Eszopiclone, Febuxostat, Glycerin, Hydrochlorothiazide, Latex, Lorazepam, Losartan, Metformin, Nortriptyline, Other, Penicillin g, Penicillins, Povidone iodine, Ropinirole, Simvastatin, Sulfa antibiotics, Torsemide, Valsartan, and Fluticasone    Medications:   Current Outpatient Medications:   •  Accu-Chek Lila Plus test strip, [place] [employer]., Disp: , Rfl:   •  allopurinol (ZYLOPRIM) 100 mg tablet, Take 100 mg by mouth 2 (two) times a day, Disp: , Rfl:   •  aspirin (ECOTRIN LOW STRENGTH) 81 mg EC tablet, Take 81 mg by mouth daily, Disp: , Rfl:   •  BD Pen Needle Rena 2nd Gen 32G X 4 MM MISC, , Disp: , Rfl:   •  calcium citrate-vitamin D (CITRACAL+D) 315-200 MG-UNIT per tablet, Take 1 tablet by mouth 2 (two) times a day, Disp: , Rfl:   •  cholecalciferol (VITAMIN D3) 1,000 units tablet, Take 1,000 Units by mouth daily, Disp: , Rfl:   •  Denta 5000 Plus 1.1 % CREA, BRUSH TWICE A DAY AS DIRECTED, Disp: , Rfl:   •  gabapentin (NEURONTIN) 100 mg capsule, Take 2 capsules (200 mg total) by mouth daily at bedtime Take 2 capsules by mouth everyday at bedtime (200mg total), Disp: 180 capsule, Rfl: 1  •  glucose blood test strip, , Disp: , Rfl:   •  HYDROcodone-acetaminophen (NORCO) 7.5-325 mg per tablet, Take 1 tablet by mouth every 4 to 6 hours if needed for pain, Disp: , Rfl:   •  insulin aspart (NovoLOG FlexPen) 100 UNIT/ML injection pen, Inject 5 Units under the skin as needed, Disp: , Rfl:   •  insulin detemir (LEVEMIR) 100 units/mL subcutaneous injection, Inject under the skin 16 daily, Disp: , Rfl:   •  metoprolol succinate (TOPROL-XL) 50 mg 24 hr tablet, Take 50 mg by mouth daily, Disp: , Rfl:   •  Multiple Vitamin (MULTIVITAMIN) tablet, Take 1 tablet by mouth daily, Disp: , Rfl:   •  Multiple Vitamins-Minerals (PRESERVISION AREDS 2 PO), Take by mouth 2 (two) times a day, Disp: , Rfl:   •  naloxegol oxalate (Movantik) 25 MG tablet, Take 1 tablet (25 mg total) by mouth every other day (Patient taking differently: Take 25 mg by mouth if needed), Disp: 15 tablet, Rfl: 5  •  NON FORMULARY, Take 1 tablet by mouth 4 (four) times a day HydroEye, Disp: , Rfl:   •  RABEprazole (ACIPHEX) 20 MG tablet, Take 1 tablet (20 mg total) by mouth daily, Disp: 90 tablet, Rfl: 1  •  spironolactone (ALDACTONE) 50 mg tablet, Take 50 mg by mouth daily , Disp: , Rfl:   •  VITAMIN E PO, Take by mouth daily, Disp: , Rfl:   •  Vyzulta 0.024 % SOLN, instill 1 drop into both eyes at bedtime, Disp: , Rfl:   •  warfarin (COUMADIN) 2.5 mg tablet, Take 1.5 tablets by mouth daily 3.75 mg 6 days 5 mg on 1 day, Disp: , Rfl:   •  warfarin (COUMADIN) 5 mg tablet, Take 5 mg by mouth once a week, Disp: , Rfl:   •  bumetanide (BUMEX) 1 mg tablet, Take 1 tablet (1 mg total) by mouth daily (Patient not taking: Reported on 2/24/2022), Disp: 30 tablet, Rfl: 3  •  Calcium Carb-Cholecalciferol 250-125 MG-UNIT TABS, Take by mouth daily (Patient not taking: Reported on 7/14/2023), Disp: , Rfl:   •  Cholecalciferol 50 MCG (2000 UT) CAPS, Take 1 capsule by mouth (Patient not taking: Reported on 7/14/2023), Disp: , Rfl:   •  dapagliflozin (Farxiga) 10 MG tablet, Take by mouth daily (Patient not taking: Reported on 7/14/2023), Disp: , Rfl:   •  fentaNYL (DURAGESIC) 25 mcg/hr, 20 mcg, Disp: , Rfl:   •  ferrous sulfate 325 (65 FE) MG EC tablet, Take 325 mg by mouth daily, Disp: , Rfl:   •  ferrous sulfate 325 (65 Fe) mg tablet, Take 325 mg by mouth daily with breakfast (Patient not taking: Reported on 6/7/2022), Disp: , Rfl:   •  guaifenesin-codeine (GUAIFENESIN AC) 100-10 MG/5ML liquid, Iophen C-NR 10 mg-100 mg/5 mL oral liquid (Patient not taking: No sig reported), Disp: , Rfl:   •  HYDROcodone-acetaminophen (NORCO) 5-325 mg per tablet, Take 1 tablet by mouth every 6 (six) hours as needed for pain for up to 10 dosesMax Daily Amount: 4 tablets (Patient not taking: Reported on 9/19/2022), Disp: 20 tablet, Rfl: 0  •  HYDROcodone-acetaminophen (XODOL) 7.5-300 MG per tablet, Take by mouth every 6 (six) hours as needed (Patient not taking: Reported on 11/22/2022), Disp: , Rfl:   •  indapamide (LOZOL) 2.5 mg tablet, Take 2 tablets (5 mg total) by mouth daily (Patient not taking: Reported on 11/22/2022), Disp: 180 tablet, Rfl: 3  •  insulin detemir (Levemir FlexTouch) 100 Units/mL injection pen, 13 Units, Disp: , Rfl:   •  lidocaine (XYLOCAINE) 1 %, lidocaine HCl 10 mg/mL (1 %) injection solution administered (Patient not taking: No sig reported), Disp: , Rfl:   •  methocarbamol (ROBAXIN) 500 mg tablet, Take 1.5 tablets (750 mg total) by mouth 2 (two) times a day (Patient not taking: Reported on 5/24/2022), Disp: 10 tablet, Rfl: 0  •  methocarbamol (ROBAXIN) 500 mg tablet, Take 1 tablet (500 mg total) by mouth 2 (two) times a day (Patient not taking: Reported on 7/14/2023), Disp: 30 tablet, Rfl: 0  •  Omega-3 Fatty Acids (FISH OIL PO), Take 1,000 mg by mouth 4 (four) times a day Hydrieye 4 capsule daily.  (Patient not taking: Reported on 3/9/2023), Disp: , Rfl:   •  warfarin (COUMADIN) 5 mg tablet, Take 1 tablet (5 mg total) by mouth daily Adjust based on inr (Patient not taking: Reported on 11/22/2022), Disp: 20 tablet, Rfl: 0    Past Medical History:   Diagnosis Date   • Anemia    • Arthritis     also osteoporosis of spine   • Cancer [facility])     endometrial   • CHF (congestive heart failure) ([facility])    • Chronic atrial fibrillation (HCC) 09/13/2017   • Chronic gout 09/13/2017   • Chronic kidney disease    • Chronic rhinitis 11/08/2018   • CKD (chronic kidney disease) stage 3, GFR 30-59 ml/min ([facility]) 09/13/2017   • Clotting disorder ([address])     on warfarin   • Colon polyp 09/13/2017   • Coronary artery disease    • Diabetes mellitus ([address])    • COMBS (dyspnea on exertion) 11/02/2018   • Endometrial cancer [facility])     age 62   • Essential hypertension 09/13/2017   • Gastritis 09/13/2017   • GERD (gastroesophageal reflux disease)    • Heart murmur    • Hiatal hernia    • Hx of hiatal hernia    • Hyperlipidemia    • Hypertension    • Irregular heart beat    • 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 Juan Ortega MD.- Multiple stomach polyps, small amount of heme or coffee-grounds noted but do not feel significant evidence for bleeding. • EGD AND COLONOSCOPY  09/2017    adenomatous polyp/ benign gastric polyp   • ESOPHAGOGASTRODUODENOSCOPY  08/2012   • FIXATION KYPHOPLASTY     • HERNIA REPAIR      x3   • HYSTERECTOMY      age 62   • INSERT / REPLACE / REMOVE PACEMAKER     • IR PELVIC ANGIOGRAM  01/29/2019   • JOINT REPLACEMENT Bilateral     knees   • OOPHORECTOMY Bilateral     age 62   • MO COLONOSCOPY FLX DX W/COLLJ SPEC WHEN PFRMD N/A 09/12/2017    Procedure: Chiquita Irena COLONOSCOPY with polypectomies;  Surgeon: Carol Patricia MD;  Location: [facility]; Service: Gastroenterology   • MO SIGMOIDOSCOPY FLX DX W/COLLJ SPEC BR/WA IF PFRMD N/A 09/13/2017    Procedure: colonoscopy with hemo clip x 2;  Surgeon: Carol Patricia MD;  Location: [facility];   Service: Gastroenterology   • SKIN BIOPSY     • TONSILLECTOMY       Family History   Problem Relation Age of Onset   • Diabetes Mother    • Hypertension Mother    • Heart disease Father    • Emphysema Father    • Asthma Sister    • Cancer Sister         colon   • Hypertension Sister    • Colon cancer Sister    • Autoimmune disease Sister         Diabetes   • Diabetes Sister    • No Known Problems Daughter    • No Known Problems Maternal Grandmother    • No Known Problems Maternal Grandfather    • No Known Problems Paternal Grandmother    • No Known Problems Paternal Grandfather    • Breast cancer Cousin 54   • Breast cancer Cousin 54   • Breast cancer Cousin 79   • Ovarian cancer Sister    • Autoimmune disease Sister         Hashimoto's thyroiditis   • Cancer Sister         ovarian   • No Known Problems Daughter    • No Known Problems Paternal Aunt    • No Known Problems Paternal Aunt    • Breast cancer Cousin • Breast cancer Cousin    • Cancer Paternal Uncle         prostate      reports that she has quit smoking. Her smoking use included cigarettes. She has a 60.00 pack-year smoking history. She has never used smokeless tobacco. She reports that she does not drink alcohol and does not use drugs. OBJECTIVE:    Vitals:    07/14/23 0831   BP: 124/72   BP Location: Left arm   Patient Position: Sitting   Cuff Size: Adult   Weight: 97.1 kg (214 lb)   Height: 5' 5" (1.651 m)        Body mass index is 35.61 kg/m². [unfilled]     Weight (last 2 days)     Date/Time Weight    07/14/23 0831 97.1 (214)     Weight: pt verbal at 07/14/23 0831             Physical exam:  Physical Exam  Vitals reviewed. Constitutional:       Appearance: Normal appearance. She is normal weight. HENT:      Head: Normocephalic and atraumatic. Nose: Nose normal.      Mouth/Throat:      Mouth: Mucous membranes are dry. Pharynx: Oropharynx is clear. Eyes:      Extraocular Movements: Extraocular movements intact. Pupils: Pupils are equal, round, and reactive to light. Cardiovascular:      Rate and Rhythm: Normal rate. Pulses: Normal pulses. Heart sounds: Normal heart sounds. Pulmonary:      Effort: Pulmonary effort is normal.      Breath sounds: Normal breath sounds. Abdominal:      General: Abdomen is flat. Bowel sounds are normal.      Palpations: Abdomen is soft. Genitourinary:     Rectum: Normal.   Musculoskeletal:         General: Normal range of motion. Cervical back: Normal range of motion. Right lower leg: Edema present. Left lower leg: Edema present. Skin:     General: Skin is warm and dry. Findings: Bruising present. Neurological:      General: No focal deficit present. Mental Status: She is oriented to person, place, and time. Mental status is at baseline.    Psychiatric:         Mood and Affect: Mood normal.         Behavior: Behavior normal.         Thought Content: Thought content normal.         Judgment: Judgment normal.             Lab Results:    Results for orders placed or performed during the hospital encounter of 07/06/23   CBC and differential   Result Value Ref Range    WBC 7.27 4.31 - 10.16 Thousand/uL    RBC 4.21 3.81 - 5.12 Million/uL    Hemoglobin 12.7 11.5 - 15.4 g/dL    Hematocrit 40.5 34.8 - 46.1 %    MCV 96 82 - 98 fL    MCH 30.2 26.8 - 34.3 pg    MCHC 31.4 31.4 - 37.4 g/dL    RDW 15.2 (H) 11.6 - 15.1 %    MPV 10.0 8.9 - 12.7 fL    Platelets 057 599 - 141 Thousands/uL    nRBC 0 /100 WBCs    Neutrophils Relative 67 43 - 75 %    Immat GRANS % 1 0 - 2 %    Lymphocytes Relative 21 14 - 44 %    Monocytes Relative 9 4 - 12 %    Eosinophils Relative 2 0 - 6 %    Basophils Relative 0 0 - 1 %    Neutrophils Absolute 4.80 1.85 - 7.62 Thousands/µL    Immature Grans Absolute 0.07 0.00 - 0.20 Thousand/uL    Lymphocytes Absolute 1.53 0.60 - 4.47 Thousands/µL    Monocytes Absolute 0.67 0.17 - 1.22 Thousand/µL    Eosinophils Absolute 0.17 0.00 - 0.61 Thousand/µL    Basophils Absolute 0.03 0.00 - 0.10 Thousands/µL   Comprehensive metabolic panel   Result Value Ref Range    Sodium 135 135 - 147 mmol/L    Potassium 4.7 3.5 - 5.3 mmol/L    Chloride 99 96 - 108 mmol/L    CO2 30 21 - 32 mmol/L    ANION GAP 6 mmol/L    BUN 37 (H) 5 - 25 mg/dL    Creatinine 1.31 (H) 0.60 - 1.30 mg/dL    Glucose 123 65 - 140 mg/dL    Calcium 8.9 8.4 - 10.2 mg/dL    AST 27 13 - 39 U/L    ALT 38 7 - 52 U/L    Alkaline Phosphatase 150 (H) 34 - 104 U/L    Total Protein 7.4 6.4 - 8.4 g/dL    Albumin 3.7 3.5 - 5.0 g/dL    Total Bilirubin 0.47 0.20 - 1.00 mg/dL    eGFR 36 ml/min/1.73sq m   Protime-INR   Result Value Ref Range    Protime 31.7 (H) 11.6 - 14.5 seconds    INR 3.09 (H) 0.84 - 1.19   Urine Macroscopic, POC   Result Value Ref Range    Color, UA Yellow     Clarity, UA Clear     pH, UA 5.5 4.5 - 8.0    Leukocytes, UA Negative Negative    Nitrite, UA Negative Negative    Protein, UA Negative Negative mg/dl    Glucose, UA Negative Negative mg/dl    Ketones, UA Negative Negative mg/dl    Urobilinogen, UA 0.2 0.2, 1.0 E.U./dl E.U./dl    Bilirubin, UA Negative Negative    Occult Blood, UA Negative Negative    Specific Gravity, UA <=1.005 1.003 - 1.030       Portions of the record may have been created with voice recognition software. Occasional wrong word or "sound a like" substitutions may have occurred due to the inherent limitations of voice recognition software. Read the chart carefully and recognize, using context, where substitutions have occurred. If you have any questions, please contact the dictating provider.

## 2023-07-21 ENCOUNTER — HOSPITAL ENCOUNTER (OUTPATIENT)
Dept: RADIOLOGY | Facility: HOSPITAL | Age: 88
Discharge: HOME | End: 2023-07-21
Attending: ORTHOPAEDIC SURGERY
Payer: MEDICARE

## 2023-07-21 ENCOUNTER — OFFICE VISIT (OUTPATIENT)
Dept: OBGYN CLINIC | Facility: HOSPITAL | Age: 88
End: 2023-07-21
Payer: MEDICARE

## 2023-07-21 VITALS
HEIGHT: 65 IN | BODY MASS INDEX: 35.67 KG/M2 | WEIGHT: 214.07 LBS | HEART RATE: 91 BPM | DIASTOLIC BLOOD PRESSURE: 79 MMHG | SYSTOLIC BLOOD PRESSURE: 143 MMHG

## 2023-07-21 DIAGNOSIS — S32.030A COMPRESSION FRACTURE OF L3 LUMBAR VERTEBRA, CLOSED, INITIAL ENCOUNTER (HCC): ICD-10-CM

## 2023-07-21 DIAGNOSIS — S32.030A COMPRESSION FRACTURE OF L3 LUMBAR VERTEBRA, CLOSED, INITIAL ENCOUNTER (HCC): Primary | ICD-10-CM

## 2023-07-21 DIAGNOSIS — S32.030A COMPRESSION FRACTURE OF L3 VERTEBRA, INITIAL ENCOUNTER (HCC): ICD-10-CM

## 2023-07-21 PROCEDURE — 72100 X-RAY EXAM L-S SPINE 2/3 VWS: CPT

## 2023-07-21 PROCEDURE — 99204 OFFICE O/P NEW MOD 45 MIN: CPT | Performed by: ORTHOPAEDIC SURGERY

## 2023-07-21 RX ORDER — TRAMADOL HYDROCHLORIDE 50 MG/1
50 TABLET ORAL EVERY 8 HOURS PRN
Qty: 30 TABLET | Refills: 0 | Status: SHIPPED | OUTPATIENT
Start: 2023-07-21

## 2023-07-21 NOTE — PROGRESS NOTES
NAME: Sanju Cadena  : 1935  PCP: Barbara Mott MD      Chief Complaint: Back pain     HPI:  Sanju Cadena is a 81 y.o. female presenting for initial visit with chief complaint of back pain. Patient has a significant history of osteoporosis. She developed back pain about 2 to 3 weeks ago after overdoing it and moving some boxes. She was seen in the ED on 2023 and obtain CT scan which noted an L3 inferior endplate compression fracture. She was given a TLSO brace. She was also evaluated by her primary care physician who treated her with fentanyl patch as she is already taking hydrocodone. She denies any numbness or tingling in her lower extremities. She denies any bladder or bowel incontinence. She is accompanied by her daughter who is a nurse practitioner who lives in [place].     FAMILY HISTORY   Family History   Problem Relation Age of Onset   • Diabetes Mother    • Hypertension Mother    • Heart disease Father    • Emphysema Father    • Asthma Sister    • Cancer Sister         colon   • Hypertension Sister    • Colon cancer Sister    • Autoimmune disease Sister         Diabetes   • Diabetes Sister    • No Known Problems Daughter    • No Known Problems Maternal Grandmother    • No Known Problems Maternal Grandfather    • No Known Problems Paternal Grandmother    • No Known Problems Paternal Grandfather    • Breast cancer Cousin 54   • Breast cancer Cousin 54   • Breast cancer Cousin 79   • Ovarian cancer Sister    • Autoimmune disease Sister         Hashimoto's thyroiditis   • Cancer Sister         ovarian   • No Known Problems Daughter    • No Known Problems Paternal Aunt    • No Known Problems Paternal Aunt    • Breast cancer Cousin    • Breast cancer Cousin    • Cancer Paternal Uncle         prostate       PAST MEDICAL HISTORY:   Past Medical History:   Diagnosis Date   • Anemia    • Arthritis     also osteoporosis of spine   • Cancer (HCC)     endometrial   • CHF (congestive heart failure) ([facility])    • Chronic atrial fibrillation ([address]) 09/13/2017   • Chronic gout 09/13/2017   • Chronic kidney disease    • Chronic rhinitis 11/08/2018   • CKD (chronic kidney disease) stage 3, GFR 30-59 ml/min ([facility]) 09/13/2017   • Clotting disorder ([address])     on warfarin   • Colon polyp 09/13/2017   • Coronary artery disease    • Diabetes mellitus ([address])    • COMBS (dyspnea on exertion) 11/02/2018   • Endometrial cancer [facility])     age 62   • Essential hypertension 09/13/2017   • Gastritis 09/13/2017   • GERD (gastroesophageal reflux disease)    • Heart murmur    • Hiatal hernia    • Hx of hiatal hernia    • Hyperlipidemia    • Hypertension    • Irregular heart beat    • 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   • Osteoarthritis    • Osteoporosis    • Pacemaker    • Peripheral vascular disease ([address])    • S/P AVR    • Spinal stenosis     disk herniation    • Urinary tract infection        MEDICATIONS:  Current Outpatient Medications   Medication Sig Dispense Refill   • Accu-Chek Lila Plus test strip [employer] Lite Brand.      • allopurinol (ZYLOPRIM) 100 mg tablet Take 100 mg by mouth 2 (two) times a day     • aspirin (ECOTRIN LOW STRENGTH) 81 mg EC tablet Take 81 mg by mouth daily     • BD Pen Needle Rena 2nd Gen 32G X 4 MM MISC      • calcium citrate-vitamin D (CITRACAL+D) 315-200 MG-UNIT per tablet Take 1 tablet by mouth 2 (two) times a day     • cholecalciferol (VITAMIN D3) 1,000 units tablet Take 1,000 Units by mouth daily     • Denta 5000 Plus 1.1 % CREA BRUSH TWICE A DAY AS DIRECTED     • fentaNYL (DURAGESIC) 25 mcg/hr 20 mcg     • glucose blood test strip      • HYDROcodone-acetaminophen (NORCO) 7.5-325 mg per tablet Take 1 tablet by mouth every 4 to 6 hours if needed for pain     • insulin aspart (NovoLOG FlexPen) 100 UNIT/ML injection pen Inject 5 Units under the skin as needed     • insulin detemir (Levemir FlexTouch) 100 Units/mL injection pen 13 Units     • insulin detemir (LEVEMIR) 100 units/mL subcutaneous injection Inject under the skin 16 daily     • metoprolol succinate (TOPROL-XL) 50 mg 24 hr tablet Take 50 mg by mouth daily     • Multiple Vitamin (MULTIVITAMIN) tablet Take 1 tablet by mouth daily     • NON FORMULARY Take 1 tablet by mouth 4 (four) times a day HydroEye     • spironolactone (ALDACTONE) 50 mg tablet Take 50 mg by mouth daily      • traMADol (Ultram) 50 mg tablet Take 1 tablet (50 mg total) by mouth every 8 (eight) hours as needed for severe pain 30 tablet 0   • VITAMIN E PO Take by mouth daily     • Vyzulta 0.024 % SOLN instill 1 drop into both eyes at bedtime     • warfarin (COUMADIN) 2.5 mg tablet Take 1.5 tablets by mouth daily 3.75 mg 6 days 5 mg on 1 day     • warfarin (COUMADIN) 5 mg tablet Take 5 mg by mouth once a week     • bumetanide (BUMEX) 1 mg tablet Take 1 tablet (1 mg total) by mouth daily (Patient not taking: Reported on 2/24/2022) 30 tablet 3   • Calcium Carb-Cholecalciferol 250-125 MG-UNIT TABS Take by mouth daily (Patient not taking: Reported on 7/14/2023)     • Cholecalciferol 50 MCG (2000 UT) CAPS Take 1 capsule by mouth (Patient not taking: Reported on 7/14/2023)     • dapagliflozin (Farxiga) 10 MG tablet Take by mouth daily (Patient not taking: Reported on 7/14/2023)     • ferrous sulfate 325 (65 FE) MG EC tablet Take 325 mg by mouth daily (Patient not taking: Reported on 7/21/2023)     • ferrous sulfate 325 (65 Fe) mg tablet Take 325 mg by mouth daily with breakfast (Patient not taking: Reported on 6/7/2022)     • gabapentin (NEURONTIN) 100 mg capsule Take 2 capsules (200 mg total) by mouth daily at bedtime Take 2 capsules by mouth everyday at bedtime (200mg total) (Patient not taking: Reported on 7/21/2023) 180 capsule 1   • guaifenesin-codeine (GUAIFENESIN AC) 100-10 MG/5ML liquid Iophen C-NR 10 mg-100 mg/5 mL oral liquid (Patient not taking: No sig reported)     • HYDROcodone-acetaminophen (NORCO) 5-325 mg per tablet Take 1 tablet by mouth every 6 (six) hours as needed for pain for up to 10 dosesMax Daily Amount: 4 tablets (Patient not taking: Reported on 9/19/2022) 20 tablet 0   • HYDROcodone-acetaminophen (Benuel Jade) 7.5-300 MG per tablet Take by mouth every 6 (six) hours as needed (Patient not taking: Reported on 11/22/2022)     • indapamide (LOZOL) 2.5 mg tablet Take 2 tablets (5 mg total) by mouth daily (Patient not taking: Reported on 11/22/2022) 180 tablet 3   • lidocaine (XYLOCAINE) 1 % lidocaine HCl 10 mg/mL (1 %) injection solution   administered (Patient not taking: No sig reported)     • methocarbamol (ROBAXIN) 500 mg tablet Take 1.5 tablets (750 mg total) by mouth 2 (two) times a day (Patient not taking: Reported on 5/24/2022) 10 tablet 0   • methocarbamol (ROBAXIN) 500 mg tablet Take 1 tablet (500 mg total) by mouth 2 (two) times a day (Patient not taking: Reported on 7/14/2023) 30 tablet 0   • Multiple Vitamins-Minerals (PRESERVISION AREDS 2 PO) Take by mouth 2 (two) times a day (Patient not taking: Reported on 7/21/2023)     • naloxegol oxalate (Movantik) 25 MG tablet Take 1 tablet (25 mg total) by mouth every other day (Patient taking differently: Take 25 mg by mouth if needed) 15 tablet 5   • Omega-3 Fatty Acids (FISH OIL PO) Take 1,000 mg by mouth 4 (four) times a day Hydrieye 4 capsule daily. (Patient not taking: Reported on 3/9/2023)     • RABEprazole (ACIPHEX) 20 MG tablet Take 1 tablet (20 mg total) by mouth daily 90 tablet 1   • warfarin (COUMADIN) 5 mg tablet Take 1 tablet (5 mg total) by mouth daily Adjust based on inr (Patient not taking: Reported on 11/22/2022) 20 tablet 0     No current facility-administered medications for this visit.        PAST SURGICAL HISTORY:  Past Surgical History:   Procedure Laterality Date   • AORTIC VALVE REPLACEMENT     • BREAST EXCISIONAL BIOPSY Left 1995    benign   • CARDIAC SURGERY     • CATARACT EXTRACTION     • CHOLECYSTECTOMY     • COLONOSCOPY     • COLONOSCOPY  09/2012    tubular adenoma   • EGD  04/30/2021 Rubens Serrano. MD Shannon.- Multiple stomach polyps, small amount of heme or coffee-grounds noted but do not feel significant evidence for bleeding. • EGD AND COLONOSCOPY  09/2017    adenomatous polyp/ benign gastric polyp   • ESOPHAGOGASTRODUODENOSCOPY  08/2012   • FIXATION KYPHOPLASTY     • HERNIA REPAIR      x3   • HYSTERECTOMY      age 62   • INSERT / REPLACE / REMOVE PACEMAKER     • IR PELVIC ANGIOGRAM  01/29/2019   • JOINT REPLACEMENT Bilateral     knees   • OOPHORECTOMY Bilateral     age 62   • MA COLONOSCOPY FLX DX W/COLLJ SPEC WHEN PFRMD N/A 09/12/2017    Procedure: Stirling Diane COLONOSCOPY with polypectomies;  Surgeon: Domenick Bernheim, MD;  Location: [facility]; Service: Gastroenterology   • MA SIGMOIDOSCOPY FLX DX W/COLLJ SPEC BR/WA IF PFRMD N/A 09/13/2017    Procedure: colonoscopy with hemo clip x 2;  Surgeon: Domenick Bernheim, MD;  Location: [facility]; Service: Gastroenterology   • SKIN BIOPSY     • TONSILLECTOMY         SOCIAL HISTORY:  Social History     Socioeconomic History   • Marital status:       Spouse name: Not on file   • Number of children: Not on file   • Years of education: Not on file   • Highest education level: Not on file   Occupational History   • Occupation: Retired -    Tobacco Use   • Smoking status: Former     Packs/day: 3.00     Years: 20.00     Total pack years: 60.00     Types: Cigarettes   • Smokeless tobacco: Never   • Tobacco comments:     have not smoked for at least 50 years   Vaping Use   • Vaping Use: Never used   Substance and Sexual Activity   • Alcohol use: No   • Drug use: No   • Sexual activity: Yes     Partners: Male     Birth control/protection: Other     Comment: MINGO SOLIZ   Other Topics Concern   • Not on file   Social History Narrative   • Not on file     Social Determinants of Health     Financial Resource Strain: Not on file   Food Insecurity: Not on file   Transportation Needs: Not on file   Physical Activity: Not on file   Stress: Not on file   Social Connections: Not on file   Intimate Partner Violence: Not on file   Housing Stability: Not on file       ALLERGIES:  Allergies   Allergen Reactions   • Acetazolamide GI Intolerance   • Colchicine Other (See Comments)   • Diltiazem Edema   • Docusate Other (See Comments)     impaction   • Eszopiclone    • Febuxostat    • Glycerin Other (See Comments)   • Hydrochlorothiazide Other (See Comments) and Headache     Other reaction(s): Other (see comments)  Generalized swelling   • Latex      Other reaction(s): Other (See Comments)  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   • Lorazepam Nausea Only and Other (See Comments)     Other reaction(s): not sleeping, made her feel "wired"   • Losartan Headache   • Metformin Other (See Comments)     Other reaction(s): GI intolerance   • Nortriptyline Other (See Comments)     Other reaction(s): Other (See Comments)  rash bolivar flexeril     • Other GI Intolerance   • Penicillin G Headache   • Penicillins      Other reaction(s): Other (See Comments)  RASH-anaphylaxis  Other reaction(s): Anaphylaxis   • Povidone Iodine Other (See Comments)     VAGINAL ITCHING   • Ropinirole      Back pain   • Simvastatin Other (See Comments) and Myalgia     muscle aches   • Sulfa Antibiotics      Other reaction(s): Other (See Comments)  bolivar Lasix, GI upset, rash  Other reaction(s): GI Upset   • Torsemide Nausea Only, Vomiting and GI Intolerance     Pt states "it does not work for me". • Valsartan      Other reaction(s):  Other (See Comments)  bolivar olmesartan   • Fluticasone      Leg cramps       ROS:   Constitutional:  No fever, chills, night sweats, loss of appetite   HEENT No no sore throat, difficulty swallowing   Cardiovascular:  No chest pain, palpitations, BLE edema, COMBS   Respiratory:  No SOB, coughing, chest congestion   Gastrointestinal:  No nausea, vomiting, abdominal pain   Genitourinary:  No dysuria, hematuria, urinary urgency/frequency   Musculoskeletal:  See HPI   Skin:  No rash, erythema, edema   Neurologic:  See HPI   Psychiatric Illness:  No depression, anxiety, mood disorder, substance abuse disorder     PHYSICAL EXAM:  /79   Pulse 91   Ht 5' 5" (1.651 m)   Wt 97.1 kg (214 lb 1.1 oz)   BMI 35.62 kg/m²           General:  Well-developed,appears well, no acute distress   Respiratory:  No SOB, no abnormal effort (use of accessory muscles). GI / Abdominal:  Soft. No abdominal masses or tenderness. Nondistended. Gait & balance No evidence of myelopathic gait. Lumbar spine range of motion:  Range of motion not assessed due to known fracture  Tenderness in thoracolumbar spine region midline and paraspinal    Neurologic:    Lower Extremity Motor Function    Right  Left    Iliopsoas  5/5  5/5    Quadriceps 5/5 5/5   Tibialis anterior  5/5  5/5    EHL  5/5  5/5    Gastroc. muscle  5/5  5/5      Sensory: light touch is intact to bilateral upper and lower extremities     Reflexes:    Right Left   Patellar 1+ 1+   Achilles 1+ 1+   Babinski neg neg     Other tests:  Greater troch: no tenderness   Internal/external hip ROM: intact, no pain   Flexion/extension knee ROM: intact, no pain     IMAGING: I have personally reviewed the images and these are my findings:  Lumbar x-ray from 7/21/2023 and CT from 7/6/2023: Multilevel lumbar spondylosis, L3 inferior endplate deformity with less than 50% loss of height, no obvious posterior element involvement, also with evidence of previous L2 kyphoplasty        ASSESSMENT / Medical Decision Making (MDM):  81 y.o. female with history of L3 compression fracture. No incontinence of bowel/bladder, no fever, no chills, no night sweats. Physical exam showing no focal neurologic deficits    Imaging reviewed as above. Findings most notable for L3 compression fracture    Impression of lumbar degenerative disease, osteoporosis, compression fractures    Plan: The above clinical, physical and imaging findings were reviewed with the patient.   Milas Oppenheim has a constellation of findings consistent with mechanical back pain in the setting of acute L3 compression fracture. Patient has a significant history of osteoporosis. We reviewed the natural history of osteoporosis and fragility fractures as well as fracture healing. Discussed case with her daughter who is a nurse practitioner. It seems as though recently prescribed fentanyl patch is not having any significant impact on her pain levels. She is currently taking hydrocodone for many years. We discussed the short course of tramadol for breakthrough pain. She was also fitted for an LSO brace as fracture does not require TLSO brace. We will have her follow-up in 1 month for repeat evaluation and x-rays to assess fracture healing.

## 2023-08-25 ENCOUNTER — OFFICE VISIT (OUTPATIENT)
Dept: OBGYN CLINIC | Facility: HOSPITAL | Age: 88
End: 2023-08-25
Payer: MEDICARE

## 2023-08-25 ENCOUNTER — HOSPITAL ENCOUNTER (OUTPATIENT)
Dept: RADIOLOGY | Facility: HOSPITAL | Age: 88
Discharge: HOME | End: 2023-08-25
Attending: ORTHOPAEDIC SURGERY
Payer: MEDICARE

## 2023-08-25 VITALS
SYSTOLIC BLOOD PRESSURE: 116 MMHG | DIASTOLIC BLOOD PRESSURE: 70 MMHG | BODY MASS INDEX: 35.67 KG/M2 | HEIGHT: 65 IN | WEIGHT: 214.07 LBS | HEART RATE: 89 BPM

## 2023-08-25 DIAGNOSIS — S32.030A COMPRESSION FRACTURE OF L3 LUMBAR VERTEBRA, CLOSED, INITIAL ENCOUNTER (HCC): ICD-10-CM

## 2023-08-25 DIAGNOSIS — S32.030A COMPRESSION FRACTURE OF L3 LUMBAR VERTEBRA, CLOSED, INITIAL ENCOUNTER (HCC): Primary | ICD-10-CM

## 2023-08-25 PROCEDURE — 72100 X-RAY EXAM L-S SPINE 2/3 VWS: CPT

## 2023-08-25 PROCEDURE — 99213 OFFICE O/P EST LOW 20 MIN: CPT | Performed by: ORTHOPAEDIC SURGERY

## 2023-08-25 NOTE — PROGRESS NOTES
NAME: Franky Shaw  : 1935  PCP: Annette Alexandre MD      Chief Complaint: Back pain     HPI:  Franky Shaw is a 81 y.o. female presenting for initial visit with chief complaint of back pain. Patient has a significant history of osteoporosis. She developed back pain about 2 to 3 weeks ago after overdoing it and moving some boxes. She was seen in the ED on 2023 and obtain CT scan which noted an L3 inferior endplate compression fracture. She was given a TLSO brace. She was also evaluated by her primary care physician who treated her with fentanyl patch as she is already taking hydrocodone. She denies any numbness or tingling in her lower extremities. She denies any bladder or bowel incontinence. She is accompanied by her daughter who is a nurse practitioner who lives in [place]. Update on 2023:  Aaliyah Hills is here for follow up, L3 vertebral compression fracture. Doing better with less pain. Continues to ambulate with walker. Taking pre injury pain regimen. Denies numbness/tingling in extremities.      FAMILY HISTORY   Family History   Problem Relation Age of Onset   • Diabetes Mother    • Hypertension Mother    • Heart disease Father    • Emphysema Father    • Asthma Sister    • Cancer Sister         colon   • Hypertension Sister    • Colon cancer Sister    • Autoimmune disease Sister         Diabetes   • Diabetes Sister    • No Known Problems Daughter    • No Known Problems Maternal Grandmother    • No Known Problems Maternal Grandfather    • No Known Problems Paternal Grandmother    • No Known Problems Paternal Grandfather    • Breast cancer Cousin 54   • Breast cancer Cousin 54   • Breast cancer Cousin 79   • Ovarian cancer Sister    • Autoimmune disease Sister         Hashimoto's thyroiditis   • Cancer Sister         ovarian   • No Known Problems Daughter    • No Known Problems Paternal Aunt    • No Known Problems Paternal Aunt    • Breast cancer Cousin    • Breast cancer Cousin    • Cancer Paternal Uncle         prostate       PAST MEDICAL HISTORY:   Past Medical History:   Diagnosis Date   • Anemia    • Arthritis     also osteoporosis of spine   • Cancer [facility])     endometrial   • CHF (congestive heart failure) ([facility])    • Chronic atrial fibrillation ([facility]) 09/13/2017   • Chronic gout 09/13/2017   • Chronic kidney disease    • Chronic rhinitis 11/08/2018   • CKD (chronic kidney disease) stage 3, GFR 30-59 ml/min ([facility]) 09/13/2017   • Clotting disorder ([address])     on warfarin   • Colon polyp 09/13/2017   • Coronary artery disease    • Diabetes mellitus ([address])    • COMBS (dyspnea on exertion) 11/02/2018   • Endometrial cancer [facility])     age 62   • Essential hypertension 09/13/2017   • Gastritis 09/13/2017   • GERD (gastroesophageal reflux disease)    • Heart murmur    • Hiatal hernia    • Hx of hiatal hernia    • Hyperlipidemia    • Hypertension    • Irregular heart beat    • 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   • Osteoarthritis    • Osteoporosis    • Pacemaker    • Peripheral vascular disease ([facility])    • S/P AVR    • Spinal stenosis     disk herniation    • Urinary tract infection        MEDICATIONS:  Current Outpatient Medications   Medication Sig Dispense Refill   • Accu-Chek Lila Plus test strip [employer].      • allopurinol (ZYLOPRIM) 100 mg tablet Take 100 mg by mouth 2 (two) times a day     • aspirin (ECOTRIN LOW STRENGTH) 81 mg EC tablet Take 81 mg by mouth daily     • BD Pen Needle Rena 2nd Gen 32G X 4 MM MISC      • bumetanide (BUMEX) 1 mg tablet Take 1 tablet (1 mg total) by mouth daily (Patient not taking: Reported on 2/24/2022) 30 tablet 3   • Calcium Carb-Cholecalciferol 250-125 MG-UNIT TABS Take by mouth daily (Patient not taking: Reported on 7/14/2023)     • calcium citrate-vitamin D (CITRACAL+D) 315-200 MG-UNIT per tablet Take 1 tablet by mouth 2 (two) times a day     • cholecalciferol (VITAMIN D3) 1,000 units tablet Take 1,000 Units by mouth daily     • Cholecalciferol 50 MCG (2000 UT) CAPS Take 1 capsule by mouth (Patient not taking: Reported on 7/14/2023)     • dapagliflozin (Farxiga) 10 MG tablet Take by mouth daily (Patient not taking: Reported on 7/14/2023)     • Denta 5000 Plus 1.1 % CREA BRUSH TWICE A DAY AS DIRECTED     • fentaNYL (DURAGESIC) 25 mcg/hr 20 mcg     • ferrous sulfate 325 (65 FE) MG EC tablet Take 325 mg by mouth daily (Patient not taking: Reported on 7/21/2023)     • ferrous sulfate 325 (65 Fe) mg tablet Take 325 mg by mouth daily with breakfast (Patient not taking: Reported on 6/7/2022)     • gabapentin (NEURONTIN) 100 mg capsule Take 2 capsules (200 mg total) by mouth daily at bedtime Take 2 capsules by mouth everyday at bedtime (200mg total) (Patient not taking: Reported on 7/21/2023) 180 capsule 1   • glucose blood test strip      • guaifenesin-codeine (GUAIFENESIN AC) 100-10 MG/5ML liquid Iophen C-NR 10 mg-100 mg/5 mL oral liquid (Patient not taking: No sig reported)     • HYDROcodone-acetaminophen (NORCO) 5-325 mg per tablet Take 1 tablet by mouth every 6 (six) hours as needed for pain for up to 10 dosesMax Daily Amount: 4 tablets (Patient not taking: Reported on 9/19/2022) 20 tablet 0   • HYDROcodone-acetaminophen (NORCO) 7.5-325 mg per tablet Take 1 tablet by mouth every 4 to 6 hours if needed for pain     • HYDROcodone-acetaminophen (XODOL) 7.5-300 MG per tablet Take by mouth every 6 (six) hours as needed (Patient not taking: Reported on 11/22/2022)     • indapamide (LOZOL) 2.5 mg tablet Take 2 tablets (5 mg total) by mouth daily (Patient not taking: Reported on 11/22/2022) 180 tablet 3   • insulin aspart (NovoLOG FlexPen) 100 UNIT/ML injection pen Inject 5 Units under the skin as needed     • insulin detemir (Levemir FlexTouch) 100 Units/mL injection pen 13 Units     • insulin detemir (LEVEMIR) 100 units/mL subcutaneous injection Inject under the skin 16 daily     • lidocaine (XYLOCAINE) 1 % lidocaine HCl 10 mg/mL (1 %) injection solution   administered (Patient not taking: No sig reported)     • methocarbamol (ROBAXIN) 500 mg tablet Take 1.5 tablets (750 mg total) by mouth 2 (two) times a day (Patient not taking: Reported on 5/24/2022) 10 tablet 0   • methocarbamol (ROBAXIN) 500 mg tablet Take 1 tablet (500 mg total) by mouth 2 (two) times a day (Patient not taking: Reported on 7/14/2023) 30 tablet 0   • metoprolol succinate (TOPROL-XL) 50 mg 24 hr tablet Take 50 mg by mouth daily     • Multiple Vitamin (MULTIVITAMIN) tablet Take 1 tablet by mouth daily     • Multiple Vitamins-Minerals (PRESERVISION AREDS 2 PO) Take by mouth 2 (two) times a day (Patient not taking: Reported on 7/21/2023)     • naloxegol oxalate (Movantik) 25 MG tablet Take 1 tablet (25 mg total) by mouth every other day (Patient taking differently: Take 25 mg by mouth if needed) 15 tablet 5   • NON FORMULARY Take 1 tablet by mouth 4 (four) times a day HydroEye     • Omega-3 Fatty Acids (FISH OIL PO) Take 1,000 mg by mouth 4 (four) times a day Hydrieye 4 capsule daily. (Patient not taking: Reported on 3/9/2023)     • RABEprazole (ACIPHEX) 20 MG tablet Take 1 tablet (20 mg total) by mouth daily 90 tablet 1   • spironolactone (ALDACTONE) 50 mg tablet Take 50 mg by mouth daily      • traMADol (Ultram) 50 mg tablet Take 1 tablet (50 mg total) by mouth every 8 (eight) hours as needed for severe pain 30 tablet 0   • VITAMIN E PO Take by mouth daily     • Vyzulta 0.024 % SOLN instill 1 drop into both eyes at bedtime     • warfarin (COUMADIN) 2.5 mg tablet Take 1.5 tablets by mouth daily 3.75 mg 6 days 5 mg on 1 day     • warfarin (COUMADIN) 5 mg tablet Take 1 tablet (5 mg total) by mouth daily Adjust based on inr (Patient not taking: Reported on 11/22/2022) 20 tablet 0   • warfarin (COUMADIN) 5 mg tablet Take 5 mg by mouth once a week       No current facility-administered medications for this visit.        PAST SURGICAL HISTORY:  Past Surgical History:   Procedure Laterality Date • AORTIC VALVE REPLACEMENT     • BREAST EXCISIONAL BIOPSY Left 1995    benign   • CARDIAC SURGERY     • CATARACT EXTRACTION     • CHOLECYSTECTOMY     • COLONOSCOPY     • COLONOSCOPY  09/2012    tubular adenoma   • EGD  04/30/2021    [facility] MD.- Multiple stomach polyps, small amount of heme or coffee-grounds noted but do not feel significant evidence for bleeding. • EGD AND COLONOSCOPY  09/2017    adenomatous polyp/ benign gastric polyp   • ESOPHAGOGASTRODUODENOSCOPY  08/2012   • FIXATION KYPHOPLASTY     • HERNIA REPAIR      x3   • HYSTERECTOMY      age 62   • INSERT / REPLACE / REMOVE PACEMAKER     • IR PELVIC ANGIOGRAM  01/29/2019   • JOINT REPLACEMENT Bilateral     knees   • OOPHORECTOMY Bilateral     age 62   • LA COLONOSCOPY FLX DX W/COLLJ SPEC WHEN PFRMD N/A 09/12/2017    Procedure: Servando Martinaris COLONOSCOPY with polypectomies;  Surgeon: Mary Clayton MD;  Location: [facility]; Service: Gastroenterology   • LA SIGMOIDOSCOPY FLX DX W/COLLJ SPEC BR/WA IF PFRMD N/A 09/13/2017    Procedure: colonoscopy with hemo clip x 2;  Surgeon: Mary Clayton MD;  Location: [facility]; Service: Gastroenterology   • SKIN BIOPSY     • TONSILLECTOMY         SOCIAL HISTORY:  Social History     Socioeconomic History   • Marital status:       Spouse name: Not on file   • Number of children: Not on file   • Years of education: Not on file   • Highest education level: Not on file   Occupational History   • Occupation: Retired -    Tobacco Use   • Smoking status: Former     Packs/day: 3.00     Years: 20.00     Total pack years: 60.00     Types: Cigarettes   • Smokeless tobacco: Never   • Tobacco comments:     have not smoked for at least 50 years   Vaping Use   • Vaping Use: Never used   Substance and Sexual Activity   • Alcohol use: No   • Drug use: No   • Sexual activity: Yes     Partners: Male     Birth control/protection: Other     Comment: MARTÍNEZ, BSO   Other Topics Concern   • Not on file Social History Narrative   • Not on file     Social Determinants of Health     Financial Resource Strain: Not on file   Food Insecurity: Not on file   Transportation Needs: Not on file   Physical Activity: Not on file   Stress: Not on file   Social Connections: Not on file   Intimate Partner Violence: Not on file   Housing Stability: Not on file       ALLERGIES:  Allergies   Allergen Reactions   • Acetazolamide GI Intolerance   • Colchicine Other (See Comments)   • Diltiazem Edema   • Docusate Other (See Comments)     impaction   • Eszopiclone    • Febuxostat    • Glycerin Other (See Comments)   • Hydrochlorothiazide Other (See Comments) and Headache     Other reaction(s): Other (see comments)  Generalized swelling   • Latex      Other reaction(s): Other (See Comments)  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   • Lorazepam Nausea Only and Other (See Comments)     Other reaction(s): not sleeping, made her feel "wired"   • Losartan Headache   • Metformin Other (See Comments)     Other reaction(s): GI intolerance   • Nortriptyline Other (See Comments)     Other reaction(s): Other (See Comments)  rash bolivar flexeril     • Other GI Intolerance   • Penicillin G Headache   • Penicillins      Other reaction(s): Other (See Comments)  RASH-anaphylaxis  Other reaction(s): Anaphylaxis   • Povidone Iodine Other (See Comments)     VAGINAL ITCHING   • Ropinirole      Back pain   • Simvastatin Other (See Comments) and Myalgia     muscle aches   • Sulfa Antibiotics      Other reaction(s): Other (See Comments)  bolivar Lasix, GI upset, rash  Other reaction(s): GI Upset   • Torsemide Nausea Only, Vomiting and GI Intolerance     Pt states "it does not work for me". • Valsartan      Other reaction(s):  Other (See Comments)  bolivar olmesartan   • Fluticasone      Leg cramps       ROS:   Constitutional:  No fever, chills, night sweats, loss of appetite   HEENT No no sore throat, difficulty swallowing   Cardiovascular:  No chest pain, palpitations, BLE edema, COMBS   Respiratory:  No SOB, coughing, chest congestion   Gastrointestinal:  No nausea, vomiting, abdominal pain   Genitourinary:  No dysuria, hematuria, urinary urgency/frequency   Musculoskeletal:  See HPI   Skin:  No rash, erythema, edema   Neurologic:  See HPI   Psychiatric Illness:  No depression, anxiety, mood disorder, substance abuse disorder     PHYSICAL EXAM:  /70   Pulse 89   Ht 5' 5" (1.651 m)   Wt 97.1 kg (214 lb 1.1 oz)   BMI 35.62 kg/m²           General:  Well-developed,appears well, no acute distress   Respiratory:  No SOB, no abnormal effort (use of accessory muscles). GI / Abdominal:  Soft. No abdominal masses or tenderness. Nondistended. Gait & balance No evidence of myelopathic gait.       Lumbar spine range of motion:  Range of motion not assessed due to known fracture  Tenderness in thoracolumbar spine region midline and paraspinal    Neurologic:    Lower Extremity Motor Function    Right  Left    Iliopsoas  5/5  5/5    Quadriceps 5/5 5/5   Tibialis anterior  5/5  5/5    EHL  5/5  5/5    Gastroc. muscle  5/5  5/5      Sensory: light touch is intact to bilateral upper and lower extremities     Reflexes:    Right Left   Patellar 1+ 1+   Achilles 1+ 1+   Babinski neg neg     Other tests:  Greater troch: no tenderness   Internal/external hip ROM: intact, no pain   Flexion/extension knee ROM: intact, no pain     IMAGING: I have personally reviewed the images and these are my findings:  Lumbar x-ray from 7/21/2023 and CT from 7/6/2023: Multilevel lumbar spondylosis, L3 inferior endplate deformity with less than 50% loss of height, no obvious posterior element involvement, also with evidence of previous L2 kyphoplasty        Lumbar spine xray on 8/25/2023: Multilevel lumbar spondylosis, L3 inferior endplate deformity with less than 50% loss of height, no obvious posterior element involvement, also with evidence of previous L2 kyphoplasty; stable from previous radiographs ASSESSMENT / Medical Decision Making (MDM):  81 y.o. female with history of L3 compression fracture. Here for follow up. No incontinence of bowel/bladder, no fever, no chills, no night sweats. Physical exam showing no focal neurologic deficits    Imaging reviewed as above. Findings most notable for L3 compression fracture    Impression of lumbar degenerative disease, osteoporosis, compression fractures    Plan: The above clinical, physical and imaging findings were reviewed with the patient. Sana Henley  has a constellation of findings consistent with mechanical back pain in the setting of acute L3 compression fracture. Patient has a significant history of osteoporosis. Sana Henley is here for follow up. She is doing much better but continues with tenderness in lumbar spine. Advised to continue activity restrictions. Follow up in 2 months for repeat radiographs and evaluation. Continue with pain meds as needed.

## 2023-09-11 ENCOUNTER — TELEPHONE (OUTPATIENT)
Dept: SCHEDULING | Facility: CLINIC | Age: 88
End: 2023-09-11
Payer: MEDICARE

## 2023-09-25 ASSESSMENT — ENCOUNTER SYMPTOMS
PSYCHIATRIC NEGATIVE: 1
HEMATOLOGIC/LYMPHATIC NEGATIVE: 1
SHORTNESS OF BREATH: 0
WEIGHT GAIN: 0
ENDOCRINE NEGATIVE: 1
DYSPNEA ON EXERTION: 0
WEAKNESS: 0
GASTROINTESTINAL NEGATIVE: 1
EYES NEGATIVE: 1

## 2023-09-25 NOTE — PROGRESS NOTES
Electrophysiology  Outpatient Progress Note       Reason for visit:   Chief Complaint   Patient presents with    Follow-up    Device Check       HPI   Ailyn St is a 88 y.o. female who is seen today for follow up of her  implanted biventricular pacemaker indicated for complete heart block during AV node ablation in .  She has chronic atrial fibrillation.  She had a new LV lead along with a new RV lead placed in 2018.  Her previous RV lead had chronically elevated thresholds and had been turned off due to this.  That RV lead is abandoned and capped.  The atrial port is capped.     In 2019 she underwent a cardiac catheterization at an outside institution which showed no obstructive disease. Most recent echocardiogram showed normal EF.  She has been having increasing lower extremity edema and abdominal distention, with previous intolerance to loop diuretics.  She recently started Farxiga and feels well.  She feels her shortness of breath and fatigue have improved.  She has been bothered by back issues secondary to recent vertebral fracture.    Past Medical History:   Diagnosis Date    Abnormal ECG     Anticoagulated on Coumadin 2018    Arthritis     Fatigue 2018    GERD (gastroesophageal reflux disease)     Hypertension     Osteoporosis     S/P AVR 2018    SOB (shortness of breath) 2018    Type 2 diabetes mellitus (CMS/HCC)      Past Surgical History:   Procedure Laterality Date    ADENOIDECTOMY      AORTIC VALVE REPLACEMENT      CARDIAC CATHETERIZATION      CHOLECYSTECTOMY      TONSILLECTOMY      TOTAL ABDOMINAL HYSTERECTOMY W/ BILATERAL SALPINGOOPHORECTOMY         Social History     Tobacco Use    Smoking status: Former     Types: Cigarettes     Quit date:      Years since quittin.7    Smokeless tobacco: Never    Tobacco comments:     stoppe in    Vaping Use    Vaping Use: Never used   Substance Use Topics    Alcohol use: Yes     Comment:  rarely    Drug use: Defer     Family History   Problem Relation Age of Onset    Diabetes Biological Mother     Heart disease Biological Father        ALLERGY:  Penicillins, Colace [docusate sodium], Hydrochlorothiazide, Losartan potassium, Metformin, Requip [ropinirole], Sulfa (sulfonamide antibiotics), Valsartan, and Latex    Current Outpatient Medications   Medication Sig Dispense Refill    acetaminophen/diphenhydramine (TYLENOL PM EXTRA STRENGTH ORAL) Take by mouth as needed.      allopurinoL (ZYLOPRIM) 100 mg tablet Take 100 mg by mouth 2 (two) times a day.      B-complex with vitamin C tablet Take 1 tablet by mouth daily.      bimatoprost (LUMIGAN) 0.01 % ophthalmic drops 1 drop nightly.      calcium carbonate-vitamin D3 250-125 mg-unit tablet Take by mouth daily.      cholecalciferol, vitamin D3, 1,000 unit tablet Take 1,000 Units by mouth daily.      FARXIGA 10 mg tablet tablet Take 10 mg by mouth daily.      ferrous sulfate 325 mg (65 mg iron) EC tablet Take 325 mg by mouth daily with breakfast.      gabapentin (NEURONTIN) 100 mg capsule Take 200 mg by mouth nightly.      gabapentin (NEURONTIN) 300 mg capsule Take 300 mg by mouth nightly.      HYDROcodone-acetaminophen (XODOL) 7.5-300 mg per tablet Take 1 tablet by mouth every 6 (six) hours as needed for moderate pain (taken at night).        indapamide (LOZOL) 2.5 mg tablet Take 2.5 mg by mouth daily.      insulin aspart U-100 (NovoLOG) 100 unit/mL subcutaneous pen Inject under the skin 3 (three) times a day before meals. Sliding scale as follows:  160-190 give 3 units, 191-210 give 5 units, 211-240 give 7 units, 241-270 give 9 units, greater than 271 give 10 units and recheck sugars in 2-3 hours      insulin detemir U-100 (LEVEMIR) 100 unit/mL (3 mL) subcutaneous pen Inject 22 Units under the skin daily. In morning      latanoprostene bunod (VYZULTA) 0.024 % drops Administer 1 drop into affected eye(s) nightly. In both eyes       MAGNESIUM ORAL Take 750 mg by mouth daily.      methocarbamol (ROBAXIN) 750 mg tablet Take 750 mg by mouth 2 (two) times a day.      metoprolol succinate XL (TOPROL-XL) 50 mg 24 hr tablet Take 50 mg by mouth daily.      NON FORMULARY MEDICATION REQUEST Take 1 tablet by mouth 4 (four) times a day. Hydro Eye supplement      pantoprazole (PROTONIX) 40 mg EC tablet Take 40 mg by mouth. Normally takes aciphex 30 mg daily.      RABEprazole (ACIPHEX) 20 mg EC tablet Take 20 mg by mouth daily.      spironolactone (ALDACTONE) 50 mg tablet Take 50 mg by mouth once daily.      tolterodine (DETROL) 2 mg tablet Take 2 mg by mouth nightly.      vit A/vit C/vit E/zinc/copper (PRESERVISION AREDS ORAL) Take by mouth 2 (two) times a day.      vitamin E acetate (VITAMIN E ORAL) Take by mouth daily.      warfarin (COUMADIN) 2.5 mg tablet Take 3.75 mg by mouth once daily. Takes 3.75 mg five times weekly      warfarin (COUMADIN) 5 mg tablet Take 5 mg by mouth 2 (two) times a week (Mon, Thu).      vit C/E/Zn/coppr/lutein/zeaxan (PRESERVISION AREDS-2 ORAL) Take by mouth.       No current facility-administered medications for this visit.       Review of Systems   Constitutional: Negative for malaise/fatigue and weight gain.   HENT: Negative.    Eyes: Negative.    Cardiovascular: Negative for dyspnea on exertion.   Respiratory: Negative for shortness of breath.    Endocrine: Negative.    Hematologic/Lymphatic: Negative.    Skin: Negative.    Musculoskeletal: Negative for muscle weakness.   Gastrointestinal: Negative.    Genitourinary: Negative.    Neurological: Negative for weakness.   Psychiatric/Behavioral: Negative.        Objective   Vitals:    09/28/23 1610   BP: (!) 144/70   Pulse: 85   Resp: 18   SpO2: 96%         BP Readings from Last 3 Encounters:   09/28/23 (!) 144/70   01/26/23 134/76   07/14/22 120/70     Physical Exam  HENT:      Head: Normocephalic.   Eyes:      Conjunctiva/sclera: Conjunctivae normal.      Pupils:  Pupils are equal, round, and reactive to light.   Cardiovascular:      Rate and Rhythm: Normal rate and regular rhythm.      Heart sounds: Normal heart sounds.   Pulmonary:      Effort: Pulmonary effort is normal.      Breath sounds: Normal breath sounds.   Abdominal:      General: Bowel sounds are normal. There is no distension.      Palpations: Abdomen is soft.   Musculoskeletal:         General: Normal range of motion.      Cervical back: Normal range of motion.   Skin:     General: Skin is warm and dry.      Comments: Incision well-healed.    Neurological:      Mental Status: She is alert and oriented to person, place, and time.   Psychiatric:         Mood and Affect: Mood is not anxious. Affect is not blunt.         Lab Results   Component Value Date    WBC 6.87 06/01/2018    HGB 10.8 (L) 06/01/2018     06/01/2018     06/01/2018    K 3.7 06/01/2018    CREATININE 1.1 06/01/2018    INR 1.4 06/01/2018     Cardiovascular Procedures:    ECHO/MUGA:  Echo (Scanned) - 9/7/2012  Echo 6/13/2019 - EF 55%; Normal AVR      ELECTROPHYSIOLOGY:  Devices (Bi-Ventricular PPM (Biotronik-Evia), Evia Dr-T serial# [id number] [id number]. DDDR 80-130ppm. Normal function. Changes made=sensor rate and gain. Base rate decreased to 75ppm.) - 10/30/2013   RFA (Indication A Fib, AVN ablation with pacemaker implantation) - 10/22/2013     STRESS TESTS:  Unruly MPI (Scanned) - 10/24/2011   ETT (Lexiscan. Had SOB and lightheadedness during recoevry, otherwise normal.) - 1/22/2016    CT SURGERY-   AVR 1998 - mechanical    Catheterization 1/23/19 ([facility])  HEMODYNAMICS: There was mild pulmonary hypertension. RA 10mmHg, RV 45/10, PA 45/25 mean 32, PCWP 22 with v wave, Pa sat 68.8%, CO 4.5 L/min, CI 2.1 L/min/m2  VALVES:  AORTIC VALVE:   --  A bileaflet mechanical prosthesis was observed in the aortic position. Both leaflet open well.  CORONARY VESSELS:   --  The coronary circulation is right dominant.  --  Left main: Normal.  --  LAD:  Angiography showed minor luminal irregularities.  --  Circumflex: Normal.  --  RCA: Angiography showed minor luminal irregularities.    ECG-atrial fibrillation with biventricular pacing    Assessment   Problem List Items Addressed This Visit        Circulatory    CHB (complete heart block) (CMS/HCC)     Patient is status post AV ashley ablation in 2013 for her persistent atrial fibrillation with rapid rate.  She is status post BiV pacemaker.         Complication of Procedure: Insert single lead to existing PPM/ICD (05/31/2018)    Complication of Admission: Admission (02/19/2018)    Relevant Orders    ECG 12 LEAD-OFFICE PERFORMED (Completed)    Chronic atrial fibrillation (CMS/HCC)     History of persistent atrial fibrillation, on oral anticoagulation and is status post AV ashley ablation with BiV pacing.         Relevant Orders    ECG 12 LEAD-OFFICE PERFORMED (Completed)    Hypertension     Mildly elevated today, but she reports adequate blood pressure control at home.         Relevant Orders    ECG 12 LEAD-OFFICE PERFORMED (Completed)    S/P AVR - Primary     S/p AVR 1998.  Cardiac catheterization January 23, 2019 demonstrated a bileaflet mechanical prosthesis was observed in the aortic position. Both leaflets open well.     She denies symptoms of CHF, chest pain or increasing SOB.          Relevant Orders    ECG 12 LEAD-OFFICE PERFORMED (Completed)       Hematologic    Anticoagulated on Coumadin    Relevant Orders    ECG 12 LEAD-OFFICE PERFORMED (Completed)       Other    Biventricular pacemaker check     She underwent implantation of a Biotronik biventricular pacemaker in May 2018.  Her previous RV lead was malfunctioning therefore a new RV lead was implanted along with the new LV lead.  Her previous RV lead is abandoned and capped.       Biotronik Etrinsa implanted May 31, 2018.  RV threshold 0.8 V at 0.4 ms and LV threshold 0. V at 0.4 ms.  Lead impedances are stable.  Chronic atrial fibrillation. parameters  VVIR 85 bpm. BiV pacing is 100%.  Underlying rhythm is atrial fibrillation with complete heart block.  Battery longevity is ~5.5 years.     She uses remote monitoring, but is not clear where these are being sent.  We will check with [employer] to be sure remote monitoring is being continued, and determining whether or not she will change her monitoring to our office.         Relevant Orders    ECG 12 LEAD-OFFICE PERFORMED (Completed)            Simon Mcknight MD  09/28/2023

## 2023-09-28 ENCOUNTER — OFFICE VISIT (OUTPATIENT)
Dept: CARDIOLOGY | Facility: CLINIC | Age: 88
End: 2023-09-28
Payer: MEDICARE

## 2023-09-28 VITALS
HEIGHT: 65 IN | WEIGHT: 217 LBS | HEART RATE: 85 BPM | SYSTOLIC BLOOD PRESSURE: 144 MMHG | OXYGEN SATURATION: 96 % | DIASTOLIC BLOOD PRESSURE: 70 MMHG | RESPIRATION RATE: 18 BRPM | BODY MASS INDEX: 36.15 KG/M2

## 2023-09-28 DIAGNOSIS — I10 HYPERTENSION, UNSPECIFIED TYPE: ICD-10-CM

## 2023-09-28 DIAGNOSIS — Z95.2 S/P AVR: Primary | ICD-10-CM

## 2023-09-28 DIAGNOSIS — I48.20 CHRONIC ATRIAL FIBRILLATION (CMS/HCC): ICD-10-CM

## 2023-09-28 DIAGNOSIS — Z79.01 ANTICOAGULATED ON COUMADIN: ICD-10-CM

## 2023-09-28 DIAGNOSIS — I44.2 CHB (COMPLETE HEART BLOCK) (CMS/HCC): ICD-10-CM

## 2023-09-28 DIAGNOSIS — Z45.018 BIVENTRICULAR PACEMAKER CHECK: ICD-10-CM

## 2023-09-28 LAB
CREAT ?TM UR-SCNC: 55.7 UMOL/L
EXT ALBUMIN URINE RANDOM: 1.1
MICROALBUMIN/CREAT UR: 19.7 MG/G{CREAT}

## 2023-09-28 PROCEDURE — 99214 OFFICE O/P EST MOD 30 MIN: CPT | Performed by: INTERNAL MEDICINE

## 2023-09-28 PROCEDURE — 93000 ELECTROCARDIOGRAM COMPLETE: CPT | Performed by: INTERNAL MEDICINE

## 2023-09-28 NOTE — LETTER
October 2, 2023     Damien Perdue Jr., MD  [address]  [address]  [facility] [id number]    Patient: Ailyn St  YOB: 1935  Date of Visit: 9/28/2023      Dear Dr. Perdue:    Thank you for referring Ailyn St to me for evaluation. Below are my notes for this consultation.    If you have questions, please do not hesitate to call me. I look forward to following your patient along with you.         Sincerely,        Simon Mcknight MD        CC: MD Roxanna Orellana Steven A, MD  10/2/2023  9:31 AM  Signed       Electrophysiology  Outpatient Progress Note       Reason for visit:   Chief Complaint   Patient presents with    Follow-up    Device Check       HPI   Ailyn St is a 88 y.o. female who is seen today for follow up of her  implanted biventricular pacemaker indicated for complete heart block during AV node ablation in 2013.  She has chronic atrial fibrillation.  She had a new LV lead along with a new RV lead placed in 2018.  Her previous RV lead had chronically elevated thresholds and had been turned off due to this.  That RV lead is abandoned and capped.  The atrial port is capped.     In 2019 she underwent a cardiac catheterization at an outside institution which showed no obstructive disease. Most recent echocardiogram showed normal EF.  She has been having increasing lower extremity edema and abdominal distention, with previous intolerance to loop diuretics.  She recently started Farxiga and feels well.  She feels her shortness of breath and fatigue have improved.  She has been bothered by back issues secondary to recent vertebral fracture.    Past Medical History:   Diagnosis Date    Abnormal ECG     Anticoagulated on Coumadin 12/20/2018    Arthritis     Fatigue 7/16/2018    GERD (gastroesophageal reflux disease)     Hypertension     Osteoporosis     S/P AVR 8/8/2018    SOB (shortness of breath) 12/31/2018    Type 2 diabetes mellitus (CMS/[facility])      Past  Surgical History:   Procedure Laterality Date    ADENOIDECTOMY      AORTIC VALVE REPLACEMENT      CARDIAC CATHETERIZATION      CHOLECYSTECTOMY      TONSILLECTOMY      TOTAL ABDOMINAL HYSTERECTOMY W/ BILATERAL SALPINGOOPHORECTOMY         Social History     Tobacco Use    Smoking status: Former     Types: Cigarettes     Quit date:      Years since quittin.7    Smokeless tobacco: Never    Tobacco comments:     stoppe in    Vaping Use    Vaping Use: Never used   Substance Use Topics    Alcohol use: Yes     Comment: rarely    Drug use: Defer     Family History   Problem Relation Age of Onset    Diabetes Biological Mother     Heart disease Biological Father        ALLERGY:  Penicillins, Colace [docusate sodium], Hydrochlorothiazide, Losartan potassium, Metformin, Requip [ropinirole], Sulfa (sulfonamide antibiotics), Valsartan, and Latex    Current Outpatient Medications   Medication Sig Dispense Refill    acetaminophen/diphenhydramine (TYLENOL PM EXTRA STRENGTH ORAL) Take by mouth as needed.      allopurinoL (ZYLOPRIM) 100 mg tablet Take 100 mg by mouth 2 (two) times a day.      B-complex with vitamin C tablet Take 1 tablet by mouth daily.      bimatoprost (LUMIGAN) 0.01 % ophthalmic drops 1 drop nightly.      calcium carbonate-vitamin D3 250-125 mg-unit tablet Take by mouth daily.      cholecalciferol, vitamin D3, 1,000 unit tablet Take 1,000 Units by mouth daily.      FARXIGA 10 mg tablet tablet Take 10 mg by mouth daily.      ferrous sulfate 325 mg (65 mg iron) EC tablet Take 325 mg by mouth daily with breakfast.      gabapentin (NEURONTIN) 100 mg capsule Take 200 mg by mouth nightly.      gabapentin (NEURONTIN) 300 mg capsule Take 300 mg by mouth nightly.      HYDROcodone-acetaminophen (XODOL) 7.5-300 mg per tablet Take 1 tablet by mouth every 6 (six) hours as needed for moderate pain (taken at night).        indapamide (LOZOL) 2.5 mg tablet Take 2.5 mg by mouth daily.       insulin aspart U-100 (NovoLOG) 100 unit/mL subcutaneous pen Inject under the skin 3 (three) times a day before meals. Sliding scale as follows:  160-190 give 3 units, 191-210 give 5 units, 211-240 give 7 units, 241-270 give 9 units, greater than 271 give 10 units and recheck sugars in 2-3 hours      insulin detemir U-100 (LEVEMIR) 100 unit/mL (3 mL) subcutaneous pen Inject 22 Units under the skin daily. In morning      latanoprostene bunod (VYZULTA) 0.024 % drops Administer 1 drop into affected eye(s) nightly. In both eyes      MAGNESIUM ORAL Take 750 mg by mouth daily.      methocarbamol (ROBAXIN) 750 mg tablet Take 750 mg by mouth 2 (two) times a day.      metoprolol succinate XL (TOPROL-XL) 50 mg 24 hr tablet Take 50 mg by mouth daily.      NON FORMULARY MEDICATION REQUEST Take 1 tablet by mouth 4 (four) times a day. Hydro Eye supplement      pantoprazole (PROTONIX) 40 mg EC tablet Take 40 mg by mouth. Normally takes aciphex 30 mg daily.      RABEprazole (ACIPHEX) 20 mg EC tablet Take 20 mg by mouth daily.      spironolactone (ALDACTONE) 50 mg tablet Take 50 mg by mouth once daily.      tolterodine (DETROL) 2 mg tablet Take 2 mg by mouth nightly.      vit A/vit C/vit E/zinc/copper (PRESERVISION AREDS ORAL) Take by mouth 2 (two) times a day.      vitamin E acetate (VITAMIN E ORAL) Take by mouth daily.      warfarin (COUMADIN) 2.5 mg tablet Take 3.75 mg by mouth once daily. Takes 3.75 mg five times weekly      warfarin (COUMADIN) 5 mg tablet Take 5 mg by mouth 2 (two) times a week (Mon, Thu).      vit C/E/Zn/coppr/lutein/zeaxan (PRESERVISION AREDS-2 ORAL) Take by mouth.       No current facility-administered medications for this visit.       Review of Systems   Constitutional: Negative for malaise/fatigue and weight gain.   HENT: Negative.    Eyes: Negative.    Cardiovascular: Negative for dyspnea on exertion.   Respiratory: Negative for shortness of breath.    Endocrine: Negative.     Hematologic/Lymphatic: Negative.    Skin: Negative.    Musculoskeletal: Negative for muscle weakness.   Gastrointestinal: Negative.    Genitourinary: Negative.    Neurological: Negative for weakness.   Psychiatric/Behavioral: Negative.        Objective   Vitals:    09/28/23 1610   BP: (!) 144/70   Pulse: 85   Resp: 18   SpO2: 96%         BP Readings from Last 3 Encounters:   09/28/23 (!) 144/70   01/26/23 134/76   07/14/22 120/70     Physical Exam  HENT:      Head: Normocephalic.   Eyes:      Conjunctiva/sclera: Conjunctivae normal.      Pupils: Pupils are equal, round, and reactive to light.   Cardiovascular:      Rate and Rhythm: Normal rate and regular rhythm.      Heart sounds: Normal heart sounds.   Pulmonary:      Effort: Pulmonary effort is normal.      Breath sounds: Normal breath sounds.   Abdominal:      General: Bowel sounds are normal. There is no distension.      Palpations: Abdomen is soft.   Musculoskeletal:         General: Normal range of motion.      Cervical back: Normal range of motion.   Skin:     General: Skin is warm and dry.      Comments: Incision well-healed.    Neurological:      Mental Status: She is alert and oriented to person, place, and time.   Psychiatric:         Mood and Affect: Mood is not anxious. Affect is not blunt.         Lab Results   Component Value Date    WBC 6.87 06/01/2018    HGB 10.8 (L) 06/01/2018     06/01/2018     06/01/2018    K 3.7 06/01/2018    CREATININE 1.1 06/01/2018    INR 1.4 06/01/2018     Cardiovascular Procedures:    ECHO/MUGA:  Echo (Scanned) - 9/7/2012  Echo 6/13/2019 - EF 55%; Normal AVR      ELECTROPHYSIOLOGY:  Devices (Bi-Ventricular PPM (Biotronik-Evia), Evia Dr-T serial# [id number] [id number]. DDDR 80-130ppm. Normal function. Changes made=sensor rate and gain. Base rate decreased to 75ppm.) - 10/30/2013   RFA (Indication A Fib, AVN ablation with pacemaker implantation) - 10/22/2013     STRESS TESTS:  Unruly MPI (Scanned) - 10/24/2011   ETT  (Lexiscan. Had SOB and lightheadedness during recoevry, otherwise normal.) - 1/22/2016    CT SURGERY-   AVR 1998 - mechanical    Catheterization 1/23/19 ([facility])  HEMODYNAMICS: There was mild pulmonary hypertension. RA 10mmHg, RV 45/10, PA 45/25 mean 32, PCWP 22 with v wave, Pa sat 68.8%, CO 4.5 L/min, CI 2.1 L/min/m2  VALVES:  AORTIC VALVE:   --  A bileaflet mechanical prosthesis was observed in the aortic position. Both leaflet open well.  CORONARY VESSELS:   --  The coronary circulation is right dominant.  --  Left main: Normal.  --  LAD: Angiography showed minor luminal irregularities.  --  Circumflex: Normal.  --  RCA: Angiography showed minor luminal irregularities.    ECG-atrial fibrillation with biventricular pacing    Assessment   Problem List Items Addressed This Visit        Circulatory    CHB (complete heart block) (CMS/HCC)     Patient is status post AV ashley ablation in 2013 for her persistent atrial fibrillation with rapid rate.  She is status post BiV pacemaker.         Complication of Procedure: Insert single lead to existing PPM/ICD (05/31/2018)    Complication of Admission: Admission (02/19/2018)    Relevant Orders    ECG 12 LEAD-OFFICE PERFORMED (Completed)    Chronic atrial fibrillation (CMS/HCC)     History of persistent atrial fibrillation, on oral anticoagulation and is status post AV ashley ablation with BiV pacing.         Relevant Orders    ECG 12 LEAD-OFFICE PERFORMED (Completed)    Hypertension     Mildly elevated today, but she reports adequate blood pressure control at home.         Relevant Orders    ECG 12 LEAD-OFFICE PERFORMED (Completed)    S/P AVR - Primary     S/p AVR 1998.  Cardiac catheterization January 23, 2019 demonstrated a bileaflet mechanical prosthesis was observed in the aortic position. Both leaflets open well.     She denies symptoms of CHF, chest pain or increasing SOB.          Relevant Orders    ECG 12 LEAD-OFFICE PERFORMED (Completed)       Hematologic     Anticoagulated on Coumadin    Relevant Orders    ECG 12 LEAD-OFFICE PERFORMED (Completed)       Other    Biventricular pacemaker check     She underwent implantation of a Biotronik biventricular pacemaker in May 2018.  Her previous RV lead was malfunctioning therefore a new RV lead was implanted along with the new LV lead.  Her previous RV lead is abandoned and capped.       Biotronik Etrinsa implanted May 31, 2018.  RV threshold 0.8 V at 0.4 ms and LV threshold 0. V at 0.4 ms.  Lead impedances are stable.  Chronic atrial fibrillation. parameters VVIR 85 bpm. BiV pacing is 100%.  Underlying rhythm is atrial fibrillation with complete heart block.  Battery longevity is ~5.5 years.     She uses remote monitoring, but is not clear where these are being sent.  We will check with [employer] to be sure remote monitoring is being continued, and determining whether or not she will change her monitoring to our office.         Relevant Orders    ECG 12 LEAD-OFFICE PERFORMED (Completed)           Simon Mcknight MD  09/28/2023

## 2023-10-02 ENCOUNTER — OFFICE VISIT (OUTPATIENT)
Dept: NEPHROLOGY | Facility: CLINIC | Age: 88
End: 2023-10-02
Payer: MEDICARE

## 2023-10-02 VITALS
WEIGHT: 221 LBS | HEIGHT: 65 IN | SYSTOLIC BLOOD PRESSURE: 132 MMHG | DIASTOLIC BLOOD PRESSURE: 72 MMHG | BODY MASS INDEX: 36.82 KG/M2

## 2023-10-02 DIAGNOSIS — Z79.4 TYPE 2 DIABETES MELLITUS WITH STAGE 3B CHRONIC KIDNEY DISEASE, WITH LONG-TERM CURRENT USE OF INSULIN (HCC): Primary | ICD-10-CM

## 2023-10-02 DIAGNOSIS — N18.32 TYPE 2 DIABETES MELLITUS WITH STAGE 3B CHRONIC KIDNEY DISEASE, WITH LONG-TERM CURRENT USE OF INSULIN (HCC): Primary | ICD-10-CM

## 2023-10-02 DIAGNOSIS — E11.22 TYPE 2 DIABETES MELLITUS WITH STAGE 3B CHRONIC KIDNEY DISEASE, WITH LONG-TERM CURRENT USE OF INSULIN (HCC): Primary | ICD-10-CM

## 2023-10-02 PROCEDURE — 99214 OFFICE O/P EST MOD 30 MIN: CPT | Performed by: INTERNAL MEDICINE

## 2023-10-02 NOTE — PATIENT INSTRUCTIONS
Chronic Kidney Disease   WHAT YOU NEED TO KNOW:   Chronic kidney disease (CKD) is the gradual and permanent loss of kidney function. It is also called chronic kidney failure, or chronic renal insufficiency. Normally, the kidneys remove fluid, chemicals, and waste from your blood. These wastes are turned into urine by your kidneys. CKD may worsen over time and lead to kidney failure. Your CKD team will help you and your family plan for your care at home. The team will help you create goals and find ways to meet your goals. Your care plan may change over time as your needs change. DISCHARGE INSTRUCTIONS:   Call your local emergency number (911 in the [address]) if:   You have a seizure. You have shortness of breath. Return to the emergency department if:   You are confused and very drowsy. Call your doctor or nephrologist if:   You suddenly gain or lose more weight than your healthcare provider has told you is okay. You have itchy skin or a rash. You urinate more or less than you normally do. You have blood in your urine. You have nausea and are vomiting. You have fatigue or muscle weakness. You have hiccups that will not stop. You have questions or concerns about your condition or care. Medicines:   Medicines  may be given to decrease blood pressure and get rid of extra fluid. You may also receive medicine to manage health conditions that may occur with CKD, such as anemia, diabetes, and heart disease. Take your medicine as directed. Contact your healthcare provider if you think your medicine is not helping or if you have side effects. Tell your provider if you are allergic to any medicine. Keep a list of the medicines, vitamins, and herbs you take. Include the amounts, and when and why you take them. Bring the list or the pill bottles to follow-up visits. Carry your medicine list with you in case of an emergency. What you can do to manage CKD:   Management may include making some lifestyle changes. Tell your healthcare provider if you have any concerns about being able to make changes. He or she can help you find solutions, including working with specialists. Ask for help creating a plan to break large goals into smaller steps. Your plan may include any of the following:  Manage other health conditions. Your healthcare provider will work with you to make a care plan that meets your needs. You will be checked regularly for heart disease or other conditions that can make CKD worse, such as diabetes. Your blood pressure will be closely monitored. You will also get a target blood pressure and help making a plan to reach your target. This may include taking your blood pressure at home. Maintain a healthy weight. Your weight and body mass index (BMI) will be checked regularly. BMI helps find if your weight is healthy for your height. Your healthcare provider will use other tests to check your muscle and protein levels. Extra weight can strain your kidneys. A low weight or low muscle mass can make you feel more tired. You may have trouble doing your daily activities. Ask your provider what a healthy weight is for you. He or she can help you create a plan to lose or gain weight safely, if needed. The plan may include keeping a food diary. This is a list of foods and liquids you have each day. Your provider will use the diary to help you make changes, if needed. Changes are based on your health and any other conditions you have, such as diabetes. Create an exercise plan. Regular exercise can help you manage CKD, high blood pressure, and diabetes. Exercise also helps control weight. Your provider can help you create exercise goals and a plan to reach those goals. For example, your goal may be to exercise for 30 minutes in a day. Your plan can include breaking exercise into 10 minute sessions, 3 times during the day. Create a healthy eating plan.   Your provider may tell you to eat food low in potassium, phosphorus, or protein. Your provider may also recommend vitamin or mineral supplements. Do not take any supplements without talking to your provider. A dietitian can help you plan meals if needed. Ask how much liquid to drink each day and which liquids are best for you. Limit sodium (salt) as directed. You may need to limit sodium to less than 2,300 milligrams (mg) each day. Ask your dietitian or healthcare provider how much sodium you can have each day. The amount depends on your stage of kidney disease. Table salt, canned foods, soups, salted snacks, and processed meats, like deli meats and sausage, are high in sodium. Your provider or a dietitian can show you how to read food labels for sodium. Limit alcohol as directed. Alcohol can cause fluid retention and can affect your kidneys. Ask how much alcohol is safe for you. A drink of alcohol is 12 ounces of beer, 5 ounces of wine, or 1½ ounces of liquor. Do not smoke. Nicotine and other chemicals in cigarettes and cigars can cause kidney damage. Ask your provider for information if you currently smoke and need help to quit. E-cigarettes or smokeless tobacco still contain nicotine. Talk to your provider before you use these products. Ask about over-the-counter medicines. Medicines such as NSAIDs and laxatives may harm your kidneys. Some cough and cold medicines can raise your blood pressure. Always ask if a medicine is safe before you take it. Ask about vaccines you may need. CKD can increase your risk for infections such as pneumonia, influenza, and hepatitis. Vaccines lower your risk for infection. Your healthcare provider will tell you which vaccines you need and when to get them. Follow up with your doctor or nephrologist as directed: You will need to return for tests to monitor your kidney and nerve function, and your parathyroid hormone level.  Your medicines may be changed, based on certain test results. Write down your questions so you remember to ask them during your visits. © Copyright [employer] 2023 Information is for End User's use only and may not be sold, redistributed or otherwise used for commercial purposes. The above information is an  only. It is not intended as medical advice for individual conditions or treatments. Talk to your doctor, nurse or pharmacist before following any medical regimen to see if it is safe and effective for you.

## 2023-10-02 NOTE — PROGRESS NOTES
OFFICE FOLLOW UP - Nephrology   Beryle Duverney 81 y.o. female MRN: [id number]    Encounter: [id number]        [address] [facility], Is here today for follow-up    Diagnoses and all orders for this visit:    CKD stage G3b/A2, GFR 30-44 and albumin creatinine ratio  mg/g ([facility])  -Her creatinine is stable at 1.4  -GFR is around 30 to 40 mL/min  -Urine protein to creatinine ratio 0.19  -Urinalysis positive for glucose, trace leukocytes  -CT scan of the abdomen pelvis with contrast from November 2021 shows kidneys with renal cortical cysts no solid renal masses urinary tract calculi or hydronephrosis  -At her last office visit we discussed initiating her Candy Rink again and she did feels much better on it, lower extremity edema improved, breathing improved, renewed Farxiga 10 mg daily today  -Pressures well controlled  -Blood sugar stable  -Medications appropriately dosed  -We will continue to monitor    Secondary hyperparathyroidism, renal ([address])  -PTH is acceptable in normal range   -On vitamin D3 1000 units daily as well as a multivitamin    Type 2 diabetes mellitus with microalbuminuria, without long-term current use of insulin ([address])  -Restarted Candy Rink, renewed prescription today  -Insulin    Chronic atrial fibrillation ([address])  -Anticoagulated with Coumadin  -Rate control with metoprolol XL 50 mg daily    Chronic diastolic congestive heart failure (HCC)  -Volume status is currently stable  -On Farxiga, spironolactone  -No other diuretics at this time    Hypertension, unspecified type  -Blood pressures are stable  -Blood pressure lowering medications include  -Spironolactone 50 mg daily  -Metoprolol XL 50 mg daily    Chronic gout without tophus, unspecified cause, unspecified site  -Allopurinol 100 mg 2 times daily  -This is relatively well controlled    Hyponatremia  -Sodium is improved and at baseline    Proteinuria, unspecified type  -Continue to monitor urine protein to creatinine ratio  -Pressure control, glycemic control  -Serologies show positive CHRISTOPHER, free light chain ratio that is slightly elevated, does see rheumatology, renal function is stable, no hematuria    Class 2 severe obesity due to excess calories with serious comorbidity and body mass index (BMI) of 39.0 to 39.9 in adult [facility])  -With some recent weight loss    Upper abdominal pain, unspecified  -This is now stable    Hyperkalemia  -Continue current medications her electrolytes remained stable    Mechanical heart valve present  -Requires anticoagulation with Coumadin    Dry eye syndrome of both eyes  -Also with worsening arthritis  -Proteinuria  -Serologies negative  -Did see rheumatologist    L3/L4 compression fracture  -Now without brace  -Overall stable  -Using walker    DISCUSSION:    Sana Henley is stable from a renal standpoint, creatinine is stable. Edema is stable as well continue to monitor    HPI: Clara Holter is a 81 y.o. female who is here for Follow-up and Chronic Kidney Disease    Since her last office visit she has been feeling okay, denies any acute shortness of breath fevers chills no nausea vomiting diarrhea foamy or bloody urine    ROS:   Review of Systems   Constitutional: Negative. HENT: Negative. Eyes: Negative. Respiratory: Negative. Cardiovascular: Negative. Gastrointestinal: Negative. Endocrine: Negative. Genitourinary: Negative. Musculoskeletal: Negative. Skin: Negative. Allergic/Immunologic: Negative. Neurological: Negative. Psychiatric/Behavioral: Negative. All other systems reviewed and are negative.       Allergies: Acetazolamide, Colchicine, Diltiazem, Docusate, Eszopiclone, Febuxostat, Glycerin, Hydrochlorothiazide, Latex, Lorazepam, Losartan, Metformin, Nortriptyline, Other, Penicillin g, Penicillins, Povidone iodine, Ropinirole, Simvastatin, Sulfa antibiotics, Torsemide, Valsartan, and Fluticasone    Medications:   Current Outpatient Medications:   •  Accu-Chek Lila Plus test strip, Vancleve Lite Brand., Disp: , Rfl:   •  allopurinol (ZYLOPRIM) 100 mg tablet, Take 100 mg by mouth 2 (two) times a day, Disp: , Rfl:   •  aspirin (ECOTRIN LOW STRENGTH) 81 mg EC tablet, Take 81 mg by mouth daily, Disp: , Rfl:   •  BD Pen Needle Rena 2nd Gen 32G X 4 MM MISC, , Disp: , Rfl:   •  calcium citrate-vitamin D (CITRACAL+D) 315-200 MG-UNIT per tablet, Take 1 tablet by mouth 2 (two) times a day, Disp: , Rfl:   •  cholecalciferol (VITAMIN D3) 1,000 units tablet, Take 1,000 Units by mouth daily, Disp: , Rfl:   •  dapagliflozin (Farxiga) 10 MG tablet, Take 1 tablet (10 mg total) by mouth daily, Disp: 90 tablet, Rfl: 3  •  Denta 5000 Plus 1.1 % CREA, BRUSH TWICE A DAY AS DIRECTED, Disp: , Rfl:   •  gabapentin (NEURONTIN) 100 mg capsule, Take 2 capsules (200 mg total) by mouth daily at bedtime Take 2 capsules by mouth everyday at bedtime (200mg total), Disp: 180 capsule, Rfl: 1  •  glucose blood test strip, , Disp: , Rfl:   •  HYDROcodone-acetaminophen (NORCO) 7.5-325 mg per tablet, Take 1 tablet by mouth every 6 (six) hours as needed every 4 to 6 hours if needed for pain, Disp: , Rfl:   •  insulin aspart (NovoLOG FlexPen) 100 UNIT/ML injection pen, Inject 5 Units under the skin as needed, Disp: , Rfl:   •  insulin detemir (LEVEMIR) 100 units/mL subcutaneous injection, Inject under the skin 16 daily, Disp: , Rfl:   •  metoprolol succinate (TOPROL-XL) 50 mg 24 hr tablet, Take 50 mg by mouth daily, Disp: , Rfl:   •  Multiple Vitamin (MULTIVITAMIN) tablet, Take 1 tablet by mouth daily, Disp: , Rfl:   •  naloxegol oxalate (Movantik) 25 MG tablet, Take 1 tablet (25 mg total) by mouth every other day (Patient taking differently: Take 25 mg by mouth if needed), Disp: 15 tablet, Rfl: 5  •  NON FORMULARY, Take 1 tablet by mouth 4 (four) times a day HydroEye, Disp: , Rfl:   •  RABEprazole (ACIPHEX) 20 MG tablet, Take 1 tablet (20 mg total) by mouth daily, Disp: 90 tablet, Rfl: 3  •  spironolactone (ALDACTONE) 50 mg tablet, Take 50 mg by mouth daily , Disp: , Rfl:   •  VITAMIN E PO, Take by mouth daily, Disp: , Rfl:   •  Vyzulta 0.024 % SOLN, instill 1 drop into both eyes at bedtime, Disp: , Rfl:   •  warfarin (COUMADIN) 2.5 mg tablet, Take 1.5 tablets by mouth daily 3.75 mg 6 days 5 mg on 1 day, Disp: , Rfl:   •  warfarin (COUMADIN) 5 mg tablet, Take 5 mg by mouth once a week, Disp: , Rfl:   •  bumetanide (BUMEX) 1 mg tablet, Take 1 tablet (1 mg total) by mouth daily (Patient not taking: Reported on 2/24/2022), Disp: 30 tablet, Rfl: 3  •  Calcium Carb-Cholecalciferol 250-125 MG-UNIT TABS, Take by mouth daily (Patient not taking: Reported on 7/14/2023), Disp: , Rfl:   •  Cholecalciferol 50 MCG (2000 UT) CAPS, Take 1 capsule by mouth (Patient not taking: Reported on 7/14/2023), Disp: , Rfl:   •  fentaNYL (DURAGESIC) 25 mcg/hr, 20 mcg (Patient not taking: Reported on 10/2/2023), Disp: , Rfl:   •  ferrous sulfate 325 (65 FE) MG EC tablet, Take 325 mg by mouth daily (Patient not taking: Reported on 7/21/2023), Disp: , Rfl:   •  ferrous sulfate 325 (65 Fe) mg tablet, Take 325 mg by mouth daily with breakfast (Patient not taking: Reported on 6/7/2022), Disp: , Rfl:   •  guaifenesin-codeine (GUAIFENESIN AC) 100-10 MG/5ML liquid, Iophen C-NR 10 mg-100 mg/5 mL oral liquid (Patient not taking: No sig reported), Disp: , Rfl:   •  HYDROcodone-acetaminophen (NORCO) 5-325 mg per tablet, Take 1 tablet by mouth every 6 (six) hours as needed for pain for up to 10 dosesMax Daily Amount: 4 tablets (Patient not taking: Reported on 9/19/2022), Disp: 20 tablet, Rfl: 0  •  HYDROcodone-acetaminophen (XODOL) 7.5-300 MG per tablet, Take by mouth every 6 (six) hours as needed (Patient not taking: Reported on 11/22/2022), Disp: , Rfl:   •  indapamide (LOZOL) 2.5 mg tablet, Take 2 tablets (5 mg total) by mouth daily (Patient not taking: Reported on 11/22/2022), Disp: 180 tablet, Rfl: 3  •  insulin detemir (Levemir FlexTouch) 100 Units/mL injection pen, 13 Units (Patient not taking: Reported on 10/2/2023), Disp: , Rfl:   •  lidocaine (XYLOCAINE) 1 %, lidocaine HCl 10 mg/mL (1 %) injection solution  administered (Patient not taking: No sig reported), Disp: , Rfl:   •  methocarbamol (ROBAXIN) 500 mg tablet, Take 1.5 tablets (750 mg total) by mouth 2 (two) times a day (Patient not taking: Reported on 5/24/2022), Disp: 10 tablet, Rfl: 0  •  methocarbamol (ROBAXIN) 500 mg tablet, Take 1 tablet (500 mg total) by mouth 2 (two) times a day (Patient not taking: Reported on 7/14/2023), Disp: 30 tablet, Rfl: 0  •  Multiple Vitamins-Minerals (PRESERVISION AREDS 2 PO), Take by mouth 2 (two) times a day (Patient not taking: Reported on 7/21/2023), Disp: , Rfl:   •  Omega-3 Fatty Acids (FISH OIL PO), Take 1,000 mg by mouth 4 (four) times a day Hydrieye 4 capsule daily.  (Patient not taking: Reported on 3/9/2023), Disp: , Rfl:   •  traMADol (Ultram) 50 mg tablet, Take 1 tablet (50 mg total) by mouth every 8 (eight) hours as needed for severe pain (Patient not taking: Reported on 10/2/2023), Disp: 30 tablet, Rfl: 0  •  warfarin (COUMADIN) 5 mg tablet, Take 1 tablet (5 mg total) by mouth daily Adjust based on inr (Patient not taking: Reported on 11/22/2022), Disp: 20 tablet, Rfl: 0    Past Medical History:   Diagnosis Date   • Anemia    • Arthritis     also osteoporosis of spine   • Cancer [facility])     endometrial   • CHF (congestive heart failure) ([facility])    • Chronic atrial fibrillation ([address]) 09/13/2017   • Chronic gout 09/13/2017   • Chronic kidney disease    • Chronic rhinitis 11/08/2018   • CKD (chronic kidney disease) stage 3, GFR 30-59 ml/min ([facility]) 09/13/2017   • Clotting disorder ([address])     on warfarin   • Colon polyp 09/13/2017   • Compression fracture of L3 vertebra ([facility])    • Coronary artery disease    • Diabetes mellitus ([address])    • COMBS (dyspnea on exertion) 11/02/2018   • Endometrial cancer [facility])     age 62   • Essential hypertension 09/13/2017   • Gastritis 09/13/2017   • GERD (gastroesophageal reflux disease)    • Heart murmur    • Hiatal hernia    • Hx of hiatal hernia    • Hyperlipidemia    • Hypertension    • Irregular heart beat    • 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   • Osteoarthritis    • Osteoporosis    • Pacemaker    • Peripheral vascular disease ([address])    • S/P AVR    • Spinal stenosis     disk herniation    • Urinary tract infection      Past Surgical History:   Procedure Laterality Date   • AORTIC VALVE REPLACEMENT     • BREAST EXCISIONAL BIOPSY Left 1995    benign   • CARDIAC SURGERY     • CATARACT EXTRACTION     • CHOLECYSTECTOMY     • COLONOSCOPY     • COLONOSCOPY  09/2012    tubular adenoma   • [facility] Multiple stomach polyps, small amount of heme or coffee-grounds noted but do not feel significant evidence for bleeding. • EGD AND COLONOSCOPY  09/2017    adenomatous polyp/ benign gastric polyp   • ESOPHAGOGASTRODUODENOSCOPY  08/2012   • FIXATION KYPHOPLASTY     • HERNIA REPAIR      x3   • HYSTERECTOMY      age 62   • INSERT / REPLACE / REMOVE PACEMAKER     • IR PELVIC ANGIOGRAM  01/29/2019   • JOINT REPLACEMENT Bilateral     knees   • OOPHORECTOMY Bilateral     age 62   • DC COLONOSCOPY FLX DX W/COLLJ SPEC WHEN PFRMD N/A 09/12/2017    Procedure: Adenike Saldaña COLONOSCOPY with polypectomies;  Surgeon: Cyndi Love MD;  Location: [facility]; Service: Gastroenterology   • DC SIGMOIDOSCOPY FLX DX W/COLLJ SPEC BR/WA IF PFRMD N/A 09/13/2017    Procedure: colonoscopy with hemo clip x 2;  Surgeon: Cyndi Love MD;  Location: [facility];   Service: Gastroenterology   • SKIN BIOPSY     • TONSILLECTOMY       Family History   Problem Relation Age of Onset   • Diabetes Mother    • Hypertension Mother    • Heart disease Father    • Emphysema Father    • Asthma Sister    • Cancer Sister         colon   • Hypertension Sister    • Colon cancer Sister    • Autoimmune disease Sister         Diabetes   • Diabetes Sister    • No Known Problems Daughter    • No Known Problems Maternal Grandmother    • No Known Problems Maternal Grandfather    • No Known Problems Paternal Grandmother    • No Known Problems Paternal Grandfather    • Breast cancer Cousin 54   • Breast cancer Cousin 54   • Breast cancer Cousin 79   • Ovarian cancer Sister    • Autoimmune disease Sister         Hashimoto's thyroiditis   • Cancer Sister         ovarian   • No Known Problems Daughter    • No Known Problems Paternal Aunt    • No Known Problems Paternal Aunt    • Breast cancer Cousin    • Breast cancer Cousin    • Cancer Paternal Uncle         prostate      reports that she has quit smoking. Her smoking use included cigarettes. She has a 60.00 pack-year smoking history. She has never used smokeless tobacco. She reports that she does not drink alcohol and does not use drugs. OBJECTIVE:    Vitals:    10/02/23 0956   BP: 132/72   BP Location: Left arm   Patient Position: Sitting   Cuff Size: Large   Weight: 100 kg (221 lb)   Height: 5' 5" (1.651 m)        Body mass index is 36.78 kg/m². [unfilled]     Weight (last 2 days)     Date/Time Weight    10/02/23 0956 100 (221)             Physical exam:  Physical Exam  Vitals reviewed. Constitutional:       Appearance: Normal appearance. She is normal weight. HENT:      Head: Normocephalic and atraumatic. Nose: Nose normal.      Mouth/Throat:      Mouth: Mucous membranes are dry. Pharynx: Oropharynx is clear. Eyes:      Extraocular Movements: Extraocular movements intact. Pupils: Pupils are equal, round, and reactive to light. Cardiovascular:      Rate and Rhythm: Normal rate. Pulses: Normal pulses. Heart sounds: Normal heart sounds. Pulmonary:      Effort: Pulmonary effort is normal.      Breath sounds: Normal breath sounds. Abdominal:      General: Abdomen is flat. Bowel sounds are normal.      Palpations: Abdomen is soft.    Genitourinary:     Rectum: Normal.   Musculoskeletal: General: Normal range of motion. Cervical back: Normal range of motion. Right lower leg: Edema present. Left lower leg: Edema present. Skin:     General: Skin is warm and dry. Findings: Bruising present. Neurological:      General: No focal deficit present. Mental Status: She is oriented to person, place, and time. Mental status is at baseline. Psychiatric:         Mood and Affect: Mood normal.         Behavior: Behavior normal.         Thought Content:  Thought content normal.         Judgment: Judgment normal.             Lab Results:    Results for orders placed or performed during the hospital encounter of 07/06/23   CBC and differential   Result Value Ref Range    WBC 7.27 4.31 - 10.16 Thousand/uL    RBC 4.21 3.81 - 5.12 Million/uL    Hemoglobin 12.7 11.5 - 15.4 g/dL    Hematocrit 40.5 34.8 - 46.1 %    MCV 96 82 - 98 fL    MCH 30.2 26.8 - 34.3 pg    MCHC 31.4 31.4 - 37.4 g/dL    RDW 15.2 (H) 11.6 - 15.1 %    MPV 10.0 8.9 - 12.7 fL    Platelets 731 011 - 391 Thousands/uL    nRBC 0 /100 WBCs    Neutrophils Relative 67 43 - 75 %    Immat GRANS % 1 0 - 2 %    Lymphocytes Relative 21 14 - 44 %    Monocytes Relative 9 4 - 12 %    Eosinophils Relative 2 0 - 6 %    Basophils Relative 0 0 - 1 %    Neutrophils Absolute 4.80 1.85 - 7.62 Thousands/µL    Immature Grans Absolute 0.07 0.00 - 0.20 Thousand/uL    Lymphocytes Absolute 1.53 0.60 - 4.47 Thousands/µL    Monocytes Absolute 0.67 0.17 - 1.22 Thousand/µL    Eosinophils Absolute 0.17 0.00 - 0.61 Thousand/µL    Basophils Absolute 0.03 0.00 - 0.10 Thousands/µL   Comprehensive metabolic panel   Result Value Ref Range    Sodium 135 135 - 147 mmol/L    Potassium 4.7 3.5 - 5.3 mmol/L    Chloride 99 96 - 108 mmol/L    CO2 30 21 - 32 mmol/L    ANION GAP 6 mmol/L    BUN 37 (H) 5 - 25 mg/dL    Creatinine 1.31 (H) 0.60 - 1.30 mg/dL    Glucose 123 65 - 140 mg/dL    Calcium 8.9 8.4 - 10.2 mg/dL    AST 27 13 - 39 U/L    ALT 38 7 - 52 U/L    Alkaline Phosphatase 150 (H) 34 - 104 U/L    Total Protein 7.4 6.4 - 8.4 g/dL    Albumin 3.7 3.5 - 5.0 g/dL    Total Bilirubin 0.47 0.20 - 1.00 mg/dL    eGFR 36 ml/min/1.73sq m   Protime-INR   Result Value Ref Range    Protime 31.7 (H) 11.6 - 14.5 seconds    INR 3.09 (H) 0.84 - 1.19   Urine Macroscopic, POC   Result Value Ref Range    Color, UA Yellow     Clarity, UA Clear     pH, UA 5.5 4.5 - 8.0    Leukocytes, UA Negative Negative    Nitrite, UA Negative Negative    Protein, UA Negative Negative mg/dl    Glucose, UA Negative Negative mg/dl    Ketones, UA Negative Negative mg/dl    Urobilinogen, UA 0.2 0.2, 1.0 E.U./dl E.U./dl    Bilirubin, UA Negative Negative    Occult Blood, UA Negative Negative    Specific Gravity, UA <=1.005 1.003 - 1.030       Portions of the record may have been created with voice recognition software. Occasional wrong word or "sound a like" substitutions may have occurred due to the inherent limitations of voice recognition software. Read the chart carefully and recognize, using context, where substitutions have occurred. If you have any questions, please contact the dictating provider.

## 2023-10-02 NOTE — ASSESSMENT & PLAN NOTE
She underwent implantation of a Biotronik biventricular pacemaker in May 2018.  Her previous RV lead was malfunctioning therefore a new RV lead was implanted along with the new LV lead.  Her previous RV lead is abandoned and capped.       Biotronik Etrinsa implanted May 31, 2018.  RV threshold 0.8 V at 0.4 ms and LV threshold 0. V at 0.4 ms.  Lead impedances are stable.  Chronic atrial fibrillation. parameters VVIR 85 bpm. BiV pacing is 100%.  Underlying rhythm is atrial fibrillation with complete heart block.  Battery longevity is ~5.5 years.     She uses remote monitoring, but is not clear where these are being sent.  We will check with [employer] to be sure remote monitoring is being continued, and determining whether or not she will change her monitoring to our office.

## 2023-10-02 NOTE — ASSESSMENT & PLAN NOTE
Patient is status post AV ashley ablation in 2013 for her persistent atrial fibrillation with rapid rate.  She is status post BiV pacemaker.

## 2023-10-02 NOTE — ASSESSMENT & PLAN NOTE
History of persistent atrial fibrillation, on oral anticoagulation and is status post AV ashley ablation with BiV pacing.

## 2023-10-05 ENCOUNTER — TELEPHONE (OUTPATIENT)
Dept: NEPHROLOGY | Facility: HOSPITAL | Age: 88
End: 2023-10-05

## 2023-10-05 NOTE — TELEPHONE ENCOUNTER
----- Message from Rey العلي DO sent at 10/5/2023  1:45 PM EDT -----  Reviewed.   The microalbumin to creatinine ratio is 19.7 which is minimal.  We will monitor creatinine is stable at 1.4

## 2023-10-05 NOTE — TELEPHONE ENCOUNTER
I spoke to Lucia she is aware labs are stable no changes at this time as we will continue to monitor.

## 2023-10-19 NOTE — ASSESSMENT & PLAN NOTE
BP Readings from Last 3 Encounters:   12/12/19 (!) 148/80   06/27/19 124/80   12/27/18 130/80   Although her blood pressure is slightly elevated at today's appointment she reports stability at home.   Vaginal delivery

## 2023-10-27 ENCOUNTER — OFFICE VISIT (OUTPATIENT)
Dept: OBGYN CLINIC | Facility: HOSPITAL | Age: 88
End: 2023-10-27
Payer: MEDICARE

## 2023-10-27 ENCOUNTER — HOSPITAL ENCOUNTER (OUTPATIENT)
Dept: RADIOLOGY | Facility: HOSPITAL | Age: 88
Discharge: HOME | End: 2023-10-27
Attending: ORTHOPAEDIC SURGERY
Payer: MEDICARE

## 2023-10-27 VITALS — WEIGHT: 220.46 LBS | HEIGHT: 65 IN | BODY MASS INDEX: 36.73 KG/M2

## 2023-10-27 DIAGNOSIS — M70.61 TROCHANTERIC BURSITIS OF BOTH HIPS: ICD-10-CM

## 2023-10-27 DIAGNOSIS — M70.62 TROCHANTERIC BURSITIS OF BOTH HIPS: ICD-10-CM

## 2023-10-27 DIAGNOSIS — S32.030A COMPRESSION FRACTURE OF L3 LUMBAR VERTEBRA, CLOSED, INITIAL ENCOUNTER (HCC): ICD-10-CM

## 2023-10-27 DIAGNOSIS — S32.030A COMPRESSION FRACTURE OF L3 LUMBAR VERTEBRA, CLOSED, INITIAL ENCOUNTER (HCC): Primary | ICD-10-CM

## 2023-10-27 PROCEDURE — 99213 OFFICE O/P EST LOW 20 MIN: CPT | Performed by: ORTHOPAEDIC SURGERY

## 2023-10-27 PROCEDURE — 72100 X-RAY EXAM L-S SPINE 2/3 VWS: CPT

## 2023-10-27 NOTE — PROGRESS NOTES
NAME: Nuno Evans  : 1935  PCP: Annie Bourgeois MD      Chief Complaint: Back pain     HPI:  Nuno Evans is a 81 y.o. female presenting for initial visit with chief complaint of back pain. Patient has a significant history of osteoporosis. She developed back pain about 2 to 3 weeks ago after overdoing it and moving some boxes. She was seen in the ED on 2023 and obtain CT scan which noted an L3 inferior endplate compression fracture. She was given a TLSO brace. She was also evaluated by her primary care physician who treated her with fentanyl patch as she is already taking hydrocodone. She denies any numbness or tingling in her lower extremities. She denies any bladder or bowel incontinence. She is accompanied by her daughter who is a nurse practitioner who lives in [place]. Update on 2023:  Parish Be is here for follow up, L3 vertebral compression fracture. Doing better with less pain. Continues to ambulate with walker. Taking pre injury pain regimen. Denies numbness/tingling in extremities. Update on 10/27/2023:  81 y.o. female presents to the office for follow up of L3 compression fracture. She reports continued bilateral low back pain. Her pain worsens with standing for any length of time. She is experiencing lateral hip pain made worse with laying on her sides. She denies any numbness or tingling. She recently started Prolia.     FAMILY HISTORY   Family History   Problem Relation Age of Onset    Diabetes Mother     Hypertension Mother     Heart disease Father     Emphysema Father     Asthma Sister     Cancer Sister         colon    Hypertension Sister     Colon cancer Sister     Autoimmune disease Sister         Diabetes    Diabetes Sister     No Known Problems Daughter     No Known Problems Maternal Grandmother     No Known Problems Maternal Grandfather     No Known Problems Paternal Grandmother     No Known Problems Paternal Grandfather     Breast cancer Cousin 54 Breast cancer Cousin 54    Breast cancer Cousin 79    Ovarian cancer Sister     Autoimmune disease Sister         Hashimoto's thyroiditis    Cancer Sister         ovarian    No Known Problems Daughter     No Known Problems Paternal Aunt     No Known Problems Paternal Aunt     Breast cancer Cousin     Breast cancer Cousin     Cancer Paternal Uncle         prostate       PAST MEDICAL HISTORY:   Past Medical History:   Diagnosis Date    Anemia     Arthritis     also osteoporosis of spine    Cancer ([address])     endometrial    CHF (congestive heart failure) ([facility])     Chronic atrial fibrillation ([address]) 09/13/2017    Chronic gout 09/13/2017    Chronic kidney disease     Chronic rhinitis 11/08/2018    CKD (chronic kidney disease) stage 3, GFR 30-59 ml/min ([facility]) 09/13/2017    Clotting disorder ([address])     on warfarin    Colon polyp 09/13/2017    Compression fracture of L3 vertebra ([facility])     Coronary artery disease     Diabetes mellitus ([address])     COMBS (dyspnea on exertion) 11/02/2018    Endometrial cancer [facility])     age 62    Essential hypertension 09/13/2017    Gastritis 09/13/2017    GERD (gastroesophageal reflux disease)     Heart murmur     Hiatal hernia     Hx of hiatal hernia     Hyperlipidemia     Hypertension     Irregular heart beat     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    Osteoarthritis     Osteoporosis     Pacemaker     Peripheral vascular disease ([address])     S/P AVR     Spinal stenosis     disk herniation     Urinary tract infection        MEDICATIONS:  Current Outpatient Medications   Medication Sig Dispense Refill    Accu-Chek Lila Plus test strip [employer].       allopurinol (ZYLOPRIM) 100 mg tablet Take 100 mg by mouth 2 (two) times a day      aspirin (ECOTRIN LOW STRENGTH) 81 mg EC tablet Take 81 mg by mouth daily      BD Pen Needle Rena 2nd Gen 32G X 4 MM MISC       bumetanide (BUMEX) 1 mg tablet Take 1 tablet (1 mg total) by mouth daily (Patient not taking: Reported on 2/24/2022) 30 tablet 3 Calcium Carb-Cholecalciferol 250-125 MG-UNIT TABS Take by mouth daily (Patient not taking: Reported on 7/14/2023)      calcium citrate-vitamin D (CITRACAL+D) 315-200 MG-UNIT per tablet Take 1 tablet by mouth 2 (two) times a day      cholecalciferol (VITAMIN D3) 1,000 units tablet Take 1,000 Units by mouth daily      Cholecalciferol 50 MCG (2000 UT) CAPS Take 1 capsule by mouth (Patient not taking: Reported on 7/14/2023)      dapagliflozin (Farxiga) 10 MG tablet Take 1 tablet (10 mg total) by mouth daily 90 tablet 3    Denta 5000 Plus 1.1 % CREA BRUSH TWICE A DAY AS DIRECTED      fentaNYL (DURAGESIC) 25 mcg/hr 20 mcg (Patient not taking: Reported on 10/2/2023)      ferrous sulfate 325 (65 FE) MG EC tablet Take 325 mg by mouth daily (Patient not taking: Reported on 7/21/2023)      ferrous sulfate 325 (65 Fe) mg tablet Take 325 mg by mouth daily with breakfast (Patient not taking: Reported on 6/7/2022)      gabapentin (NEURONTIN) 100 mg capsule Take 2 capsules (200 mg total) by mouth daily at bedtime Take 2 capsules by mouth everyday at bedtime (200mg total) 180 capsule 1    glucose blood test strip       guaifenesin-codeine (GUAIFENESIN AC) 100-10 MG/5ML liquid Iophen C-NR 10 mg-100 mg/5 mL oral liquid (Patient not taking: No sig reported)      HYDROcodone-acetaminophen (NORCO) 5-325 mg per tablet Take 1 tablet by mouth every 6 (six) hours as needed for pain for up to 10 dosesMax Daily Amount: 4 tablets (Patient not taking: Reported on 9/19/2022) 20 tablet 0    HYDROcodone-acetaminophen (NORCO) 7.5-325 mg per tablet Take 1 tablet by mouth every 6 (six) hours as needed every 4 to 6 hours if needed for pain      HYDROcodone-acetaminophen (XODOL) 7.5-300 MG per tablet Take by mouth every 6 (six) hours as needed (Patient not taking: Reported on 11/22/2022)      indapamide (LOZOL) 2.5 mg tablet Take 2 tablets (5 mg total) by mouth daily (Patient not taking: Reported on 11/22/2022) 180 tablet 3    insulin aspart (NovoLOG FlexPen) 100 UNIT/ML injection pen Inject 5 Units under the skin as needed      insulin detemir (Levemir FlexTouch) 100 Units/mL injection pen 13 Units (Patient not taking: Reported on 10/2/2023)      insulin detemir (LEVEMIR) 100 units/mL subcutaneous injection Inject under the skin 16 daily      lidocaine (XYLOCAINE) 1 % lidocaine HCl 10 mg/mL (1 %) injection solution   administered (Patient not taking: No sig reported)      methocarbamol (ROBAXIN) 500 mg tablet Take 1.5 tablets (750 mg total) by mouth 2 (two) times a day (Patient not taking: Reported on 5/24/2022) 10 tablet 0    methocarbamol (ROBAXIN) 500 mg tablet Take 1 tablet (500 mg total) by mouth 2 (two) times a day (Patient not taking: Reported on 7/14/2023) 30 tablet 0    metoprolol succinate (TOPROL-XL) 50 mg 24 hr tablet Take 50 mg by mouth daily      Multiple Vitamin (MULTIVITAMIN) tablet Take 1 tablet by mouth daily      Multiple Vitamins-Minerals (PRESERVISION AREDS 2 PO) Take by mouth 2 (two) times a day (Patient not taking: Reported on 7/21/2023)      naloxegol oxalate (Movantik) 25 MG tablet Take 1 tablet (25 mg total) by mouth every other day (Patient taking differently: Take 25 mg by mouth if needed) 15 tablet 5    NON FORMULARY Take 1 tablet by mouth 4 (four) times a day HydroEye      Omega-3 Fatty Acids (FISH OIL PO) Take 1,000 mg by mouth 4 (four) times a day Hydrieye 4 capsule daily.  (Patient not taking: Reported on 3/9/2023)      RABEprazole (ACIPHEX) 20 MG tablet Take 1 tablet (20 mg total) by mouth daily 90 tablet 3    spironolactone (ALDACTONE) 50 mg tablet Take 50 mg by mouth daily       traMADol (Ultram) 50 mg tablet Take 1 tablet (50 mg total) by mouth every 8 (eight) hours as needed for severe pain (Patient not taking: Reported on 10/2/2023) 30 tablet 0    VITAMIN E PO Take by mouth daily      Vyzulta 0.024 % SOLN instill 1 drop into both eyes at bedtime      warfarin (COUMADIN) 2.5 mg tablet Take 1.5 tablets by mouth daily 3.75 mg 6 days 5 mg on 1 day      warfarin (COUMADIN) 5 mg tablet Take 1 tablet (5 mg total) by mouth daily Adjust based on inr (Patient not taking: Reported on 11/22/2022) 20 tablet 0    warfarin (COUMADIN) 5 mg tablet Take 5 mg by mouth once a week       No current facility-administered medications for this visit. PAST SURGICAL HISTORY:  Past Surgical History:   Procedure Laterality Date    AORTIC VALVE REPLACEMENT      BREAST EXCISIONAL BIOPSY Left 1995    benign    CARDIAC SURGERY      CATARACT EXTRACTION      CHOLECYSTECTOMY      COLONOSCOPY      COLONOSCOPY  09/2012    tubular adenoma    EGD  04/30/2021    [facility] MD.- Multiple stomach polyps, small amount of heme or coffee-grounds noted but do not feel significant evidence for bleeding. EGD AND COLONOSCOPY  09/2017    adenomatous polyp/ benign gastric polyp    ESOPHAGOGASTRODUODENOSCOPY  08/2012    FIXATION KYPHOPLASTY      HERNIA REPAIR      x3    HYSTERECTOMY      age 62    INSERT / REPLACE / REMOVE PACEMAKER      IR PELVIC ANGIOGRAM  01/29/2019    JOINT REPLACEMENT Bilateral     knees    OOPHORECTOMY Bilateral     age 62    CT COLONOSCOPY FLX DX W/COLLJ SPEC WHEN PFRMD N/A 09/12/2017    Procedure: Indy Cornelius COLONOSCOPY with polypectomies;  Surgeon: Patricia Doan MD;  Location: [facility]; Service: Gastroenterology    CT SIGMOIDOSCOPY FLX DX W/COLLJ SPEC BR/WA IF PFRMD N/A 09/13/2017    Procedure: colonoscopy with hemo clip x 2;  Surgeon: Patricia Doan MD;  Location: [facility]; Service: Gastroenterology    SKIN BIOPSY      TONSILLECTOMY         SOCIAL HISTORY:  Social History     Socioeconomic History    Marital status:       Spouse name: Not on file    Number of children: Not on file    Years of education: Not on file    Highest education level: Not on file   Occupational History    Occupation: Retired -    Tobacco Use    Smoking status: Former     Packs/day: 3.00     Years: 20.00     Total pack years: 60.00 Types: Cigarettes    Smokeless tobacco: Never    Tobacco comments:     have not smoked for at least 50 years   Vaping Use    Vaping Use: Never used   Substance and Sexual Activity    Alcohol use: No    Drug use: No    Sexual activity: Yes     Partners: Male     Birth control/protection: Other     Comment: MINGO SOLIZ   Other Topics Concern    Not on file   Social History Narrative    Not on file     Social Determinants of Health     Financial Resource Strain: Not on file   Food Insecurity: Not on file   Transportation Needs: Not on file   Physical Activity: Not on file   Stress: Not on file   Social Connections: Not on file   Intimate Partner Violence: Not on file   Housing Stability: Not on file       ALLERGIES:  Allergies   Allergen Reactions    Acetazolamide GI Intolerance    Colchicine Other (See Comments)    Diltiazem Edema    Docusate Other (See Comments)     impaction    Eszopiclone     Febuxostat     Glycerin Other (See Comments)    Hydrochlorothiazide Other (See Comments) and Headache     Other reaction(s): Other (see comments)  Generalized swelling    Latex      Other reaction(s): Other (See Comments)  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    Lorazepam Nausea Only and Other (See Comments)     Other reaction(s): not sleeping, made her feel "wired"    Losartan Headache    Metformin Other (See Comments)     Other reaction(s): GI intolerance    Nortriptyline Other (See Comments)     Other reaction(s): Other (See Comments)  rash bolivar flexeril      Other GI Intolerance    Penicillin G Headache    Penicillins      Other reaction(s): Other (See Comments)  RASH-anaphylaxis  Other reaction(s): Anaphylaxis    Povidone Iodine Other (See Comments)     VAGINAL ITCHING    Ropinirole      Back pain    Simvastatin Other (See Comments) and Myalgia     muscle aches    Sulfa Antibiotics      Other reaction(s):  Other (See Comments)  bolivar Lasix, GI upset, rash  Other reaction(s): GI Upset    Torsemide Nausea Only, Vomiting and GI Intolerance Pt states "it does not work for me". Valsartan      Other reaction(s): Other (See Comments)  bolivar olmesartan    Fluticasone      Leg cramps       ROS:   Constitutional:  No fever, chills, night sweats, loss of appetite   HEENT No no sore throat, difficulty swallowing   Cardiovascular:  No chest pain, palpitations, BLE edema, COMBS   Respiratory:  No SOB, coughing, chest congestion   Gastrointestinal:  No nausea, vomiting, abdominal pain   Genitourinary:  No dysuria, hematuria, urinary urgency/frequency   Musculoskeletal:  See HPI   Skin:  No rash, erythema, edema   Neurologic:  See HPI   Psychiatric Illness:  No depression, anxiety, mood disorder, substance abuse disorder     PHYSICAL EXAM:  Ht 5' 5" (1.651 m)   Wt 100 kg (220 lb 7.4 oz)   BMI 36.69 kg/m²           General:  Well-developed,appears well, no acute distress   Respiratory:  No SOB, no abnormal effort (use of accessory muscles). GI / Abdominal:  Soft. No abdominal masses or tenderness. Nondistended. Gait & balance No evidence of myelopathic gait.       Lumbar spine range of motion:  Range of motion not assessed due to known fracture  Tenderness in thoracolumbar spine region midline and paraspinal    Neurologic:    Lower Extremity Motor Function    Right  Left    Iliopsoas  5/5  5/5    Quadriceps 5/5 5/5   Tibialis anterior  5/5  5/5    EHL  5/5  5/5    Gastroc. muscle  5/5  5/5      Sensory: light touch is intact to bilateral upper and lower extremities     Reflexes:    Right Left   Patellar 1+ 1+   Achilles 1+ 1+   Babinski neg neg     Other tests:  Greater troch: significant tenderness   Internal/external hip ROM: intact, no pain   Flexion/extension knee ROM: intact, no pain     IMAGING: I have personally reviewed the images and these are my findings:  Lumbar x-ray from 7/21/2023 and CT from 7/6/2023: Multilevel lumbar spondylosis, L3 inferior endplate deformity with less than 50% loss of height, no obvious posterior element involvement, also with evidence of previous L2 kyphoplasty        Lumbar spine xray on 8/25/2023: Multilevel lumbar spondylosis, L3 inferior endplate deformity with less than 50% loss of height, no obvious posterior element involvement, also with evidence of previous L2 kyphoplasty; stable from previous radiographs       Lumbar spine xray on 10/27/2023: stable and relatively unchanged appearance of L3 compression fracture    ASSESSMENT / Medical Decision Making (MDM):  81 y.o. female with history of L3 compression fracture. Here for follow up. No incontinence of bowel/bladder, no fever, no chills, no night sweats. Physical exam showing no focal neurologic deficits    Imaging reviewed as above. Findings most notable for L3 compression fracture    Impression of lumbar degenerative disease, osteoporosis, compression fractures    Plan: The above clinical, physical and imaging findings were reviewed with the patient. Ramona Templeton  has a constellation of findings consistent with mechanical back pain in the setting of L3 compression fracture. Patient has a significant history of osteoporosis. Ramona Templeton is here for follow up. She is doing much better but continues with tenderness in lumbar spine and trochanteric bursae. Referral to physical therapy provided. Referral to pain management provided for consideration of bilateral trochanteric bursae cortisone injections.  Follow up as needed

## 2023-11-02 ENCOUNTER — TELEPHONE (OUTPATIENT)
Age: 88
End: 2023-11-02

## 2023-11-02 NOTE — TELEPHONE ENCOUNTER
Patients GI provider:  Dr. Leticia Chaney    Number to return call: [phone]    Reason for call: Pt calling in to schedule an appt with Dr. Leticia Chaney only. Patient is experiencing nausea,constipation, abdominal pain, and breathing problems when sitting and standing. Patient will like to be contacted when there is a sooner appt available. Patient can be reached at the number above.     Scheduled procedure/appointment date if applicable: Apt/procedure 12/15/2023

## 2023-11-03 DIAGNOSIS — G25.81 RLS (RESTLESS LEGS SYNDROME): ICD-10-CM

## 2023-11-04 RX ORDER — GABAPENTIN 100 MG/1
CAPSULE ORAL
Qty: 180 CAPSULE | Refills: 0 | Status: SHIPPED | OUTPATIENT
Start: 2023-11-04 | End: 2023-11-04

## 2023-11-04 RX ORDER — GABAPENTIN 100 MG/1
200 CAPSULE ORAL
Qty: 60 CAPSULE | Refills: 1 | Status: SHIPPED | OUTPATIENT
Start: 2023-11-04

## 2023-11-17 ENCOUNTER — TELEPHONE (OUTPATIENT)
Dept: OBGYN CLINIC | Facility: HOSPITAL | Age: 88
End: 2023-11-17

## 2023-11-17 NOTE — TELEPHONE ENCOUNTER
Caller: Patient    Doctor: [facility]    Reason for call: Can we fax Rx for PT to [facility]?     FAX#: [phone]    Call back#: [phone]

## 2023-11-20 ENCOUNTER — CONSULT (OUTPATIENT)
Dept: PAIN MEDICINE | Facility: CLINIC | Age: 88
End: 2023-11-20
Payer: MEDICARE

## 2023-11-20 VITALS — WEIGHT: 214 LBS | BODY MASS INDEX: 35.65 KG/M2 | HEIGHT: 65 IN

## 2023-11-20 DIAGNOSIS — S32.020S CLOSED COMPRESSION FRACTURE OF L2 VERTEBRA, SEQUELA: ICD-10-CM

## 2023-11-20 DIAGNOSIS — M70.61 TROCHANTERIC BURSITIS OF BOTH HIPS: ICD-10-CM

## 2023-11-20 DIAGNOSIS — S32.030S CLOSED COMPRESSION FRACTURE OF L3 VERTEBRA, SEQUELA: Primary | ICD-10-CM

## 2023-11-20 DIAGNOSIS — M47.816 LUMBAR SPONDYLOSIS: ICD-10-CM

## 2023-11-20 DIAGNOSIS — M70.62 TROCHANTERIC BURSITIS OF BOTH HIPS: ICD-10-CM

## 2023-11-20 PROBLEM — S32.020A CLOSED COMPRESSION FRACTURE OF SECOND LUMBAR VERTEBRA (HCC): Status: ACTIVE | Noted: 2023-11-20

## 2023-11-20 PROBLEM — S32.030A CLOSED COMPRESSION FRACTURE OF THIRD LUMBAR VERTEBRA (HCC): Status: ACTIVE | Noted: 2023-11-20

## 2023-11-20 PROCEDURE — 99204 OFFICE O/P NEW MOD 45 MIN: CPT | Performed by: ANESTHESIOLOGY

## 2023-11-20 NOTE — PROGRESS NOTES
Assessment  1. Closed compression fracture of L3 vertebra, sequela    2. Trochanteric bursitis of both hips    3. Closed compression fracture of L2 vertebra, sequela    4. Lumbar spondylosis        Plan  60-year-old female with a history of osteoporosis, referred by Dr. Jaime Steen, presenting for initial consultation regarding lumbosacral back pain and bilateral lateral hip pain with radiation to proximal to mid lateral thigh. Pain began when the patient was lifting boxes back in July 2023. X-ray of the lumbar spine demonstrates compression fracture L2 status post vertebral augmentation. Compression fracture at L3 which is stable. Multilevel spondylosis. The patient has weaned out of the TLSO. She does take hydrocodone/acetaminophen 7.5/300 mg as needed which is prescribed by her PCP with moderate relief although the patient does feel the tramadol worked better for her when she was in the hospital.  Unable to take NSAIDs secondary to anticoagulation. Tylenol does provide some relief. The patient's low back pain seems to be multifactorial including components of myofascial pain, facet mediated pain, and components coming from healing L3 compression fracture. No evidence of sacroiliitis. The patient does have trochanteric bursitis bilaterally as well as tenderness along the bilateral IT bands. Lumbar radiculitis also on the differential although lower. She does have numbness in her feet secondary to baseline peripheral neuropathy. 1.  I will schedule the patient for bilateral trochanteric bursa injections under ultrasound guidance  2. The patient will continue with hydrocodone as prescribed by PCP. She inquired as to whether or not it would be more beneficial to go back onto tramadol since she found this more efficacious and I did tell her she could mention this to her PCP, but ultimately it would be at their discretion. Patient verbalized understanding.   3.  I will follow-up with the patient in 3 to 4 weeks after injections      Complete risks and benefits including bleeding, infection, tissue reaction, nerve injury and allergic reaction were discussed. The approach was demonstrated using models and literature was provided. Verbal and written consent was obtained. My impressions and treatment recommendations were discussed in detail with the patient who verbalized understanding and had no further questions. Discharge instructions were provided. I personally saw and examined the patient and I agree with the above discussed plan of care. No orders of the defined types were placed in this encounter. No orders of the defined types were placed in this encounter. History of Present Illness    Shanita Alonso is a 81 y.o. female with a history of osteoporosis, referred by Dr. Skip Figueroa, presenting for initial consultation regarding lumbosacral back pain and bilateral lateral hip pain with radiation to proximal to mid lateral thigh. Pain began when the patient was lifting boxes back in July 2023. She denies any numbness, paresthesias, or weakness in the legs. She denies any bladder or bowel incontinence or saddle anesthesia. X-ray of the lumbar spine demonstrates compression fracture L2 status post vertebral augmentation. Compression fracture at L3 which is stable. Multilevel spondylosis. The patient has weaned out of the TLSO. She does take hydrocodone/acetaminophen 7.5/300 mg as needed which is prescribed by her PCP with moderate relief although the patient does feel the tramadol worked better for her when she was in the hospital.  Unable to take NSAIDs secondary to anticoagulation. Tylenol does provide some relief. The patient rates her pain an 8 out of 10 and the pain is intermittent. The pain is worse in the afternoon and evening. The pain is described as cramping, pressure-like, dull, and aching. She does have baseline numbness in her feet secondary to neuropathy. This is unchanged.   The pain is increased with standing, bending, sneezing, and bowel movements. The pain is alleviated with lying down, sitting, walking, and relaxation. She does find some relief with heat and ice location. Other than as stated above, the patient denies any interval changes in medications, medical condition, mental condition, symptoms, or allergies since the last office visit. I have personally reviewed and/or updated the patient's past medical history, past surgical history, family history, social history, current medications, allergies, and vital signs today. Review of Systems   Constitutional:  Positive for unexpected weight change. Negative for fever. HENT:  Negative for trouble swallowing. Eyes:  Positive for visual disturbance. Respiratory:  Positive for shortness of breath. Negative for wheezing. Cardiovascular:  Positive for leg swelling. Negative for chest pain and palpitations. Gastrointestinal:  Positive for abdominal pain. Negative for constipation, diarrhea, nausea and vomiting. Endocrine: Positive for polyuria. Negative for cold intolerance, heat intolerance and polydipsia. Genitourinary:  Negative for difficulty urinating and frequency. Musculoskeletal:  Positive for arthralgias and myalgias. Negative for gait problem and joint swelling. Skin:  Negative for rash. Neurological:  Positive for numbness and headaches. Negative for dizziness, seizures, syncope and weakness. Hematological:  Does not bruise/bleed easily. Psychiatric/Behavioral:  Negative for dysphoric mood. All other systems reviewed and are negative.       Patient Active Problem List   Diagnosis    Hypertension    CKD stage G3b/A2, GFR 30-44 and albumin creatinine ratio  mg/g (HCC)    Gout    Mechanical heart valve present    Chronic atrial fibrillation (HCC)    Gastritis    Anticoagulated    Acute cystitis without hematuria    Benign hypertensive CKD    Cardiomegaly    Diastolic dysfunction    DJD (degenerative joint disease), multiple sites    Esophagitis, reflux    Fibromyalgia    Fibrous breast lumps    H/O mechanical aortic valve replacement    Hyperuricemia without signs inflammatory arthritis/tophaceous disease    MCI (mild cognitive impairment)    Class 2 severe obesity due to excess calories with serious comorbidity and body mass index (BMI) of 39.0 to 39.9 in adult     Osteoarthritis    Primary osteoarthritis involving multiple joints    Proteinuria    Pulmonary artery hypertension (HCC)    RLS (restless legs syndrome)    Secondary hyperparathyroidism, renal ([facility])    Increased frequency of urination    Lump of right breast    Leg cramps    Chronic diastolic congestive heart failure (HCC)    Diabetes mellitus ([facility])    Acute blood loss anemia    Ambulatory dysfunction    Hyponatremia    Adhesive capsulitis of right shoulder    Obstructive sleep apnea    Chronic prescription opiate use    Insomnia    PLMD (periodic limb movement disorder)    Cellulitis of great toe of left foot    Hyperlipidemia associated with type 2 diabetes mellitus     Hyperkalemia    Dry eye syndrome of both eyes    SUZETTE (obstructive sleep apnea)       Past Medical History:   Diagnosis Date    Anemia     Arthritis     also osteoporosis of spine    Cancer ([facility])     endometrial    CHF (congestive heart failure) ([facility])     Chronic atrial fibrillation ([address]) 09/13/2017    Chronic gout 09/13/2017    Chronic kidney disease     Chronic rhinitis 11/08/2018    CKD (chronic kidney disease) stage 3, GFR 30-59 ml/min ([facility]) 09/13/2017    Clotting disorder ([address])     on warfarin    Colon polyp 09/13/2017    Compression fracture of L3 vertebra ([facility])     Coronary artery disease     Diabetes mellitus ([address])     COMBS (dyspnea on exertion) 11/02/2018    Endometrial cancer [facility])     age 62    Essential hypertension 09/13/2017    Gastritis 09/13/2017    GERD (gastroesophageal reflux disease)     Heart murmur     Hiatal hernia     Hx of hiatal hernia Hyperlipidemia     Hypertension     Irregular heart beat     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    Osteoarthritis     Osteoporosis     Pacemaker     Peripheral vascular disease ([address])     S/P AVR     Spinal stenosis     disk herniation     Urinary tract infection        Past Surgical History:   Procedure Laterality Date    AORTIC VALVE REPLACEMENT      BREAST EXCISIONAL BIOPSY Left 1995    benign    CARDIAC SURGERY      CATARACT EXTRACTION      CHOLECYSTECTOMY      COLONOSCOPY      COLONOSCOPY  09/2012    tubular adenoma    EGD  04/30/2021    [facility].- Multiple stomach polyps, small amount of heme or coffee-grounds noted but do not feel significant evidence for bleeding. EGD AND COLONOSCOPY  09/2017    adenomatous polyp/ benign gastric polyp    ESOPHAGOGASTRODUODENOSCOPY  08/2012    FIXATION KYPHOPLASTY      HERNIA REPAIR      x3    HYSTERECTOMY      age 62    INSERT / REPLACE / REMOVE PACEMAKER      IR PELVIC ANGIOGRAM  01/29/2019    JOINT REPLACEMENT Bilateral     knees    OOPHORECTOMY Bilateral     age 62    MA COLONOSCOPY FLX DX W/COLLJ SPEC WHEN PFRMD N/A 09/12/2017    Procedure: Rasheed Spicer COLONOSCOPY with polypectomies;  Surgeon: Denys Duane, MD;  Location: [facility]; Service: Gastroenterology    MA SIGMOIDOSCOPY FLX DX W/COLLJ SPEC BR/WA IF PFRMD N/A 09/13/2017    Procedure: colonoscopy with hemo clip x 2;  Surgeon: Denys Duane, MD;  Location: [facility];   Service: Gastroenterology    SKIN BIOPSY      TONSILLECTOMY         Family History   Problem Relation Age of Onset    Diabetes Mother     Hypertension Mother     Heart disease Father     Emphysema Father     Asthma Sister     Cancer Sister         colon    Hypertension Sister     Colon cancer Sister     Autoimmune disease Sister         Diabetes    Diabetes Sister     No Known Problems Daughter     No Known Problems Maternal Grandmother     No Known Problems Maternal Grandfather     No Known Problems Paternal Grandmother No Known Problems Paternal Grandfather     Breast cancer Cousin 54    Breast cancer Cousin 54    Breast cancer Cousin 79    Ovarian cancer Sister     Autoimmune disease Sister         Hashimoto's thyroiditis    Cancer Sister         ovarian    No Known Problems Daughter     No Known Problems Paternal Aunt     No Known Problems Paternal Aunt     Breast cancer Cousin     Breast cancer Cousin     Cancer Paternal Uncle         prostate       Social History     Occupational History    Occupation: Retired -    Tobacco Use    Smoking status: Former     Packs/day: 3.00     Years: 20.00     Total pack years: 60.00     Types: Cigarettes    Smokeless tobacco: Never    Tobacco comments:     have not smoked for at least 50 years   Vaping Use    Vaping Use: Never used   Substance and Sexual Activity    Alcohol use: No    Drug use: No    Sexual activity: Yes     Partners: Male     Birth control/protection: Other     Comment: MARTÍNEZ, BSO       Current Outpatient Medications on File Prior to Visit   Medication Sig    gabapentin (NEURONTIN) 100 mg capsule Take 2 capsules (200 mg total) by mouth daily at bedtime    Accu-Chek Lila Plus test strip [employer].     allopurinol (ZYLOPRIM) 100 mg tablet Take 100 mg by mouth 2 (two) times a day    aspirin (ECOTRIN LOW STRENGTH) 81 mg EC tablet Take 81 mg by mouth daily    BD Pen Needle Rena 2nd Gen 32G X 4 MM MISC     bumetanide (BUMEX) 1 mg tablet Take 1 tablet (1 mg total) by mouth daily (Patient not taking: Reported on 2/24/2022)    Calcium Carb-Cholecalciferol 250-125 MG-UNIT TABS Take by mouth daily (Patient not taking: Reported on 7/14/2023)    calcium citrate-vitamin D (CITRACAL+D) 315-200 MG-UNIT per tablet Take 1 tablet by mouth 2 (two) times a day    cholecalciferol (VITAMIN D3) 1,000 units tablet Take 1,000 Units by mouth daily    Cholecalciferol 50 MCG (2000 UT) CAPS Take 1 capsule by mouth (Patient not taking: Reported on 7/14/2023)    dapagliflozin (Farxiga) 10 MG tablet Take 1 tablet (10 mg total) by mouth daily    Denta 5000 Plus 1.1 % CREA BRUSH TWICE A DAY AS DIRECTED    fentaNYL (DURAGESIC) 25 mcg/hr 20 mcg (Patient not taking: Reported on 10/2/2023)    ferrous sulfate 325 (65 FE) MG EC tablet Take 325 mg by mouth daily (Patient not taking: Reported on 7/21/2023)    ferrous sulfate 325 (65 Fe) mg tablet Take 325 mg by mouth daily with breakfast (Patient not taking: Reported on 6/7/2022)    glucose blood test strip     guaifenesin-codeine (GUAIFENESIN AC) 100-10 MG/5ML liquid Iophen C-NR 10 mg-100 mg/5 mL oral liquid (Patient not taking: No sig reported)    HYDROcodone-acetaminophen (NORCO) 5-325 mg per tablet Take 1 tablet by mouth every 6 (six) hours as needed for pain for up to 10 dosesMax Daily Amount: 4 tablets (Patient not taking: Reported on 9/19/2022)    HYDROcodone-acetaminophen (NORCO) 7.5-325 mg per tablet Take 1 tablet by mouth every 6 (six) hours as needed every 4 to 6 hours if needed for pain    HYDROcodone-acetaminophen (XODOL) 7.5-300 MG per tablet Take by mouth every 6 (six) hours as needed (Patient not taking: Reported on 11/22/2022)    indapamide (LOZOL) 2.5 mg tablet Take 2 tablets (5 mg total) by mouth daily (Patient not taking: Reported on 11/22/2022)    insulin aspart (NovoLOG FlexPen) 100 UNIT/ML injection pen Inject 5 Units under the skin as needed    insulin detemir (Levemir FlexTouch) 100 Units/mL injection pen 13 Units (Patient not taking: Reported on 10/2/2023)    insulin detemir (LEVEMIR) 100 units/mL subcutaneous injection Inject under the skin 16 daily    lidocaine (XYLOCAINE) 1 % lidocaine HCl 10 mg/mL (1 %) injection solution   administered (Patient not taking: No sig reported)    methocarbamol (ROBAXIN) 500 mg tablet Take 1.5 tablets (750 mg total) by mouth 2 (two) times a day (Patient not taking: Reported on 5/24/2022)    methocarbamol (ROBAXIN) 500 mg tablet Take 1 tablet (500 mg total) by mouth 2 (two) times a day (Patient not taking: Reported on 7/14/2023)    metoprolol succinate (TOPROL-XL) 50 mg 24 hr tablet Take 50 mg by mouth daily    Multiple Vitamin (MULTIVITAMIN) tablet Take 1 tablet by mouth daily    Multiple Vitamins-Minerals (PRESERVISION AREDS 2 PO) Take by mouth 2 (two) times a day (Patient not taking: Reported on 7/21/2023)    naloxegol oxalate (Movantik) 25 MG tablet Take 1 tablet (25 mg total) by mouth every other day (Patient taking differently: Take 25 mg by mouth if needed)    NON FORMULARY Take 1 tablet by mouth 4 (four) times a day HydroEye    Omega-3 Fatty Acids (FISH OIL PO) Take 1,000 mg by mouth 4 (four) times a day Hydrieye 4 capsule daily. (Patient not taking: Reported on 3/9/2023)    RABEprazole (ACIPHEX) 20 MG tablet Take 1 tablet (20 mg total) by mouth daily    spironolactone (ALDACTONE) 50 mg tablet Take 50 mg by mouth daily     traMADol (Ultram) 50 mg tablet Take 1 tablet (50 mg total) by mouth every 8 (eight) hours as needed for severe pain (Patient not taking: Reported on 10/2/2023)    VITAMIN E PO Take by mouth daily    Vyzulta 0.024 % SOLN instill 1 drop into both eyes at bedtime    warfarin (COUMADIN) 2.5 mg tablet Take 1.5 tablets by mouth daily 3.75 mg 6 days 5 mg on 1 day    warfarin (COUMADIN) 5 mg tablet Take 1 tablet (5 mg total) by mouth daily Adjust based on inr (Patient not taking: Reported on 11/22/2022)    warfarin (COUMADIN) 5 mg tablet Take 5 mg by mouth once a week     No current facility-administered medications on file prior to visit. 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 Nausea Only and Other (See Comments)     Other reaction(s): not sleeping, made her feel "wired"    Losartan Headache    Metformin Other (See Comments)     Other reaction(s): GI intolerance    Nortriptyline Other (See Comments)     Other reaction(s): Other (See Comments)  rash bolivar flexeril      Other GI Intolerance    Penicillin G Headache    Penicillins      Other reaction(s): Other (See Comments)  RASH-anaphylaxis  Other reaction(s): Anaphylaxis    Povidone Iodine Other (See Comments)     VAGINAL ITCHING    Ropinirole      Back pain    Simvastatin Other (See Comments) and Myalgia     muscle aches    Sulfa Antibiotics      Other reaction(s): Other (See Comments)  bolivar Lasix, GI upset, rash  Other reaction(s): GI Upset    Torsemide Nausea Only, Vomiting and GI Intolerance     Pt states "it does not work for me". Valsartan      Other reaction(s): Other (See Comments)  bolivar olmesartan    Fluticasone      Leg cramps       Physical Exam    Ht 5' 5" (1.651 m)   Wt 97.1 kg (214 lb)   BMI 35.61 kg/m²     Constitutional: normal, well developed, well nourished, alert, in no distress and non-toxic and no overt pain behavior. Eyes: anicteric  HEENT: grossly intact  Neck: supple, symmetric, trachea midline and no masses   Pulmonary:even and unlabored  Cardiovascular:No edema or pitting edema present  Skin:Normal without rashes or lesions and well hydrated  Psychiatric:Mood and affect appropriate  Neurologic:Cranial Nerves II-XII grossly intact  Musculoskeletal:antalgic gait and ambulates with a walker. Bilateral lumbar paraspinals tender to palpation from L2-L5. Bilateral SI joints nontender to palpation. Tenderness to palpation over bilateral trochanteric flares and along bilateral IT bands to the knees. Bilateral patellar reflexes 2 out of 4. Bilateral Achilles reflexes trace. No clonus is noted bilaterally. Bilateral lower extremity strength 5 out of 5 in all muscle groups.   Sensation intact to light touch in L3-S1 dermatomes bilaterally. Negative straight leg raise bilaterally. Negative Saw's test bilaterally. Imaging    Study Result    Narrative & Impression         LUMBAR SPINE     INDICATION:   Wedge compression fracture of third lumbar vertebra, initial encounter for closed fracture. COMPARISON:  Comparison made with previous examination(s) dated (DX) 25-Aug-2023,(DX) 21-Jul-2023,(CT) 06-Jul-2023,(CT) 11-Nov-2021,(DX) 04-Oct-2019. VIEWS:  XR SPINE LUMBAR 2 OR 3 VIEWS INJURY  Images: 2     FINDINGS:     There are 5 non rib bearing lumbar vertebral bodies. Stable mild superior endplate compression deformity of L2 with post vertebral augmentation changes. Stable mild compression deformity at the L3 vertebral body. Alignment is unremarkable. Stable multilevel disc space narrowing at the lower lumbar spine with lower lumbar facet arthropathy. The pedicles appear intact. There are atherosclerotic calcifications. Soft tissues are otherwise unremarkable. IMPRESSION:        Stable L2 and L3 compression deformities, status post augmentation at L2. Electronically signed: 10/28/2023 01:41 PM Vianey Kemp, MPH,MD     DXA BONE DENSITY SCAN STANDARD 2 SITES  Order: [id number]  Impression    Impression:  The examination is reviewed using the World Health Organization criteria. 1. Osteopenia as measured by the bone mineral density of the lumbar spine, hip,  and femoral neck with measurements that show increased risk for fracture. The  forearm has bone mineral density of osteoporosis with measurements that show  high risk for fracture. All of the measurements have significantly decreased  since the prior examination, as described above. 2. A FRAX is not reported because the patient has a history of prior hip or  vertebral fracture. Workstation:[id number]  Narrative    A DXA bone mineral density examination was performed with a [employer] scanner.  The  patient is a 52-year-old postmenopausal female with a history of osteopenia. Comparison is made to a previous study of 10/9/2019. The lumbar spine was examined from L3-L4. In total, the bone mineral density is  1.3 standard deviations below the predicted peak bone mass and is 87 % of  normal. The lumbar spine measurement is 0.956 g/cm2. This measurement has  decreased 3% since the prior examination. The left hip was examined in several regions. In total, the bone mineral density  is 1.2 standard deviations below the predicted peak bone mass and is 85 % of  normal.  The total hip measurement is 0.799 g/cm2. This measurement has  decreased 8.1% since the prior examination. The femoral neck measurement is 2.2 standard deviations below the predicted peak  bone mass and is 71 % of normal. The femoral neck measurement is 0.603 g/cm2. This measurement has decreased 12.4% since the prior examination. The left forearm was examined. The one-third radius has bone mineral density  that is 2.9 standard deviations below the predicted peak bone mass and is 75% of  normal. This measurement has decreased 5.3% since the prior examination. Exam End: 01/18/23 10:25 AM    Specimen Collected: 01/19/23  6:30 PM Last Resulted: 01/19/23  6:33 PM   Received From: [address] [place] [address]  Result Received: 05/24/23  9:03 AM       CT SPINE THORACIC WO CONTRAST  Order: [id number]  Impression    IMPRESSION:    1.  T11 fracture again identified with increased collapse of vertebral body  compared to 07/30/2020. Mild retropulsion. No significant central canal  stenosis. Fracture of T11 spinous process, new. No facet dislocation or  spondylolisthesis. 2.  Osteopenia. 3.  Additional findings as discussed above. Workstation:[id number]  Narrative    EXAMINATION: CT thoracic spine without contrast    CLINICAL INFORMATION: T11 fracture. Follow-up examination. TECHNIQUE: Noncontrast enhanced CT thoracic spine was performed.  Axial, coronal,  and sagittal reconstructions were made. Dose lowering technique(s) utilized in  accordance with ALARA. COMPARISON: CT abdomen/pelvis 07/30/2020. CT chest 05/04/2007. FINDINGS:    Bone marrow: Osteopenia. Fracture of T11 vertebral body identified, with increased loss of vertebral body  height (60%) compared to 07/30/2020. Mild retropulsion present. Sclerotic  changes identified. Distinct fracture along the anterior/inferior aspect. Fracture of T11 spinous process without significant displacement, new from  07/30/2020. Alignment: Increased thoracic kyphosis. No facet dislocation. No significant  spondylolisthesis. Levels: Multilevel thoracic spondylosis. Paravertebral ossification and fusion  at multiple levels. No significant central canal stenosis. Additional findings: Bilateral pulmonary nodules measuring up to 4 to 5 mm, some  without significant change from 05/04/2007 and likely inflammatory. Exam End: 09/03/20 10:23 AM    Specimen Collected: 09/03/20 10:29 AM Last Resulted: 09/03/20 10:56 AM   Received From: [address] [place] [address]  Result Received: 11/15/23  1:52 PM      CT SPINE LUMBAR WO CONTRAST  Order: [id number]  Impression    Impression:    1. Patient status post L2 vertebroplasty. Compression deformity at L2 with  approximately 20% loss of vertebral body height. 2. There is a remote compression deformity at T11 with approximately 30% loss  vertebral body height. 3. Multilevel disc degeneration, greatest at L4-5 and L5-S1, without interval  change. Workstation:[id number]  Narrative    Examination: CT lumbar spine, without contrast.    Indication: Right hip pain. Technique: Axial helically acquired imaging obtained the lumbar spine. Sagittal  and coronal multiplanar reformations obtained. Comparison: 1/23/2009    Findings: The lumbar spine demonstrates anatomic alignment. There is  methylmethacrylate within the L2 vertebral body.  There is a mild superior  endplate compression deformity at L2 with approximate 20% loss of vertebral  body height. There is degenerative disc narrowing at L4-5 and L5-S1. There are subchondral  sclerotic changes with minimal endplate spurring at E8-I4. There is a wedge compression deformity at T11 with approximately 50% loss of  vertebral body height. There is mild disc bulging with endplate spurring at X4-C9. There is mild  impression of the ventral thecal sac with mild bilateral neural foraminal  stenosis. L4-5, there is mild degenerative bulging; there is no significant spinal canal  or neural foraminal stenosis. The L1-2 through L3-4 discs are normal.    There is a remote compression deformity at T11. There is approximately 30% loss  of vertebral body height. There is disc narrowing at T11-12; there is an  inferior endplate Schmorl's node at T11. Exam End: 12/22/16  3:52 PM    Specimen Collected: 12/22/16  3:58 PM Last Resulted: 12/22/16  4:14 PM   Received From: [address] [place] [address]  Result Received: 11/15/23  1:52 PM        CT HIP RIGHT WO CONTRAST  Order: [id number]  Impression    Impression:  No acute fracture or other acute osseous abnormality of the right hip. 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 [id number]  Narrative    Clinical Indication: Intractable right hip pain, rule out fracture. Exam: CT of the right hip without IV contrast.    Technique: Using helical technique, axial CT images were obtained through the  right hip without intravenous contrast on 12/22/2016. Coronal and sagittal  reformations were performed. Comparison: Radiographs dated 12/21/2016 and prior CT dated 1/23/2009. Findings:    No acute fracture or dislocation. Radiocarpal alignment is normal. There is  mild joint space narrowing and marginal spurring at the right hip which can  indicate primary ostium arthritis. No destructive osseous lesion or abnormal  periostitis.     Visualized portions of the pelvic viscera are unremarkable. Exam End: 12/22/16  3:52 PM    Specimen Collected: 12/22/16  8:48 PM Last Resulted: 12/22/16  8:51 PM   Received From: [facility]  Result Received: 05/24/23  9:03 AM       XR pelvis ap only 1 or 2 vw  Order: [id number]  Impression    Impression: Unremarkable erect AP pelvis. Workstation:[id number]  Narrative    Erect AP pelvis    Indication: CHRISTOPHER positive. Right low back pain    Erect AP view of the pelvis does not reveal a pelvic fracture or other bony  abnormality of the pelvis. Markers for surgical mesh are identified in the lower  abdomen and pelvis. Exam End: 06/08/23 12:09 PM    Specimen Collected: 06/08/23  1:11 PM Last Resulted: 06/08/23  1:13 PM   Received From: [facility]  Result Received: 11/15/23  1:52 PM      XR HIP/PELV 2-3 VWS RIGHT W PELVIS IF PERFORMED  Order: [id number]  Impression    IMPRESSION: Normal right hip. Workstation:[id number]  Narrative    This result has an attachment that is not available. History: Right hip pain for months. 3 projections of the right hip were obtained. There is no fracture or  dislocation. There are no degenerative changes. The osseous and soft tissue  structures of the pelvis are normal. There is no evidence of degenerative change  suggested on 10/24/2019. Exam End: 06/16/21  4:00 PM    Specimen Collected: 06/17/21 10:51 AM Last Resulted: 06/17/21 10:53 AM   Received From: [facility]  Result Received: 11/15/23  1:52 PM    CT HIP RIGHT WO CONTRAST  Order: [id number]  Impression    Impression:  No acute fracture or other acute osseous abnormality of the right hip. 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 [id number]  Narrative    Clinical Indication: Intractable right hip pain, rule out fracture.     Exam: CT of the right hip without IV contrast.    Technique: Using helical technique, axial CT images were obtained through the  right hip without intravenous contrast on 12/22/2016. Coronal and sagittal  reformations were performed. Comparison: Radiographs dated 12/21/2016 and prior CT dated 1/23/2009. Findings:    No acute fracture or dislocation. Radiocarpal alignment is normal. There is  mild joint space narrowing and marginal spurring at the right hip which can  indicate primary ostium arthritis. No destructive osseous lesion or abnormal  periostitis. Visualized portions of the pelvic viscera are unremarkable.   Exam End: 12/22/16  3:52 PM    Specimen Collected: 12/22/16  8:48 PM Last Resulted: 12/22/16  8:51 PM   Received From: [address] [place] [address]  Result Received: 05/24/23  9:03 AM

## 2023-11-21 ENCOUNTER — TELEPHONE (OUTPATIENT)
Age: 88
End: 2023-11-21

## 2023-11-21 NOTE — TELEPHONE ENCOUNTER
S/w the patient to review. Encouraged  her not too stop any medications pre procedure. She stated she will check her PT/INR on Weds before. Encouraged her to CB if anything fun and exciting. She appreciated the F/U and JW to advise otherwise.

## 2023-11-21 NOTE — TELEPHONE ENCOUNTER
Called patient back- she is schedule for an ultra sound guided procedure. Advise there are no pre-procedure instructions. Patient stated Dr. Shauna Lopez mentioned about her INR- she said she does test at home every Wed. Is that ok? Please call patient to advise. Thanks!

## 2023-11-21 NOTE — TELEPHONE ENCOUNTER
Caller: Óscar Murray, pt    Doctor: Scot Forbes    Reason for call: pt called for pre-procedure  Instructions    Call back#: [phone]

## 2023-12-14 ENCOUNTER — OFFICE VISIT (OUTPATIENT)
Dept: SLEEP CENTER | Facility: CLINIC | Age: 88
End: 2023-12-14
Payer: MEDICARE

## 2023-12-14 ENCOUNTER — TELEPHONE (OUTPATIENT)
Dept: SLEEP CENTER | Facility: CLINIC | Age: 88
End: 2023-12-14

## 2023-12-14 VITALS
SYSTOLIC BLOOD PRESSURE: 126 MMHG | DIASTOLIC BLOOD PRESSURE: 82 MMHG | BODY MASS INDEX: 36.49 KG/M2 | WEIGHT: 219 LBS | HEIGHT: 65 IN

## 2023-12-14 DIAGNOSIS — E61.1 IRON DEFICIENCY: ICD-10-CM

## 2023-12-14 DIAGNOSIS — R41.9 ALTERATION OF AWARENESS: Primary | ICD-10-CM

## 2023-12-14 DIAGNOSIS — G47.33 OSA (OBSTRUCTIVE SLEEP APNEA): ICD-10-CM

## 2023-12-14 DIAGNOSIS — G25.81 RLS (RESTLESS LEGS SYNDROME): ICD-10-CM

## 2023-12-14 PROCEDURE — 99215 OFFICE O/P EST HI 40 MIN: CPT | Performed by: PSYCHIATRY & NEUROLOGY

## 2023-12-14 RX ORDER — GABAPENTIN 100 MG/1
200 CAPSULE ORAL
Qty: 60 CAPSULE | Refills: 1 | Status: SHIPPED | OUTPATIENT
Start: 2023-12-14

## 2023-12-14 NOTE — PROGRESS NOTES
Assessment/Plan:    1. Alteration of awareness  -     CT head wo contrast; Future; Expected date: 12/14/2023  -     EEG awake or drowsy routine; Future    2. SUZETTE (obstructive sleep apnea)  -     Diagnostic Sleep Study; Future; Expected date: 12/15/2023    3. RLS (restless legs syndrome)  -     gabapentin (NEURONTIN) 100 mg capsule; Take 2 capsules (200 mg total) by mouth daily at bedtime    We discussed that I am most concerned about the episode she describes of alteration of awareness, I cannot explain the etiology of these at present but she needs an urgent workup. She is prescribed warfarin and I therefore would like her to have a stat head CT as there could be risk for subdural hematoma. I advised her that she is today to have this done, even though this is not convenient. She cannot have a MRI as she has a pacemaker. On the differential diagnosis would include TIA (somewhat unlikely), seizure, or other cause. I have ordered an EEG if that is negative, she may require an ambulatory EEG which should be somewhat high yield as she experiences these episodes daily. .  I expressed to the patient the importance of going to the ER to get this evaluated promptly. She increased her dose of gabapentin about 1 month ago which seems to correlate with the timing. I advised her to reduce her dose back to 200 mg. We may consider further reductions in the future. With regard to obstructive sleep apnea, she is not being treated, she feels she had an allergic reaction to the mask. This is somewhat unlikely as the masks contain silicone, not latex. Nonetheless I would recommend a diagnostic polysomnogram to reassess obstructive sleep apnea, important due to her many comorbidities. I ordered an iron panel given restless leg syndrome, she had iron deficiency in the past and I would like to reassess this.   If iron deficiency is demonstrated, potentially iron therapy may be beneficial to lessen restless leg symptoms and lessen the need for gabapentin or other medications. Lastly we had a discussion regarding driving. As she has episodes of loss of awareness, explained that she should not drive, by law I must report her  to [facility]. Subjective:      Patient ID: Clara Holter is a 81 y.o. female. HPI    This is an 61-year-old female who returns in a follow-up visit, she has a history of obstructive sleep apnea untreated (she returns her CPAP machine) as well as restless leg syndrome. I last saw her in June 2023. She presents with a chief complaint today that she has "increased RLS/neuropathy/"absences"    She has been going to bed at 11 PM, asleep in 20 minutes, Wakes 1-2 times total including at 3 AM,  struggles to return to sleep until 5 AM. Reads and then sleeps until 7 am. She estimates 6 hours sleep total.     She has increased gabapentin due to worsened RLS. Now takes 300 mg, was on 200 ng. Takes this at bedtime, 11 pm. Tried to take it earlier but would wake at 12 AM with RLS. Rls can start as early as 9 pm.   Describes RLS as constant movements, annoying feeling over shin. Often gets better with movement. Feels she is losing track of time. , she calls these absences, these began 2-3 weeks ago. These occur a few times a day. As an example, remembers getting up, brush teeth, then doesn't know what happened, still standing at sink for 20 minutes. Another example- at sink washing dishes, "10 minutes I'm standing there, not doing anything". Had increased gabapentin 1 month ago but does not take 300 mg nightly. AM headaches- a few times a week, has had this for several months. Does not think she is asleep when this occurs. No warning prior to these episodes, no hx seizures, has not had a recent head injury or fall. Takes warfarin      Felt CPAP causing an allergy, "takes my skin off". Has latex allergy. This occurs with different masks.          [place] Sleepiness Scale  Sitting and reading: Slight chance of dozing  Watching TV: Slight chance of dozing  Sitting, inactive in a public place (e.g. a theatre or a meeting):  Would never doze  As a passenger in a car for an hour without a break: Slight chance of dozing  Lying down to rest in the afternoon when circumstances permit: High chance of dozing  Sitting and talking to someone: Would never doze  Sitting quietly after a lunch without alcohol: Would never doze  In a car, while stopped for a few minutes in traffic: Would never doze  Total score: 6      Review of Systems    As above  Objective:      /82 (BP Location: Left arm, Patient Position: Sitting, Cuff Size: Large)   Ht 5' 5" (1.651 m)   Wt 99.3 kg (219 lb)   BMI 36.44 kg/m²          Physical Exam    RRR, has a click  2+ edema b/l, chronic  Lungs CTA  Cn 2-12  Visual fields full   Strength intact  2+ reflexes in ue, decreased in le, symmetric  No deficit to light touch  Alert in no distress

## 2023-12-14 NOTE — PATIENT INSTRUCTIONS
I would recommend going to the ER now to get assessed for these episodes- you need a CT scan of the head to make sure you do not have a subdural hematoma     You should not drive. As discussed, I have to report this to [place]    It is very important to avoid driving while drowsy, this can be very dangerous or even cause serious injury or death. If sleepy, it is not safe to get behind the wheel. If you are driving and feels sleepy, it is very important to pull over right away. Even losing control of the car for a split second can be deadly. If you feel you cannot control when sleepiness occurs and cannot prevented, it is important to not drive at all until this improves. Please let me know if you experience this as it is very important. Please reduce gabapentin to 200 mg, do not take 300 mg    Please have iron panel checked     Nursing Support:  When: Monday through Friday 7A-5PM except holidays  Where: Our direct line is [phone]. If you are having a true emergency please call 911. In the event that the line is busy or it is after hours please leave a voice message and we will return your call. Please speak clearly, leaving your full name, birth date, best number to reach you and the reason for your call. Medication refills: We will need the name of the medication, the dosage, the ordering provider, whether you get a 30 or 90 day refill, and the pharmacy name and address. Medications will be ordered by the provider only. Nurses cannot call in prescriptions. Please allow 7 days for medication refills. Physician requested updates: If your provider requested that you call with an update after starting medication, please be ready to provide us the medication and dosage, what time you take your medication, the time you attempt to fall asleep, time you fall asleep, when you wake up, and what time you get out of bed. Sleep Study Results:  We will contact you with sleep study results and/or next steps after the physician has reviewed your testing.

## 2023-12-14 NOTE — TELEPHONE ENCOUNTER
Patient stated she has had many of studies and everybody knows she has sleep apnea and did not want to schedule the sleep study    Female

## 2023-12-15 ENCOUNTER — HOSPITAL ENCOUNTER (OUTPATIENT)
Dept: CT IMAGING | Facility: HOSPITAL | Age: 88
Discharge: HOME | End: 2023-12-15
Attending: PSYCHIATRY & NEUROLOGY
Payer: MEDICARE

## 2023-12-15 DIAGNOSIS — R41.9 ALTERATION OF AWARENESS: ICD-10-CM

## 2023-12-15 PROCEDURE — G1004 CDSM NDSC: HCPCS

## 2023-12-15 PROCEDURE — 70450 CT HEAD/BRAIN W/O DYE: CPT

## 2023-12-18 ENCOUNTER — TELEPHONE (OUTPATIENT)
Dept: SLEEP CENTER | Facility: CLINIC | Age: 88
End: 2023-12-18

## 2023-12-18 DIAGNOSIS — R41.9 ALTERATION OF AWARENESS: Primary | ICD-10-CM

## 2023-12-18 NOTE — TELEPHONE ENCOUNTER
Patient called she is wandering when her results for her Ct scan with be resulted ,she said it was suppose to be stat and is concerned.

## 2023-12-18 NOTE — TELEPHONE ENCOUNTER
She did not go to the ER, she had a head CT performed the day after the visit.  Waiting for official report.  In my preliminary review, I did not see any sign of subdural or hemorrhage on her head CT.    She weened off of gabapentin, cut hydrocodone in half, she has not had any further episodes.  She thinks medication was causing this.  She notes she is seeing her primary care physician later today to discuss changing from hydrocodone to tramadol.  I advised that tramadol to could potentially contribute to such episodes.  She has EEG scheduled tomorrow

## 2023-12-19 ENCOUNTER — HOSPITAL ENCOUNTER (OUTPATIENT)
Dept: NEUROLOGY | Facility: CLINIC | Age: 88
Discharge: HOME | End: 2023-12-19
Payer: MEDICARE

## 2023-12-19 DIAGNOSIS — R41.9 ALTERATION OF AWARENESS: ICD-10-CM

## 2023-12-19 PROCEDURE — 95816 EEG AWAKE AND DROWSY: CPT

## 2023-12-19 PROCEDURE — 95819 EEG AWAKE AND ASLEEP: CPT | Performed by: STUDENT IN AN ORGANIZED HEALTH CARE EDUCATION/TRAINING PROGRAM

## 2023-12-21 NOTE — TELEPHONE ENCOUNTER
Hct neg, called patient to let her know  Neurology consult placed, will try to expedite    She notes that she has been free of episodes (which she termed absences, discussed in my last note) since Saturday, she changed to tramadol; now takes on gabapentin 100 mg.  She has been on gabapentin for many years, it looks like since 2017 or earlier      Rec- trying to stop gabapentn in 2 weeks.  I also advised her that I placed a neurology referral and filled out a [employer] reporting form as required by law.  On that form I made note that these could be medication related, I would aske the neurologist to render their opinion regarding that.

## 2023-12-26 ENCOUNTER — TELEPHONE (OUTPATIENT)
Dept: NEUROLOGY | Facility: CLINIC | Age: 88
End: 2023-12-26

## 2023-12-26 NOTE — TELEPHONE ENCOUNTER
I called the patient and left a voicemail that I was calling to schedule her for an appt as requested by Dr. Camargo.  Mike back line given.

## 2023-12-26 NOTE — TELEPHONE ENCOUNTER
Pt needs an OV with Dr Mcknight at [facility] on a Tuesday, Thursday but preferably on a Friday.. There are no suitable appts in Epic.    Pt can be reached at [phone]     To ER if acutely worse Follow up with your PCP in 3 - 4 days if worsening or not improving:    Please follow-up with Pediatrics:

## 2023-12-27 NOTE — TELEPHONE ENCOUNTER
Called patient back- patient needs to be seen by Dr. Crabtree on 12/19- appt is on hold. Provided my direct extension for a call back.

## 2023-12-29 ENCOUNTER — OFFICE VISIT (OUTPATIENT)
Dept: NEUROLOGY | Facility: CLINIC | Age: 88
End: 2023-12-29
Payer: MEDICARE

## 2023-12-29 ENCOUNTER — TELEPHONE (OUTPATIENT)
Age: 88
End: 2023-12-29

## 2023-12-29 ENCOUNTER — TELEPHONE (OUTPATIENT)
Dept: NEUROLOGY | Facility: CLINIC | Age: 88
End: 2023-12-29

## 2023-12-29 VITALS
BODY MASS INDEX: 36.49 KG/M2 | HEART RATE: 86 BPM | WEIGHT: 219 LBS | HEIGHT: 65 IN | SYSTOLIC BLOOD PRESSURE: 124 MMHG | DIASTOLIC BLOOD PRESSURE: 80 MMHG

## 2023-12-29 DIAGNOSIS — R41.9 ALTERATION OF AWARENESS: Primary | ICD-10-CM

## 2023-12-29 PROCEDURE — 99214 OFFICE O/P EST MOD 30 MIN: CPT | Performed by: STUDENT IN AN ORGANIZED HEALTH CARE EDUCATION/TRAINING PROGRAM

## 2023-12-29 PROCEDURE — 99204 OFFICE O/P NEW MOD 45 MIN: CPT | Performed by: STUDENT IN AN ORGANIZED HEALTH CARE EDUCATION/TRAINING PROGRAM

## 2023-12-29 NOTE — TELEPHONE ENCOUNTER
ZHANG phoned [facility] at [phone] and spoke with Hernando who informed that pt is not active with [facility]. [facility] will be mailing an application to the patient.

## 2023-12-29 NOTE — PATIENT INSTRUCTIONS
Patient Instructions:  -complete 72 hr vEEG  -continue to refrain from driving and any activities during which you may cause harm to yourself or others if you were to lose consciousness (e.g. bathing, swimming, using heavy equipment or machinery, etc)  -follow up in about 6 months

## 2023-12-29 NOTE — TELEPHONE ENCOUNTER
MSW phoned patient and provided information about county transportation. Pt informed that she has multiple appointments next week and she will not stop driving until she receives something in the mail stating that her license is suspended. Pt has upcoming appt with [facility] on 1[facility]4 and PT with [facility].     MSW informed pt that she can reach out to [facility] to request a lyft. Also provided phone number for [facility] if interested in getting therapies at home. Pt is aware to contact provider offices to request lyft as a last resort until she is able to get approved for Lantavan. Pt is aware that she will receive applciation for lantavan in the mail but she is agreeable to receive application via email, will complete and will mail back to [facility] to expedite this process. This writer confirmed patient's email as [email or web address]    MSW reach out to [facility] and spoke with Bertha who will reach out to their office to request a lyft ride for the patient.     MSW sent email to patient         Jaden Muniz,    Please review attached application for Lantavan. Once completed you can email it directly to :  [email or web address]  please remember to attach a proof of age document. Could you please let me know once you submit your application and I can follow up with them to learn if they can expedite the application process.     Additionally, I reached out to [facility] and they will be reaching out to you regarding a lyft ride for your upcoming appointment. If you have any other medical appointments, please reach out to their offices as they may be able to provide transportation as a last resort.       Please review alternative non-emergency transportation:    [employer]  [email or web address]  [phone]  An arm-through-arm, luhi-azrhiae-cykw transportation for older people. Affordable fares, no tipping, cash-free transactions.     [facility] [facility] - spoke with Stephanie at  [phone]  [email or web address]  He informed that they provide non emergency transportation.      There is a flat rate for 0-10 miles   This is $40 dollar curb to curb (just a van) any additional miles $2.75 per mile  If you need a wheelchair van then 0-10 miles is $85 and additional miles $4.85 wheelchair van   If in need an escort , the cost is an additional $35  Destinations through [place] and [facility].    Will need to book 72 hours in advance, if last notice, then they can always check the schedule.      [employer]   They arrange reliable rides for seniors, 24/7. Speak to an  or use our easy menu. Pickups can happen within 15 minutes, or you can schedule rides for seniors in advance.  [email or web address]          Please let me know if you have any questions.       Best Regards,        Charu Kern LMSW     [facility]  [address]  [place] [address] [phone] phone/ [phone] fax  [email or web address]

## 2023-12-29 NOTE — TELEPHONE ENCOUNTER
Caller: Charu -  [facility]    Doctor: Krissy    Reason for call: the pt was at a neurology appt with Dr. Crabtree this morning.  Dr. Crabtree does not want the pt to drive anymore.  The  is working on filling out an application for the [employer] for the pt.  Until that application is completed the pt will need a LYFT for her appt on 1/4/24.  Can someone please arrange the transportation and reach out to the pt regarding  time?    Thank you!    Call back#: [phone] pt call back #

## 2023-12-29 NOTE — TELEPHONE ENCOUNTER
----- Message from Shameka Crabtree MD sent at 12/29/2023  9:47 AM EST -----  Regarding: Transportation  Good morning,     I have a patient Cristy Fisher who I just saw for the first time today. She's currently undergoing workup and her license is suspended due to concern for seizures, with plan for reinstatement in 6 months if she remains seizure-free. She expressed concern that she would not be able to make it to multiple upcoming medical appointments due to this restriction. Is there anything that we would be able to offer her from a transportation standpoint?     Thanks!  KP

## 2023-12-29 NOTE — PROGRESS NOTES
[facility] Neurology Consult  PATIENT:  Cristy Fisher  MRN:  [id number]  :  1935  DATE OF SERVICE:  2023  REFERRED BY: Jaycob Camargo MD  PMD: Reed Bach Jr., MD    Assessment/Plan:     Cristy Fisher is a very pleasant 88 y.o. female with a past medical history that includes T2DM, HLD, SUZETTE, HTN, afib, RLS who presents for evaluation of episodes of altered awareness.     Episodes of altered awareness  -unclear etiology at this time. rEEG showed mild encephalopathy but no evidence of epileptiform activity. There is a possibility that these events were related to a medication side effect from recent increase in gabapentin. Since d/c the medication, she has not had any additional events. Would consider ambulatory 72 hr EEG in an effort to capture/characterize on of these events, although yield will likely be lower now given decrease in frequency  -follow up with EP as scheduled     CC:   Altered awareness    History of Present Illness:     88 y.o. female with a past medical history that includes T2DM, HLD, SUZETTE, HTN, afib, RLS who presents for evaluation of episodes of altered awareness.     She was initially evaluated by Dr. Camargo on 23. At that time, she endorsed having episodes lasting 10-20 minutes during which she would lose time. She calls these absences and states that they began about 2-3 weeks prior to that initial evaluation. She is able to recount a few examples. States that one day she got up to brush her teeth and found herself in the same position 10 minutes later with no recollection of the time that had passed. She has had similar episodes when she was at the sink washing dishes. She does not believe that she is asleep when these occur. Denies any preceding symptoms prior to these events. She had increased gabapentin a few weeks prior to the onset of these symptoms. Denies any TBI. Denies associated tongue bite, incontinence, loss of tone, or postictal phase after these  events. She was recommended to have rEEG performed, which showed very mild generalized encephalopathy with no evidence of epileptiform activity.     She states to me today that her episodes have completely resolved in the interim since her last visit. She states that she completely discontinued gabapentin and they have resolved since then. She was previously having multiple in one day. She does endorse history of afib and follows with EP. She has a pacer in place and states that she has not been notified of any alarms.      Past Medical History:     Past Medical History:   Diagnosis Date    Anemia     Arthritis     also osteoporosis of spine    Cancer (HCC)     endometrial    CHF (congestive heart failure) (HCC)     Chronic atrial fibrillation (HCC) 09/13/2017    Chronic gout 09/13/2017    Chronic kidney disease     Chronic rhinitis 11/08/2018    CKD (chronic kidney disease) stage 3, GFR 30-59 ml/min (HCC) 09/13/2017    Clotting disorder (HCC)     on warfarin    Colon polyp 09/13/2017    Compression fracture of L3 vertebra (HCC)     Coronary artery disease     Diabetes mellitus (HCC)     COMBS (dyspnea on exertion) 11/02/2018    Endometrial cancer (HCC)     age 60    Essential hypertension 09/13/2017    Gastritis 09/13/2017    GERD (gastroesophageal reflux disease)     Heart murmur     Hiatal hernia     Hx of hiatal hernia     Hyperlipidemia     Hypertension     Irregular heart beat     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    Osteoarthritis     Osteoporosis     Pacemaker     Peripheral vascular disease (HCC)     S/P AVR     Spinal stenosis     disk herniation     Urinary tract infection        Patient Active Problem List   Diagnosis    Hypertension    CKD stage G3b/A2, GFR 30-44 and albumin creatinine ratio  mg/g (HCC)    Gout    Mechanical heart valve present    Chronic atrial fibrillation (HCC)    Gastritis    Anticoagulated    Acute cystitis without hematuria    Benign hypertensive CKD    Cardiomegaly     Diastolic dysfunction    DJD (degenerative joint disease), multiple sites    Esophagitis, reflux    Fibromyalgia    Fibrous breast lumps    H/O mechanical aortic valve replacement    Hyperuricemia without signs inflammatory arthritis/tophaceous disease    MCI (mild cognitive impairment)    Class 2 severe obesity due to excess calories with serious comorbidity and body mass index (BMI) of 39.0 to 39.9 in adult     Osteoarthritis    Primary osteoarthritis involving multiple joints    Proteinuria    Pulmonary artery hypertension (HCC)    RLS (restless legs syndrome)    Secondary hyperparathyroidism, renal (HCC)    Increased frequency of urination    Lump of right breast    Leg cramps    Chronic diastolic congestive heart failure (HCC)    Diabetes mellitus (HCC)    Acute blood loss anemia    Ambulatory dysfunction    Hyponatremia    Adhesive capsulitis of right shoulder    Obstructive sleep apnea    Chronic prescription opiate use    Insomnia    PLMD (periodic limb movement disorder)    Cellulitis of great toe of left foot    Hyperlipidemia associated with type 2 diabetes mellitus     Hyperkalemia    Dry eye syndrome of both eyes    SUZETTE (obstructive sleep apnea)    Lumbar spondylosis    Closed compression fracture of third lumbar vertebra ([facility])    Trochanteric bursitis of both hips    Alteration of awareness       Medications:      Current Outpatient Medications   Medication Sig Dispense Refill    Accu-Chek Lila Plus test strip Freedom Lite Brand.      allopurinol (ZYLOPRIM) 100 mg tablet Take 100 mg by mouth 2 (two) times a day      aspirin (ECOTRIN LOW STRENGTH) 81 mg EC tablet Take 81 mg by mouth daily      BD Pen Needle Rena 2nd Gen 32G X 4 MM MISC       calcium citrate-vitamin D (CITRACAL+D) 315-200 MG-UNIT per tablet Take 1 tablet by mouth 2 (two) times a day      dapagliflozin (Farxiga) 10 MG tablet Take 1 tablet (10 mg total) by mouth daily 90 tablet 3    denosumab (PROLIA) 60 mg/mL Inject 60 mg under the skin  once      Denta 5000 Plus 1.1 % CREA BRUSH TWICE A DAY AS DIRECTED      glucose blood test strip       HYDROcodone-acetaminophen (NORCO) 7.5-325 mg per tablet Take 1 tablet by mouth every 6 (six) hours as needed every 4 to 6 hours if needed for pain      indapamide (LOZOL) 2.5 mg tablet Take 2 tablets (5 mg total) by mouth daily 180 tablet 3    insulin aspart (NovoLOG FlexPen) 100 UNIT/ML injection pen Inject 5 Units under the skin as needed      insulin detemir (Levemir FlexTouch) 100 Units/mL injection pen 13 Units      metoprolol succinate (TOPROL-XL) 50 mg 24 hr tablet Take 50 mg by mouth daily      Multiple Vitamin (MULTIVITAMIN) tablet Take 1 tablet by mouth daily      Multiple Vitamins-Minerals (PRESERVISION AREDS 2 PO) Take by mouth 2 (two) times a day      naloxegol oxalate (Movantik) 25 MG tablet Take 1 tablet (25 mg total) by mouth every other day (Patient taking differently: Take 25 mg by mouth if needed) 15 tablet 5    NON FORMULARY Take 1 tablet by mouth 4 (four) times a day HydroEye      RABEprazole (ACIPHEX) 20 MG tablet Take 1 tablet (20 mg total) by mouth daily 90 tablet 3    spironolactone (ALDACTONE) 50 mg tablet Take 25 mg by mouth daily      VITAMIN E PO Take by mouth daily      Vyzulta 0.024 % SOLN instill 1 drop into both eyes at bedtime      warfarin (COUMADIN) 2.5 mg tablet Take 1.5 tablets by mouth daily 3.75 mg 6 days 5 mg on 1 day      bumetanide (BUMEX) 1 mg tablet Take 1 tablet (1 mg total) by mouth daily (Patient not taking: Reported on 2/24/2022) 30 tablet 3    Calcium Carb-Cholecalciferol 250-125 MG-UNIT TABS Take by mouth daily (Patient not taking: Reported on 7/14/2023)      cholecalciferol (VITAMIN D3) 1,000 units tablet Take 1,000 Units by mouth daily      Cholecalciferol 50 MCG (2000 UT) CAPS Take 1 capsule by mouth (Patient not taking: Reported on 7/14/2023)      gabapentin (NEURONTIN) 100 mg capsule Take 2 capsules (200 mg total) by mouth daily at bedtime 60 capsule 1     guaifenesin-codeine (GUAIFENESIN AC) 100-10 MG/5ML liquid Iophen C-NR 10 mg-100 mg/5 mL oral liquid (Patient not taking: No sig reported)      HYDROcodone-acetaminophen (NORCO) 5-325 mg per tablet Take 1 tablet by mouth every 6 (six) hours as needed for pain for up to 10 dosesMax Daily Amount: 4 tablets (Patient not taking: Reported on 9/19/2022) 20 tablet 0    insulin detemir (LEVEMIR) 100 units/mL subcutaneous injection Inject under the skin 16 daily      lidocaine (XYLOCAINE) 1 % lidocaine HCl 10 mg/mL (1 %) injection solution   administered (Patient not taking: No sig reported)      methocarbamol (ROBAXIN) 500 mg tablet Take 1.5 tablets (750 mg total) by mouth 2 (two) times a day (Patient not taking: Reported on 5/24/2022) 10 tablet 0    methocarbamol (ROBAXIN) 500 mg tablet Take 1 tablet (500 mg total) by mouth 2 (two) times a day (Patient not taking: Reported on 7/14/2023) 30 tablet 0    Omega-3 Fatty Acids (FISH OIL PO) Take 1,000 mg by mouth 4 (four) times a day Hydrieye 4 capsule daily.      traMADol (Ultram) 50 mg tablet Take 1 tablet (50 mg total) by mouth every 8 (eight) hours as needed for severe pain (Patient not taking: Reported on 10/2/2023) 30 tablet 0    warfarin (COUMADIN) 5 mg tablet Take 1 tablet (5 mg total) by mouth daily Adjust based on inr (Patient not taking: Reported on 11/22/2022) 20 tablet 0    warfarin (COUMADIN) 5 mg tablet Take 5 mg by mouth once a week       No current facility-administered medications for this visit.        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    Lorazepam  "Nausea Only and Other (See Comments)     Other reaction(s): not sleeping, made her feel \"wired\"    Losartan Headache    Metformin Other (See Comments)     Other reaction(s): GI intolerance    Nortriptyline Other (See Comments)     Other reaction(s): Other (See Comments)  rash bolivar flexeril      Other GI Intolerance    Penicillin G Headache    Penicillins      Other reaction(s): Other (See Comments)  RASH-anaphylaxis  Other reaction(s): Anaphylaxis    Povidone Iodine Other (See Comments)     VAGINAL ITCHING    Ropinirole      Back pain    Simvastatin Other (See Comments) and Myalgia     muscle aches    Sulfa Antibiotics      Other reaction(s): Other (See Comments)  bolivar Lasix, GI upset, rash  Other reaction(s): GI Upset    Torsemide Nausea Only, Vomiting and GI Intolerance     Pt states \"it does not work for me\".     Valsartan      Other reaction(s): Other (See Comments)  bolivar olmesartan    Fluticasone      Leg cramps       Family History:     Family History   Problem Relation Age of Onset    Diabetes Mother     Hypertension Mother     Heart disease Father     Emphysema Father     Asthma Sister     Cancer Sister         colon    Hypertension Sister     Colon cancer Sister     Autoimmune disease Sister         Diabetes    Diabetes Sister     No Known Problems Daughter     No Known Problems Maternal Grandmother     No Known Problems Maternal Grandfather     No Known Problems Paternal Grandmother     No Known Problems Paternal Grandfather     Breast cancer Cousin 55    Breast cancer Cousin 55    Breast cancer Cousin 67    Ovarian cancer Sister     Autoimmune disease Sister         Hashimoto's thyroiditis    Cancer Sister         ovarian    No Known Problems Daughter     No Known Problems Paternal Aunt     No Known Problems Paternal Aunt     Breast cancer Cousin     Breast cancer Cousin     Cancer Paternal Uncle         prostate       Social History:       Social History     Socioeconomic History    Marital status:  " "    Spouse name: Not on file    Number of children: Not on file    Years of education: Not on file    Highest education level: Not on file   Occupational History    Occupation: Retired -    Tobacco Use    Smoking status: Former     Current packs/day: 3.00     Average packs/day: 3.0 packs/day for 20.0 years (60.0 ttl pk-yrs)     Types: Cigarettes    Smokeless tobacco: Never    Tobacco comments:     have not smoked for at least 50 years   Vaping Use    Vaping status: Never Used   Substance and Sexual Activity    Alcohol use: No    Drug use: No    Sexual activity: Yes     Partners: Male     Birth control/protection: Other     Comment: MARTÍNEZ, MINGO   Other Topics Concern    Not on file   Social History Narrative    Not on file     Social Determinants of Health     Financial Resource Strain: Not on file   Food Insecurity: Not on file   Transportation Needs: Not on file   Physical Activity: Not on file   Stress: Not on file   Social Connections: Not on file   Intimate Partner Violence: Not on file   Housing Stability: Not on file         Objective:   /80 (BP Location: Left arm, Patient Position: Sitting, Cuff Size: Large)   Pulse 86   Ht 5' 5\" (1.651 m)   Wt 99.3 kg (219 lb)   BMI 36.44 kg/m²     General: Patient is not in any acute/apparent distress, well nourished, well developed and cooperative.   HEENT: normocephalic, atraumatic, moist membranes  Neck: supple  Extremities: no edema noted   Skin: no lesions or rash  Musculosketal: no bony abnormalities    Neurologic Examination:   Mental status: alert, awake, oriented X 3 and following commands.     Speech/Language: Speech is fluent without any dysarthria, no aphasia noted, can name, repeat, comprehension intact    Cranial Nerves:   CN I: smell not tested  CN II: Visual fields full to confrontation  CN III, IV, VI: Extraocular movements intact bilaterally. Pupils equal round and reactive to light bilaterally.  CN V: Facial sensation is normal.  CN " VII: Full and symmetric facial movement.  CN VIII: Hearing is normal.  CN IX, X: Palate elevates symmetrically.  CN XI: Shoulder shrug strength is normal.  CN XII: Tongue midline without atrophy or fasciculations.    Motor:   Strength 5/5 in all 4 extremities. Bulk/tone - normal.  Fasiculations - none    Sensory:   Sensation intact to soft touch in all 4 extremities.    Cerebellar:   Finger-to-nose intact, normal heel to shin.    Reflexes: 2+ in all 4 extremities  Pathologic reflexes - babinski reflex negative    Gait:   Normal gait, able to tandem walk, able to tip-toe, able to walk on heels, normal arm to swing. Rhomberg sign negative     Imaging:   MRI brain 12/15/23  FINDINGS:     PARENCHYMA: Decreased attenuation is noted in periventricular and subcortical white matter demonstrating an appearance that is statistically most likely to represent mild microangiopathic change.        No CT signs of acute infarction.  No intracranial mass, mass effect or midline shift.  No acute parenchymal hemorrhage.     VENTRICLES AND EXTRA-AXIAL SPACES:  Normal for the patient's age.     VISUALIZED ORBITS: Bilateral lens implants noted     PARANASAL SINUSES: Mild mucosal thickening left sphenoid sinus and right maxillary sinus.     CALVARIUM AND EXTRACRANIAL SOFT TISSUES: Hyperostosis frontalis internus.     IMPRESSION:  1. No acute intracerebral hemorrhage, mass effect or edema.     2.  Mild microangiopathic changes.        Review of Systems:     ROS:  Review of Systems   Constitutional:  Positive for appetite change (Very less) and fatigue. Negative for fever.   HENT: Negative.  Negative for hearing loss, tinnitus, trouble swallowing and voice change.    Eyes:  Positive for pain (Sometimes in her right eye) and visual disturbance (floaters sometimes). Negative for photophobia.   Respiratory: Negative.  Negative for shortness of breath.    Cardiovascular: Negative.  Negative for palpitations.   Gastrointestinal:  Positive for  nausea (Sometimes). Negative for vomiting.   Endocrine: Negative.  Negative for cold intolerance.   Genitourinary:  Positive for frequency. Negative for dysuria and urgency.   Musculoskeletal:  Positive for back pain, gait problem (Sometimes), myalgias, neck pain and neck stiffness.   Skin: Negative.  Negative for rash.   Allergic/Immunologic: Negative.    Neurological:  Positive for speech difficulty (Finding words), weakness (Legs), light-headedness (Sometimes) and numbness (Feet , legs, sometimes fingers). Negative for dizziness, tremors, seizures, syncope, facial asymmetry and headaches.   Hematological:  Bruises/bleeds easily.   Psychiatric/Behavioral:  Positive for sleep disturbance (staying asleep). Negative for confusion and hallucinations.      I have spent a total time of 56 minutes on 12/29/23 in caring for this patient including Risks and benefits of tx options, Instructions for management, Patient and family education, Risk factor reductions, Impressions, Documenting in the medical record, Reviewing / ordering tests, medicine, procedures  , and Obtaining or reviewing history  .

## 2024-01-03 ENCOUNTER — TELEPHONE (OUTPATIENT)
Age: 89
End: 2024-01-03

## 2024-01-03 NOTE — TELEPHONE ENCOUNTER
Please call patient back she wants to know if Lyft was arranged for [facility]. Her  brought the application to [facility] yesterday.

## 2024-01-03 NOTE — TELEPHONE ENCOUNTER
Second attempt contacting patient       Has to turn in license on Friday.   Will drive to appt with [facility]. Pt was advised not to drive and to reschedule appt.   Patient submitted her application to [facility] yesterday.        MSW informed pt that she will be following up with [facility] next week.       MSW advised to call central scheduling at [phone] to schedule EEG 72 hours     MSW will continue to follow up with pt

## 2024-01-03 NOTE — TELEPHONE ENCOUNTER
Caller: yannick    Doctor: dr arias    Reason for call: pt calling to cancel procedure. Pain is gone    Call back#: [phone]

## 2024-01-03 NOTE — TELEPHONE ENCOUNTER
You're nearly This is Cristy Fisher, [id number]. I'm calling with regard. I called yesterday, left a message, and I have a feeling you're on vacation this week. I'm going to call the neurology clinic and find out if anybody is covering for you because I did cancel my appointment. The doctor work at the spine clinic because I my symptoms have resolved and I have an appointment with Jaden Lubin Thursday morning at 9:00 AM. However, I am going to drive to it. I don't have to return. I don't have to hand in my license until Friday, so I am going to drive to that. You don't have to worry about setting up any kind of transport for me for that appointment, but I will call neurology just in case you are not in this week and I'm just talking into the air. So please call me back if you are in so that I know what's going on and whether you've arranged anything because I don't want you to go ahead and arrange anything. And then I'm not available. I'm not there. So thank you so much. Again, my phone number is [phone]. Thank you.

## 2024-01-03 NOTE — TELEPHONE ENCOUNTER
I cx patients U/S procedure per phone note.   Just wanted to send you an FYI-   Also wanted to check if you schedule her LYFT ride. If so that would need to be cx as well     Thanks!

## 2024-01-03 NOTE — TELEPHONE ENCOUNTER
MSW phoned pt and lvm informing that this writer was out of the office. Also asked to learn if she submitted the [employer] application. Additionally explained that for Lyft to Sp[facility] and paid that is something they provide and if she cancelled application then lyft will be cancelled. For [facility] appt to call them directly to learn if they have any transportation until she can have [employer].

## 2024-01-05 NOTE — TELEPHONE ENCOUNTER
EEG scheduled to be on   1/30, 1/31, 2/1, 2/2 at   [address] [place] [address], [address], [place], [place], [address]

## 2024-01-05 NOTE — TELEPHONE ENCOUNTER
ZHANG phoned [facility] at [phone] and spoke with Hernando who informed that pt submitted the wrong application. They sent out a new application on 1/3 pt to complete and resubmit.

## 2024-01-22 ENCOUNTER — APPOINTMENT (EMERGENCY)
Dept: RADIOLOGY | Facility: HOSPITAL | Age: 89
End: 2024-01-22
Payer: MEDICARE

## 2024-01-22 ENCOUNTER — HOSPITAL ENCOUNTER (EMERGENCY)
Facility: HOSPITAL | Age: 89
Discharge: HOME | End: 2024-01-22
Attending: EMERGENCY MEDICINE
Payer: MEDICARE

## 2024-01-22 ENCOUNTER — APPOINTMENT (EMERGENCY)
Dept: CT IMAGING | Facility: HOSPITAL | Age: 89
End: 2024-01-22
Payer: MEDICARE

## 2024-01-22 VITALS
SYSTOLIC BLOOD PRESSURE: 145 MMHG | OXYGEN SATURATION: 95 % | HEART RATE: 84 BPM | DIASTOLIC BLOOD PRESSURE: 68 MMHG | RESPIRATION RATE: 22 BRPM | TEMPERATURE: 97.9 F

## 2024-01-22 DIAGNOSIS — R11.2 NAUSEA AND VOMITING: Primary | ICD-10-CM

## 2024-01-22 DIAGNOSIS — R10.9 ABDOMINAL PAIN: ICD-10-CM

## 2024-01-22 LAB
2HR DELTA HS TROPONIN: 3 NG/L
ALBUMIN SERPL BCP-MCNC: 4.4 G/DL (ref 3.5–5)
ALP SERPL-CCNC: 93 U/L (ref 34–104)
ALT SERPL W P-5'-P-CCNC: 35 U/L (ref 7–52)
ANION GAP SERPL CALCULATED.3IONS-SCNC: 8 MMOL/L
ANION GAP SERPL CALCULATED.3IONS-SCNC: 9 MMOL/L
ANISOCYTOSIS BLD QL SMEAR: PRESENT
AST SERPL W P-5'-P-CCNC: 62 U/L (ref 13–39)
ATRIAL RATE: 202 BPM
ATRIAL RATE: 85 BPM
BASOPHILS # BLD MANUAL: 0 THOUSAND/UL (ref 0–0.1)
BASOPHILS NFR MAR MANUAL: 0 % (ref 0–1)
BILIRUB SERPL-MCNC: 0.9 MG/DL (ref 0.2–1)
BUN SERPL-MCNC: 37 MG/DL (ref 5–25)
BUN SERPL-MCNC: 38 MG/DL (ref 5–25)
CALCIUM SERPL-MCNC: 8.5 MG/DL (ref 8.4–10.2)
CALCIUM SERPL-MCNC: 9 MG/DL (ref 8.4–10.2)
CARDIAC TROPONIN I PNL SERPL HS: 22 NG/L
CARDIAC TROPONIN I PNL SERPL HS: 25 NG/L
CHLORIDE SERPL-SCNC: 101 MMOL/L (ref 96–108)
CHLORIDE SERPL-SCNC: 98 MMOL/L (ref 96–108)
CO2 SERPL-SCNC: 28 MMOL/L (ref 21–32)
CO2 SERPL-SCNC: 28 MMOL/L (ref 21–32)
CREAT SERPL-MCNC: 1.3 MG/DL (ref 0.6–1.3)
CREAT SERPL-MCNC: 1.33 MG/DL (ref 0.6–1.3)
EOSINOPHIL # BLD MANUAL: 0 THOUSAND/UL (ref 0–0.4)
EOSINOPHIL NFR BLD MANUAL: 0 % (ref 0–6)
ERYTHROCYTE [DISTWIDTH] IN BLOOD BY AUTOMATED COUNT: 15.8 % (ref 11.6–15.1)
GFR SERPL CREATININE-BSD FRML MDRD: 35 ML/MIN/1.73SQ M
GFR SERPL CREATININE-BSD FRML MDRD: 36 ML/MIN/1.73SQ M
GLUCOSE SERPL-MCNC: 160 MG/DL (ref 65–140)
GLUCOSE SERPL-MCNC: 174 MG/DL (ref 65–140)
HCT VFR BLD AUTO: 45.1 % (ref 34.8–46.1)
HGB BLD-MCNC: 14.6 G/DL (ref 11.5–15.4)
LG PLATELETS BLD QL SMEAR: PRESENT
LIPASE SERPL-CCNC: 29 U/L (ref 11–82)
LYMPHOCYTES # BLD AUTO: 0.39 THOUSAND/UL (ref 0.6–4.47)
LYMPHOCYTES # BLD AUTO: 3 % (ref 14–44)
MACROCYTES BLD QL AUTO: PRESENT
MCH RBC QN AUTO: 29.9 PG (ref 26.8–34.3)
MCHC RBC AUTO-ENTMCNC: 32.4 G/DL (ref 31.4–37.4)
MCV RBC AUTO: 92 FL (ref 82–98)
MONOCYTES # BLD AUTO: 0 THOUSAND/UL (ref 0–1.22)
MONOCYTES NFR BLD: 0 % (ref 4–12)
NEUTROPHILS # BLD MANUAL: 9.37 THOUSAND/UL (ref 1.85–7.62)
NEUTS BAND NFR BLD MANUAL: 1 % (ref 0–8)
NEUTS SEG NFR BLD AUTO: 95 % (ref 43–75)
PLATELET # BLD AUTO: 210 THOUSANDS/UL (ref 149–390)
PLATELET BLD QL SMEAR: ADEQUATE
PMV BLD AUTO: 9.8 FL (ref 8.9–12.7)
POTASSIUM SERPL-SCNC: 5 MMOL/L (ref 3.5–5.3)
POTASSIUM SERPL-SCNC: 5.9 MMOL/L (ref 3.5–5.3)
PROT SERPL-MCNC: 8.7 G/DL (ref 6.4–8.4)
QRS AXIS: 250 DEGREES
QRS AXIS: 256 DEGREES
QRSD INTERVAL: 166 MS
QRSD INTERVAL: 166 MS
QT INTERVAL: 432 MS
QT INTERVAL: 438 MS
QTC INTERVAL: 516 MS
QTC INTERVAL: 529 MS
RBC # BLD AUTO: 4.89 MILLION/UL (ref 3.81–5.12)
RBC MORPH BLD: PRESENT
SODIUM SERPL-SCNC: 135 MMOL/L (ref 135–147)
SODIUM SERPL-SCNC: 137 MMOL/L (ref 135–147)
T WAVE AXIS: 78 DEGREES
T WAVE AXIS: 85 DEGREES
VARIANT LYMPHS # BLD AUTO: 1 %
VENTRICULAR RATE: 86 BPM
VENTRICULAR RATE: 88 BPM
WBC # BLD AUTO: 9.76 THOUSAND/UL (ref 4.31–10.16)

## 2024-01-22 PROCEDURE — 85007 BL SMEAR W/DIFF WBC COUNT: CPT

## 2024-01-22 PROCEDURE — 99285 EMERGENCY DEPT VISIT HI MDM: CPT

## 2024-01-22 PROCEDURE — 71046 X-RAY EXAM CHEST 2 VIEWS: CPT

## 2024-01-22 PROCEDURE — 93005 ELECTROCARDIOGRAM TRACING: CPT

## 2024-01-22 PROCEDURE — 96361 HYDRATE IV INFUSION ADD-ON: CPT

## 2024-01-22 PROCEDURE — 80053 COMPREHEN METABOLIC PANEL: CPT

## 2024-01-22 PROCEDURE — 96375 TX/PRO/DX INJ NEW DRUG ADDON: CPT

## 2024-01-22 PROCEDURE — 80048 BASIC METABOLIC PNL TOTAL CA: CPT

## 2024-01-22 PROCEDURE — 99284 EMERGENCY DEPT VISIT MOD MDM: CPT

## 2024-01-22 PROCEDURE — 85027 COMPLETE CBC AUTOMATED: CPT

## 2024-01-22 PROCEDURE — 83690 ASSAY OF LIPASE: CPT

## 2024-01-22 PROCEDURE — 84484 ASSAY OF TROPONIN QUANT: CPT

## 2024-01-22 PROCEDURE — 96374 THER/PROPH/DIAG INJ IV PUSH: CPT

## 2024-01-22 PROCEDURE — 36415 COLL VENOUS BLD VENIPUNCTURE: CPT

## 2024-01-22 RX ORDER — ONDANSETRON 2 MG/ML
4 INJECTION INTRAMUSCULAR; INTRAVENOUS ONCE
Status: COMPLETED | OUTPATIENT
Start: 2024-01-22 | End: 2024-01-22

## 2024-01-22 RX ORDER — ONDANSETRON 4 MG/1
4 TABLET, ORALLY DISINTEGRATING ORAL EVERY 6 HOURS PRN
Qty: 20 TABLET | Refills: 0 | Status: SHIPPED | OUTPATIENT
Start: 2024-01-22

## 2024-01-22 RX ADMIN — SODIUM CHLORIDE 500 ML: 0.9 INJECTION, SOLUTION INTRAVENOUS at 13:15

## 2024-01-22 RX ADMIN — MORPHINE SULFATE 2 MG: 2 INJECTION, SOLUTION INTRAMUSCULAR; INTRAVENOUS at 13:16

## 2024-01-22 RX ADMIN — ONDANSETRON 4 MG: 2 INJECTION INTRAMUSCULAR; INTRAVENOUS at 13:16

## 2024-01-22 NOTE — ED PROVIDER NOTES
History  Chief Complaint   Patient presents with    Vomiting     Vomiting and abdominal pain starting around 0400. Believes she has food poisoning. No medication pta.      Patient is an 88-year-old female with a history of hypertension, diabetes, CAD, chronic A-fib with pacemaker, CKD, GERD, hyperlipidemia, CHF presenting for evaluation of vomiting since early this morning.  Reports greater than 10 episodes of nonbloody, nonbilious vomiting.  She has associated epigastric abdominal pain that is constant.  Denies diarrhea.  No fevers, URI symptoms, chest pain, shortness of breath, leg swelling.  States she has been unable to tolerate any p.o. today.  She has a history of cholecystectomy and hysterectomy.      Vomiting  Associated symptoms: abdominal pain    Associated symptoms: no arthralgias, no chills, no cough, no diarrhea, no fever, no headaches and no sore throat        Prior to Admission Medications   Prescriptions Last Dose Informant Patient Reported? Taking?   Accu-Chek Lila Plus test strip  Self Yes No   Sig: [place] [employer]ite Brand.   BD Pen Needle Rena 2nd Gen 32G X 4 MM MISC   Yes No   Calcium Carb-Cholecalciferol 250-125 MG-UNIT TABS   Yes Yes   Sig: Take by mouth daily   Cholecalciferol 50 MCG (2000 UT) CAPS   Yes Yes   Sig: Take 1 capsule by mouth   Denta 5000 Plus 1.1 % CREA   Yes No   Sig: BRUSH TWICE A DAY AS DIRECTED   HYDROcodone-acetaminophen (NORCO) 5-325 mg per tablet   No No   Sig: Take 1 tablet by mouth every 6 (six) hours as needed for pain for up to 10 dosesMax Daily Amount: 4 tablets   Patient not taking: Reported on 9/19/2022   HYDROcodone-acetaminophen (NORCO) 7.5-325 mg per tablet   Yes Yes   Sig: Take 1 tablet by mouth every 6 (six) hours as needed every 4 to 6 hours if needed for pain   Multiple Vitamin (MULTIVITAMIN) tablet  Self Yes Yes   Sig: Take 1 tablet by mouth daily   Multiple Vitamins-Minerals (PRESERVISION AREDS 2 PO)   Yes Yes   Sig: Take by mouth 2 (two) times a day   NON  FORMULARY   Yes Yes   Sig: Take 1 tablet by mouth 4 (four) times a day HydroEye   Omega-3 Fatty Acids (FISH OIL PO)  Self Yes No   Sig: Take 1,000 mg by mouth 4 (four) times a day Hydrieye 4 capsule daily.   RABEprazole (ACIPHEX) 20 MG tablet   No Yes   Sig: Take 1 tablet (20 mg total) by mouth daily   VITAMIN E PO   Yes Yes   Sig: Take by mouth daily   Vyzulta 0.024 % SOLN   Yes Yes   Sig: instill 1 drop into both eyes at bedtime   allopurinol (ZYLOPRIM) 100 mg tablet   Yes Yes   Sig: Take 100 mg by mouth 2 (two) times a day   aspirin (ECOTRIN LOW STRENGTH) 81 mg EC tablet  Self Yes Yes   Sig: Take 81 mg by mouth daily   bumetanide (BUMEX) 1 mg tablet   No No   Sig: Take 1 tablet (1 mg total) by mouth daily   Patient not taking: Reported on 2/24/2022   calcium citrate-vitamin D (CITRACAL+D) 315-200 MG-UNIT per tablet  Self Yes No   Sig: Take 1 tablet by mouth 2 (two) times a day   Patient not taking: Reported on 1/3/2024   cholecalciferol (VITAMIN D3) 1,000 units tablet  Self Yes No   Sig: Take 1,000 Units by mouth daily   Patient not taking: Reported on 1/3/2024   dapagliflozin (Farxiga) 10 MG tablet   No Yes   Sig: Take 1 tablet (10 mg total) by mouth daily   denosumab (PROLIA) 60 mg/mL   Yes Yes   Sig: Inject 60 mg under the skin once   gabapentin (NEURONTIN) 100 mg capsule   No No   Sig: Take 2 capsules (200 mg total) by mouth daily at bedtime   Patient not taking: Reported on 1/3/2024   glucose blood test strip   Yes No   guaifenesin-codeine (GUAIFENESIN AC) 100-10 MG/5ML liquid   Yes No   Sig: Iophen C-NR 10 mg-100 mg/5 mL oral liquid   Patient not taking: No sig reported   indapamide (LOZOL) 1.25 mg tablet   Yes Yes   Sig: Take 1.25 mg by mouth daily 3 days/week  MON, TUES, WED   insulin aspart (NovoLOG FlexPen) 100 UNIT/ML injection pen  Self Yes Yes   Sig: Inject 5 Units under the skin as needed   insulin detemir (LEVEMIR) 100 units/mL subcutaneous injection  Self Yes No   Sig: Inject under the skin 16  daily   insulin detemir (Levemir FlexTouch) 100 Units/mL injection pen   Yes Yes   Si Units   lidocaine (XYLOCAINE) 1 %   Yes No   Sig: lidocaine HCl 10 mg/mL (1 %) injection solution   administered   Patient not taking: No sig reported   methocarbamol (ROBAXIN) 500 mg tablet   No No   Sig: Take 1.5 tablets (750 mg total) by mouth 2 (two) times a day   Patient not taking: Reported on 2022   methocarbamol (ROBAXIN) 500 mg tablet   No No   Sig: Take 1 tablet (500 mg total) by mouth 2 (two) times a day   Patient not taking: Reported on 2023   metoprolol succinate (TOPROL-XL) 50 mg 24 hr tablet  Self Yes Yes   Sig: Take 50 mg by mouth daily   naloxegol oxalate (Movantik) 25 MG tablet   No No   Sig: Take 1 tablet (25 mg total) by mouth every other day   Patient taking differently: Take 25 mg by mouth if needed   spironolactone (ALDACTONE) 50 mg tablet  Self Yes Yes   Sig: Take 25 mg by mouth daily   traMADol (Ultram) 50 mg tablet   No No   Sig: Take 1 tablet (50 mg total) by mouth every 8 (eight) hours as needed for severe pain   Patient not taking: Reported on 10/2/2023   warfarin (COUMADIN) 2.5 mg tablet   Yes Yes   Sig: Take 1.5 tablets by mouth daily 3.75 mg 6 days 5 mg on 1 day   warfarin (COUMADIN) 5 mg tablet   No No   Sig: Take 1 tablet (5 mg total) by mouth daily Adjust based on inr   Patient not taking: Reported on 2022   warfarin (COUMADIN) 5 mg tablet   Yes No   Sig: Take 5 mg by mouth once a week      Facility-Administered Medications: None       Past Medical History:   Diagnosis Date    Anemia     Arthritis     also osteoporosis of spine    Cancer (HCC)     endometrial    CHF (congestive heart failure) (HCC)     Chronic atrial fibrillation (HCC) 2017    Chronic gout 2017    Chronic kidney disease     Chronic rhinitis 2018    CKD (chronic kidney disease) stage 3, GFR 30-59 ml/min (HCC) 2017    Clotting disorder (HCC)     on warfarin    Colon polyp 2017     Compression fracture of L3 vertebra (HCC)     Coronary artery disease     Diabetes mellitus (HCC)     COMBS (dyspnea on exertion) 11/02/2018    Endometrial cancer (HCC)     age 60    Essential hypertension 09/13/2017    Gastritis 09/13/2017    GERD (gastroesophageal reflux disease)     Heart murmur     Hiatal hernia     Hx of hiatal hernia     Hyperlipidemia     Hypertension     Irregular heart beat     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    Osteoarthritis     Osteoporosis     Pacemaker     Peripheral vascular disease (HCC)     S/P AVR     Spinal stenosis     disk herniation     Urinary tract infection        Past Surgical History:   Procedure Laterality Date    AORTIC VALVE REPLACEMENT      BREAST EXCISIONAL BIOPSY Left 1995    benign    CARDIAC SURGERY      CATARACT EXTRACTION      CHOLECYSTECTOMY      COLONOSCOPY      COLONOSCOPY  09/2012    tubular adenoma    EGD  04/30/2021    [facility] MD.- Multiple stomach polyps, small amount of heme or coffee-grounds noted but do not feel significant evidence for bleeding.    EGD AND COLONOSCOPY  09/2017    adenomatous polyp/ benign gastric polyp    ESOPHAGOGASTRODUODENOSCOPY  08/2012    FIXATION KYPHOPLASTY      HERNIA REPAIR      x3    HYSTERECTOMY      age 61    INSERT / REPLACE / REMOVE PACEMAKER      IR PELVIC ANGIOGRAM  01/29/2019    JOINT REPLACEMENT Bilateral     knees    OOPHORECTOMY Bilateral     age 61    [place] COLONOSCOPY FLX DX W/COLLJ SPEC WHEN PFRMD N/A 09/12/2017    Procedure: EGDwith bxAND COLONOSCOPY with polypectomies;  Surgeon: Andrea Ortega IV, MD;  Location: [facility];  Service: Gastroenterology    KY SIGMOIDOSCOPY FLX DX W/COLLJ SPEC BR/WA IF PFRMD N/A 09/13/2017    Procedure: colonoscopy with hemo clip x 2;  Surgeon: Andrea Ortega IV, MD;  Location: [facility];  Service: Gastroenterology    SKIN BIOPSY      TONSILLECTOMY         Family History   Problem Relation Age of Onset    Diabetes Mother     Hypertension Mother     Heart disease Father      Emphysema Father     Asthma Sister     Cancer Sister         colon    Hypertension Sister     Colon cancer Sister     Autoimmune disease Sister         Diabetes    Diabetes Sister     No Known Problems Daughter     No Known Problems Maternal Grandmother     No Known Problems Maternal Grandfather     No Known Problems Paternal Grandmother     No Known Problems Paternal Grandfather     Breast cancer Cousin 55    Breast cancer Cousin 55    Breast cancer Cousin 67    Ovarian cancer Sister     Autoimmune disease Sister         Hashimoto's thyroiditis    Cancer Sister         ovarian    No Known Problems Daughter     No Known Problems Paternal Aunt     No Known Problems Paternal Aunt     Breast cancer Cousin     Breast cancer Cousin     Cancer Paternal Uncle         prostate     I have reviewed and agree with the history as documented.    E-Cigarette/Vaping    E-Cigarette Use Never User      E-Cigarette/Vaping Substances    Nicotine No     THC No     CBD No     Flavoring No     Other No     Unknown No      Social History     Tobacco Use    Smoking status: Former     Current packs/day: 3.00     Average packs/day: 3.0 packs/day for 20.0 years (60.0 ttl pk-yrs)     Types: Cigarettes    Smokeless tobacco: Never    Tobacco comments:     have not smoked for at least 50 years   Vaping Use    Vaping status: Never Used   Substance Use Topics    Alcohol use: No    Drug use: No       Review of Systems   Constitutional:  Negative for chills and fever.   HENT:  Negative for ear pain and sore throat.    Eyes:  Negative for pain and visual disturbance.   Respiratory:  Negative for cough and shortness of breath.    Cardiovascular:  Negative for chest pain and palpitations.   Gastrointestinal:  Positive for abdominal pain, nausea and vomiting. Negative for constipation and diarrhea.   Genitourinary:  Negative for dysuria and hematuria.   Musculoskeletal:  Negative for arthralgias and back pain.   Skin:  Negative for color change and  rash.   Neurological:  Negative for dizziness, seizures, syncope, facial asymmetry, light-headedness and headaches.   All other systems reviewed and are negative.      Physical Exam  Physical Exam  Vitals and nursing note reviewed.   Constitutional:       General: She is not in acute distress.     Appearance: Normal appearance.   HENT:      Head: Normocephalic and atraumatic.      Right Ear: External ear normal.      Left Ear: External ear normal.      Nose: Nose normal.      Mouth/Throat:      Mouth: Mucous membranes are moist.   Eyes:      General: No scleral icterus.        Right eye: No discharge.         Left eye: No discharge.      Extraocular Movements: Extraocular movements intact.      Conjunctiva/sclera: Conjunctivae normal.   Cardiovascular:      Rate and Rhythm: Normal rate and regular rhythm.      Pulses: Normal pulses.      Heart sounds: Normal heart sounds.   Pulmonary:      Effort: Pulmonary effort is normal. No respiratory distress.      Breath sounds: Normal breath sounds.   Abdominal:      Palpations: Abdomen is soft.      Tenderness: There is abdominal tenderness in the right upper quadrant, epigastric area and left upper quadrant. There is no right CVA tenderness, left CVA tenderness, guarding or rebound.   Musculoskeletal:         General: No tenderness, deformity or signs of injury.      Cervical back: Normal range of motion and neck supple.   Skin:     General: Skin is dry.      Coloration: Skin is not jaundiced.      Findings: No erythema or rash.   Neurological:      General: No focal deficit present.      Mental Status: She is alert and oriented to person, place, and time. Mental status is at baseline.      Motor: No weakness.      Gait: Gait normal.   Psychiatric:         Mood and Affect: Mood normal.         Behavior: Behavior normal.         Thought Content: Thought content normal.         Vital Signs  ED Triage Vitals   Temperature Pulse Respirations Blood Pressure SpO2   01/22/24 1225  01/22/24 1225 01/22/24 1225 01/22/24 1225 01/22/24 1225   97.9 °F (36.6 °C) 88 18 (!) 201/81 100 %      Temp src Heart Rate Source Patient Position - Orthostatic VS BP Location FiO2 (%)   -- 01/22/24 1225 01/22/24 1300 01/22/24 1300 --    Monitor Lying Right arm       Pain Score       01/22/24 1316       5           Vitals:    01/22/24 1225 01/22/24 1249 01/22/24 1300 01/22/24 1545   BP: (!) 201/81 (!) 179/79 159/70 145/68   Pulse: 88 84 84 84   Patient Position - Orthostatic VS:   Lying Lying         Visual Acuity      ED Medications  Medications   sodium chloride 0.9 % bolus 500 mL (0 mL Intravenous Stopped 1/22/24 1418)   ondansetron (ZOFRAN) injection 4 mg (4 mg Intravenous Given 1/22/24 1316)   morphine injection 2 mg (2 mg Intravenous Given 1/22/24 1316)       Diagnostic Studies  Results Reviewed       Procedure Component Value Units Date/Time    Manual Differential(PHLEBS Do Not Order) [[id number]]  (Abnormal) Collected: 01/22/24 1313    Lab Status: Final result Specimen: Blood from Arm, Left Updated: 01/22/24 1726     Segmented % 95 %      Bands % 1 %      Lymphocytes % 3 %      Monocytes % 0 %      Eosinophils, % 0 %      Basophils % 0 %      Atypical Lymphocytes % 1 %      Absolute Neutrophils 9.37 Thousand/uL      Lymphocytes Absolute 0.39 Thousand/uL      Monocytes Absolute 0.00 Thousand/uL      Eosinophils Absolute 0.00 Thousand/uL      Basophils Absolute 0.00 Thousand/uL      Total Counted --     RBC Morphology Present     Platelet Estimate Adequate     Large Platelet Present     Anisocytosis Present     Macrocytes Present    HS Troponin I 2hr [[id number]]  (Normal) Collected: 01/22/24 1531    Lab Status: Final result Specimen: Blood from Arm, Left Updated: 01/22/24 1602     hs TnI 2hr 25 ng/L      Delta 2hr hsTnI 3 ng/L     HS Troponin 0hr (reflex protocol) [[id number]]  (Normal) Collected: 01/22/24 1313    Lab Status: Final result Specimen: Blood from Arm, Left Updated: 01/22/24 1511     hs TnI 0hr 22  ng/L     Basic metabolic panel [[id number]]  (Abnormal) Collected: 01/22/24 1423    Lab Status: Final result Specimen: Blood from Arm, Left Updated: 01/22/24 1458     Sodium 137 mmol/L      Potassium 5.0 mmol/L      Chloride 101 mmol/L      CO2 28 mmol/L      ANION GAP 8 mmol/L      BUN 37 mg/dL      Creatinine 1.30 mg/dL      Glucose 160 mg/dL      Calcium 8.5 mg/dL      eGFR 36 ml/min/1.73sq m     Narrative:      National Kidney Disease Foundation guidelines for Chronic Kidney Disease (CKD):     Stage 1 with normal or high GFR (GFR > 90 mL/min/1.73 square meters)    Stage 2 Mild CKD (GFR = 60-89 mL/min/1.73 square meters)    Stage 3A Moderate CKD (GFR = 45-59 mL/min/1.73 square meters)    Stage 3B Moderate CKD (GFR = 30-44 mL/min/1.73 square meters)    Stage 4 Severe CKD (GFR = 15-29 mL/min/1.73 square meters)    Stage 5 End Stage CKD (GFR <15 mL/min/1.73 square meters)  Note: GFR calculation is accurate only with a steady state creatinine    Lipase [[id number]]  (Normal) Collected: 01/22/24 1313    Lab Status: Final result Specimen: Blood from Arm, Left Updated: 01/22/24 1358     Lipase 29 u/L     Comprehensive metabolic panel [[id number]]  (Abnormal) Collected: 01/22/24 1313    Lab Status: Final result Specimen: Blood from Arm, Left Updated: 01/22/24 1358     Sodium 135 mmol/L      Potassium 5.9 mmol/L      Chloride 98 mmol/L      CO2 28 mmol/L      ANION GAP 9 mmol/L      BUN 38 mg/dL      Creatinine 1.33 mg/dL      Glucose 174 mg/dL      Calcium 9.0 mg/dL      AST 62 U/L      ALT 35 U/L      Alkaline Phosphatase 93 U/L      Total Protein 8.7 g/dL      Albumin 4.4 g/dL      Total Bilirubin 0.90 mg/dL      eGFR 35 ml/min/1.73sq m     Narrative:      National Kidney Disease Foundation guidelines for Chronic Kidney Disease (CKD):     Stage 1 with normal or high GFR (GFR > 90 mL/min/1.73 square meters)    Stage 2 Mild CKD (GFR = 60-89 mL/min/1.73 square meters)    Stage 3A Moderate CKD (GFR = 45-59 mL/min/1.73 square  meters)    Stage 3B Moderate CKD (GFR = 30-44 mL/min/1.73 square meters)    Stage 4 Severe CKD (GFR = 15-29 mL/min/1.73 square meters)    Stage 5 End Stage CKD (GFR <15 mL/min/1.73 square meters)  Note: GFR calculation is accurate only with a steady state creatinine    CBC and differential [[id number]]  (Abnormal) Collected: 01/22/24 1313    Lab Status: Final result Specimen: Blood from Arm, Left Updated: 01/22/24 1349     WBC 9.76 Thousand/uL      RBC 4.89 Million/uL      Hemoglobin 14.6 g/dL      Hematocrit 45.1 %      MCV 92 fL      MCH 29.9 pg      MCHC 32.4 g/dL      RDW 15.8 %      MPV 9.8 fL      Platelets 210 Thousands/uL     Narrative:      This is an appended report.  These results have been appended to a previously verified report.                   XR chest 2 views   Final Result by Porsche Do MD (01/22 0136)      No focal consolidation   Mild increased lung markings may be due to hypoinflation or mild congestion            Resident: PEGGY Magaña I, the attending radiologist, have reviewed the images and agree with the final report above.      Workstation performed: [id number]                    Procedures  ECG 12 Lead Documentation Only    Date/Time: 1/22/2024 1:04 PM    Performed by: Radha Mendoza PA-C  Authorized by: Radha Mendoza PA-C    Indications / Diagnosis:  Vomiting  ECG reviewed by me, the ED Provider: yes    Patient location:  ED  Previous ECG:     Previous ECG:  Compared to current    Similarity:  Changes noted  Interpretation:     Interpretation: abnormal    Rate:     ECG rate:  86    ECG rate assessment: normal    Rhythm:     Rhythm: sinus rhythm      Rhythm comment:  Ventricular paced rhythm.  Ectopy:     Ectopy: none    QRS:     QRS axis:  Normal    QRS intervals:  Wide  Conduction:     Conduction: abnormal      Abnormal conduction: non-specific intraventricular conduction delay    ST segments:     ST segments:  Normal  T waves:     T waves: normal              ED Course  ED Course as of 01/23/24 0607   Mon Jan 22, 2024   1402 CBC and differential(!)  No leukocytosis, anemia.   1402 Creatinine(!): 1.33  Baseline.   1403 Potassium(!): 5.9  Hemolyzed result. No EKG changes, patient is paced. Will repeat.   1500 Potassium: 5.0  Repeat normal.   1501 Pt refusing CT scan. She feels improvement in symptoms. Will PO challenge and remaining cardiac workup negative, can be discharged home with supportive care and strict return precautions.   1512 hs TnI 0hr: 22  Need delta.   1518 S/o to KM pending PO challenge and delta trop.             HEART Risk Score      Flowsheet Row Most Recent Value   Heart Score Risk Calculator    History 0 Filed at: 01/22/2024 1612   ECG 0 Filed at: 01/22/2024 1612   Age 2 Filed at: 01/22/2024 1612   Risk Factors 2 Filed at: 01/22/2024 1612   Troponin 1 Filed at: 01/22/2024 1612   HEART Score 5 Filed at: 01/22/2024 1612                          SBIRT 20yo+      Flowsheet Row Most Recent Value   Initial Alcohol Screen: US AUDIT-C     1. How often do you have a drink containing alcohol? 0 Filed at: 01/22/2024 1238   2. How many drinks containing alcohol do you have on a typical day you are drinking?  0 Filed at: 01/22/2024 1238   3a. Male UNDER 65: How often do you have five or more drinks on one occasion? 0 Filed at: 01/22/2024 1238   3b. FEMALE Any Age, or MALE 65+: How often do you have 4 or more drinks on one occassion? 0 Filed at: 01/22/2024 1238   Audit-C Score 0 Filed at: 01/22/2024 1238   KIMANI: How many times in the past year have you...    Used an illegal drug or used a prescription medication for non-medical reasons? Never Filed at: 01/22/2024 1238                      Medical Decision Making  Patient is an 88-year-old female presenting with epigastric pain and vomiting since this morning.  She is mildly hypertensive on arrival but remaining vitals within normal limits.  DDx including but not limited to: Gastritis/PUD, pancreatitis,  gastroenteritis, atypical ACS, cystitis.  Plan: CBC, CMP, lipase, troponin/EKG, chest x-ray, CT ab pelvis with contrast.  Will treat symptomatically with pain meds, fluids, Zofran.    Labs negative for leukocytosis, anemia, RUBY, significantly elevated LFTs.  Potassium initially increased however sample was hemolyzed and repeat was within normal limits.  Troponin 22, EKG is nonischemic.  Chest x-ray negative for acute pathology as interpreted by myself.  Patient declined CT scan but discussed with her and she reported improvement in symptoms.  Discussed plan for p.o. challenge and if unsuccessful then we will revisit need for CT scan.  Signed out to Bolivar Choi PA-C pending delta troponin and p.o. challenge.  Patient stable at time of signout.    Amount and/or Complexity of Data Reviewed  Labs: ordered. Decision-making details documented in ED Course.  Radiology: ordered.    Risk  Prescription drug management.             Disposition  Final diagnoses:   Nausea and vomiting   Abdominal pain     Time reflects when diagnosis was documented in both MDM as applicable and the Disposition within this note       Time User Action Codes Description Comment    1/22/2024  4:35 PM Bolivar Choi Add [R11.2] Nausea and vomiting     1/22/2024  4:35 PM Bolivar Choi Add [R10.9] Abdominal pain           ED Disposition       ED Disposition   Discharge    Condition   Stable    Date/Time   Mon Jan 22, 2024 1635    Comment   Cristy Fisher discharge to home/self care.                   Follow-up Information       Follow up With Specialties Details Why Contact Info    Reed Bach Jr., MD Geriatric Medicine  As needed 8 [address] [address]  [phone]              Discharge Medication List as of 1/22/2024  4:36 PM        CONTINUE these medications which have NOT CHANGED    Details   allopurinol (ZYLOPRIM) 100 mg tablet Take 100 mg by mouth 2 (two) times a day, Starting Sun 5/23/2021, Historical Med       aspirin (ECOTRIN LOW STRENGTH) 81 mg EC tablet Take 81 mg by mouth daily, Historical Med      Calcium Carb-Cholecalciferol 250-125 MG-UNIT TABS Take by mouth daily, Historical Med      Cholecalciferol 50 MCG (2000 UT) CAPS Take 1 capsule by mouth, Historical Med      dapagliflozin (Farxiga) 10 MG tablet Take 1 tablet (10 mg total) by mouth daily, Starting Mon 10/2/2023, Normal      denosumab (PROLIA) 60 mg/mL Inject 60 mg under the skin once, Historical Med      HYDROcodone-acetaminophen (NORCO) 7.5-325 mg per tablet Take 1 tablet by mouth every 6 (six) hours as needed every 4 to 6 hours if needed for pain, Starting Thu 11/18/2021, Historical Med      indapamide (LOZOL) 1.25 mg tablet Take 1.25 mg by mouth daily 3 days/week  MON, TUES, WED, Starting Fri 12/1/2023, Historical Med      insulin aspart (NovoLOG FlexPen) 100 UNIT/ML injection pen Inject 5 Units under the skin as needed, Historical Med      insulin detemir (Levemir FlexTouch) 100 Units/mL injection pen 13 Units, Starting Tue 4/6/2021, Historical Med      metoprolol succinate (TOPROL-XL) 50 mg 24 hr tablet Take 50 mg by mouth daily, Historical Med      Multiple Vitamin (MULTIVITAMIN) tablet Take 1 tablet by mouth daily, Historical Med      Multiple Vitamins-Minerals (PRESERVISION AREDS 2 PO) Take by mouth 2 (two) times a day, Historical Med      NON FORMULARY Take 1 tablet by mouth 4 (four) times a day HydroEye, Historical Med      RABEprazole (ACIPHEX) 20 MG tablet Take 1 tablet (20 mg total) by mouth daily, Starting Tue 9/12/2023, Normal      spironolactone (ALDACTONE) 50 mg tablet Take 25 mg by mouth daily, Starting Tue 12/1/2020, Historical Med      VITAMIN E PO Take by mouth daily, Historical Med      Vyzulta 0.024 % SOLN instill 1 drop into both eyes at bedtime, Historical Med      !! warfarin (COUMADIN) 2.5 mg tablet Take 1.5 tablets by mouth daily 3.75 mg 6 days 5 mg on 1 day, Historical Med      !! Accu-Chek Lila Plus test strip [place] Lite  Brand., Historical Med      BD Pen Needle Rena 2nd Gen 32G X 4 MM MISC Starting Thu 10/13/2022, Historical Med      bumetanide (BUMEX) 1 mg tablet Take 1 tablet (1 mg total) by mouth daily, Starting Mon 11/15/2021, Normal      calcium citrate-vitamin D (CITRACAL+D) 315-200 MG-UNIT per tablet Take 1 tablet by mouth 2 (two) times a day, Historical Med      cholecalciferol (VITAMIN D3) 1,000 units tablet Take 1,000 Units by mouth daily, Historical Med      Denta 5000 Plus 1.1 % CREA BRUSH TWICE A DAY AS DIRECTED, Historical Med      gabapentin (NEURONTIN) 100 mg capsule Take 2 capsules (200 mg total) by mouth daily at bedtime, Starting Thu 12/14/2023, Normal      !! glucose blood test strip Historical Med      guaifenesin-codeine (GUAIFENESIN AC) 100-10 MG/5ML liquid Iophen C-NR 10 mg-100 mg/5 mL oral liquid, Historical Med      HYDROcodone-acetaminophen (NORCO) 5-325 mg per tablet Take 1 tablet by mouth every 6 (six) hours as needed for pain for up to 10 dosesMax Daily Amount: 4 tablets, Starting Mon 2/18/2019, Print      insulin detemir (LEVEMIR) 100 units/mL subcutaneous injection Inject under the skin 16 daily, Historical Med      lidocaine (XYLOCAINE) 1 % lidocaine HCl 10 mg/mL (1 %) injection solution   administered, Historical Med      !! methocarbamol (ROBAXIN) 500 mg tablet Take 1.5 tablets (750 mg total) by mouth 2 (two) times a day, Starting Mon 2/18/2019, Print      !! methocarbamol (ROBAXIN) 500 mg tablet Take 1 tablet (500 mg total) by mouth 2 (two) times a day, Starting Thu 7/6/2023, Normal      naloxegol oxalate (Movantik) 25 MG tablet Take 1 tablet (25 mg total) by mouth every other day, Starting Wed 12/7/2022, Until Wed 1/3/2024, Normal      Omega-3 Fatty Acids (FISH OIL PO) Take 1,000 mg by mouth 4 (four) times a day Hydrieye 4 capsule daily., Historical Med      traMADol (Ultram) 50 mg tablet Take 1 tablet (50 mg total) by mouth every 8 (eight) hours as needed for severe pain, Starting Fri  7/21/2023, Normal      !! warfarin (COUMADIN) 5 mg tablet Take 1 tablet (5 mg total) by mouth daily Adjust based on inr, Starting Mon 2/18/2019, Print      !! warfarin (COUMADIN) 5 mg tablet Take 5 mg by mouth once a week, Starting Fri 6/4/2021, Historical Med       !! - Potential duplicate medications found. Please discuss with provider.          No discharge procedures on file.    PDMP Review         Value Time User    PDMP Reviewed  Yes 11/20/2023  8:04 AM Cam Rey DO            ED Provider  Electronically Signed by             Radha Mendoza PA-C  01/23/24 0607

## 2024-01-22 NOTE — ED CARE HANDOFF
Emergency Department Sign Out Note        Sign out and transfer of care from Radha Mendoza PA-C. See Separate Emergency Department note.     The patient, Cristy Fisher, was evaluated by the previous provider for .results.    Workup Completed:  Results Reviewed       Procedure Component Value Units Date/Time    HS Troponin I 2hr [[id number]]  (Normal) Collected: 01/22/24 1531    Lab Status: Final result Specimen: Blood from Arm, Left Updated: 01/22/24 1602     hs TnI 2hr 25 ng/L      Delta 2hr hsTnI 3 ng/L     HS Troponin 0hr (reflex protocol) [[id number]]  (Normal) Collected: 01/22/24 1313    Lab Status: Final result Specimen: Blood from Arm, Left Updated: 01/22/24 1511     hs TnI 0hr 22 ng/L     Basic metabolic panel [[id number]]  (Abnormal) Collected: 01/22/24 1423    Lab Status: Final result Specimen: Blood from Arm, Left Updated: 01/22/24 1458     Sodium 137 mmol/L      Potassium 5.0 mmol/L      Chloride 101 mmol/L      CO2 28 mmol/L      ANION GAP 8 mmol/L      BUN 37 mg/dL      Creatinine 1.30 mg/dL      Glucose 160 mg/dL      Calcium 8.5 mg/dL      eGFR 36 ml/min/1.73sq m     Narrative:      [employer] guidelines for Chronic Kidney Disease (CKD):     Stage 1 with normal or high GFR (GFR > 90 mL/min/1.73 square meters)    Stage 2 Mild CKD (GFR = 60-89 mL/min/1.73 square meters)    Stage 3A Moderate CKD (GFR = 45-59 mL/min/1.73 square meters)    Stage 3B Moderate CKD (GFR = 30-44 mL/min/1.73 square meters)    Stage 4 Severe CKD (GFR = 15-29 mL/min/1.73 square meters)    Stage 5 End Stage CKD (GFR <15 mL/min/1.73 square meters)  Note: GFR calculation is accurate only with a steady state creatinine    Lipase [[id number]]  (Normal) Collected: 01/22/24 1313    Lab Status: Final result Specimen: Blood from Arm, Left Updated: 01/22/24 1358     Lipase 29 u/L     Comprehensive metabolic panel [[id number]]  (Abnormal) Collected: 01/22/24 1313    Lab Status: Final result Specimen: Blood from Arm,  Left Updated: 01/22/24 1358     Sodium 135 mmol/L      Potassium 5.9 mmol/L      Chloride 98 mmol/L      CO2 28 mmol/L      ANION GAP 9 mmol/L      BUN 38 mg/dL      Creatinine 1.33 mg/dL      Glucose 174 mg/dL      Calcium 9.0 mg/dL      AST 62 U/L      ALT 35 U/L      Alkaline Phosphatase 93 U/L      Total Protein 8.7 g/dL      Albumin 4.4 g/dL      Total Bilirubin 0.90 mg/dL      eGFR 35 ml/min/1.73sq m     Narrative:      National [employer] Disease Foundation guidelines for Chronic Kidney Disease (CKD):     Stage 1 with normal or high GFR (GFR > 90 mL/min/1.73 square meters)    Stage 2 Mild CKD (GFR = 60-89 mL/min/1.73 square meters)    Stage 3A Moderate CKD (GFR = 45-59 mL/min/1.73 square meters)    Stage 3B Moderate CKD (GFR = 30-44 mL/min/1.73 square meters)    Stage 4 Severe CKD (GFR = 15-29 mL/min/1.73 square meters)    Stage 5 End Stage CKD (GFR <15 mL/min/1.73 square meters)  Note: GFR calculation is accurate only with a steady state creatinine    CBC and differential [[id number]]  (Abnormal) Collected: 01/22/24 1313    Lab Status: Final result Specimen: Blood from Arm, Left Updated: 01/22/24 1349     WBC 9.76 Thousand/uL      RBC 4.89 Million/uL      Hemoglobin 14.6 g/dL      Hematocrit 45.1 %      MCV 92 fL      MCH 29.9 pg      MCHC 32.4 g/dL      RDW 15.8 %      MPV 9.8 fL      Platelets 210 Thousands/uL     Narrative:      This is an appended report.  These results have been appended to a previously verified report.    Manual Differential(PHLEBS Do Not Order) [[id number]] Collected: 01/22/24 1313    Lab Status: In process Specimen: Blood from Arm, Left Updated: 01/22/24 1349              ED Course / Workup Pending (followup):              ED Course as of 01/22/24 1645   Mon Jan 22, 2024   1523 S/o from MV PA-C: pending PO challenge and delta trop. If cannot PO challenge consider CT scan   1611 Delta 2hr hsTnI: 3  Does not need 4 hour.      ECG 12 Lead Documentation Only    Date/Time: 1/22/2024 3:37  PM    Performed by: Bolivar Choi PA-C  Authorized by: Bolivar Choi PA-C    Indications / Diagnosis:  2 hour  ECG reviewed by me, the ED Provider: yes    Patient location:  ED  Previous ECG:     Previous ECG:  Compared to current    Similarity:  No change  Interpretation:     Interpretation: normal    Rate:     ECG rate:  88    ECG rate assessment: normal    Rhythm:     Rhythm: sinus rhythm    Ectopy:     Ectopy: none    QRS:     QRS axis:  Normal    QRS intervals:  Normal  Conduction:     Conduction: normal    ST segments:     ST segments:  Normal  T waves:     T waves: normal      Medical Decision Making  At time of sign out, pending 2 hour trop and PO challenge.     Pt was able to tolerate PO without emesis. 2 hour trop had increased by 3 but pt does have a history of CAD so likely chronic for pt and pt denied any chest pain. Pt does have a cardiologist.     I have discussed the plan to discharge pt from ED. The patient was discharged in stable condition.  Patient ambulated off the department.  Extensive return to emergency department precautions were discussed.  Follow up with appropriate providers including primary care physician was discussed.  Patient and/or their  primary decision maker expressed understanding.  Patient remained stable during entire emergency department stay.      Problems Addressed:  Abdominal pain: acute illness or injury  Nausea and vomiting: acute illness or injury    Amount and/or Complexity of Data Reviewed  Labs: ordered. Decision-making details documented in ED Course.  Radiology: ordered.    Risk  Prescription drug management.            Disposition  Final diagnoses:   Nausea and vomiting   Abdominal pain     Time reflects when diagnosis was documented in both MDM as applicable and the Disposition within this note       Time User Action Codes Description Comment    1/22/2024  4:35 PM Bolivar Choi Add [R11.2] Nausea and vomiting     1/22/2024  4:35 PM Jimbo  Bolivar Ley [R10.9] Abdominal pain           ED Disposition       ED Disposition   Discharge    Condition   Stable    Date/Time   Mon Jan 22, 2024  4:35 PM    Comment   Cristy Fisher discharge to home/self care.                   Follow-up Information       Follow up With Specialties Details Why Contact Info    Reed Bach Jr., MD Geriatric Medicine  As needed 4 [address] [address]  [phone]            Patient's Medications   Discharge Prescriptions    ONDANSETRON (ZOFRAN-ODT) 4 MG DISINTEGRATING TABLET    Take 1 tablet (4 mg total) by mouth every 6 (six) hours as needed for vomiting or nausea       Start Date: 1/22/2024 End Date: --       Order Dose: 4 mg       Quantity: 20 tablet    Refills: 0     No discharge procedures on file.       ED Provider  Electronically Signed by     Bolivar Choi PA-C  01/22/24 7480

## 2024-01-30 ENCOUNTER — HOSPITAL ENCOUNTER (OUTPATIENT)
Dept: NEUROLOGY | Facility: CLINIC | Age: 89
Discharge: HOME | End: 2024-01-30

## 2024-01-30 DIAGNOSIS — R41.9 ALTERATION OF AWARENESS: ICD-10-CM

## 2024-03-09 ENCOUNTER — HOSPITAL ENCOUNTER (INPATIENT)
Facility: HOSPITAL | Age: 89
LOS: 1 days | Discharge: HOME HEALTH | DRG: 543 | End: 2024-03-12
Attending: EMERGENCY MEDICINE | Admitting: INTERNAL MEDICINE
Payer: MEDICARE

## 2024-03-09 DIAGNOSIS — S09.90XA CLOSED HEAD INJURY, INITIAL ENCOUNTER: ICD-10-CM

## 2024-03-09 DIAGNOSIS — T14.8XXA SKIN ABRASION: ICD-10-CM

## 2024-03-09 DIAGNOSIS — S00.83XA CONTUSION OF FOREHEAD, INITIAL ENCOUNTER: ICD-10-CM

## 2024-03-09 DIAGNOSIS — S42.309A HUMERUS FRACTURE: ICD-10-CM

## 2024-03-09 DIAGNOSIS — S42.213A: ICD-10-CM

## 2024-03-09 DIAGNOSIS — S42.212A: ICD-10-CM
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
## 2024-03-09 DIAGNOSIS — N17.9 AKI (ACUTE KIDNEY INJURY) (HCC): ICD-10-CM

## 2024-03-09 PROCEDURE — 99284 EMERGENCY DEPT VISIT MOD MDM: CPT

## 2024-03-09 PROCEDURE — 90471 IMMUNIZATION ADMIN: CPT

## 2024-03-09 RX ORDER — MORPHINE SULFATE 4 MG/ML
2 INJECTION, SOLUTION INTRAMUSCULAR; INTRAVENOUS ONCE
Status: COMPLETED | OUTPATIENT
Start: 2024-03-10 | End: 2024-03-10

## 2024-03-09 NOTE — Clinical Note
Case was discussed with NATALIE and the patient's admission status was agreed to be Admission Status: observation status to the service of Dr. mcbride .

## 2024-03-10 ENCOUNTER — APPOINTMENT (OUTPATIENT)
Dept: RADIOLOGY | Facility: HOSPITAL | Age: 89
DRG: 543 | End: 2024-03-10
Payer: MEDICARE

## 2024-03-10 ENCOUNTER — APPOINTMENT (EMERGENCY)
Dept: CT IMAGING | Facility: HOSPITAL | Age: 89
DRG: 543 | End: 2024-03-10
Payer: MEDICARE
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
## 2024-03-10 PROBLEM — S42.212A FRACTURE OF SURGICAL NECK OF LEFT HUMERUS: Status: ACTIVE | Noted: 2024-03-10
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
## 2024-03-10 PROBLEM — Z79.4 TYPE 2 DIABETES MELLITUS, WITH LONG-TERM CURRENT USE OF INSULIN (HCC): Status: ACTIVE | Noted: 2019-01-29

## 2024-03-10 LAB
ABO GROUP BLD: NORMAL
ALBUMIN SERPL BCP-MCNC: 4 G/DL (ref 3.5–5)
ALP SERPL-CCNC: 88 U/L (ref 34–104)
ALT SERPL W P-5'-P-CCNC: 19 U/L (ref 7–52)
ANION GAP SERPL CALCULATED.3IONS-SCNC: 8 MMOL/L
APTT PPP: 41 SECONDS (ref 23–37)
AST SERPL W P-5'-P-CCNC: 25 U/L (ref 13–39)
BASOPHILS # BLD AUTO: 0.03 THOUSANDS/ÂΜL (ref 0–0.1)
BASOPHILS NFR BLD AUTO: 0 % (ref 0–1)
BILIRUB SERPL-MCNC: 0.5 MG/DL (ref 0.2–1)
BUN SERPL-MCNC: 36 MG/DL (ref 5–25)
CALCIUM SERPL-MCNC: 9.2 MG/DL (ref 8.4–10.2)
CHLORIDE SERPL-SCNC: 101 MMOL/L (ref 96–108)
CO2 SERPL-SCNC: 29 MMOL/L (ref 21–32)
CREAT SERPL-MCNC: 1.51 MG/DL (ref 0.6–1.3)
EOSINOPHIL # BLD AUTO: 0.12 THOUSAND/ÂΜL (ref 0–0.61)
EOSINOPHIL NFR BLD AUTO: 1 % (ref 0–6)
ERYTHROCYTE [DISTWIDTH] IN BLOOD BY AUTOMATED COUNT: 16.9 % (ref 11.6–15.1)
GFR SERPL CREATININE-BSD FRML MDRD: 30 ML/MIN/1.73SQ M
GLUCOSE SERPL-MCNC: 114 MG/DL (ref 65–140)
GLUCOSE SERPL-MCNC: 133 MG/DL (ref 65–140)
GLUCOSE SERPL-MCNC: 135 MG/DL (ref 65–140)
GLUCOSE SERPL-MCNC: 150 MG/DL (ref 65–140)
GLUCOSE SERPL-MCNC: 152 MG/DL (ref 65–140)
HCT VFR BLD AUTO: 38.2 % (ref 34.8–46.1)
HGB BLD-MCNC: 12.2 G/DL (ref 11.5–15.4)
IMM GRANULOCYTES # BLD AUTO: 0.05 THOUSAND/UL (ref 0–0.2)
IMM GRANULOCYTES NFR BLD AUTO: 1 % (ref 0–2)
INR PPP: 2.69 (ref 0.84–1.19)
LYMPHOCYTES # BLD AUTO: 1.13 THOUSANDS/ÂΜL (ref 0.6–4.47)
LYMPHOCYTES NFR BLD AUTO: 12 % (ref 14–44)
MCH RBC QN AUTO: 30.2 PG (ref 26.8–34.3)
MCHC RBC AUTO-ENTMCNC: 31.9 G/DL (ref 31.4–37.4)
MCV RBC AUTO: 95 FL (ref 82–98)
MONOCYTES # BLD AUTO: 0.59 THOUSAND/ÂΜL (ref 0.17–1.22)
MONOCYTES NFR BLD AUTO: 6 % (ref 4–12)
NEUTROPHILS # BLD AUTO: 7.8 THOUSANDS/ÂΜL (ref 1.85–7.62)
NEUTS SEG NFR BLD AUTO: 80 % (ref 43–75)
NRBC BLD AUTO-RTO: 0 /100 WBCS
PLATELET # BLD AUTO: 214 THOUSANDS/UL (ref 149–390)
PMV BLD AUTO: 10.3 FL (ref 8.9–12.7)
POTASSIUM SERPL-SCNC: 4.6 MMOL/L (ref 3.5–5.3)
PROT SERPL-MCNC: 7.4 G/DL (ref 6.4–8.4)
PROTHROMBIN TIME: 28.6 SECONDS (ref 11.6–14.5)
RBC # BLD AUTO: 4.04 MILLION/UL (ref 3.81–5.12)
RH BLD: POSITIVE
SODIUM SERPL-SCNC: 138 MMOL/L (ref 135–147)
WBC # BLD AUTO: 9.72 THOUSAND/UL (ref 4.31–10.16)

## 2024-03-10 PROCEDURE — 73200 CT UPPER EXTREMITY W/O DYE: CPT

## 2024-03-10 PROCEDURE — 85730 THROMBOPLASTIN TIME PARTIAL: CPT | Performed by: EMERGENCY MEDICINE

## 2024-03-10 PROCEDURE — 73030 X-RAY EXAM OF SHOULDER: CPT

## 2024-03-10 PROCEDURE — 99285 EMERGENCY DEPT VISIT HI MDM: CPT | Performed by: EMERGENCY MEDICINE

## 2024-03-10 PROCEDURE — 82948 REAGENT STRIP/BLOOD GLUCOSE: CPT

## 2024-03-10 PROCEDURE — 90715 TDAP VACCINE 7 YRS/> IM: CPT | Performed by: EMERGENCY MEDICINE

## 2024-03-10 PROCEDURE — 96376 TX/PRO/DX INJ SAME DRUG ADON: CPT

## 2024-03-10 PROCEDURE — 23600 CLTX PROX HUMRL FX W/O MNPJ: CPT | Performed by: STUDENT IN AN ORGANIZED HEALTH CARE EDUCATION/TRAINING PROGRAM

## 2024-03-10 PROCEDURE — 72125 CT NECK SPINE W/O DYE: CPT

## 2024-03-10 PROCEDURE — 71250 CT THORAX DX C-: CPT

## 2024-03-10 PROCEDURE — 80053 COMPREHEN METABOLIC PANEL: CPT | Performed by: EMERGENCY MEDICINE

## 2024-03-10 PROCEDURE — 85610 PROTHROMBIN TIME: CPT | Performed by: EMERGENCY MEDICINE

## 2024-03-10 PROCEDURE — 99214 OFFICE O/P EST MOD 30 MIN: CPT | Performed by: STUDENT IN AN ORGANIZED HEALTH CARE EDUCATION/TRAINING PROGRAM

## 2024-03-10 PROCEDURE — 74176 CT ABD & PELVIS W/O CONTRAST: CPT

## 2024-03-10 PROCEDURE — 36415 COLL VENOUS BLD VENIPUNCTURE: CPT | Performed by: EMERGENCY MEDICINE

## 2024-03-10 PROCEDURE — 85025 COMPLETE CBC W/AUTO DIFF WBC: CPT | Performed by: EMERGENCY MEDICINE

## 2024-03-10 PROCEDURE — 96365 THER/PROPH/DIAG IV INF INIT: CPT

## 2024-03-10 PROCEDURE — 70450 CT HEAD/BRAIN W/O DYE: CPT

## 2024-03-10 PROCEDURE — 99222 1ST HOSP IP/OBS MODERATE 55: CPT | Performed by: INTERNAL MEDICINE

## 2024-03-10 PROCEDURE — 96375 TX/PRO/DX INJ NEW DRUG ADDON: CPT

## 2024-03-10 PROCEDURE — 96366 THER/PROPH/DIAG IV INF ADDON: CPT

## 2024-03-10 RX ORDER — OXYCODONE HYDROCHLORIDE 10 MG/1
10 TABLET ORAL EVERY 4 HOURS PRN
Status: DISCONTINUED | OUTPATIENT
Start: 2024-03-10 | End: 2024-03-12 | Stop reason: HOSPADM

## 2024-03-10 RX ORDER — PANTOPRAZOLE SODIUM 40 MG/1
40 TABLET, DELAYED RELEASE ORAL
Status: DISCONTINUED | OUTPATIENT
Start: 2024-03-10 | End: 2024-03-12 | Stop reason: HOSPADM

## 2024-03-10 RX ORDER — OXYCODONE HYDROCHLORIDE 5 MG/1
5 TABLET ORAL EVERY 4 HOURS PRN
Status: DISCONTINUED | OUTPATIENT
Start: 2024-03-10 | End: 2024-03-12 | Stop reason: HOSPADM

## 2024-03-10 RX ORDER — METOPROLOL SUCCINATE 50 MG/1
50 TABLET, EXTENDED RELEASE ORAL DAILY
Status: DISCONTINUED | OUTPATIENT
Start: 2024-03-10 | End: 2024-03-12 | Stop reason: HOSPADM

## 2024-03-10 RX ORDER — HYDROMORPHONE HCL/PF 1 MG/ML
0.5 SYRINGE (ML) INJECTION ONCE
Status: COMPLETED | OUTPATIENT
Start: 2024-03-10 | End: 2024-03-10

## 2024-03-10 RX ORDER — HYDROMORPHONE HCL/PF 1 MG/ML
0.5 SYRINGE (ML) INJECTION EVERY 4 HOURS PRN
Status: DISCONTINUED | OUTPATIENT
Start: 2024-03-10 | End: 2024-03-11

## 2024-03-10 RX ORDER — LIDOCAINE 50 MG/G
1 PATCH TOPICAL ONCE
Status: COMPLETED | OUTPATIENT
Start: 2024-03-10 | End: 2024-03-10

## 2024-03-10 RX ORDER — SPIRONOLACTONE 25 MG/1
25 TABLET ORAL DAILY
Status: DISCONTINUED | OUTPATIENT
Start: 2024-03-10 | End: 2024-03-12 | Stop reason: HOSPADM

## 2024-03-10 RX ORDER — ACETAMINOPHEN 325 MG/1
650 TABLET ORAL EVERY 6 HOURS PRN
Status: DISCONTINUED | OUTPATIENT
Start: 2024-03-10 | End: 2024-03-10

## 2024-03-10 RX ORDER — HYDROMORPHONE HCL/PF 1 MG/ML
1 SYRINGE (ML) INJECTION ONCE
Status: COMPLETED | OUTPATIENT
Start: 2024-03-10 | End: 2024-03-10

## 2024-03-10 RX ORDER — INDAPAMIDE 1.25 MG/1
1.25 TABLET ORAL DAILY
Status: DISCONTINUED | OUTPATIENT
Start: 2024-03-10 | End: 2024-03-12 | Stop reason: HOSPADM

## 2024-03-10 RX ORDER — LIDOCAINE 50 MG/G
1 PATCH TOPICAL DAILY
Status: DISCONTINUED | OUTPATIENT
Start: 2024-03-10 | End: 2024-03-12 | Stop reason: HOSPADM

## 2024-03-10 RX ORDER — INSULIN LISPRO 100 [IU]/ML
1-6 INJECTION, SOLUTION INTRAVENOUS; SUBCUTANEOUS
Status: DISCONTINUED | OUTPATIENT
Start: 2024-03-10 | End: 2024-03-12 | Stop reason: HOSPADM

## 2024-03-10 RX ORDER — OXYCODONE HYDROCHLORIDE 5 MG/1
5 TABLET ORAL EVERY 4 HOURS PRN
Status: DISCONTINUED | OUTPATIENT
Start: 2024-03-10 | End: 2024-03-10

## 2024-03-10 RX ORDER — ALLOPURINOL 100 MG/1
100 TABLET ORAL 2 TIMES DAILY
Status: DISCONTINUED | OUTPATIENT
Start: 2024-03-10 | End: 2024-03-12 | Stop reason: HOSPADM

## 2024-03-10 RX ORDER — ACETAMINOPHEN 325 MG/1
975 TABLET ORAL EVERY 8 HOURS SCHEDULED
Status: DISCONTINUED | OUTPATIENT
Start: 2024-03-10 | End: 2024-03-12 | Stop reason: HOSPADM

## 2024-03-10 RX ORDER — DAPAGLIFLOZIN 10 MG/1
10 TABLET, FILM COATED ORAL DAILY
Status: DISCONTINUED | OUTPATIENT
Start: 2024-03-10 | End: 2024-03-12 | Stop reason: HOSPADM

## 2024-03-10 RX ORDER — HYDRALAZINE HYDROCHLORIDE 20 MG/ML
5 INJECTION INTRAMUSCULAR; INTRAVENOUS EVERY 6 HOURS PRN
Status: DISCONTINUED | OUTPATIENT
Start: 2024-03-10 | End: 2024-03-12 | Stop reason: HOSPADM

## 2024-03-10 RX ORDER — WARFARIN SODIUM 2.5 MG/1
3.75 TABLET ORAL DAILY
Status: DISCONTINUED | OUTPATIENT
Start: 2024-03-10 | End: 2024-03-11

## 2024-03-10 RX ADMIN — HYDROMORPHONE HYDROCHLORIDE 1 MG: 1 INJECTION, SOLUTION INTRAMUSCULAR; INTRAVENOUS; SUBCUTANEOUS at 03:43

## 2024-03-10 RX ADMIN — HYDROMORPHONE HYDROCHLORIDE 0.5 MG: 0.5 INJECTION, SOLUTION INTRAMUSCULAR; INTRAVENOUS; SUBCUTANEOUS at 01:32

## 2024-03-10 RX ADMIN — ASPIRIN 81 MG: 81 TABLET, COATED ORAL at 08:23

## 2024-03-10 RX ADMIN — LIDOCAINE 5% 1 PATCH: 700 PATCH TOPICAL at 03:43

## 2024-03-10 RX ADMIN — OXYCODONE HYDROCHLORIDE 10 MG: 10 TABLET ORAL at 17:59

## 2024-03-10 RX ADMIN — OXYCODONE HYDROCHLORIDE 10 MG: 10 TABLET ORAL at 22:10

## 2024-03-10 RX ADMIN — ACETAMINOPHEN 325MG 975 MG: 325 TABLET ORAL at 13:16

## 2024-03-10 RX ADMIN — INSULIN LISPRO 1 UNITS: 100 INJECTION, SOLUTION INTRAVENOUS; SUBCUTANEOUS at 11:48

## 2024-03-10 RX ADMIN — TETANUS TOXOID, REDUCED DIPHTHERIA TOXOID AND ACELLULAR PERTUSSIS VACCINE, ADSORBED 0.5 ML: 5; 2.5; 8; 8; 2.5 SUSPENSION INTRAMUSCULAR at 00:35

## 2024-03-10 RX ADMIN — ACETAMINOPHEN 325MG 975 MG: 325 TABLET ORAL at 22:09

## 2024-03-10 RX ADMIN — INSULIN LISPRO 1 UNITS: 100 INJECTION, SOLUTION INTRAVENOUS; SUBCUTANEOUS at 08:24

## 2024-03-10 RX ADMIN — HYDROMORPHONE HYDROCHLORIDE 0.5 MG: 1 INJECTION, SOLUTION INTRAMUSCULAR; INTRAVENOUS; SUBCUTANEOUS at 19:28

## 2024-03-10 RX ADMIN — LIDOCAINE 5% 1 PATCH: 700 PATCH TOPICAL at 17:34

## 2024-03-10 RX ADMIN — METOPROLOL SUCCINATE 50 MG: 50 TABLET, EXTENDED RELEASE ORAL at 08:23

## 2024-03-10 RX ADMIN — ALLOPURINOL 100 MG: 100 TABLET ORAL at 17:33

## 2024-03-10 RX ADMIN — WARFARIN SODIUM 3.75 MG: 2.5 TABLET ORAL at 17:33

## 2024-03-10 RX ADMIN — SODIUM CHLORIDE, SODIUM LACTATE, POTASSIUM CHLORIDE, AND CALCIUM CHLORIDE 1000 ML: .6; .31; .03; .02 INJECTION, SOLUTION INTRAVENOUS at 01:05

## 2024-03-10 RX ADMIN — ALLOPURINOL 100 MG: 100 TABLET ORAL at 08:23

## 2024-03-10 RX ADMIN — OXYCODONE HYDROCHLORIDE 10 MG: 10 TABLET ORAL at 10:23

## 2024-03-10 RX ADMIN — SPIRONOLACTONE 25 MG: 25 TABLET, FILM COATED ORAL at 08:23

## 2024-03-10 RX ADMIN — INSULIN DETEMIR 13 UNITS: 100 INJECTION, SOLUTION SUBCUTANEOUS at 08:23

## 2024-03-10 RX ADMIN — HYDROMORPHONE HYDROCHLORIDE 0.5 MG: 0.5 INJECTION, SOLUTION INTRAMUSCULAR; INTRAVENOUS; SUBCUTANEOUS at 00:27

## 2024-03-10 RX ADMIN — OXYCODONE 5 MG: 5 TABLET ORAL at 06:12

## 2024-03-10 RX ADMIN — HYDROMORPHONE HYDROCHLORIDE 0.5 MG: 1 INJECTION, SOLUTION INTRAMUSCULAR; INTRAVENOUS; SUBCUTANEOUS at 08:31

## 2024-03-10 NOTE — H&P
"[facility]  H&P  Name: Cristy Fisher 88 y.o. female I MRN: [id number]  Unit/Bed#: E2 -01 I Date of Admission: 3/9/2024   Date of Service: 3/10/2024 I Hospital Day: 0      Assessment/Plan   * Fracture of neck of left humerus  Assessment & Plan  Pt with PMHx of osteoporosis, CKD, DM2, CHF, mechanical valve replacement and afib anticoagulated on coumadin presented to the ED after sustaining a mechanical fall at home where she fell into a chair hitting her left arm/shoulder  CT left upper extremity demonstrating a left humerus neck fracture with sling applied in ED  After receiving, 8 mg morphine via EMS and total of 1.5 mg dilaudid while in the ED, patient's pain remains uncontrolled  Pt states she has 7/8 pain currently while at rest and 10/10 pain in her left arm/shoulder with any movements  Pain control, supportive care  Ortho consult pending      Fall at home, initial encounter  Assessment & Plan  Patient presented to the ED following a mechanical fall at home where she tripped over the end of her bed.  Pt states she was organizing her nightstand when she tripped over the edge over her bed and fell into a chair nearby hitting her left arm/shoulder and striking left forehead.   Pt is anticoagulated on coumadin for a mechanical valve replacement and afib. She denies LOC. Denies headache or other neurologic symptoms.  She reports significant left arm/shoulder pain, hematoma over left eye. Denies any other injury.  Pt typically ambulates using a walker at home   Trauma work-up in the ED negative besides left humeral neck fracture. The following are vrad reports, official reads pending.  CT head: \" No evidence of acute intracranial hemorrhage or infarct. Mild left forehead swelling. Mild generalized volume loss. Mild microvascular ischemic change. 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  CT cervical spine: \"No evidence of acute bony fracture or misalignment. Spinal canal and " "neural foraminal narrowing as described\"  CT chest/abdomen/pelvis without contrast: \"Chronic findings. No acute injury\"  CT left upper extremity: \"Left humerus neck fracture\"  Will monitor on neuro checks for now given head strike on coumadin   Supportive care, pain control  PT/OT eval      Stage 3b chronic kidney disease (CKD) ([facility])  Assessment & Plan  Lab Results   Component Value Date    EGFR 30 03/10/2024    EGFR 36 01/22/2024    EGFR 35 01/22/2024    CREATININE 1.51 (H) 03/10/2024    CREATININE 1.30 01/22/2024    CREATININE 1.33 (H) 01/22/2024   POA creatinine 1.51  Mildly elevated from baseline creatinine of 1.3-1.4  Pt received 1L of fluids in the ED  Continue to monitor BMP    Chronic atrial fibrillation ([facility])  Assessment & Plan  S/p pacemaker  Anticoagulated on Coumadin  Continue metoprolol    Mechanical heart valve present  Assessment & Plan  Anticoagulated on coumadin  Pt reports currently taking 3.75 mg daily, continue   POA INR 2.69  Goal INR 2.5-3.5   Continue to monitor INR     Hypertension  Assessment & Plan  Blood pressures elevated with SBP in the 180's while in the ED  Likely secondary to pain related to humeral fracture  Optimize pain control  Hydralazine prn  Continue home regimen of metoprolol, spironolactone, and lozol    Type 2 diabetes mellitus, with long-term current use of insulin ([facility])  Assessment & Plan  Lab Results   Component Value Date    HGBA1C 6.6 (H) 09/01/2023       No results for input(s): \"POCGLU\" in the last 72 hours.    Blood Sugar Average: Last 72 hrs:  Hold Farxiga  Continue 13 units of Levemir daily  SSI with accucheks       Chronic diastolic congestive heart failure ([facility])  Assessment & Plan  Wt Readings from Last 3 Encounters:   03/09/24 96 kg (211 lb 10.3 oz)   01/03/24 92.1 kg (203 lb)   12/29/23 99.3 kg (219 lb)     Pt appears euvolemic on exam. She does have mild bilateral pedal edema with chronic overlying venous stasis skin changes which she reports is baseline for " "her.   Last ECHO 3/2022 demonstrating \"There is mild concentric hypertrophy. Systolic function is hyperdynamic with an ejection fraction over 65%.\"  Daily weights, I/O  Continue home regimen of spironolactone and lozol                   VTE Pharmacologic Prophylaxis: VTE Score: 4 Moderate Risk (Score 3-4) - Pharmacological DVT Prophylaxis Ordered: warfarin (Coumadin).  Code Status: Level 3 - DNAR and DNI   Discussion with family: Patient declined call to .     Anticipated Length of Stay: Patient will be admitted on an observation basis with an anticipated length of stay of less than 2 midnights secondary to uncontrolled pain associated with left humeral neck fracture following mechanical fall at home.    Total Time Spent on Date of Encounter in care of patient: 65 minutes This time was spent on one or more of the following: performing physical exam; counseling and coordination of care; obtaining or reviewing history; documenting in the medical record; reviewing/ordering tests, medications or procedures; communicating with other healthcare professionals and discussing with patient's family/caregivers.    Chief Complaint: \"I was organizing my nightstand and, when I walked around by bed, I tripped on my bed and fell into a nearby chair hitting my left arm/shoulder and left head/eye\"    History of Present Illness:  Cristy Fisher is a 88 y.o. female who presents with uncontrolled pain after sustaining a left humeral neck fracture in a mechanical fall at home.  Patient reports that earlier this evening she was organizing her nightstand before bed and was walking around her bed when she tripped over it landing into the arm of a nearby chair.  She reports that she hit her left shoulder and arm in the mechanical fall.  She also hit her head on the left side with associated left forehead hematoma and eye bruising.  Patient denies any loss of consciousness in the fall.  She is anticoagulated on Coumadin for " mechanical valve and A-fib.  She denies any headache or other neurologic symptoms.  Patient's left shoulder/arm pain has remained uncontrolled.  Patient received 8 mg of morphine via EMS and 1.5 mg of Dilaudid in the ED and continues to have pain.  She reports that her pain currently is a 7-8 while at rest however whenever she moves her arm slightly the pain escalates to a 10 out of 10.  Patient's arm is currently in a sling which was placed in the ED.  Patient reports that she typically uses a walker to ambulate at home.  Patient also has a shallow skin tear to her left shin.  She has some mild pedal edema and overlying chronic venous stasis skin changes.  Patient reports that her pedal edema is at baseline for her and reports compliance with diuretic therapy at home which she currently take spironolactone and lozol.  She denies any other injury or complaints at this time.    Review of Systems:  Review of Systems   Constitutional:  Negative for appetite change, chills, diaphoresis, fatigue and fever.   HENT:  Negative for congestion, rhinorrhea and sore throat.    Eyes:  Positive for pain (Hematoma of left eye with swelling, mild discomfort). Negative for photophobia and visual disturbance.   Respiratory:  Negative for cough, shortness of breath and wheezing.    Cardiovascular:  Negative for chest pain, palpitations and leg swelling.   Gastrointestinal:  Negative for abdominal distention, abdominal pain, blood in stool, constipation, diarrhea, nausea and vomiting.   Genitourinary:  Negative for dysuria and hematuria.   Musculoskeletal:  Positive for arthralgias (left arm/shoulder pain). Negative for back pain, myalgias and neck stiffness.   Skin:  Negative for color change and rash.   Neurological:  Negative for dizziness, tremors, seizures, syncope, facial asymmetry, speech difficulty, weakness, light-headedness, numbness and headaches.   Psychiatric/Behavioral:  Negative for agitation, behavioral problems and  confusion. The patient is not nervous/anxious.    All other systems reviewed and are negative.      Past Medical and Surgical History:   Past Medical History:   Diagnosis Date    Anemia     Arthritis     also osteoporosis of spine    Cancer (HCC)     endometrial    CHF (congestive heart failure) (HCC)     Chronic atrial fibrillation (HCC) 09/13/2017    Chronic gout 09/13/2017    Chronic kidney disease     Chronic rhinitis 11/08/2018    CKD (chronic kidney disease) stage 3, GFR 30-59 ml/min (HCC) 09/13/2017    Clotting disorder (HCC)     on warfarin    Colon polyp 09/13/2017    Compression fracture of L3 vertebra (HCC)     Coronary artery disease     Diabetes mellitus (HCC)     COMBS (dyspnea on exertion) 11/02/2018    Endometrial cancer (HCC)     age 60    Essential hypertension 09/13/2017    Gastritis 09/13/2017    GERD (gastroesophageal reflux disease)     Heart murmur     Hiatal hernia     Hx of hiatal hernia     Hyperlipidemia     Hypertension     Irregular heart beat     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    Osteoarthritis     Osteoporosis     Pacemaker     Peripheral vascular disease (HCC)     S/P AVR     Spinal stenosis     disk herniation     Urinary tract infection        Past Surgical History:   Procedure Laterality Date    AORTIC VALVE REPLACEMENT      BREAST EXCISIONAL BIOPSY Left 1995    benign    CARDIAC SURGERY      CATARACT EXTRACTION      CHOLECYSTECTOMY      COLONOSCOPY      COLONOSCOPY  09/2012    tubular adenoma    EGD  04/30/2021    [facility] MD.- Multiple stomach polyps, small amount of heme or coffee-grounds noted but do not feel significant evidence for bleeding.    EGD AND COLONOSCOPY  09/2017    adenomatous polyp/ benign gastric polyp    ESOPHAGOGASTRODUODENOSCOPY  08/2012    FIXATION KYPHOPLASTY      HERNIA REPAIR      x3    HYSTERECTOMY      age 61    INSERT / REPLACE / REMOVE PACEMAKER      IR PELVIC ANGIOGRAM  01/29/2019    JOINT REPLACEMENT Bilateral     knees     OOPHORECTOMY Bilateral     age 61    DC COLONOSCOPY FLX DX W/COLLJ SPEC WHEN PFRMD N/A 09/12/2017    Procedure: EGDwith bxAND COLONOSCOPY with polypectomies;  Surgeon: Andrea Ortega IV, MD;  Location: [facility];  Service: Gastroenterology    DC SIGMOIDOSCOPY FLX DX W/COLLJ SPEC BR/WA IF PFRMD N/A 09/13/2017    Procedure: colonoscopy with hemo clip x 2;  Surgeon: Andrea Ortega IV, MD;  Location: [facility];  Service: Gastroenterology    SKIN BIOPSY      TONSILLECTOMY         Meds/Allergies:  Prior to Admission medications    Medication Sig Start Date End Date Taking? Authorizing Provider   Accu-Chek Lila Plus test strip [place] Lite Brand. 12/7/20   Historical Provider, MD   allopurinol (ZYLOPRIM) 100 mg tablet Take 100 mg by mouth 2 (two) times a day 5/23/21   Historical Provider, MD   aspirin (ECOTRIN LOW STRENGTH) 81 mg EC tablet Take 81 mg by mouth daily    Historical Provider, MD   BD Pen Needle Rena 2nd Gen 32G X 4 MM MISC  10/13/22   Historical Provider, MD   bumetanide (BUMEX) 1 mg tablet Take 1 tablet (1 mg total) by mouth daily  Patient not taking: Reported on 2/24/2022 11/15/21   Lukas Montano DO   Calcium Carb-Cholecalciferol 250-125 MG-UNIT TABS Take by mouth daily    Historical Provider, MD   calcium citrate-vitamin D (CITRACAL+D) 315-200 MG-UNIT per tablet Take 1 tablet by mouth 2 (two) times a day  Patient not taking: Reported on 1/3/2024    Historical Provider, MD   cholecalciferol (VITAMIN D3) 1,000 units tablet Take 1,000 Units by mouth daily  Patient not taking: Reported on 1/3/2024    Historical Provider, MD   Cholecalciferol 50 MCG (2000 UT) CAPS Take 1 capsule by mouth    Historical Provider, MD   dapagliflozin (Farxiga) 10 MG tablet Take 1 tablet (10 mg total) by mouth daily 10/2/23   Lukas Montano DO   denosumab (PROLIA) 60 mg/mL Inject 60 mg under the skin once    Historical Provider, MD   Denta 5000 Plus 1.1 % CREA BRUSH TWICE A DAY AS DIRECTED 2/17/23   Historical Provider, MD   gabapentin  (NEURONTIN) 100 mg capsule Take 2 capsules (200 mg total) by mouth daily at bedtime  Patient not taking: Reported on 1/3/2024 12/14/23   Jaycob Camargo MD   glucose blood test strip     Historical Provider, MD constantinoaifenesin-codeine (GUAIFENESIN AC) 100-10 MG/5ML liquid Iophen C-NR 10 mg-100 mg/5 mL oral liquid  Patient not taking: No sig reported    Historical Provider, MD   HYDROcodone-acetaminophen (NORCO) 5-325 mg per tablet Take 1 tablet by mouth every 6 (six) hours as needed for pain for up to 10 dosesMax Daily Amount: 4 tablets  Patient not taking: Reported on 9/19/2022 2/18/19   Tamar Harrington MD   HYDROcodone-acetaminophen (NORCO) 7.5-325 mg per tablet Take 1 tablet by mouth every 6 (six) hours as needed every 4 to 6 hours if needed for pain 11/18/21   Historical Provider, MD   indapamide (LOZOL) 1.25 mg tablet Take 1.25 mg by mouth daily 3 days/week  MON, TUES, WED 12/1/23   Historical Provider, MD   insulin aspart (NovoLOG FlexPen) 100 UNIT/ML injection pen Inject 5 Units under the skin as needed    Historical Provider, MD   insulin detemir (Levemir FlexTouch) 100 Units/mL injection pen 13 Units 4/6/21   Historical Provider, MD   insulin detemir (LEVEMIR) 100 units/mL subcutaneous injection Inject under the skin 16 daily    Historical Provider, MD   lidocaine (XYLOCAINE) 1 % lidocaine HCl 10 mg/mL (1 %) injection solution   administered  Patient not taking: No sig reported    Historical Provider, MD   methocarbamol (ROBAXIN) 500 mg tablet Take 1.5 tablets (750 mg total) by mouth 2 (two) times a day  Patient not taking: Reported on 5/24/2022 2/18/19   Tamar Harrington MD   methocarbamol (ROBAXIN) 500 mg tablet Take 1 tablet (500 mg total) by mouth 2 (two) times a day  Patient not taking: Reported on 7/14/2023 7/6/23   Gabriel Jansen MD   metoprolol succinate (TOPROL-XL) 50 mg 24 hr tablet Take 50 mg by mouth daily    Historical Provider, MD   Multiple Vitamin (MULTIVITAMIN) tablet Take 1 tablet by  mouth daily    Historical Provider, MD   Multiple Vitamins-Minerals (PRESERVISION AREDS 2 PO) Take by mouth 2 (two) times a day    Historical Provider, MD   naloxegol oxalate (Movantik) 25 MG tablet Take 1 tablet (25 mg total) by mouth every other day  Patient taking differently: Take 25 mg by mouth if needed 12/7/22 1/3/24  Blaire HEMALATHA Carbone   NON FORMULARY Take 1 tablet by mouth 4 (four) times a day HydroEye    Historical Provider, MD   Omega-3 Fatty Acids (FISH OIL PO) Take 1,000 mg by mouth 4 (four) times a day Hydrieye 4 capsule daily.    Historical Provider, MD   ondansetron (ZOFRAN-ODT) 4 mg disintegrating tablet Take 1 tablet (4 mg total) by mouth every 6 (six) hours as needed for vomiting or nausea 1/22/24   Bolivar Choi PA-C   RABEprazole (ACIPHEX) 20 MG tablet Take 1 tablet (20 mg total) by mouth daily 9/12/23   Keren Bliss PA-C   spironolactone (ALDACTONE) 50 mg tablet Take 25 mg by mouth daily 12/1/20   Historical Provider, MD   traMADol (Ultram) 50 mg tablet Take 1 tablet (50 mg total) by mouth every 8 (eight) hours as needed for severe pain  Patient not taking: Reported on 10/2/2023 7/21/23   Jhoan Chaudhry MD   VITAMIN E PO Take by mouth daily    Historical Provider, MD   Vyzulta 0.024 % SOLN instill 1 drop into both eyes at bedtime 2/23/22   Historical Provider, MD   warfarin (COUMADIN) 2.5 mg tablet Take 1.5 tablets by mouth daily 3.75 mg 6 days 5 mg on 1 day    Historical Provider, MD   warfarin (COUMADIN) 5 mg tablet Take 1 tablet (5 mg total) by mouth daily Adjust based on inr  Patient not taking: Reported on 11/22/2022 2/18/19   Tamar Harrington MD   warfarin (COUMADIN) 5 mg tablet Take 5 mg by mouth once a week 6/4/21   Historical Provider, MD RENTERIA have reviewed home medications with patient personally.    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    Lorazepam Nausea Only and Other (See Comments)     Other reaction(s): not sleeping, made her feel \"wired\"    Losartan Headache    Metformin Other (See Comments)     Other reaction(s): GI intolerance    Nortriptyline Other (See Comments)     Other reaction(s): Other (See Comments)  rash bolivar flexeril      Other GI Intolerance    Penicillin G Headache    Penicillins      Other reaction(s): Other (See Comments)  RASH-anaphylaxis  Other reaction(s): Anaphylaxis    Povidone Iodine Other (See Comments)     VAGINAL ITCHING    Ropinirole      Back pain    Simvastatin Other (See Comments) and Myalgia     muscle aches    Sulfa Antibiotics      Other reaction(s): Other (See Comments)  bolivar Lasix, GI upset, rash  Other reaction(s): GI Upset    Torsemide Nausea Only, Vomiting and GI Intolerance     Pt states \"it does not work for me\".     Valsartan      Other reaction(s): Other (See Comments)  bolivar olmesartan    Fluticasone      Leg cramps       Social History:  Marital Status:    Patient Pre-hospital Living Situation: Home  Patient Pre-hospital Level of Mobility: walks with walker  Patient Pre-hospital Diet Restrictions: diabetic  Substance Use History:   Social History     Substance and Sexual Activity   Alcohol Use No     Social History     Tobacco Use   Smoking Status Former    Current packs/day: 3.00    Average packs/day: 3.0 packs/day for 20.0 years (60.0 ttl pk-yrs)    Types: Cigarettes   Smokeless Tobacco Never   Tobacco Comments    have not smoked for at least 50 years     Social History     Substance and Sexual Activity   Drug Use No       Family " "History:  Family History   Problem Relation Age of Onset    Diabetes Mother     Hypertension Mother     Heart disease Father     Emphysema Father     Asthma Sister     Cancer Sister         colon    Hypertension Sister     Colon cancer Sister     Autoimmune disease Sister         Diabetes    Diabetes Sister     No Known Problems Daughter     No Known Problems Maternal Grandmother     No Known Problems Maternal Grandfather     No Known Problems Paternal Grandmother     No Known Problems Paternal Grandfather     Breast cancer Cousin 55    Breast cancer Cousin 55    Breast cancer Cousin 67    Ovarian cancer Sister     Autoimmune disease Sister         Hashimoto's thyroiditis    Cancer Sister         ovarian    No Known Problems Daughter     No Known Problems Paternal Aunt     No Known Problems Paternal Aunt     Breast cancer Cousin     Breast cancer Cousin     Cancer Paternal Uncle         prostate       Physical Exam:     Vitals:   Blood Pressure: 138/84 (03/10/24 0601)  Pulse: 82 (03/10/24 0601)  Temperature: 98.1 °F (36.7 °C) (03/10/24 0601)  Temp Source: Temporal (03/10/24 0601)  Respirations: 18 (03/10/24 0601)  Height: 5' 5\" (165.1 cm) (03/10/24 0605)  Weight - Scale: 96 kg (211 lb 10.3 oz) (03/10/24 0605)  SpO2: 98 % (03/10/24 0601)    Physical Exam  Vitals and nursing note reviewed.   Constitutional:       General: She is not in acute distress.     Appearance: She is well-developed.   HENT:      Head: Normocephalic.      Nose: Nose normal. No congestion.      Mouth/Throat:      Mouth: Mucous membranes are moist.      Pharynx: Oropharynx is clear.   Eyes:      Conjunctiva/sclera: Conjunctivae normal.   Cardiovascular:      Rate and Rhythm: Normal rate and regular rhythm.      Heart sounds: Murmur heard.      No friction rub. No gallop.   Pulmonary:      Effort: Pulmonary effort is normal. No respiratory distress.      Breath sounds: Normal breath sounds. No wheezing, rhonchi or rales.   Abdominal:      General: " Bowel sounds are normal. There is no distension.      Palpations: Abdomen is soft.      Tenderness: There is no abdominal tenderness.   Musculoskeletal:         General: Tenderness (left upper extremity/shoulder, in sling) and signs of injury (Fracture of neck of left humerus) present.      Cervical back: Neck supple.      Right lower leg: Edema (mild with chronic overlying venous stasis skin changes) present.      Left lower leg: Edema (mild wiht chronic overlying venous stasis skin changes) present.   Skin:     General: Skin is warm and dry.      Capillary Refill: Capillary refill takes less than 2 seconds.      Findings: Bruising (left eye with mild swelling and discomfort) and lesion (shallow skin tear to left shin) present.   Neurological:      General: No focal deficit present.      Mental Status: She is alert and oriented to person, place, and time. Mental status is at baseline.   Psychiatric:         Mood and Affect: Mood normal.         Behavior: Behavior normal.          Additional Data:     Lab Results:  Results from last 7 days   Lab Units 03/10/24  0025   WBC Thousand/uL 9.72   HEMOGLOBIN g/dL 12.2   HEMATOCRIT % 38.2   PLATELETS Thousands/uL 214   NEUTROS PCT % 80*   LYMPHS PCT % 12*   MONOS PCT % 6   EOS PCT % 1     Results from last 7 days   Lab Units 03/10/24  0025   SODIUM mmol/L 138   POTASSIUM mmol/L 4.6   CHLORIDE mmol/L 101   CO2 mmol/L 29   BUN mg/dL 36*   CREATININE mg/dL 1.51*   ANION GAP mmol/L 8   CALCIUM mg/dL 9.2   ALBUMIN g/dL 4.0   TOTAL BILIRUBIN mg/dL 0.50   ALK PHOS U/L 88   ALT U/L 19   AST U/L 25   GLUCOSE RANDOM mg/dL 133     Results from last 7 days   Lab Units 03/10/24  0025   INR  2.69*                   Lines/Drains:  Invasive Devices       Peripheral Intravenous Line  Duration             Peripheral IV 03/09/24 Right Antecubital <1 day                        Imaging: Reviewed radiology reports from this admission including: chest CT scan, abdominal/pelvic CT, CT head, and CT  cervical spine, CT left upper extremity  CT head without contrast   ED Interpretation by Barrera Robles Jr., DO (03/10 0419)   Per [facility]:    FINDINGS:  Brain: There is mild microvascular ischemic change. Two small infarcts in the left cerebellum do not  appear acute. A lacunar infarct in the right cerebellum does not appear acute. No evidence of acute  intracranial hemorrhage or infarct. No evidence of acute intracranial mass effect.  Cerebral ventricles: The ventricles and sulci are prominent, consistent with mild generalized volume  loss.  Paranasal sinuses: Retention cyst or polyp in the left sphenoid sinus.  Mastoid air cells: Visualized mastoid air cells are well aerated.  Bones/joints: Unremarkable. No acute fracture.  Soft tissues: Mild left forehead swelling.      IMPRESSION:  1. No evidence of acute intracranial hemorrhage or infarct.  2. Mild left forehead swelling.  3. Mild generalized volume loss.  4. Mild microvascular ischemic change.  5. 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        CT cervical spine without contrast   ED Interpretation by Barrera Robles Jr., DO (03/10 0420)   Per [facility]:    FINDINGS:  Bones/joints: No evidence of acute bony fracture or misalignment. Severe disc space narrowing in  the cervical spine with osteophyte formation. Findings consistent with degenerative change. Moderate  bilateral facet joint arthropathy. Mild bilateral neural foraminal narrowing at C3-C4. Mild to moderate  bilateral neural foraminal narrowing at C4-C5. Mild to moderate bilateral neural foraminal narrowing at  C5-C6. Moderate right and mild left neural foraminal narrowing at C6-C7.  Lungs: Biapical scarring.  Thyroid: Bilateral thyroid nodules.  Soft tissues: Unremarkable.      IMPRESSION:  1. No evidence of acute bony fracture or misalignment.  2. Spinal canal and neural foraminal narrowing as described.      CT upper extremity wo contrast left   ED Interpretation by Barrera Robles Jr., DO  (03/10 0420)   Per [facility]:    Bones/joints: There is a comminuted fracture of the neck of the proximal left humerus with anterior  angulation at the fracture site. Old healed left clavicle fracture deformity . Thoracic spine multilevel  degenerative change. T11 anterior wedge deformity is unchanged.  Soft tissues: Unremarkable.      IMPRESSION:  Left humerus neck fracture.      CT chest abdomen pelvis wo contrast   ED Interpretation by Barrera Robles Jr.,  (03/10 0421)   Per [facility]:    FINDINGS:  Thyroid: Thyroid gland is heterogeneous/multinodular. This appears unchanged  Lungs: No focal pulmonary contusion. No confluence infiltrate. There is mild peripheral interstitial  thickening.  Pleural spaces: Unremarkable. No pneumothorax. No pleural effusion.  Heart: Status post CABG. Pacer in place.. No cardiomegaly. No pericardial effusion.  Lymph nodes: Mildly enlarged mediastinal lymph nodes  Vasculature: Unremarkable. No aortic aneurysm.  Bones/joints: There is a comminuted fracture of the neck of the proximal left humerus with anterior  angulation at the fracture site. Old healed left clavicle fracture deformity . Thoracic spine multilevel  degenerative change. T11 anterior wedge deformity is unchanged.  Soft tissues: Unremarkable.    Liver: Liver is unremarkable. No suspicious mass. No injury.  Gallbladder and bile ducts: Gallbladder has been removed.  Pancreas: Pancreas is fatty infiltrated.  Spleen: Spleen appears normal. No injury.  Adrenal glands: N   ormal. No mass.  Kidneys and ureters: Kidneys are normal without hydronephrosis . No injury. There are small renal  cysts.  Stomach and bowel: Unremarkable. No obstruction. No mucosal thickening.  Appendix: No evidence of appendicitis.  Intraperitoneal space: Unremarkable. No free air. No significant fluid collection.  Vasculature: Unremarkable. No abdominal aortic aneurysm.  Lymph nodes: Unremarkable. No enlarged lymph nodes.  Urinary bladder: Unremarkable, as  visualized.  Reproductive: Unremarkable as visualized.  Bones/joints: Multilevel degenerative change. Previous vertebroplasty of L2. Endplate irregularities  are unchanged. Wedge deformity of T11 is unchanged.. No new vertebral fracture.  Soft tissues: Anterior abdominal wall postoperative changes.      IMPRESSION:  Chronic findings. No acute injury          EKG and Other Studies Reviewed on Admission:   EKG: No EKG obtained.    ** Please Note: This note has been constructed using a voice recognition system. **

## 2024-03-10 NOTE — PLAN OF CARE
Problem: Potential for Falls  Goal: Patient will remain free of falls  Description: INTERVENTIONS:  - Educate patient/family on patient safety including physical limitations  - Instruct patient to call for assistance with activity   - Consult OT/PT to assist with strengthening/mobility   - Keep Call bell within reach  - Keep bed low and locked with side rails adjusted as appropriate  - Keep care items and personal belongings within reach  - Initiate and maintain comfort rounds  - Make Fall Risk Sign visible to staff  - Offer Toileting every 2 Hours, in advance of need  - Initiate/Maintain bed alarm  - Obtain necessary fall risk management equipment: non slip socks, call bell   - Apply yellow socks and bracelet for high fall risk patients  - Consider moving patient to room near nurses station  Outcome: Progressing     Problem: PAIN - ADULT  Goal: Verbalizes/displays adequate comfort level or baseline comfort level  Description: Interventions:  - Encourage patient to monitor pain and request assistance  - Assess pain using appropriate pain scale  - Administer analgesics based on type and severity of pain and evaluate response  - Implement non-pharmacological measures as appropriate and evaluate response  - Consider cultural and social influences on pain and pain management  - Notify physician/advanced practitioner if interventions unsuccessful or patient reports new pain  Outcome: Progressing     Problem: SAFETY ADULT  Goal: Patient will remain free of falls  Description: INTERVENTIONS:  - Educate patient/family on patient safety including physical limitations  - Instruct patient to call for assistance with activity   - Consult OT/PT to assist with strengthening/mobility   - Keep Call bell within reach  - Keep bed low and locked with side rails adjusted as appropriate  - Keep care items and personal belongings within reach  - Initiate and maintain comfort rounds  - Make Fall Risk Sign visible to staff  - Offer  Toileting every 2 Hours, in advance of need  - Initiate/Maintain bed alarm  - Obtain necessary fall risk management equipment: non slip socks, call bell   - Apply yellow socks and bracelet for high fall risk patients  - Consider moving patient to room near nurses station  Outcome: Progressing     Problem: DISCHARGE PLANNING  Goal: Discharge to home or other facility with appropriate resources  Description: INTERVENTIONS:  - Identify barriers to discharge w/patient and caregiver  - Arrange for needed discharge resources and transportation as appropriate  - Identify discharge learning needs (meds, wound care, etc.)  - Arrange for interpretive services to assist at discharge as needed  - Refer to Case Management Department for coordinating discharge planning if the patient needs post-hospital services based on physician/advanced practitioner order or complex needs related to functional status, cognitive ability, or social support system  Outcome: Progressing     Problem: NEUROSENSORY - ADULT  Goal: Achieves stable or improved neurological status  Description: INTERVENTIONS  - Monitor and report changes in neurological status  - Monitor vital signs such as temperature, blood pressure, glucose, and any other labs ordered   - Initiate measures to prevent increased intracranial pressure  - Monitor for seizure activity and implement precautions if appropriate      Outcome: Progressing     Problem: SKIN/TISSUE INTEGRITY - ADULT  Goal: Incision(s), wounds(s) or drain site(s) healing without S/S of infection  Description: INTERVENTIONS  - Assess and document dressing, incision, wound bed, drain sites and surrounding tissue  - Provide patient and family education    Outcome: Progressing     Problem: MUSCULOSKELETAL - ADULT  Goal: Maintain or return mobility to safest level of function  Description: INTERVENTIONS:  - Assess patient's ability to carry out ADLs; assess patient's baseline for ADL function and identify physical  deficits which impact ability to perform ADLs (bathing, care of mouth/teeth, toileting, grooming, dressing, etc.)  - Assess/evaluate cause of self-care deficits   - Assess range of motion  - Assess patient's mobility  - Assess patient's need for assistive devices and provide as appropriate  - Encourage maximum independence but intervene and supervise when necessary  - Involve family in performance of ADLs  - Assess for home care needs following discharge   - Consider OT consult to assist with ADL evaluation and planning for discharge  - Provide patient education as appropriate  Outcome: Progressing

## 2024-03-10 NOTE — ASSESSMENT & PLAN NOTE
Anticoagulated on coumadin  Pt reports currently taking 3.75 mg daily, continue   POA INR 2.69  Goal INR 2.5-3.5   Continue to monitor INR

## 2024-03-10 NOTE — ASSESSMENT & PLAN NOTE
"Patient presented to the ED following a mechanical fall at home where she tripped over the end of her bed.  Pt states she was organizing her nightstand when she tripped over the edge over her bed and fell into a chair nearby hitting her left arm/shoulder and striking left forehead.   Pt is anticoagulated on coumadin for a mechanical valve replacement and afib. She denies LOC. Denies headache or other neurologic symptoms.  She reports significant left arm/shoulder pain, hematoma over left eye. Denies any other injury.  Pt typically ambulates using a walker at home   Trauma work-up in the ED negative besides left humeral neck fracture. The following are vrad reports, official reads pending.  CT head: \" No evidence of acute intracranial hemorrhage or infarct. Mild left forehead swelling. Mild generalized volume loss. Mild microvascular ischemic change. 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  CT cervical spine: \"No evidence of acute bony fracture or misalignment. Spinal canal and neural foraminal narrowing as described\"  CT chest/abdomen/pelvis without contrast: \"Chronic findings. No acute injury\"  CT left upper extremity: \"Left humerus neck fracture\"  Will monitor on neuro checks for now given head strike on coumadin   Supportive care, pain control  PT/OT eval    "

## 2024-03-10 NOTE — ASSESSMENT & PLAN NOTE
Lab Results   Component Value Date    EGFR 30 03/10/2024    EGFR 36 01/22/2024    EGFR 35 01/22/2024    CREATININE 1.51 (H) 03/10/2024    CREATININE 1.30 01/22/2024    CREATININE 1.33 (H) 01/22/2024   POA creatinine 1.51  Mildly elevated from baseline creatinine of 1.3-1.4  Pt received 1L of fluids in the ED  Continue to monitor BMP

## 2024-03-10 NOTE — ASSESSMENT & PLAN NOTE
"Lab Results   Component Value Date    HGBA1C 6.6 (H) 09/01/2023       No results for input(s): \"POCGLU\" in the last 72 hours.    Blood Sugar Average: Last 72 hrs:  Hold Farxiga  Continue 13 units of Levemir daily  SSI with accucheks     "

## 2024-03-10 NOTE — ED PROVIDER NOTES
History  Chief Complaint   Patient presents with    Fall     Pt BIBA from home after a fall tonight. Pt reports she tripped over bed, fell forward, hit her head and rolled over a chair. Pt on Coumadin for hx of AVR. Pt denies cp, dizziness, c/o 10/10 left shoulder pain. Hematoma noted above pts left eye. GCS 15 in triage.     Patient presents for a fall tonight.  She was walking around her bed when she tripped and fell.  Patient landed on furniture.  Has injuries to the left side of her forehead, left shoulder and left shin.  Patient is on Coumadin for an aortic valve replacement.  She denies any loss of consciousness, nausea, vomiting or numbness to her hand or leg.  Denies any direct blunt trauma to her chest, abdomen, pelvis or back.  Biggest complaint is left shoulder pain at this time.    EMS gave morphine and placed a wrap around the shoulder with minimal improvement.      History provided by:  Patient, EMS personnel and spouse   used: No        Prior to Admission Medications   Prescriptions Last Dose Informant Patient Reported? Taking?   Accu-Chek Lila Plus test strip  Self Yes No   Sig: Freedom Lite Brand.   BD Pen Needle Rena 2nd Gen 32G X 4 MM MISC   Yes No   Calcium Carb-Cholecalciferol 250-125 MG-UNIT TABS   Yes No   Sig: Take by mouth daily   Cholecalciferol 50 MCG (2000 UT) CAPS   Yes No   Sig: Take 1 capsule by mouth   Denta 5000 Plus 1.1 % CREA   Yes No   Sig: BRUSH TWICE A DAY AS DIRECTED   HYDROcodone-acetaminophen (NORCO) 5-325 mg per tablet   No No   Sig: Take 1 tablet by mouth every 6 (six) hours as needed for pain for up to 10 dosesMax Daily Amount: 4 tablets   Patient not taking: Reported on 9/19/2022   HYDROcodone-acetaminophen (NORCO) 7.5-325 mg per tablet   Yes No   Sig: Take 1 tablet by mouth every 6 (six) hours as needed every 4 to 6 hours if needed for pain   Multiple Vitamin (MULTIVITAMIN) tablet  Self Yes No   Sig: Take 1 tablet by mouth daily   Multiple  Vitamins-Minerals (PRESERVISION AREDS 2 PO)   Yes No   Sig: Take by mouth 2 (two) times a day   NON FORMULARY   Yes No   Sig: Take 1 tablet by mouth 4 (four) times a day HydroEye   Omega-3 Fatty Acids (FISH OIL PO)  Self Yes No   Sig: Take 1,000 mg by mouth 4 (four) times a day Hydrieye 4 capsule daily.   RABEprazole (ACIPHEX) 20 MG tablet   No No   Sig: Take 1 tablet (20 mg total) by mouth daily   VITAMIN E PO   Yes No   Sig: Take by mouth daily   Vyzulta 0.024 % SOLN   Yes No   Sig: instill 1 drop into both eyes at bedtime   allopurinol (ZYLOPRIM) 100 mg tablet   Yes No   Sig: Take 100 mg by mouth 2 (two) times a day   aspirin (ECOTRIN LOW STRENGTH) 81 mg EC tablet  Self Yes No   Sig: Take 81 mg by mouth daily   bumetanide (BUMEX) 1 mg tablet   No No   Sig: Take 1 tablet (1 mg total) by mouth daily   Patient not taking: Reported on 2/24/2022   calcium citrate-vitamin D (CITRACAL+D) 315-200 MG-UNIT per tablet  Self Yes No   Sig: Take 1 tablet by mouth 2 (two) times a day   Patient not taking: Reported on 1/3/2024   cholecalciferol (VITAMIN D3) 1,000 units tablet  Self Yes No   Sig: Take 1,000 Units by mouth daily   Patient not taking: Reported on 1/3/2024   dapagliflozin (Farxiga) 10 MG tablet   No No   Sig: Take 1 tablet (10 mg total) by mouth daily   denosumab (PROLIA) 60 mg/mL   Yes No   Sig: Inject 60 mg under the skin once   gabapentin (NEURONTIN) 100 mg capsule   No No   Sig: Take 2 capsules (200 mg total) by mouth daily at bedtime   Patient not taking: Reported on 1/3/2024   glucose blood test strip   Yes No   guaifenesin-codeine (GUAIFENESIN AC) 100-10 MG/5ML liquid   Yes No   Sig: Iophen C-NR 10 mg-100 mg/5 mL oral liquid   Patient not taking: No sig reported   indapamide (LOZOL) 1.25 mg tablet   Yes No   Sig: Take 1.25 mg by mouth daily 3 days/week  MON, TUES, WED   insulin aspart (NovoLOG FlexPen) 100 UNIT/ML injection pen  Self Yes No   Sig: Inject 5 Units under the skin as needed   insulin detemir  (LEVEMIR) 100 units/mL subcutaneous injection  Self Yes No   Sig: Inject under the skin 16 daily   insulin detemir (Levemir FlexTouch) 100 Units/mL injection pen   Yes No   Si Units   lidocaine (XYLOCAINE) 1 %   Yes No   Sig: lidocaine HCl 10 mg/mL (1 %) injection solution   administered   Patient not taking: No sig reported   methocarbamol (ROBAXIN) 500 mg tablet   No No   Sig: Take 1.5 tablets (750 mg total) by mouth 2 (two) times a day   Patient not taking: Reported on 2022   methocarbamol (ROBAXIN) 500 mg tablet   No No   Sig: Take 1 tablet (500 mg total) by mouth 2 (two) times a day   Patient not taking: Reported on 2023   metoprolol succinate (TOPROL-XL) 50 mg 24 hr tablet  Self Yes No   Sig: Take 50 mg by mouth daily   naloxegol oxalate (Movantik) 25 MG tablet   No No   Sig: Take 1 tablet (25 mg total) by mouth every other day   Patient taking differently: Take 25 mg by mouth if needed   ondansetron (ZOFRAN-ODT) 4 mg disintegrating tablet   No No   Sig: Take 1 tablet (4 mg total) by mouth every 6 (six) hours as needed for vomiting or nausea   spironolactone (ALDACTONE) 50 mg tablet  Self Yes No   Sig: Take 25 mg by mouth daily   traMADol (Ultram) 50 mg tablet   No No   Sig: Take 1 tablet (50 mg total) by mouth every 8 (eight) hours as needed for severe pain   Patient not taking: Reported on 10/2/2023   warfarin (COUMADIN) 2.5 mg tablet   Yes No   Sig: Take 1.5 tablets by mouth daily 3.75 mg 6 days 5 mg on 1 day   warfarin (COUMADIN) 5 mg tablet   No No   Sig: Take 1 tablet (5 mg total) by mouth daily Adjust based on inr   Patient not taking: Reported on 2022   warfarin (COUMADIN) 5 mg tablet   Yes No   Sig: Take 5 mg by mouth once a week      Facility-Administered Medications: None       Past Medical History:   Diagnosis Date    Anemia     Arthritis     also osteoporosis of spine    Cancer (HCC)     endometrial    CHF (congestive heart failure) (HCC)     Chronic atrial fibrillation (HCC)  09/13/2017    Chronic gout 09/13/2017    Chronic kidney disease     Chronic rhinitis 11/08/2018    CKD (chronic kidney disease) stage 3, GFR 30-59 ml/min (HCC) 09/13/2017    Clotting disorder (HCC)     on warfarin    Colon polyp 09/13/2017    Compression fracture of L3 vertebra (HCC)     Coronary artery disease     Diabetes mellitus (HCC)     COMBS (dyspnea on exertion) 11/02/2018    Endometrial cancer (HCC)     age 60    Essential hypertension 09/13/2017    Gastritis 09/13/2017    GERD (gastroesophageal reflux disease)     Heart murmur     Hiatal hernia     Hx of hiatal hernia     Hyperlipidemia     Hypertension     Irregular heart beat     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    Osteoarthritis     Osteoporosis     Pacemaker     Peripheral vascular disease (HCC)     S/P AVR     Spinal stenosis     disk herniation     Urinary tract infection        Past Surgical History:   Procedure Laterality Date    AORTIC VALVE REPLACEMENT      BREAST EXCISIONAL BIOPSY Left 1995    benign    CARDIAC SURGERY      CATARACT EXTRACTION      CHOLECYSTECTOMY      COLONOSCOPY      COLONOSCOPY  09/2012    tubular adenoma    EGD  04/30/2021    [facility] MD.- Multiple stomach polyps, small amount of heme or coffee-grounds noted but do not feel significant evidence for bleeding.    EGD AND COLONOSCOPY  09/2017    adenomatous polyp/ benign gastric polyp    ESOPHAGOGASTRODUODENOSCOPY  08/2012    FIXATION KYPHOPLASTY      HERNIA REPAIR      x3    HYSTERECTOMY      age 61    INSERT / REPLACE / REMOVE PACEMAKER      IR PELVIC ANGIOGRAM  01/29/2019    JOINT REPLACEMENT Bilateral     knees    OOPHORECTOMY Bilateral     age 61    [place] COLONOSCOPY FLX DX W/COLLJ SPEC WHEN PFRMD N/A 09/12/2017    Procedure: EGDwith bxAND COLONOSCOPY with polypectomies;  Surgeon: Andrea Ortega IV, MD;  Location: [facility];  Service: Gastroenterology    NY SIGMOIDOSCOPY FLX DX W/COLLJ SPEC BR/WA IF PFRMD N/A 09/13/2017    Procedure: colonoscopy with hemo  clip x 2;  Surgeon: Andrea Ortega IV, MD;  Location: [facility];  Service: Gastroenterology    SKIN BIOPSY      TONSILLECTOMY         Family History   Problem Relation Age of Onset    Diabetes Mother     Hypertension Mother     Heart disease Father     Emphysema Father     Asthma Sister     Cancer Sister         colon    Hypertension Sister     Colon cancer Sister     Autoimmune disease Sister         Diabetes    Diabetes Sister     No Known Problems Daughter     No Known Problems Maternal Grandmother     No Known Problems Maternal Grandfather     No Known Problems Paternal Grandmother     No Known Problems Paternal Grandfather     Breast cancer Cousin 55    Breast cancer Cousin 55    Breast cancer Cousin 67    Ovarian cancer Sister     Autoimmune disease Sister         Hashimoto's thyroiditis    Cancer Sister         ovarian    No Known Problems Daughter     No Known Problems Paternal Aunt     No Known Problems Paternal Aunt     Breast cancer Cousin     Breast cancer Cousin     Cancer Paternal Uncle         prostate     I have reviewed and agree with the history as documented.    E-Cigarette/Vaping    E-Cigarette Use Never User      E-Cigarette/Vaping Substances    Nicotine No     THC No     CBD No     Flavoring No     Other No     Unknown No      Social History     Tobacco Use    Smoking status: Former     Current packs/day: 3.00     Average packs/day: 3.0 packs/day for 20.0 years (60.0 ttl pk-yrs)     Types: Cigarettes    Smokeless tobacco: Never    Tobacco comments:     have not smoked for at least 50 years   Vaping Use    Vaping status: Never Used   Substance Use Topics    Alcohol use: No    Drug use: No       Review of Systems   Constitutional:  Negative for fever.   Respiratory:  Negative for shortness of breath.    Cardiovascular:  Negative for chest pain.   Gastrointestinal:  Negative for abdominal pain, nausea and vomiting.   Genitourinary:  Negative for flank pain.   Musculoskeletal:  Positive for  arthralgias and joint swelling. Negative for back pain, neck pain and neck stiffness.   Skin:  Positive for wound. Negative for color change, pallor and rash.   Allergic/Immunologic: Negative for immunocompromised state.   Neurological:  Negative for weakness, numbness and headaches.   All other systems reviewed and are negative.      Physical Exam  Physical Exam  Vitals and nursing note reviewed.   Constitutional:       General: She is not in acute distress.     Appearance: She is well-developed. She is not ill-appearing, toxic-appearing or diaphoretic.      Interventions: Cervical collar in place.   HENT:      Head: Normocephalic. Abrasion and contusion present. No raccoon eyes, Navas's sign or laceration.        Right Ear: External ear normal.      Left Ear: External ear normal.      Nose: Nose normal. No nasal deformity, laceration, congestion or rhinorrhea.      Right Sinus: No maxillary sinus tenderness or frontal sinus tenderness.      Left Sinus: No maxillary sinus tenderness or frontal sinus tenderness.   Eyes:      General: Lids are normal. Vision grossly intact. No scleral icterus.        Right eye: No discharge.         Left eye: No discharge.      Extraocular Movements:      Right eye: Normal extraocular motion.      Left eye: Normal extraocular motion.      Conjunctiva/sclera: Conjunctivae normal.      Right eye: Right conjunctiva is not injected.      Left eye: Left conjunctiva is not injected.      Pupils: Pupils are equal, round, and reactive to light. Pupils are equal.   Neck:      Trachea: No tracheal tenderness or tracheal deviation.   Cardiovascular:      Rate and Rhythm: Normal rate and regular rhythm.      Pulses: Normal pulses.   Pulmonary:      Effort: Pulmonary effort is normal. No tachypnea, accessory muscle usage, respiratory distress or retractions.      Breath sounds: Normal breath sounds. No stridor. No decreased breath sounds, wheezing or rales.   Chest:      Chest wall: No  lacerations, deformity, tenderness or crepitus.   Abdominal:      General: There is no distension.      Palpations: Abdomen is soft.      Tenderness: There is no abdominal tenderness. There is no guarding or rebound.   Musculoskeletal:         General: No deformity.      Right shoulder: Normal.      Left shoulder: Tenderness and bony tenderness present. Decreased range of motion. Normal pulse.      Right elbow: Normal.      Left elbow: Normal.      Right wrist: Normal.      Left wrist: Normal.      Cervical back: Normal range of motion and neck supple. No swelling, deformity, lacerations, rigidity, tenderness or bony tenderness. No spinous process tenderness or muscular tenderness.      Thoracic back: Normal. No deformity, lacerations, tenderness or bony tenderness. Normal range of motion.      Lumbar back: Normal. No deformity, lacerations, tenderness or bony tenderness. Normal range of motion.      Right hip: Normal.      Left hip: Normal.      Right knee: Normal.      Left knee: Normal.      Right ankle: Normal.      Left ankle: Normal.        Legs:    Skin:     General: Skin is warm and dry.      Coloration: Skin is not pale.   Neurological:      General: No focal deficit present.      Mental Status: She is alert and oriented to person, place, and time. She is not disoriented.      Cranial Nerves: Cranial nerves 2-12 are intact.      Gait: Gait normal.   Psychiatric:         Speech: Speech normal.         Behavior: Behavior normal.         Vital Signs  ED Triage Vitals [03/09/24 2348]   Temperature Pulse Respirations Blood Pressure SpO2   98.2 °F (36.8 °C) 75 18 (!) 176/77 96 %      Temp Source Heart Rate Source Patient Position - Orthostatic VS BP Location FiO2 (%)   Oral Monitor Sitting Right arm --      Pain Score       10 - Worst Possible Pain           Vitals:    03/09/24 2348 03/10/24 0000 03/10/24 0115 03/10/24 0348   BP: (!) 176/77 160/77 (!) 180/72 (!) 180/74   Pulse: 75 75 76 75   Patient Position -  Orthostatic VS: Sitting Sitting Lying Sitting         Visual Acuity  Visual Acuity      Flowsheet Row Most Recent Value   L Pupil Size (mm) 3   R Pupil Size (mm) 3            ED Medications  Medications   lidocaine (LIDODERM) 5 % patch 1 patch (1 patch Topical Medication Applied 3/10/24 0343)   morphine (PF) (FOR EMS ONLY) 4 mg/mL injection 8 mg (0 mg Does not apply Given to EMS 3/10/24 0013)   HYDROmorphone (DILAUDID) injection 0.5 mg (0.5 mg Intravenous Given 3/10/24 0027)   tetanus-diphtheria-acellular pertussis (BOOSTRIX) IM injection 0.5 mL (0.5 mL Intramuscular Given 3/10/24 0035)   lactated ringers bolus 1,000 mL (0 mL Intravenous Stopped 3/10/24 0305)   HYDROmorphone (DILAUDID) injection 0.5 mg (0.5 mg Intravenous Given 3/10/24 0132)   HYDROmorphone (DILAUDID) injection 1 mg (1 mg Intravenous Given 3/10/24 0343)       Diagnostic Studies  Results Reviewed       Procedure Component Value Units Date/Time    Protime-INR [[id number]]  (Abnormal) Collected: 03/10/24 0025    Lab Status: Final result Specimen: Blood from Arm, Right Updated: 03/10/24 0107     Protime 28.6 seconds      INR 2.69    APTT [[id number]]  (Abnormal) Collected: 03/10/24 0025    Lab Status: Final result Specimen: Blood from Arm, Right Updated: 03/10/24 0107     PTT 41 seconds     Comprehensive metabolic panel [[id number]]  (Abnormal) Collected: 03/10/24 0025    Lab Status: Final result Specimen: Blood from Arm, Right Updated: 03/10/24 0050     Sodium 138 mmol/L      Potassium 4.6 mmol/L      Chloride 101 mmol/L      CO2 29 mmol/L      ANION GAP 8 mmol/L      BUN 36 mg/dL      Creatinine 1.51 mg/dL      Glucose 133 mg/dL      Calcium 9.2 mg/dL      AST 25 U/L      ALT 19 U/L      Alkaline Phosphatase 88 U/L      Total Protein 7.4 g/dL      Albumin 4.0 g/dL      Total Bilirubin 0.50 mg/dL      eGFR 30 ml/min/1.73sq m     Narrative:      National Kid[employer] Foundation guidelines for Chronic Kidney Disease (CKD):     Stage 1 with normal or high  GFR (GFR > 90 mL/min/1.73 square meters)    Stage 2 Mild CKD (GFR = 60-89 mL/min/1.73 square meters)    Stage 3A Moderate CKD (GFR = 45-59 mL/min/1.73 square meters)    Stage 3B Moderate CKD (GFR = 30-44 mL/min/1.73 square meters)    Stage 4 Severe CKD (GFR = 15-29 mL/min/1.73 square meters)    Stage 5 End Stage CKD (GFR <15 mL/min/1.73 square meters)  Note: GFR calculation is accurate only with a steady state creatinine    CBC and differential [[id number]]  (Abnormal) Collected: 03/10/24 0025    Lab Status: Final result Specimen: Blood from Arm, Right Updated: 03/10/24 0042     WBC 9.72 Thousand/uL      RBC 4.04 Million/uL      Hemoglobin 12.2 g/dL      Hematocrit 38.2 %      MCV 95 fL      MCH 30.2 pg      MCHC 31.9 g/dL      RDW 16.9 %      MPV 10.3 fL      Platelets 214 Thousands/uL      nRBC 0 /100 WBCs      Neutrophils Relative 80 %      Immat GRANS % 1 %      Lymphocytes Relative 12 %      Monocytes Relative 6 %      Eosinophils Relative 1 %      Basophils Relative 0 %      Neutrophils Absolute 7.80 Thousands/µL      Immature Grans Absolute 0.05 Thousand/uL      Lymphocytes Absolute 1.13 Thousands/µL      Monocytes Absolute 0.59 Thousand/µL      Eosinophils Absolute 0.12 Thousand/µL      Basophils Absolute 0.03 Thousands/µL                    CT head without contrast   ED Interpretation by Barrera Robles Jr., DO (03/10 0419)   Per [facility]:    FINDINGS:  Brain: There is mild microvascular ischemic change. Two small infarcts in the left cerebellum do not  appear acute. A lacunar infarct in the right cerebellum does not appear acute. No evidence of acute  intracranial hemorrhage or infarct. No evidence of acute intracranial mass effect.  Cerebral ventricles: The ventricles and sulci are prominent, consistent with mild generalized volume  loss.  Paranasal sinuses: Retention cyst or polyp in the left sphenoid sinus.  Mastoid air cells: Visualized mastoid air cells are well aerated.  Bones/joints: Unremarkable. No  acute fracture.  Soft tissues: Mild left forehead swelling.      IMPRESSION:  1. No evidence of acute intracranial hemorrhage or infarct.  2. Mild left forehead swelling.  3. Mild generalized volume loss.  4. Mild microvascular ischemic change.  5. 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        CT cervical spine without contrast   ED Interpretation by Barrera Robles Jr., DO (03/10 0420)   Per [facility]:    FINDINGS:  Bones/joints: No evidence of acute bony fracture or misalignment. Severe disc space narrowing in  the cervical spine with osteophyte formation. Findings consistent with degenerative change. Moderate  bilateral facet joint arthropathy. Mild bilateral neural foraminal narrowing at C3-C4. Mild to moderate  bilateral neural foraminal narrowing at C4-C5. Mild to moderate bilateral neural foraminal narrowing at  C5-C6. Moderate right and mild left neural foraminal narrowing at C6-C7.  Lungs: Biapical scarring.  Thyroid: Bilateral thyroid nodules.  Soft tissues: Unremarkable.      IMPRESSION:  1. No evidence of acute bony fracture or misalignment.  2. Spinal canal and neural foraminal narrowing as described.      CT upper extremity wo contrast left   ED Interpretation by Barrera Robles Jr., DO (03/10 0420)   Per [facility]:    Bones/joints: There is a comminuted fracture of the neck of the proximal left humerus with anterior  angulation at the fracture site. Old healed left clavicle fracture deformity . Thoracic spine multilevel  degenerative change. T11 anterior wedge deformity is unchanged.  Soft tissues: Unremarkable.      IMPRESSION:  Left humerus neck fracture.      CT chest abdomen pelvis wo contrast   ED Interpretation by Barrera Robles Jr., DO (03/10 0421)   Per [facility]:    FINDINGS:  Thyroid: Thyroid gland is heterogeneous/multinodular. This appears unchanged  Lungs: No focal pulmonary contusion. No confluence infiltrate. There is mild peripheral interstitial  thickening.  Pleural spaces:  Unremarkable. No pneumothorax. No pleural effusion.  Heart: Status post CABG. Pacer in place.. No cardiomegaly. No pericardial effusion.  Lymph nodes: Mildly enlarged mediastinal lymph nodes  Vasculature: Unremarkable. No aortic aneurysm.  Bones/joints: There is a comminuted fracture of the neck of the proximal left humerus with anterior  angulation at the fracture site. Old healed left clavicle fracture deformity . Thoracic spine multilevel  degenerative change. T11 anterior wedge deformity is unchanged.  Soft tissues: Unremarkable.    Liver: Liver is unremarkable. No suspicious mass. No injury.  Gallbladder and bile ducts: Gallbladder has been removed.  Pancreas: Pancreas is fatty infiltrated.  Spleen: Spleen appears normal. No injury.  Adrenal glands: N   ormal. No mass.  Kidneys and ureters: Kidneys are normal without hydronephrosis . No injury. There are small renal  cysts.  Stomach and bowel: Unremarkable. No obstruction. No mucosal thickening.  Appendix: No evidence of appendicitis.  Intraperitoneal space: Unremarkable. No free air. No significant fluid collection.  Vasculature: Unremarkable. No abdominal aortic aneurysm.  Lymph nodes: Unremarkable. No enlarged lymph nodes.  Urinary bladder: Unremarkable, as visualized.  Reproductive: Unremarkable as visualized.  Bones/joints: Multilevel degenerative change. Previous vertebroplasty of L2. Endplate irregularities  are unchanged. Wedge deformity of T11 is unchanged.. No new vertebral fracture.  Soft tissues: Anterior abdominal wall postoperative changes.      IMPRESSION:  Chronic findings. No acute injury                 Procedures  Procedures         ED Course  ED Course as of 03/10/24 0435   Sun Mar 10, 2024   0043 CBC and differential(!)  At baseline   0057 Comprehensive metabolic panel(!)  Mild RUBY.  Will give IVF.   0121 Protime-INR(!)  Therapeutic range for the pt's underlying conditions    0135 CT head without contrast                               SBIRT  22yo+      Flowsheet Row Most Recent Value   Initial Alcohol Screen: US AUDIT-C     1. How often do you have a drink containing alcohol? 0 Filed at: 03/09/2024 2351   2. How many drinks containing alcohol do you have on a typical day you are drinking?  0 Filed at: 03/09/2024 2351   3a. Male UNDER 65: How often do you have five or more drinks on one occasion? 0 Filed at: 03/09/2024 2351   3b. FEMALE Any Age, or MALE 65+: How often do you have 4 or more drinks on one occassion? 0 Filed at: 03/09/2024 2351   Audit-C Score 0 Filed at: 03/09/2024 2351   KIMANI: How many times in the past year have you...    Used an illegal drug or used a prescription medication for non-medical reasons? Never Filed at: 03/09/2024 2351                      Medical Decision Making  Patient is a fall on Coumadin with obvious trauma to her head and shoulder.  She is neurologically intact at this time.  Will check basic labs for potential underlying trauma.  This was a mechanical fall and not from dizziness, syncope, chest pain, etc. so I do not think any further labs are needed other than trauma labs.  Will obtain trauma CTs to further evaluate her underlying injuries.  Will update tetanus if needed.  Will give Dilaudid for pain since she did not respond to morphine and is telling me she is allergic to fentanyl.    4:23 AM  Labs show a mild RUBY for which I did give IV fluids to rehydrate.  Patient still in a lot of pain despite multiple doses of pain medications.  CTs are overall negative for trauma with the exception of a left humerus fracture.  Given the amount of pain occasions that the patient is requiring, I will admit her to internal medicine for orthopedic consultation.    Amount and/or Complexity of Data Reviewed  Labs: ordered. Decision-making details documented in ED Course.  Radiology: ordered and independent interpretation performed. Decision-making details documented in ED Course.    Risk  Prescription drug management.  Decision  regarding hospitalization.             Disposition  Final diagnoses:   Fall, initial encounter   Humerus fracture   Contusion of forehead, initial encounter   Closed head injury, initial encounter   Skin abrasion   RUBY (acute kidney injury) ([facility])     Time reflects when diagnosis was documented in both MDM as applicable and the Disposition within this note       Time User Action Codes Description Comment    3/10/2024  4:22 AM Barrera Robles Add [W19.XXXA] Fall, initial encounter     3/10/2024  4:22 AM Barrera Robles Add [S42.309A] Humerus fracture     3/10/2024  4:22 AM Barrera Robles Add [S00.83XA] Contusion of forehead, initial encounter     3/10/2024  4:22 AM AntonioeriBarrera grace Add [S09.90XA] Closed head injury, initial encounter     3/10/2024  4:23 AM Barrera Robles Add [T14.8XXA] Skin abrasion     3/10/2024  4:24 AM Barrera Robles Add [N17.9] RUBY (acute kidney injury) ([facility])           ED Disposition       ED Disposition   Admit    Condition   Stable    Date/Time   Sun Mar 10, 2024 8333    Comment   Case was discussed with NATALIE and the patient's admission status was agreed to be Admission Status: observation status to the service of Dr. Gallo .               Follow-up Information    None         Patient's Medications   Discharge Prescriptions    No medications on file       No discharge procedures on file.    PDMP Review         Value Time User    PDMP Reviewed  Yes 11/20/2023  8:04 AM Cam Rey DO            ED Provider  Electronically Signed by             Barrera Robles Jr., DO  03/10/24 [id number]

## 2024-03-10 NOTE — PHYSICAL THERAPY NOTE
PHYSICAL THERAPY NOTE          Patient Name: Cristy Fisher  Today's Date: 3/10/2024       03/10/24 0807   PT Last Visit   PT Visit Date 03/10/24   Note Type   Note type Cancelled Session;Evaluation   Cancel Reasons Medical status   Additional Comments Evaluation orders received, patient currently awaiting ortho conuslt for new Fx of L humerus. Will hold PT Eval until consult complete with activity guidelines for LUE.     Trice Martinez PT, DPT, [facility]

## 2024-03-10 NOTE — CONSULTS
Orthopedics   Cristy Fisher 88 y.o. female MRN: [id number]  Unit/Bed#: E2 -01      Chief Complaint:   left arm and shoulder pain    HPI:   88 y.o. right hand dominant female status post clinical fall from standing complaining of left shoulder pain.  Positive Headstrike, negative LOC, positive blood thinners. Pain is aching and throbbing in character, Located globally about the shoulder, acute in onset, constant in duration, severe in intensity. Exacerbating factors attempted active motion, remitting factors rest in a sling.  Negative radiating, negative numbness, negative tingling, no open wounds noted. No other complaints at this time. PMH significant for aortic valve replacement requiring Coumadin.    Review Of Systems:   Skin: See note below  Neuro: See HPI  Musculoskeletal: See HPI  14 point review of systems negative except as stated above     Past Medical History:   Past Medical History:   Diagnosis Date    Anemia     Arthritis     also osteoporosis of spine    Cancer (HCC)     endometrial    CHF (congestive heart failure) ([facility])     Chronic atrial fibrillation (HCC) 09/13/2017    Chronic gout 09/13/2017    Chronic kidney disease     Chronic rhinitis 11/08/2018    CKD (chronic kidney disease) stage 3, GFR 30-59 ml/min (HCC) 09/13/2017    Clotting disorder (HCC)     on warfarin    Colon polyp 09/13/2017    Compression fracture of L3 vertebra (HCC)     Coronary artery disease     Diabetes mellitus (HCC)     COMBS (dyspnea on exertion) 11/02/2018    Endometrial cancer (HCC)     age 60    Essential hypertension 09/13/2017    Gastritis 09/13/2017    GERD (gastroesophageal reflux disease)     Heart murmur     Hiatal hernia     Hx of hiatal hernia     Hyperlipidemia     Hypertension     Irregular heart beat     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    Osteoarthritis     Osteoporosis     Pacemaker     Peripheral vascular disease (HCC)     S/P AVR     Spinal stenosis     disk herniation     Urinary tract  infection        Past Surgical History:   Past Surgical History:   Procedure Laterality Date    AORTIC VALVE REPLACEMENT      BREAST EXCISIONAL BIOPSY Left 1995    benign    CARDIAC SURGERY      CATARACT EXTRACTION      CHOLECYSTECTOMY      COLONOSCOPY      COLONOSCOPY  09/2012    tubular adenoma    EGD  04/30/2021    [facility] MD.- Multiple stomach polyps, small amount of heme or coffee-grounds noted but do not feel significant evidence for bleeding.    EGD AND COLONOSCOPY  09/2017    adenomatous polyp/ benign gastric polyp    ESOPHAGOGASTRODUODENOSCOPY  08/2012    FIXATION KYPHOPLASTY      HERNIA REPAIR      x3    HYSTERECTOMY      age 61    INSERT / REPLACE / REMOVE PACEMAKER      IR PELVIC ANGIOGRAM  01/29/2019    JOINT REPLACEMENT Bilateral     knees    OOPHORECTOMY Bilateral     age 61    WI COLONOSCOPY FLX DX W/COLLJ SPEC WHEN PFRMD N/A 09/12/2017    Procedure: EGDwith bxAND COLONOSCOPY with polypectomies;  Surgeon: Andrea Ortega IV, MD;  Location: [facility];  Service: Gastroenterology    WI SIGMOIDOSCOPY FLX DX W/COLLJ SPEC BR/WA IF PFRMD N/A 09/13/2017    Procedure: colonoscopy with hemo clip x 2;  Surgeon: Andrea Ortega IV, MD;  Location: [facility];  Service: Gastroenterology    SKIN BIOPSY      TONSILLECTOMY         Family History:  Family history reviewed and non-contributory  Family History   Problem Relation Age of Onset    Diabetes Mother     Hypertension Mother     Heart disease Father     Emphysema Father     Asthma Sister     Cancer Sister         colon    Hypertension Sister     Colon cancer Sister     Autoimmune disease Sister         Diabetes    Diabetes Sister     No Known Problems Daughter     No Known Problems Maternal Grandmother     No Known Problems Maternal Grandfather     No Known Problems Paternal Grandmother     No Known Problems Paternal Grandfather     Breast cancer Cousin 55    Breast cancer Cousin 55    Breast cancer Cousin 67    Ovarian cancer Sister     Autoimmune  disease Sister         Hashimoto's thyroiditis    Cancer Sister         ovarian    No Known Problems Daughter     No Known Problems Paternal Aunt     No Known Problems Paternal Aunt     Breast cancer Cousin     Breast cancer Cousin     Cancer Paternal Uncle         prostate       Social History:  Social History     Socioeconomic History    Marital status:      Spouse name: None    Number of children: None    Years of education: None    Highest education level: None   Occupational History    Occupation: Retired -    Tobacco Use    Smoking status: Former     Current packs/day: 3.00     Average packs/day: 3.0 packs/day for 20.0 years (60.0 ttl pk-yrs)     Types: Cigarettes    Smokeless tobacco: Never    Tobacco comments:     have not smoked for at least 50 years   Vaping Use    Vaping status: Never Used   Substance and Sexual Activity    Alcohol use: No    Drug use: No    Sexual activity: Yes     Partners: Male     Birth control/protection: Other     Comment: MINGO SOLIZ   Other Topics Concern    None   Social History Narrative    None     Social Determinants of Health     Financial Resource Strain: Not on file   Food Insecurity: Not on file   Transportation Needs: Not on file   Physical Activity: Not on file   Stress: Not on file   Social Connections: Not on file   Intimate Partner Violence: Not on file   Housing Stability: Not on file       Allergies:   Allergies   Allergen Reactions    Acetazolamide GI Intolerance    Colchicine Other (See Comments)    Diltiazem Edema    Docusate Other (See Comments)     impaction    Eszopiclone     Febuxostat     Fentanyl Vomiting     Pt reports vomiting after given fentanyl patch     Glycerin Other (See Comments)    Hydrochlorothiazide Other (See Comments) and Headache     Other reaction(s): Other (see comments)  Generalized swelling    Latex      Other reaction(s): Other (See Comments)  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    Lorazepam Nausea Only and Other (See Comments)  "    Other reaction(s): not sleeping, made her feel \"wired\"    Losartan Headache    Metformin Other (See Comments)     Other reaction(s): GI intolerance    Nortriptyline Other (See Comments)     Other reaction(s): Other (See Comments)  rash bolivar flexeril      Other GI Intolerance    Penicillin G Headache    Penicillins      Other reaction(s): Other (See Comments)  RASH-anaphylaxis  Other reaction(s): Anaphylaxis    Povidone Iodine Other (See Comments)     VAGINAL ITCHING    Ropinirole      Back pain    Simvastatin Other (See Comments) and Myalgia     muscle aches    Sulfa Antibiotics      Other reaction(s): Other (See Comments)  bolivar Lasix, GI upset, rash  Other reaction(s): GI Upset    Torsemide Nausea Only, Vomiting and GI Intolerance     Pt states \"it does not work for me\".     Valsartan      Other reaction(s): Other (See Comments)  bolivar olmesartan    Fluticasone      Leg cramps           Labs:  0   Lab Value Date/Time    HCT 38.2 03/10/2024 0025    HCT 45.1 01/22/2024 1313    HCT 40.5 07/06/2023 1821    HGB 12.2 03/10/2024 0025    HGB 14.6 01/22/2024 1313    HGB 12.7 07/06/2023 1821    PT 30.3 (H) 03/28/2022 0915    INR 2.69 (H) 03/10/2024 0025    INR 2.9 03/28/2022 0915    WBC 9.72 03/10/2024 0025    WBC 9.76 01/22/2024 1313    WBC 7.27 07/06/2023 1821       Meds:    Current Facility-Administered Medications:     acetaminophen (TYLENOL) tablet 975 mg, 975 mg, Oral, Q[facility], Corey Hunter MD, 975 mg at 03/10/24 1316    allopurinol (ZYLOPRIM) tablet 100 mg, 100 mg, Oral, BID, Reyes Monteiro PA-C, 100 mg at 03/10/24 0823    aspirin (ECOTRIN LOW STRENGTH) EC tablet 81 mg, 81 mg, Oral, Daily, Reyes Monteiro PA-C, 81 mg at 03/10/24 0823    hydrALAZINE (APRESOLINE) injection 5 mg, 5 mg, Intravenous, Q6H PRN, Reyes Monteiro PA-C    HYDROmorphone (DILAUDID) injection 0.5 mg, 0.5 mg, Intravenous, Q4H PRN, Reyes Monteiro PA-C, 0.5 mg at 03/10/24 0831    indapamide (LOZOL) tablet 1.25 mg, 1.25 mg, Oral, Daily, Reyes " "Mayela Monteiro PA-C    insulin detemir (LEVEMIR) subcutaneous injection 13 Units, 13 Units, Subcutaneous, Daily, Reyes Monteiro PA-C, 13 Units at 03/10/24 0823    insulin lispro (HumALOG/ADMELOG) 100 units/mL subcutaneous injection 1-6 Units, 1-6 Units, Subcutaneous, TID AC, 1 Units at 03/10/24 1148 **AND** Fingerstick Glucose (POCT), , , TID AC, Reyes Monteiro PA-C    insulin lispro (HumALOG/ADMELOG) 100 units/mL subcutaneous injection 1-6 Units, 1-6 Units, Subcutaneous, HS, Reyes Monteiro PA-C    lidocaine (LIDODERM) 5 % patch 1 patch, 1 patch, Topical, Daily, Corey Hunter MD    metoprolol succinate (TOPROL-XL) 24 hr tablet 50 mg, 50 mg, Oral, Daily, Reyes Monteiro PA-C, 50 mg at 03/10/24 0823    oxyCODONE (ROXICODONE) immediate release tablet 10 mg, 10 mg, Oral, Q4H PRN, Corey Hunter MD, 10 mg at 03/10/24 1023    oxyCODONE (ROXICODONE) IR tablet 5 mg, 5 mg, Oral, Q4H PRN, Corey Hunter MD    pantoprazole (PROTONIX) EC tablet 40 mg, 40 mg, Oral, Early Morning, Reyes Monteiro PA-C    spironolactone (ALDACTONE) tablet 25 mg, 25 mg, Oral, Daily, Reyes Monteiro PA-C, 25 mg at 03/10/24 0823    warfarin (COUMADIN) tablet 3.75 mg, 3.75 mg, Oral, Daily, Reyes Monteiro PA-C    Blood Culture:   No results found for: \"BLOODCX\"    Wound Culture:   No results found for: \"WOUNDCULT\"    Ins and Outs:  I/O last 24 hours:  In: 1000 [IV Piggyback:1000]  Out: -           Physical Exam:   /66 (BP Location: Right arm)   Pulse 84   Temp 98.6 °F (37 °C) (Temporal)   Resp 16   Ht 5' 5\" (1.651 m)   Wt 96 kg (211 lb 10.3 oz)   SpO2 97%   BMI 35.22 kg/m²   Gen: No acute distress, resting comfortably in bed  HEENT: Eyes clear, moist mucus membranes, hearing intact  Respiratory: No audible wheezing or stridor  Cardiovascular: Well Perfused peripherally, 2+ distal pulse  Abdomen: nondistended, no peritoneal signs  Musculoskeletal: left upper extremity  Skin intact, ecchymosis and swelling noted over the " shoulder  Tender to palpation over shoulder and upper arm  Sensation intact to radial, ulna, median, musculocutaneous, and axillary nerve distributions  Motor intact to  radial, ulnar, median, musculocutaneous, and axillary nerve distributions  Palpable radial and ulnar pulse  Musculature is soft and compressible, no pain with passive stretch    Radiology:   I personally reviewed the films.  Radiographs and CT scan of the left shoulder reveal a comminuted extra-articular fracture about the anatomic neck of the proximal humerus and pseudosubluxation of the glenohumeral joint.    _*_*_*_*_*_*_*_*_*_*_*_*_*_*_*_*_*_*_*_*_*_*_*_*_*_*_*_*_*_*_*_*_*_*_*_*_*_*_*_*_*    Assessment:  88 y.o. female S/P fall from standing with left proximal humerus fracture    Plan:   Nonweightbearing left upper extremity in sling  Injury will be managed  Analgesics for pain  PT/OT  NonOp management of left proximal humeral fracture  Body mass index is 35.22 kg/m². moderately obese. Recommend behavior modifications, nutrition, and physical activity.  Dispo: Ok for discharge from ortho perspective  Patient to follow-up in clinic in 2 weeks    Cuate Xavier MD

## 2024-03-10 NOTE — ASSESSMENT & PLAN NOTE
Blood pressures elevated with SBP in the 180's while in the ED  Likely secondary to pain related to humeral fracture  Optimize pain control  Hydralazine prn  Continue home regimen of metoprolol, spironolactone, and lozol

## 2024-03-10 NOTE — ASSESSMENT & PLAN NOTE
Pt with PMHx of osteoporosis, CKD, DM2, CHF, mechanical valve replacement and afib anticoagulated on coumadin presented to the ED after sustaining a mechanical fall at home where she fell into a chair hitting her left arm/shoulder  CT left upper extremity demonstrating a left humerus neck fracture with sling applied in ED  After receiving, 8 mg morphine via EMS and total of 1.5 mg dilaudid while in the ED, patient's pain remains uncontrolled  Pt states she has 7/8 pain currently while at rest and 10/10 pain in her left arm/shoulder with any movements  Pain control, supportive care  Ortho consult pending

## 2024-03-10 NOTE — ASSESSMENT & PLAN NOTE
"Wt Readings from Last 3 Encounters:   03/09/24 96 kg (211 lb 10.3 oz)   01/03/24 92.1 kg (203 lb)   12/29/23 99.3 kg (219 lb)     Pt appears euvolemic on exam. She does have mild bilateral pedal edema with chronic overlying venous stasis skin changes which she reports is baseline for her.   Last ECHO 3/2022 demonstrating \"There is mild concentric hypertrophy. Systolic function is hyperdynamic with an ejection fraction over 65%.\"  Daily weights, I/O  Continue home regimen of spironolactone and lozol          "

## 2024-03-10 NOTE — ED NOTES
Pt states she felt short of breath after dilaudid, oxygen sats 94% on RA. Pt placed on 2L nasal cannula for comfort sats increased to 97%     Whitley Brooks RN  03/10/24 0052

## 2024-03-10 NOTE — OCCUPATIONAL THERAPY NOTE
Occupational Therapy         Patient Name: Cristy Fisher  Today's Date: 3/10/2024       03/10/24 0829   OT Last Visit   OT Visit Date 03/10/24   Note Type   Note type Cancelled Session;Evaluation   Cancel Reasons Medical status   Additional Comments Evaluation orders received, patient currently awaiting ortho conuslt for new Fx of L humerus. Will hold OT eval until consult complete with activity guidelines for PRINCESS Llanos

## 2024-03-11 ENCOUNTER — HOME HEALTH ADMISSION (OUTPATIENT)
Dept: HOME HEALTH SERVICES | Facility: HOME HEALTHCARE | Age: 89
End: 2024-03-11
Payer: MEDICARE

## 2024-03-11 LAB
ANION GAP SERPL CALCULATED.3IONS-SCNC: 11 MMOL/L
BASOPHILS # BLD AUTO: 0.02 THOUSANDS/ÂΜL (ref 0–0.1)
BASOPHILS NFR BLD AUTO: 0 % (ref 0–1)
BUN SERPL-MCNC: 34 MG/DL (ref 5–25)
CALCIUM SERPL-MCNC: 8.9 MG/DL (ref 8.4–10.2)
CHLORIDE SERPL-SCNC: 98 MMOL/L (ref 96–108)
CO2 SERPL-SCNC: 25 MMOL/L (ref 21–32)
CREAT SERPL-MCNC: 1.38 MG/DL (ref 0.6–1.3)
EOSINOPHIL # BLD AUTO: 0.17 THOUSAND/ÂΜL (ref 0–0.61)
EOSINOPHIL NFR BLD AUTO: 2 % (ref 0–6)
ERYTHROCYTE [DISTWIDTH] IN BLOOD BY AUTOMATED COUNT: 16.7 % (ref 11.6–15.1)
GFR SERPL CREATININE-BSD FRML MDRD: 34 ML/MIN/1.73SQ M
GLUCOSE SERPL-MCNC: 110 MG/DL (ref 65–140)
GLUCOSE SERPL-MCNC: 116 MG/DL (ref 65–140)
GLUCOSE SERPL-MCNC: 145 MG/DL (ref 65–140)
GLUCOSE SERPL-MCNC: 146 MG/DL (ref 65–140)
GLUCOSE SERPL-MCNC: 168 MG/DL (ref 65–140)
HCT VFR BLD AUTO: 35.3 % (ref 34.8–46.1)
HGB BLD-MCNC: 11.2 G/DL (ref 11.5–15.4)
IMM GRANULOCYTES # BLD AUTO: 0.05 THOUSAND/UL (ref 0–0.2)
IMM GRANULOCYTES NFR BLD AUTO: 1 % (ref 0–2)
INR PPP: 3.53 (ref 0.84–1.19)
LYMPHOCYTES # BLD AUTO: 1.25 THOUSANDS/ÂΜL (ref 0.6–4.47)
LYMPHOCYTES NFR BLD AUTO: 15 % (ref 14–44)
MAGNESIUM SERPL-MCNC: 1.8 MG/DL (ref 1.9–2.7)
MCH RBC QN AUTO: 29.9 PG (ref 26.8–34.3)
MCHC RBC AUTO-ENTMCNC: 31.7 G/DL (ref 31.4–37.4)
MCV RBC AUTO: 94 FL (ref 82–98)
MONOCYTES # BLD AUTO: 0.79 THOUSAND/ÂΜL (ref 0.17–1.22)
MONOCYTES NFR BLD AUTO: 9 % (ref 4–12)
NEUTROPHILS # BLD AUTO: 6.12 THOUSANDS/ÂΜL (ref 1.85–7.62)
NEUTS SEG NFR BLD AUTO: 73 % (ref 43–75)
NRBC BLD AUTO-RTO: 0 /100 WBCS
PLATELET # BLD AUTO: 202 THOUSANDS/UL (ref 149–390)
PMV BLD AUTO: 10.1 FL (ref 8.9–12.7)
POTASSIUM SERPL-SCNC: 4.5 MMOL/L (ref 3.5–5.3)
PROTHROMBIN TIME: 35.3 SECONDS (ref 11.6–14.5)
RBC # BLD AUTO: 3.74 MILLION/UL (ref 3.81–5.12)
SODIUM SERPL-SCNC: 134 MMOL/L (ref 135–147)
WBC # BLD AUTO: 8.4 THOUSAND/UL (ref 4.31–10.16)

## 2024-03-11 PROCEDURE — 97163 PT EVAL HIGH COMPLEX 45 MIN: CPT

## 2024-03-11 PROCEDURE — 97167 OT EVAL HIGH COMPLEX 60 MIN: CPT

## 2024-03-11 PROCEDURE — 99024 POSTOP FOLLOW-UP VISIT: CPT

## 2024-03-11 PROCEDURE — 97530 THERAPEUTIC ACTIVITIES: CPT

## 2024-03-11 PROCEDURE — 80048 BASIC METABOLIC PNL TOTAL CA: CPT

## 2024-03-11 PROCEDURE — 97535 SELF CARE MNGMENT TRAINING: CPT

## 2024-03-11 PROCEDURE — 82948 REAGENT STRIP/BLOOD GLUCOSE: CPT

## 2024-03-11 PROCEDURE — 85610 PROTHROMBIN TIME: CPT

## 2024-03-11 PROCEDURE — 83735 ASSAY OF MAGNESIUM: CPT

## 2024-03-11 PROCEDURE — 99232 SBSQ HOSP IP/OBS MODERATE 35: CPT

## 2024-03-11 PROCEDURE — 85025 COMPLETE CBC W/AUTO DIFF WBC: CPT

## 2024-03-11 RX ORDER — MAGNESIUM SULFATE HEPTAHYDRATE 40 MG/ML
2 INJECTION, SOLUTION INTRAVENOUS ONCE
Qty: 50 ML | Refills: 0 | Status: COMPLETED | OUTPATIENT
Start: 2024-03-11 | End: 2024-03-11

## 2024-03-11 RX ORDER — DIPHENHYDRAMINE HCL 25 MG
12.5 TABLET ORAL 2 TIMES DAILY PRN
Status: DISCONTINUED | OUTPATIENT
Start: 2024-03-11 | End: 2024-03-12 | Stop reason: HOSPADM

## 2024-03-11 RX ADMIN — ASPIRIN 81 MG: 81 TABLET, COATED ORAL at 08:11

## 2024-03-11 RX ADMIN — OXYCODONE HYDROCHLORIDE 10 MG: 10 TABLET ORAL at 19:59

## 2024-03-11 RX ADMIN — ACETAMINOPHEN 325MG 975 MG: 325 TABLET ORAL at 14:32

## 2024-03-11 RX ADMIN — MAGNESIUM SULFATE HEPTAHYDRATE 2 G: 40 INJECTION, SOLUTION INTRAVENOUS at 08:19

## 2024-03-11 RX ADMIN — ALLOPURINOL 100 MG: 100 TABLET ORAL at 17:21

## 2024-03-11 RX ADMIN — METOPROLOL SUCCINATE 50 MG: 50 TABLET, EXTENDED RELEASE ORAL at 08:11

## 2024-03-11 RX ADMIN — MORPHINE SULFATE 2 MG: 2 INJECTION, SOLUTION INTRAMUSCULAR; INTRAVENOUS at 21:07

## 2024-03-11 RX ADMIN — OXYCODONE HYDROCHLORIDE 10 MG: 10 TABLET ORAL at 04:00

## 2024-03-11 RX ADMIN — OXYCODONE HYDROCHLORIDE 10 MG: 10 TABLET ORAL at 08:27

## 2024-03-11 RX ADMIN — LIDOCAINE 5% 1 PATCH: 700 PATCH TOPICAL at 08:13

## 2024-03-11 RX ADMIN — ACETAMINOPHEN 325MG 975 MG: 325 TABLET ORAL at 06:39

## 2024-03-11 RX ADMIN — DIPHENHYDRAMINE HYDROCHLORIDE 12.5 MG: 25 TABLET ORAL at 14:52

## 2024-03-11 RX ADMIN — ACETAMINOPHEN 325MG 975 MG: 325 TABLET ORAL at 21:06

## 2024-03-11 RX ADMIN — SPIRONOLACTONE 25 MG: 25 TABLET, FILM COATED ORAL at 08:27

## 2024-03-11 RX ADMIN — NALOXEGOL OXALATE 25 MG: 25 TABLET, FILM COATED ORAL at 05:00

## 2024-03-11 RX ADMIN — INDAPAMIDE 1.25 MG: 1.25 TABLET, FILM COATED ORAL at 08:27

## 2024-03-11 RX ADMIN — INSULIN DETEMIR 13 UNITS: 100 INJECTION, SOLUTION SUBCUTANEOUS at 08:13

## 2024-03-11 RX ADMIN — HYDROMORPHONE HYDROCHLORIDE 0.5 MG: 1 INJECTION, SOLUTION INTRAMUSCULAR; INTRAVENOUS; SUBCUTANEOUS at 04:53

## 2024-03-11 RX ADMIN — ALLOPURINOL 100 MG: 100 TABLET ORAL at 08:11

## 2024-03-11 RX ADMIN — OXYCODONE HYDROCHLORIDE 10 MG: 10 TABLET ORAL at 15:48

## 2024-03-11 RX ADMIN — DAPAGLIFLOZIN 10 MG: 10 TABLET, FILM COATED ORAL at 08:11

## 2024-03-11 RX ADMIN — INSULIN LISPRO 1 UNITS: 100 INJECTION, SOLUTION INTRAVENOUS; SUBCUTANEOUS at 12:17

## 2024-03-11 NOTE — ASSESSMENT & PLAN NOTE
Lab Results   Component Value Date    HGBA1C 6.6 (H) 09/01/2023       Recent Labs     03/10/24  1049 03/10/24  1553 03/10/24  2205 03/11/24  0627   POCGLU 152* 135 114 116       Blood Sugar Average: Last 72 hrs:  Hold Farxiga  Continue 13 units of Levemir daily  SSI with accucheks (P) 133.4

## 2024-03-11 NOTE — ASSESSMENT & PLAN NOTE
Anticoagulated on coumadin  Pt reports currently taking 3.75 mg daily, hold tonight resume tomorrow  POA INR 3.53  Goal INR 2.5-3.5   Continue to monitor INR

## 2024-03-11 NOTE — PROGRESS NOTES
Orthopedics   Cristyluana Fisher 88 y.o. female MRN: [id number]  Unit/Bed#: E2 -01      Chief Complaint:   left arm and shoulder pain    HPI:   88 y.o. right hand dominant female status post clinical fall from standing complaining of left shoulder pain.  She is doing well today, her pain is well controlled and she is continuing in her sling. She would like to go home tomorrow. She denies any new incidence throughout the night. Denies any numbness or tingling.     Review Of Systems:   Skin: See note below  Neuro: See HPI  Musculoskeletal: See HPI  14 point review of systems negative except as stated above     Past Medical History:   Past Medical History:   Diagnosis Date    Anemia     Arthritis     also osteoporosis of spine    Cancer (HCC)     endometrial    CHF (congestive heart failure) ([facility])     Chronic atrial fibrillation (HCC) 09/13/2017    Chronic gout 09/13/2017    Chronic kidney disease     Chronic rhinitis 11/08/2018    CKD (chronic kidney disease) stage 3, GFR 30-59 ml/min (HCC) 09/13/2017    Clotting disorder (HCC)     on warfarin    Colon polyp 09/13/2017    Compression fracture of L3 vertebra (HCC)     Coronary artery disease     Diabetes mellitus (HCC)     COMBS (dyspnea on exertion) 11/02/2018    Endometrial cancer (HCC)     age 60    Essential hypertension 09/13/2017    Gastritis 09/13/2017    GERD (gastroesophageal reflux disease)     Heart murmur     Hiatal hernia     Hx of hiatal hernia     Hyperlipidemia     Hypertension     Irregular heart beat     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    Osteoarthritis     Osteoporosis     Pacemaker     Peripheral vascular disease (HCC)     S/P AVR     Spinal stenosis     disk herniation     Urinary tract infection        Past Surgical History:   Past Surgical History:   Procedure Laterality Date    AORTIC VALVE REPLACEMENT      BREAST EXCISIONAL BIOPSY Left 1995    benign    CARDIAC SURGERY      CATARACT EXTRACTION      CHOLECYSTECTOMY      COLONOSCOPY       COLONOSCOPY  09/2012    tubular adenoma    EGD  04/30/2021    [facility] MD.- Multiple stomach polyps, small amount of heme or coffee-grounds noted but do not feel significant evidence for bleeding.    EGD AND COLONOSCOPY  09/2017    adenomatous polyp/ benign gastric polyp    ESOPHAGOGASTRODUODENOSCOPY  08/2012    FIXATION KYPHOPLASTY      HERNIA REPAIR      x3    HYSTERECTOMY      age 61    INSERT / REPLACE / REMOVE PACEMAKER      IR PELVIC ANGIOGRAM  01/29/2019    JOINT REPLACEMENT Bilateral     knees    OOPHORECTOMY Bilateral     age 61    WY COLONOSCOPY FLX DX W/COLLJ SPEC WHEN PFRMD N/A 09/12/2017    Procedure: EGDwith bxAND COLONOSCOPY with polypectomies;  Surgeon: Andrea Ortega IV, MD;  Location: [facility];  Service: Gastroenterology    WY SIGMOIDOSCOPY FLX DX W/COLLJ SPEC BR/WA IF PFRMD N/A 09/13/2017    Procedure: colonoscopy with hemo clip x 2;  Surgeon: Andrea Ortega IV, MD;  Location: [facility];  Service: Gastroenterology    SKIN BIOPSY      TONSILLECTOMY         Family History:  Family history reviewed and non-contributory  Family History   Problem Relation Age of Onset    Diabetes Mother     Hypertension Mother     Heart disease Father     Emphysema Father     Asthma Sister     Cancer Sister         colon    Hypertension Sister     Colon cancer Sister     Autoimmune disease Sister         Diabetes    Diabetes Sister     No Known Problems Daughter     No Known Problems Maternal Grandmother     No Known Problems Maternal Grandfather     No Known Problems Paternal Grandmother     No Known Problems Paternal Grandfather     Breast cancer Cousin 55    Breast cancer Cousin 55    Breast cancer Cousin 67    Ovarian cancer Sister     Autoimmune disease Sister         Hashimoto's thyroiditis    Cancer Sister         ovarian    No Known Problems Daughter     No Known Problems Paternal Aunt     No Known Problems Paternal Aunt     Breast cancer Cousin     Breast cancer Cousin     Cancer Paternal Uncle      "    prostate       Social History:  Social History     Socioeconomic History    Marital status:      Spouse name: None    Number of children: None    Years of education: None    Highest education level: None   Occupational History    Occupation: Retired -    Tobacco Use    Smoking status: Former     Current packs/day: 3.00     Average packs/day: 3.0 packs/day for 20.0 years (60.0 ttl pk-yrs)     Types: Cigarettes    Smokeless tobacco: Never    Tobacco comments:     have not smoked for at least 50 years   Vaping Use    Vaping status: Never Used   Substance and Sexual Activity    Alcohol use: Never     Comment: 0    Drug use: No    Sexual activity: Yes     Partners: Male     Birth control/protection: Other     Comment: MARTÍNEZ, BSO   Other Topics Concern    None   Social History Narrative    None     Social Determinants of Health     Financial Resource Strain: Not on file   Food Insecurity: Not on file   Transportation Needs: Not on file   Physical Activity: Not on file   Stress: Not on file   Social Connections: Not on file   Intimate Partner Violence: Not on file   Housing Stability: Not on file       Allergies:   Allergies   Allergen Reactions    Acetazolamide GI Intolerance    Colchicine Other (See Comments)    Diltiazem Edema    Docusate Other (See Comments)     impaction    Eszopiclone     Febuxostat     Fentanyl Vomiting     Pt reports vomiting after given fentanyl patch     Glycerin Other (See Comments)    Hydrochlorothiazide Other (See Comments) and Headache     Other reaction(s): Other (see comments)  Generalized swelling    Latex      Other reaction(s): Other (See Comments)  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    Lorazepam Nausea Only and Other (See Comments)     Other reaction(s): not sleeping, made her feel \"wired\"    Losartan Headache    Metformin Other (See Comments)     Other reaction(s): GI intolerance    Nortriptyline Other (See Comments)     Other reaction(s): Other (See Comments)  rash bolivar " "flexeril      Other GI Intolerance    Penicillin G Headache    Penicillins      Other reaction(s): Other (See Comments)  RASH-anaphylaxis  Other reaction(s): Anaphylaxis    Povidone Iodine Other (See Comments)     VAGINAL ITCHING    Ropinirole      Back pain    Simvastatin Other (See Comments) and Myalgia     muscle aches    Sulfa Antibiotics      Other reaction(s): Other (See Comments)  bolivar Lasix, GI upset, rash  Other reaction(s): GI Upset    Torsemide Nausea Only, Vomiting and GI Intolerance     Pt states \"it does not work for me\".     Valsartan      Other reaction(s): Other (See Comments)  bolivar olmesartan    Fluticasone      Leg cramps           Labs:  0   Lab Value Date/Time    HCT 35.3 03/11/2024 0553    HCT 38.2 03/10/2024 0025    HCT 45.1 01/22/2024 1313    HGB 11.2 (L) 03/11/2024 0553    HGB 12.2 03/10/2024 0025    HGB 14.6 01/22/2024 1313    PT 30.3 (H) 03/28/2022 0915    INR 3.53 (H) 03/11/2024 0553    INR 2.9 03/28/2022 0915    WBC 8.40 03/11/2024 0553    WBC 9.72 03/10/2024 0025    WBC 9.76 01/22/2024 1313       Meds:    Current Facility-Administered Medications:     acetaminophen (TYLENOL) tablet 975 mg, 975 mg, Oral, Q8H [facility], Corey Hunter MD, 975 mg at 03/11/24 1432    allopurinol (ZYLOPRIM) tablet 100 mg, 100 mg, Oral, BID, Reyes Monteiro PA-C, 100 mg at 03/11/24 0811    aspirin (ECOTRIN LOW STRENGTH) EC tablet 81 mg, 81 mg, Oral, Daily, Reyes Monteiro PA-C, 81 mg at 03/11/24 0811    dapagliflozin (Farxiga) tablet 10 mg, 10 mg, Oral, Daily, Aggie Amezquita PA-C, 10 mg at 03/11/24 0811    diphenhydrAMINE (BENADRYL) tablet 12.5 mg, 12.5 mg, Oral, BID PRN, Noemi Munson PA-C, 12.5 mg at 03/11/24 1452    hydrALAZINE (APRESOLINE) injection 5 mg, 5 mg, Intravenous, Q6H PRN, Reyes Monteiro PA-C    indapamide (LOZOL) tablet 1.25 mg, 1.25 mg, Oral, Daily, Reyes Monteiro PA-C, 1.25 mg at 03/11/24 0827    insulin detemir (LEVEMIR) subcutaneous injection 13 Units, 13 Units, Subcutaneous, " "Daily, Reyes Motneiro PA-C, 13 Units at 03/11/24 0813    insulin lispro (HumALOG/ADMELOG) 100 units/mL subcutaneous injection 1-6 Units, 1-6 Units, Subcutaneous, TID AC, 1 Units at 03/11/24 1217 **AND** Fingerstick Glucose (POCT), , , TID AC, Reyes Monteiro PA-C    insulin lispro (HumALOG/ADMELOG) 100 units/mL subcutaneous injection 1-6 Units, 1-6 Units, Subcutaneous, HS, Reyes Monteiro PA-C    lidocaine (LIDODERM) 5 % patch 1 patch, 1 patch, Topical, Daily, Corey Hunter MD, 1 patch at 03/11/24 0813    metoprolol succinate (TOPROL-XL) 24 hr tablet 50 mg, 50 mg, Oral, Daily, Reyes Monteiro PA-C, 50 mg at 03/11/24 0811    morphine injection 2 mg, 2 mg, Intravenous, Q6H PRN, Noemi Munson PA-C    naloxegol oxalate (MOVANTIK) tablet 25 mg, 25 mg, Oral, Early Morning, Aggie Amezquita PA-C, 25 mg at 03/11/24 0500    oxyCODONE (ROXICODONE) immediate release tablet 10 mg, 10 mg, Oral, Q4H PRN, Corey Hunter MD, 10 mg at 03/11/24 1548    oxyCODONE (ROXICODONE) IR tablet 5 mg, 5 mg, Oral, Q4H PRN, Corey Hunter MD    pantoprazole (PROTONIX) EC tablet 40 mg, 40 mg, Oral, Early Morning, Reyes Monteiro PA-C    spironolactone (ALDACTONE) tablet 25 mg, 25 mg, Oral, Daily, Reyes Monteiro PA-C, 25 mg at 03/11/24 0827    Blood Culture:   No results found for: \"BLOODCX\"    Wound Culture:   No results found for: \"WOUNDCULT\"    Ins and Outs:  I/O last 24 hours:  In: -   Out: 850 [Urine:850]          Physical Exam:   /62 (BP Location: Right arm)   Pulse 85   Temp 98.2 °F (36.8 °C) (Temporal)   Resp 18   Ht 5' 5\" (1.651 m)   Wt 94 kg (207 lb 3.7 oz)   SpO2 100%   BMI 34.49 kg/m²   Gen: No acute distress, resting comfortably in bed  HEENT: Eyes clear, moist mucus membranes, hearing intact  Respiratory: No audible wheezing or stridor  Cardiovascular: Well Perfused peripherally, 2+ distal pulse  Abdomen: nondistended, no peritoneal signs  Musculoskeletal: left upper extremity  Skin intact, " ecchymosis and swelling noted over the shoulder  Tender to palpation over shoulder and upper arm  Sensation intact to radial, ulna, median, musculocutaneous, and axillary nerve distributions  Motor intact to  radial, ulnar, median, musculocutaneous, and axillary nerve distributions  Palpable radial and ulnar pulse  Musculature is soft and compressible, no pain with passive stretch    Radiology:   I personally reviewed the films.  Radiographs and CT scan of the left shoulder reveal a comminuted extra-articular fracture about the anatomic neck of the proximal humerus and pseudosubluxation of the glenohumeral joint.    _*_*_*_*_*_*_*_*_*_*_*_*_*_*_*_*_*_*_*_*_*_*_*_*_*_*_*_*_*_*_*_*_*_*_*_*_*_*_*_*_*    Assessment:  88 y.o. female S/P fall from standing with left proximal humerus fracture    Plan:   Nonweightbearing left upper extremity in sling  Injury will be managed  Analgesics for pain  PT/OT  NonOp management of left proximal humeral fracture  Body mass index is 34.49 kg/m². moderately obese. Recommend behavior modifications, nutrition, and physical activity.  Dispo: Ok for discharge from ortho perspective  Patient to follow-up in clinic in 2 weeks    Keisha Walker PA-C

## 2024-03-11 NOTE — ASSESSMENT & PLAN NOTE
"Patient presented to the ED following a mechanical fall at home where she tripped over the end of her bed hitting her left arm/shoulder and striking head. Pt typically ambulates using a walker at home   Trauma work-up in the ED negative besides left humeral neck fracture. The following are vrad reports, official reads pending.  CT head: \" No evidence of acute intracranial hemorrhage or infarct. Mild left forehead swelling. Mild generalized volume loss. Mild microvascular ischemic change. 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  CT cervical spine: \"No evidence of acute bony fracture or misalignment. Spinal canal and neural foraminal narrowing as described\"  CT chest/abdomen/pelvis without contrast: \"Chronic findings. No acute injury\"  CT left upper extremity: \"Left humerus neck fracture\"  Supportive care, pain control  PT/OT eval    "

## 2024-03-11 NOTE — PROGRESS NOTES
"[facility]  Progress Note  Name: Cristy Fisher I  MRN: [id number]  Unit/Bed#: E2 -01 I Date of Admission: 3/9/2024   Date of Service: 3/11/2024 I Hospital Day: 0    Assessment/Plan   * Fracture of neck of left humerus  Assessment & Plan  Pt with PMHx of osteoporosis, CKD, DM2, CHF, mechanical valve replacement and afib anticoagulated on coumadin presented to the ED after sustaining a mechanical fall at home where she fell into a chair hitting her left arm/shoulder  CT left upper extremity demonstrating a left humerus neck fracture with sling applied in ED  Pain control, supportive care  Ortho consulted non op management of left proximal humerus fracture  PT/OT pending     Fall at home, initial encounter  Assessment & Plan  Patient presented to the ED following a mechanical fall at home where she tripped over the end of her bed hitting her left arm/shoulder and striking head. Pt typically ambulates using a walker at home   Trauma work-up in the ED negative besides left humeral neck fracture. The following are vrad reports, official reads pending.  CT head: \" No evidence of acute intracranial hemorrhage or infarct. Mild left forehead swelling. Mild generalized volume loss. Mild microvascular ischemic change. 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  CT cervical spine: \"No evidence of acute bony fracture or misalignment. Spinal canal and neural foraminal narrowing as described\"  CT chest/abdomen/pelvis without contrast: \"Chronic findings. No acute injury\"  CT left upper extremity: \"Left humerus neck fracture\"  Supportive care, pain control  PT/OT eval      Chronic diastolic congestive heart failure (HCC)  Assessment & Plan  Wt Readings from Last 3 Encounters:   03/11/24 94 kg (207 lb 3.7 oz)   01/03/24 92.1 kg (203 lb)   12/29/23 99.3 kg (219 lb)     Pt appears euvolemic on exam. She does have mild bilateral pedal edema with chronic overlying venous stasis skin changes which " "she reports is baseline for her.   Last ECHO 3/2022 demonstrating \"There is mild concentric hypertrophy. Systolic function is hyperdynamic with an ejection fraction over 65%.\"  Daily weights, I/O  Continue home regimen of spironolactone and lozol            Chronic atrial fibrillation ([facility])  Assessment & Plan  S/p pacemaker  Anticoagulated on Coumadin  Continue metoprolol    Mechanical heart valve present  Assessment & Plan  Anticoagulated on coumadin  Pt reports currently taking 3.75 mg daily, hold tonight resume tomorrow  POA INR 3.53  Goal INR 2.5-3.5   Continue to monitor INR    Stage 3b chronic kidney disease (CKD) ([facility])  Assessment & Plan  Lab Results   Component Value Date    EGFR 34 03/11/2024    EGFR 30 03/10/2024    EGFR 36 01/22/2024    CREATININE 1.38 (H) 03/11/2024    CREATININE 1.51 (H) 03/10/2024    CREATININE 1.30 01/22/2024   POA creatinine 1.51, improved back to baseline  Mildly elevated from baseline creatinine of 1.3-1.4  Continue to monitor BMP    Hypertension  Assessment & Plan  Blood pressures elevated upon arrival improved with pain control   Likely secondary to pain related to humeral fracture  Optimize pain control  Hydralazine prn  Continue home regimen of metoprolol, spironolactone, and lozol    Type 2 diabetes mellitus, with long-term current use of insulin ([facility])  Assessment & Plan  Lab Results   Component Value Date    HGBA1C 6.6 (H) 09/01/2023       Recent Labs     03/10/24  1049 03/10/24  1553 03/10/24  2205 03/11/24  0627   POCGLU 152* 135 114 116       Blood Sugar Average: Last 72 hrs:  Hold Farxiga  Continue 13 units of Levemir daily  SSI with accucheks (P) 133.4             VTE Pharmacologic Prophylaxis: VTE Score: 4 Moderate Risk (Score 3-4) - Pharmacological DVT Prophylaxis Ordered: warfarin (Coumadin).    Mobility:   Basic Mobility Inpatient Raw Score: 17  JH-HLM Goal: 5: Stand one or more mins  JH-HLM Achieved: 6: Walk 10 steps or more  HLM Goal achieved. Continue to encourage " appropriate mobility.    Patient Centered Rounds: I performed bedside rounds with nursing staff today.   Discussions with Specialists or Other Care Team Provider: bong    Education and Discussions with Family / Patient: Patient declined call to .     Total Time Spent on Date of Encounter in care of patient: 30 mins. This time was spent on one or more of the following: performing physical exam; counseling and coordination of care; obtaining or reviewing history; documenting in the medical record; reviewing/ordering tests, medications or procedures; communicating with other healthcare professionals and discussing with patient's family/caregivers.    Current Length of Stay: 0 day(s)  Current Patient Status: Observation   Certification Statement: The patient will continue to require additional inpatient hospital stay due to pending pt/ot recommendations   Discharge Plan: Anticipate discharge tomorrow to discharge location to be determined pending rehab evaluations.    Code Status: Level 3 - DNAR and DNI    Subjective:   Patient was seen sitting in chair at bedside today. She reports continued pain in her  shoulder that pain regimen in helping. She reports that she had anxiety attach after being given dilaudid. She denies any other acute complains      Objective:     Vitals:   Temp (24hrs), Av.7 °F (36.5 °C), Min:97.1 °F (36.2 °C), Max:98.6 °F (37 °C)    Temp:  [97.1 °F (36.2 °C)-98.6 °F (37 °C)] 97.4 °F (36.3 °C)  HR:  [74-85] 85  Resp:  [16] 16  BP: (106-126)/(58-66) 118/62  SpO2:  [97 %-100 %] 100 %  Body mass index is 34.49 kg/m².     Input and Output Summary (last 24 hours):     Intake/Output Summary (Last 24 hours) at 3/11/2024 0944  Last data filed at 3/11/2024 0450  Gross per 24 hour   Intake --   Output 850 ml   Net -850 ml       Physical Exam:   Physical Exam  Vitals and nursing note reviewed.   Constitutional:       Comments: Bruising over left eye    HENT:      Head: Normocephalic and  atraumatic.   Eyes:      General: No scleral icterus.  Cardiovascular:      Rate and Rhythm: Normal rate and regular rhythm.      Heart sounds: Murmur heard.   Pulmonary:      Effort: Pulmonary effort is normal.      Breath sounds: Normal breath sounds.   Abdominal:      General: Abdomen is flat. Bowel sounds are normal.      Palpations: Abdomen is soft.   Musculoskeletal:      Right lower leg: Edema present.      Left lower leg: Edema present.   Skin:     General: Skin is warm and dry.      Comments: Deformity and bruising of left shoulder    Neurological:      Mental Status: She is alert. Mental status is at baseline.   Psychiatric:         Mood and Affect: Mood normal.         Behavior: Behavior normal.        Additional Data:     Labs:  Results from last 7 days   Lab Units 03/11/24  0553   WBC Thousand/uL 8.40   HEMOGLOBIN g/dL 11.2*   HEMATOCRIT % 35.3   PLATELETS Thousands/uL 202   NEUTROS PCT % 73   LYMPHS PCT % 15   MONOS PCT % 9   EOS PCT % 2     Results from last 7 days   Lab Units 03/11/24  0553 03/10/24  0025   SODIUM mmol/L 134* 138   POTASSIUM mmol/L 4.5 4.6   CHLORIDE mmol/L 98 101   CO2 mmol/L 25 29   BUN mg/dL 34* 36*   CREATININE mg/dL 1.38* 1.51*   ANION GAP mmol/L 11 8   CALCIUM mg/dL 8.9 9.2   ALBUMIN g/dL  --  4.0   TOTAL BILIRUBIN mg/dL  --  0.50   ALK PHOS U/L  --  88   ALT U/L  --  19   AST U/L  --  25   GLUCOSE RANDOM mg/dL 110 133     Results from last 7 days   Lab Units 03/11/24  0553   INR  3.53*     Results from last 7 days   Lab Units 03/11/24  0627 03/10/24  2205 03/10/24  1553 03/10/24  1049 03/10/24  0650   POC GLUCOSE mg/dl 116 114 135 152* 150*               Lines/Drains:  Invasive Devices       Peripheral Intravenous Line  Duration             Peripheral IV 03/09/24 Right Antecubital 1 day                          Imaging: No pertinent imaging reviewed.    Recent Cultures (last 7 days):         Last 24 Hours Medication List:   Current Facility-Administered Medications    Medication Dose Route Frequency Provider Last Rate    acetaminophen  975 mg Oral Q[facility]H [facility] MD      allopurinol  100 mg Oral BID Reyes Monteiro PA-C      aspirin  81 mg Oral Daily Reyes Monteiro PA-C      dapagliflozin  10 mg Oral Daily Aggie Amezquita PA-C      hydrALAZINE  5 mg Intravenous Q6H PRN Reyes Monteiro PA-C      indapamide  1.25 mg Oral Daily Reyes Monteiro PA-C      insulin detemir  13 Units Subcutaneous Daily Reyes Monteiro PA-C      insulin lispro  1-6 Units Subcutaneous TID AC Reyes Monteiro PA-C      insulin lispro  1-6 Units Subcutaneous HS Reyes Monteiro PA-C      lidocaine  1 patch Topical Daily Corey Hunter MD      magnesium sulfate  2 g Intravenous Once Noemi Munson PA-C 2 g (03/11/24 0819)    metoprolol succinate  50 mg Oral Daily Reyes Monteiro PA-C      morphine injection  2 mg Intravenous Q6H PRN Noemi Munson PA-C      naloxegol oxalate  25 mg Oral Early Morning Aggie Amezquita PA-C      oxyCODONE  10 mg Oral Q4H PRN Corey Hunter MD      oxyCODONE  5 mg Oral Q4H PRN Corey Hunter MD      pantoprazole  40 mg Oral Early Morning Reyes Monteiro PA-C      spironolactone  25 mg Oral Daily Reyes Monteiro PA-C          Today, Patient Was Seen By: Noemi Munson PA-C    **Please Note: This note may have been constructed using a voice recognition system.**

## 2024-03-11 NOTE — PLAN OF CARE
Problem: PHYSICAL THERAPY ADULT  Goal: Performs mobility at highest level of function for planned discharge setting.  See evaluation for individualized goals.  Description: Treatment/Interventions: Functional transfer training, Elevations, Therapeutic exercise, Endurance training, Patient/family training, Equipment eval/education, Bed mobility, Gait training, Compensatory technique education, Spoke to nursing, OT          See flowsheet documentation for full assessment, interventions and recommendations.  3/11/2024 1438 by Aubrey Leblanc PT  Outcome: Progressing  Note: Prognosis: Fair  Problem List: Decreased endurance, Impaired balance, Decreased mobility, Impaired judgement, Decreased safety awareness, Orthopedic restrictions, Pain, Decreased skin integrity, Obesity, Impaired sensation  Assessment: Pt is a 88 y.o. female y/o presenting to [facility] on 3/9/2024 s/p mechanical fall at home with (+) head strike. Primary dx: Fracture of surgical neck of left humerus. WBS: NWB, LUE, and arm sling. CT head (-) hemorrhage or infarct, CT c/s (-) fxs, CT LUE (+) humeral neck fx. Ortho consulted and recommended non-oeprative management. Significant pmhx per chart: osteoporosis, CKD, DM2, CHF, mechanical valve replacement, afib on coumadin with pacemaker, HTN, DJD, MCI, ambulatory dysfunction, RLS, SUZETTE, h.o compression fx lumbar vertebrae. PT consulted to assess strength/functional mobility, activity tolerance and d/c needs. Active PT orders and activity orders for Up and OOB as tolerated . PTA, pt reports functioning at amb without AD within home vs. RW for community, indep ADLs, (-) Drive, no additional falls besides most recent, previously receiving OPPT. (+) Time alone, however spouse able to assist PRN while not at work. Pt greeted standing in care of OT. During PT IE, pt presenting with above (see flowsheet) outlined functional impairments and deficits that limit functional mobility and activity  "tolerance relative to baseline. Pt currently requires supervision assistance x1 for transfers, ambulation without AD for limited household distances. Pt currently demonstrating inc fall risk 2/2 hx of falls, impulsivity, impaired balance, impaired judgement, limited sensation/neuropathy, multiple co-morbidities, varying levels of pain, and acuity of medical illness, fear of falling.  Fall risk education provided with good understanding. Nsg staff most recent vital signs as follows: /62   Pulse 85   Temp (!) 97.4 °F (36.3 °C) (Temporal)   Resp 16   Ht 5' 5\" (1.651 m)   Wt 94 kg (207 lb 3.7 oz)   SpO2 100%   BMI 34.49 kg/m² . Pt reporting fatigue with amb with (+) fear of falling. Denied additional sxs throughout session. Recommend continued mobilization of pt with nsg staff as tolerated to prevent further decline in function. Pt will benefit from continued PT services to progress mobility independence necessary for return to PLOF. Based on pt presentation and impairments, pt would most appropriately benefit from d/c to home with level III (min) rehab intensity resources , support of family/spouse , and pending progress  when medically appropriate. Pt seen for additional tx session following IE. Refer to assessment below.  Barriers to Discharge: Inaccessible home environment, Other (Comment) (refer to additional tx assessment)  Barriers to Discharge Comments: (+) LAUREL  Rehab Resource Intensity Level, PT: III (Minimum Resource Intensity) (with support of spouse)    See flowsheet documentation for full assessment.     3/11/2024 1438 by Aubrey Leblanc, PT  Note: Prognosis: Fair  Problem List: Decreased endurance, Impaired balance, Decreased mobility, Impaired judgement, Decreased safety awareness, Orthopedic restrictions, Pain, Decreased skin integrity, Obesity, Impaired sensation  Assessment: Pt is a 88 y.o. female y/o presenting to [facility] on 3/9/2024 s/p mechanical fall at home with (+) head " "strike. Primary dx: Fracture of surgical neck of left humerus. WBS: NWB, LUE, and arm sling. CT head (-) hemorrhage or infarct, CT c/s (-) fxs, CT LUE (+) humeral neck fx. Ortho consulted and recommended non-oeprative management. Significant pmhx per chart: osteoporosis, CKD, DM2, CHF, mechanical valve replacement, afib on coumadin with pacemaker, HTN, DJD, MCI, ambulatory dysfunction, RLS, SUZETTE, h.o compression fx lumbar vertebrae. PT consulted to assess strength/functional mobility, activity tolerance and d/c needs. Active PT orders and activity orders for Up and OOB as tolerated . PTA, pt reports functioning at amb without AD within home vs. RW for community, indep ADLs, (-) Drive, no additional falls besides most recent, previously receiving OPPT. (+) Time alone, however spouse able to assist PRN while not at work. Pt greeted standing in care of OT. During PT IE, pt presenting with above (see flowsheet) outlined functional impairments and deficits that limit functional mobility and activity tolerance relative to baseline. Pt currently requires supervision assistance x1 for transfers, ambulation without AD for limited household distances. Pt currently demonstrating inc fall risk 2/2 hx of falls, impulsivity, impaired balance, impaired judgement, limited sensation/neuropathy, multiple co-morbidities, varying levels of pain, and acuity of medical illness, fear of falling.  Fall risk education provided with good understanding. Nsg staff most recent vital signs as follows: /62   Pulse 85   Temp (!) 97.4 °F (36.3 °C) (Temporal)   Resp 16   Ht 5' 5\" (1.651 m)   Wt 94 kg (207 lb 3.7 oz)   SpO2 100%   BMI 34.49 kg/m² . Pt reporting fatigue with amb with (+) fear of falling. Denied additional sxs throughout session. Recommend continued mobilization of pt with nsg staff as tolerated to prevent further decline in function. Pt will benefit from continued PT services to progress mobility independence necessary for " return to PLOF. Based on pt presentation and impairments, pt would most appropriately benefit from d/c to home with level III (min) rehab intensity resources , support of family/spouse , and pending progress  when medically appropriate. Pt seen for additional tx session following IE. Refer to assessment below.  Barriers to Discharge: Inaccessible home environment, Other (Comment) (refer to additional tx assessment)  Barriers to Discharge Comments: (+) LAUREL  Rehab Resource Intensity Level, PT: III (Minimum Resource Intensity) (with support of spouse)    See flowsheet documentation for full assessment.

## 2024-03-11 NOTE — NURSING NOTE
Patient's  brought in home INR machine to check patient's INR levels this way as patient is a tough bloodwork and IV stick. This was approved and okay to use per [facility] provider Noemi Munson for AM INR levels.

## 2024-03-11 NOTE — ASSESSMENT & PLAN NOTE
Blood pressures elevated upon arrival improved with pain control   Likely secondary to pain related to humeral fracture  Optimize pain control  Hydralazine prn  Continue home regimen of metoprolol, spironolactone, and lozol

## 2024-03-11 NOTE — PHYSICAL THERAPY NOTE
PHYSICAL THERAPY EVALUATION    NAME:  Cristy Fisher  DATE: 03/11/24    AGE:   88 y.o.  Mrn:   [id number]  ADMIT DX:  Humerus fracture [S42.309A]  Skin abrasion [T14.8XXA]  RUBY (acute kidney injury) (HCC) [N17.9]  Closed head injury, initial encounter [S09.90XA]  Fall in elderly patient [R29.6]  Contusion of forehead, initial encounter [S00.83XA]    Patient Active Problem List   Diagnosis    Hypertension    Stage 3b chronic kidney disease (CKD) (HCC)    Gout    Mechanical heart valve present    Chronic atrial fibrillation (HCC)    Gastritis    Anticoagulated    Benign hypertensive CKD    Cardiomegaly    Diastolic dysfunction    DJD (degenerative joint disease), multiple sites    Esophagitis, reflux    Fibromyalgia    Fibrous breast lumps    H/O mechanical aortic valve replacement    Hyperuricemia without signs inflammatory arthritis/tophaceous disease    MCI (mild cognitive impairment)    Class 2 severe obesity due to excess calories with serious comorbidity and body mass index (BMI) of 39.0 to 39.9 in adult     Osteoarthritis    Primary osteoarthritis involving multiple joints    Proteinuria    Pulmonary artery hypertension (HCC)    RLS (restless legs syndrome)    Secondary hyperparathyroidism, renal (HCC)    Increased frequency of urination    Lump of right breast    Leg cramps    Chronic diastolic congestive heart failure (HCC)    Type 2 diabetes mellitus, with long-term current use of insulin (HCC)    Acute blood loss anemia    Ambulatory dysfunction    Hyponatremia    Adhesive capsulitis of right shoulder    Obstructive sleep apnea    Chronic prescription opiate use    Insomnia    PLMD (periodic limb movement disorder)    Cellulitis of great toe of left foot    Hyperlipidemia associated with type 2 diabetes mellitus     Hyperkalemia    Dry eye syndrome of both eyes    SUZETTE (obstructive sleep apnea)    Lumbar spondylosis    Closed compression fracture of third lumbar vertebra (HCC)    Trochanteric bursitis of  both hips    Alteration of awareness    Fracture of neck of left humerus    Fall at home, initial encounter       Past Medical History:   Diagnosis Date    Anemia     Arthritis     also osteoporosis of spine    Cancer (HCC)     endometrial    CHF (congestive heart failure) (HCC)     Chronic atrial fibrillation (HCC) 09/13/2017    Chronic gout 09/13/2017    Chronic kidney disease     Chronic rhinitis 11/08/2018    CKD (chronic kidney disease) stage 3, GFR 30-59 ml/min (HCC) 09/13/2017    Clotting disorder (HCC)     on warfarin    Colon polyp 09/13/2017    Compression fracture of L3 vertebra (HCC)     Coronary artery disease     Diabetes mellitus (HCC)     COMBS (dyspnea on exertion) 11/02/2018    Endometrial cancer (HCC)     age 60    Essential hypertension 09/13/2017    Gastritis 09/13/2017    GERD (gastroesophageal reflux disease)     Heart murmur     Hiatal hernia     Hx of hiatal hernia     Hyperlipidemia     Hypertension     Irregular heart beat     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    Osteoarthritis     Osteoporosis     Pacemaker     Peripheral vascular disease (HCC)     S/P AVR     Spinal stenosis     disk herniation     Urinary tract infection        Past Surgical History:   Procedure Laterality Date    AORTIC VALVE REPLACEMENT      BREAST EXCISIONAL BIOPSY Left 1995    benign    CARDIAC SURGERY      CATARACT EXTRACTION      CHOLECYSTECTOMY      COLONOSCOPY      COLONOSCOPY  09/2012    tubular adenoma    EGD  04/30/2021    [facility] MD.- Multiple stomach polyps, small amount of heme or coffee-grounds noted but do not feel significant evidence for bleeding.    EGD AND COLONOSCOPY  09/2017    adenomatous polyp/ benign gastric polyp    ESOPHAGOGASTRODUODENOSCOPY  08/2012    FIXATION KYPHOPLASTY      HERNIA REPAIR      x3    HYSTERECTOMY      age 61    INSERT / REPLACE / REMOVE PACEMAKER      IR PELVIC ANGIOGRAM  01/29/2019    JOINT REPLACEMENT Bilateral     knees    OOPHORECTOMY Bilateral      age 61    ID COLONOSCOPY FLX DX W/COLLJ SPEC WHEN PFRMD N/A 09/12/2017    Procedure: EGDwith bxAND COLONOSCOPY with polypectomies;  Surgeon: Andrea Ortega IV, MD;  Location: [facility];  Service: Gastroenterology    ID SIGMOIDOSCOPY FLX DX W/COLLJ SPEC BR/WA IF PFRMD N/A 09/13/2017    Procedure: colonoscopy with hemo clip x 2;  Surgeon: Andrea Ortega IV, MD;  Location: [facility];  Service: Gastroenterology    SKIN BIOPSY      TONSILLECTOMY         Imaging Studies:  XR shoulder 2+ vw left   Final Result by Juan Davis MD (03/10 2101)      Comminuted humeral head fracture with some anterior displacement of the humeral shaft relative to the glenoid on the transscapular Y view. Chronic deformity of the left clavicle.         Workstation performed: [id number]         CT head without contrast   ED Interpretation by Barrera Robles Jr., DO (03/10 8929)   Per [facility]:    FINDINGS:  Brain: There is mild microvascular ischemic change. Two small infarcts in the left cerebellum do not  appear acute. A lacunar infarct in the right cerebellum does not appear acute. No evidence of acute  intracranial hemorrhage or infarct. No evidence of acute intracranial mass effect.  Cerebral ventricles: The ventricles and sulci are prominent, consistent with mild generalized volume  loss.  Paranasal sinuses: Retention cyst or polyp in the left sphenoid sinus.  Mastoid air cells: Visualized mastoid air cells are well aerated.  Bones/joints: Unremarkable. No acute fracture.  Soft tissues: Mild left forehead swelling.      IMPRESSION:  1. No evidence of acute intracranial hemorrhage or infarct.  2. Mild left forehead swelling.  3. Mild generalized volume loss.  4. Mild microvascular ischemic change.  5. 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        Final Result by Sudheer Skinner MD (03/10 1531)      -No acute intracranial abnormality.   -Remote cerebellar infarcts and mild microangiopathic changes.   -Left frontal  scalp/periorbital hematoma. No underlying calvarial fracture.      Initial report was provided by [facility].                  Workstation performed: [id number]         CT cervical spine without contrast   ED Interpretation by Barrera Robles Jr., DO (03/10 0420)   Per [facility]:    FINDINGS:  Bones/joints: No evidence of acute bony fracture or misalignment. Severe disc space narrowing in  the cervical spine with osteophyte formation. Findings consistent with degenerative change. Moderate  bilateral facet joint arthropathy. Mild bilateral neural foraminal narrowing at C3-C4. Mild to moderate  bilateral neural foraminal narrowing at C4-C5. Mild to moderate bilateral neural foraminal narrowing at  C5-C6. Moderate right and mild left neural foraminal narrowing at C6-C7.  Lungs: Biapical scarring.  Thyroid: Bilateral thyroid nodules.  Soft tissues: Unremarkable.      IMPRESSION:  1. No evidence of acute bony fracture or misalignment.  2. Spinal canal and neural foraminal narrowing as described.      Final Result by Sudheer Skinner MD (03/10 0845)      -No cervical spine fracture or traumatic malalignment.      -Spondylosis of the cervical spine as detailed above most prominent at C5-C7.      -Multiple hypoattenuating thyroid nodules measuring up to 1.5 cm. Nonemergent thyroid ultrasound can be obtained for further evaluation if clinically indicated.      -Multiple biapical 2 to 3 mm pulmonary nodules. Based on current Fleischner Society 2017 Guidelines on incidental pulmonary nodule, no routine follow-up is needed if the patient is low risk. If the patient is high risk, optional follow-up chest CT at 12    months can be considered.         Initial report was provided by [employer]. I personally communicated the recommendations via telephone with Corey Hunter at 8:45 a.m. on 3/10/2024.                  Workstation performed: [id number]         CT upper extremity wo contrast left   ED Interpretation by Barrera Robles Jr., DO (03/10 0420)    Per [facility]:    Bones/joints: There is a comminuted fracture of the neck of the proximal left humerus with anterior  angulation at the fracture site. Old healed left clavicle fracture deformity . Thoracic spine multilevel  degenerative change. T11 anterior wedge deformity is unchanged.  Soft tissues: Unremarkable.      IMPRESSION:  Left humerus neck fracture.      Final Result by He Bowser MD (03/10 1121)   Comminuted impacted left humeral head and neck fracture with prominent anterior angulation of fracture fragments.      Virtual radiologic reporting of the left humeral fracture provided at 0420 hours on 3/10/2024      Workstation performed: [id number]         CT chest abdomen pelvis wo contrast   ED Interpretation by Barrera Robles Jr., DO (03/10 0421)   Per [facility]:    FINDINGS:  Thyroid: Thyroid gland is heterogeneous/multinodular. This appears unchanged  Lungs: No focal pulmonary contusion. No confluence infiltrate. There is mild peripheral interstitial  thickening.  Pleural spaces: Unremarkable. No pneumothorax. No pleural effusion.  Heart: Status post CABG. Pacer in place.. No cardiomegaly. No pericardial effusion.  Lymph nodes: Mildly enlarged mediastinal lymph nodes  Vasculature: Unremarkable. No aortic aneurysm.  Bones/joints: There is a comminuted fracture of the neck of the proximal left humerus with anterior  angulation at the fracture site. Old healed left clavicle fracture deformity . Thoracic spine multilevel  degenerative change. T11 anterior wedge deformity is unchanged.  Soft tissues: Unremarkable.    Liver: Liver is unremarkable. No suspicious mass. No injury.  Gallbladder and bile ducts: Gallbladder has been removed.  Pancreas: Pancreas is fatty infiltrated.  Spleen: Spleen appears normal. No injury.  Adrenal glands: N   ormal. No mass.  Kidneys and ureters: Kidneys are normal without hydronephrosis . No injury. There are small renal  cysts.  Stomach and bowel: Unremarkable. No obstruction. No  mucosal thickening.  Appendix: No evidence of appendicitis.  Intraperitoneal space: Unremarkable. No free air. No significant fluid collection.  Vasculature: Unremarkable. No abdominal aortic aneurysm.  Lymph nodes: Unremarkable. No enlarged lymph nodes.  Urinary bladder: Unremarkable, as visualized.  Reproductive: Unremarkable as visualized.  Bones/joints: Multilevel degenerative change. Previous vertebroplasty of L2. Endplate irregularities  are unchanged. Wedge deformity of T11 is unchanged.. No new vertebral fracture.  Soft tissues: Anterior abdominal wall postoperative changes.      IMPRESSION:  Chronic findings. No acute injury      Final Result by Master Melissa MD (03/10 1118)      Acute proximal left humeral fracture.               Workstation performed: [id number]             Past Medical History:   Diagnosis Date    Anemia     Arthritis     also osteoporosis of spine    Cancer (HCC)     endometrial    CHF (congestive heart failure) ([facility])     Chronic atrial fibrillation (HCC) 09/13/2017    Chronic gout 09/13/2017    Chronic kidney disease     Chronic rhinitis 11/08/2018    CKD (chronic kidney disease) stage 3, GFR 30-59 ml/min ([facility]) 09/13/2017    Clotting disorder (HCC)     on warfarin    Colon polyp 09/13/2017    Compression fracture of L3 vertebra (HCC)     Coronary artery disease     Diabetes mellitus (HCC)     COMBS (dyspnea on exertion) 11/02/2018    Endometrial cancer (HCC)     age 60    Essential hypertension 09/13/2017    Gastritis 09/13/2017    GERD (gastroesophageal reflux disease)     Heart murmur     Hiatal hernia     Hx of hiatal hernia     Hyperlipidemia     Hypertension     Irregular heart beat     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    Osteoarthritis     Osteoporosis     Pacemaker     Peripheral vascular disease (HCC)     S/P AVR     Spinal stenosis     disk herniation     Urinary tract infection      Length Of Stay: 0  Performed at least 2 patient identifiers during session: Name and  Birthday  PHYSICAL THERAPY EVALUATION :        03/11/24 1209   PT Last Visit   PT Visit Date 03/11/24   Note Type   Note type Evaluation  (and treatment)   Pain Assessment   Pain Assessment Tool 0-10   Pain Score 10 - Worst Possible Pain   Pain Location/Orientation Orientation: Left;Location: Shoulder   Hospital Pain Intervention(s) Ambulation/increased activity;Emotional support;Repositioned;Rest   Restrictions/Precautions   Weight Bearing Precautions Per Order No  (Per ortho note by Dr. Xavier, NWB LUE in sling.)   LUE Weight Bearing Per Order NWB   Braces or Orthoses Sling  (lue)   Other Precautions Chair Alarm;Bed Alarm;WBS;Fall Risk;Pain   Home Living   Type of Home House   Home Layout Two level;1/2 bath on main level;Bed/bath upstairs;Stairs to enter without rails  (1STE front with L rail, 2STE from garage L rail. FOS with)   Bathroom Shower/Tub Tub/shower unit   Bathroom Toilet Standard   Bathroom Equipment Grab bars in shower;Tub transfer bench   Home Equipment Walker;Cane   Additional Comments Pt lives with spouse who is able to assist while not at work (works 3 days per week). Granddaughter may be able to support. (+) Time alone. Able to sleep in recliner upon d/c.   Prior Function   Level of [place] Independent with ADLs;Independent with functional mobility   Lives With Spouse   Receives Help From Family;Outpatient therapy  (Receiving OPPT prior to fall.)   IADLs Family/Friend/Other provides transportation   Falls in the last 6 months 1 to 4  (x1  (reason for hospital visit))   Comments PTA, pt reports functioning at amb without AD within home vs. RW for community, indep ADLs, (-) Drive, no additional falls besides most recent, previously receiving OPPT.   General   Additional Pertinent History [facility] on 3/9/2024 s/p mechanical fall at home with (+) head strike. Primary dx: Fracture of surgical neck of left humerus. WBS: NWB, LUE, and arm sling. CT head (-) hemorrhage or infarct, CT  "c/s (-) fxs, CT LUE (+) humeral neck fx. Ortho consulted and recommended non-oeprative management. Significant pmhx per chart: osteoporosis, CKD, DM2, CHF, mechanical valve replacement, afib on coumadin with pacemaker, HTN, DJD, MCI, ambulatory dysfunction, RLS, SUZETTE, h.o compression fx lumbar vertebrae.   Family/Caregiver Present No   Cognition   Overall Cognitive Status WFL   Arousal/Participation Alert   Orientation Level Oriented X4   Following Commands Follows one step commands with increased time or repetition   Comments Pt reporting (+) fear of falling. Limited acceptance of education with max encouragement   Subjective   Subjective \"I don't feel safe with the cane.\"   RUE Assessment   RUE Assessment   (Refer to OT)   LUE Assessment   LUE Assessment X  (Refer to OT--NWB in sling)   RLE Assessment   RLE Assessment WFL  (4/5 major muscle groups except hip flx 4-/5. Reports neuropathies distal to mid shin b/l LEs.)   LLE Assessment   LLE Assessment WFL  (4/5 major muscle groups except hip flx 4-/5. Bandage to L shin. Reports neuropathies distal to mid shin b/l LEs.)   Light Touch   RLE Light Touch Impaired   RLE Light Touch Comments reports neuropathies distal to mid shin   LLE Light Touch Impaired   LLE Light Touch Comments reports neuropathies distal to mid shin   Bed Mobility   Additional Comments Unable to assess   Transfers   Sit to Stand 5  Supervision   Additional items Assist x 1;Increased time required;Verbal cues;Armrests   Stand to Sit 5  Supervision   Additional items Increased time required;Verbal cues   Additional Comments Pt greeted standing in care of OT. Cued for safe technique/hand placement. Excessive hip flx/trunk flx   Ambulation/Elevation   Gait pattern Improper Weight shift;Antalgic;Wide MALAIKA;Forward Flexion;Inconsistent vianca;Short stride;Step through pattern;Decreased foot clearance   Gait Assistance 5  Supervision   Additional items Assist x 1;Verbal cues   Assistive Device None "   Distance 45ft x1 without AD.   Ambulation/Elevation Additional Comments (+) flxed posture. semi-steady step through patterning without gross LOB. (+) c/o LUE pain (+) fatigue   Balance   Static Sitting Fair +   Dynamic Sitting Fair   Static Standing Fair   Dynamic Standing Fair -   Ambulatory Fair -   Endurance Deficit   Endurance Deficit Yes   Endurance Deficit Description fatigue with limited ambulation   Activity Tolerance   Activity Tolerance Patient tolerated treatment well   Medical Staff Made Aware OT Noemi, LORRI Noemi via TT   Nurse Made Aware RN cleared/updated   Assessment   Prognosis Fair   Problem List Decreased endurance;Impaired balance;Decreased mobility;Impaired judgement;Decreased safety awareness;Orthopedic restrictions;Pain;Decreased skin integrity;Obesity;Impaired sensation   Assessment Pt is a 88 y.o. female y/o [facility] s/p mechanical fall at home with (+) head strike. Primary dx: Fracture of surgical neck of left humerus. WBS: NWB, LUE, and arm sling. CT head (-) hemorrhage or infarct, CT c/s (-) fxs, CT LUE (+) humeral neck fx. Ortho consulted and recommended non-oeprative management. Significant pmhx per chart: osteoporosis, CKD, DM2, CHF, mechanical valve replacement, afib on coumadin with pacemaker, HTN, DJD, MCI, ambulatory dysfunction, RLS, SUZETTE, h.o compression fx lumbar vertebrae. PT consulted to assess strength/functional mobility, activity tolerance and d/c needs. Active PT orders and activity orders for Up and OOB as tolerated . PTA, pt reports functioning at amb without AD within home vs. RW for community, indep ADLs, (-) Drive, no additional falls besides most recent, previously receiving OPPT. (+) Time alone, however spouse able to assist PRN while not at work. Pt greeted standing in care of OT. During PT IE, pt presenting with above (see flowsheet) outlined functional impairments and deficits that limit functional mobility and activity  "tolerance relative to baseline. Pt currently requires supervision assistance x1 for transfers, ambulation without AD for limited household distances. Pt currently demonstrating inc fall risk 2/2 hx of falls, impulsivity, impaired balance, impaired judgement, limited sensation/neuropathy, multiple co-morbidities, varying levels of pain, and acuity of medical illness, fear of falling.  Fall risk education provided with good understanding. Nsg staff most recent vital signs as follows: /62   Pulse 85   Temp (!) 97.4 °F (36.3 °C) (Temporal)   Resp 16   Ht 5' 5\" (1.651 m)   Wt 94 kg (207 lb 3.7 oz)   SpO2 100%   BMI 34.49 kg/m² . Pt reporting fatigue with amb with (+) fear of falling. Denied additional sxs throughout session. Recommend continued mobilization of pt with ns staff as tolerated to prevent further decline in function. Pt will benefit from continued PT services to progress mobility independence necessary for return to PLOF. Based on pt presentation and impairments, pt would most appropriately benefit from d/c to home with level III (min) rehab intensity resources , support of family/spouse , and pending progress  when medically appropriate. Pt seen for additional tx session following IE. Refer to assessment below.   Barriers to Discharge Inaccessible home environment;Other (Comment)  (refer to additional tx assessment)   Barriers to Discharge Comments (+) LAUREL   Goals   Patient Goals \"Go home\"   STG Expiration Date 03/25/24   Short Term Goal #1 ).  Pt will perform bed mobility with Esperanza demonstrating appropriate technique 100% of the time in order to improve function. 2)  Perform all transfers with Esperanza demonstrating safe and appropriate technique 100% of the time in order to improve ability to negotiate safely in home environment.3) Amb with least restrictive AD > 200'x1 with mod I in order to demonstrate ability to negotiate in home environment.4)  Improve overall strength and balance 1/2 grade " in order to optimize ability to perform functional tasks and reduce fall risk.5) Increase activity tolerance to 45 minutes in order to improve endurance to functional tasks. 6)  Negotiate >/= 2 stairs using most appropriate technique and mod I in order to be able to negotiate safely in home environment. 7) PT for ongoing patient and family/caregiver education, DME needs and d/c planning in order to promote highest level of function in least restrictive environment.   PT Treatment Day 0   Plan   Treatment/Interventions Functional transfer training;Elevations;Therapeutic exercise;Endurance training;Patient/family training;Equipment eval/education;Bed mobility;Gait training;Compensatory technique education;Spoke to nursing;OT   PT Frequency 2-3x/wk   Discharge Recommendation   Rehab Resource Intensity Level, PT III (Minimum Resource Intensity)  (with support of spouse)   Additional Comments The patient's [facility] Basic Mobility Inpatient Short Form Raw Score is 18. A Raw score of greater than 16 suggests the patient may benefit from discharge to home. Please also refer to the recommendation of the Physical Therapist for safe discharge planning.   Additional Comments 2 (S)  Restorative and non-PT staff for daily amb while in hospital   AM-PAC Basic Mobility Inpatient   Turning in Flat Bed Without Bedrails 3   Lying on Back to Sitting on Edge of Flat Bed Without Bedrails 3   Moving Bed to Chair 3   Standing Up From Chair Using Arms 3   Walk in Room 3   Climb 3-5 Stairs With Railing 3   Basic Mobility Inpatient Raw Score 18   Basic Mobility Standardized Score 41.05   Highest Level Of Mobility   JH-HLM Goal 6: Walk 10 steps or more   JH-HLM Achieved 7: Walk 25 feet or more   Additional Treatment Session   Start Time 1158   End Time 1209   Treatment Assessment Following IE, additional tx session performed with focus on functional transfers, gait training with SPC vs, No AD, stair negotiation, and pt education on  fall risk,  "assistance with stair negotiation, use of AD for increased stability per PT POC. Pt with fair tolerance to session with limitations of fatigue and dec endurance and activity tolerance. HR up to 105 during ambulation with (+) COMBS.  Pt demonstrating progress towards functional goals with ability to complete stairs at supervision level using unilateral rail and with no rail using SPC, however pt expressing she felt unsafe/unsteady using SPC. Skilled cues for technique/safety througout session. Education provided on use of AD for stability, however, pt declined additional use. Pt left in care of OT at end of session. Please refer to flowsheet for objective data above. Pt most limited 2/2 inability to use LUE for functional mobility, fatigue with inc ambulatory distances and fear of falling, however cont to demonstrate deficits relative to PLOF and would benefit from continued skilled PT services per POC to improve indep and safety with mobility. Limited receptiveness to pt education. PT d/c recommendations: Anticipate d/c to home with level III (min) rehab intensity resources  at d/c when medically appropriate with support of spouse. [facility] 18.   Equipment Use Amb 10ft x1 with SPC with cues for safe technique/sequencing > pt reported feeling \"unsafe\" and declined additional amb with SPC. Amb 45ft x1 without AD, semi-steady step through patterning with gait deviations without gross LOB. (+) COMBS and fatigue with HR up to 105, SpO2 98% RA. Inc trunk flx with fatigue. Seated rest break. Performed 2 stairs with R rail at supervision. Attempted with SPC and supervision and pt able to complete 1 stair with cues for sequencing, however reporting feeling unsafe/unsteady and requested to use rail. No physical assist required to complete. 2 seated rest breaks required during session 2/2 fatigue. Cued for safe technique/sequencing. Amb 20ft x1 without AD. Multiple transfers completed at supervision level from variable surface. Edu " on activity pacing.    Additional Treatment Day 1   End of Consult   Patient Position at End of Consult Seated edge of bed;All needs within reach;Other (comment)  (Left in care of OT.)       Pt requires PT/OT co-eval/treat due to medical complexity, safety concerns, fall risk, significant assistance with mobility.    (Please find full objective findings from PT assessment regarding body systems outlined above).     Hx/personal factors: co-morbidities, inaccessible home, dec caregiver support, advanced age, use of AD, pain, WB restrictions, fall risk, h/o of falls, and obesity, fear of falling  Examination: dec mobility, dec balance, dec endurance, dec amb, risk for falls, pain, WB restrictions, impairements in locomotion, musculoskeletal, balance, endurance, posture, coordination, assessed/impairments of systems including multiple body structures involved; musculoskeletal (ROM UE, strength, posture, BMI, pain), neuromuscular (balance, gait, transfers, motor control and learning, sensation), joint integrity, integumentary (skin integrity, presence of scars or wounds, post-surgical incision), cardiopulmonary (vitals, edema), assessed cognition; activity limitations (difficulties executing an action); participation restrictions (problems associated w involvement in life situations), am-pac score suggesting inc supervision/assistance vs baseline,  Clinical: unpredictable (ongoing medical status, risk for falls, pain mgt, WBS, fear/retreat, and bed/chair alarm)  Complexity: high          Aubrey Leblanc, PT,DPT   03/11/24

## 2024-03-11 NOTE — PLAN OF CARE
Problem: OCCUPATIONAL THERAPY ADULT  Goal: Performs self-care activities at highest level of function for planned discharge setting.  See evaluation for individualized goals.  Description: Treatment Interventions: ADL retraining, Functional transfer training, UE strengthening/ROM, Endurance training, Patient/family training, Equipment evaluation/education, Neuromuscular reeducation, Compensatory technique education, Continued evaluation, Energy conservation, Activityengagement  Equipment Recommended: Other (comment) (Use of SPC - pt owns.)       See flowsheet documentation for full assessment, interventions and recommendations.   Note: Limitation: Decreased ADL status, Decreased UE ROM, Decreased UE strength, Decreased Safe judgement during ADL, Decreased endurance, Decreased self-care trans, Decreased high-level ADLs, Non-func L UE  Prognosis: Good  Assessment: Patient is a 88 y.o. year old female seen for OT eval s/p admit to [facility] on 3/9/2024 with fx of neck of L humerus s/p fall at home - per Dr. Xavier, pt to be NWB to L UE in sling, nonop mngment. OT consulted to assess ADLs/IADLs/functional mobility and assist w/ D/C planning. Patient demonstrates the following deficits impacting occupational performance: weakness, decreased UE ROM, decreased strength , decreased balance, decreased activity tolerance, limited functional reach, decreased safety awareness, increased pain, orthopedic restrictions, increased body habitus , impaired coordination, and decreased skin integrity . These impairments, as well at pt’s LAUREL home environment, steps within home environment, difficulty performing ADLs, difficulty performing IADLs, difficulty performing transfers/mobility, limited insight into deficits, fall risk , functional decline , and advanced age, limit pt’s ability to safely engage in all baseline areas of occupation. Pt's CLOF as follows: eating/grooming: supervision , UB ADLs: modA, LB ADLs: Lilly, toileting: Lilly,  bed mobility: supervision , functional transfers: supervision , functional mobility: supervision , sitting/standing tolerance: ~6 min. Pt would benefit from continued skilled OT while in acute setting to address deficits as defined above and to maximize (I) w/ ADLs/functional mobility. Occupational performance areas to address include: grooming, bathing/shower, toilet hygiene, dressing, medication management, health maintenance, functional mobility, cleaning, meal prep, and household maintenance. Based on the aforementioned evaluation, functional performance deficits, and assessments, pt has been identified as a high complexity evaluation. At this time, recommendation for pt to receive post-acute rehabilitation services at a Level III (minimum resource intensity) due to above deficits and CLOF. OT will continue to follow pt 2-5x/wk to address the goals listed below to  w/in 10-14 days.     Rehab Resource Intensity Level, OT: III (Minimum Resource Intensity)

## 2024-03-11 NOTE — OCCUPATIONAL THERAPY NOTE
Occupational Therapy Evaluation     Patient Name: Cristy Fisher  Today's Date: 3/11/2024  Problem List  Principal Problem:    Fracture of neck of left humerus  Active Problems:    Hypertension    Stage 3b chronic kidney disease (CKD) (HCC)    Mechanical heart valve present    Chronic atrial fibrillation (HCC)    Chronic diastolic congestive heart failure (HCC)    Type 2 diabetes mellitus, with long-term current use of insulin (HCC)    Fall at home, initial encounter    Past Medical History  Past Medical History:   Diagnosis Date    Anemia     Arthritis     also osteoporosis of spine    Cancer (HCC)     endometrial    CHF (congestive heart failure) (HCC)     Chronic atrial fibrillation (HCC) 09/13/2017    Chronic gout 09/13/2017    Chronic kidney disease     Chronic rhinitis 11/08/2018    CKD (chronic kidney disease) stage 3, GFR 30-59 ml/min (HCC) 09/13/2017    Clotting disorder (HCC)     on warfarin    Colon polyp 09/13/2017    Compression fracture of L3 vertebra (HCC)     Coronary artery disease     Diabetes mellitus (HCC)     COMBS (dyspnea on exertion) 11/02/2018    Endometrial cancer (HCC)     age 60    Essential hypertension 09/13/2017    Gastritis 09/13/2017    GERD (gastroesophageal reflux disease)     Heart murmur     Hiatal hernia     Hx of hiatal hernia     Hyperlipidemia     Hypertension     Irregular heart beat     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    Osteoarthritis     Osteoporosis     Pacemaker     Peripheral vascular disease ([facility])     S/P AVR     Spinal stenosis     disk herniation     Urinary tract infection      Past Surgical History  Past Surgical History:   Procedure Laterality Date    AORTIC VALVE REPLACEMENT      BREAST EXCISIONAL BIOPSY Left 1995    benign    CARDIAC SURGERY      CATARACT EXTRACTION      CHOLECYSTECTOMY      COLONOSCOPY      COLONOSCOPY  09/2012    tubular adenoma    EGD  04/30/2021    [facility] Ortega MD.- Multiple stomach polyps, small amount of heme or  coffee-grounds noted but do not feel significant evidence for bleeding.    EGD AND COLONOSCOPY  09/2017    adenomatous polyp/ benign gastric polyp    ESOPHAGOGASTRODUODENOSCOPY  08/2012    FIXATION KYPHOPLASTY      HERNIA REPAIR      x3    HYSTERECTOMY      age 61    INSERT / REPLACE / REMOVE PACEMAKER      IR PELVIC ANGIOGRAM  01/29/2019    JOINT REPLACEMENT Bilateral     knees    OOPHORECTOMY Bilateral     age 61    [place] COLONOSCOPY FLX DX W/COLLJ SPEC WHEN PFRMD N/A 09/12/2017    Procedure: EGDwith bxAND COLONOSCOPY with polypectomies;  Surgeon: Andrea Ortega IV, MD;  Location: [facility];  Service: Gastroenterology    NY SIGMOIDOSCOPY FLX DX W/COLLJ SPEC BR/WA IF PFRMD N/A 09/13/2017    Procedure: colonoscopy with hemo clip x 2;  Surgeon: Andrea Ortega IV, MD;  Location: [facility];  Service: Gastroenterology    SKIN BIOPSY      TONSILLECTOMY           03/11/24 1130   OT Last Visit   OT Visit Date 03/11/24   Note Type   Note type Evaluation  (treatment)   Pain Assessment   Pain Assessment Tool 0-10   Pain Score 10 - Worst Possible Pain   Pain Location/Orientation Orientation: Left;Location: [facility] Pain Intervention(s) Repositioned;Ambulation/increased activity;Emotional support;Rest;Other (Comment)  (provided pt w more comfortable sling.)   Restrictions/Precautions   LUE Weight Bearing Per Order NWB  (Per ortho note by Dr. Xavier, NWB LUE in sling.)   Braces or Orthoses Sling   Other Precautions Chair Alarm;Bed Alarm;WBS;Fall Risk;Pain   Home Living   Type of Home House   Home Layout Two level;1/2 bath on main level;Bed/bath upstairs;Stairs to enter without rails   Bathroom Shower/Tub Tub/shower unit   Bathroom Toilet Standard   Bathroom Equipment Grab bars in shower;Tub transfer bench   Home Equipment Walker;Cane   Prior Function   Level of [place] Independent with ADLs;Independent with functional mobility   Lives With Spouse   Receives Help From Family;Outpatient therapy  (Receiving OPPT prior  "to fall.)   IADLs Independent with meal prep;Independent with medication management   Falls in the last 6 months 1 to 4   Vocational Retired   Lifestyle   Autonomy PTA, was (I) with ADLs and was (I) with IADLs. Patient lives in a 2 SH w 1st fl s/u available; 1/2 bath on main, able to sleep on recliner. No AD use in home, RW use in community. 1 fall. (-) . Lives w spouse whom works 3 days/wk. Granddaughter possibly available to assist. Tub/shower w tub bench and grab bar on second floor, raised toilets.   Reciprocal Relationships spouse, granddaughter   Service to Others caretaker   Intrinsic Gratification watching tv   General   Additional Pertinent History Comorbidities affecting pt’s functional performance include a significant PMH of: HTN, CKD3, mechanical heart valve present, a-fib, CHF, DM2, DJD, fibromyalgia, MCI, OA, RLS, ambulatory dysfunction, lumbar spondylosis, closed compression fx of L3  Patient with active OT orders and activity orders for Up and OOB as tolerated .   Family/Caregiver Present No   Subjective   Subjective \"I can't believe this happened to me\" \"this sling is much better than the other one\"   ADL   Where Assessed Chair   Eating Assistance 5  Supervision/Setup   Grooming Assistance 5  Supervision/Setup   UB Bathing Assistance 3  Moderate Assistance   LB Bathing Assistance 4  Minimal Assistance   UB Dressing Assistance 3  Moderate Assistance   LB Dressing Assistance 4  Minimal Assistance   Toileting Assistance  4  Minimal Assistance   Transfers   Sit to Stand 5  Supervision   Additional items Assist x 1;Increased time required;Verbal cues;Armrests   Stand to Sit 5  Supervision   Additional items Increased time required;Verbal cues   Car transfer 5  Supervision  (simulated w w/c)   Additional items Increased time required;Verbal cues   Functional Mobility   Functional Mobility 5  Supervision   Additional Comments no AD use SBA, pt appears more steady w use of SPC but states she feels " better without AD.   Balance   Static Sitting Fair +   Dynamic Sitting Fair   Static Standing Fair   Dynamic Standing Fair -   Ambulatory Fair -   Activity Tolerance   Activity Tolerance Patient tolerated treatment well;Patient limited by pain   Medical Staff Made Aware Aubrey PT   Nurse Made Aware Yes   RUE Assessment   RUE Assessment WFL   LUE Assessment   LUE Assessment X   Hand Function   Gross Motor Coordination Functional   Fine Motor Coordination Functional   Vision-Basic Assessment   Current Vision Wears glasses all the time   Vision - Complex Assessment   Ocular Range of Motion Intact   Acuity Able to read employee name badge without difficulty   Perception   Inattention/Neglect Appears intact   Cognition   Overall Cognitive Status WFL   Arousal/Participation Alert;Responsive;Cooperative   Attention Within functional limits   Orientation Level Oriented X4   Memory Within functional limits   Following Commands Follows one step commands without difficulty   Comments decreased insight into deficits, somewhat impaired safety awareness. Resistent to use of AD.   Assessment   Limitation Decreased ADL status;Decreased UE ROM;Decreased UE strength;Decreased Safe judgement during ADL;Decreased endurance;Decreased self-care trans;Decreased high-level ADLs;Non-func L UE   Prognosis Good   Assessment Patient is a 88 y.o. year old female seen for OT eval s/p admit to [facility] on 3/9/2024 with fx of neck of L humerus s/p fall at home - per Dr. Xavier, pt to be NWB to L UE in sling, nonop mngment. OT consulted to assess ADLs/IADLs/functional mobility and assist w/ D/C planning. Patient demonstrates the following deficits impacting occupational performance: weakness, decreased UE ROM, decreased strength , decreased balance, decreased activity tolerance, limited functional reach, decreased safety awareness, increased pain, orthopedic restrictions, increased body habitus , impaired coordination, and decreased skin integrity  . These impairments, as well at pt’s LAUREL home environment, steps within home environment, difficulty performing ADLs, difficulty performing IADLs, difficulty performing transfers/mobility, limited insight into deficits, fall risk , functional decline , and advanced age, limit pt’s ability to safely engage in all baseline areas of occupation. Pt's CLOF as follows: eating/grooming: supervision , UB ADLs: modA, LB ADLs: Lilly, toileting: Lilly, bed mobility: supervision , functional transfers: supervision , functional mobility: supervision , sitting/standing tolerance: ~6 min. Pt would benefit from continued skilled OT while in acute setting to address deficits as defined above and to maximize (I) w/ ADLs/functional mobility. Occupational performance areas to address include: grooming, bathing/shower, toilet hygiene, dressing, medication management, health maintenance, functional mobility, cleaning, meal prep, and household maintenance. Based on the aforementioned evaluation, functional performance deficits, and assessments, pt has been identified as a high complexity evaluation. At this time, recommendation for pt to receive post-acute rehabilitation services at a Level III (minimum resource intensity) due to above deficits and CLOF. OT will continue to follow pt 2-5x/wk to address the goals listed below to  w/in 10-14 days.   Goals   Patient Goals return home, less pain   LTG Time Frame 10-14   Plan   Treatment Interventions ADL retraining;Functional transfer training;UE strengthening/ROM;Endurance training;Patient/family training;Equipment evaluation/education;Neuromuscular reeducation;Compensatory technique education;Continued evaluation;Energy conservation;Activityengagement   Goal Expiration Date 24   OT Treatment Day 0   OT Frequency 2-3x/wk;3-5x/wk   Discharge Recommendation   Rehab Resource Intensity Level, OT III (Minimum Resource Intensity)   Equipment Recommended Other (comment)  (Use of SPC - pt  owns.)   AM-PAC Daily Activity Inpatient   Lower Body Dressing 3   Bathing 2   Toileting 3   Upper Body Dressing 2   Grooming 4   Eating 4   Daily Activity Raw Score 18   Daily Activity Standardized Score (Calc for Raw Score >=11) 38.66   AM-PAC Applied Cognition Inpatient   Following a Speech/Presentation 4   Understanding Ordinary Conversation 4   Taking Medications 3   Remembering Where Things Are Placed or Put Away 3   Remembering List of 4-5 Errands 3   Taking Care of Complicated Tasks 3   Applied Cognition Raw Score 20   Applied Cognition Standardized Score 41.76   Additional Treatment Session   Start Time 1150   End Time 1213   Treatment Assessment Pt seen for additional tx session w focus on ADL training, applying appropriate sling w education, education on safe transfer/mobility techniques. Pt complete multiple STS transfers from various surfaces w S. Functional mobility w/o AD - pt requires SBA. Did trial SPC w pt reporting she prefers no AD due to needing to hold her L sling in place. Did obtain a new sling for pt w a strap over her shoulder and one behind her back to feel more supprt and then encouraged pt to mobilize w SPC w pt continuing to decline. Educated pt on UB dressing technique (dressing L UE first) w khang and pt verbalized understanding. She reports her family will assist at home w this. Given pt's functional status and assistance at home, will rec level III resource intensity. Left pt seated in chair, alarm armed and needs met.   Additional Treatment Day 1     Occupational Therapy goals: In 7-14 days:     1- Patient will verbalize and demonstrate use of energy conservation/deep breathing technique and work simplification skills during functional activity with no verbal cues.   2- Patient will verbalize and demonstrate good body mechanics and joint protection techniques during ADLs/IADLs with no verbal cues   3- Pt will complete bed mobility at a Mod I level w/ G balance/safety demonstrated to  decrease caregiver assistance required   4- Patient will increase OOB/ sitting tolerance to 2-4 hours per day for increased participation in self care and leisure tasks with no s/s of exertion.   5-Patient will increase standing tolerance time to 5 minutes with unilateral UE support to complete sink level ADLs@ mod I level    6- Pt will improve functional transfers to Mod I on/off all surfaces using DME as needed w/ G balance/safety   7- Pt will demonstrate good understanding of one handed UB dressing technique w Lilly  8- Patient will complete LB ADLs with [place] utilizing appropriate DME/AE PRN   9- Patient will complete toileting tasks with [place] with G hygiene/thoroughness utilizing appropriate DME/AE PRN   10- Pt will improve functional mobility during ADL/IADL/leisure tasks to Mod I using DME as needed w/ G balance/safety    11- Pt will be attentive 100% of the time during ongoing cognitive assessment w/ G participation to assist w/ safe d/c planning/recommendations   12- Pt will participate in simulated IADL management task to increase independence to Mod I w/ G safety and endurance      KADE Segura/L

## 2024-03-11 NOTE — ASSESSMENT & PLAN NOTE
Pt with PMHx of osteoporosis, CKD, DM2, CHF, mechanical valve replacement and afib anticoagulated on coumadin presented to the ED after sustaining a mechanical fall at home where she fell into a chair hitting her left arm/shoulder  CT left upper extremity demonstrating a left humerus neck fracture with sling applied in ED  Pain control, supportive care  Ortho consulted non op management of left proximal humerus fracture  PT/OT pending

## 2024-03-11 NOTE — PLAN OF CARE
Problem: Potential for Falls  Goal: Patient will remain free of falls  Description: INTERVENTIONS:  - Educate patient/family on patient safety including physical limitations  - Instruct patient to call for assistance with activity   - Consult OT/PT to assist with strengthening/mobility   - Keep Call bell within reach  - Keep bed low and locked with side rails adjusted as appropriate  - Keep care items and personal belongings within reach  - Initiate and maintain comfort rounds  - Make Fall Risk Sign visible to staff  - Offer Toileting every 2 Hours, in advance of need  - Initiate/Maintain chair alarm  - Obtain necessary fall risk management equipment: chair alarm, call bell, gripper socks, assistive device as recommended by PT/OT, LUE in sling  - Apply yellow socks and bracelet for high fall risk patients  - Consider moving patient to room near nurses station  Outcome: Progressing     Problem: PAIN - ADULT  Goal: Verbalizes/displays adequate comfort level or baseline comfort level  Description: Interventions:  - Encourage patient to monitor pain and request assistance  - Assess pain using appropriate pain scale  - Administer analgesics based on type and severity of pain and evaluate response  - Implement non-pharmacological measures as appropriate and evaluate response  - Consider cultural and social influences on pain and pain management  - Notify physician/advanced practitioner if interventions unsuccessful or patient reports new pain  Outcome: Progressing     Problem: SAFETY ADULT  Goal: Patient will remain free of falls  Description: INTERVENTIONS:  - Educate patient/family on patient safety including physical limitations  - Instruct patient to call for assistance with activity   - Consult OT/PT to assist with strengthening/mobility   - Keep Call bell within reach  - Keep bed low and locked with side rails adjusted as appropriate  - Keep care items and personal belongings within reach  - Initiate and maintain  comfort rounds  - Make Fall Risk Sign visible to staff  - Offer Toileting every 2 Hours, in advance of need  - Initiate/Maintain chair alarm  - Obtain necessary fall risk management equipment: chair alarm, call bell, gripper socks, assistive device as recommended by PT/OT, LUE in sling  - Apply yellow socks and bracelet for high fall risk patients  - Consider moving patient to room near nurses station  Outcome: Progressing  Goal: Maintain or return to baseline ADL function  Description: INTERVENTIONS:  - Educate patient/family on patient safety including physical limitations  - Instruct patient to call for assistance with activity   - Consult OT/PT to assist with strengthening/mobility   - Keep Call bell within reach  - Keep bed low and locked with side rails adjusted as appropriate  - Keep care items and personal belongings within reach  - Initiate and maintain comfort rounds  - Make Fall Risk Sign visible to staff  - Offer Toileting every 2 Hours, in advance of need  - Initiate/Maintain chair alarm  - Obtain necessary fall risk management equipment: chair alarm, call bell, gripper socks, assistive device as recommended by PT/OT  - Apply yellow socks and bracelet for high fall risk patients  - Consider moving patient to room near nurses station  Outcome: Progressing  Goal: Maintains/Returns to pre admission functional level  Description: INTERVENTIONS:  - Perform AM-PAC 6 Click Basic Mobility/ Daily Activity assessment daily.  - Set and communicate daily mobility goal to care team and patient/family/caregiver.   - Collaborate with rehabilitation services on mobility goals if consulted  - Perform Range of Motion 3 times a day.  - Reposition patient every 2 hours.  - Dangle patient 3 times a day  - Stand patient 3 times a day  - Ambulate patient 3 times a day  - Out of bed to chair 3 times a day   - Out of bed for meals 3 times a day  - Out of bed for toileting  - Record patient progress and toleration of activity  level   Outcome: Progressing     Problem: DISCHARGE PLANNING  Goal: Discharge to home or other facility with appropriate resources  Description: INTERVENTIONS:  - Identify barriers to discharge w/patient and caregiver  - Arrange for needed discharge resources and transportation as appropriate  - Identify discharge learning needs (meds, wound care, etc.)  - Refer to Case Management Department for coordinating discharge planning if the patient needs post-hospital services based on physician/advanced practitioner order or complex needs related to functional status, cognitive ability, or social support system  Outcome: Progressing     Problem: NEUROSENSORY - ADULT  Goal: Achieves stable or improved neurological status  Description: INTERVENTIONS  - Monitor and report changes in neurological status  - Monitor vital signs such as temperature, blood pressure, glucose, and any other labs ordered   - Initiate measures to prevent increased intracranial pressure  - Monitor for seizure activity and implement precautions if appropriate      Outcome: Progressing  Goal: Achieves maximal functionality and self care  Description: INTERVENTIONS  - Monitor swallowing and airway patency with patient fatigue and changes in neurological status  - Encourage and assist patient to increase activity and self care.   - Encourage visually impaired, hearing impaired and aphasic patients to use assistive/communication devices  Outcome: Progressing     Problem: METABOLIC, FLUID AND ELECTROLYTES - ADULT  Goal: Glucose maintained within target range  Description: INTERVENTIONS:  - Monitor Blood Glucose as ordered  - Assess for signs and symptoms of hyperglycemia and hypoglycemia  - Administer ordered medications to maintain glucose within target range  - Assess nutritional intake and initiate nutrition service referral as needed  Outcome: Progressing     Problem: SKIN/TISSUE INTEGRITY - ADULT  Goal: Incision(s), wounds(s) or drain site(s) healing  without S/S of infection  Description: INTERVENTIONS  - Assess and document dressing, incision, wound bed, drain sites and surrounding tissue  - Provide patient and family education  - Perform skin care/dressing changes as ordered  Outcome: Progressing     Problem: MUSCULOSKELETAL - ADULT  Goal: Maintain or return mobility to safest level of function  Description: INTERVENTIONS:  - Assess patient's ability to carry out ADLs; assess patient's baseline for ADL function and identify physical deficits which impact ability to perform ADLs (bathing, care of mouth/teeth, toileting, grooming, dressing, etc.)  - Assess/evaluate cause of self-care deficits   - Assess range of motion  - Assess patient's mobility  - Assess patient's need for assistive devices and provide as appropriate  - Encourage maximum independence but intervene and supervise when necessary  - Involve family in performance of ADLs  - Assess for home care needs following discharge   - Consider OT consult to assist with ADL evaluation and planning for discharge  - Provide patient education as appropriate  Outcome: Progressing  Goal: Maintain proper alignment of affected body part  Description: INTERVENTIONS:  - Support, maintain and protect limb and body alignment  - Provide patient/ family with appropriate education  Outcome: Progressing     Problem: COPING  Goal: Pt/Family able to verbalize concerns and demonstrate effective coping strategies  Description: INTERVENTIONS:  - Assist patient/family to identify coping skills, available support systems and cultural and spiritual values  - Provide emotional support, including active listening and acknowledgement of concerns of patient and caregivers  - Reduce environmental stimuli, as able  - Provide patient education  - Assess for spiritual pain/suffering and initiate spiritual care, including notification of Pastoral Care or rudi based community as needed  - Assess effectiveness of coping strategies  Outcome:  Progressing

## 2024-03-11 NOTE — ASSESSMENT & PLAN NOTE
Lab Results   Component Value Date    EGFR 34 03/11/2024    EGFR 30 03/10/2024    EGFR 36 01/22/2024    CREATININE 1.38 (H) 03/11/2024    CREATININE 1.51 (H) 03/10/2024    CREATININE 1.30 01/22/2024   POA creatinine 1.51, improved back to baseline  Mildly elevated from baseline creatinine of 1.3-1.4  Continue to monitor BMP

## 2024-03-11 NOTE — CASE MANAGEMENT
Case Management Discharge Planning Note    Patient name Cristy Fisher  [facility] -* MRN [id number]  : 1935 Date 3/11/2024       Current Admission Date: 3/9/2024  Current Admission Diagnosis:Fracture of neck of left humerus   Patient Active Problem List    Diagnosis Date Noted    Fracture of neck of left humerus 03/10/2024    Fall at home, initial encounter 03/10/2024    Alteration of awareness 2023    Lumbar spondylosis 2023    Closed compression fracture of third lumbar vertebra (HCC) 2023    Trochanteric bursitis of both hips 2023    SUZETTE (obstructive sleep apnea) 2023    Dry eye syndrome of both eyes 2023    Hyperkalemia 2022    Hyperlipidemia associated with type 2 diabetes mellitus  2022    Cellulitis of great toe of left foot 2021    PLMD (periodic limb movement disorder)     Obstructive sleep apnea 2019    Chronic prescription opiate use 2019    Insomnia 2019    Adhesive capsulitis of right shoulder 2019    Hyponatremia 2019    Ambulatory dysfunction 2019    Acute blood loss anemia 2019    Type 2 diabetes mellitus, with long-term current use of insulin (HCC) 2019    Chronic diastolic congestive heart failure (HCC) 2019    Leg cramps 2018    Lump of right breast 2018    Benign hypertensive CKD 2018    Diastolic dysfunction 2018    DJD (degenerative joint disease), multiple sites 2018    Fibromyalgia 2018    Fibrous breast lumps 2018    Hyperuricemia without signs inflammatory arthritis/tophaceous disease 2018    Class 2 severe obesity due to excess calories with serious comorbidity and body mass index (BMI) of 39.0 to 39.9 in adult  2018    Proteinuria 2018    Pulmonary artery hypertension (HCC) 2018    Secondary hyperparathyroidism, renal (HCC) 2018    Increased frequency of urination 2018     Anticoagulated 09/15/2017    Hypertension 09/13/2017    Stage 3b chronic kidney disease (CKD) (HCC) 09/13/2017    Gout 09/13/2017    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    Chronic atrial fibrillation (HCC) 09/13/2017    Gastritis 09/13/2017    H/O mechanical aortic valve replacement 12/21/2016    Primary osteoarthritis involving multiple joints 09/22/2015    RLS (restless legs syndrome) 09/22/2015    MCI (mild cognitive impairment) 08/27/2014    Esophagitis, reflux 11/15/2013    Cardiomegaly 11/11/2013    Osteoarthritis 06/26/2013      LOS (days): 0  Geometric Mean LOS (GMLOS) (days):   Days to GMLOS:     OBJECTIVE:            Current admission status: Inpatient   Preferred Pharmacy:   [id number] - [place], [place] - [address] [address]  Phone: [phone] Fax: [phone]    Primary Care Provider: Reed Bach Jr., MD    Primary Insurance: MEDICARE  Secondary Insurance: BLUE CROSS    DISCHARGE DETAILS:    Discharge planning discussed with:: Patient  Freedom of Choice: Yes     CM contacted family/caregiver?: No- see comments (Pt to notify family)  Were Treatment Team discharge recommendations reviewed with patient/caregiver?: Yes  Did patient/caregiver verbalize understanding of patient care needs?: Yes  Were patient/caregiver advised of the risks associated with not following Treatment Team discharge recommendations?: Yes         Requested Home Health Care         Is the patient interested in HHC at discharge?: Yes  Home Health Discipline requested:: Occupational Therapy, Physical Therapy  Home Health Agency Name:: [facility]  Home Health Follow-Up Provider:: PCP  Home Health Services Needed:: Evaluate Functional Status and Safety, Gait/ADL Training, Strengthening/Theraputic Exercises to Improve Function  Homebound Criteria Met:: Uses an Assist Device (i.e. cane, walker, etc), Requires the Assistance of Another Person for Safe Ambulation or to Leave the  Home  Supporting Clincal Findings:: Limited Endurance, Fatigues Easliy in Short Distances      Other Referral/Resources/Interventions Provided:  Interventions: [facility]      Treatment Team Recommendation: Home with Home Health Care  Discharge Destination Plan:: Home with Home Health Care     Additional Comments: PT/OT recommended Level III. Pt agreeable to [facility] (PT/OT). Pt requested [facility].  Referral made.  Reserved in [facility].

## 2024-03-11 NOTE — PLAN OF CARE
Problem: Potential for Falls  Goal: Patient will remain free of falls  Description: INTERVENTIONS:  - Educate patient/family on patient safety including physical limitations  - Instruct patient to call for assistance with activity   - Consult OT/PT to assist with strengthening/mobility   - Keep Call bell within reach  - Keep bed low and locked with side rails adjusted as appropriate  - Keep care items and personal belongings within reach  - Initiate and maintain comfort rounds  - Make Fall Risk Sign visible to staff  - Offer Toileting every 2 Hours, in advance of need  - Initiate/Maintain chair alarm  - Obtain necessary fall risk management equipment: chair alarm, call bell, gripper socks, assistive device as recommended by PT/OT, LUE in sling  - Apply yellow socks and bracelet for high fall risk patients  - Consider moving patient to room near nurses station  Outcome: Progressing     Problem: SAFETY ADULT  Goal: Patient will remain free of falls  Description: INTERVENTIONS:  - Educate patient/family on patient safety including physical limitations  - Instruct patient to call for assistance with activity   - Consult OT/PT to assist with strengthening/mobility   - Keep Call bell within reach  - Keep bed low and locked with side rails adjusted as appropriate  - Keep care items and personal belongings within reach  - Initiate and maintain comfort rounds  - Make Fall Risk Sign visible to staff  - Offer Toileting every 2 Hours, in advance of need  - Initiate/Maintain chair alarm  - Obtain necessary fall risk management equipment: chair alarm, call bell, gripper socks, assistive device as recommended by PT/OT, LUE in sling  - Apply yellow socks and bracelet for high fall risk patients  - Consider moving patient to room near nurses station  Outcome: Progressing  Goal: Maintain or return to baseline ADL function  Description: INTERVENTIONS:  - Educate patient/family on patient safety including physical limitations  -  Instruct patient to call for assistance with activity   - Consult OT/PT to assist with strengthening/mobility   - Keep Call bell within reach  - Keep bed low and locked with side rails adjusted as appropriate  - Keep care items and personal belongings within reach  - Initiate and maintain comfort rounds  - Make Fall Risk Sign visible to staff  - Offer Toileting every 2 Hours, in advance of need  - Initiate/Maintain chair alarm  - Obtain necessary fall risk management equipment: chair alarm, call bell, gripper socks, assistive device as recommended by PT/OT  - Apply yellow socks and bracelet for high fall risk patients  - Consider moving patient to room near nurses station  Outcome: Progressing     Problem: DISCHARGE PLANNING  Goal: Discharge to home or other facility with appropriate resources  Description: INTERVENTIONS:  - Identify barriers to discharge w/patient and caregiver  - Arrange for needed discharge resources and transportation as appropriate  - Identify discharge learning needs (meds, wound care, etc.)  - Refer to Case Management Department for coordinating discharge planning if the patient needs post-hospital services based on physician/advanced practitioner order or complex needs related to functional status, cognitive ability, or social support system  Outcome: Progressing     Problem: NEUROSENSORY - ADULT  Goal: Achieves stable or improved neurological status  Description: INTERVENTIONS  - Monitor and report changes in neurological status  - Monitor vital signs such as temperature, blood pressure, glucose, and any other labs ordered   - Initiate measures to prevent increased intracranial pressure  - Monitor for seizure activity and implement precautions if appropriate      Outcome: Progressing     Problem: METABOLIC, FLUID AND ELECTROLYTES - ADULT  Goal: Glucose maintained within target range  Description: INTERVENTIONS:  - Monitor Blood Glucose as ordered  - Assess for signs and symptoms of  hyperglycemia and hypoglycemia  - Administer ordered medications to maintain glucose within target range  - Assess nutritional intake and initiate nutrition service referral as needed  Outcome: Progressing     Problem: SKIN/TISSUE INTEGRITY - ADULT  Goal: Incision(s), wounds(s) or drain site(s) healing without S/S of infection  Description: INTERVENTIONS  - Assess and document dressing, incision, wound bed, drain sites and surrounding tissue  - Provide patient and family education  - Perform skin care/dressing changes as ordered  Outcome: Progressing     Problem: MUSCULOSKELETAL - ADULT  Goal: Maintain proper alignment of affected body part  Description: INTERVENTIONS:  - Support, maintain and protect limb and body alignment  - Provide patient/ family with appropriate education  Outcome: Progressing     Problem: COPING  Goal: Pt/Family able to verbalize concerns and demonstrate effective coping strategies  Description: INTERVENTIONS:  - Assist patient/family to identify coping skills, available support systems and cultural and spiritual values  - Provide emotional support, including active listening and acknowledgement of concerns of patient and caregivers  - Reduce environmental stimuli, as able  - Provide patient education  - Assess for spiritual pain/suffering and initiate spiritual care, including notification of Pastoral Care or rudi based community as needed  - Assess effectiveness of coping strategies  Outcome: Progressing

## 2024-03-11 NOTE — ASSESSMENT & PLAN NOTE
"Wt Readings from Last 3 Encounters:   03/11/24 94 kg (207 lb 3.7 oz)   01/03/24 92.1 kg (203 lb)   12/29/23 99.3 kg (219 lb)     Pt appears euvolemic on exam. She does have mild bilateral pedal edema with chronic overlying venous stasis skin changes which she reports is baseline for her.   Last ECHO 3/2022 demonstrating \"There is mild concentric hypertrophy. Systolic function is hyperdynamic with an ejection fraction over 65%.\"  Daily weights, I/O  Continue home regimen of spironolactone and lozol          "

## 2024-03-12 VITALS
BODY MASS INDEX: 34.53 KG/M2 | HEART RATE: 85 BPM | DIASTOLIC BLOOD PRESSURE: 60 MMHG | SYSTOLIC BLOOD PRESSURE: 124 MMHG | HEIGHT: 65 IN | WEIGHT: 207.23 LBS | OXYGEN SATURATION: 95 % | RESPIRATION RATE: 18 BRPM | TEMPERATURE: 97 F

## 2024-03-12 PROBLEM — M80.00XA AGE-RELATED OSTEOPOROSIS WITH CURRENT PATHOLOGICAL FRACTURE: Status: ACTIVE | Noted: 2024-03-12

## 2024-03-12 LAB
GLUCOSE SERPL-MCNC: 123 MG/DL (ref 65–140)
GLUCOSE SERPL-MCNC: 137 MG/DL (ref 65–140)

## 2024-03-12 PROCEDURE — 99239 HOSP IP/OBS DSCHRG MGMT >30: CPT | Performed by: INTERNAL MEDICINE

## 2024-03-12 PROCEDURE — 82948 REAGENT STRIP/BLOOD GLUCOSE: CPT

## 2024-03-12 RX ORDER — OXYCODONE HYDROCHLORIDE 5 MG/1
5 TABLET ORAL EVERY 4 HOURS PRN
Qty: 30 TABLET | Refills: 0 | Status: SHIPPED | OUTPATIENT
Start: 2024-03-12 | End: 2024-03-22

## 2024-03-12 RX ORDER — LIDOCAINE 50 MG/G
1 PATCH TOPICAL DAILY
Qty: 30 PATCH | Refills: 0 | Status: SHIPPED | OUTPATIENT
Start: 2024-03-13

## 2024-03-12 RX ORDER — ONDANSETRON 2 MG/ML
4 INJECTION INTRAMUSCULAR; INTRAVENOUS EVERY 6 HOURS PRN
Status: DISCONTINUED | OUTPATIENT
Start: 2024-03-12 | End: 2024-03-12 | Stop reason: HOSPADM

## 2024-03-12 RX ORDER — ACETAMINOPHEN 325 MG/1
975 TABLET ORAL EVERY 8 HOURS SCHEDULED
Start: 2024-03-12 | End: 2024-03-26

## 2024-03-12 RX ADMIN — ALLOPURINOL 100 MG: 100 TABLET ORAL at 08:20

## 2024-03-12 RX ADMIN — INDAPAMIDE 1.25 MG: 1.25 TABLET, FILM COATED ORAL at 10:38

## 2024-03-12 RX ADMIN — LIDOCAINE 5% 1 PATCH: 700 PATCH TOPICAL at 08:21

## 2024-03-12 RX ADMIN — ONDANSETRON 4 MG: 2 INJECTION INTRAMUSCULAR; INTRAVENOUS at 10:04

## 2024-03-12 RX ADMIN — PANTOPRAZOLE SODIUM 40 MG: 40 TABLET, DELAYED RELEASE ORAL at 05:03

## 2024-03-12 RX ADMIN — DAPAGLIFLOZIN 10 MG: 10 TABLET, FILM COATED ORAL at 10:38

## 2024-03-12 RX ADMIN — ASPIRIN 81 MG: 81 TABLET, COATED ORAL at 10:38

## 2024-03-12 RX ADMIN — INSULIN DETEMIR 13 UNITS: 100 INJECTION, SOLUTION SUBCUTANEOUS at 08:20

## 2024-03-12 RX ADMIN — OXYCODONE HYDROCHLORIDE 10 MG: 10 TABLET ORAL at 10:38

## 2024-03-12 RX ADMIN — OXYCODONE HYDROCHLORIDE 10 MG: 10 TABLET ORAL at 00:00

## 2024-03-12 RX ADMIN — OXYCODONE HYDROCHLORIDE 10 MG: 10 TABLET ORAL at 04:58

## 2024-03-12 RX ADMIN — SPIRONOLACTONE 25 MG: 25 TABLET, FILM COATED ORAL at 10:38

## 2024-03-12 RX ADMIN — ACETAMINOPHEN 325MG 975 MG: 325 TABLET ORAL at 05:03

## 2024-03-12 RX ADMIN — METOPROLOL SUCCINATE 50 MG: 50 TABLET, EXTENDED RELEASE ORAL at 08:20

## 2024-03-12 NOTE — PLAN OF CARE
Problem: Potential for Falls  Goal: Patient will remain free of falls  Description: INTERVENTIONS:  - Educate patient/family on patient safety including physical limitations  - Instruct patient to call for assistance with activity   - Consult OT/PT to assist with strengthening/mobility   - Keep Call bell within reach  - Keep bed low and locked with side rails adjusted as appropriate  - Keep care items and personal belongings within reach  - Initiate and maintain comfort rounds  - Make Fall Risk Sign visible to staff  - Offer Toileting every 2 Hours, in advance of need  - Initiate/Maintain chair alarm  - Obtain necessary fall risk management equipment: chair alarm, call bell, gripper socks, assistive device as recommended by PT/OT, LUE in sling  - Apply yellow socks and bracelet for high fall risk patients  - Consider moving patient to room near nurses station  Outcome: Progressing     Problem: PAIN - ADULT  Goal: Verbalizes/displays adequate comfort level or baseline comfort level  Description: Interventions:  - Encourage patient to monitor pain and request assistance  - Assess pain using appropriate pain scale  - Administer analgesics based on type and severity of pain and evaluate response  - Implement non-pharmacological measures as appropriate and evaluate response  - Consider cultural and social influences on pain and pain management  - Notify physician/advanced practitioner if interventions unsuccessful or patient reports new pain  Outcome: Progressing     Problem: SAFETY ADULT  Goal: Patient will remain free of falls  Description: INTERVENTIONS:  - Educate patient/family on patient safety including physical limitations  - Instruct patient to call for assistance with activity   - Consult OT/PT to assist with strengthening/mobility   - Keep Call bell within reach  - Keep bed low and locked with side rails adjusted as appropriate  - Keep care items and personal belongings within reach  - Initiate and maintain  comfort rounds  - Make Fall Risk Sign visible to staff  - Offer Toileting every 2 Hours, in advance of need  - Initiate/Maintain chair alarm  - Obtain necessary fall risk management equipment: chair alarm, call bell, gripper socks, assistive device as recommended by PT/OT, LUE in sling  - Apply yellow socks and bracelet for high fall risk patients  - Consider moving patient to room near nurses station  Outcome: Progressing  Goal: Maintain or return to baseline ADL function  Description: INTERVENTIONS:  - Educate patient/family on patient safety including physical limitations  - Instruct patient to call for assistance with activity   - Consult OT/PT to assist with strengthening/mobility   - Keep Call bell within reach  - Keep bed low and locked with side rails adjusted as appropriate  - Keep care items and personal belongings within reach  - Initiate and maintain comfort rounds  - Make Fall Risk Sign visible to staff  - Offer Toileting every 2 Hours, in advance of need  - Initiate/Maintain chair alarm  - Obtain necessary fall risk management equipment: chair alarm, call bell, gripper socks, assistive device as recommended by PT/OT  - Apply yellow socks and bracelet for high fall risk patients  - Consider moving patient to room near nurses station  Outcome: Progressing  Goal: Maintains/Returns to pre admission functional level  Description: INTERVENTIONS:  - Perform AM-PAC 6 Click Basic Mobility/ Daily Activity assessment daily.  - Set and communicate daily mobility goal to care team and patient/family/caregiver.   - Collaborate with rehabilitation services on mobility goals if consulted  - Perform Range of Motion 3 times a day.  - Reposition patient every 2 hours.  - Dangle patient 3 times a day  - Stand patient 3 times a day  - Ambulate patient 3 times a day  - Out of bed to chair 3 times a day   - Out of bed for meals 3 times a day  - Out of bed for toileting  - Record patient progress and toleration of activity  level   Outcome: Progressing     Problem: DISCHARGE PLANNING  Goal: Discharge to home or other facility with appropriate resources  Description: INTERVENTIONS:  - Identify barriers to discharge w/patient and caregiver  - Arrange for needed discharge resources and transportation as appropriate  - Identify discharge learning needs (meds, wound care, etc.)  - Refer to Case Management Department for coordinating discharge planning if the patient needs post-hospital services based on physician/advanced practitioner order or complex needs related to functional status, cognitive ability, or social support system  Outcome: Progressing     Problem: NEUROSENSORY - ADULT  Goal: Achieves stable or improved neurological status  Description: INTERVENTIONS  - Monitor and report changes in neurological status  - Monitor vital signs such as temperature, blood pressure, glucose, and any other labs ordered   - Initiate measures to prevent increased intracranial pressure  - Monitor for seizure activity and implement precautions if appropriate      Outcome: Progressing  Goal: Achieves maximal functionality and self care  Description: INTERVENTIONS  - Monitor swallowing and airway patency with patient fatigue and changes in neurological status  - Encourage and assist patient to increase activity and self care.   - Encourage visually impaired, hearing impaired and aphasic patients to use assistive/communication devices  Outcome: Progressing     Problem: METABOLIC, FLUID AND ELECTROLYTES - ADULT  Goal: Glucose maintained within target range  Description: INTERVENTIONS:  - Monitor Blood Glucose as ordered  - Assess for signs and symptoms of hyperglycemia and hypoglycemia  - Administer ordered medications to maintain glucose within target range  - Assess nutritional intake and initiate nutrition service referral as needed  Outcome: Progressing     Problem: SKIN/TISSUE INTEGRITY - ADULT  Goal: Incision(s), wounds(s) or drain site(s) healing  without S/S of infection  Description: INTERVENTIONS  - Assess and document dressing, incision, wound bed, drain sites and surrounding tissue  - Provide patient and family education  - Perform skin care/dressing changes as ordered  Outcome: Progressing     Problem: MUSCULOSKELETAL - ADULT  Goal: Maintain or return mobility to safest level of function  Description: INTERVENTIONS:  - Assess patient's ability to carry out ADLs; assess patient's baseline for ADL function and identify physical deficits which impact ability to perform ADLs (bathing, care of mouth/teeth, toileting, grooming, dressing, etc.)  - Assess/evaluate cause of self-care deficits   - Assess range of motion  - Assess patient's mobility  - Assess patient's need for assistive devices and provide as appropriate  - Encourage maximum independence but intervene and supervise when necessary  - Involve family in performance of ADLs  - Assess for home care needs following discharge   - Consider OT consult to assist with ADL evaluation and planning for discharge  - Provide patient education as appropriate  Outcome: Progressing  Goal: Maintain proper alignment of affected body part  Description: INTERVENTIONS:  - Support, maintain and protect limb and body alignment  - Provide patient/ family with appropriate education  Outcome: Progressing     Problem: COPING  Goal: Pt/Family able to verbalize concerns and demonstrate effective coping strategies  Description: INTERVENTIONS:  - Assist patient/family to identify coping skills, available support systems and cultural and spiritual values  - Provide emotional support, including active listening and acknowledgement of concerns of patient and caregivers  - Reduce environmental stimuli, as able  - Provide patient education  - Assess for spiritual pain/suffering and initiate spiritual care, including notification of Pastoral Care or rudi based community as needed  - Assess effectiveness of coping strategies  Outcome:  Progressing     Problem: Nutrition/Hydration-ADULT  Goal: Nutrient/Hydration intake appropriate for improving, restoring or maintaining nutritional needs  Description: Monitor and assess patient's nutrition/hydration status for malnutrition. Collaborate with interdisciplinary team and initiate plan and interventions as ordered.  Monitor patient's weight and dietary intake as ordered or per policy. Utilize nutrition screening tool and intervene as necessary. Determine patient's food preferences and provide high-protein, high-caloric foods as appropriate.     INTERVENTIONS:  - Monitor oral intake, urinary output, labs, and treatment plans  - Assess nutrition and hydration status and recommend course of action  - Evaluate amount of meals eaten  - Assist patient with eating if necessary   - Allow adequate time for meals  - Recommend/ encourage appropriate diets, oral nutritional supplements, and vitamin/mineral supplements  - Order, calculate, and assess calorie counts as needed  - Recommend, monitor, and adjust tube feedings and TPN/PPN based on assessed needs  - Assess need for intravenous fluids  - Provide specific nutrition/hydration education as appropriate  - Include patient/family/caregiver in decisions related to nutrition  Outcome: Progressing

## 2024-03-12 NOTE — DISCHARGE SUMMARY
"[facility]  Discharge- Cristy Fisher 1935, 88 y.o. female MRN: [id number]  Unit/Bed#: E2 -01 Encounter: [id number]  Primary Care Provider: Reed Bach Jr., MD   Date and time admitted to hospital: 3/9/2024 11:43 PM    Age-related osteoporosis with current pathological fracture  Assessment & Plan  Fractured left humerus d/t fall and Osteoporosis, as evidenced by DXA scan 1/18/23 stating, The forearm has bone mineral density of osteoporosis with measurements that show high risk for fracture, treated with Calcium/Vit D, Prolia, PT/OT, sling, and pain control.     Fall at home, initial encounter  Assessment & Plan  Patient presented to the ED following a mechanical fall at home where she tripped over the end of her bed hitting her left arm/shoulder and striking head. Pt typically ambulates using a walker at home   Trauma work-up in the ED negative besides left humeral neck fracture. The following are vrad reports, official reads pending.  CT head: \" No evidence of acute intracranial hemorrhage or infarct. Mild left forehead swelling. Mild generalized volume loss. Mild microvascular ischemic change. 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  CT cervical spine: \"No evidence of acute bony fracture or misalignment. Spinal canal and neural foraminal narrowing as described\"  CT chest/abdomen/pelvis without contrast: \"Chronic findings. No acute injury\"  CT left upper extremity: \"Left humerus neck fracture\"  Supportive care, pain control  PT/OT eval appreciated      Type 2 diabetes mellitus, with long-term current use of insulin (HCC)  Assessment & Plan  Lab Results   Component Value Date    HGBA1C 6.6 (H) 09/01/2023       Recent Labs     03/11/24  1135 03/11/24  1617 03/11/24  2112 03/12/24  0624   POCGLU 168* 145* 146* 123         Blood Sugar Average: Last 72 hrs:  Hold Farxiga  Continue 13 units of Levemir daily  SSI with accucheks (P) [id number]      Chronic " "diastolic congestive heart failure (HCC)  Assessment & Plan  Wt Readings from Last 3 Encounters:   03/11/24 94 kg (207 lb 3.7 oz)   01/03/24 92.1 kg (203 lb)   12/29/23 99.3 kg (219 lb)     Pt appears euvolemic on exam. She does have mild bilateral pedal edema with chronic overlying venous stasis skin changes which she reports is baseline for her.   Last ECHO 3/2022 demonstrating \"There is mild concentric hypertrophy. Systolic function is hyperdynamic with an ejection fraction over 65%.\"  Daily weights, I/O  Continue home regimen of spironolactone and lozol            Chronic atrial fibrillation ([facility])  Assessment & Plan  S/p pacemaker  Anticoagulated on Coumadin  Continue metoprolol    Mechanical heart valve present  Assessment & Plan  Anticoagulated on coumadin  Pt reports currently taking 3.75 mg daily, hold tonight resume tomorrow  POA INR 3.53  Goal INR 2.5-3.5   Continue to monitor INR    Stage 3b chronic kidney disease (CKD) ([facility])  Assessment & Plan  Lab Results   Component Value Date    EGFR 34 03/11/2024    EGFR 30 03/10/2024    EGFR 36 01/22/2024    CREATININE 1.38 (H) 03/11/2024    CREATININE 1.51 (H) 03/10/2024    CREATININE 1.30 01/22/2024   POA creatinine 1.51, improved back to baseline  Mildly elevated from baseline creatinine of 1.3-1.4  Continue to monitor BMP    Hypertension  Assessment & Plan  Blood pressures elevated upon arrival improved with pain control   Likely secondary to pain related to humeral fracture  Optimize pain control  Hydralazine prn  Continue home regimen of metoprolol, spironolactone, and lozol    * Fracture of neck of left humerus  Assessment & Plan  Pt with PMHx of osteoporosis, CKD, DM2, CHF, mechanical valve replacement and afib anticoagulated on coumadin presented to the ED after sustaining a mechanical fall at home where she fell into a chair hitting her left arm/shoulder  CT left upper extremity demonstrating a left humerus neck fracture with sling applied in ED  Pain " "control, supportive care  Ortho consulted non op management of left proximal humerus fracture  PT/OT recommending level 3, home health/home PT/OT              Medical Problems       Resolved Problems  Date Reviewed: 3/11/2024   None         Admission Date:   Admission Orders (From admission, onward)       Ordered        03/11/24 1350  Inpatient Admission  Once            03/10/24 0435  Place in Observation  Once                            Admitting Diagnosis: Humerus fracture [S42.309A]  Skin abrasion [T14.8XXA]  RUBY (acute kidney injury) ([facility]) [N17.9]  Closed head injury, initial encounter [S09.90XA]  Fall in elderly patient [R29.6]  Contusion of forehead, initial encounter [S00.83XA]    HPI: \"Cristy Fisher is a 88 y.o. female who presents with uncontrolled pain after sustaining a left humeral neck fracture in a mechanical fall at home.  Patient reports that earlier this evening she was organizing her nightstand before bed and was walking around her bed when she tripped over it landing into the arm of a nearby chair.  She reports that she hit her left shoulder and arm in the mechanical fall.  She also hit her head on the left side with associated left forehead hematoma and eye bruising.  Patient denies any loss of consciousness in the fall.  She is anticoagulated on Coumadin for mechanical valve and A-fib.  She denies any headache or other neurologic symptoms.  Patient's left shoulder/arm pain has remained uncontrolled.  Patient received 8 mg of morphine via EMS and 1.5 mg of Dilaudid in the ED and continues to have pain.  She reports that her pain currently is a 7-8 while at rest however whenever she moves her arm slightly the pain escalates to a 10 out of 10.  Patient's arm is currently in a sling which was placed in the ED.  Patient reports that she typically uses a walker to ambulate at home.  Patient also has a shallow skin tear to her left shin.  She has some mild pedal edema and overlying chronic venous " "stasis skin changes.  Patient reports that her pedal edema is at baseline for her and reports compliance with diuretic therapy at home which she currently take spironolactone and lozol.  She denies any other injury or complaints at this time.\"    Procedures Performed: No orders of the defined types were placed in this encounter.      Summary of Hospital Course: Admitted after mechanical fall and sustaining head strike with resulting left orbital ecchymosis as well as left humeral neck fracture.  Was evaluated by orthopedics they report no urgent surgical intervention needed.  Otherwise trauma workup negative with CT head, CT cervical spine, and CT chest abdomen pelvis with no acute abnormalities.  Patient reported pain was well-controlled and she would continue treatment with sling and nonweightbearing at home.  Pain medications as well as Lidoderm patch sent to her pharmacy.  Otherwise continue current home medications; INR therapeutic at 3.53 yesterday.  No signs of bleeding and patient encouraged to follow up with primary care; referral to orthopedic physician of her choice made.  Home health on discharge.    Significant Findings, Care, Treatment and Services Provided: Mechanical fall; fracture of humeral head left shoulder    Complications: None    Condition at Discharge: stable         Discharge instructions/Information to patient and family:   See after visit summary for information provided to patient and family.      Provisions for Follow-Up Care:  See after visit summary for information related to follow-up care and any pertinent home health orders.      PCP: Reed Bach Jr., MD    Disposition: Home    Planned Readmission: No    Discharge Statement   I spent 45 minutes discharging the patient. This time was spent on the day of discharge. I had direct contact with the patient on the day of discharge. Additional documentation is required if more than 30 minutes were spent on discharge.     Discharge " Medications:  See after visit summary for reconciled discharge medications provided to patient and family.

## 2024-03-12 NOTE — INCIDENTAL FINDINGS
"The following findings require follow up:  Radiographic finding   Finding: \"Multiple biapical 2 to 3 mm pulmonary nodules. Based on current Fleischner Society 2017 Guidelines on incidental pulmonary nodule, no routine follow-up is needed if the patient is low risk. If the patient is high risk, optional follow-up chest CT at 12 , months can be considered.\"   Follow up required: CT Chest   Follow up should be done within 12 month(s)    Please notify the following clinician to assist with the follow up:   Primary care provider  "

## 2024-03-12 NOTE — PLAN OF CARE
Problem: Potential for Falls  Goal: Patient will remain free of falls  Description: INTERVENTIONS:  - Educate patient/family on patient safety including physical limitations  - Instruct patient to call for assistance with activity   - Consult OT/PT to assist with strengthening/mobility   - Keep Call bell within reach  - Keep bed low and locked with side rails adjusted as appropriate  - Keep care items and personal belongings within reach  - Initiate and maintain comfort rounds  - Make Fall Risk Sign visible to staff  - Offer Toileting every 2 Hours, in advance of need  - Initiate/Maintain chair alarm  - Obtain necessary fall risk management equipment: chair alarm, call bell, gripper socks, assistive device as recommended by PT/OT, LUE in sling  - Apply yellow socks and bracelet for high fall risk patients  - Consider moving patient to room near nurses station  Outcome: Progressing     Problem: PAIN - ADULT  Goal: Verbalizes/displays adequate comfort level or baseline comfort level  Description: Interventions:  - Encourage patient to monitor pain and request assistance  - Assess pain using appropriate pain scale  - Administer analgesics based on type and severity of pain and evaluate response  - Implement non-pharmacological measures as appropriate and evaluate response  - Consider cultural and social influences on pain and pain management  - Notify physician/advanced practitioner if interventions unsuccessful or patient reports new pain  Outcome: Progressing     Problem: SAFETY ADULT  Goal: Patient will remain free of falls  Description: INTERVENTIONS:  - Educate patient/family on patient safety including physical limitations  - Instruct patient to call for assistance with activity   - Consult OT/PT to assist with strengthening/mobility   - Keep Call bell within reach  - Keep bed low and locked with side rails adjusted as appropriate  - Keep care items and personal belongings within reach  - Initiate and maintain  comfort rounds  - Make Fall Risk Sign visible to staff  - Offer Toileting every 2 Hours, in advance of need  - Initiate/Maintain chair alarm  - Obtain necessary fall risk management equipment: chair alarm, call bell, gripper socks, assistive device as recommended by PT/OT, LUE in sling  - Apply yellow socks and bracelet for high fall risk patients  - Consider moving patient to room near nurses station  Outcome: Progressing  Goal: Maintain or return to baseline ADL function  Description: INTERVENTIONS:  - Educate patient/family on patient safety including physical limitations  - Instruct patient to call for assistance with activity   - Consult OT/PT to assist with strengthening/mobility   - Keep Call bell within reach  - Keep bed low and locked with side rails adjusted as appropriate  - Keep care items and personal belongings within reach  - Initiate and maintain comfort rounds  - Make Fall Risk Sign visible to staff  - Offer Toileting every 2 Hours, in advance of need  - Initiate/Maintain chair alarm  - Obtain necessary fall risk management equipment: chair alarm, call bell, gripper socks, assistive device as recommended by PT/OT  - Apply yellow socks and bracelet for high fall risk patients  - Consider moving patient to room near nurses station  Outcome: Progressing  Goal: Maintains/Returns to pre admission functional level  Description: INTERVENTIONS:  - Perform AM-PAC 6 Click Basic Mobility/ Daily Activity assessment daily.  - Set and communicate daily mobility goal to care team and patient/family/caregiver.   - Collaborate with rehabilitation services on mobility goals if consulted  - Perform Range of Motion  times a day.  - Reposition patient every  hours.  - Dangle patient  times a day  - Stand patient  times a day  - Ambulate patient  times a day  - Out of bed to chair  times a day   - Out of bed for meal times a day  - Out of bed for toileting  - Record patient progress and toleration of activity level    Outcome: Progressing     Problem: DISCHARGE PLANNING  Goal: Discharge to home or other facility with appropriate resources  Description: INTERVENTIONS:  - Identify barriers to discharge w/patient and caregiver  - Arrange for needed discharge resources and transportation as appropriate  - Identify discharge learning needs (meds, wound care, etc.)  - Refer to Case Management Department for coordinating discharge planning if the patient needs post-hospital services based on physician/advanced practitioner order or complex needs related to functional status, cognitive ability, or social support system  Outcome: Progressing     Problem: NEUROSENSORY - ADULT  Goal: Achieves stable or improved neurological status  Description: INTERVENTIONS  - Monitor and report changes in neurological status  - Monitor vital signs such as temperature, blood pressure, glucose, and any other labs ordered   - Initiate measures to prevent increased intracranial pressure  - Monitor for seizure activity and implement precautions if appropriate      Outcome: Progressing  Goal: Achieves maximal functionality and self care  Description: INTERVENTIONS  - Monitor swallowing and airway patency with patient fatigue and changes in neurological status  - Encourage and assist patient to increase activity and self care.   - Encourage visually impaired, hearing impaired and aphasic patients to use assistive/communication devices  Outcome: Progressing     Problem: METABOLIC, FLUID AND ELECTROLYTES - ADULT  Goal: Glucose maintained within target range  Description: INTERVENTIONS:  - Monitor Blood Glucose as ordered  - Assess for signs and symptoms of hyperglycemia and hypoglycemia  - Administer ordered medications to maintain glucose within target range  - Assess nutritional intake and initiate nutrition service referral as needed  Outcome: Progressing     Problem: SKIN/TISSUE INTEGRITY - ADULT  Goal: Incision(s), wounds(s) or drain site(s) healing without  S/S of infection  Description: INTERVENTIONS  - Assess and document dressing, incision, wound bed, drain sites and surrounding tissue  - Provide patient and family education  - Perform skin care/dressing changes as ordered  Outcome: Progressing     Problem: MUSCULOSKELETAL - ADULT  Goal: Maintain or return mobility to safest level of function  Description: INTERVENTIONS:  - Assess patient's ability to carry out ADLs; assess patient's baseline for ADL function and identify physical deficits which impact ability to perform ADLs (bathing, care of mouth/teeth, toileting, grooming, dressing, etc.)  - Assess/evaluate cause of self-care deficits   - Assess range of motion  - Assess patient's mobility  - Assess patient's need for assistive devices and provide as appropriate  - Encourage maximum independence but intervene and supervise when necessary  - Involve family in performance of ADLs  - Assess for home care needs following discharge   - Consider OT consult to assist with ADL evaluation and planning for discharge  - Provide patient education as appropriate  Outcome: Progressing  Goal: Maintain proper alignment of affected body part  Description: INTERVENTIONS:  - Support, maintain and protect limb and body alignment  - Provide patient/ family with appropriate education  Outcome: Progressing     Problem: COPING  Goal: Pt/Family able to verbalize concerns and demonstrate effective coping strategies  Description: INTERVENTIONS:  - Assist patient/family to identify coping skills, available support systems and cultural and spiritual values  - Provide emotional support, including active listening and acknowledgement of concerns of patient and caregivers  - Reduce environmental stimuli, as able  - Provide patient education  - Assess for spiritual pain/suffering and initiate spiritual care, including notification of Pastoral Care or rudi based community as needed  - Assess effectiveness of coping strategies  Outcome:  Progressing     Problem: Nutrition/Hydration-ADULT  Goal: Nutrient/Hydration intake appropriate for improving, restoring or maintaining nutritional needs  Description: Monitor and assess patient's nutrition/hydration status for malnutrition. Collaborate with interdisciplinary team and initiate plan and interventions as ordered.  Monitor patient's weight and dietary intake as ordered or per policy. Utilize nutrition screening tool and intervene as necessary. Determine patient's food preferences and provide high-protein, high-caloric foods as appropriate.     INTERVENTIONS:  - Monitor oral intake, urinary output, labs, and treatment plans  - Assess nutrition and hydration status and recommend course of action  - Evaluate amount of meals eaten  - Assist patient with eating if necessary   - Allow adequate time for meals  - Recommend/ encourage appropriate diets, oral nutritional supplements, and vitamin/mineral supplements  - Order, calculate, and assess calorie counts as needed  - Recommend, monitor, and adjust tube feedings and TPN/PPN based on assessed needs  - Assess need for intravenous fluids  - Provide specific nutrition/hydration education as appropriate  - Include patient/family/caregiver in decisions related to nutrition  Outcome: Progressing

## 2024-03-12 NOTE — ASSESSMENT & PLAN NOTE
Pt with PMHx of osteoporosis, CKD, DM2, CHF, mechanical valve replacement and afib anticoagulated on coumadin presented to the ED after sustaining a mechanical fall at home where she fell into a chair hitting her left arm/shoulder  CT left upper extremity demonstrating a left humerus neck fracture with sling applied in ED  Pain control, supportive care  Ortho consulted non op management of left proximal humerus fracture  PT/OT recommending level 3, home health/home PT/OT

## 2024-03-12 NOTE — RESTORATIVE TECHNICIAN NOTE
Restorative Technician Note      Patient Name: Cristy Fisher     Restorative Tech Visit Date: 03/12/24  Note Type: Mobility  Patient Position Upon Consult: Bedside chair  Activity Performed: Ambulated  Assistive Device: Other (Comment) (none)  Patient Position at End of Consult: Bedside chair; All needs within reach

## 2024-03-12 NOTE — ASSESSMENT & PLAN NOTE
"Patient presented to the ED following a mechanical fall at home where she tripped over the end of her bed hitting her left arm/shoulder and striking head. Pt typically ambulates using a walker at home   Trauma work-up in the ED negative besides left humeral neck fracture. The following are vrad reports, official reads pending.  CT head: \" No evidence of acute intracranial hemorrhage or infarct. Mild left forehead swelling. Mild generalized volume loss. Mild microvascular ischemic change. 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  CT cervical spine: \"No evidence of acute bony fracture or misalignment. Spinal canal and neural foraminal narrowing as described\"  CT chest/abdomen/pelvis without contrast: \"Chronic findings. No acute injury\"  CT left upper extremity: \"Left humerus neck fracture\"  Supportive care, pain control  PT/OT eval appreciated    "

## 2024-03-12 NOTE — ASSESSMENT & PLAN NOTE
Lab Results   Component Value Date    HGBA1C 6.6 (H) 09/01/2023       Recent Labs     03/11/24  1135 03/11/24  1617 03/11/24  2112 03/12/24  0624   POCGLU 168* 145* 146* 123         Blood Sugar Average: Last 72 hrs:  Hold Farxiga  Continue 13 units of Levemir daily  SSI with accucheks (P) [id number]

## 2024-03-12 NOTE — DISCHARGE INSTR - AVS FIRST PAGE
Please remember to take all medications as directed on your medication list and follow-up with your primary care provider in 1-2 weeks.    You are encouraged to keep your med list on your person (in your wallet or purse) and please take your medication list provided in this After Visit Summary to your PCP visit following discharge.    Please ask your nurse to show you the medication list and explain when to take your medications.    Additionally, we encourage all patient's to take their actual medications with them to their primary care visit! This is to verify you have the proper medications and the proper dosages.  Remember, while medications are often listed in your computer record; that may not always be right as mistakes can occur at the pharmacy or in the computer and sometimes old medication bottles can be mistaken for newer medications.    (Note to nursing: please place patient's medication list on top of the AVS.)      _______________________________________________________________________________________    Pain meds and Lidoderm patch have been sent to your pharmacy    Please keep arm in sling no lifting weight until cleared by orthopedic surgery    Feel better!

## 2024-03-12 NOTE — NURSING NOTE
Unable to obtain labs this am after multiple sticks. Pt was able to check INR via her home machine in which the result was 3.6. Taim OJEDA notified.

## 2024-03-12 NOTE — ASSESSMENT & PLAN NOTE
Fractured left humerus d/t fall and Osteoporosis, as evidenced by DXA scan 1/18/23 stating, The forearm has bone mineral density of osteoporosis with measurements that show high risk for fracture, treated with Calcium/Vit D, Prolia, PT/OT, sling, and pain control.

## 2024-03-13 ENCOUNTER — HOME CARE VISIT (OUTPATIENT)
Dept: HOME HEALTH SERVICES | Facility: HOME HEALTHCARE | Age: 89
End: 2024-03-13
Payer: MEDICARE

## 2024-03-13 VITALS — OXYGEN SATURATION: 99 % | SYSTOLIC BLOOD PRESSURE: 110 MMHG | HEART RATE: 85 BPM | DIASTOLIC BLOOD PRESSURE: 56 MMHG

## 2024-03-13 PROCEDURE — 400013 VN SOC

## 2024-03-13 PROCEDURE — G0152 HHCP-SERV OF OT,EA 15 MIN: HCPCS

## 2024-03-13 PROCEDURE — 10330081 VN NO-PAY CLAIM PROCEDURE

## 2024-03-15 ENCOUNTER — HOME CARE VISIT (OUTPATIENT)
Dept: HOME HEALTH SERVICES | Facility: HOME HEALTHCARE | Age: 89
End: 2024-03-15
Payer: MEDICARE

## 2024-03-15 VITALS
TEMPERATURE: 98 F | DIASTOLIC BLOOD PRESSURE: 54 MMHG | OXYGEN SATURATION: 98 % | SYSTOLIC BLOOD PRESSURE: 108 MMHG | HEART RATE: 85 BPM

## 2024-03-15 VITALS
SYSTOLIC BLOOD PRESSURE: 138 MMHG | DIASTOLIC BLOOD PRESSURE: 74 MMHG | OXYGEN SATURATION: 99 % | RESPIRATION RATE: 20 BRPM | TEMPERATURE: 98 F | HEART RATE: 86 BPM

## 2024-03-15 VITALS — OXYGEN SATURATION: 96 % | DIASTOLIC BLOOD PRESSURE: 67 MMHG | SYSTOLIC BLOOD PRESSURE: 118 MMHG | HEART RATE: 85 BPM

## 2024-03-15 PROCEDURE — G0299 HHS/HOSPICE OF RN EA 15 MIN: HCPCS

## 2024-03-15 PROCEDURE — G0151 HHCP-SERV OF PT,EA 15 MIN: HCPCS

## 2024-03-15 PROCEDURE — G0152 HHCP-SERV OF OT,EA 15 MIN: HCPCS

## 2024-03-19 ENCOUNTER — HOME CARE VISIT (OUTPATIENT)
Dept: HOME HEALTH SERVICES | Facility: HOME HEALTHCARE | Age: 89
End: 2024-03-19
Payer: MEDICARE

## 2024-03-19 VITALS — DIASTOLIC BLOOD PRESSURE: 58 MMHG | SYSTOLIC BLOOD PRESSURE: 124 MMHG | HEART RATE: 84 BPM | OXYGEN SATURATION: 97 %

## 2024-03-19 PROCEDURE — G0152 HHCP-SERV OF OT,EA 15 MIN: HCPCS

## 2024-03-19 PROCEDURE — G0299 HHS/HOSPICE OF RN EA 15 MIN: HCPCS

## 2024-03-21 ENCOUNTER — OFFICE VISIT (OUTPATIENT)
Dept: OBGYN CLINIC | Facility: MEDICAL CENTER | Age: 89
End: 2024-03-21

## 2024-03-21 ENCOUNTER — TELEPHONE (OUTPATIENT)
Dept: HOME HEALTH SERVICES | Facility: HOME HEALTHCARE | Age: 89
End: 2024-03-21

## 2024-03-21 VITALS
WEIGHT: 207 LBS | SYSTOLIC BLOOD PRESSURE: 122 MMHG | DIASTOLIC BLOOD PRESSURE: 78 MMHG | HEART RATE: 86 BPM | BODY MASS INDEX: 34.49 KG/M2 | HEIGHT: 65 IN

## 2024-03-21 VITALS
RESPIRATION RATE: 20 BRPM | HEART RATE: 78 BPM | DIASTOLIC BLOOD PRESSURE: 80 MMHG | SYSTOLIC BLOOD PRESSURE: 130 MMHG | TEMPERATURE: 97.4 F | OXYGEN SATURATION: 94 %

## 2024-03-21 DIAGNOSIS — M31.0 HYPERSENSITIVITY ANGIITIS (HCC): Primary | ICD-10-CM

## 2024-03-21 DIAGNOSIS — S42.212A: ICD-10-CM

## 2024-03-21 DIAGNOSIS — E66.01 CLASS 2 SEVERE OBESITY DUE TO EXCESS CALORIES WITH SERIOUS COMORBIDITY AND BODY MASS INDEX (BMI) OF 39.0 TO 39.9 IN ADULT (HCC): ICD-10-CM

## 2024-03-21 PROCEDURE — 99024 POSTOP FOLLOW-UP VISIT: CPT | Performed by: STUDENT IN AN ORGANIZED HEALTH CARE EDUCATION/TRAINING PROGRAM

## 2024-03-21 RX ORDER — HYDROCODONE BITARTRATE AND ACETAMINOPHEN 7.5; 3 MG/1; MG/1
TABLET ORAL
COMMUNITY
Start: 2024-03-20

## 2024-03-21 RX ORDER — ERYTHROMYCIN 5 MG/G
OINTMENT OPHTHALMIC
COMMUNITY
Start: 2024-02-23

## 2024-03-21 NOTE — CASE COMMUNICATION
Plan to visit 1x/week for 2 weeks and then 2x/week for 2 weeks to work on balance, LE strength, gait and endurance.

## 2024-03-21 NOTE — PATIENT INSTRUCTIONS
Non-Operative Proximal Humeral Fracture Rehabilitation Protocol  General Principles:    1. Non-displaced means less than 1cm of displacement and less than 45° of  angulation  2. Bony healing occurs usually within 6 to 8 weeks in adults  3. Extension and Internal Rotation not performed until 6 weeks  4. Return to normal function and motion may require 3 to 4 months  5. No shoulder extension or internal rotation until 6 weeks post-op  Goals:  1. Increase ROM while protecting the fracture site.  2. Control pain and swelling (with exercise and modalities)  3. Perform frequent gentle exercise to prevent adhesion formation    Phase I - Early Motion Phase (0 - 5 weeks)    A. Week 1 Early Passive Motion  1. Wear the sling at all times except to exercise  2. Hand, wrist, elbow, and cervical AROM  3.  and wrist strengthening  4. PROM: supine Flexion to 90º and ER (very gentle)  5. Modalities as needed for pain relief or inflammation reduction    B. Week 2  1. Apply hot packs 10 minutes before exercising  2. Begin pendulum (Codman) exercises with circles in and out  3. Soft tissue mobilization  4. Supine ER with a stick to 30º  a. Support elbow on a folded towel with shoulder in 15° ABD  5. Scapular Stabilization  a. Scapular clocks  b. Scapular retractions (no shoulder extension)    C. Week 3 - 5 (begin AAROM when pain diminishes and pt is less  apprehensive)  1. Continue all above exercises  2. Begin gentle AAROM flexion to 140º if clinical situation is stable  a. Supine Cane flexion  b. Supine AAROM with therapist assistance or with hands clasped  3. Begin pulley for flexion to tolerance  4. Begin submaximal isometrics ER, and flex (week 3 - 4)  5. Begin flexion and ABD on slide board or table to tolerance    II. Phase II - Active Motion Phase (Weeks 6 - 12)    A. Week 6 - 8 AROM  1. Establish full PROM  2. Begin AROM  a. Supine flexion to patient tolerance  b. Progress to seated (or standing) flexion with a stick  c.  Seated flexion with elbow bent and arm close to the body  d. Perform ER and ABD with hands behind head  e. Sidelying ER (pain-free)  f. Serratus Punches  3. Begin Extension and IR: (PROM, AROM and Isometrics)  4. Begin multi-angle isometrics  5. Continue PROM and begin gentle patient self stretching (week 7-8)  a. Flexion: put hand on wall or top of door  b. ER: hold onto door jam and twist  c. IR: use good arm to pull affected arm into IR    B. Week 8 - 10 Early Resisted ROM  1. Begin Theraband for IR, ER, flexion, ABD, and extension  2. Begin supine IR, ER with 1# (arm supported at 15° ABD) (pain-free)  3. Begin UBE with no resistance  4. Prone Ext and ABD (pain-free)  5. Progress to adding weight to above exercises only if pain-free  6. Biceps / Triceps strengthening with dumbbells    Phase III - Aggressive Stretching and Strengthening Phase (beginning week 12)    1. Isotonic strengthening with weights all directions  2. Increase theraband or use rubber tubing  3. Increase stretches on door and add prone stretches  4. Begin functional or sport activity for strength gain

## 2024-03-21 NOTE — PROGRESS NOTES
Orthopedic Surgery Office Note  Cristy Fisher (88 y.o. female)   : 1935   MRN: [id number]   Encounter Date: 3/21/2024 with Dr. Derek Gutierrez DO  Chief Complaint   Patient presents with   • Left Shoulder - Pain       Assessment, Plan, & Discussion:     Proximal humerus fracture of left humerus  Discussed with the patient that:  - presence of proximal humerus fracture  - typically treat this type of injury with non-operative protocol for rehabilitation  - referral for home health OT/PT given with instructions for non-op proximal humerus fracture rehabilitation protocol  - continue with OT/PT for ROM and strengthening  - patient should continue with hand pumps to decrease swelling  - prescription for Robaxin sent to pharmacy on file  - patient will follow-up in 4 weeks  - The patient expresses understanding and is in agreement with today's treatment plan.       Plan:   Return in about 4 weeks (around 2024) for Recheck, Repeat X-ray.    Surgery:   No surgery planned at this time      Orders:     Diagnoses and all orders for this visit:    Fracture of neck of left humerus  -     Ambulatory referral to Orthopedic Surgery  -     EXTERNAL Referral to Home Health; Future  -     Fracture / Dislocation Treatment    Other orders  -     HYDROcodone-acetaminophen (XODOL) 7.5-300 MG per tablet  -     erythromycin (ILOTYCIN) ophthalmic ointment; APPLY INTO BOTH EYES AT BEDTIME         History of Present Illness:     rCisty Fisher is a 88 y.o. female who presents for consultation at the request of Noel Wanrer MD / Home health nursing regarding left proximal humerus fracture. Patient has CHF and chronic kidney disease. Patient states that she had a fall on 3/10/24 where she fell onto her left shoulder and struck her head on a chair near the bed. She was seen in the ED where x-rays were obtained demonstrating a proximal humerus fracture. She was discharged with non-operative protocol and prescribed OT/PT with  "home health. On presentation today, she states that she is having a significant amount of pain and is unable to move her left arm at all. She is taking hydrocodone, tylenol and would like a muscle relaxer.     Review of Systems  Constitutional: Negative for fatigue, fever or loss of appetite.   HENT: Negative.    Respiratory: Negative for shortness of breath, dyspnea.    Cardiovascular: Negative for chest pain/tightness.   Gastrointestinal: Negative for abdominal pain, N/V.   Endocrine: Negative for cold/heat intolerance, unexplained weight loss/gain.   Genitourinary: Negative for flank pain, dysuria  Skin: Negative for rash.    Psychiatric/Behavioral: Negative for agitation.  All else negative unless otherwise noted in HPI    Physical Exam:   General:  /78   Pulse 86   Ht 5' 5\" (1.651 m)   Wt 93.9 kg (207 lb)   BMI 34.45 kg/m²   Cons: Appears well.  No apparent distress.  Psych: Alert. Oriented x3.  Mood and affect normal.  Eyes: PERRLA, EOMI  Resp: Normal effort.  No audible wheezing or stridor.  CV: Extremities warm and well perfused.   Abd:    No distention or guarding.   Skin: Warm. No visible lesions.  Neuro: Normal muscle tone.      Orthopedic Exam:   left Shoulder -   No anatomical deformity  Skin is warm and dry to touch with no signs of erythema, ecchymosis, or infection  Moderate soft tissue swelling and ecchymosis noted  TTP over entire deltoid region, anterior and posterior capsule, along the humerus  ROM FF Deferred due to known proximal humerus fracture  MMT: deferred  Demonstrates normal elbow, wrist, and finger motion  2+  distal radial pulse with brisk capillary refill to the fingers  Radial, median, ulnar and motor  and sensory distribution intact  Sensation to light touch intact distally        Imaging/Studies:     Study: XR left shoulder  Date: 3/10/24  Report: I have read and agree with the radiologist report.  I have personally reviewed imaging and my impression is as follows: "   IMPRESSION:     Comminuted humeral head fracture with some anterior displacement of the humeral shaft relative to the glenoid on the transscapular Y view. Chronic deformity of the left clavicle.        Fracture / Dislocation Treatment    Date/Time: 3/21/2024 3:15 PM    Performed by: Derek Guteirrez DO  Authorized by: Derek Gutierrez DO    Patient Location:  [facility] Protocol:  Consent: Verbal consent obtained.  Risks and benefits: risks, benefits and alternatives were discussed  Consent given by: patient  Timeout called at: 3/21/2024 4:16 PM.  Patient understanding: patient states understanding of the procedure being performed  Site marked: the operative site was marked  Radiology Images displayed and confirmed. If images not available, report reviewed: imaging studies available  Patient identity confirmed: verbally with patient    Injury location:  Shoulder  Location details:  Left shoulder  Injury type:  Fracture  Fracture type: surgical neck    Neurovascular status: Neurovascularly intact    Distal perfusion: normal    Neurological function: normal    Range of motion: reduced    Local anesthesia used?: No    Manipulation performed?: No    Immobilization:  Sling  Neurovascular status: Neurovascularly intact    Distal perfusion: normal    Neurological function: normal    Range of motion: unchanged    Patient tolerance:  Patient tolerated the procedure well with no immediate complications           Medical, Surgical, Family, and Social History    The patient's medical history, family history, and social history, were reviewed and updated as appropriate.    Past Medical History:   Diagnosis Date   • Anemia    • Arthritis     also osteoporosis of spine   • Cancer (HCC)     endometrial   • CHF (congestive heart failure) (HCC)    • Chronic atrial fibrillation (HCC) 09/13/2017   • Chronic gout 09/13/2017   • Chronic kidney disease    • Chronic rhinitis 11/08/2018   • CKD (chronic kidney disease) stage 3, GFR  30-59 ml/min (HCC) 09/13/2017   • Clotting disorder (HCC)     on warfarin   • Colon polyp 09/13/2017   • Compression fracture of L3 vertebra (HCC)    • Coronary artery disease    • Diabetes mellitus (HCC)    • COMBS (dyspnea on exertion) 11/02/2018   • Endometrial cancer (HCC)     age 60   • Essential hypertension 09/13/2017   • Gastritis 09/13/2017   • GERD (gastroesophageal reflux disease)    • Heart murmur    • Hiatal hernia    • Hx of hiatal hernia    • Hyperlipidemia    • Hypertension    • Irregular heart beat    • 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 Or[facility] MD.- Multiple stomach polyps, small amount of heme or coffee-grounds noted but do not feel significant evidence for bleeding.   • EGD AND COLONOSCOPY  09/2017    adenomatous polyp/ benign gastric polyp   • ESOPHAGOGASTRODUODENOSCOPY  08/2012   • FIXATION KYPHOPLASTY     • HERNIA REPAIR      x3   • HYSTERECTOMY      age 61   • INSERT / REPLACE / REMOVE PACEMAKER     • IR PELVIC ANGIOGRAM  01/29/2019   • JOINT REPLACEMENT Bilateral     knees   • OOPHORECTOMY Bilateral     age 61   • NY COLONOSCOPY FLX DX W/COLLJ SPEC WHEN PFRMD N/A 09/12/2017    Procedure: EGDwith bxAND COLONOSCOPY with polypectomies;  Surgeon: Andrea Ortega IV, MD;  Location: [facility];  Service: Gastroenterology   • NY SIGMOIDOSCOPY FLX DX W/COLLJ SPEC BR/WA IF PFRMD N/A 09/13/2017    Procedure: colonoscopy with hemo clip x 2;  Surgeon: Andrea Ortega IV, MD;  Location: [facility];  Service: Gastroenterology   • SKIN  BIOPSY     • TONSILLECTOMY       Family History   Problem Relation Age of Onset   • Diabetes Mother    • Hypertension Mother    • Heart disease Father    • Emphysema Father    • Asthma Sister    • Cancer Sister         colon   • Hypertension Sister    • Colon cancer Sister    • Autoimmune disease Sister         Diabetes   • Diabetes Sister    • No Known Problems Daughter    • No Known Problems Maternal Grandmother    • No Known Problems Maternal Grandfather    • No Known Problems Paternal Grandmother    • No Known Problems Paternal Grandfather    • Breast cancer Cousin 55   • Breast cancer Cousin 55   • Breast cancer Cousin 67   • Ovarian cancer Sister    • Autoimmune disease Sister         Hashimoto's thyroiditis   • Cancer Sister         ovarian   • No Known Problems Daughter    • No Known Problems Paternal Aunt    • No Known Problems Paternal Aunt    • Breast cancer Cousin    • Breast cancer Cousin    • Cancer Paternal Uncle         prostate     Social History     Occupational History   • Occupation: Retired -    Tobacco Use   • Smoking status: Former     Current packs/day: 3.00     Average packs/day: 3.0 packs/day for 20.0 years (60.0 ttl pk-yrs)     Types: Cigarettes   • Smokeless tobacco: Never   • Tobacco comments:     have not smoked for at least 50 years   Vaping Use   • Vaping status: Never Used   Substance and Sexual Activity   • Alcohol use: Never     Comment: 0   • Drug use: No   • Sexual activity: Yes     Partners: Male     Birth control/protection: Other     Comment: MARTÍNEZ, BSO     Allergies   Allergen Reactions   • Acetazolamide GI Intolerance   • Colchicine Other (See Comments)   • Diltiazem Edema   • Docusate Other (See Comments)     impaction   • Eszopiclone    • Febuxostat    • Fentanyl Vomiting     Pt reports vomiting after given fentanyl patch    • Glycerin Other (See Comments)   • Hydrochlorothiazide Other (See Comments) and Headache     Other reaction(s): Other (see  "comments)  Generalized swelling   • Latex      Other reaction(s): Other (See Comments)  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   • Lorazepam Nausea Only and Other (See Comments)     Other reaction(s): not sleeping, made her feel \"wired\"   • Losartan Headache   • Metformin Other (See Comments)     Other reaction(s): GI intolerance   • Nortriptyline Other (See Comments)     Other reaction(s): Other (See Comments)  rash bolivar flexeril     • Other GI Intolerance   • Penicillin G Headache   • Penicillins      Other reaction(s): Other (See Comments)  RASH-anaphylaxis  Other reaction(s): Anaphylaxis   • Povidone Iodine Other (See Comments)     VAGINAL ITCHING   • Ropinirole      Back pain   • Simvastatin Other (See Comments) and Myalgia     muscle aches   • Sulfa Antibiotics      Other reaction(s): Other (See Comments)  bolivar Lasix, GI upset, rash  Other reaction(s): GI Upset   • Torsemide Nausea Only, Vomiting and GI Intolerance     Pt states \"it does not work for me\".    • Valsartan      Other reaction(s): Other (See Comments)  bolivar olmesartan   • Fluticasone      Leg cramps       Current Outpatient Medications:   •  acetaminophen (TYLENOL) 325 mg tablet, Take 3 tablets (975 mg total) by mouth every 8 (eight) hours for 14 days, Disp: , Rfl:   •  allopurinol (ZYLOPRIM) 100 mg tablet, Take 100 mg by mouth 2 (two) times a day, Disp: , Rfl:   •  aspirin (ECOTRIN LOW STRENGTH) 81 mg EC tablet, Take 81 mg by mouth daily, Disp: , Rfl:   •  Calcium Carb-Cholecalciferol 250-125 MG-UNIT TABS, Take by mouth daily, Disp: , Rfl:   •  calcium citrate-vitamin D (CITRACAL+D) 315-200 MG-UNIT per tablet, Take 1 tablet by mouth 2 (two) times a day, Disp: , Rfl:   •  Cholecalciferol 50 MCG (2000 UT) CAPS, Take 1 capsule by mouth, Disp: , Rfl:   •  denosumab (PROLIA) 60 mg/mL, Inject 60 mg under the skin once 2 times a year , Disp: , Rfl:   •  Denta 5000 Plus 1.1 % CREA, BRUSH TWICE A DAY AS DIRECTED, Disp: , Rfl:   •  erythromycin (ILOTYCIN) ophthalmic " ointment, APPLY INTO BOTH EYES AT BEDTIME, Disp: , Rfl:   •  HYDROcodone-acetaminophen (XODOL) 7.5-300 MG per tablet, , Disp: , Rfl:   •  insulin aspart (NovoLOG FlexPen) 100 UNIT/ML injection pen, Inject 5 Units under the skin as needed, Disp: , Rfl:   •  insulin detemir (Levemir FlexTouch) 100 Units/mL injection pen, 13 Units, Disp: , Rfl:   •  lidocaine (LIDODERM) 5 %, Apply 1 patch topically over 12 hours daily Remove & Discard patch within 12 hours or as directed by MD, Disp: 30 patch, Rfl: 0  •  metoprolol succinate (TOPROL-XL) 50 mg 24 hr tablet, Take 50 mg by mouth daily, Disp: , Rfl:   •  Multiple Vitamin (MULTIVITAMIN) tablet, Take 1 tablet by mouth daily, Disp: , Rfl:   •  Multiple Vitamins-Minerals (PRESERVISION AREDS 2 PO), Take by mouth 2 (two) times a day, Disp: , Rfl:   •  RABEprazole (ACIPHEX) 20 MG tablet, Take 1 tablet (20 mg total) by mouth daily, Disp: 90 tablet, Rfl: 3  •  spironolactone (ALDACTONE) 50 mg tablet, Take 25 mg by mouth daily, Disp: , Rfl:   •  VITAMIN E PO, Take by mouth daily, Disp: , Rfl:   •  warfarin (COUMADIN) 2.5 mg tablet, Take 1.5 tablets by mouth daily 3.75 mg 6 days 5 mg on 1 day, Disp: , Rfl:   •  Accu-Chek Lila Plus test strip, [place] [employer]. (Patient not taking: Reported on 3/10/2024), Disp: , Rfl:   •  BD Pen Needle Rena 2nd Gen 32G X 4 MM MISC, , Disp: , Rfl:   •  cholecalciferol (VITAMIN D3) 1,000 units tablet, Take 2,000 Units by mouth daily, Disp: , Rfl:   •  dapagliflozin (Farxiga) 10 MG tablet, Take 1 tablet (10 mg total) by mouth daily (Patient not taking: Reported on 3/21/2024), Disp: 90 tablet, Rfl: 3  •  glucose blood test strip, , Disp: , Rfl:   •  naloxegol oxalate (Movantik) 25 MG tablet, Take 1 tablet (25 mg total) by mouth every other day (Patient taking differently: Take 25 mg by mouth if needed), Disp: 15 tablet, Rfl: 5  •  NON FORMULARY, Take 1 tablet by mouth 4 (four) times a day HydroEye (Patient not taking: Reported on 3/21/2024), Disp: ,  Rfl:   •  ondansetron (ZOFRAN-ODT) 4 mg disintegrating tablet, Take 1 tablet (4 mg total) by mouth every 6 (six) hours as needed for vomiting or nausea (Patient not taking: Reported on 3/15/2024), Disp: 20 tablet, Rfl: 0  •  oxyCODONE (ROXICODONE) 5 immediate release tablet, Take 1 tablet (5 mg total) by mouth every 4 (four) hours as needed for moderate pain for up to 10 days Max Daily Amount: 30 mg (Patient not taking: Reported on 3/21/2024), Disp: 30 tablet, Rfl: 0  •  Vyzulta 0.024 % SOLN, instill 1 drop into both eyes at bedtime (Patient not taking: Reported on 3/21/2024), Disp: , Rfl:   •  warfarin (COUMADIN) 5 mg tablet, Take 5 mg by mouth once a week, Disp: , Rfl:     Non-Operative Proximal Humeral Fracture Rehabilitation Protocol  General Principles:    1. Non-displaced means less than 1cm of displacement and less than 45° of  angulation  2. Bony healing occurs usually within 6 to 8 weeks in adults  3. Extension and Internal Rotation not performed until 6 weeks  4. Return to normal function and motion may require 3 to 4 months  5. No shoulder extension or internal rotation until 6 weeks post-op  Goals:  1. Increase ROM while protecting the fracture site.  2. Control pain and swelling (with exercise and modalities)  3. Perform frequent gentle exercise to prevent adhesion formation    Phase I - Early Motion Phase (0 - 5 weeks)    A. Week 1 Early Passive Motion  1. Wear the sling at all times except to exercise  2. Hand, wrist, elbow, and cervical AROM  3.  and wrist strengthening  4. PROM: supine Flexion to 90º and ER (very gentle)  5. Modalities as needed for pain relief or inflammation reduction    B. Week 2  1. Apply hot packs 10 minutes before exercising  2. Begin pendulum (Codman) exercises with circles in and out  3. Soft tissue mobilization  4. Supine ER with a stick to 30º  a. Support elbow on a folded towel with shoulder in 15° ABD  5. Scapular Stabilization  a. Scapular clocks  b. Scapular  retractions (no shoulder extension)    C. Week 3 - 5 (begin AAROM when pain diminishes and pt is less  apprehensive)  1. Continue all above exercises  2. Begin gentle AAROM flexion to 140º if clinical situation is stable  a. Supine Cane flexion  b. Supine AAROM with therapist assistance or with hands clasped  3. Begin pulley for flexion to tolerance  4. Begin submaximal isometrics ER, and flex (week 3 - 4)  5. Begin flexion and ABD on slide board or table to tolerance    II. Phase II - Active Motion Phase (Weeks 6 - 12)    A. Week 6 - 8 AROM  1. Establish full PROM  2. Begin AROM  a. Supine flexion to patient tolerance  b. Progress to seated (or standing) flexion with a stick  c. Seated flexion with elbow bent and arm close to the body  d. Perform ER and ABD with hands behind head  e. Sidelying ER (pain-free)  f. Serratus Punches  3. Begin Extension and IR: (PROM, AROM and Isometrics)  4. Begin multi-angle isometrics  5. Continue PROM and begin gentle patient self stretching (week 7-8)  a. Flexion: put hand on wall or top of door  b. ER: hold onto door jam and twist  c. IR: use good arm to pull affected arm into IR    B. Week 8 - 10 Early Resisted ROM  1. Begin Theraband for IR, ER, flexion, ABD, and extension  2. Begin supine IR, ER with 1# (arm supported at 15° ABD) (pain-free)  3. Begin UBE with no resistance  4. Prone Ext and ABD (pain-free)  5. Progress to adding weight to above exercises only if pain-free  6. Biceps / Triceps strengthening with dumbbells    Phase III - Aggressive Stretching and Strengthening Phase (beginning week 12)    1. Isotonic strengthening with weights all directions  2. Increase theraband or use rubber tubing  3. Increase stretches on door and add prone stretches  4. Begin functional or sport activity for strength gain        This Visit:     30 minutes was spent in the coordination of care, reviewing of imaging and with the patient on the date of service      Scribe Attestation    I,:   Rosie Huber am acting as a scribe while in the presence of the attending physician.:       I,:  Derek Gutierrez DO personally performed the services described in this documentation    as scribed in my presence.:           Dr. Derek Gutierrez DO, Orthopedic Surgeon  [facility]thopedic Oncology [facility]

## 2024-03-22 ENCOUNTER — HOME CARE VISIT (OUTPATIENT)
Dept: HOME HEALTH SERVICES | Facility: HOME HEALTHCARE | Age: 89
End: 2024-03-22
Payer: MEDICARE

## 2024-03-22 VITALS — HEART RATE: 85 BPM | DIASTOLIC BLOOD PRESSURE: 68 MMHG | OXYGEN SATURATION: 100 % | SYSTOLIC BLOOD PRESSURE: 142 MMHG

## 2024-03-22 VITALS — DIASTOLIC BLOOD PRESSURE: 68 MMHG | SYSTOLIC BLOOD PRESSURE: 142 MMHG | OXYGEN SATURATION: 100 % | HEART RATE: 85 BPM

## 2024-03-22 PROBLEM — M31.0 HYPERSENSITIVITY ANGIITIS (HCC): Status: ACTIVE | Noted: 2024-03-22

## 2024-03-22 PROCEDURE — G0151 HHCP-SERV OF PT,EA 15 MIN: HCPCS

## 2024-03-22 PROCEDURE — G0152 HHCP-SERV OF OT,EA 15 MIN: HCPCS

## 2024-03-22 RX ORDER — METHOCARBAMOL 500 MG/1
500 TABLET, FILM COATED ORAL 3 TIMES DAILY
Qty: 90 TABLET | Refills: 0 | Status: SHIPPED | OUTPATIENT
Start: 2024-03-22

## 2024-03-25 ENCOUNTER — TELEPHONE (OUTPATIENT)
Age: 89
End: 2024-03-25

## 2024-03-25 ENCOUNTER — HOME CARE VISIT (OUTPATIENT)
Dept: HOME HEALTH SERVICES | Facility: HOME HEALTHCARE | Age: 89
End: 2024-03-25
Payer: MEDICARE

## 2024-03-25 VITALS — SYSTOLIC BLOOD PRESSURE: 126 MMHG | DIASTOLIC BLOOD PRESSURE: 64 MMHG | HEART RATE: 85 BPM | OXYGEN SATURATION: 100 %

## 2024-03-25 PROCEDURE — G0152 HHCP-SERV OF OT,EA 15 MIN: HCPCS

## 2024-03-25 NOTE — TELEPHONE ENCOUNTER
Caller: Isamar [facility]    Doctor: Matt    Reason for call: Isamar stated Cristy asked her to call the office and request a refill on Oxy. Patient took her last pill yesterday. Pain is a 6/10 when not moving, but once she starts to move the pain is 10/10/    Call back#: [phone]    [id number] - [place] - [id number]

## 2024-03-26 ENCOUNTER — HOME CARE VISIT (OUTPATIENT)
Dept: HOME HEALTH SERVICES | Facility: HOME HEALTHCARE | Age: 89
End: 2024-03-26
Payer: MEDICARE

## 2024-03-26 VITALS — OXYGEN SATURATION: 99 % | HEART RATE: 85 BPM | SYSTOLIC BLOOD PRESSURE: 122 MMHG | DIASTOLIC BLOOD PRESSURE: 71 MMHG

## 2024-03-26 PROCEDURE — G0299 HHS/HOSPICE OF RN EA 15 MIN: HCPCS

## 2024-03-26 PROCEDURE — G0151 HHCP-SERV OF PT,EA 15 MIN: HCPCS

## 2024-03-26 NOTE — TELEPHONE ENCOUNTER
Called and spoke with pt, pt states she has been on Norco for a years and she thinks she is immune to the dose and she states the oxycodone worked for her. I relayed Jez cano. PT states she would not mix the medication and her PCP is currently on vacation. I stated normally, our orthopedic providers will not prescribe a narcotic pain medication if she is already getting it prescribed somewhere else, she states she would not take the NORCO. I stated if she needs to get a dose changed she needs to contact her PCP. Pt states he is away and nobody in the office will change the dose.     She would like to know if there is anything Dr Gutierrez can do or can prescribe her the oxycodone.

## 2024-03-27 ENCOUNTER — HOME CARE VISIT (OUTPATIENT)
Dept: HOME HEALTH SERVICES | Facility: HOME HEALTHCARE | Age: 89
End: 2024-03-27
Payer: MEDICARE

## 2024-03-27 VITALS
SYSTOLIC BLOOD PRESSURE: 120 MMHG | TEMPERATURE: 97.2 F | RESPIRATION RATE: 18 BRPM | OXYGEN SATURATION: 95 % | HEART RATE: 78 BPM | DIASTOLIC BLOOD PRESSURE: 60 MMHG

## 2024-03-27 VITALS — HEART RATE: 85 BPM | SYSTOLIC BLOOD PRESSURE: 118 MMHG | OXYGEN SATURATION: 98 % | DIASTOLIC BLOOD PRESSURE: 56 MMHG

## 2024-03-27 PROCEDURE — G0152 HHCP-SERV OF OT,EA 15 MIN: HCPCS

## 2024-03-28 NOTE — TELEPHONE ENCOUNTER
Caller: Patient- Cristy Fisher     Doctor: Dr Gutierrez    Reason for call: Patient is calling back in from a missed call from triage nurse to relay Drs message trans caller over to her to assist further.

## 2024-03-29 ENCOUNTER — HOME CARE VISIT (OUTPATIENT)
Dept: HOME HEALTH SERVICES | Facility: HOME HEALTHCARE | Age: 89
End: 2024-03-29
Payer: MEDICARE

## 2024-03-29 PROCEDURE — G0151 HHCP-SERV OF PT,EA 15 MIN: HCPCS

## 2024-03-29 PROCEDURE — G0299 HHS/HOSPICE OF RN EA 15 MIN: HCPCS

## 2024-03-31 VITALS — OXYGEN SATURATION: 97 % | DIASTOLIC BLOOD PRESSURE: 80 MMHG | HEART RATE: 85 BPM | SYSTOLIC BLOOD PRESSURE: 121 MMHG

## 2024-04-01 ENCOUNTER — HOME CARE VISIT (OUTPATIENT)
Dept: HOME HEALTH SERVICES | Facility: HOME HEALTHCARE | Age: 89
End: 2024-04-01
Payer: MEDICARE

## 2024-04-01 VITALS — OXYGEN SATURATION: 99 % | HEART RATE: 75 BPM | DIASTOLIC BLOOD PRESSURE: 56 MMHG | SYSTOLIC BLOOD PRESSURE: 128 MMHG

## 2024-04-01 VITALS
HEART RATE: 78 BPM | TEMPERATURE: 97.6 F | OXYGEN SATURATION: 97 % | RESPIRATION RATE: 16 BRPM | DIASTOLIC BLOOD PRESSURE: 62 MMHG | SYSTOLIC BLOOD PRESSURE: 122 MMHG

## 2024-04-01 PROCEDURE — G0152 HHCP-SERV OF OT,EA 15 MIN: HCPCS

## 2024-04-02 ENCOUNTER — HOME CARE VISIT (OUTPATIENT)
Dept: HOME HEALTH SERVICES | Facility: HOME HEALTHCARE | Age: 89
End: 2024-04-02
Payer: MEDICARE

## 2024-04-02 VITALS
DIASTOLIC BLOOD PRESSURE: 76 MMHG | SYSTOLIC BLOOD PRESSURE: 146 MMHG | HEART RATE: 88 BPM | TEMPERATURE: 96.1 F | OXYGEN SATURATION: 98 % | RESPIRATION RATE: 24 BRPM

## 2024-04-02 VITALS — SYSTOLIC BLOOD PRESSURE: 128 MMHG | OXYGEN SATURATION: 97 % | HEART RATE: 85 BPM | DIASTOLIC BLOOD PRESSURE: 70 MMHG

## 2024-04-02 PROCEDURE — G0151 HHCP-SERV OF PT,EA 15 MIN: HCPCS

## 2024-04-02 PROCEDURE — G0300 HHS/HOSPICE OF LPN EA 15 MIN: HCPCS

## 2024-04-03 ENCOUNTER — HOME CARE VISIT (OUTPATIENT)
Dept: HOME HEALTH SERVICES | Facility: HOME HEALTHCARE | Age: 89
End: 2024-04-03
Payer: MEDICARE

## 2024-04-03 VITALS — SYSTOLIC BLOOD PRESSURE: 104 MMHG | DIASTOLIC BLOOD PRESSURE: 56 MMHG | OXYGEN SATURATION: 95 % | HEART RATE: 85 BPM

## 2024-04-03 PROCEDURE — G0152 HHCP-SERV OF OT,EA 15 MIN: HCPCS

## 2024-04-04 ENCOUNTER — HOME CARE VISIT (OUTPATIENT)
Dept: HOME HEALTH SERVICES | Facility: HOME HEALTHCARE | Age: 89
End: 2024-04-04
Payer: MEDICARE

## 2024-04-04 VITALS — OXYGEN SATURATION: 93 % | DIASTOLIC BLOOD PRESSURE: 80 MMHG | HEART RATE: 86 BPM | SYSTOLIC BLOOD PRESSURE: 132 MMHG

## 2024-04-04 PROCEDURE — G0151 HHCP-SERV OF PT,EA 15 MIN: HCPCS

## 2024-04-05 ENCOUNTER — HOME CARE VISIT (OUTPATIENT)
Dept: HOME HEALTH SERVICES | Facility: HOME HEALTHCARE | Age: 89
End: 2024-04-05
Payer: MEDICARE

## 2024-04-05 PROCEDURE — G0299 HHS/HOSPICE OF RN EA 15 MIN: HCPCS

## 2024-04-06 ENCOUNTER — TELEPHONE (OUTPATIENT)
Dept: NEUROLOGY | Facility: CLINIC | Age: 89
End: 2024-04-06

## 2024-04-06 NOTE — TELEPHONE ENCOUNTER
Spoke with patient informing her. That her appt needed to be rescheduled. Patient understood and agreed to reschedule appt. Patient new appt is 07/09 at 8:30 am.

## 2024-04-07 VITALS
DIASTOLIC BLOOD PRESSURE: 80 MMHG | SYSTOLIC BLOOD PRESSURE: 140 MMHG | RESPIRATION RATE: 22 BRPM | OXYGEN SATURATION: 97 % | HEART RATE: 86 BPM | TEMPERATURE: 97.1 F

## 2024-04-08 ENCOUNTER — HOME CARE VISIT (OUTPATIENT)
Dept: HOME HEALTH SERVICES | Facility: HOME HEALTHCARE | Age: 89
End: 2024-04-08
Payer: MEDICARE

## 2024-04-08 VITALS — OXYGEN SATURATION: 99 % | SYSTOLIC BLOOD PRESSURE: 102 MMHG | DIASTOLIC BLOOD PRESSURE: 50 MMHG | HEART RATE: 85 BPM

## 2024-04-08 PROCEDURE — G0152 HHCP-SERV OF OT,EA 15 MIN: HCPCS

## 2024-04-09 ENCOUNTER — HOME CARE VISIT (OUTPATIENT)
Dept: HOME HEALTH SERVICES | Facility: HOME HEALTHCARE | Age: 89
End: 2024-04-09
Payer: MEDICARE

## 2024-04-09 VITALS
WEIGHT: 218 LBS | HEART RATE: 88 BPM | DIASTOLIC BLOOD PRESSURE: 64 MMHG | RESPIRATION RATE: 28 BRPM | BODY MASS INDEX: 36.28 KG/M2 | TEMPERATURE: 96.8 F | OXYGEN SATURATION: 100 % | SYSTOLIC BLOOD PRESSURE: 130 MMHG

## 2024-04-09 PROCEDURE — G0300 HHS/HOSPICE OF LPN EA 15 MIN: HCPCS

## 2024-04-10 ENCOUNTER — HOME CARE VISIT (OUTPATIENT)
Dept: HOME HEALTH SERVICES | Facility: HOME HEALTHCARE | Age: 89
End: 2024-04-10
Payer: MEDICARE

## 2024-04-10 VITALS — DIASTOLIC BLOOD PRESSURE: 56 MMHG | OXYGEN SATURATION: 97 % | SYSTOLIC BLOOD PRESSURE: 122 MMHG | HEART RATE: 85 BPM

## 2024-04-10 PROCEDURE — G0152 HHCP-SERV OF OT,EA 15 MIN: HCPCS

## 2024-04-12 ENCOUNTER — HOME CARE VISIT (OUTPATIENT)
Dept: HOME HEALTH SERVICES | Facility: HOME HEALTHCARE | Age: 89
End: 2024-04-12
Payer: MEDICARE

## 2024-04-12 VITALS
TEMPERATURE: 97.3 F | HEART RATE: 78 BPM | RESPIRATION RATE: 22 BRPM | OXYGEN SATURATION: 95 % | SYSTOLIC BLOOD PRESSURE: 160 MMHG | DIASTOLIC BLOOD PRESSURE: 80 MMHG

## 2024-04-12 PROCEDURE — G0151 HHCP-SERV OF PT,EA 15 MIN: HCPCS

## 2024-04-12 PROCEDURE — G0299 HHS/HOSPICE OF RN EA 15 MIN: HCPCS

## 2024-04-14 VITALS — SYSTOLIC BLOOD PRESSURE: 132 MMHG | DIASTOLIC BLOOD PRESSURE: 88 MMHG | HEART RATE: 85 BPM | OXYGEN SATURATION: 96 %

## 2024-04-15 ENCOUNTER — HOME CARE VISIT (OUTPATIENT)
Dept: HOME HEALTH SERVICES | Facility: HOME HEALTHCARE | Age: 89
End: 2024-04-15
Payer: MEDICARE

## 2024-04-15 VITALS — OXYGEN SATURATION: 100 % | SYSTOLIC BLOOD PRESSURE: 128 MMHG | HEART RATE: 86 BPM | DIASTOLIC BLOOD PRESSURE: 70 MMHG

## 2024-04-15 PROCEDURE — G0152 HHCP-SERV OF OT,EA 15 MIN: HCPCS

## 2024-04-16 ENCOUNTER — HOME CARE VISIT (OUTPATIENT)
Dept: HOME HEALTH SERVICES | Facility: HOME HEALTHCARE | Age: 89
End: 2024-04-16
Payer: MEDICARE

## 2024-04-16 ENCOUNTER — LAB REQUISITION (OUTPATIENT)
Dept: LAB | Facility: HOSPITAL | Age: 89
End: 2024-04-16
Payer: MEDICARE

## 2024-04-16 VITALS
RESPIRATION RATE: 18 BRPM | TEMPERATURE: 97.9 F | DIASTOLIC BLOOD PRESSURE: 68 MMHG | SYSTOLIC BLOOD PRESSURE: 118 MMHG | HEART RATE: 72 BPM | OXYGEN SATURATION: 97 %

## 2024-04-16 DIAGNOSIS — Z79.899 OTHER LONG TERM (CURRENT) DRUG THERAPY: ICD-10-CM

## 2024-04-16 DIAGNOSIS — R60.9 EDEMA, UNSPECIFIED: ICD-10-CM

## 2024-04-16 DIAGNOSIS — R06.00 DYSPNEA, UNSPECIFIED: ICD-10-CM

## 2024-04-16 LAB
ANION GAP SERPL CALCULATED.3IONS-SCNC: 6 MMOL/L (ref 4–13)
BNP SERPL-MCNC: 149 PG/ML (ref 0–100)
BUN SERPL-MCNC: 28 MG/DL (ref 5–25)
CALCIUM SERPL-MCNC: 8.9 MG/DL (ref 8.4–10.2)
CHLORIDE SERPL-SCNC: 103 MMOL/L (ref 96–108)
CO2 SERPL-SCNC: 27 MMOL/L (ref 21–32)
CREAT SERPL-MCNC: 1.32 MG/DL (ref 0.6–1.3)
GFR SERPL CREATININE-BSD FRML MDRD: 36 ML/MIN/1.73SQ M
GLUCOSE SERPL-MCNC: 207 MG/DL (ref 65–140)
POTASSIUM SERPL-SCNC: 4.5 MMOL/L (ref 3.5–5.3)
SODIUM SERPL-SCNC: 136 MMOL/L (ref 135–147)

## 2024-04-16 PROCEDURE — G0299 HHS/HOSPICE OF RN EA 15 MIN: HCPCS

## 2024-04-16 PROCEDURE — 83880 ASSAY OF NATRIURETIC PEPTIDE: CPT | Performed by: INTERNAL MEDICINE

## 2024-04-16 PROCEDURE — 80048 BASIC METABOLIC PNL TOTAL CA: CPT | Performed by: INTERNAL MEDICINE

## 2024-04-17 ENCOUNTER — HOME CARE VISIT (OUTPATIENT)
Dept: HOME HEALTH SERVICES | Facility: HOME HEALTHCARE | Age: 89
End: 2024-04-17
Payer: MEDICARE

## 2024-04-17 VITALS — OXYGEN SATURATION: 100 % | DIASTOLIC BLOOD PRESSURE: 58 MMHG | HEART RATE: 85 BPM | SYSTOLIC BLOOD PRESSURE: 108 MMHG

## 2024-04-17 PROCEDURE — G0152 HHCP-SERV OF OT,EA 15 MIN: HCPCS

## 2024-04-18 ENCOUNTER — HOME CARE VISIT (OUTPATIENT)
Dept: HOME HEALTH SERVICES | Facility: HOME HEALTHCARE | Age: 89
End: 2024-04-18
Payer: MEDICARE

## 2024-04-19 ENCOUNTER — HOME CARE VISIT (OUTPATIENT)
Dept: HOME HEALTH SERVICES | Facility: HOME HEALTHCARE | Age: 89
End: 2024-04-19
Payer: MEDICARE

## 2024-04-22 ENCOUNTER — HOME CARE VISIT (OUTPATIENT)
Dept: HOME HEALTH SERVICES | Facility: HOME HEALTHCARE | Age: 89
End: 2024-04-22
Payer: MEDICARE

## 2024-04-22 VITALS — SYSTOLIC BLOOD PRESSURE: 140 MMHG | DIASTOLIC BLOOD PRESSURE: 72 MMHG | OXYGEN SATURATION: 100 % | HEART RATE: 85 BPM

## 2024-04-22 PROCEDURE — G0152 HHCP-SERV OF OT,EA 15 MIN: HCPCS

## 2024-04-23 ENCOUNTER — HOME CARE VISIT (OUTPATIENT)
Dept: HOME HEALTH SERVICES | Facility: HOME HEALTHCARE | Age: 89
End: 2024-04-23
Payer: MEDICARE

## 2024-04-23 NOTE — CASE COMMUNICATION
No visit planned this week due to her edema in LEs and she does not feel she can do the balance exercises. O.T. continues to work on left UE. Did not have any P.T. visits this week.

## 2024-04-25 ENCOUNTER — HOME CARE VISIT (OUTPATIENT)
Dept: HOME HEALTH SERVICES | Facility: HOME HEALTHCARE | Age: 89
End: 2024-04-25
Payer: MEDICARE

## 2024-04-25 ENCOUNTER — APPOINTMENT (OUTPATIENT)
Dept: RADIOLOGY | Facility: MEDICAL CENTER | Age: 89
End: 2024-04-25
Payer: MEDICARE

## 2024-04-25 ENCOUNTER — OFFICE VISIT (OUTPATIENT)
Dept: OBGYN CLINIC | Facility: MEDICAL CENTER | Age: 89
End: 2024-04-25

## 2024-04-25 VITALS
HEIGHT: 65 IN | HEART RATE: 86 BPM | SYSTOLIC BLOOD PRESSURE: 112 MMHG | DIASTOLIC BLOOD PRESSURE: 78 MMHG | BODY MASS INDEX: 33.82 KG/M2 | WEIGHT: 203 LBS

## 2024-04-25 VITALS
HEART RATE: 88 BPM | TEMPERATURE: 98.3 F | DIASTOLIC BLOOD PRESSURE: 58 MMHG | OXYGEN SATURATION: 97 % | SYSTOLIC BLOOD PRESSURE: 114 MMHG | RESPIRATION RATE: 20 BRPM

## 2024-04-25 DIAGNOSIS — Z47.89 AFTERCARE FOLLOWING SURGERY OF THE MUSCULOSKELETAL SYSTEM: ICD-10-CM

## 2024-04-25 DIAGNOSIS — M25.512 LEFT SHOULDER PAIN, UNSPECIFIED CHRONICITY: ICD-10-CM

## 2024-04-25 DIAGNOSIS — S42.215D CLOSED NONDISPLACED FRACTURE OF SURGICAL NECK OF LEFT HUMERUS WITH ROUTINE HEALING, UNSPECIFIED FRACTURE MORPHOLOGY, SUBSEQUENT ENCOUNTER: Primary | ICD-10-CM

## 2024-04-25 PROCEDURE — 99024 POSTOP FOLLOW-UP VISIT: CPT | Performed by: STUDENT IN AN ORGANIZED HEALTH CARE EDUCATION/TRAINING PROGRAM

## 2024-04-25 PROCEDURE — G0300 HHS/HOSPICE OF LPN EA 15 MIN: HCPCS

## 2024-04-25 PROCEDURE — 73030 X-RAY EXAM OF SHOULDER: CPT

## 2024-04-25 RX ORDER — METHYLPREDNISOLONE 4 MG/1
TABLET ORAL
Qty: 21 TABLET | Refills: 0 | Status: SHIPPED | OUTPATIENT
Start: 2024-04-25

## 2024-04-25 NOTE — PROGRESS NOTES
Orthopedic Surgery Office Note  Cristy Fisher (88 y.o. female)   : 1935   MRN: [id number]   Encounter Date: 2024 with Dr. Derek Gutierrez,   Chief Complaint   Patient presents with   • Left Upper Arm - Follow-up, Fracture     4 wk follow up. Pt states that she is having trouble with the exercises and has limited range of motion persisting to today. She has muscular pain in the biceps, triceps, and deltoids. Pt is requesting a steroids to decrease.        Assessment, Plan, & Discussion:     Proximal humerus fracture of left humerus  The patient's x-rays were reviewed today. Comminuted displaced humeral shaft fracture.   Discontinue use of the sling.  Elbow range of motion is encouraged as much as possible to avoid flexion contracture.  Continue with OT for ROM and strengthening - protocol placed in after visit summary. An updated referral was placed for continued OT.  Patient should continue with hand pumps to decrease swelling  A Medrol Dosepak was provided for temporary symptom relief.   Patient will follow-up in 6 weeks      Plan:   Return in about 6 weeks (around 2024) for f/u, X-rays, L shoulder .    Surgery:   No surgery planned at this time      Orders:     Diagnoses and all orders for this visit:    Closed nondisplaced fracture of surgical neck of left humerus with routine healing, unspecified fracture morphology, subsequent encounter  -     Referral to Home Health- [facility]; Future    Left shoulder pain, unspecified chronicity  -     XR shoulder 2+ vw left; Future         History of Present Illness:   Interval history 24:  The patient is approximately 7 weeks s/p left proximal humerus fracture sustained from a fall on 3/9/24. At rest pain is rated 2/10. With exercise pain is rated 10/10. She can slide her arm on her leg but she cannot due this on the table as it is too high. She can partially forward flex. She can perform AAROM with sharp pain. The patient notes she has pain  "when letting her arm down from a forward flexed position. The patient requests oral prednisone to assist wit her symptoms. The patient is using hydrocodone-acetaminophen for symptom management. She has mild swelling present.      Initial history:   Cristy Fisher is a 88 y.o. female who presents for consultation at the request of Self, Referral / Home health nursing regarding left proximal humerus fracture. Patient has CHF and chronic kidney disease. Patient states that she had a fall on 3/10/24 where she fell onto her left shoulder and struck her head on a chair near the bed. She was seen in the ED where x-rays were obtained demonstrating a proximal humerus fracture. She was discharged with non-operative protocol and prescribed OT/PT with home health. On presentation today, she states that she is having a significant amount of pain and is unable to move her left arm at all. She is taking hydrocodone, tylenol and would like a muscle relaxer.     Review of Systems  Constitutional: Negative for fatigue, fever or loss of appetite.   HENT: Negative.    Respiratory: Negative for shortness of breath, dyspnea.    Cardiovascular: Negative for chest pain/tightness.   Gastrointestinal: Negative for abdominal pain, N/V.   Endocrine: Negative for cold/heat intolerance, unexplained weight loss/gain.   Genitourinary: Negative for flank pain, dysuria  Skin: Negative for rash.    Psychiatric/Behavioral: Negative for agitation.  All else negative unless otherwise noted in HPI    Physical Exam:   General:  /78   Pulse 86   Ht 5' 5\" (1.651 m)   Wt 92.1 kg (203 lb)   BMI 33.78 kg/m²   Cons: Appears well.  No apparent distress.  Psych: Alert. Oriented x3.  Mood and affect normal.  Eyes: PERRLA, EOMI  Resp: Normal effort.  No audible wheezing or stridor.  CV: Extremities warm and well perfused.   Abd:    No distention or guarding.   Skin: Warm. No visible lesions.  Neuro: Normal muscle tone.      Orthopedic Exam:   left " Shoulder -   No anatomical deformity  Skin is warm and dry to touch with no signs of erythema, ecchymosis, or infection  Moderate soft tissue swelling and ecchymosis noted  TTP over entire deltoid region, anterior and posterior capsule, along the humerus  ROM FF Deferred due to known proximal humerus fracture  MMT: deferred  Demonstrates normal elbow, wrist, and finger motion  2+  distal radial pulse with brisk capillary refill to the fingers  Radial, median, ulnar and motor  and sensory distribution intact  Sensation to light touch intact distally        Imaging/Studies:   Study: XR left shoulder  Date: 4/10/2024  Report: Report is not available for review today.  I have personally reviewed imaging and my impression is as follows:   IMPRESSION:  Comminuted displaced fracture of the surgical neck of the humerus.    Study: XR left shoulder  Date: 3/10/24  Report: I have read and agree with the radiologist report.  I have personally reviewed imaging and my impression is as follows:   IMPRESSION:     Comminuted humeral head fracture with some anterior displacement of the humeral shaft relative to the glenoid on the transscapular Y view. Chronic deformity of the left clavicle.        Procedures         Medical, Surgical, Family, and Social History    The patient's medical history, family history, and social history, were reviewed and updated as appropriate.    Past Medical History:   Diagnosis Date   • Anemia    • Arthritis     also osteoporosis of spine   • Cancer (HCC)     endometrial   • CHF (congestive heart failure) (HCC)    • Chronic atrial fibrillation (HCC) 09/13/2017   • Chronic gout 09/13/2017   • Chronic kidney disease    • Chronic rhinitis 11/08/2018   • CKD (chronic kidney disease) stage 3, GFR 30-59 ml/min (HCC) 09/13/2017   • Clotting disorder (HCC)     on warfarin   • Colon polyp 09/13/2017   • Compression fracture of L3 vertebra (HCC)    • Coronary artery disease    • Diabetes mellitus (HCC)    • COMBS  (dyspnea on exertion) 11/02/2018   • Endometrial cancer (HCC)     age 60   • Essential hypertension 09/13/2017   • Gastritis 09/13/2017   • GERD (gastroesophageal reflux disease)    • Heart murmur    • Hiatal hernia    • Hx of hiatal hernia    • Hyperlipidemia    • Hypertension    • Irregular heart beat    • 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 Ortega MD.- Multiple stomach polyps, small amount of heme or coffee-grounds noted but do not feel significant evidence for bleeding.   • EGD AND COLONOSCOPY  09/2017    adenomatous polyp/ benign gastric polyp   • ESOPHAGOGASTRODUODENOSCOPY  08/2012   • FIXATION KYPHOPLASTY     • HERNIA REPAIR      x3   • HYSTERECTOMY      age 61   • INSERT / REPLACE / REMOVE PACEMAKER     • IR PELVIC ANGIOGRAM  01/29/2019   • JOINT REPLACEMENT Bilateral     knees   • OOPHORECTOMY Bilateral     age 61   • ME COLONOSCOPY FLX DX W/COLLJ SPEC WHEN PFRMD N/A 09/12/2017    Procedure: EGDwith bxAND COLONOSCOPY with polypectomies;  Surgeon: Andrea Ortega IV, MD;  Location: [facility];  Service: Gastroenterology   • ME SIGMOIDOSCOPY FLX DX W/COLLJ SPEC BR/WA IF PFRMD N/A 09/13/2017    Procedure: colonoscopy with hemo clip x 2;  Surgeon: Andrea Ortega IV, MD;  Location: [facility];  Service: Gastroenterology   • SKIN BIOPSY     • TONSILLECTOMY       Family History   Problem Relation Age of Onset   • Diabetes Mother    • Hypertension Mother    • Heart disease Father    • Emphysema Father    • Asthma Sister    • Cancer Sister          "colon   • Hypertension Sister    • Colon cancer Sister    • Autoimmune disease Sister         Diabetes   • Diabetes Sister    • No Known Problems Daughter    • No Known Problems Maternal Grandmother    • No Known Problems Maternal Grandfather    • No Known Problems Paternal Grandmother    • No Known Problems Paternal Grandfather    • Breast cancer Cousin 55   • Breast cancer Cousin 55   • Breast cancer Cousin 67   • Ovarian cancer Sister    • Autoimmune disease Sister         Hashimoto's thyroiditis   • Cancer Sister         ovarian   • No Known Problems Daughter    • No Known Problems Paternal Aunt    • No Known Problems Paternal Aunt    • Breast cancer Cousin    • Breast cancer Cousin    • Cancer Paternal Uncle         prostate     Social History     Occupational History   • Occupation: Retired -    Tobacco Use   • Smoking status: Former     Current packs/day: 3.00     Average packs/day: 3.0 packs/day for 20.0 years (60.0 ttl pk-yrs)     Types: Cigarettes   • Smokeless tobacco: Never   • Tobacco comments:     have not smoked for at least 50 years   Vaping Use   • Vaping status: Never Used   Substance and Sexual Activity   • Alcohol use: Never     Comment: 0   • Drug use: No   • Sexual activity: Yes     Partners: Male     Birth control/protection: Other     Comment: MARTÍNEZ, BSO     Allergies   Allergen Reactions   • Acetazolamide GI Intolerance   • Colchicine Other (See Comments)   • Diltiazem Edema   • Docusate Other (See Comments)     impaction   • Eszopiclone    • Febuxostat    • Fentanyl Vomiting     Pt reports vomiting after given fentanyl patch    • Glycerin Other (See Comments)   • Hydrochlorothiazide Other (See Comments) and Headache     Other reaction(s): Other (see comments)  Generalized swelling   • Latex      Other reaction(s): Other (See Comments)  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   • Lorazepam Nausea Only and Other (See Comments)     Other reaction(s): not sleeping, made her feel \"wired\"   • " "Losartan Headache   • Metformin Other (See Comments)     Other reaction(s): GI intolerance   • Nortriptyline Other (See Comments)     Other reaction(s): Other (See Comments)  rash bolivar flexeril     • Other GI Intolerance   • Penicillin G Headache   • Penicillins      Other reaction(s): Other (See Comments)  RASH-anaphylaxis  Other reaction(s): Anaphylaxis   • Povidone Iodine Other (See Comments)     VAGINAL ITCHING   • Ropinirole      Back pain   • Simvastatin Other (See Comments) and Myalgia     muscle aches   • Sulfa Antibiotics      Other reaction(s): Other (See Comments)  bolivar Lasix, GI upset, rash  Other reaction(s): GI Upset   • Torsemide Nausea Only, Vomiting and GI Intolerance     Pt states \"it does not work for me\".    • Valsartan      Other reaction(s): Other (See Comments)  bolivar olmesartan   • Fluticasone      Leg cramps       Current Outpatient Medications:   •  allopurinol (ZYLOPRIM) 100 mg tablet, Take 100 mg by mouth 2 (two) times a day, Disp: , Rfl:   •  ALPRAZolam (XANAX) 0.5 mg tablet, Take 0.5 mg by mouth 3 (three) times a day as needed for anxiety. Indications: Feeling Anxious, Disp: , Rfl:   •  aspirin (ECOTRIN LOW STRENGTH) 81 mg EC tablet, Take 81 mg by mouth daily, Disp: , Rfl:   •  Calcium Carb-Cholecalciferol 250-125 MG-UNIT TABS, Take by mouth daily, Disp: , Rfl:   •  calcium citrate-vitamin D (CITRACAL+D) 315-200 MG-UNIT per tablet, Take 1 tablet by mouth 2 (two) times a day, Disp: , Rfl:   •  cholecalciferol (VITAMIN D3) 1,000 units tablet, Take 2,000 Units by mouth daily, Disp: , Rfl:   •  Cholecalciferol 50 MCG (2000 UT) CAPS, Take 1 capsule by mouth, Disp: , Rfl:   •  denosumab (PROLIA) 60 mg/mL, Inject 60 mg under the skin once 2 times a year , Disp: , Rfl:   •  Denta 5000 Plus 1.1 % CREA, BRUSH TWICE A DAY AS DIRECTED, Disp: , Rfl:   •  erythromycin (ILOTYCIN) ophthalmic ointment, APPLY INTO BOTH EYES AT BEDTIME, Disp: , Rfl:   •  HYDROcodone-acetaminophen (XODOL) 7.5-300 MG per " tablet, Take one tablet by mouth every 5 to 6 hours as needed for pain., Disp: , Rfl:   •  indapamide (LOZOL) 1.25 mg tablet, Take 2.5 mg by mouth daily. call with wts on monday 4 15 24  Indications: Edema, Disp: , Rfl:   •  insulin aspart (NovoLOG FlexPen) 100 UNIT/ML injection pen, Inject 5 Units under the skin as needed, Disp: , Rfl:   •  insulin detemir (Levemir FlexTouch) 100 Units/mL injection pen, 13 Units, Disp: , Rfl:   •  lidocaine (LIDODERM) 5 %, Apply 1 patch topically over 12 hours daily Remove & Discard patch within 12 hours or as directed by MD, Disp: 30 patch, Rfl: 0  •  metoprolol succinate (TOPROL-XL) 50 mg 24 hr tablet, Take 50 mg by mouth daily, Disp: , Rfl:   •  Multiple Vitamin (MULTIVITAMIN) tablet, Take 1 tablet by mouth daily, Disp: , Rfl:   •  Multiple Vitamins-Minerals (PRESERVISION AREDS 2 PO), Take by mouth 2 (two) times a day, Disp: , Rfl:   •  RABEprazole (ACIPHEX) 20 MG tablet, Take 1 tablet (20 mg total) by mouth daily, Disp: 90 tablet, Rfl: 3  •  spironolactone (ALDACTONE) 50 mg tablet, Take 100 mg by mouth daily. Indications: Edema, Disp: , Rfl:   •  VITAMIN E PO, Take by mouth daily, Disp: , Rfl:   •  warfarin (COUMADIN) 2.5 mg tablet, Take 1.5 tablets by mouth daily Hold warfarin until next INR on or about 4/10., Disp: , Rfl:   •  Accu-Chek Lila Plus test strip, [place] [employer]. (Patient not taking: Reported on 3/10/2024), Disp: , Rfl:   •  BD Pen Needle Rena 2nd Gen 32G X 4 MM MISC, , Disp: , Rfl:   •  dapagliflozin (Farxiga) 10 MG tablet, Take 1 tablet (10 mg total) by mouth daily (Patient not taking: Reported on 3/21/2024), Disp: 90 tablet, Rfl: 3  •  glucose blood test strip, , Disp: , Rfl:   •  indapamide (LOZOL) 1.25 mg tablet, Take 1.25 mg by mouth daily. Indications: Edema (Patient not taking: Reported on 4/25/2024), Disp: , Rfl:   •  methocarbamol (ROBAXIN) 500 mg tablet, Take 1 tablet (500 mg total) by mouth 3 (three) times a day, Disp: 90 tablet, Rfl: 0  •   naloxegol oxalate (Movantik) 25 MG tablet, Take 1 tablet (25 mg total) by mouth every other day (Patient taking differently: Take 25 mg by mouth if needed), Disp: 15 tablet, Rfl: 5  •  NON FORMULARY, Take 1 tablet by mouth 4 (four) times a day HydroEye (Patient not taking: Reported on 3/21/2024), Disp: , Rfl:   •  ondansetron (ZOFRAN-ODT) 4 mg disintegrating tablet, Take 1 tablet (4 mg total) by mouth every 6 (six) hours as needed for vomiting or nausea (Patient not taking: Reported on 3/15/2024), Disp: 20 tablet, Rfl: 0  •  spironolactone (ALDACTONE) 50 mg tablet, Take 25 mg by mouth daily, Disp: , Rfl:   •  spironolactone (ALDACTONE) 50 mg tablet, Take 50 mg by mouth daily. Indications: Edema (Patient not taking: Reported on 4/25/2024), Disp: , Rfl:   •  Vyzulta 0.024 % SOLN, instill 1 drop into both eyes at bedtime (Patient not taking: Reported on 3/21/2024), Disp: , Rfl:   •  warfarin (COUMADIN) 5 mg tablet, Take 5 mg by mouth once a week (Patient not taking: Reported on 4/25/2024), Disp: , Rfl:     Non-Operative Proximal Humeral Fracture Rehabilitation Protocol  General Principles:    1. Non-displaced means less than 1cm of displacement and less than 45° of  angulation  2. Bony healing occurs usually within 6 to 8 weeks in adults  3. Extension and Internal Rotation not performed until 6 weeks  4. Return to normal function and motion may require 3 to 4 months  5. No shoulder extension or internal rotation until 6 weeks post-op  Goals:  1. Increase ROM while protecting the fracture site.  2. Control pain and swelling (with exercise and modalities)  3. Perform frequent gentle exercise to prevent adhesion formation    Phase I - Early Motion Phase (0 - 5 weeks)    A. Week 1 Early Passive Motion  1. Wear the sling at all times except to exercise  2. Hand, wrist, elbow, and cervical AROM  3.  and wrist strengthening  4. PROM: supine Flexion to 90º and ER (very gentle)  5. Modalities as needed for pain relief or  inflammation reduction    B. Week 2  1. Apply hot packs 10 minutes before exercising  2. Begin pendulum (Codman) exercises with circles in and out  3. Soft tissue mobilization  4. Supine ER with a stick to 30º  a. Support elbow on a folded towel with shoulder in 15° ABD  5. Scapular Stabilization  a. Scapular clocks  b. Scapular retractions (no shoulder extension)    C. Week 3 - 5 (begin AAROM when pain diminishes and pt is less  apprehensive)  1. Continue all above exercises  2. Begin gentle AAROM flexion to 140º if clinical situation is stable  a. Supine Cane flexion  b. Supine AAROM with therapist assistance or with hands clasped  3. Begin pulley for flexion to tolerance  4. Begin submaximal isometrics ER, and flex (week 3 - 4)  5. Begin flexion and ABD on slide board or table to tolerance    II. Phase II - Active Motion Phase (Weeks 6 - 12)    A. Week 6 - 8 AROM  1. Establish full PROM  2. Begin AROM  a. Supine flexion to patient tolerance  b. Progress to seated (or standing) flexion with a stick  c. Seated flexion with elbow bent and arm close to the body  d. Perform ER and ABD with hands behind head  e. Sidelying ER (pain-free)  f. Serratus Punches  3. Begin Extension and IR: (PROM, AROM and Isometrics)  4. Begin multi-angle isometrics  5. Continue PROM and begin gentle patient self stretching (week 7-8)  a. Flexion: put hand on wall or top of door  b. ER: hold onto door jam and twist  c. IR: use good arm to pull affected arm into IR    B. Week 8 - 10 Early Resisted ROM  1. Begin Theraband for IR, ER, flexion, ABD, and extension  2. Begin supine IR, ER with 1# (arm supported at 15° ABD) (pain-free)  3. Begin UBE with no resistance  4. Prone Ext and ABD (pain-free)  5. Progress to adding weight to above exercises only if pain-free  6. Biceps / Triceps strengthening with dumbbells    Phase III - Aggressive Stretching and Strengthening Phase (beginning week 12)    1. Isotonic strengthening with weights all  directions  2. Increase theraband or use rubber tubing  3. Increase stretches on door and add prone stretches  4. Begin functional or sport activity for strength gain        This Visit:     30 minutes was spent in the coordination of care, reviewing of imaging and with the patient on the date of service      Scribe Attestation    I,:  Soila Holder am acting as a scribe while in the presence of the attending physician.:       I,:  Derek Gutierrez DO personally performed the services described in this documentation    as scribed in my presence.:           Dr. Derek Gutierrez DO, Orthopedic Surgeon  [facility]

## 2024-04-26 ENCOUNTER — HOME CARE VISIT (OUTPATIENT)
Dept: HOME HEALTH SERVICES | Facility: HOME HEALTHCARE | Age: 89
End: 2024-04-26
Payer: MEDICARE

## 2024-04-26 VITALS — OXYGEN SATURATION: 98 % | HEART RATE: 87 BPM | DIASTOLIC BLOOD PRESSURE: 64 MMHG | SYSTOLIC BLOOD PRESSURE: 120 MMHG

## 2024-04-26 PROCEDURE — G0152 HHCP-SERV OF OT,EA 15 MIN: HCPCS

## 2024-04-29 ENCOUNTER — HOME CARE VISIT (OUTPATIENT)
Dept: HOME HEALTH SERVICES | Facility: HOME HEALTHCARE | Age: 89
End: 2024-04-29
Payer: MEDICARE

## 2024-04-29 VITALS — DIASTOLIC BLOOD PRESSURE: 70 MMHG | SYSTOLIC BLOOD PRESSURE: 126 MMHG | OXYGEN SATURATION: 100 % | HEART RATE: 86 BPM

## 2024-04-29 PROCEDURE — G0152 HHCP-SERV OF OT,EA 15 MIN: HCPCS

## 2024-05-01 ENCOUNTER — HOME CARE VISIT (OUTPATIENT)
Dept: HOME HEALTH SERVICES | Facility: HOME HEALTHCARE | Age: 89
End: 2024-05-01
Payer: MEDICARE

## 2024-05-01 VITALS — HEART RATE: 85 BPM | DIASTOLIC BLOOD PRESSURE: 80 MMHG | SYSTOLIC BLOOD PRESSURE: 118 MMHG | OXYGEN SATURATION: 100 %

## 2024-05-01 PROCEDURE — G0152 HHCP-SERV OF OT,EA 15 MIN: HCPCS

## 2024-05-03 ENCOUNTER — TELEPHONE (OUTPATIENT)
Age: 89
End: 2024-05-03

## 2024-05-14 ENCOUNTER — TELEPHONE (OUTPATIENT)
Dept: NEPHROLOGY | Facility: CLINIC | Age: 89
End: 2024-05-14

## 2024-05-14 ENCOUNTER — TELEPHONE (OUTPATIENT)
Age: 89
End: 2024-05-14

## 2024-05-14 NOTE — TELEPHONE ENCOUNTER
Patient called bc she was not able to get her labs done at [facility]. She said they never received the faxed copy of the bw and urine. She would like to reschedule with only dr mendez in [place]. She can only do Tuesdays, Thursdays and Fridays. I was unable to find her an appt with him at all in [place]. Can you see if he is able to accommodate her?    Patient would like the lab slip mailed to her please.

## 2024-05-14 NOTE — TELEPHONE ENCOUNTER
Called pt and rescheduled appt for next available on 9/17 with Maile Cardona in the [place], as Dr Montano has no availability.     Mailing lab slips to pt's home. Pt will complete her labs once received and would like Dr Montano to review and call her if she needs to be seen earlier.

## 2024-06-06 ENCOUNTER — OFFICE VISIT (OUTPATIENT)
Dept: OBGYN CLINIC | Facility: MEDICAL CENTER | Age: 89
End: 2024-06-06

## 2024-06-06 ENCOUNTER — APPOINTMENT (OUTPATIENT)
Dept: RADIOLOGY | Facility: MEDICAL CENTER | Age: 89
End: 2024-06-06
Payer: MEDICARE

## 2024-06-06 VITALS
SYSTOLIC BLOOD PRESSURE: 128 MMHG | HEIGHT: 65 IN | HEART RATE: 86 BPM | BODY MASS INDEX: 34.66 KG/M2 | DIASTOLIC BLOOD PRESSURE: 74 MMHG | WEIGHT: 208 LBS

## 2024-06-06 DIAGNOSIS — S42.215D CLOSED NONDISPLACED FRACTURE OF SURGICAL NECK OF LEFT HUMERUS WITH ROUTINE HEALING, UNSPECIFIED FRACTURE MORPHOLOGY, SUBSEQUENT ENCOUNTER: Primary | ICD-10-CM

## 2024-06-06 DIAGNOSIS — S42.215D CLOSED NONDISPLACED FRACTURE OF SURGICAL NECK OF LEFT HUMERUS WITH ROUTINE HEALING, UNSPECIFIED FRACTURE MORPHOLOGY, SUBSEQUENT ENCOUNTER: ICD-10-CM

## 2024-06-06 PROCEDURE — 73030 X-RAY EXAM OF SHOULDER: CPT

## 2024-06-06 PROCEDURE — 99024 POSTOP FOLLOW-UP VISIT: CPT | Performed by: STUDENT IN AN ORGANIZED HEALTH CARE EDUCATION/TRAINING PROGRAM

## 2024-06-06 NOTE — PROGRESS NOTES
Orthopedic Surgery Office Note  Cristy Fisher (88 y.o. female)   : 1935   MRN: [id number]   Encounter Date: 2024 with Dr. Derek Gutierrez DO  Chief Complaint   Patient presents with    Left Shoulder - Follow-up       Assessment, Plan, & Discussion:     Proximal humerus fracture of left humerus  Reviewed physical exam and imaging with patient at time of visit. Radiographic findings demonstrate comminuted fracture of left proximal humerus with evidence of interval healing.   Elbow range of motion is encouraged as much as possible to avoid flexion contracture, patient has slightly improved in elbow extension however it is painful  Continue with OT for ROM and strengthening - protocol placed in after visit summary. An updated referral was placed for continued OT, she is completing with outside source  Discussed that the next step if she does not regain acceptable range of motion and shoulder mobility, is a total shoulder reverse arthroplasty  Patient would like to avoid surgery as much as possible due to health conditions and comordbidities   Patient should continue with use of walker as necessary        Plan:   Return 6-8 weeks, for Recheck, Repeat X-ray.    Surgery:   No surgery planned at this time      Orders:     Diagnoses and all orders for this visit:    Closed nondisplaced fracture of surgical neck of left humerus with routine healing, unspecified fracture morphology, subsequent encounter  -     XR shoulder 2+ vw left; Future         History of Present Illness:   Interval history 24:  The patient is approximately 13 weeks s/p left proximal humerus fracture sustained from a fall on 3/9/24. At rest pain is rated 2/10. With exercise pain is rated 10/10. She states that extension is extremely painful. She states that her pain is significant around the bicep, almost down to the forearm. She can partially forward flex. She can perform AAROM with sharp pain. She does state that she has noticed  "improvements with ROM in most movements rather than extension. Patient explains that [facility]  PT ROM updates brought by patient due to her PT note being from outside facility:  Shoulder flexion: 46 degrees  Shoulder extension: 50 degrees  Abduction: 52 degrees  External rotation: 11 degrees  Abduction and flexion are painful, external rotation is slightly better      Initial history:   Cristy Fisher is a 88 y.o. female who presents for consultation at the request of No ref. provider found / Home health nursing regarding left proximal humerus fracture. Patient has CHF and chronic kidney disease. Patient states that she had a fall on 3/10/24 where she fell onto her left shoulder and struck her head on a chair near the bed. She was seen in the ED where x-rays were obtained demonstrating a proximal humerus fracture. She was discharged with non-operative protocol and prescribed OT/PT with home health. On presentation today, she states that she is having a significant amount of pain and is unable to move her left arm at all. She is taking hydrocodone, tylenol and would like a muscle relaxer.     Review of Systems  Constitutional: Negative for fatigue, fever or loss of appetite.   HENT: Negative.    Respiratory: Negative for shortness of breath, dyspnea.    Cardiovascular: Negative for chest pain/tightness.   Gastrointestinal: Negative for abdominal pain, N/V.   Endocrine: Negative for cold/heat intolerance, unexplained weight loss/gain.   Genitourinary: Negative for flank pain, dysuria  Skin: Negative for rash.    Psychiatric/Behavioral: Negative for agitation.  All else negative unless otherwise noted in HPI    Physical Exam:   General:  /74   Pulse 86   Ht 5' 5\" (1.651 m)   Wt 94.3 kg (208 lb)   BMI 34.61 kg/m²   Cons: Appears well.  No apparent distress.  Psych: Alert. Oriented x3.  Mood and affect normal.  Eyes: PERRLA, EOMI  Resp: Normal effort.  No audible wheezing or " stridor.  CV: Extremities warm and well perfused.   Abd:    No distention or guarding.   Skin: Warm. No visible lesions.  Neuro: Normal muscle tone.      Orthopedic Exam:   left Shoulder -   No anatomical deformity  Skin is warm and dry to touch with no signs of erythema, ecchymosis, or infection  Moderate soft tissue swelling and ecchymosis noted  TTP over entire deltoid region, anterior and posterior capsule, along the humerus  ROM FF Deferred due to known proximal humerus fracture  MMT: deferred  Demonstrates normal elbow, wrist, and finger motion  2+  distal radial pulse with brisk capillary refill to the fingers  Radial, median, ulnar and motor  and sensory distribution intact  Sensation to light touch intact distally        Imaging/Studies:     Study: XR left shoulder  Date: 6/6/24  Report: No radiologist report was available at this time.   I have personally reviewed imaging and my impression is as follows: Interval healing of comminuted fracture of proximal humerus fracture, similar alignment to previous      Study: XR left shoulder  Date: 5/23/24  Report: No radiologist report was available at this time.   I have personally reviewed imaging and my impression is as follows: XR FROM [facility]: This result has an attachment that is not available. AP and lateral of the left shoulder were obtained and independently interpreted by me. I compared these to outside images. There is a healing comminuted presumably prior intra-articular proximal humerus fracture.  Possibly developing arthritis. This read is limited to orthopedic interpretation. No sign of bola dislocation but concern for posterior displaced articular   fragment         Study: XR left shoulder  Date: 4/10/2024  Report: Report is not available for review today.  I have personally reviewed imaging and my impression is as follows:   IMPRESSION:  Comminuted displaced fracture of the surgical neck of the humerus.    Study: XR left shoulder  Date:  3/10/24  Report: I have read and agree with the radiologist report.  I have personally reviewed imaging and my impression is as follows:   IMPRESSION:     Comminuted humeral head fracture with some anterior displacement of the humeral shaft relative to the glenoid on the transscapular Y view. Chronic deformity of the left clavicle.        Procedures     None performed    Medical, Surgical, Family, and Social History    The patient's medical history, family history, and social history, were reviewed and updated as appropriate.    Past Medical History:   Diagnosis Date    Anemia     Arthritis     also osteoporosis of spine    Cancer (HCC)     endometrial    CHF (congestive heart failure) (HCC)     Chronic atrial fibrillation (HCC) 09/13/2017    Chronic gout 09/13/2017    Chronic kidney disease     Chronic rhinitis 11/08/2018    CKD (chronic kidney disease) stage 3, GFR 30-59 ml/min (HCC) 09/13/2017    Clotting disorder ([facility])     on warfarin    Colon polyp 09/13/2017    Compression fracture of L3 vertebra ([facility])     Coronary artery disease     Diabetes mellitus (HCC)     COMBS (dyspnea on exertion) 11/02/2018    Endometrial cancer (HCC)     age 60    Essential hypertension 09/13/2017    Gastritis 09/13/2017    GERD (gastroesophageal reflux disease)     Heart murmur     Hiatal hernia     Hx of hiatal hernia     Hyperlipidemia     Hypertension     Irregular heart beat     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    Osteoarthritis     Osteoporosis     Pacemaker     Peripheral vascular disease ([facility])     S/P AVR     Spinal stenosis     disk herniation     Urinary tract infection      Past Surgical History:   Procedure Laterality Date    AORTIC VALVE REPLACEMENT      BREAST EXCISIONAL BIOPSY Left 1995    benign    CARDIAC SURGERY      CATARACT EXTRACTION      CHOLECYSTECTOMY      COLONOSCOPY      COLONOSCOPY  09/2012    tubular adenoma    EGD  04/30/2021    [facility] MD.- Multiple stomach polyps, small amount of  heme or coffee-grounds noted but do not feel significant evidence for bleeding.    EGD AND COLONOSCOPY  09/2017    adenomatous polyp/ benign gastric polyp    ESOPHAGOGASTRODUODENOSCOPY  08/2012    FIXATION KYPHOPLASTY      HERNIA REPAIR      x3    HYSTERECTOMY      age 61    INSERT / REPLACE / REMOVE PACEMAKER      IR PELVIC ANGIOGRAM  01/29/2019    JOINT REPLACEMENT Bilateral     knees    OOPHORECTOMY Bilateral     age 61    NH COLONOSCOPY FLX DX W/COLLJ SPEC WHEN PFRMD N/A 09/12/2017    Procedure: EGDwith bxAND COLONOSCOPY with polypectomies;  Surgeon: Andrea Ortega IV, MD;  Location: [facility];  Service: Gastroenterology    NH SIGMOIDOSCOPY FLX DX W/COLLJ SPEC BR/WA IF PFRMD N/A 09/13/2017    Procedure: colonoscopy with hemo clip x 2;  Surgeon: Andrea Ortega IV, MD;  Location: [facility];  Service: Gastroenterology    SKIN BIOPSY      TONSILLECTOMY       Family History   Problem Relation Age of Onset    Diabetes Mother     Hypertension Mother     Heart disease Father     Emphysema Father     Asthma Sister     Cancer Sister         colon    Hypertension Sister     Colon cancer Sister     Autoimmune disease Sister         Diabetes    Diabetes Sister     No Known Problems Daughter     No Known Problems Maternal Grandmother     No Known Problems Maternal Grandfather     No Known Problems Paternal Grandmother     No Known Problems Paternal Grandfather     Breast cancer Cousin 55    Breast cancer Cousin 55    Breast cancer Cousin 67    Ovarian cancer Sister     Autoimmune disease Sister         Hashimoto's thyroiditis    Cancer Sister         ovarian    No Known Problems Daughter     No Known Problems Paternal Aunt     No Known Problems Paternal Aunt     Breast cancer Cousin     Breast cancer Cousin     Cancer Paternal Uncle         prostate     Social History     Occupational History    Occupation: Retired -    Tobacco Use    Smoking status: Former     Current packs/day: 3.00     Average packs/day: 3.0  "packs/day for 20.0 years (60.0 ttl pk-yrs)     Types: Cigarettes    Smokeless tobacco: Never    Tobacco comments:     have not smoked for at least 50 years   Vaping Use    Vaping status: Never Used   Substance and Sexual Activity    Alcohol use: Never     Comment: 0    Drug use: No    Sexual activity: Yes     Partners: Male     Birth control/protection: Other     Comment: MARTÍNEZ, BSO     Allergies   Allergen Reactions    Acetazolamide GI Intolerance    Colchicine Other (See Comments)    Diltiazem Edema    Docusate Other (See Comments)     impaction    Eszopiclone     Febuxostat     Fentanyl Vomiting     Pt reports vomiting after given fentanyl patch     Glycerin Other (See Comments)    Hydrochlorothiazide Other (See Comments) and Headache     Other reaction(s): Other (see comments)  Generalized swelling    Latex      Other reaction(s): Other (See Comments)  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    Lorazepam Nausea Only and Other (See Comments)     Other reaction(s): not sleeping, made her feel \"wired\"    Losartan Headache    Metformin Other (See Comments)     Other reaction(s): GI intolerance    Nortriptyline Other (See Comments)     Other reaction(s): Other (See Comments)  rash bolivar flexeril      Other GI Intolerance    Penicillin G Headache    Penicillins      Other reaction(s): Other (See Comments)  RASH-anaphylaxis  Other reaction(s): Anaphylaxis    Povidone Iodine Other (See Comments)     VAGINAL ITCHING    Ropinirole      Back pain    Simvastatin Other (See Comments) and Myalgia     muscle aches    Sulfa Antibiotics      Other reaction(s): Other (See Comments)  bolivar Lasix, GI upset, rash  Other reaction(s): GI Upset    Torsemide Nausea Only, Vomiting and GI Intolerance     Pt states \"it does not work for me\".     Valsartan      Other reaction(s): Other (See Comments)  bolivar olmesartan    Fluticasone      Leg cramps       Current Outpatient Medications:     allopurinol (ZYLOPRIM) 100 mg tablet, Take 100 mg by mouth 2 (two) " times a day, Disp: , Rfl:     ALPRAZolam (XANAX) 0.5 mg tablet, Take 0.5 mg by mouth 3 (three) times a day as needed for anxiety. Indications: Feeling Anxious, Disp: , Rfl:     aspirin (ECOTRIN LOW STRENGTH) 81 mg EC tablet, Take 81 mg by mouth daily, Disp: , Rfl:     Calcium Carb-Cholecalciferol 250-125 MG-UNIT TABS, Take by mouth daily, Disp: , Rfl:     calcium citrate-vitamin D (CITRACAL+D) 315-200 MG-UNIT per tablet, Take 1 tablet by mouth 2 (two) times a day, Disp: , Rfl:     cholecalciferol (VITAMIN D3) 1,000 units tablet, Take 2,000 Units by mouth daily, Disp: , Rfl:     Cholecalciferol 50 MCG (2000 UT) CAPS, Take 1 capsule by mouth, Disp: , Rfl:     denosumab (PROLIA) 60 mg/mL, Inject 60 mg under the skin once 2 times a year , Disp: , Rfl:     Denta 5000 Plus 1.1 % CREA, BRUSH TWICE A DAY AS DIRECTED, Disp: , Rfl:     erythromycin (ILOTYCIN) ophthalmic ointment, APPLY INTO BOTH EYES AT BEDTIME, Disp: , Rfl:     HYDROcodone-acetaminophen (XODOL) 7.5-300 MG per tablet, Take one tablet by mouth every 5 to 6 hours as needed for pain., Disp: , Rfl:     indapamide (LOZOL) 1.25 mg tablet, Take 2.5 mg by mouth daily. call with wts on monday 4 15 24  Indications: Edema, Disp: , Rfl:     insulin aspart (NovoLOG FlexPen) 100 UNIT/ML injection pen, Inject 5 Units under the skin as needed, Disp: , Rfl:     insulin detemir (Levemir FlexTouch) 100 Units/mL injection pen, 13 Units, Disp: , Rfl:     lidocaine (LIDODERM) 5 %, Apply 1 patch topically over 12 hours daily Remove & Discard patch within 12 hours or as directed by MD, Disp: 30 patch, Rfl: 0    methocarbamol (ROBAXIN) 500 mg tablet, Take 1 tablet (500 mg total) by mouth 3 (three) times a day, Disp: 90 tablet, Rfl: 0    methylPREDNISolone 4 MG tablet therapy pack, Use as directed on package, Disp: 21 tablet, Rfl: 0    metoprolol succinate (TOPROL-XL) 50 mg 24 hr tablet, Take 50 mg by mouth daily, Disp: , Rfl:     Multiple Vitamin (MULTIVITAMIN) tablet, Take 1 tablet  by mouth daily, Disp: , Rfl:     Multiple Vitamins-Minerals (PRESERVISION AREDS 2 PO), Take by mouth 2 (two) times a day, Disp: , Rfl:     RABEprazole (ACIPHEX) 20 MG tablet, Take 1 tablet (20 mg total) by mouth daily, Disp: 90 tablet, Rfl: 3    spironolactone (ALDACTONE) 50 mg tablet, Take 100 mg by mouth daily. Indications: Edema, Disp: , Rfl:     VITAMIN E PO, Take by mouth daily, Disp: , Rfl:     warfarin (COUMADIN) 2.5 mg tablet, Take 1.5 tablets by mouth daily Hold warfarin until next INR on or about 4/10.    Takin 3.75 mg wolf, Disp: , Rfl:     Accu-Chek Lila Plus test strip, [employer]. (Patient not taking: Reported on 3/10/2024), Disp: , Rfl:     BD Pen Needle Rena 2nd Gen 32G X 4 MM MISC, , Disp: , Rfl:     dapagliflozin (Farxiga) 10 MG tablet, Take 1 tablet (10 mg total) by mouth daily (Patient not taking: Reported on 3/21/2024), Disp: 90 tablet, Rfl: 3    glucose blood test strip, , Disp: , Rfl:     indapamide (LOZOL) 1.25 mg tablet, Take 1.25 mg by mouth daily. Indications: Edema (Patient not taking: Reported on 4/25/2024), Disp: , Rfl:     naloxegol oxalate (Movantik) 25 MG tablet, Take 1 tablet (25 mg total) by mouth every other day (Patient taking differently: Take 25 mg by mouth if needed), Disp: 15 tablet, Rfl: 5    NON FORMULARY, Take 1 tablet by mouth 4 (four) times a day HydroEye (Patient not taking: Reported on 3/21/2024), Disp: , Rfl:     ondansetron (ZOFRAN-ODT) 4 mg disintegrating tablet, Take 1 tablet (4 mg total) by mouth every 6 (six) hours as needed for vomiting or nausea (Patient not taking: Reported on 3/15/2024), Disp: 20 tablet, Rfl: 0    spironolactone (ALDACTONE) 50 mg tablet, Take 25 mg by mouth daily, Disp: , Rfl:     spironolactone (ALDACTONE) 50 mg tablet, Take 50 mg by mouth daily. Indications: Edema (Patient not taking: Reported on 4/25/2024), Disp: , Rfl:     Vyzulta 0.024 % SOLN, instill 1 drop into both eyes at bedtime (Patient not taking: Reported on 3/21/2024),  Disp: , Rfl:     warfarin (COUMADIN) 5 mg tablet, Take 5 mg by mouth once a week (Patient not taking: Reported on 4/25/2024), Disp: , Rfl:     Non-Operative Proximal Humeral Fracture Rehabilitation Protocol  General Principles:    1. Non-displaced means less than 1cm of displacement and less than 45° of  angulation  2. Bony healing occurs usually within 6 to 8 weeks in adults  3. Extension and Internal Rotation not performed until 6 weeks  4. Return to normal function and motion may require 3 to 4 months  5. No shoulder extension or internal rotation until 6 weeks post-op  Goals:  1. Increase ROM while protecting the fracture site.  2. Control pain and swelling (with exercise and modalities)  3. Perform frequent gentle exercise to prevent adhesion formation    Phase I - Early Motion Phase (0 - 5 weeks)    A. Week 1 Early Passive Motion  1. Wear the sling at all times except to exercise  2. Hand, wrist, elbow, and cervical AROM  3.  and wrist strengthening  4. PROM: supine Flexion to 90º and ER (very gentle)  5. Modalities as needed for pain relief or inflammation reduction    B. Week 2  1. Apply hot packs 10 minutes before exercising  2. Begin pendulum (Codman) exercises with circles in and out  3. Soft tissue mobilization  4. Supine ER with a stick to 30º  a. Support elbow on a folded towel with shoulder in 15° ABD  5. Scapular Stabilization  a. Scapular clocks  b. Scapular retractions (no shoulder extension)    C. Week 3 - 5 (begin AAROM when pain diminishes and pt is less  apprehensive)  1. Continue all above exercises  2. Begin gentle AAROM flexion to 140º if clinical situation is stable  a. Supine Cane flexion  b. Supine AAROM with therapist assistance or with hands clasped  3. Begin pulley for flexion to tolerance  4. Begin submaximal isometrics ER, and flex (week 3 - 4)  5. Begin flexion and ABD on slide board or table to tolerance    II. Phase II - Active Motion Phase (Weeks 6 - 12)    A. Week 6 - 8  AROM  1. Establish full PROM  2. Begin AROM  a. Supine flexion to patient tolerance  b. Progress to seated (or standing) flexion with a stick  c. Seated flexion with elbow bent and arm close to the body  d. Perform ER and ABD with hands behind head  e. Sidelying ER (pain-free)  f. Serratus Punches  3. Begin Extension and IR: (PROM, AROM and Isometrics)  4. Begin multi-angle isometrics  5. Continue PROM and begin gentle patient self stretching (week 7-8)  a. Flexion: put hand on wall or top of door  b. ER: hold onto door jam and twist  c. IR: use good arm to pull affected arm into IR    B. Week 8 - 10 Early Resisted ROM  1. Begin Theraband for IR, ER, flexion, ABD, and extension  2. Begin supine IR, ER with 1# (arm supported at 15° ABD) (pain-free)  3. Begin UBE with no resistance  4. Prone Ext and ABD (pain-free)  5. Progress to adding weight to above exercises only if pain-free  6. Biceps / Triceps strengthening with dumbbells    Phase III - Aggressive Stretching and Strengthening Phase (beginning week 12)    1. Isotonic strengthening with weights all directions  2. Increase theraband or use rubber tubing  3. Increase stretches on door and add prone stretches  4. Begin functional or sport activity for strength gain        This Visit:     30 minutes was spent in the coordination of care, reviewing of imaging and with the patient on the date of service      Scribe Attestation      I,:  Rosie Huber am acting as a scribe while in the presence of the attending physician.:       I,:  Derek Gutierrez DO personally performed the services described in this documentation    as scribed in my presence.:             Dr. Derek Gutierrez DO, Orthopedic Surgeon  [facility] On[facility]

## 2024-07-08 ENCOUNTER — TELEPHONE (OUTPATIENT)
Dept: NEUROLOGY | Facility: CLINIC | Age: 89
End: 2024-07-08

## 2024-07-09 ENCOUNTER — OFFICE VISIT (OUTPATIENT)
Dept: NEUROLOGY | Facility: CLINIC | Age: 89
End: 2024-07-09
Payer: MEDICARE

## 2024-07-09 VITALS
WEIGHT: 208 LBS | SYSTOLIC BLOOD PRESSURE: 122 MMHG | BODY MASS INDEX: 34.66 KG/M2 | HEIGHT: 65 IN | HEART RATE: 86 BPM | DIASTOLIC BLOOD PRESSURE: 80 MMHG

## 2024-07-09 DIAGNOSIS — R41.9 ALTERATION OF AWARENESS: Primary | ICD-10-CM

## 2024-07-09 PROCEDURE — 99213 OFFICE O/P EST LOW 20 MIN: CPT | Performed by: STUDENT IN AN ORGANIZED HEALTH CARE EDUCATION/TRAINING PROGRAM

## 2024-07-09 NOTE — PROGRESS NOTES
[facility] Neurology Consult  PATIENT:  Cristy Fisher  MRN:  [id number]  :  1935  DATE OF SERVICE:  2024  REFERRED BY: No ref. provider found  PMD: Reed Bach Jr., MD    Assessment/Plan:     Cristy Fisher is a very pleasant 88 y.o. female with a past medical history that includes T2DM, HLD, SUZETTE, HTN, afib, RLS who presents for evaluation of episodes of altered awareness.     Episodes of altered awareness  -unclear etiology, but suspect side effect 2/2 gabapentin, as episodes have completely resolved as of 2 weeks after she discontinued the medication  -will fax [place] forms to reinstate license  -follow up with EP as scheduled     CC:   Altered awareness    History of Present Illness:     88 y.o. female with a past medical history that includes T2DM, HLD, SUZETTE, HTN, afib, RLS who presents for evaluation of episodes of altered awareness.     Interval hx  Since 2 weeks after stopping gabapentin, she has not had any additional episodes. She has no additional new neurologic complaints at this time. She has brought [place] forms with her today and is requesting reinstatement of her license.     Previous Hx  She was initially evaluated by Dr. Camargo on 23. At that time, she endorsed having episodes lasting 10-20 minutes during which she would lose time. She calls these absences and states that they began about 2-3 weeks prior to that initial evaluation. She is able to recount a few examples. States that one day she got up to brush her teeth and found herself in the same position 10 minutes later with no recollection of the time that had passed. She has had similar episodes when she was at the sink washing dishes. She does not believe that she is asleep when these occur. Denies any preceding symptoms prior to these events. She had increased gabapentin a few weeks prior to the onset of these symptoms. Denies any TBI. Denies associated tongue bite, incontinence, loss of tone, or postictal  phase after these events. She was recommended to have rEEG performed, which showed very mild generalized encephalopathy with no evidence of epileptiform activity.     She states to me today that her episodes have completely resolved in the interim since her last visit. She states that she completely discontinued gabapentin and they have resolved since then. She was previously having multiple in one day. She does endorse history of afib and follows with EP. She has a pacer in place and states that she has not been notified of any alarms.      Past Medical History:     Past Medical History:   Diagnosis Date    Anemia     Arthritis     also osteoporosis of spine    Cancer (HCC)     endometrial    CHF (congestive heart failure) (HCC)     Chronic atrial fibrillation (HCC) 09/13/2017    Chronic gout 09/13/2017    Chronic kidney disease     Chronic rhinitis 11/08/2018    CKD (chronic kidney disease) stage 3, GFR 30-59 ml/min (HCC) 09/13/2017    Clotting disorder (HCC)     on warfarin    Colon polyp 09/13/2017    Compression fracture of L3 vertebra (HCC)     Coronary artery disease     Diabetes mellitus (HCC)     COMBS (dyspnea on exertion) 11/02/2018    Endometrial cancer (HCC)     age 60    Essential hypertension 09/13/2017    Gastritis 09/13/2017    GERD (gastroesophageal reflux disease)     Heart murmur     Hiatal hernia     Hx of hiatal hernia     Hyperlipidemia     Hypertension     Irregular heart beat     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    Osteoarthritis     Osteoporosis     Pacemaker     Peripheral vascular disease (HCC)     S/P AVR     Spinal stenosis     disk herniation     Urinary tract infection        Patient Active Problem List   Diagnosis    Hypertension    Stage 3b chronic kidney disease (CKD) (HCC)    Gout    Mechanical heart valve present    Chronic atrial fibrillation (HCC)    Gastritis    Anticoagulated    Benign hypertensive CKD    Cardiomegaly    Diastolic dysfunction    DJD (degenerative joint  disease), multiple sites    Esophagitis, reflux    Fibromyalgia    Fibrous breast lumps    H/O mechanical aortic valve replacement    Hyperuricemia without signs inflammatory arthritis/tophaceous disease    MCI (mild cognitive impairment)    Class 2 severe obesity due to excess calories with serious comorbidity and body mass index (BMI) of 39.0 to 39.9 in adult ([facility])    Osteoarthritis    Primary osteoarthritis involving multiple joints    Proteinuria    Pulmonary artery hypertension ([facility])    RLS (restless legs syndrome)    Secondary hyperparathyroidism, renal ([facility])    Increased frequency of urination    Lump of right breast    Leg cramps    Chronic diastolic congestive heart failure ([facility])    Type 2 diabetes mellitus, with long-term current use of insulin ([facility])    Acute blood loss anemia    Ambulatory dysfunction    Hyponatremia    Adhesive capsulitis of right shoulder    Obstructive sleep apnea    Chronic prescription opiate use    Insomnia    PLMD (periodic limb movement disorder)    Cellulitis of great toe of left foot    Hyperlipidemia associated with type 2 diabetes mellitus  ([facility])    Hyperkalemia    Dry eye syndrome of both eyes    SUZETTE (obstructive sleep apnea)    Lumbar spondylosis    Closed compression fracture of third lumbar vertebra ([facility])    Trochanteric bursitis of both hips    Alteration of awareness    Fracture of neck of left humerus    Fall at home, initial encounter    Age-related osteoporosis with current pathological fracture    Hypersensitivity angiitis ([facility])       Medications:      Current Outpatient Medications   Medication Sig Dispense Refill    allopurinol (ZYLOPRIM) 100 mg tablet Take 100 mg by mouth 2 (two) times a day      ALPRAZolam (XANAX) 0.5 mg tablet Take 0.5 mg by mouth 3 (three) times a day as needed for anxiety. Indications: Feeling Anxious      aspirin (ECOTRIN LOW STRENGTH) 81 mg EC tablet Take 81 mg by mouth daily      Calcium Carb-Cholecalciferol 250-125 MG-UNIT TABS Take by  mouth daily      calcium citrate-vitamin D (CITRACAL+D) 315-200 MG-UNIT per tablet Take 1 tablet by mouth 2 (two) times a day      cholecalciferol (VITAMIN D3) 1,000 units tablet Take 2,000 Units by mouth daily      dapagliflozin (Farxiga) 10 MG tablet Take 1 tablet (10 mg total) by mouth daily (Patient taking differently: Take 10 mg by mouth every other day) 90 tablet 3    denosumab (PROLIA) 60 mg/mL Inject 60 mg under the skin once 2 times a year       Denta 5000 Plus 1.1 % CREA BRUSH TWICE A DAY AS DIRECTED      HYDROcodone-acetaminophen (XODOL) 7.5-300 MG per tablet Take one tablet by mouth every 5 to 6 hours as needed for pain.      indapamide (LOZOL) 1.25 mg tablet Take 2.5 mg by mouth daily. call with wts on monday 4 15 24  Indications: Edema      insulin aspart (NovoLOG FlexPen) 100 UNIT/ML injection pen Inject 5 Units under the skin as needed      insulin detemir (Levemir FlexTouch) 100 Units/mL injection pen 13 Units      lidocaine (LIDODERM) 5 % Apply 1 patch topically over 12 hours daily Remove & Discard patch within 12 hours or as directed by MD 30 patch 0    metoprolol succinate (TOPROL-XL) 50 mg 24 hr tablet Take 50 mg by mouth daily      Multiple Vitamin (MULTIVITAMIN) tablet Take 1 tablet by mouth daily      Multiple Vitamins-Minerals (PRESERVISION AREDS 2 PO) Take by mouth 2 (two) times a day      NON FORMULARY Take 1 tablet by mouth 4 (four) times a day HydroEye      ondansetron (ZOFRAN-ODT) 4 mg disintegrating tablet Take 1 tablet (4 mg total) by mouth every 6 (six) hours as needed for vomiting or nausea 20 tablet 0    RABEprazole (ACIPHEX) 20 MG tablet Take 1 tablet (20 mg total) by mouth daily 90 tablet 3    spironolactone (ALDACTONE) 50 mg tablet Take 25 mg by mouth daily      VITAMIN E PO Take by mouth daily      warfarin (COUMADIN) 2.5 mg tablet Take 1.5 tablets by mouth daily Hold warfarin until next INR on or about 4/10.    Takin 3.75 mg wolf      Accu-Chek Lila Plus test strip [place]  Lite Brand. (Patient not taking: Reported on 7/9/2024)      BD Pen Needle Rena 2nd Gen 32G X 4 MM MISC  (Patient not taking: Reported on 3/10/2024)      Cholecalciferol 50 MCG (2000 UT) CAPS Take 1 capsule by mouth (Patient not taking: Reported on 7/9/2024)      erythromycin (ILOTYCIN) ophthalmic ointment APPLY INTO BOTH EYES AT BEDTIME (Patient not taking: Reported on 7/9/2024)      glucose blood test strip  (Patient not taking: Reported on 3/21/2024)      indapamide (LOZOL) 1.25 mg tablet Take 1.25 mg by mouth daily. Indications: Edema (Patient not taking: Reported on 7/9/2024)      methocarbamol (ROBAXIN) 500 mg tablet Take 1 tablet (500 mg total) by mouth 3 (three) times a day (Patient not taking: Reported on 7/9/2024) 90 tablet 0    methylPREDNISolone 4 MG tablet therapy pack Use as directed on package (Patient not taking: Reported on 7/9/2024) 21 tablet 0    naloxegol oxalate (Movantik) 25 MG tablet Take 1 tablet (25 mg total) by mouth every other day (Patient taking differently: Take 25 mg by mouth if needed) 15 tablet 5    spironolactone (ALDACTONE) 50 mg tablet Take 50 mg by mouth daily. Indications: Edema (Patient not taking: Reported on 4/25/2024)      spironolactone (ALDACTONE) 50 mg tablet Take 100 mg by mouth daily. Indications: Edema (Patient not taking: Reported on 7/9/2024)      Vyzulta 0.024 % SOLN instill 1 drop into both eyes at bedtime (Patient not taking: Reported on 3/21/2024)      warfarin (COUMADIN) 5 mg tablet Take 5 mg by mouth once a week (Patient not taking: Reported on 4/25/2024)       No current facility-administered medications for this visit.        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    Lorazepam Nausea Only and Other (See Comments)     Other reaction(s): not sleeping, made her feel \"wired\"    Losartan Headache    Metformin Other (See Comments)     Other reaction(s): GI intolerance    Nortriptyline Other (See Comments)     Other reaction(s): Other (See Comments)  rash bolivar flexeril      Other GI Intolerance    Penicillin G Headache    Penicillins      Other reaction(s): Other (See Comments)  RASH-anaphylaxis  Other reaction(s): Anaphylaxis    Povidone Iodine Other (See Comments)     VAGINAL ITCHING    Ropinirole      Back pain    Simvastatin Other (See Comments) and Myalgia     muscle aches    Sulfa Antibiotics      Other reaction(s): Other (See Comments)  bolivar Lasix, GI upset, rash  Other reaction(s): GI Upset    Torsemide Nausea Only, Vomiting and GI Intolerance     Pt states \"it does not work for me\".     Valsartan      Other reaction(s): Other (See Comments)  bolivar olmesartan    Fluticasone      Leg cramps       Family History:     Family History   Problem Relation Age of Onset    Diabetes Mother     Hypertension Mother     Heart disease Father     Emphysema Father     Asthma Sister     Cancer Sister         colon    Hypertension Sister     Colon cancer Sister     Autoimmune disease Sister         Diabetes    Diabetes Sister     No Known Problems Daughter     No Known Problems Maternal Grandmother     No Known Problems Maternal Grandfather     No Known Problems Paternal Grandmother     No Known Problems Paternal Grandfather     Breast cancer Cousin 55    Breast cancer Cousin 55    Breast cancer Cousin 67    Ovarian cancer Sister     Autoimmune disease Sister         Hashimoto's thyroiditis    Cancer Sister         ovarian    No Known Problems Daughter     No Known Problems Paternal Aunt     No Known Problems " "Paternal Aunt     Breast cancer Cousin     Breast cancer Cousin     Cancer Paternal Uncle         prostate       Social History:       Social History     Socioeconomic History    Marital status:      Spouse name: Not on file    Number of children: Not on file    Years of education: Not on file    Highest education level: Not on file   Occupational History    Occupation: Retired -    Tobacco Use    Smoking status: Former     Current packs/day: 3.00     Average packs/day: 3.0 packs/day for 20.0 years (60.0 ttl pk-yrs)     Types: Cigarettes    Smokeless tobacco: Never    Tobacco comments:     have not smoked for at least 50 years   Vaping Use    Vaping status: Never Used   Substance and Sexual Activity    Alcohol use: Never     Comment: 0    Drug use: No    Sexual activity: Yes     Partners: Male     Birth control/protection: Other     Comment: MARTÍNEZ, MINGO   Other Topics Concern    Not on file   Social History Narrative    Not on file     Social Determinants of Health     Financial Resource Strain: Not on file   Food Insecurity: Not on file   Transportation Needs: Not on file   Physical Activity: Not on file   Stress: Not on file   Social Connections: Not on file   Intimate Partner Violence: Not on file   Housing Stability: Not on file         Objective:   /80 (BP Location: Right arm, Patient Position: Sitting, Cuff Size: Large)   Pulse 86   Ht 5' 5\" (1.651 m)   Wt 94.3 kg (208 lb)   BMI 34.61 kg/m²     General: Patient is not in any acute/apparent distress, well nourished, well developed and cooperative.   HEENT: normocephalic, atraumatic, moist membranes  Neck: supple  Extremities: no edema noted   Skin: no lesions or rash  Musculosketal: no bony abnormalities    Neurologic Examination:   Mental status: alert, awake, oriented X 3 and following commands.     Speech/Language: Speech is fluent without any dysarthria, no aphasia noted, can name, repeat, comprehension intact    Cranial Nerves: "   CN I: smell not tested  CN II: Visual fields full to confrontation  CN III, IV, VI: Extraocular movements intact bilaterally. Pupils equal round and reactive to light bilaterally.  CN V: Facial sensation is normal.  CN VII: Full and symmetric facial movement.  CN VIII: Hearing is normal.  CN IX, X: Palate elevates symmetrically.  CN XI: Shoulder shrug strength is normal.  CN XII: Tongue midline without atrophy or fasciculations.    Motor:   Strength 5/5 in all 4 extremities. Bulk/tone - normal.  Fasiculations - none    Sensory:   Sensation intact to soft touch in all 4 extremities.    Imaging:   MRI brain 12/15/23  FINDINGS:     PARENCHYMA: Decreased attenuation is noted in periventricular and subcortical white matter demonstrating an appearance that is statistically most likely to represent mild microangiopathic change.        No CT signs of acute infarction.  No intracranial mass, mass effect or midline shift.  No acute parenchymal hemorrhage.     VENTRICLES AND EXTRA-AXIAL SPACES:  Normal for the patient's age.     VISUALIZED ORBITS: Bilateral lens implants noted     PARANASAL SINUSES: Mild mucosal thickening left sphenoid sinus and right maxillary sinus.     CALVARIUM AND EXTRACRANIAL SOFT TISSUES: Hyperostosis frontalis internus.     IMPRESSION:  1. No acute intracerebral hemorrhage, mass effect or edema.     2.  Mild microangiopathic changes.        Review of Systems:     ROS:    Review of Systems   Constitutional:  Negative for appetite change, fatigue and fever.   HENT: Negative.  Negative for hearing loss, tinnitus, trouble swallowing and voice change.    Eyes:  Positive for photophobia. Negative for pain and visual disturbance.   Respiratory: Negative.  Negative for shortness of breath.    Cardiovascular: Negative.  Negative for palpitations.   Gastrointestinal: Negative.  Negative for nausea and vomiting.   Endocrine: Negative.  Negative for cold intolerance.   Genitourinary:  Positive for frequency.  Negative for dysuria and urgency.   Musculoskeletal:  Positive for back pain and myalgias. Negative for gait problem, neck pain and neck stiffness.   Skin: Negative.  Negative for rash.   Allergic/Immunologic: Negative.    Neurological:  Positive for numbness (Feet and legs) and headaches (Sometimes). Negative for dizziness, tremors, seizures, syncope, facial asymmetry, speech difficulty, weakness and light-headedness.   Hematological:  Bruises/bleeds easily.   Psychiatric/Behavioral: Negative.  Negative for confusion, hallucinations and sleep disturbance.      I have spent a total time of 21 minutes on 07/09/24 in caring for this patient including Risks and benefits of tx options, Instructions for management, Patient and family education, Risk factor reductions, Impressions, Documenting in the medical record, Reviewing / ordering tests, medicine, procedures  , and Obtaining or reviewing history  .

## 2024-07-25 ENCOUNTER — OFFICE VISIT (OUTPATIENT)
Dept: OBGYN CLINIC | Facility: MEDICAL CENTER | Age: 89
End: 2024-07-25
Payer: MEDICARE

## 2024-07-25 ENCOUNTER — APPOINTMENT (OUTPATIENT)
Dept: RADIOLOGY | Facility: MEDICAL CENTER | Age: 89
End: 2024-07-25
Payer: MEDICARE

## 2024-07-25 VITALS
BODY MASS INDEX: 35.65 KG/M2 | DIASTOLIC BLOOD PRESSURE: 70 MMHG | SYSTOLIC BLOOD PRESSURE: 130 MMHG | HEART RATE: 80 BPM | HEIGHT: 65 IN | WEIGHT: 214 LBS

## 2024-07-25 DIAGNOSIS — S42.215D CLOSED NONDISPLACED FRACTURE OF SURGICAL NECK OF LEFT HUMERUS WITH ROUTINE HEALING, UNSPECIFIED FRACTURE MORPHOLOGY, SUBSEQUENT ENCOUNTER: ICD-10-CM

## 2024-07-25 DIAGNOSIS — M25.512 LEFT SHOULDER PAIN, UNSPECIFIED CHRONICITY: ICD-10-CM

## 2024-07-25 DIAGNOSIS — S42.215D CLOSED NONDISPLACED FRACTURE OF SURGICAL NECK OF LEFT HUMERUS WITH ROUTINE HEALING, UNSPECIFIED FRACTURE MORPHOLOGY, SUBSEQUENT ENCOUNTER: Primary | ICD-10-CM

## 2024-07-25 PROCEDURE — 99214 OFFICE O/P EST MOD 30 MIN: CPT | Performed by: STUDENT IN AN ORGANIZED HEALTH CARE EDUCATION/TRAINING PROGRAM

## 2024-07-25 PROCEDURE — 73030 X-RAY EXAM OF SHOULDER: CPT

## 2024-07-25 NOTE — PROGRESS NOTES
Orthopedic Surgery Office Note  Cristy Fisher (89 y.o. female)   : 1935   MRN: [id number]   Encounter Date: 2024 with Dr. Derek Gutierrez DO  Chief Complaint   Patient presents with    Left Shoulder - Follow-up       Assessment, Plan, & Discussion:     Proximal humerus fracture of left humerus  Reviewed physical exam and imaging with patient at time of visit. Radiographic findings demonstrate comminuted fracture of left proximal humerus with evidence of healing.   Elbow range of motion is encouraged as much as possible to avoid contracture  Continue with OT for ROM and strengthening - protocol placed in after visit summary. An updated referral was placed for continued OT, she is completing with outside source  She does not want to undergo a total shoulder reverse arthroplasty due to her comorbidities    Patient can continue with use of walker as necessary      Plan:   No follow-ups on file. F/u as needed    Surgery:   No surgery planned at this time      Orders:     Diagnoses and all orders for this visit:    Closed nondisplaced fracture of surgical neck of left humerus with routine healing, unspecified fracture morphology, subsequent encounter  -     XR shoulder 2+ vw left; Future    Left shoulder pain, unspecified chronicity         History of Present Illness:   Interval history 24:  The patient is approximately 4.5 months s/p left proximal humerus fracture sustained from a fall on 3/9/24. At rest pain is rated 2/10. She states that external rotation is painful. She still has pain is around the distal bicep and forearm. She has difficulty lifting her arm up past 45 degrees. She can perform AAROM with sharp pain. She does state that she has noticed improvements with ROM in most movements rather than external rotation.  She states she can do her ADL. She does not want surgery.     PT ROM updates brought by patient due to her PT note being from outside facility:  Shoulder flexion: 58  "degrees  Shoulder extension: 38 degrees  Abduction: 54 degrees  External rotation: 14 degrees      Initial history:   Cristy Fisher is a 89 y.o. female who presents for consultation at the request of No ref. provider found / Home health nursing regarding left proximal humerus fracture. Patient has CHF and chronic kidney disease. Patient states that she had a fall on 3/10/24 where she fell onto her left shoulder and struck her head on a chair near the bed. She was seen in the ED where x-rays were obtained demonstrating a proximal humerus fracture. She was discharged with non-operative protocol and prescribed OT/PT with home health. On presentation today, she states that she is having a significant amount of pain and is unable to move her left arm at all. She is taking hydrocodone, tylenol and would like a muscle relaxer.     Review of Systems  Constitutional: Negative for fatigue, fever or loss of appetite.   HENT: Negative.    Respiratory: Negative for shortness of breath, dyspnea.    Cardiovascular: Negative for chest pain/tightness.   Gastrointestinal: Negative for abdominal pain, N/V.   Endocrine: Negative for cold/heat intolerance, unexplained weight loss/gain.   Genitourinary: Negative for flank pain, dysuria  Skin: Negative for rash.    Psychiatric/Behavioral: Negative for agitation.  All else negative unless otherwise noted in HPI    Physical Exam:   General:  /70   Pulse 80   Ht 5' 5\" (1.651 m)   Wt 97.1 kg (214 lb)   BMI 35.61 kg/m²   Cons: Appears well.  No apparent distress.  Psych: Alert. Oriented x3.  Mood and affect normal.  Eyes: PERRLA, EOMI  Resp: Normal effort.  No audible wheezing or stridor.  CV: Extremities warm and well perfused.   Abd:    No distention or guarding.   Skin: Warm. No visible lesions.  Neuro: Normal muscle tone.      Orthopedic Exam:   left Shoulder -   No anatomical deformity  Skin is warm and dry to touch with no signs of erythema, ecchymosis, or infection  Moderate " soft tissue swelling and ecchymosis noted  TTP over entire deltoid region, anterior and posterior capsule, along the humerus  ROM FF 60, ER 15  MMT: deferred  Demonstrates normal elbow, wrist, and finger motion  2+  distal radial pulse with brisk capillary refill to the fingers  Radial, median, ulnar and motor  and sensory distribution intact  Sensation to light touch intact distally        Imaging/Studies:     Study: XR left shoulder  Date: 7/25/24  Report: No radiologist report was available at this time.   I have personally reviewed imaging and my impression is as follows: healed of comminuted fracture of proximal humerus fracture involving the surgical neck, greater tuberosity, and lesser tuberosity with similar alignment to previous      Study: XR left shoulder  Date: 5/23/24  Report: No radiologist report was available at this time.   I have personally reviewed imaging and my impression is as follows: XR FROM [facility]: This result has an attachment that is not available. AP and lateral of the left shoulder were obtained and independently interpreted by me. I compared these to outside images. There is a healing comminuted presumably prior intra-articular proximal humerus fracture.  Possibly developing arthritis. This read is limited to orthopedic interpretation. No sign of bola dislocation but concern for posterior displaced articular   fragment     Study: XR left shoulder  Date: 4/10/2024  Report: Report is not available for review today.  I have personally reviewed imaging and my impression is as follows:   IMPRESSION: Comminuted displaced fracture of the surgical neck of the humerus.    Study: XR left shoulder  Date: 3/10/24  Report: I have read and agree with the radiologist report.  I have personally reviewed imaging and my impression is as follows:   IMPRESSION: Comminuted humeral head fracture with some anterior displacement of the humeral shaft relative to the glenoid on the transscapular Y view. Chronic  deformity of the left clavicle.    Medical, Surgical, Family, and Social History    The patient's medical history, family history, and social history, were reviewed and updated as appropriate.    Past Medical History:   Diagnosis Date    Anemia     Arthritis     also osteoporosis of spine    Cancer (HCC)     endometrial    CHF (congestive heart failure) (HCC)     Chronic atrial fibrillation (HCC) 09/13/2017    Chronic gout 09/13/2017    Chronic kidney disease     Chronic rhinitis 11/08/2018    CKD (chronic kidney disease) stage 3, GFR 30-59 ml/min (HCC) 09/13/2017    Clotting disorder (HCC)     on warfarin    Colon polyp 09/13/2017    Compression fracture of L3 vertebra (HCC)     Coronary artery disease     Diabetes mellitus (HCC)     COMBS (dyspnea on exertion) 11/02/2018    Endometrial cancer (HCC)     age 60    Essential hypertension 09/13/2017    Gastritis 09/13/2017    GERD (gastroesophageal reflux disease)     Heart murmur     Hiatal hernia     Hx of hiatal hernia     Hyperlipidemia     Hypertension     Irregular heart beat     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    Osteoarthritis     Osteoporosis     Pacemaker     Peripheral vascular disease (HCC)     S/P AVR     Spinal stenosis     disk herniation     Urinary tract infection      Past Surgical History:   Procedure Laterality Date    AORTIC VALVE REPLACEMENT      BREAST EXCISIONAL BIOPSY Left 1995    benign    CARDIAC SURGERY      CATARACT EXTRACTION      CHOLECYSTECTOMY      COLONOSCOPY      COLONOSCOPY  09/2012    tubular adenoma    EGD  04/30/2021    [facility] MD.- Multiple stomach polyps, small amount of heme or coffee-grounds noted but do not feel significant evidence for bleeding.    EGD AND COLONOSCOPY  09/2017    adenomatous polyp/ benign gastric polyp    ESOPHAGOGASTRODUODENOSCOPY  08/2012    FIXATION KYPHOPLASTY      HERNIA REPAIR      x3    HYSTERECTOMY      age 61    INSERT / REPLACE / REMOVE PACEMAKER      IR PELVIC ANGIOGRAM   01/29/2019    JOINT REPLACEMENT Bilateral     knees    OOPHORECTOMY Bilateral     age 61    DC COLONOSCOPY FLX DX W/COLLJ SPEC WHEN PFRMD N/A 09/12/2017    Procedure: EGDwith bxAND COLONOSCOPY with polypectomies;  Surgeon: Andrea Ortega IV, MD;  Location: [facility];  Service: Gastroenterology    DC SIGMOIDOSCOPY FLX DX W/COLLJ SPEC BR/WA IF PFRMD N/A 09/13/2017    Procedure: colonoscopy with hemo clip x 2;  Surgeon: Andrea Ortega IV, MD;  Location: [facility];  Service: Gastroenterology    SKIN BIOPSY      TONSILLECTOMY       Family History   Problem Relation Age of Onset    Diabetes Mother     Hypertension Mother     Heart disease Father     Emphysema Father     Asthma Sister     Cancer Sister         colon    Hypertension Sister     Colon cancer Sister     Autoimmune disease Sister         Diabetes    Diabetes Sister     No Known Problems Daughter     No Known Problems Maternal Grandmother     No Known Problems Maternal Grandfather     No Known Problems Paternal Grandmother     No Known Problems Paternal Grandfather     Breast cancer Cousin 55    Breast cancer Cousin 55    Breast cancer Cousin 67    Ovarian cancer Sister     Autoimmune disease Sister         Hashimoto's thyroiditis    Cancer Sister         ovarian    No Known Problems Daughter     No Known Problems Paternal Aunt     No Known Problems Paternal Aunt     Breast cancer Cousin     Breast cancer Cousin     Cancer Paternal Uncle         prostate     Social History     Occupational History    Occupation: Retired -    Tobacco Use    Smoking status: Former     Current packs/day: 3.00     Average packs/day: 3.0 packs/day for 20.0 years (60.0 ttl pk-yrs)     Types: Cigarettes    Smokeless tobacco: Never    Tobacco comments:     have not smoked for at least 50 years   Vaping Use    Vaping status: Never Used   Substance and Sexual Activity    Alcohol use: Never     Comment: 0    Drug use: No    Sexual activity: Yes     Partners: Male     Birth  "control/protection: Other     Comment: MARTÍNEZ, BSO     Allergies   Allergen Reactions    Acetazolamide GI Intolerance    Colchicine Other (See Comments)    Diltiazem Edema    Docusate Other (See Comments)     impaction    Eszopiclone     Febuxostat     Fentanyl Vomiting     Pt reports vomiting after given fentanyl patch     Glycerin Other (See Comments)    Hydrochlorothiazide Other (See Comments) and Headache     Other reaction(s): Other (see comments)  Generalized swelling    Latex      Other reaction(s): Other (See Comments)  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    Lorazepam Nausea Only and Other (See Comments)     Other reaction(s): not sleeping, made her feel \"wired\"    Losartan Headache    Metformin Other (See Comments)     Other reaction(s): GI intolerance    Nortriptyline Other (See Comments)     Other reaction(s): Other (See Comments)  rash bolivar flexeril      Other GI Intolerance    Penicillin G Headache    Penicillins      Other reaction(s): Other (See Comments)  RASH-anaphylaxis  Other reaction(s): Anaphylaxis    Povidone Iodine Other (See Comments)     VAGINAL ITCHING    Ropinirole      Back pain    Simvastatin Other (See Comments) and Myalgia     muscle aches    Sulfa Antibiotics      Other reaction(s): Other (See Comments)  bolivar Lasix, GI upset, rash  Other reaction(s): GI Upset    Torsemide Nausea Only, Vomiting and GI Intolerance     Pt states \"it does not work for me\".     Valsartan      Other reaction(s): Other (See Comments)  bolivar olmesartan    Fluticasone      Leg cramps       Current Outpatient Medications:     allopurinol (ZYLOPRIM) 100 mg tablet, Take 100 mg by mouth 2 (two) times a day, Disp: , Rfl:     ALPRAZolam (XANAX) 0.5 mg tablet, Take 0.5 mg by mouth 3 (three) times a day as needed for anxiety. Indications: Feeling Anxious, Disp: , Rfl:     aspirin (ECOTRIN LOW STRENGTH) 81 mg EC tablet, Take 81 mg by mouth daily, Disp: , Rfl:     Calcium Carb-Cholecalciferol 250-125 MG-UNIT TABS, Take by mouth " daily, Disp: , Rfl:     calcium citrate-vitamin D (CITRACAL+D) 315-200 MG-UNIT per tablet, Take 1 tablet by mouth 2 (two) times a day, Disp: , Rfl:     cholecalciferol (VITAMIN D3) 1,000 units tablet, Take 2,000 Units by mouth daily, Disp: , Rfl:     dapagliflozin (Farxiga) 10 MG tablet, Take 1 tablet (10 mg total) by mouth daily (Patient taking differently: Take 10 mg by mouth every other day), Disp: 90 tablet, Rfl: 3    denosumab (PROLIA) 60 mg/mL, Inject 60 mg under the skin once 2 times a year , Disp: , Rfl:     Denta 5000 Plus 1.1 % CREA, BRUSH TWICE A DAY AS DIRECTED, Disp: , Rfl:     HYDROcodone-acetaminophen (XODOL) 7.5-300 MG per tablet, Take one tablet by mouth every 5 to 6 hours as needed for pain., Disp: , Rfl:     indapamide (LOZOL) 1.25 mg tablet, Take 2.5 mg by mouth daily. call with wts on monday 4 15 24  Indications: Edema, Disp: , Rfl:     insulin aspart (NovoLOG FlexPen) 100 UNIT/ML injection pen, Inject 5 Units under the skin as needed, Disp: , Rfl:     insulin detemir (Levemir FlexTouch) 100 Units/mL injection pen, 13 Units, Disp: , Rfl:     lidocaine (LIDODERM) 5 %, Apply 1 patch topically over 12 hours daily Remove & Discard patch within 12 hours or as directed by MD, Disp: 30 patch, Rfl: 0    metoprolol succinate (TOPROL-XL) 50 mg 24 hr tablet, Take 50 mg by mouth daily, Disp: , Rfl:     Multiple Vitamin (MULTIVITAMIN) tablet, Take 1 tablet by mouth daily, Disp: , Rfl:     Multiple Vitamins-Minerals (PRESERVISION AREDS 2 PO), Take by mouth 2 (two) times a day, Disp: , Rfl:     NON FORMULARY, Take 1 tablet by mouth 4 (four) times a day HydroEye, Disp: , Rfl:     ondansetron (ZOFRAN-ODT) 4 mg disintegrating tablet, Take 1 tablet (4 mg total) by mouth every 6 (six) hours as needed for vomiting or nausea, Disp: 20 tablet, Rfl: 0    RABEprazole (ACIPHEX) 20 MG tablet, Take 1 tablet (20 mg total) by mouth daily, Disp: 90 tablet, Rfl: 3    VITAMIN E PO, Take by mouth daily, Disp: , Rfl:     warfarin  (COUMADIN) 2.5 mg tablet, Take 1.5 tablets by mouth daily Hold warfarin until next INR on or about 4/10.    Takin 3.75 mg wolf, Disp: , Rfl:     Accu-Chek Lila Plus test strip, [place] [employer]. (Patient not taking: Reported on 7/9/2024), Disp: , Rfl:     BD Pen Needle Rena 2nd Gen 32G X 4 MM MISC, , Disp: , Rfl:     Cholecalciferol 50 MCG (2000 UT) CAPS, Take 1 capsule by mouth (Patient not taking: Reported on 7/9/2024), Disp: , Rfl:     erythromycin (ILOTYCIN) ophthalmic ointment, APPLY INTO BOTH EYES AT BEDTIME (Patient not taking: Reported on 7/9/2024), Disp: , Rfl:     glucose blood test strip, , Disp: , Rfl:     indapamide (LOZOL) 1.25 mg tablet, Take 1.25 mg by mouth daily. Indications: Edema (Patient not taking: Reported on 7/9/2024), Disp: , Rfl:     methocarbamol (ROBAXIN) 500 mg tablet, Take 1 tablet (500 mg total) by mouth 3 (three) times a day (Patient not taking: Reported on 7/9/2024), Disp: 90 tablet, Rfl: 0    methylPREDNISolone 4 MG tablet therapy pack, Use as directed on package (Patient not taking: Reported on 7/9/2024), Disp: 21 tablet, Rfl: 0    naloxegol oxalate (Movantik) 25 MG tablet, Take 1 tablet (25 mg total) by mouth every other day (Patient taking differently: Take 25 mg by mouth if needed), Disp: 15 tablet, Rfl: 5    spironolactone (ALDACTONE) 50 mg tablet, Take 25 mg by mouth daily, Disp: , Rfl:     spironolactone (ALDACTONE) 50 mg tablet, Take 50 mg by mouth daily. Indications: Edema (Patient not taking: Reported on 4/25/2024), Disp: , Rfl:     spironolactone (ALDACTONE) 50 mg tablet, Take 100 mg by mouth daily. Indications: Edema (Patient not taking: Reported on 7/9/2024), Disp: , Rfl:     Vyzulta 0.024 % SOLN, instill 1 drop into both eyes at bedtime (Patient not taking: Reported on 3/21/2024), Disp: , Rfl:     warfarin (COUMADIN) 5 mg tablet, Take 5 mg by mouth once a week (Patient not taking: Reported on 4/25/2024), Disp: , Rfl:     Non-Operative Proximal Humeral Fracture  Rehabilitation Protocol  General Principles:    1. Non-displaced means less than 1cm of displacement and less than 45° of  angulation  2. Bony healing occurs usually within 6 to 8 weeks in adults  3. Extension and Internal Rotation not performed until 6 weeks  4. Return to normal function and motion may require 3 to 4 months  5. No shoulder extension or internal rotation until 6 weeks post-op  Goals:  1. Increase ROM while protecting the fracture site.  2. Control pain and swelling (with exercise and modalities)  3. Perform frequent gentle exercise to prevent adhesion formation    Phase I - Early Motion Phase (0 - 5 weeks)    A. Week 1 Early Passive Motion  1. Wear the sling at all times except to exercise  2. Hand, wrist, elbow, and cervical AROM  3.  and wrist strengthening  4. PROM: supine Flexion to 90º and ER (very gentle)  5. Modalities as needed for pain relief or inflammation reduction    B. Week 2  1. Apply hot packs 10 minutes before exercising  2. Begin pendulum (Codman) exercises with circles in and out  3. Soft tissue mobilization  4. Supine ER with a stick to 30º  a. Support elbow on a folded towel with shoulder in 15° ABD  5. Scapular Stabilization  a. Scapular clocks  b. Scapular retractions (no shoulder extension)    C. Week 3 - 5 (begin AAROM when pain diminishes and pt is less  apprehensive)  1. Continue all above exercises  2. Begin gentle AAROM flexion to 140º if clinical situation is stable  a. Supine Cane flexion  b. Supine AAROM with therapist assistance or with hands clasped  3. Begin pulley for flexion to tolerance  4. Begin submaximal isometrics ER, and flex (week 3 - 4)  5. Begin flexion and ABD on slide board or table to tolerance    II. Phase II - Active Motion Phase (Weeks 6 - 12)    A. Week 6 - 8 AROM  1. Establish full PROM  2. Begin AROM  a. Supine flexion to patient tolerance  b. Progress to seated (or standing) flexion with a stick  c. Seated flexion with elbow bent and arm  close to the body  d. Perform ER and ABD with hands behind head  e. Sidelying ER (pain-free)  f. Serratus Punches  3. Begin Extension and IR: (PROM, AROM and Isometrics)  4. Begin multi-angle isometrics  5. Continue PROM and begin gentle patient self stretching (week 7-8)  a. Flexion: put hand on wall or top of door  b. ER: hold onto door jam and twist  c. IR: use good arm to pull affected arm into IR    B. Week 8 - 10 Early Resisted ROM  1. Begin Theraband for IR, ER, flexion, ABD, and extension  2. Begin supine IR, ER with 1# (arm supported at 15° ABD) (pain-free)  3. Begin UBE with no resistance  4. Prone Ext and ABD (pain-free)  5. Progress to adding weight to above exercises only if pain-free  6. Biceps / Triceps strengthening with dumbbells    Phase III - Aggressive Stretching and Strengthening Phase (beginning week 12)    1. Isotonic strengthening with weights all directions  2. Increase theraband or use rubber tubing  3. Increase stretches on door and add prone stretches  4. Begin functional or sport activity for strength gain        This Visit:     30 minutes was spent in the coordination of care, reviewing of imaging and with the patient on the date of service      Scribe Attestation      I,:   am acting as a scribe while in the presence of the attending physician.:       I,:   personally performed the services described in this documentation    as scribed in my presence.:             Dr. Derek Gutierrez, DO, Orthopedic Surgeon  Orthopedic Oncology & [facility]

## 2024-08-02 ENCOUNTER — TELEPHONE (OUTPATIENT)
Dept: SCHEDULING | Facility: CLINIC | Age: 89
End: 2024-08-02
Payer: MEDICARE

## 2024-08-19 ASSESSMENT — ENCOUNTER SYMPTOMS
PSYCHIATRIC NEGATIVE: 1
ENDOCRINE NEGATIVE: 1
GASTROINTESTINAL NEGATIVE: 1
WEIGHT GAIN: 0
DYSPNEA ON EXERTION: 0
WEAKNESS: 0
HEMATOLOGIC/LYMPHATIC NEGATIVE: 1
EYES NEGATIVE: 1
SHORTNESS OF BREATH: 0

## 2024-08-19 NOTE — PROGRESS NOTES
"     Electrophysiology  Outpatient Progress Note       Reason for visit:   Chief Complaint   Patient presents with    Follow-up       HPI   Ailyn St is a 89 y.o. female who is seen today for follow up of her implanted biventricular pacemaker indicated for complete heart block during AV node ablation in .  She has chronic atrial fibrillation.  She had a new LV lead along with a new RV lead placed in 2018.  Her previous RV lead had chronically elevated thresholds and had been turned off due to this.  That RV lead is abandoned and capped.  The atrial port is capped.     In 2019 she underwent a cardiac catheterization at an outside institution which showed no obstructive disease. Most recent echocardiogram showed normal EF.  She had been having increasing lower extremity edema and abdominal distention, with previous intolerance to loop diuretics.  She started Farxiga saw improvement.   She is chronically bothered by back issues secondary to recent vertebral fracture.    In the office today she reports a fall resulting in a \"fractured shoulder\". She also reports an episode of CHF which was treated with aldactone while home. She had gained 15 lbs. She then had NANDO and aldactone was stopped. She is now on Lasix daily and is doing well.     Past Medical History:   Diagnosis Date    Abnormal ECG     Anticoagulated on Coumadin 2018    Arthritis     Fatigue 2018    GERD (gastroesophageal reflux disease)     Hypertension     Osteoporosis     S/P AVR 2018    SOB (shortness of breath) 2018    Type 2 diabetes mellitus (CMS/[facility])      Past Surgical History:   Procedure Laterality Date    ADENOIDECTOMY      AORTIC VALVE REPLACEMENT      CARDIAC CATHETERIZATION      CHOLECYSTECTOMY      TONSILLECTOMY      TOTAL ABDOMINAL HYSTERECTOMY W/ BILATERAL SALPINGOOPHORECTOMY         Social History     Tobacco Use    Smoking status: Former     Types: Cigarettes     Quit date: 1966     Years since quittin.7 "    Smokeless tobacco: Never    Tobacco comments:     stoppe in 1966   Vaping Use    Vaping Use: Never used   Substance Use Topics    Alcohol use: Yes     Comment: rarely    Drug use: Defer     Family History   Problem Relation Age of Onset    Diabetes Biological Mother     Heart disease Biological Father        ALLERGY:  Penicillins, Colace [docusate sodium], Hydrochlorothiazide, Losartan potassium, Metformin, Requip [ropinirole], Sulfa (sulfonamide antibiotics), Valsartan, and Latex    Current Outpatient Medications   Medication Sig Dispense Refill    acetaminophen/diphenhydramine (TYLENOL PM EXTRA STRENGTH ORAL) Take by mouth as needed.      allopurinoL (ZYLOPRIM) 100 mg tablet Take 100 mg by mouth 2 (two) times a day.      ALPRAZolam (XANAX) 0.5 mg tablet Take 0.5 mg by mouth 3 times daily as needed.      B-complex with vitamin C tablet Take 1 tablet by mouth daily.      calcium carbonate-vitamin D3 250-125 mg-unit tablet Take by mouth daily.      cholecalciferol, vitamin D3, 1,000 unit tablet Take 1,000 Units by mouth daily.      denosumab (PROLIA) 60 mg/mL syringe Inject 60 mg under the skin every 6 (six) months.      furosemide (LASIX) 40 mg tablet Take 40 mg by mouth daily.      GVOKE HYPOPEN 2-PACK 1 mg/0.2 mL auto-injector subcutaneous auto-injector Inject 1 mg under the skin as needed.      HYDROcodone-acetaminophen (XODOL) 7.5-300 mg per tablet Take 1 tablet by mouth every 6 (six) hours as needed for moderate pain (taken at night).        insulin aspart U-100 (NovoLOG) 100 unit/mL subcutaneous pen Inject under the skin 3 (three) times a day before meals. Sliding scale as follows:  160-190 give 3 units, 191-210 give 5 units, 211-240 give 7 units, 241-270 give 9 units, greater than 271 give 10 units and recheck sugars in 2-3 hours      insulin detemir U-100 (LEVEMIR) 100 unit/mL (3 mL) subcutaneous pen Inject 22 Units under the skin daily. In morning      latanoprostene bunod (VYZULTA) 0.024 % drops  Administer 1 drop into affected eye(s) nightly. In both eyes      lidocaine (LIDODERM) 5 % patch Apply 1 patch topically daily.      MAGNESIUM ORAL Take 750 mg by mouth daily.      metoprolol succinate XL (TOPROL-XL) 50 mg 24 hr tablet Take 50 mg by mouth daily.      NON FORMULARY MEDICATION REQUEST Take 1 tablet by mouth 4 (four) times a day. Hydro Eye supplement      RABEprazole (ACIPHEX) 20 mg EC tablet Take 20 mg by mouth daily.      vit A/vit C/vit E/zinc/copper (PRESERVISION AREDS ORAL) Take by mouth 2 (two) times a day.      vitamin E acetate (VITAMIN E ORAL) Take by mouth daily.      warfarin (COUMADIN) 2.5 mg tablet Take 3.75 mg by mouth once daily. Takes 3.75 mg five times weekly      warfarin (COUMADIN) 5 mg tablet Take 5 mg by mouth 2 (two) times a week (Mon, Thu).      bimatoprost (LUMIGAN) 0.01 % ophthalmic drops 1 drop nightly.      FARXIGA 10 mg tablet tablet Take 10 mg by mouth daily.      ferrous sulfate 325 mg (65 mg iron) EC tablet Take 325 mg by mouth daily with breakfast.      gabapentin (NEURONTIN) 100 mg capsule Take 200 mg by mouth nightly.      gabapentin (NEURONTIN) 300 mg capsule Take 300 mg by mouth nightly.      indapamide (LOZOL) 2.5 mg tablet Take 2.5 mg by mouth daily.      methocarbamol (ROBAXIN) 750 mg tablet Take 750 mg by mouth 2 (two) times a day.      pantoprazole (PROTONIX) 40 mg EC tablet Take 40 mg by mouth. Normally takes aciphex 30 mg daily.      spironolactone (ALDACTONE) 50 mg tablet Take 50 mg by mouth once daily.      tolterodine (DETROL) 2 mg tablet Take 2 mg by mouth nightly.      vit C/E/Zn/coppr/lutein/zeaxan (PRESERVISION AREDS-2 ORAL) Take by mouth.       No current facility-administered medications for this visit.       Review of Systems   Constitutional: Negative for malaise/fatigue and weight gain.   HENT: Negative.     Eyes: Negative.    Cardiovascular:  Negative for dyspnea on exertion.   Respiratory:  Negative for shortness of breath.    Endocrine:  Negative.    Hematologic/Lymphatic: Negative.    Skin: Negative.    Musculoskeletal:  Negative for muscle weakness.   Gastrointestinal: Negative.    Genitourinary: Negative.    Neurological:  Negative for weakness.   Psychiatric/Behavioral: Negative.         Objective   Vitals:    08/22/24 1131   BP: 138/68   Pulse: 85   Resp: 18   SpO2: 97%           BP Readings from Last 3 Encounters:   08/22/24 138/68   09/28/23 (!) 144/70   01/26/23 134/76     Physical Exam  HENT:      Head: Normocephalic.   Eyes:      Conjunctiva/sclera: Conjunctivae normal.      Pupils: Pupils are equal, round, and reactive to light.   Cardiovascular:      Rate and Rhythm: Normal rate and regular rhythm.      Heart sounds: Normal heart sounds.   Pulmonary:      Effort: Pulmonary effort is normal.      Breath sounds: Normal breath sounds.   Abdominal:      General: Bowel sounds are normal. There is no distension.      Palpations: Abdomen is soft.   Musculoskeletal:         General: Normal range of motion.      Cervical back: Normal range of motion.   Skin:     General: Skin is warm and dry.      Comments: Incision well-healed.    Neurological:      Mental Status: She is alert and oriented to person, place, and time.   Psychiatric:         Mood and Affect: Mood is not anxious. Affect is not blunt.         Lab Results   Component Value Date    WBC 6.87 06/01/2018    HGB 10.8 (L) 06/01/2018     06/01/2018     06/01/2018    K 3.7 06/01/2018    CREATININE 1.1 06/01/2018    INR 1.4 06/01/2018     Cardiovascular Procedures:    ECHO/MUGA:  Echo (Scanned) - 9/7/2012  Echo 6/13/2019 - EF 55%; Normal AVR   Echo 2022 at [facility]     Left ventricle is small. There is mild concentric hypertrophy. Systolic   function is hyperdynamic with an ejection fraction over 65%. Wall motion   is within normal limits. Unable to assess diastolic function due to   pacemaker. Suspect the patient has underlying diastolic dysfunction given   her  left atrial enlargement and LVH.     Right ventricle cavity is mildly dilated. Systolic function is normal.     Left atrium volume index is mildly increased.     Right atrium cavity is mildly dilated. A pacemaker wire is present in   the right atrium.     S/P Mechanical AVR ([place] 1995). No prosthetic valve regurgitation.   No bioprosthetic stenosis (peak velocity 3.6 m/s, mean gradient 28 mmHg,   DI 0.33, triangular CW jet, AT < 100 ms).     Thickened and calcified mitral valve leaflets. Mitral annular   calcification. There is mild regurgitation. Mild mitral valve stenosis   (mean gradient 3.4 mmHg at a heart rate of 85 bpm).     Tricuspid valve structure is normal. There is mild regurgitation.     Normal pulmonary artery pressure.      ELECTROPHYSIOLOGY:  Devices (Bi-Ventricular PPM (Biotronik-Evia), Evia Dr-T serial# [id number] PID [id number]. DDDR 80-130ppm. Normal function. Changes made=sensor rate and gain. Base rate decreased to 75ppm.) - 10/30/2013   RFA (Indication A Fib, AVN ablation with pacemaker implantation) - 10/22/2013     STRESS TESTS:  Unruly MPI (Scanned) - 10/24/2011   ETT (Lexiscan. Had SOB and lightheadedness during recoevry, otherwise normal.) - 1/22/2016    CT SURGERY-   AVR 1998 - mechanical    Catheterization 1/23/19 ([facility])  HEMODYNAMICS: There was mild pulmonary hypertension. RA 10mmHg, RV 45/10, PA 45/25 mean 32, PCWP 22 with v wave, Pa sat 68.8%, CO 4.5 L/min, CI 2.1 L/min/m2  VALVES:  AORTIC VALVE:   --  A bileaflet mechanical prosthesis was observed in the aortic position. Both leaflet open well.  CORONARY VESSELS:   --  The coronary circulation is right dominant.  --  Left main: Normal.  --  LAD: Angiography showed minor luminal irregularities.  --  Circumflex: Normal.  --  RCA: Angiography showed minor luminal irregularities.    ECG-Ventricular pacing underlying atrial fibrillation chronic    Assessment   Problem List Items Addressed This Visit          Circulatory    CHB (complete heart  block) (CMS/[facility])     She remains in complete heart block following her AV node ablation for uncontrolled atrial fibrillation.  A normal functioning pacemaker is in place.         Complication of Procedure: Insert single lead to existing PPM/ICD (05/31/2018)    Complication of Admission: Admission (02/19/2018)    Relevant Orders    [facility] MUSE ECG 12 lead (clinic performed) (Completed)    Chronic atrial fibrillation (CMS/HCC)     She underwent an AV node ablation in 2013.  She continues with complete heart block and a pacemaker in place.  She is asymptomatic with the chronic atrial fibrillation. Compliant with warfarin.          Relevant Orders    [facility] MUSE ECG 12 lead (clinic performed) (Completed)    Hypertension     She reports adequate blood pressure control at home.         Relevant Medications    furosemide (LASIX) 40 mg tablet    Other Relevant Orders    [facility] MUSE ECG 12 lead (clinic performed) (Completed)    S/P AVR - Primary     S/p AVR 1998. 2022 echocardiogram showed no prosthetic valve regurgitation. No bioprosthetic stenosis     She denies symptoms of CHF, chest pain or increasing SOB.          Relevant Orders    [facility] MUSE ECG 12 lead (clinic performed) (Completed)       Hematologic    Anticoagulated on Coumadin    Relevant Orders    [facility] MUSE ECG 12 lead (clinic performed) (Completed)       Other    Biventricular pacemaker check     She underwent implantation of a Biotronik biventricular pacemaker in May 2018.  Her previous RV lead was malfunctioning therefore a new RV lead was implanted along with the new LV lead.  Her previous RV lead is abandoned and capped.       Biotronik Etrinsa implanted May 31, 2018.  RV threshold 0.8V at 0.4 ms lead impedance is 643 ohms. LV threshold 0.6 V at 0.4 ms (Lv tip->LV ring)and lead impedance 1131 ohms, elevated by stable.. Chronic atrial fibrillation. parameters VVIR 85 bpm. BiV pacing is 100%.  Underlying rhythm is atrial fibrillation with complete  heart block.  Battery longevity is 4years/10 months.          Relevant Orders    [facility] MUSE ECG 12 lead (clinic performed) (Completed)              Simon Mcknight MD  08/22/2024

## 2024-08-22 ENCOUNTER — OFFICE VISIT (OUTPATIENT)
Dept: CARDIOLOGY | Facility: CLINIC | Age: 89
End: 2024-08-22
Payer: MEDICARE

## 2024-08-22 VITALS
RESPIRATION RATE: 18 BRPM | HEART RATE: 85 BPM | BODY MASS INDEX: 36.49 KG/M2 | DIASTOLIC BLOOD PRESSURE: 68 MMHG | OXYGEN SATURATION: 97 % | SYSTOLIC BLOOD PRESSURE: 138 MMHG | HEIGHT: 65 IN | WEIGHT: 219 LBS

## 2024-08-22 DIAGNOSIS — I10 HYPERTENSION, UNSPECIFIED TYPE: ICD-10-CM

## 2024-08-22 DIAGNOSIS — I48.20 CHRONIC ATRIAL FIBRILLATION (CMS/HCC): ICD-10-CM

## 2024-08-22 DIAGNOSIS — Z95.2 S/P AVR: Primary | ICD-10-CM

## 2024-08-22 DIAGNOSIS — I44.2 CHB (COMPLETE HEART BLOCK) (CMS/HCC): ICD-10-CM

## 2024-08-22 DIAGNOSIS — Z79.01 ANTICOAGULATED ON COUMADIN: ICD-10-CM

## 2024-08-22 DIAGNOSIS — Z45.018 BIVENTRICULAR PACEMAKER CHECK: ICD-10-CM

## 2024-08-22 PROCEDURE — 93000 ELECTROCARDIOGRAM COMPLETE: CPT | Performed by: INTERNAL MEDICINE

## 2024-08-22 PROCEDURE — 99214 OFFICE O/P EST MOD 30 MIN: CPT | Performed by: INTERNAL MEDICINE

## 2024-08-22 RX ORDER — LIDOCAINE 50 MG/G
1 PATCH TOPICAL DAILY
COMMUNITY
Start: 2024-03-13

## 2024-08-22 RX ORDER — ALPRAZOLAM 0.5 MG/1
0.5 TABLET ORAL 3 TIMES DAILY PRN
COMMUNITY
Start: 2024-03-20

## 2024-08-22 RX ORDER — GLUCAGON INJECTION, SOLUTION 1 MG/.2ML
1 INJECTION, SOLUTION SUBCUTANEOUS AS NEEDED
COMMUNITY

## 2024-08-22 RX ORDER — FUROSEMIDE 40 MG/1
40 TABLET ORAL DAILY
COMMUNITY
Start: 2024-08-02

## 2024-08-27 NOTE — ASSESSMENT & PLAN NOTE
S/p AVR 1998. 2022 echocardiogram showed no prosthetic valve regurgitation. No bioprosthetic stenosis     She denies symptoms of CHF, chest pain or increasing SOB.

## 2024-08-27 NOTE — ASSESSMENT & PLAN NOTE
She underwent implantation of a Biotronik biventricular pacemaker in May 2018.  Her previous RV lead was malfunctioning therefore a new RV lead was implanted along with the new LV lead.  Her previous RV lead is abandoned and capped.       Biotronik Etrinsa implanted May 31, 2018.  RV threshold 0.8V at 0.4 ms lead impedance is 643 ohms. LV threshold 0.6 V at 0.4 ms (Lv tip->LV ring)and lead impedance 1131 ohms, elevated by stable.. Chronic atrial fibrillation. parameters VVIR 85 bpm. BiV pacing is 100%.  Underlying rhythm is atrial fibrillation with complete heart block.  Battery longevity is 4years/10 months.

## 2024-08-30 LAB
ATRIAL RATE: 85
QRS DURATION: 160
QT INTERVAL: 434
QTC CALCULATION(BAZETT): 516
R AXIS: -72
T WAVE AXIS: 99
VENTRICULAR RATE: 85

## 2024-09-12 PROBLEM — E87.1 HYPONATREMIA: Status: RESOLVED | Noted: 2019-02-06 | Resolved: 2024-09-12

## 2024-09-12 PROBLEM — Z86.2 HISTORY OF ANEMIA DUE TO CKD: Status: ACTIVE | Noted: 2024-09-12

## 2024-09-12 PROBLEM — D63.1 ANEMIA DUE TO STAGE 4 CHRONIC KIDNEY DISEASE  (HCC): Status: ACTIVE | Noted: 2024-09-12

## 2024-09-12 PROBLEM — N18.4 ANEMIA DUE TO STAGE 4 CHRONIC KIDNEY DISEASE  (HCC): Status: ACTIVE | Noted: 2024-09-12

## 2024-09-12 PROBLEM — N18.9 HISTORY OF ANEMIA DUE TO CKD: Status: ACTIVE | Noted: 2024-09-12

## 2024-09-19 ENCOUNTER — TELEPHONE (OUTPATIENT)
Age: 89
End: 2024-09-19

## 2024-09-19 DIAGNOSIS — N18.32 STAGE 3B CHRONIC KIDNEY DISEASE (HCC): Primary | ICD-10-CM

## 2024-09-19 NOTE — TELEPHONE ENCOUNTER
Spoke to pt in regards to the following message per Dr. Montano     Yes absolutely.  I would just have her recheck a bmp in 1 week after starting.  Thanks.      Pt verbalized understanding. Pt is asking if it ok to have blood work done in 2 weeks because she has blood work to do for her cardiologist in 2 weeks and wanted to do them together. Please advise.

## 2024-09-19 NOTE — TELEPHONE ENCOUNTER
Nurse calling from [facility]. She states the patients cardiologist wants to put her on Entresto, is this ok?  Please advise

## 2024-09-20 NOTE — TELEPHONE ENCOUNTER
Spoke to pt let her know per Dr. Montano she just needs BMP done and it is ok to do them in 2 weeks. Pt verbalized understanding.     Labs in epic.

## 2024-09-20 NOTE — TELEPHONE ENCOUNTER
Emergency Department Patient Sign-out       Brief HPI:  This is a 51 year old male signed out to me by Dr. Merrill Cardenas at 6:10 AM at shift change.  Plan at the time of signout is reevaluation to ensure patient is safe for discharge and does not require additional assessment including consultation with DEC.  Patient is reported to have initially been assessed and evaluated by Dr. Bain.  At the time of signout patient had required both physical restraints with sedation with midazolam, Zyprexa and Haldol.  Patient was reported to have been brought in by EMS after police was called because the patient was publicly intoxicated and belligerent.  Recent hospitalization at [facility] in [place] and discharged home.  Disposition is pending at the time of signout. .  See initial ED note by Dr Cardenas for details of the presentation, ED course, prior to assuming care       Significant Events prior to my assuming care: Reviewed Rosalinda Bain and  Flaquita's ED note  Prior  to assuming care.       Significant Events after my assuming care: Patient awoke, ate breakfast, and was eager to be discharged to home.  He was discharged by cab.        Exam:   Patient Vitals for the past 24 hrs:   BP Temp Temp src Pulse Heart Rate Resp SpO2   08/02/19 0833 114/70 -- -- 82 -- -- --   08/02/19 0500 -- -- -- -- 61 -- 96 %   08/02/19 0430 -- -- -- -- 58 13 97 %   08/02/19 0400 -- -- -- -- 60 15 97 %   08/02/19 0330 -- -- -- -- 61 -- 95 %   08/02/19 0300 -- -- -- -- 64 14 98 %   08/02/19 0244 -- -- -- -- 61 15 99 %   08/02/19 0235 -- -- -- -- 61 14 99 %   08/02/19 0212 112/70 -- -- 63 60 16 98 %   08/02/19 0210 (!) 88/50 -- -- 64 67 13 98 %   08/02/19 0130 -- -- -- -- 63 12 96 %   08/02/19 0102 -- -- -- -- 69 -- 95 %   08/02/19 0045 -- -- -- -- 67 15 98 %   08/02/19 0036 109/66 -- -- 71 109 -- 95 %   08/02/19 0018 91/41 -- -- 65 64 16 94 %   08/02/19 0000 96/52 -- -- 69 64 -- 97 %   08/01/19 2345 90/51 -- -- 64 81 -- 95 %   08/01/19  Patient called asking if there are any additional labs and urine Dr. Montano wants her to have and also if it ok to have labs in 2 weeks. Please advise, thank you    2331 122/70 -- -- 81 84 -- 95 %   08/01/19 2330 122/70 -- -- 81 81 -- 95 %   08/01/19 2315 112/68 -- -- 83 78 15 94 %   08/01/19 2300 111/63 -- -- 80 -- -- 97 %   08/01/19 2245 116/66 -- -- 76 -- -- 95 %   08/01/19 2230 103/61 -- -- 80 -- -- 90 %   08/01/19 2215 124/69 -- -- 83 -- -- (!) 87 %   08/01/19 2200 100/69 -- -- 82 84 12 92 %   08/01/19 2155 -- 98  F (36.7  C) Oral -- -- -- --   08/01/19 2145 115/70 -- -- 86 86 17 90 %   08/01/19 2130 104/66 -- -- 82 81 14 91 %   08/01/19 2123 100/48 -- -- 86 83 (!) 0 (!) 86 %           ED RESULTS:   Results for orders placed or performed during the hospital encounter of 08/01/19 (from the past 24 hour(s))   CBC with platelets differential     Status: Abnormal    Collection Time: 08/01/19  9:37 PM   Result Value Ref Range    WBC 7.2 4.0 - 11.0 10e9/L    RBC Count 4.50 4.4 - 5.9 10e12/L    Hemoglobin 14.9 13.3 - 17.7 g/dL    Hematocrit 46.3 40.0 - 53.0 %     (H) 78 - 100 fl    MCH 33.1 (H) 26.5 - 33.0 pg    MCHC 32.2 31.5 - 36.5 g/dL    RDW 13.8 10.0 - 15.0 %    Platelet Count 252 150 - 450 10e9/L    Diff Method Automated Method     % Neutrophils 55.5 %    % Lymphocytes 34.3 %    % Monocytes 7.4 %    % Eosinophils 1.7 %    % Basophils 0.8 %    % Immature Granulocytes 0.3 %    Nucleated RBCs 0 0 /100    Absolute Neutrophil 4.0 1.6 - 8.3 10e9/L    Absolute Lymphocytes 2.5 0.8 - 5.3 10e9/L    Absolute Monocytes 0.5 0.0 - 1.3 10e9/L    Absolute Eosinophils 0.1 0.0 - 0.7 10e9/L    Absolute Basophils 0.1 0.0 - 0.2 10e9/L    Abs Immature Granulocytes 0.0 0 - 0.4 10e9/L    Absolute Nucleated RBC 0.0    Comprehensive metabolic panel     Status: Abnormal    Collection Time: 08/01/19  9:37 PM   Result Value Ref Range    Sodium 144 133 - 144 mmol/L    Potassium 3.7 3.4 - 5.3 mmol/L    Chloride 111 (H) 94 - 109 mmol/L    Carbon Dioxide 24 20 - 32 mmol/L    Anion Gap 9 3 - 14 mmol/L    Glucose 90 70 - 99 mg/dL    Urea Nitrogen 13 7 - 30 mg/dL    Creatinine 1.07 0.66 - 1.25 mg/dL     GFR Estimate 80 >60 mL/min/[1.73_m2]    GFR Estimate If Black >90 >60 mL/min/[1.73_m2]    Calcium 9.0 8.5 - 10.1 mg/dL    Bilirubin Total 0.4 0.2 - 1.3 mg/dL    Albumin 4.0 3.4 - 5.0 g/dL    Protein Total 7.9 6.8 - 8.8 g/dL    Alkaline Phosphatase 86 40 - 150 U/L    ALT 44 0 - 70 U/L    AST 32 0 - 45 U/L   Lipase     Status: None    Collection Time: 08/01/19  9:37 PM   Result Value Ref Range    Lipase 96 73 - 393 U/L   Alcohol ethyl     Status: Abnormal    Collection Time: 08/01/19  9:37 PM   Result Value Ref Range    Ethanol g/dL 0.28 (H) <0.01 g/dL   Acetaminophen level     Status: None    Collection Time: 08/01/19  9:37 PM   Result Value Ref Range    Acetaminophen Level <2 mg/L   Salicylate level     Status: None    Collection Time: 08/01/19  9:37 PM   Result Value Ref Range    Salicylate Level <2 mg/dL   Troponin I     Status: None    Collection Time: 08/01/19  9:37 PM   Result Value Ref Range    Troponin I ES <0.015 0.000 - 0.045 ug/L   Lithium level     Status: Abnormal    Collection Time: 08/01/19  9:37 PM   Result Value Ref Range    Lithium Level <0.20 (L) 0.60 - 1.20 mmol/L       ED MEDICATIONS:   Medications   ketamine (KETALAR) (HIGH CONC) inj 420 mg (0 mg Intramuscular Hold 8/1/19 2330)   lactated ringers infusion (0 mLs Intravenous Hold 8/2/19 0043)   OLANZapine (zyPREXA) injection 10 mg (10 mg Intramuscular Given 8/1/19 2149)   lactated ringers BOLUS 500 mL (0 mLs Intravenous Stopped 8/1/19 2330)   haloperidol lactate (HALDOL) injection 5 mg (5 mg IV/IM Given 8/1/19 2205)         Impression:    ICD-10-CM    1. Alcoholic intoxication without complication (H) F10.920 Drug abuse screen 77 urine ([place], [place], [facility])       Plan:    Discharge to home. Outpatient follow-up.      Glenn Madrigal MD  08/02/19 [id number]

## 2024-10-01 ENCOUNTER — HOSPITAL ENCOUNTER (OUTPATIENT)
Dept: ULTRASOUND IMAGING | Facility: MEDICAL CENTER | Age: 89
Discharge: HOME | End: 2024-10-01
Payer: MEDICARE

## 2024-10-01 ENCOUNTER — APPOINTMENT (OUTPATIENT)
Dept: RADIOLOGY | Facility: MEDICAL CENTER | Age: 89
End: 2024-10-01
Payer: MEDICARE

## 2024-10-01 DIAGNOSIS — R05.1 ACUTE COUGH: ICD-10-CM

## 2024-10-01 DIAGNOSIS — R14.0 ABDOMINAL DISTENTION: ICD-10-CM

## 2024-10-01 PROCEDURE — 76700 US EXAM ABDOM COMPLETE: CPT

## 2024-10-01 PROCEDURE — 71046 X-RAY EXAM CHEST 2 VIEWS: CPT

## 2025-02-05 ENCOUNTER — TELEPHONE (OUTPATIENT)
Age: 89
End: 2025-02-05

## 2025-02-05 DIAGNOSIS — E87.5 HYPERKALEMIA: ICD-10-CM

## 2025-02-05 DIAGNOSIS — E87.1 HYPONATREMIA: ICD-10-CM

## 2025-02-05 DIAGNOSIS — N18.32 STAGE 3B CHRONIC KIDNEY DISEASE (HCC): Primary | ICD-10-CM

## 2025-02-05 DIAGNOSIS — I10 HYPERTENSION, UNSPECIFIED TYPE: ICD-10-CM

## 2025-02-05 NOTE — TELEPHONE ENCOUNTER
Patient called; stated advised by Haydee that Loss of consciousness and/or awareness form (In media 7/10/24) has not been received.  Patient requested to re fax to the below individual.    Please assist,    Thank you    [facility]dee  FAX #[phone]

## 2025-02-05 NOTE — TELEPHONE ENCOUNTER
Patient rescheduled her appointment with Dr. Montano and needs to have updated labs mailed out to her.

## 2025-02-14 NOTE — TELEPHONE ENCOUNTER
Pt called in and is requesting a call back on the status of the Loss of consciousness and/or awareness Form being Faxed to [employer].     Pt called [employer] and they told her that they never received this form.  Please fax form to   FAX #[phone]     Please assist.     Pt wants this done ASAP . She said that she will contact  [employer] next if it is not sent by then.

## 2025-02-28 LAB
ALBUMIN SERPL-MCNC: 4.2 G/DL (ref 3.5–5.7)
ALBUMIN/CREAT UR: 11.2
ALP SERPL-CCNC: 134 U/L (ref 35–120)
ALT SERPL-CCNC: 24 U/L
ANION GAP SERPL CALCULATED.3IONS-SCNC: 9 MMOL/L (ref 3–11)
AST SERPL-CCNC: 25 U/L
BASOPHILS # BLD AUTO: 0.1 THOU/CMM (ref 0–0.1)
BASOPHILS NFR BLD AUTO: 1 %
BILIRUB SERPL-MCNC: 0.6 MG/DL (ref 0.2–1)
BUN SERPL-MCNC: 52 MG/DL (ref 7–25)
CALCIUM SERPL-MCNC: 9.5 MG/DL (ref 8.5–10.5)
CHLORIDE SERPL-SCNC: 99 MMOL/L (ref 100–109)
CO2 SERPL-SCNC: 33 MMOL/L (ref 21–31)
CREAT SERPL-MCNC: 1.84 MG/DL (ref 0.4–1.1)
CREAT UR-MCNC: 80.4 MG/DL (ref 50–200)
CYTOLOGY CMNT CVX/VAG CYTO-IMP: ABNORMAL
DIFFERENTIAL METHOD BLD: ABNORMAL
EOSINOPHIL # BLD AUTO: 0.2 THOU/CMM (ref 0–0.5)
EOSINOPHIL NFR BLD AUTO: 3 %
ERYTHROCYTE [DISTWIDTH] IN BLOOD BY AUTOMATED COUNT: 16.2 % (ref 12–16)
GFR/BSA.PRED SERPLBLD CYS-BASED-ARV: 26 ML/MIN/{1.73_M2}
GLUCOSE SERPL-MCNC: 166 MG/DL (ref 65–99)
GLUCOSE UR QL STRIP: NEGATIVE MG/DL
HCT VFR BLD AUTO: 37.3 % (ref 35–43)
HGB BLD-MCNC: 12.3 G/DL (ref 11.5–14.5)
HGB UR QL STRIP: NEGATIVE MG/DL
HYALINE CASTS URNS QL MICRO: ABNORMAL /LPF (ref 0–2)
KETONES UR QL STRIP: NEGATIVE MG/DL
LEUKOCYTE ESTERASE UR QL STRIP: ABNORMAL /UL
LYMPHOCYTES # BLD AUTO: 1.2 THOU/CMM (ref 1–3)
LYMPHOCYTES NFR BLD AUTO: 20 %
MAGNESIUM SERPL-MCNC: 2.1 MG/DL (ref 1.4–2.2)
MCH RBC QN AUTO: 32.2 PG (ref 26–34)
MCHC RBC AUTO-ENTMCNC: 33 G/DL (ref 32–37)
MCV RBC AUTO: 97 FL (ref 80–100)
MICROALBUMIN UR-MCNC: 0.9 MG/DL
MONOCYTES # BLD AUTO: 0.5 THOU/CMM (ref 0.3–1)
MONOCYTES NFR BLD AUTO: 8 %
MUCOUS THREADS URNS QL MICRO: ABNORMAL
NEUTROPHILS # BLD AUTO: 4.1 THOU/CMM (ref 1.8–7.8)
NEUTROPHILS NFR BLD AUTO: 68 %
NITRITE UR QL STRIP: NEGATIVE
PH UR: 6 [PH] (ref 4.5–8)
PHOSPHATE SERPL-MCNC: 4.6 MG/DL (ref 2.3–4.6)
PLATELET # BLD AUTO: 284 THOU/CMM (ref 140–350)
PMV BLD REES-ECKER: 8.5 FL (ref 7.5–11.3)
POTASSIUM SERPL-SCNC: 5.5 MMOL/L (ref 3.5–5.2)
PROT 24H UR-MRATE: NEGATIVE MG/DL
PROT SERPL-MCNC: 7.5 G/DL (ref 6.3–8.3)
RBC # BLD AUTO: 3.83 MILL/CMM (ref 3.7–4.7)
RBC #/AREA URNS HPF: ABNORMAL /HPF (ref 0–2)
SL AMB POCT URINE COMMENT: ABNORMAL
SODIUM SERPL-SCNC: 141 MMOL/L (ref 135–145)
SP GR UR: 1.01 (ref 1–1.03)
SQUAMOUS #/AREA URNS HPF: >10 /LPF (ref 0–5)
TRANS CELLS #/AREA URNS HPF: ABNORMAL /LPF (ref 0–1)
WBC # BLD AUTO: 6 THOU/CMM (ref 4–10)
WBC #/AREA URNS HPF: ABNORMAL /HPF (ref 0–5)

## 2025-03-04 ENCOUNTER — OFFICE VISIT (OUTPATIENT)
Dept: NEPHROLOGY | Facility: CLINIC | Age: 89
End: 2025-03-04
Payer: MEDICARE

## 2025-03-04 VITALS
DIASTOLIC BLOOD PRESSURE: 62 MMHG | SYSTOLIC BLOOD PRESSURE: 118 MMHG | WEIGHT: 217 LBS | BODY MASS INDEX: 36.15 KG/M2 | HEIGHT: 65 IN | HEART RATE: 85 BPM

## 2025-03-04 DIAGNOSIS — N18.4 STAGE 4 CHRONIC KIDNEY DISEASE (HCC): ICD-10-CM

## 2025-03-04 DIAGNOSIS — E11.29 TYPE 2 DIABETES MELLITUS WITH DIABETIC MICROALBUMINURIA, WITH LONG-TERM CURRENT USE OF INSULIN (HCC): ICD-10-CM

## 2025-03-04 DIAGNOSIS — I10 PRIMARY HYPERTENSION: Chronic | ICD-10-CM

## 2025-03-04 DIAGNOSIS — E66.01 CLASS 2 SEVERE OBESITY DUE TO EXCESS CALORIES WITH SERIOUS COMORBIDITY AND BODY MASS INDEX (BMI) OF 39.0 TO 39.9 IN ADULT (HCC): ICD-10-CM

## 2025-03-04 DIAGNOSIS — R80.9 TYPE 2 DIABETES MELLITUS WITH DIABETIC MICROALBUMINURIA, WITH LONG-TERM CURRENT USE OF INSULIN (HCC): ICD-10-CM

## 2025-03-04 DIAGNOSIS — I50.32 CHRONIC DIASTOLIC CONGESTIVE HEART FAILURE (HCC): Primary | ICD-10-CM

## 2025-03-04 DIAGNOSIS — I12.9 BENIGN HYPERTENSIVE KIDNEY DISEASE WITH CHRONIC KIDNEY DISEASE STAGE I THROUGH STAGE IV, OR UNSPECIFIED: ICD-10-CM

## 2025-03-04 DIAGNOSIS — N18.4 ANEMIA DUE TO STAGE 4 CHRONIC KIDNEY DISEASE  (HCC): ICD-10-CM

## 2025-03-04 DIAGNOSIS — E66.812 CLASS 2 SEVERE OBESITY DUE TO EXCESS CALORIES WITH SERIOUS COMORBIDITY AND BODY MASS INDEX (BMI) OF 39.0 TO 39.9 IN ADULT (HCC): ICD-10-CM

## 2025-03-04 DIAGNOSIS — Z79.4 TYPE 2 DIABETES MELLITUS WITH DIABETIC MICROALBUMINURIA, WITH LONG-TERM CURRENT USE OF INSULIN (HCC): ICD-10-CM

## 2025-03-04 DIAGNOSIS — D63.1 ANEMIA DUE TO STAGE 4 CHRONIC KIDNEY DISEASE  (HCC): ICD-10-CM

## 2025-03-04 DIAGNOSIS — E87.5 HYPERKALEMIA: ICD-10-CM

## 2025-03-04 PROCEDURE — G2211 COMPLEX E/M VISIT ADD ON: HCPCS | Performed by: INTERNAL MEDICINE

## 2025-03-04 PROCEDURE — 99214 OFFICE O/P EST MOD 30 MIN: CPT | Performed by: INTERNAL MEDICINE

## 2025-03-04 RX ORDER — BUMETANIDE 0.5 MG/1
TABLET ORAL
COMMUNITY

## 2025-03-04 RX ORDER — SACUBITRIL AND VALSARTAN 24; 26 MG/1; MG/1
1 TABLET, FILM COATED ORAL 2 TIMES DAILY
COMMUNITY
Start: 2024-11-05

## 2025-03-04 NOTE — ASSESSMENT & PLAN NOTE
Potassium has trended up now at 5.5 this is likely related to her Entresto use  We will have her increase her Bumex to 0.5 mg daily alternating with 0.5 mg twice daily  Repeat a BMP in 2 weeks  If potassium is elevated we will consider a potassium binder

## 2025-03-04 NOTE — ASSESSMENT & PLAN NOTE
Wt Readings from Last 3 Encounters:   03/04/25 98.4 kg (217 lb)   08/06/24 94.8 kg (209 lb)   07/25/24 97.1 kg (214 lb)   She follows with cardiology at [facility] and was recently started on Entresto likely for low output heart failure  She does have some increased lower extremity edema she could increase her Bumex to 0.5 mg twice daily alternating with 25 mg daily  She remains on a beta-blocker  RAAS inhibition continue lipid management per cardiology   off SGLT2i  Continue to monitor volume status    Orders:    Basic metabolic panel; Future

## 2025-03-04 NOTE — ASSESSMENT & PLAN NOTE
Her blood pressure is well-controlled today, blood pressure lowering medications include:  Metoprolol XL 50 mg daily  Entresto 24/26 mg 2 times daily  Bumex 0.5 mg twice daily  Orders:    Basic metabolic panel; Future

## 2025-03-04 NOTE — ASSESSMENT & PLAN NOTE
Continue glycemic control  Lab Results   Component Value Date    HGBA1C 7.4 (H) 02/14/2025       Orders:    Basic metabolic panel; Future

## 2025-03-04 NOTE — PROGRESS NOTES
Name: Cristy Fisher      : 1935      MRN: [id number]  Encounter Provider: Lukas Montano DO  Encounter Date: 3/4/2025   Encounter department: [facility] [place]  :  Assessment & Plan  Stage 4 chronic kidney disease (HCC)  Lab Results   Component Value Date    EGFR 26 (L) 2025    EGFR 26 (L) 2025    EGFR 28 (L) 2025    CREATININE 1.84 (H) 2025    CREATININE 1.84 (H) 2025    CREATININE 1.75 (H) 2025   With the addition of Entresto her creatinine has been slightly higher than previous around 1.7-1.8.  We discussed the benefits of maintaining Entresto despite a rise in the serum creatinine  Her GFR is now 25 to 30 mL/min  She discontinued Farxiga due to some increased urinary tract and yeast infections  She continues to have leukocyturia but her WBC count is low  Overall stable renal function and we will continue to monitor every 4 months    Orders:    Ambulatory Referral to CKD Education Program; Future    Comprehensive metabolic panel; Future    CBC and Platelet; Future    Albumin / creatinine urine ratio; Future    Urinalysis with microscopic; Future    PTH, intact; Future    Phosphorus; Future    Uric acid; Future    Magnesium; Future    Anemia due to stage 4 chronic kidney disease  (HCC)  Her hemoglobin has improved and is currently stable    Orders:    Basic metabolic panel; Future    Benign hypertensive kidney disease with chronic kidney disease stage I through stage IV, or unspecified  Lab Results   Component Value Date    EGFR 26 (L) 2025    EGFR 26 (L) 2025    EGFR 28 (L) 2025    CREATININE 1.84 (H) 2025    CREATININE 1.84 (H) 2025    CREATININE 1.75 (H) 2025       Orders:    Basic metabolic panel; Future    Comprehensive metabolic panel; Future    CBC and Platelet; Future    Albumin / creatinine urine ratio; Future    Urinalysis with microscopic; Future    PTH, intact; Future    Phosphorus; Future    Uric  acid; Future    Magnesium; Future    Chronic diastolic congestive heart failure (HCC)  Wt Readings from Last 3 Encounters:   03/04/25 98.4 kg (217 lb)   08/06/24 94.8 kg (209 lb)   07/25/24 97.1 kg (214 lb)   She follows with cardiology at [facility] and was recently started on Entresto likely for low output heart failure  She does have some increased lower extremity edema she could increase her Bumex to 0.5 mg twice daily alternating with 25 mg daily  She remains on a beta-blocker  RAAS inhibition continue lipid management per cardiology   off SGLT2i  Continue to monitor volume status    Orders:    Basic metabolic panel; Future    Primary hypertension  Her blood pressure is well-controlled today, blood pressure lowering medications include:  Metoprolol XL 50 mg daily  Entresto 24/26 mg 2 times daily  Bumex 0.5 mg twice daily  Orders:    Basic metabolic panel; Future    Type 2 diabetes mellitus with diabetic microalbuminuria, with long-term current use of insulin ([facility])  Continue glycemic control  Lab Results   Component Value Date    HGBA1C 7.4 (H) 02/14/2025       Orders:    Basic metabolic panel; Future    Hyperkalemia  Potassium has trended up now at 5.5 this is likely related to her Entresto use  We will have her increase her Bumex to 0.5 mg daily alternating with 0.5 mg twice daily  Repeat a BMP in 2 weeks  If potassium is elevated we will consider a potassium binder       Cristy is stable overall and will have her follow-up.  4 months    There are no Patient Instructions on file for this visit.    It was a pleasure evaluating your patient in the office today. Thank you for allowing our team to participate in the care of Cristy Fisher. Please do not hesitate to contact our team if further issues/questions shall arise in the interim.     History of Present Illness   HPI  Cristy Fisher is a 89 y.o. female who presents for follow-up.  She has been feeling well denies any acute chest pain or shortness of  breath fevers or chills.  Over the course of the last 4 months she has had norovirus twice a day UTI, her weight is stable, she is now off spironolactone and Farxiga, she is tolerating her current course of medicines and has follow-up cardiology regularly  And  History obtained from: patient    Review of Systems   Constitutional:  Negative for chills and fever.   HENT:  Negative for ear pain and sore throat.    Eyes:  Negative for pain and visual disturbance.   Respiratory:  Negative for cough and shortness of breath.    Cardiovascular:  Negative for chest pain and palpitations.   Gastrointestinal:  Negative for abdominal pain and vomiting.   Genitourinary:  Negative for dysuria and hematuria.   Musculoskeletal:  Negative for arthralgias and back pain.   Skin:  Negative for color change and rash.   Neurological:  Negative for seizures and syncope.   All other systems reviewed and are negative.    Medical History Reviewed by provider this encounter:     .       Current Outpatient Medications on File Prior to Visit   Medication Sig Dispense Refill    allopurinol (ZYLOPRIM) 100 mg tablet Take 100 mg by mouth 2 (two) times a day      aspirin (ECOTRIN LOW STRENGTH) 81 mg EC tablet Take 81 mg by mouth daily      bumetanide (BUMEX) 0.5 MG tablet PLEASE SEE ATTACHED FOR DETAILED DIRECTIONS      calcium citrate-vitamin D (CITRACAL+D) 315-200 MG-UNIT per tablet Take 1 tablet by mouth 2 (two) times a day      cholecalciferol (VITAMIN D3) 1,000 units tablet Take 2,000 Units by mouth daily      denosumab (PROLIA) 60 mg/mL Inject 60 mg under the skin once 2 times a year       Denta 5000 Plus 1.1 % CREA BRUSH TWICE A DAY AS DIRECTED      Entresto 24-26 MG TABS Take 1 tablet by mouth 2 (two) times a day      insulin aspart (NovoLOG FlexPen) 100 UNIT/ML injection pen Inject 5 Units under the skin as needed      insulin detemir (Levemir FlexTouch) 100 Units/mL injection pen 13 Units      lidocaine (LIDODERM) 5 % Apply 1 patch  topically over 12 hours daily Remove & Discard patch within 12 hours or as directed by MD 30 patch 0    metoprolol succinate (TOPROL-XL) 50 mg 24 hr tablet Take 50 mg by mouth daily      Multiple Vitamin (MULTIVITAMIN) tablet Take 1 tablet by mouth daily      Multiple Vitamins-Minerals (PRESERVISION AREDS 2 PO) Take by mouth 2 (two) times a day      NON FORMULARY Take 1 tablet by mouth 4 (four) times a day HydroEye      RABEprazole (ACIPHEX) 20 MG tablet TAKE 1 TABLET BY MOUTH EVERY DAY 90 tablet 3    Vyzulta 0.024 % SOLN       warfarin (COUMADIN) 2.5 mg tablet Take 1.5 tablets by mouth daily Hold warfarin until next INR on or about 4/10.    Takin 3.75 mg wolf      Accu-Chek Lila Plus test strip [place] Lite Brand. (Patient not taking: Reported on 7/9/2024)      ALPRAZolam (XANAX) 0.5 mg tablet Take 0.5 mg by mouth 3 (three) times a day as needed for anxiety. Indications: Feeling Anxious (Patient not taking: Reported on 3/4/2025)      BD Pen Needle Rena 2nd Gen 32G X 4 MM MISC  (Patient not taking: Reported on 3/4/2025)      Calcium Carb-Cholecalciferol 250-125 MG-UNIT TABS Take by mouth daily (Patient not taking: Reported on 3/4/2025)      Cholecalciferol 50 MCG (2000 UT) CAPS Take 1 capsule by mouth (Patient not taking: Reported on 3/4/2025)      dapagliflozin (Farxiga) 10 MG tablet Take 1 tablet (10 mg total) by mouth daily (Patient not taking: Reported on 3/4/2025) 90 tablet 3    erythromycin (ILOTYCIN) ophthalmic ointment APPLY INTO BOTH EYES AT BEDTIME (Patient not taking: Reported on 3/4/2025)      glucose blood test strip  (Patient not taking: Reported on 3/4/2025)      HYDROcodone-acetaminophen (XODOL) 7.5-300 MG per tablet Take one tablet by mouth every 5 to 6 hours as needed for pain. (Patient not taking: Reported on 3/4/2025)      indapamide (LOZOL) 1.25 mg tablet Take 1.25 mg by mouth daily. Indications: Edema (Patient not taking: Reported on 3/4/2025)      indapamide (LOZOL) 1.25 mg tablet Take 2.5 mg  "by mouth daily. call with wts on monday 4 15 24  Indications: Edema (Patient not taking: Reported on 3/4/2025)      methocarbamol (ROBAXIN) 500 mg tablet Take 1 tablet (500 mg total) by mouth 3 (three) times a day (Patient not taking: Reported on 3/4/2025) 90 tablet 0    methylPREDNISolone 4 MG tablet therapy pack Use as directed on package (Patient not taking: Reported on 3/4/2025) 21 tablet 0    naloxegol oxalate (Movantik) 25 MG tablet Take 1 tablet (25 mg total) by mouth every other day (Patient not taking: Reported on 3/4/2025) 15 tablet 5    ondansetron (ZOFRAN-ODT) 4 mg disintegrating tablet Take 1 tablet (4 mg total) by mouth every 6 (six) hours as needed for vomiting or nausea (Patient not taking: Reported on 3/4/2025) 20 tablet 0    spironolactone (ALDACTONE) 50 mg tablet Take 25 mg by mouth daily (Patient not taking: Reported on 3/4/2025)      spironolactone (ALDACTONE) 50 mg tablet Take 50 mg by mouth daily. Indications: Edema (Patient not taking: Reported on 3/4/2025)      spironolactone (ALDACTONE) 50 mg tablet Take 100 mg by mouth daily. Indications: Edema (Patient not taking: Reported on 3/4/2025)      VITAMIN E PO Take by mouth daily (Patient not taking: Reported on 3/4/2025)      warfarin (COUMADIN) 5 mg tablet Take 5 mg by mouth once a week (Patient not taking: Reported on 3/4/2025)       No current facility-administered medications on file prior to visit.     Objective   /62   Pulse 85   Ht 5' 5\" (1.651 m)   Wt 98.4 kg (217 lb)   BMI 36.11 kg/m²      Physical Exam  Vitals and nursing note reviewed.   Constitutional:       General: She is not in acute distress.     Appearance: She is well-developed.   HENT:      Head: Normocephalic and atraumatic.   Eyes:      Conjunctiva/sclera: Conjunctivae normal.   Cardiovascular:      Rate and Rhythm: Normal rate and regular rhythm.      Heart sounds: No murmur heard.  Pulmonary:      Effort: Pulmonary effort is normal. No respiratory distress.      " Breath sounds: Normal breath sounds.   Abdominal:      Palpations: Abdomen is soft.      Tenderness: There is no abdominal tenderness.   Musculoskeletal:         General: No swelling.      Cervical back: Neck supple.      Right lower leg: Edema present.      Left lower leg: Edema present.   Skin:     General: Skin is warm and dry.      Capillary Refill: Capillary refill takes less than 2 seconds.   Neurological:      Mental Status: She is alert.   Psychiatric:         Mood and Affect: Mood normal.           Laboratory Results:  Results from last 7 days   Lab Units 02/28/25  0829   WHITE BLOOD CELL COUNT. thou/cmm 6.0   HEMOGLOBIN. g/dL 12.3   HEMATOCRIT. % 37.3   PLATELETS. thou/cmm 284   POTASSIUM mmol/L 5.5*  5.5*   CHLORIDE mmol/L 99*  99*   CO2 mmol/L 33*  33*   BUN mg/dL 52*  52*   CREATININE mg/dL 1.84*  1.84*   CALCIUM mg/dL 9.5  9.5   XMAGNESIUM mg/dL 2.1   MAGNESIUM mg/dL 2.1   PHOSPHORUS mg/dL 4.6       Results for orders placed or performed in visit on 02/28/25   Albumin / creatinine urine ratio   Result Value Ref Range    Creatinine(Crt),U 80.4 50.0 - 200.0 mg/dL    Albumin,U,Random 0.9 <3.0 mg/dL    Microalb/Creat Ratio 11.2 <30.0 mg/gm CREA     *Note: Due to a large number of results and/or encounters for the requested time period, some results have not been displayed. A complete set of results can be found in Results Review.

## 2025-03-04 NOTE — ASSESSMENT & PLAN NOTE
Lab Results   Component Value Date    EGFR 26 (L) 02/28/2025    EGFR 26 (L) 02/28/2025    EGFR 28 (L) 02/14/2025    CREATININE 1.84 (H) 02/28/2025    CREATININE 1.84 (H) 02/28/2025    CREATININE 1.75 (H) 02/14/2025       Orders:    Basic metabolic panel; Future    Comprehensive metabolic panel; Future    CBC and Platelet; Future    Albumin / creatinine urine ratio; Future    Urinalysis with microscopic; Future    PTH, intact; Future    Phosphorus; Future    Uric acid; Future    Magnesium; Future

## 2025-03-19 LAB
ANION GAP SERPL CALCULATED.3IONS-SCNC: 13 MMOL/L (ref 3–11)
BUN SERPL-MCNC: 74 MG/DL (ref 7–25)
CALCIUM SERPL-MCNC: 9.3 MG/DL (ref 8.5–10.5)
CHLORIDE SERPL-SCNC: 102 MMOL/L (ref 100–109)
CO2 SERPL-SCNC: 27 MMOL/L (ref 21–31)
CREAT SERPL-MCNC: 2.05 MG/DL (ref 0.4–1.1)
CYTOLOGY CMNT CVX/VAG CYTO-IMP: ABNORMAL
GFR/BSA.PRED SERPLBLD CYS-BASED-ARV: 23 ML/MIN/{1.73_M2}
GLUCOSE SERPL-MCNC: 166 MG/DL (ref 65–99)
POTASSIUM SERPL-SCNC: 4.9 MMOL/L (ref 3.5–5.2)
SODIUM SERPL-SCNC: 142 MMOL/L (ref 135–145)

## 2025-03-20 ENCOUNTER — RESULTS FOLLOW-UP (OUTPATIENT)
Dept: NEPHROLOGY | Facility: CLINIC | Age: 89
End: 2025-03-20

## 2025-03-20 DIAGNOSIS — N18.4 STAGE 4 CHRONIC KIDNEY DISEASE (HCC): Primary | ICD-10-CM

## 2025-03-20 DIAGNOSIS — E87.1 HYPONATREMIA: ICD-10-CM

## 2025-03-20 DIAGNOSIS — I10 HYPERTENSION, UNSPECIFIED TYPE: ICD-10-CM

## 2025-03-20 NOTE — TELEPHONE ENCOUNTER
Spoke to pt regarding the following message     Lukas Montano, DO   Her creatinine is slightly elevated from previous but we did increase her Bumex dose.  If she is stable we can just monitor and repeat a BMP again in 2 weeks.  If she is having any issues please let me know and we may need to adjust her diuretics thanks.     Pt verbalized understanding. BMP mailed to pt.

## 2025-03-25 ENCOUNTER — TELEPHONE (OUTPATIENT)
Age: 89
End: 2025-03-25

## 2025-03-25 NOTE — TELEPHONE ENCOUNTER
LVM for pt regarding the following message per Dr. Montano     YEs that will be fine from my standpoint but I would confirm this with her cardiologist.  The entresto helps optimize her cardiac function so good idea to discuss with her cardiologist. Thanks     Asked pt to please give us a call back with any questions or concerns.

## 2025-03-25 NOTE — TELEPHONE ENCOUNTER
YEs that will be fine from my standpoint but I would confirm this with her cardiologist.  The entresto helps optimize her cardiac function so good idea to discuss with her cardiologist. thanks

## 2025-03-25 NOTE — TELEPHONE ENCOUNTER
Patient called to ask Dr. Montano a question about her medication. She states she currently takes Entresto BID. She asked if Dr. Montano is okay with her stopping entresto twice daily and start taking it once daily to see if that lowers her creatinine?  Please advise

## 2025-03-27 ENCOUNTER — OFFICE VISIT (OUTPATIENT)
Dept: CARDIOLOGY | Facility: CLINIC | Age: 89
End: 2025-03-27
Payer: MEDICARE

## 2025-03-27 VITALS
BODY MASS INDEX: 36.49 KG/M2 | WEIGHT: 219 LBS | HEART RATE: 84 BPM | SYSTOLIC BLOOD PRESSURE: 122 MMHG | DIASTOLIC BLOOD PRESSURE: 80 MMHG | HEIGHT: 65 IN | OXYGEN SATURATION: 99 %

## 2025-03-27 DIAGNOSIS — I10 HYPERTENSION, UNSPECIFIED TYPE: ICD-10-CM

## 2025-03-27 DIAGNOSIS — I48.20 CHRONIC ATRIAL FIBRILLATION (CMS/HCC): ICD-10-CM

## 2025-03-27 DIAGNOSIS — Z95.2 S/P AVR: Primary | ICD-10-CM

## 2025-03-27 DIAGNOSIS — I44.2 CHB (COMPLETE HEART BLOCK) (CMS/HCC): ICD-10-CM

## 2025-03-27 DIAGNOSIS — Z45.018 BIVENTRICULAR PACEMAKER CHECK: ICD-10-CM

## 2025-03-27 PROCEDURE — 99214 OFFICE O/P EST MOD 30 MIN: CPT | Performed by: INTERNAL MEDICINE

## 2025-03-27 RX ORDER — BUMETANIDE 0.5 MG/1
0.5 TABLET ORAL DAILY
COMMUNITY
Start: 2024-12-27

## 2025-03-27 ASSESSMENT — ENCOUNTER SYMPTOMS
WEAKNESS: 0
DYSPNEA ON EXERTION: 0
WEIGHT GAIN: 0
GASTROINTESTINAL NEGATIVE: 1
BACK PAIN: 1
HEMATOLOGIC/LYMPHATIC NEGATIVE: 1
PSYCHIATRIC NEGATIVE: 1
EYES NEGATIVE: 1
ENDOCRINE NEGATIVE: 1
SHORTNESS OF BREATH: 0

## 2025-03-27 NOTE — ASSESSMENT & PLAN NOTE
She underwent an AV node ablation in 2013. She is asymptomatic with the chronic atrial fibrillation.  She continues with complete heart block and a pacemaker in place.  Compliant with warfarin.

## 2025-03-27 NOTE — ASSESSMENT & PLAN NOTE
She underwent implantation of a Biotronik biventricular pacemaker in May 2018.  Her previous RV lead was malfunctioning therefore a new RV lead was implanted along with the new LV lead.  Her previous RV lead is abandoned and capped.       Biotronik Etrinsa implanted May 31, 2018.  RV threshold 0.8V at 0.4 ms lead impedance is 682 ohms. LV threshold 1.0V at 0.4 ms (Lv tip->LV ring)and lead impedance 1267 ohms, elevated by stable.. Chronic atrial fibrillation. parameters VVIR 85 bpm. BiV pacing is 100%.  Underlying rhythm is atrial fibrillation with complete heart block.  Battery longevity is 4years/6months.

## 2025-03-27 NOTE — ASSESSMENT & PLAN NOTE
S/p AVR 1998. 2022 echocardiogram showed no prosthetic valve regurgitation. No bioprosthetic stenosis   She denies symptoms of CHF, chest pain or increasing SOB. Followed by Dr. Perdue

## 2025-03-27 NOTE — PROGRESS NOTES
Electrophysiology  Outpatient Progress Note       Reason for visit:   Chief Complaint   Patient presents with    Follow-up       HPI   Ailyn St is a 89 y.o. female who is seen today for follow up of her implanted biventricular pacemaker indicated for complete heart block during AV node ablation in 2013.  She has chronic atrial fibrillation.  She had a new LV lead along with a new RV lead placed in 2018.  Her previous RV lead had chronically elevated thresholds and had been turned off due to this.  That RV lead is abandoned and capped.  The atrial port is capped.     In 2019 she underwent a cardiac catheterization at an outside institution which showed no obstructive disease. Most recent echocardiogram showed normal EF.  She had been having increasing lower extremity edema and abdominal distention, with previous intolerance to loop diuretics.  She started Farxiga saw improvement.   She is chronically bothered by back issues secondary to recent vertebral fracture.    In the office today she reports feeling well from a cardiac standpoint. She feels however that she is having side effects from Entresto including back pain and fatigue. She plans on calling her cardiologist to discuss.     Past Medical History:   Diagnosis Date    Abnormal ECG     Anticoagulated on Coumadin 12/20/2018    Arthritis     Fatigue 7/16/2018    GERD (gastroesophageal reflux disease)     Hypertension     Osteoporosis     S/P AVR 8/8/2018    SOB (shortness of breath) 12/31/2018    Type 2 diabetes mellitus (CMS/[facility])      Past Surgical History   Procedure Laterality Date    Adenoidectomy      Aortic valve replacement  1998    Biventricular ppm gen change  5/31/2018    Performed by Simon Mcknight MD at [facility] [place] CARDIAC CATH/EP    Cardiac catheterization      Cholecystectomy      Insert single lead to existing PPM/ICD N/A 5/31/2018    Performed by Simon Mcknight MD at [facility] [place] CARDIAC CATH/EP    Tonsillectomy      Total abdominal hysterectomy  w/ bilateral salpingoophorectomy         Social History     Tobacco Use    Smoking status: Former     Current packs/day: 0.00     Types: Cigarettes     Quit date:      Years since quittin.3    Smokeless tobacco: Never    Tobacco comments:     stoppe in    Vaping Use    Vaping status: Never Used   Substance Use Topics    Alcohol use: Yes     Comment: rarely    Drug use: Defer     Family History   Problem Relation Name Age of Onset    Diabetes Biological Mother      Heart disease Biological Father         ALLERGY:  Penicillins, Colace [docusate sodium], Entresto [sacubitril-valsartan], Hydrochlorothiazide, Losartan potassium, Metformin, Requip [ropinirole], Sulfa (sulfonamide antibiotics), Valsartan, and Latex    Current Outpatient Medications   Medication Sig Dispense Refill    acetaminophen/diphenhydramine (TYLENOL PM EXTRA STRENGTH ORAL) Take by mouth as needed (3 times a day).      allopurinoL (ZYLOPRIM) 100 mg tablet Take 100 mg by mouth 2 (two) times a day.      ALPRAZolam (XANAX) 0.5 mg tablet Take 0.5 mg by mouth 3 times daily as needed.      aspirin 81 mg capsule Take by mouth.      B-complex with vitamin C tablet Take 1 tablet by mouth daily.      bumetanide (BUMEX) 0.5 mg tablet Take 0.5 mg by mouth daily. Alternating 1 tab then 2 tabs next day      calcium carbonate-vitamin D3 250-125 mg-unit tablet Take by mouth daily.      cholecalciferol, vitamin D3, 1,000 unit tablet Take 1,000 Units by mouth daily.      denosumab (PROLIA) 60 mg/mL syringe Inject 60 mg under the skin every 6 (six) months.      GVOKE HYPOPEN 2-PACK 1 mg/0.2 mL auto-injector subcutaneous auto-injector Inject 1 mg under the skin as needed.      insulin aspart U-100 (NovoLOG) 100 unit/mL subcutaneous pen Inject under the skin 3 (three) times a day before meals. Sliding scale as follows:  160-190 give 3 units, 191-210 give 5 units, 211-240 give 7 units, 241-270 give 9 units, greater than 271 give 10 units and recheck sugars in  2-3 hours      insulin detemir U-100 (LEVEMIR) 100 unit/mL (3 mL) subcutaneous pen Inject 22 Units under the skin daily. In morning      latanoprostene bunod (VYZULTA) 0.024 % drops Administer 1 drop into affected eye(s) nightly. In both eyes      lidocaine (LIDODERM) 5 % patch Apply 1 patch topically daily.      MAGNESIUM ORAL Take 750 mg by mouth daily.      metoprolol succinate XL (TOPROL-XL) 50 mg 24 hr tablet Take 50 mg by mouth daily.      NON FORMULARY MEDICATION REQUEST Take 1 tablet by mouth 4 (four) times a day. Hydro Eye supplement      RABEprazole (ACIPHEX) 20 mg EC tablet Take 20 mg by mouth daily.      vit A/vit C/vit E/zinc/copper (PRESERVISION AREDS ORAL) Take by mouth 2 (two) times a day.      vit C/E/Zn/coppr/lutein/zeaxan (PRESERVISION AREDS-2 ORAL) Take by mouth.      vitamin E acetate (VITAMIN E ORAL) Take by mouth daily.      warfarin (COUMADIN) 5 mg tablet Take 5 mg by mouth 2 (two) times a week (Mon, Thu).      bimatoprost (LUMIGAN) 0.01 % ophthalmic drops 1 drop nightly.      FARXIGA 10 mg tablet tablet Take 10 mg by mouth daily.      ferrous sulfate 325 mg (65 mg iron) EC tablet Take 325 mg by mouth daily with breakfast.      furosemide (LASIX) 40 mg tablet Take 40 mg by mouth daily.      gabapentin (NEURONTIN) 100 mg capsule Take 200 mg by mouth nightly.      gabapentin (NEURONTIN) 300 mg capsule Take 300 mg by mouth nightly.      HYDROcodone-acetaminophen (XODOL) 7.5-300 mg per tablet Take 1 tablet by mouth every 6 (six) hours as needed for moderate pain (taken at night).        indapamide (LOZOL) 2.5 mg tablet Take 2.5 mg by mouth daily.      methocarbamol (ROBAXIN) 750 mg tablet Take 750 mg by mouth 2 (two) times a day.      pantoprazole (PROTONIX) 40 mg EC tablet Take 40 mg by mouth. Normally takes aciphex 30 mg daily.      spironolactone (ALDACTONE) 50 mg tablet Take 50 mg by mouth once daily.      tolterodine (DETROL) 2 mg tablet Take 2 mg by mouth nightly.      warfarin (COUMADIN)  2.5 mg tablet Take 3.75 mg by mouth once daily. Takes 3.75 mg five times weekly       No current facility-administered medications for this visit.       Review of Systems   Constitutional: Positive for malaise/fatigue. Negative for weight gain.   HENT: Negative.     Eyes: Negative.    Cardiovascular:  Negative for dyspnea on exertion.   Respiratory:  Negative for shortness of breath.    Endocrine: Negative.    Hematologic/Lymphatic: Negative.    Skin: Negative.    Musculoskeletal:  Positive for back pain. Negative for muscle weakness.   Gastrointestinal: Negative.    Genitourinary: Negative.    Neurological:  Negative for weakness.   Psychiatric/Behavioral: Negative.         Objective   Vitals:    03/27/25 1142   BP: 122/80   Pulse: 84   SpO2: 99%             BP Readings from Last 3 Encounters:   03/27/25 122/80   08/22/24 138/68   09/28/23 (!) 144/70     Physical Exam  HENT:      Head: Normocephalic.   Eyes:      Conjunctiva/sclera: Conjunctivae normal.      Pupils: Pupils are equal, round, and reactive to light.   Cardiovascular:      Rate and Rhythm: Normal rate and regular rhythm.      Heart sounds: Normal heart sounds.   Pulmonary:      Effort: Pulmonary effort is normal.      Breath sounds: Normal breath sounds.   Abdominal:      General: Bowel sounds are normal. There is no distension.      Palpations: Abdomen is soft.   Musculoskeletal:         General: Normal range of motion.      Cervical back: Normal range of motion.   Skin:     General: Skin is warm and dry.      Comments: Incision well-healed.    Neurological:      Mental Status: She is alert and oriented to person, place, and time.   Psychiatric:         Mood and Affect: Mood is not anxious. Affect is not blunt.         Lab Results   Component Value Date    WBC 6.87 06/01/2018    HGB 10.8 (L) 06/01/2018     06/01/2018     06/01/2018    K 3.7 06/01/2018    CREATININE 1.1 06/01/2018    INR 1.4 06/01/2018     Cardiovascular  Procedures:    ECHO/MUGA:  Echo (Scanned) - 9/7/2012  Echo 6/13/2019 - EF 55%; Normal AVR   Echo 2022 at [facility]     Left ventricle is small. There is mild concentric hypertrophy. Systolic   function is hyperdynamic with an ejection fraction over 65%. Wall motion   is within normal limits. Unable to assess diastolic function due to   pacemaker. Suspect the patient has underlying diastolic dysfunction given   her left atrial enlargement and LVH.     Right ventricle cavity is mildly dilated. Systolic function is normal.     Left atrium volume index is mildly increased.     Right atrium cavity is mildly dilated. A pacemaker wire is present in   the right atrium.     S/P Mechanical AVR ([place] 1995). No prosthetic valve regurgitation.   No bioprosthetic stenosis (peak velocity 3.6 m/s, mean gradient 28 mmHg,   DI 0.33, triangular CW jet, AT < 100 ms).     Thickened and calcified mitral valve leaflets. Mitral annular   calcification. There is mild regurgitation. Mild mitral valve stenosis   (mean gradient 3.4 mmHg at a heart rate of 85 bpm).     Tricuspid valve structure is normal. There is mild regurgitation.     Normal pulmonary artery pressure.      ELECTROPHYSIOLOGY:  Devices (Bi-Ventricular PPM (Biotronik-Evia), Evia Dr-T serial# [id number] PID [id number]. DDDR 80-130ppm. Normal function. Changes made=sensor rate and gain. Base rate decreased to 75ppm.) - 10/30/2013   RFA (Indication A Fib, AVN ablation with pacemaker implantation) - 10/22/2013     STRESS TESTS:  Unruly MPI (Scanned) - 10/24/2011   ETT (Lexiscan. Had SOB and lightheadedness during recoevry, otherwise normal.) - 1/22/2016    CT SURGERY-   AVR 1998 - mechanical    Catheterization 1/23/19 ([facility])  HEMODYNAMICS: There was mild pulmonary hypertension. RA 10mmHg, RV 45/10, PA 45/25 mean 32, PCWP 22 with v wave, Pa sat 68.8%, CO 4.5 L/min, CI 2.1 L/min/m2  VALVES:  AORTIC VALVE:   --  A bileaflet mechanical prosthesis was observed in the aortic  position. Both leaflet open well.  CORONARY VESSELS:   --  The coronary circulation is right dominant.  --  Left main: Normal.  --  LAD: Angiography showed minor luminal irregularities.  --  Circumflex: Normal.  --  RCA: Angiography showed minor luminal irregularities.    ECG-She declined ECG    Assessment   Problem List Items Addressed This Visit          Circulatory    CHB (complete heart block) (CMS/[facility])    She remains in complete heart block following her AV node ablation for uncontrolled atrial fibrillation.  A normal functioning pacemaker is in place.         Complication of Procedure: Insert single lead to existing PPM/ICD (05/31/2018)    Complication of Admission: Admission (02/19/2018)    Chronic atrial fibrillation (CMS/[facility])    She underwent an AV node ablation in 2013. She is asymptomatic with the chronic atrial fibrillation.  She continues with complete heart block and a pacemaker in place.  Compliant with warfarin.          Relevant Medications    aspirin 81 mg capsule    Hypertension    She reports adequate blood pressure control at home and seen here in the office today         Relevant Medications    bumetanide (BUMEX) 0.5 mg tablet    S/P AVR - Primary    S/p AVR 1998. 2022 echocardiogram showed no prosthetic valve regurgitation. No bioprosthetic stenosis   She denies symptoms of CHF, chest pain or increasing SOB. Followed by Dr. Perdue            Other    Biventricular pacemaker check    She underwent implantation of a Biotronik biventricular pacemaker in May 2018.  Her previous RV lead was malfunctioning therefore a new RV lead was implanted along with the new LV lead.  Her previous RV lead is abandoned and capped.       Biotronik Etrinsa implanted May 31, 2018.  RV threshold 0.8V at 0.4 ms lead impedance is 682 ohms. LV threshold 1.0V at 0.4 ms (Lv tip->LV ring)and lead impedance 1267 ohms, elevated by stable.. Chronic atrial fibrillation. parameters VVIR 85 bpm. BiV pacing is 100%.  Underlying  rhythm is atrial fibrillation with complete heart block.  Battery longevity is 4years/6months.                      Simon Mcknight MD  03/27/2025

## 2025-04-08 ENCOUNTER — TELEPHONE (OUTPATIENT)
Dept: NEPHROLOGY | Facility: CLINIC | Age: 89
End: 2025-04-08

## 2025-04-08 LAB
ANION GAP SERPL CALCULATED.3IONS-SCNC: 10 MMOL/L (ref 3–11)
BUN SERPL-MCNC: 55 MG/DL (ref 7–25)
CALCIUM SERPL-MCNC: 9.4 MG/DL (ref 8.5–10.5)
CHLORIDE SERPL-SCNC: 98 MMOL/L (ref 100–109)
CO2 SERPL-SCNC: 30 MMOL/L (ref 21–31)
CREAT SERPL-MCNC: 1.6 MG/DL (ref 0.4–1.1)
CYTOLOGY CMNT CVX/VAG CYTO-IMP: ABNORMAL
GFR/BSA.PRED SERPLBLD CYS-BASED-ARV: 31 ML/MIN/{1.73_M2}
GLUCOSE SERPL-MCNC: 174 MG/DL (ref 65–99)
POTASSIUM SERPL-SCNC: 5.8 MMOL/L (ref 3.5–5.2)
SODIUM SERPL-SCNC: 138 MMOL/L (ref 135–145)

## 2025-04-08 NOTE — TELEPHONE ENCOUNTER
Pt has 8/25/25 appt with Dr. Montano that needs to be rescheduled. If pt calls back please transfer to office staff.

## 2025-04-11 ENCOUNTER — RESULTS FOLLOW-UP (OUTPATIENT)
Dept: NEPHROLOGY | Facility: CLINIC | Age: 89
End: 2025-04-11

## 2025-04-11 DIAGNOSIS — N18.4 STAGE 4 CHRONIC KIDNEY DISEASE (HCC): Primary | ICD-10-CM

## 2025-04-11 DIAGNOSIS — E87.5 HYPERKALEMIA: Primary | ICD-10-CM

## 2025-04-11 NOTE — TELEPHONE ENCOUNTER
Spoke with pt regarding lab results. Pt also would like to reschedule appointment with Dr. Montano on 8/26. Let pt know we will give her a call back once schedule is finalized. Pt can only do Tuesday.

## 2025-04-11 NOTE — TELEPHONE ENCOUNTER
Spoke to pt regarding the following message      Lukas Montano, DO   Overall stable renal function.  No changes if she otherwise feels well     Pt stated she noticed her potassium levels were high. Please advise. Pt also stated since taking Entresto once a day she has noticed a good difference in her creatinine and wanted to thank Dr. Montano.

## 2025-04-11 NOTE — TELEPHONE ENCOUNTER
Spoke with pt regarding the following message     Lukas Montano, DO   Yes it went down and then back up again.  I think its important to try to maintain the entresto.  I can order lokelma which is a potassium binder which may help lower this.  I can send to pharmacy if she agrees and have her repeat a bmp in 1 week leander  Nks     Pt verbalized understanding. Pt confirmed she is willing to try Lokelma.

## 2025-04-14 ENCOUNTER — TELEPHONE (OUTPATIENT)
Age: 89
End: 2025-04-14

## 2025-04-14 NOTE — TELEPHONE ENCOUNTER
Patient asking if we can mail her BMP order ASAP she is going for labs Friday and will need the slip.

## 2025-04-17 NOTE — TELEPHONE ENCOUNTER
Pt called to report that she has not yet received the lab orders via mail. Asked if it can be faxed over to [facility]. Called [facility] located in the [facility] and asked for their fax number, it was given [phone]

## 2025-04-18 LAB
ANION GAP SERPL CALCULATED.3IONS-SCNC: 14 MMOL/L (ref 3–11)
BUN SERPL-MCNC: 52 MG/DL (ref 7–25)
CALCIUM SERPL-MCNC: 9.2 MG/DL (ref 8.5–10.5)
CHLORIDE SERPL-SCNC: 100 MMOL/L (ref 100–109)
CO2 SERPL-SCNC: 29 MMOL/L (ref 21–31)
CREAT SERPL-MCNC: 1.66 MG/DL (ref 0.4–1.1)
CYTOLOGY CMNT CVX/VAG CYTO-IMP: ABNORMAL
GFR/BSA.PRED SERPLBLD CYS-BASED-ARV: 29 ML/MIN/{1.73_M2}
GLUCOSE SERPL-MCNC: 153 MG/DL (ref 65–99)
POTASSIUM SERPL-SCNC: 5.1 MMOL/L (ref 3.5–5.2)
SODIUM SERPL-SCNC: 143 MMOL/L (ref 135–145)

## 2025-04-18 NOTE — TELEPHONE ENCOUNTER
Patient currently at lab and said orders were not received    Re faxed via [employer] to [phone]   Fax was not confirmed yet.    Please fax.

## 2025-04-23 ENCOUNTER — RESULTS FOLLOW-UP (OUTPATIENT)
Dept: NEPHROLOGY | Facility: CLINIC | Age: 89
End: 2025-04-23

## 2025-04-23 DIAGNOSIS — E87.1 HYPONATREMIA: ICD-10-CM

## 2025-04-23 DIAGNOSIS — N18.4 STAGE 4 CHRONIC KIDNEY DISEASE (HCC): Primary | ICD-10-CM

## 2025-04-23 NOTE — TELEPHONE ENCOUNTER
Spoke with pt regarding the following message      Lukas Montano, DO  I reviewed Cristy's blood work. Looks like potassium is much better at 5.1 electrolytes are stable.  Can you confirm she started lokelma.  Also if she started lets repeat a bmp in 2 weeks to make sure this is stable.  Thanks.     Pt verbalized understanding. Mailed lab slip to pt.

## 2025-05-09 ENCOUNTER — RESULTS FOLLOW-UP (OUTPATIENT)
Dept: NEPHROLOGY | Facility: CLINIC | Age: 89
End: 2025-05-09

## 2025-05-09 LAB
ANION GAP SERPL CALCULATED.3IONS-SCNC: 11 MMOL/L (ref 3–11)
BUN SERPL-MCNC: 70 MG/DL (ref 7–25)
CALCIUM SERPL-MCNC: 9.4 MG/DL (ref 8.5–10.5)
CHLORIDE SERPL-SCNC: 103 MMOL/L (ref 100–109)
CO2 SERPL-SCNC: 29 MMOL/L (ref 21–31)
CREAT SERPL-MCNC: 1.87 MG/DL (ref 0.4–1.1)
CYTOLOGY CMNT CVX/VAG CYTO-IMP: ABNORMAL
GFR/BSA.PRED SERPLBLD CYS-BASED-ARV: 25 ML/MIN/{1.73_M2}
GLUCOSE SERPL-MCNC: 154 MG/DL (ref 65–99)
POTASSIUM SERPL-SCNC: 5 MMOL/L (ref 3.5–5.2)
SODIUM SERPL-SCNC: 143 MMOL/L (ref 135–145)

## 2025-05-09 NOTE — TELEPHONE ENCOUNTER
Spoke with pt regarding the following message      Lukas Montano, DO   Her potassium remains stable at 5.  She should be on the Lokelma now and the Entresto.  Her creatinine is 1.87 relatively stable.  No changes at this time.     Pt verbalized understanding. Pt confirmed she is on Lokelma and Entresto.

## 2025-06-11 NOTE — NURSING NOTE
RR notified of findings regarding the CT scan  NO new order at this time      Dr J Carlos Portillo notified RN regarding the results and received orders to discontinue heparin drip and PTT lab in the am  No. EARLENE screening performed.  STOP BANG Legend: 0-2 = LOW Risk; 3-4 = INTERMEDIATE Risk; 5-8 = HIGH Risk

## 2025-06-26 LAB
ALBUMIN SERPL-MCNC: 3.9 G/DL (ref 3.5–5.7)
ALBUMIN/CREAT UR: 36.9
ALP SERPL-CCNC: 121 U/L (ref 35–120)
ALT SERPL-CCNC: 29 U/L
ANION GAP SERPL CALCULATED.3IONS-SCNC: 10 MMOL/L (ref 3–11)
AST SERPL-CCNC: 26 U/L
BACTERIA URNS QL MICRO: ABNORMAL
BASOPHILS # BLD AUTO: 0.1 THOU/CMM (ref 0–0.1)
BASOPHILS NFR BLD AUTO: 1 %
BILIRUB SERPL-MCNC: 0.7 MG/DL (ref 0.2–1)
BUN SERPL-MCNC: 51 MG/DL (ref 7–25)
CALCIUM SERPL-MCNC: 8.4 MG/DL (ref 8.5–10.5)
CHLORIDE SERPL-SCNC: 105 MMOL/L (ref 100–109)
CO2 SERPL-SCNC: 28 MMOL/L (ref 21–31)
CREAT SERPL-MCNC: 1.42 MG/DL (ref 0.4–1.1)
CREAT UR-MCNC: 43.4 MG/DL (ref 50–200)
CYTOLOGY CMNT CVX/VAG CYTO-IMP: ABNORMAL
DIFFERENTIAL METHOD BLD: ABNORMAL
EOSINOPHIL # BLD AUTO: 0.1 THOU/CMM (ref 0–0.5)
EOSINOPHIL NFR BLD AUTO: 2 %
ERYTHROCYTE [DISTWIDTH] IN BLOOD BY AUTOMATED COUNT: 15.4 % (ref 12–16)
GFR/BSA.PRED SERPLBLD CYS-BASED-ARV: 35 ML/MIN/{1.73_M2}
GLUCOSE SERPL-MCNC: 145 MG/DL (ref 65–99)
GLUCOSE UR QL STRIP: NEGATIVE MG/DL
HCT VFR BLD AUTO: 34.8 % (ref 35–43)
HGB BLD-MCNC: 11.4 G/DL (ref 11.5–14.5)
HGB UR QL STRIP: NEGATIVE MG/DL
HYALINE CASTS URNS QL MICRO: ABNORMAL /LPF (ref 0–2)
KETONES UR QL STRIP: NEGATIVE MG/DL
LEUKOCYTE ESTERASE UR QL STRIP: NEGATIVE /UL
LYMPHOCYTES # BLD AUTO: 0.9 THOU/CMM (ref 1–3)
LYMPHOCYTES NFR BLD AUTO: 14 %
MAGNESIUM SERPL-MCNC: 2.1 MG/DL (ref 1.4–2.2)
MCH RBC QN AUTO: 30.7 PG (ref 26–34)
MCHC RBC AUTO-ENTMCNC: 32.7 G/DL (ref 32–37)
MCV RBC AUTO: 94 FL (ref 80–100)
MICROALBUMIN UR-MCNC: 1.6 MG/DL
MONOCYTES # BLD AUTO: 0.5 THOU/CMM (ref 0.3–1)
MONOCYTES NFR BLD AUTO: 8 %
MUCOUS THREADS URNS QL MICRO: ABNORMAL
NEUTROPHILS # BLD AUTO: 4.7 THOU/CMM (ref 1.8–7.8)
NEUTROPHILS NFR BLD AUTO: 75 %
NITRITE UR QL STRIP: NEGATIVE
PH UR: 5 [PH] (ref 4.5–8)
PHOSPHATE SERPL-MCNC: 3.5 MG/DL (ref 2.3–4.6)
PLATELET # BLD AUTO: 222 THOU/CMM (ref 140–350)
PMV BLD REES-ECKER: 8 FL (ref 7.5–11.3)
POTASSIUM SERPL-SCNC: 4.7 MMOL/L (ref 3.5–5.2)
PROT 24H UR-MRATE: NEGATIVE MG/DL
PROT SERPL-MCNC: 7.2 G/DL (ref 6.3–8.3)
RBC # BLD AUTO: 3.71 MILL/CMM (ref 3.7–4.7)
RBC #/AREA URNS HPF: 0 /HPF (ref 0–2)
SL AMB POCT URINE COMMENT: ABNORMAL
SODIUM SERPL-SCNC: 143 MMOL/L (ref 135–145)
SP GR UR: 1.01 (ref 1–1.03)
URATE SERPL-MCNC: 6.1 MG/DL (ref 2.4–6)
WBC # BLD AUTO: 6.3 THOU/CMM (ref 4–10)
WBC #/AREA URNS HPF: 0 /HPF (ref 0–5)

## 2025-07-21 ENCOUNTER — TELEPHONE (OUTPATIENT)
Dept: NEPHROLOGY | Facility: CLINIC | Age: 89
End: 2025-07-21

## 2025-07-29 ENCOUNTER — OFFICE VISIT (OUTPATIENT)
Dept: NEPHROLOGY | Facility: CLINIC | Age: 89
End: 2025-07-29
Payer: MEDICARE

## 2025-07-29 ENCOUNTER — TELEPHONE (OUTPATIENT)
Age: 89
End: 2025-07-29

## 2025-07-29 VITALS
SYSTOLIC BLOOD PRESSURE: 122 MMHG | DIASTOLIC BLOOD PRESSURE: 74 MMHG | BODY MASS INDEX: 38.99 KG/M2 | HEIGHT: 65 IN | WEIGHT: 234 LBS | HEART RATE: 71 BPM

## 2025-07-29 DIAGNOSIS — I10 HYPERTENSION, UNSPECIFIED TYPE: Primary | Chronic | ICD-10-CM

## 2025-07-29 DIAGNOSIS — E11.29 TYPE 2 DIABETES MELLITUS WITH DIABETIC MICROALBUMINURIA, WITH LONG-TERM CURRENT USE OF INSULIN (HCC): ICD-10-CM

## 2025-07-29 DIAGNOSIS — E87.5 HYPERKALEMIA: ICD-10-CM

## 2025-07-29 DIAGNOSIS — R80.1 PERSISTENT PROTEINURIA: ICD-10-CM

## 2025-07-29 DIAGNOSIS — N18.4 ANEMIA DUE TO STAGE 4 CHRONIC KIDNEY DISEASE  (HCC): ICD-10-CM

## 2025-07-29 DIAGNOSIS — I50.32 CHRONIC DIASTOLIC CONGESTIVE HEART FAILURE (HCC): ICD-10-CM

## 2025-07-29 DIAGNOSIS — N18.32 STAGE 3B CHRONIC KIDNEY DISEASE (CKD) (HCC): ICD-10-CM

## 2025-07-29 DIAGNOSIS — R80.9 TYPE 2 DIABETES MELLITUS WITH DIABETIC MICROALBUMINURIA, WITH LONG-TERM CURRENT USE OF INSULIN (HCC): ICD-10-CM

## 2025-07-29 DIAGNOSIS — D63.1 ANEMIA DUE TO STAGE 4 CHRONIC KIDNEY DISEASE  (HCC): ICD-10-CM

## 2025-07-29 DIAGNOSIS — Z79.4 TYPE 2 DIABETES MELLITUS WITH DIABETIC MICROALBUMINURIA, WITH LONG-TERM CURRENT USE OF INSULIN (HCC): ICD-10-CM

## 2025-07-29 DIAGNOSIS — I48.20 CHRONIC ATRIAL FIBRILLATION (HCC): Chronic | ICD-10-CM

## 2025-07-29 DIAGNOSIS — M15.0 PRIMARY OSTEOARTHRITIS INVOLVING MULTIPLE JOINTS: ICD-10-CM

## 2025-07-29 DIAGNOSIS — M1A.9XX0 CHRONIC GOUT WITHOUT TOPHUS, UNSPECIFIED CAUSE, UNSPECIFIED SITE: Chronic | ICD-10-CM

## 2025-07-29 PROCEDURE — G2211 COMPLEX E/M VISIT ADD ON: HCPCS | Performed by: INTERNAL MEDICINE

## 2025-07-29 PROCEDURE — 99214 OFFICE O/P EST MOD 30 MIN: CPT | Performed by: INTERNAL MEDICINE

## (undated) DEVICE — SINGLE-USE BIOPSY FORCEPS: Brand: RADIAL JAW 4

## (undated) DEVICE — SINGLE-USE POLYPECTOMY SNARE: Brand: ROTATABLE SNARE

## (undated) DEVICE — WORKING LENGTH 235CM, WORKING CHANNEL 2.8MM: Brand: RESOLUTION 360 CLIP